# Patient Record
Sex: FEMALE | Race: BLACK OR AFRICAN AMERICAN | NOT HISPANIC OR LATINO | Employment: OTHER | URBAN - METROPOLITAN AREA
[De-identification: names, ages, dates, MRNs, and addresses within clinical notes are randomized per-mention and may not be internally consistent; named-entity substitution may affect disease eponyms.]

---

## 2017-01-03 ENCOUNTER — TELEPHONE (OUTPATIENT)
Dept: OBSTETRICS AND GYNECOLOGY | Facility: CLINIC | Age: 77
End: 2017-01-03

## 2017-01-03 NOTE — TELEPHONE ENCOUNTER
----- Message from Shalonda Sherwood sent at 1/3/2017 11:50 AM CST -----  Contact: Patient  Patient called to schedule her Mammo for tomorrow when she comes in. She can be contacted at [phone] cell.    Thanks,  Shalonda

## 2017-01-04 ENCOUNTER — HOSPITAL ENCOUNTER (OUTPATIENT)
Dept: RADIOLOGY | Facility: HOSPITAL | Age: 77
Discharge: HOME | End: 2017-01-04
Attending: OBSTETRICS & GYNECOLOGY
Payer: MEDICARE

## 2017-01-04 ENCOUNTER — OFFICE VISIT (OUTPATIENT)
Dept: OBSTETRICS AND GYNECOLOGY | Facility: CLINIC | Age: 77
End: 2017-01-04
Payer: MEDICARE

## 2017-01-04 VITALS
WEIGHT: 164.44 LBS | SYSTOLIC BLOOD PRESSURE: 136 MMHG | HEIGHT: 66 IN | DIASTOLIC BLOOD PRESSURE: 84 MMHG | BODY MASS INDEX: 26.43 KG/M2

## 2017-01-04 DIAGNOSIS — Z12.31 ENCOUNTER FOR SCREENING MAMMOGRAM FOR BREAST CANCER: ICD-10-CM

## 2017-01-04 DIAGNOSIS — Z12.31 ENCOUNTER FOR SCREENING MAMMOGRAM FOR BREAST CANCER: Primary | ICD-10-CM

## 2017-01-04 DIAGNOSIS — Z01.419 WELL WOMAN EXAM WITH ROUTINE GYNECOLOGICAL EXAM: Primary | ICD-10-CM

## 2017-01-04 PROCEDURE — 77067 SCR MAMMO BI INCL CAD: CPT | Mod: TC

## 2017-01-04 PROCEDURE — 77063 BREAST TOMOSYNTHESIS BI: CPT | Mod: 26,,, | Performed by: RADIOLOGY

## 2017-01-04 PROCEDURE — 99999 PR PBB SHADOW E&M-EST. PATIENT-LVL II: CPT | Mod: PBBFAC,,, | Performed by: OBSTETRICS & GYNECOLOGY

## 2017-01-04 PROCEDURE — 77067 SCR MAMMO BI INCL CAD: CPT | Mod: 26,,, | Performed by: RADIOLOGY

## 2017-01-04 PROCEDURE — G0101 CA SCREEN;PELVIC/BREAST EXAM: HCPCS | Mod: PBBFAC | Performed by: OBSTETRICS & GYNECOLOGY

## 2017-01-04 PROCEDURE — G0101 CA SCREEN;PELVIC/BREAST EXAM: HCPCS | Mod: S$PBB,,, | Performed by: OBSTETRICS & GYNECOLOGY

## 2017-01-04 NOTE — PROGRESS NOTES
CHIEF COMPLAINT   Gynecologic Exam  Chief Complaint   Patient presents with    Well Woman        HISTORY OF PRESENT ILLNESS  Patient presents for annual exam. The patient has no complaints today.  Wants to cont ERT.    No LMP recorded. Patient has had a hysterectomy. benign indications.  Ovaries remain.       Reports no bowel movement irregularities from baseline, bloating, or weight loss.    ERT:    on    Estradiol  0.5mg    Health Maintenance   Topic Date Due    Zoster Vaccine  06/19/2002    Pneumococcal (65+) (2 of 2 - PCV13) 04/18/2014    Lipid Panel  12/07/2016    Eye Exam  12/07/2016    Hemoglobin A1c  12/09/2016    Mammogram  12/30/2016    Foot Exam  06/30/2017    DEXA SCAN  11/24/2017    Colonoscopy  10/24/2021    TETANUS VACCINE  10/24/2024    Influenza Vaccine  Completed       HISTORY  Patient Active Problem List   Diagnosis    CKD (chronic kidney disease), stage III    Pancreatic cancer    Type II diabetes mellitus    Hypertension    Hyperlipidemia    Anemia in chronic renal disease    Allergic rhinitis due to pollen    Limb pain    SOB (shortness of breath)    Leg swelling    S/P partial thyroidectomy    Iron deficiency anemia    Hot flashes    Peripheral neuropathy       Past Medical History   Diagnosis Date    Anemia     CKD (chronic kidney disease), stage III     Diabetes mellitus type II     Encounter for blood transfusion     Gout, unspecified     Hyperlipidemia     Hypertension     Neuropathy     Pancreatic cancer     Renal failure     Thyroid disease        Past Surgical History   Procedure Laterality Date    Thyroidectomy, partial      Hysterectomy      Nephrectomy Left 5/2013     Dr Carter     Splenectomy, total      Pancreas surgery       distal pancreatectomy    Colonoscopy  2011     Dr. Favio Mims    Ventriculoatrial shunt Left 12/4/2014      left arm       Family History   Problem Relation Age of Onset    Cancer Mother      breast    Heart  disease Mother 60     MI    Hypertension Mother     Stroke Mother     Cancer Father      prostate    Cancer Brother      renal cell carcinoma    Diabetes Brother        Social History     Social History    Marital status:      Spouse name: N/A    Number of children: N/A    Years of education: N/A     Social History Main Topics    Smoking status: Former Smoker     Packs/day: 1.00     Years: 35.00     Quit date: 1/1/2001    Smokeless tobacco: Never Used    Alcohol use No    Drug use: No    Sexual activity: Not Asked     Other Topics Concern    None     Social History Narrative       Current Outpatient Prescriptions   Medication Sig Dispense Refill    allopurinol (ZYLOPRIM) 100 MG tablet TAKE 1 TABLET DAILY 90 tablet 1    amlodipine (NORVASC) 5 MG tablet Take 1 tablet (5 mg total) by mouth 2 (two) times daily. 180 tablet 3    cetirizine (ZYRTEC) 10 MG tablet Take 10 mg by mouth once daily.       estradiol (ESTRACE) 0.5 MG tablet TAKE 1 TABLET DAILY FOR 3 WEEKS PER MONTH, THEN DO NOT TAKE FOR 4TH WEEK EACH MONTH 90 tablet 2    furosemide (LASIX) 40 MG tablet Take 1 tablet (40 mg total) by mouth once daily. 30 tablet 11    gabapentin (NEURONTIN) 100 MG capsule Take 1 capsule by mouth 2 (two) times daily.      GLUCOSAMINE HCL/CHONDR ZAVALETA A NA (OSTEO BI-FLEX ORAL) Take by mouth 2 (two) times daily.        hydrALAZINE (APRESOLINE) 50 MG tablet TAKE 1 TABLET EVERY 8 HOURS 270 tablet 2    pravastatin (PRAVACHOL) 40 MG tablet Take 40 mg by mouth once daily.        repaglinide (PRANDIN) 0.5 MG tablet Take 1 tablet (0.5 mg total) by mouth 3 (three) times daily before meals. 270 tablet 2    sodium bicarbonate 650 MG tablet Take 1 tablet (650 mg total) by mouth 3 (three) times daily. 90 tablet 8    tramadol (ULTRAM) 50 mg tablet Take 1 tablet (50 mg total) by mouth every 12 (twelve) hours. 60 tablet 4     No current facility-administered medications for this visit.        Review of patient's  allergies indicates:   Allergen Reactions    Allegra [fexofenadine] Swelling    Codeine Hives, Itching and Nausea And Vomiting         PHYSICAL EXAM     Vitals:    01/04/17 1016   BP: 136/84       PAIN SCALE: 0/10 None    ROS:  GENERAL: No fever, chills, fatigability or weight loss.  ABDOMEN: Appetite fine. No weight loss. Denies diarrhea, abdominal pain, hematemesis or blood in stool.  No change in bowel movement pattern.  URINARY: No flank pain, dysuria or hematuria.  REPRODUCTIVE: No abnormal vaginal bleeding.  BREASTS: Breasts symmetric, nontender and no lumps detected.    PE:   APPEARANCE: Well nourished, well developed, in no acute distress.  NECK: Neck symmetric without masses or thyromegaly.     NODES: No inguinal lymph node enlargement.  ABDOMEN: Soft. No tenderness or masses. No hepatosplenomegaly. No hernias.  BREASTS: Symmetrical, no skin changes or visible lesions. No palpable masses, nipple discharge or adenopathy bilaterally.    PELVIC:   VULVA: No lesions. Atrophic but normal female genitalia.  URETHRAL MEATUS: Normal size and location, no lesions, no prolapse.  URETHRA: No masses, tenderness, prolapse or scarring.  PELVIC: Normal external female genitalia without lesions. Normal hair distribution. Adequate perineal body, normal urethral meatus. Vagina dry and narrow without lesions or discharge. No significant cystocele or rectocele. Uterus and cervix surgically absent. Bimanual exam revealed no masses, tenderness or abnormality.    ANUS, PERINEUM: no masses, external hemorrhoids noted.       DIAGNOSIS:   1. Annual exam  2. Physiologic atrophy      PLAN:    Mammogram  Colonoscopy  dexa    MEDICATIONS PRESCRIBED: calcium vitamin D weight bearing exercise   wean off of estraogen reviewed. Pt in agreement     COUNSELING:  Patient was counseled today on A.C.S. Pap guidelines and recommendations for yearly pelvic exams, mammograms and monthly self breast exams; to see her PCP for other health  maintenance.   A full discussion of the benefit-risk ratio of hormonal replacement therapy was carried out. Improvement in vasomotor and other climacteric symptoms is discussed, including possible improvements in sleep and mood. Reduction of risk for osteoporosis was explained. We discussed the study data showing increased risk of thrombo-embolic events such as myocardial infarction, stroke and also possibly breast cancer with estrogen replacement, and how this might affect her. The range of side effects such as breast tenderness, weight gain and including possible increases in lifetime risk of breast cancer and possible thrombotic complications was discussed. We also discussed ACOG's recommendation to use hormone replacement therapy for the relief of hot flashes alone and to be on the lowest dose possible for the shortest amount of time.  Alternative such as herbal and soy-based products were reviewed. All of her questions about this therapy were answered.  If taking hormones, pt knows and understands to discontinue immediately if she develops chest pain, heart problems, MI, DVT, CVA, breast cancer.      FOLLOW-UP: With me in 1 year.

## 2017-01-12 ENCOUNTER — LAB VISIT (OUTPATIENT)
Dept: LAB | Facility: HOSPITAL | Age: 77
End: 2017-01-12
Attending: INTERNAL MEDICINE
Payer: MEDICARE

## 2017-01-12 DIAGNOSIS — N18.5 CHRONIC KIDNEY DISEASE (CKD), STAGE V: ICD-10-CM

## 2017-01-12 LAB
ALBUMIN SERPL BCP-MCNC: 3.6 G/DL
ANION GAP SERPL CALC-SCNC: 15 MMOL/L
BUN SERPL-MCNC: 85 MG/DL
CALCIUM SERPL-MCNC: 9.6 MG/DL
CHLORIDE SERPL-SCNC: 100 MMOL/L
CO2 SERPL-SCNC: 25 MMOL/L
CREAT SERPL-MCNC: 4.4 MG/DL
EST. GFR  (AFRICAN AMERICAN): 10.7 ML/MIN/1.73 M^2
EST. GFR  (NON AFRICAN AMERICAN): 9.3 ML/MIN/1.73 M^2
GLUCOSE SERPL-MCNC: 226 MG/DL
PHOSPHATE SERPL-MCNC: 5 MG/DL
POTASSIUM SERPL-SCNC: 4.4 MMOL/L
SODIUM SERPL-SCNC: 140 MMOL/L

## 2017-01-12 PROCEDURE — 80069 RENAL FUNCTION PANEL: CPT

## 2017-01-12 PROCEDURE — 36415 COLL VENOUS BLD VENIPUNCTURE: CPT | Mod: PO

## 2017-01-30 DIAGNOSIS — M10.9 ACUTE GOUT OF FOOT, UNSPECIFIED CAUSE, UNSPECIFIED LATERALITY: ICD-10-CM

## 2017-01-30 RX ORDER — ALLOPURINOL 100 MG/1
TABLET ORAL
Qty: 90 TABLET | Refills: 2 | Status: SHIPPED | OUTPATIENT
Start: 2017-01-30 | End: 2017-11-27 | Stop reason: SDUPTHER

## 2017-02-13 ENCOUNTER — OFFICE VISIT (OUTPATIENT)
Dept: ALLERGY | Facility: CLINIC | Age: 77
End: 2017-02-13
Payer: MEDICARE

## 2017-02-13 VITALS
SYSTOLIC BLOOD PRESSURE: 138 MMHG | BODY MASS INDEX: 26.08 KG/M2 | DIASTOLIC BLOOD PRESSURE: 60 MMHG | RESPIRATION RATE: 15 BRPM | TEMPERATURE: 97 F | WEIGHT: 159.19 LBS | HEART RATE: 74 BPM

## 2017-02-13 DIAGNOSIS — E11.59 HYPERTENSION ASSOCIATED WITH DIABETES: Chronic | ICD-10-CM

## 2017-02-13 DIAGNOSIS — N18.30 CKD (CHRONIC KIDNEY DISEASE), STAGE III: ICD-10-CM

## 2017-02-13 DIAGNOSIS — J30.89 ALLERGIC RHINITIS DUE TO OTHER ALLERGEN: Primary | ICD-10-CM

## 2017-02-13 DIAGNOSIS — I15.2 HYPERTENSION ASSOCIATED WITH DIABETES: Chronic | ICD-10-CM

## 2017-02-13 DIAGNOSIS — H10.13 ALLERGIC CONJUNCTIVITIS, BILATERAL: ICD-10-CM

## 2017-02-13 DIAGNOSIS — C25.2 MALIGNANT NEOPLASM OF TAIL OF PANCREAS: ICD-10-CM

## 2017-02-13 PROCEDURE — 99214 OFFICE O/P EST MOD 30 MIN: CPT | Mod: 25,S$PBB,, | Performed by: ALLERGY & IMMUNOLOGY

## 2017-02-13 PROCEDURE — 99999 PR PBB SHADOW E&M-EST. PATIENT-LVL III: CPT | Mod: PBBFAC,,, | Performed by: ALLERGY & IMMUNOLOGY

## 2017-02-13 PROCEDURE — 96372 THER/PROPH/DIAG INJ SC/IM: CPT | Mod: PBBFAC,PO

## 2017-02-13 PROCEDURE — 99213 OFFICE O/P EST LOW 20 MIN: CPT | Mod: PBBFAC,PO | Performed by: ALLERGY & IMMUNOLOGY

## 2017-02-13 RX ORDER — BETAMETHASONE SODIUM PHOSPHATE AND BETAMETHASONE ACETATE 3; 3 MG/ML; MG/ML
6 INJECTION, SUSPENSION INTRA-ARTICULAR; INTRALESIONAL; INTRAMUSCULAR; SOFT TISSUE
Status: COMPLETED | OUTPATIENT
Start: 2017-02-13 | End: 2017-02-13

## 2017-02-13 RX ADMIN — BETAMETHASONE ACETATE AND BETAMETHASONE SODIUM PHOSPHATE 6 MG: 3; 3 INJECTION, SUSPENSION INTRA-ARTICULAR; INTRALESIONAL; INTRAMUSCULAR; SOFT TISSUE at 01:02

## 2017-02-13 NOTE — MR AVS SNAPSHOT
[facility] Allergy/ Immunology  [address] [place] [address]  Phone: [phone]  Fax: [phone]                  Cailin Pickett   2017 1:00 PM   Office Visit    Description:  Female : 1940   Provider:  Adrian Arevalo MD   Department:  [facility] - Allergy/ Immunology           Reason for Visit     Other           Diagnoses this Visit        Comments    Allergic rhinitis due to other allergen    -  Primary     Allergic conjunctivitis, bilateral                To Do List           Future Appointments        Provider Department Dept Phone    3/6/2017 9:55 AM LABORATORY, [facility] [phone]    3/6/2017 10:00 AM SPECIMEN, [facility] [phone]    3/6/2017 11:00 AM Nas Hoffman MD [facility] Hemotology Oncology [phone]    3/23/2017 11:30 AM Tank Vinson MD [facility] Nephrology [phone]      Goals (5 Years of Data)     None      [facility]     [facility] Care [facility] -  Assistance  Registered nurses in the [facility] provide clinical advisement, health education, appointment booking, and other advisory services.  Call for this free service at [phone].             Medications           Message regarding Medications     Verify the changes and/or additions to your medication regime listed below are the same as discussed with your clinician today.  If any of these changes or additions are incorrect, please notify your healthcare provider.        These medications were administered today        Dose Freq    betamethasone acetate-betamethasone sodium phosphate injection 6 mg 6 mg Clinic/HOD 1 time    Sig: Inject 1 mL (6 mg total) into the muscle one time.    Class: Normal    Route: Intramuscular           Verify that the below list of medications is an accurate representation of the medications you are currently taking.  If none reported, the list may be blank. If incorrect, please  contact your healthcare provider. Carry this list with you in case of emergency.           Current Medications     allopurinol (ZYLOPRIM) 100 MG tablet TAKE 1 TABLET DAILY    amlodipine (NORVASC) 5 MG tablet Take 1 tablet (5 mg total) by mouth 2 (two) times daily.    cetirizine (ZYRTEC) 10 MG tablet Take 10 mg by mouth once daily.     estradiol (ESTRACE) 0.5 MG tablet TAKE 1 TABLET DAILY FOR 3 WEEKS PER MONTH, THEN DO NOT TAKE FOR 4TH WEEK EACH MONTH    furosemide (LASIX) 40 MG tablet Take 1 tablet (40 mg total) by mouth once daily.    gabapentin (NEURONTIN) 100 MG capsule Take 1 capsule by mouth 2 (two) times daily.    GLUCOSAMINE HCL/CHONDR ZAVALETA A NA (OSTEO BI-FLEX ORAL) Take by mouth 2 (two) times daily.      hydrALAZINE (APRESOLINE) 50 MG tablet TAKE 1 TABLET EVERY 8 HOURS    pravastatin (PRAVACHOL) 40 MG tablet Take 40 mg by mouth once daily.      repaglinide (PRANDIN) 0.5 MG tablet Take 1 tablet (0.5 mg total) by mouth 3 (three) times daily before meals.    sodium bicarbonate 650 MG tablet Take 1 tablet (650 mg total) by mouth 3 (three) times daily.    tramadol (ULTRAM) 50 mg tablet Take 1 tablet (50 mg total) by mouth every 12 (twelve) hours.           Clinical Reference Information           Your Vitals Were     BP Pulse Temp Resp Weight Last Period    138/60 74 96.7 °F (35.9 °C) 15 72.2 kg (159 lb 2.8 oz) 07/27/1982    BMI                26.08 kg/m2          Blood Pressure          Most Recent Value    BP  138/60      Allergies as of 2/13/2017     Allegra [Fexofenadine]    Codeine      Immunizations Administered on Date of Encounter - 2/13/2017     None      Administrations This Visit     betamethasone acetate-betamethasone sodium phosphate injection 6 mg     Admin Date Action Dose Route Administered By             02/13/2017 Given 6 mg Intramuscular Nathaly Breaux LPN                      Maintenance Dialysis History     Patient has no recorded history of maintenance dialysis.      Instructions    Per  discussion.       Language Assistance Services     ATTENTION: Language assistance services are available, free of charge. Please call [phone].      ATENCIÓN: Si habla humera, tiene a salmon disposición servicios gratuitos de asistencia lingüística. Llame al [phone].     CHÚ Ý: N?u b?n nói Ti?ng Vi?t, có các d?ch v? h? tr? ngôn ng? mi?n phí dành cho b?n. G?i s? [phone].         [facility] - All[facility]/ [facility] complies with applicable Federal civil rights laws and does not discriminate on the basis of race, color, national origin, age, disability, or sex.

## 2017-02-13 NOTE — PROGRESS NOTES
Chief complaint: ALLERGY symptoms    This note was dictated using voice recognition software may contain errors.    History:    She had a 1 PM appointment on Monday, Feb. 13 2017.  She stated this past weekend she spent a great deal of time outdoors working in regard.  She stated that her ALLERGIC respiratory symptoms have significantly worsened, she is also experiencing ALLERGIC ocular symptoms.  She requested treatment.  In the past she is found administration of an intramuscular injection of Celestone to be very helpful.  She requested treatment with this medication today.    Information in her medical record regarding her past medical history family history and social history was reviewed and updated today.  Significant additions if any are as noted above or below.    She stated after her appointment December 2, 2016 she did improve after receiving treatment.    Review of systems: She is experiencing itching sneezing and excessive nasal mucus production.  She is experiencing itching of the eyes.    Exam:    In general she is in no distress.  She is alert and oriented well-developed in good mood and attentive  Gait steady  Skin no rash noted  Eyes sclera white conjunctiva slightly inflamed more so on the left  Ears not inflamed tympanic membranes not inflamed  Nose patent pink because of clear rhinorrhea  Mouth no swelling or inflammation of the lips tongue or in the throat noted  Neck no thyromegaly or masses noted  Lymph nodes no significant cervical or epitrochlear lymphadenopathy noted  Lungs clear to auscultation  Heart regular rhythm systolic murmur heard  Extremities no swelling or inflammation of the hands or legs noted    Impression:    #1 ALLERGIC rhinitis  #2 ALLERGIC conjunctivitis  #3 other health concerns as noted in her medical record    Assessment and plan:    At her request, while she was here today, my nurse administered by intramuscular injection Celestone 6 mg.  Should she not improve or have  additional questions or concerns she was instructed to call.  She was given the office phone number.

## 2017-03-06 ENCOUNTER — TELEPHONE (OUTPATIENT)
Dept: HEMATOLOGY/ONCOLOGY | Facility: CLINIC | Age: 77
End: 2017-03-06

## 2017-03-06 ENCOUNTER — OFFICE VISIT (OUTPATIENT)
Dept: HEMATOLOGY/ONCOLOGY | Facility: CLINIC | Age: 77
End: 2017-03-06
Payer: MEDICARE

## 2017-03-06 ENCOUNTER — HOSPITAL ENCOUNTER (OUTPATIENT)
Dept: RADIOLOGY | Facility: HOSPITAL | Age: 77
Discharge: HOME | End: 2017-03-06
Attending: INTERNAL MEDICINE
Payer: MEDICARE

## 2017-03-06 VITALS
OXYGEN SATURATION: 98 % | HEIGHT: 65 IN | TEMPERATURE: 97 F | DIASTOLIC BLOOD PRESSURE: 68 MMHG | SYSTOLIC BLOOD PRESSURE: 150 MMHG | WEIGHT: 162.5 LBS | HEART RATE: 66 BPM | BODY MASS INDEX: 27.07 KG/M2

## 2017-03-06 DIAGNOSIS — C25.2 MALIGNANT NEOPLASM OF TAIL OF PANCREAS: ICD-10-CM

## 2017-03-06 DIAGNOSIS — G89.29 CHRONIC RIGHT-SIDED LOW BACK PAIN WITHOUT SCIATICA: ICD-10-CM

## 2017-03-06 DIAGNOSIS — M54.50 CHRONIC RIGHT-SIDED LOW BACK PAIN WITHOUT SCIATICA: ICD-10-CM

## 2017-03-06 DIAGNOSIS — N18.9 ANEMIA IN CHRONIC RENAL DISEASE: Primary | ICD-10-CM

## 2017-03-06 DIAGNOSIS — N18.9 ANEMIA IN CHRONIC RENAL DISEASE: ICD-10-CM

## 2017-03-06 DIAGNOSIS — D63.1 ANEMIA IN CHRONIC RENAL DISEASE: Primary | ICD-10-CM

## 2017-03-06 DIAGNOSIS — D63.1 ANEMIA IN CHRONIC RENAL DISEASE: ICD-10-CM

## 2017-03-06 PROCEDURE — 72100 X-RAY EXAM L-S SPINE 2/3 VWS: CPT | Mod: TC,PO

## 2017-03-06 PROCEDURE — 72100 X-RAY EXAM L-S SPINE 2/3 VWS: CPT | Mod: 26,,, | Performed by: RADIOLOGY

## 2017-03-06 PROCEDURE — 99214 OFFICE O/P EST MOD 30 MIN: CPT | Mod: S$PBB,,, | Performed by: INTERNAL MEDICINE

## 2017-03-06 PROCEDURE — 99999 PR PBB SHADOW E&M-EST. PATIENT-LVL III: CPT | Mod: PBBFAC,,, | Performed by: INTERNAL MEDICINE

## 2017-03-06 NOTE — PROGRESS NOTES
Reason for visit: Pancreatic adenocarcinoma/Anemia due to chronic kidney disease  Date of Diagnosis: 2012    HPI:   The patient is a 76-year-old  female who presents to the hematology oncology clinic today for follow up. She underwent resection surgery for her pancreatic tail high-grade carcinoma on May 15, 2013 by Dr. Carter at [facility], [place]. The patient had previously completed neoadjuvant chemotherapy and 5-FU chemoradiation with excellent response.  Today the patient reports that she feels well except for occasional episodes of lower back pain. Energy levels are good. She reports that her appetite is good. She denies any fever, chills or night sweats. She denies any chest pain or shortness of breath. She denies any bowel or urinary complaints. The patient denies any nausea or vomiting. She denies any abdominal pain today.  I have reviewed all of the patient's interval clinical history available in EPIC. She continues to report tingling pain in the soles of her feet since completing her chemotherapy. Neurontin did not help and she stopped lyrica because of side effects.   She is being closely followed by Dr. Vinson with nephrology with regard to her kidney function. She has also had A-V fistula done in her left upper extremity by Dr. Foster.    PAST MEDICAL HISTORY:   1. Hypertension  2. GERD  3. Migraines  4. Gout  5. Chronic kidney disease stage IV   6. Bilateral renal cysts  7. Type 2 diabetes mellitus    SURGICAL HISTORY:   1. Hysterectomy for fibroids  2. Partial thyroidectomy  3. Left mediport placement  4. Distal pancreatectomy, splenectomy, left nephrectomy and adrenalectomy on May 15, 2013  5. Left AV fistula on 14    FAMILY HISTORY: The patient's mother  from complications related to breast cancer at the age of 60. The patient's father  from complications related to prostate cancer at the age of 81. The patient's sister was diagnosed with breast cancer at the  age of 68. The patient's brother had renal cell carcinoma in both kidneys [sporadic] approximately 4 years apart. He has since had a kidney transplant. Another brother has kidney cancer diagnosed at 68. She denies any other immediate family members with cancer or bleeding/clotting disorders.    SOCIAL HISTORY: She reports a 30+ pack year smoking history and quit in 2001. She does not drink alcohol. She has never used any recreational drugs. She worked for the [employer] in [place] and retired in 1986. She is  and does not have any children. She lives in [place], [place].    ALLERGIES: Reviewed on medication card.    MEDICATIONS: [Medcard has been reviewed and/or reconciled.]    REVIEW OF SYSTEMS:   GENERAL: [No fevers, chills or sweats.] Denies fatigue. Appetite is good. Weight is stable.  HEENT: [No blurred vision, tinnitus, nasal discharge, sorethroat or dysphagia.]  HEART: [No chest pain, palpitations or shortness of breath.]   LUNGS: [No hemoptysis, cough or breathing problems.]   ABDOMEN: [No abdominal pain, constipation or melena.] Denies nausea, vomiting.  GENITOURINARY: [No dysuria, bleeding or malodorous discharge.]  NEURO: [No headache, dizziness or vertigo.]  HEMATOLOGY: [No easy bruising, spontaneous bleeding or blood clots in the past].  MUSCULOSKELETAL: She denies any myalgias or bone pain. Does report occasional low back pain.  SKIN: [No rashes or skin lesions.]  PSYCHIATRY: [No depression or anxiety.]    PHYSICAL EXAMINATION:   VS: Reviewed on nurse's notes.  APPEARANCE: The patient is a well-developed, well-nourished and well-groomed  female who appears in no acute distress.   HEENT: No scleral icterus. Both external auditory canals clear. No oral ulcers, lesions. Throat clear  HEAD: No sinus tenderness. She has facial hair suggestive of hirsutism.   NECK: Supple. No palpable lymphadenopathy. Thyroid non-tender, no palpable masses  CHEST: Breath sounds  clear bilaterally. No rales. No rhonchi. Unlabored respirations.  CARDIOVASCULAR: Normal S1, S2. Normal rate. Regular rhythm.  ABDOMEN: Bowel sounds normal. No tenderness. No abdominal distention. No hepatomegaly. No splenomegaly. Healed incision noted.  LYMPHATIC: No palpable supraclavicular, axillary nodes  EXTREMITIES: No clubbing, cyanosis. No edema of bilateral lower extremities. Left A-V fistula noted.  SKIN: No lesions. No petechiae. No ecchymoses. No induration or nodules  NEUROLOGIC: No focal findings. Alert & Oriented x 3. Mood appropriate to affect    LABS:   Reviewed    IMAGING:  Reviewed    IMPRESSION:  1. Pancreatic adenocarcinoma (T3N0Mx)  2. Chronic kidney disease (stage 4)  3. Anemia due to CKD  4. Peripheral neuropathy  5. Bilateral lower extremity edema  6. Lower back pain    EDWIN:  1. Results of CBC from today were discussed in detail. I will hold off on initiation of Procrit at this time as her hemoglobin continues to be greater than 10 g/dL.  2. I have encouraged good p.o. intake of food and fluids. I have encouraged regular exercise.   3. Continue follow up with Dr. Vinson as recommended with regard to CKD  4. There appears to be no evidence of recurrence of her pancreatic adenocarcinoma at this time. We discussed that there is no benefit for surveillance imaging in her case. We will obtain imaging if any new symptoms.  5. Continue elavil for treatment of peripheral neuropathy. This medication has been helping but with some anticholinergic side effects such as dry mouth. She has not had relief from trying multiple other medications for her peripheral neuropathy. Risks/benefits and common side effects discussed in detail. She knows not to stop it abruptly and is also aware of sedation precautions.  6. Conservative measures for low back pain. Recheck lumbar xray today. Previous noted to have OA/DDD which is likely etiology for this. Chronic and stable for several years now. She knows to let us  know if any worsening.  7. She is planning to get her screening colonoscopy done this year via her PCP Dr. Favio Titus's office.    Return to clinic in 3 months with CBC. She knows to call sooner for any new problems or questions.    Nas Hoffman MD

## 2017-03-06 NOTE — TELEPHONE ENCOUNTER
----- Message from Nas Hoffman MD sent at 3/6/2017 11:51 AM CST -----  Xray shows arthritis as expected. No fracture or dislocation. Thank you.

## 2017-03-22 ENCOUNTER — OFFICE VISIT (OUTPATIENT)
Dept: CARDIOLOGY | Facility: CLINIC | Age: 77
End: 2017-03-22
Payer: MEDICARE

## 2017-03-22 VITALS
BODY MASS INDEX: 27.31 KG/M2 | HEART RATE: 62 BPM | DIASTOLIC BLOOD PRESSURE: 54 MMHG | WEIGHT: 163.94 LBS | OXYGEN SATURATION: 98 % | HEIGHT: 65 IN | SYSTOLIC BLOOD PRESSURE: 144 MMHG

## 2017-03-22 DIAGNOSIS — E11.59 HYPERTENSION ASSOCIATED WITH DIABETES: Chronic | ICD-10-CM

## 2017-03-22 DIAGNOSIS — E78.5 HYPERLIPIDEMIA, UNSPECIFIED HYPERLIPIDEMIA TYPE: Chronic | ICD-10-CM

## 2017-03-22 DIAGNOSIS — M79.604 PAIN IN BOTH LOWER EXTREMITIES: Primary | ICD-10-CM

## 2017-03-22 DIAGNOSIS — R06.02 SOB (SHORTNESS OF BREATH): ICD-10-CM

## 2017-03-22 DIAGNOSIS — M79.605 PAIN IN BOTH LOWER EXTREMITIES: Primary | ICD-10-CM

## 2017-03-22 DIAGNOSIS — N18.30 CKD (CHRONIC KIDNEY DISEASE), STAGE III: ICD-10-CM

## 2017-03-22 DIAGNOSIS — I15.2 HYPERTENSION ASSOCIATED WITH DIABETES: Chronic | ICD-10-CM

## 2017-03-22 PROCEDURE — 99999 PR PBB SHADOW E&M-EST. PATIENT-LVL III: CPT | Mod: PBBFAC,,, | Performed by: INTERNAL MEDICINE

## 2017-03-22 PROCEDURE — 99214 OFFICE O/P EST MOD 30 MIN: CPT | Mod: S$PBB,,, | Performed by: INTERNAL MEDICINE

## 2017-03-22 PROCEDURE — 99213 OFFICE O/P EST LOW 20 MIN: CPT | Mod: PBBFAC | Performed by: INTERNAL MEDICINE

## 2017-03-22 NOTE — PROGRESS NOTES
Subjective:   Patient ID:  Cailin Pickett is a 76 y.o. female who presents for follow-up of HTN, HLP.  Patient denies CP, angina or anginal equivalent.    Hypertension   This is a chronic problem. The current episode started more than 1 year ago. The problem has been gradually improving since onset. The problem is controlled. Pertinent negatives include no chest pain, palpitations or shortness of breath. Past treatments include direct vasodilators, calcium channel blockers and diuretics. The current treatment provides moderate improvement. Compliance problems include exercise.    Hyperlipidemia   This is a chronic problem. The current episode started more than 1 year ago. The problem is controlled. Recent lipid tests were reviewed and are variable. Pertinent negatives include no chest pain or shortness of breath. Current antihyperlipidemic treatment includes statins. The current treatment provides moderate improvement of lipids. Compliance problems include medication side effects.        Review of Systems   Constitution: Negative. Negative for weight gain.   HENT: Negative.    Eyes: Negative.    Cardiovascular: Negative.  Negative for chest pain, leg swelling and palpitations.   Respiratory: Negative.  Negative for shortness of breath.    Endocrine: Negative.    Hematologic/Lymphatic: Negative.    Skin: Negative.    Musculoskeletal: Negative for muscle weakness.   Gastrointestinal: Negative.    Genitourinary: Negative.    Neurological: Negative.  Negative for dizziness.   Psychiatric/Behavioral: Negative.    Allergic/Immunologic: Negative.      Family History   Problem Relation Age of Onset    Cancer Mother      breast    Heart disease Mother 60     MI    Hypertension Mother     Stroke Mother     Cancer Father      prostate    Cancer Brother      renal cell carcinoma    Diabetes Brother      Past Medical History:   Diagnosis Date    Anemia     CKD (chronic kidney disease), stage III     Diabetes  mellitus type II     Encounter for blood transfusion     Gout, unspecified     Hyperlipidemia     Hypertension     Neuropathy     Pancreatic cancer     Renal failure     Thyroid disease      Current Outpatient Prescriptions on File Prior to Visit   Medication Sig Dispense Refill    allopurinol (ZYLOPRIM) 100 MG tablet TAKE 1 TABLET DAILY 90 tablet 2    amlodipine (NORVASC) 5 MG tablet Take 1 tablet (5 mg total) by mouth 2 (two) times daily. 180 tablet 3    cetirizine (ZYRTEC) 10 MG tablet Take 10 mg by mouth once daily.       estradiol (ESTRACE) 0.5 MG tablet TAKE 1 TABLET DAILY FOR 3 WEEKS PER MONTH, THEN DO NOT TAKE FOR 4TH WEEK EACH MONTH 90 tablet 2    furosemide (LASIX) 40 MG tablet Take 1 tablet (40 mg total) by mouth once daily. 30 tablet 11    gabapentin (NEURONTIN) 100 MG capsule Take 1 capsule by mouth 2 (two) times daily.      GLUCOSAMINE HCL/CHONDR ZAVALETA A NA (OSTEO BI-FLEX ORAL) Take by mouth 2 (two) times daily.        hydrALAZINE (APRESOLINE) 50 MG tablet TAKE 1 TABLET EVERY 8 HOURS 270 tablet 2    pravastatin (PRAVACHOL) 40 MG tablet Take 40 mg by mouth once daily.        repaglinide (PRANDIN) 0.5 MG tablet Take 1 tablet (0.5 mg total) by mouth 3 (three) times daily before meals. 270 tablet 2    sodium bicarbonate 650 MG tablet Take 1 tablet (650 mg total) by mouth 3 (three) times daily. 90 tablet 8    tramadol (ULTRAM) 50 mg tablet Take 1 tablet (50 mg total) by mouth every 12 (twelve) hours. 60 tablet 4     No current facility-administered medications on file prior to visit.      Review of patient's allergies indicates:   Allergen Reactions    Allegra [fexofenadine] Swelling    Codeine Hives, Itching and Nausea And Vomiting       Objective:     Physical Exam   Constitutional: She is oriented to person, place, and time. She appears well-developed and well-nourished.   HENT:   Head: Normocephalic and atraumatic.   Eyes: Conjunctivae and EOM are normal. Pupils are equal, round, and  reactive to light.   Neck: Normal range of motion. Neck supple.   Cardiovascular: Normal rate, regular rhythm, normal heart sounds and intact distal pulses.    Pulmonary/Chest: Effort normal and breath sounds normal.   Abdominal: Soft. Bowel sounds are normal.   Musculoskeletal: Normal range of motion.   Neurological: She is alert and oriented to person, place, and time.   Skin: Skin is warm and dry.   Psychiatric: She has a normal mood and affect.   Nursing note and vitals reviewed.      Assessment:     1. Pain in both lower extremities    2. SOB (shortness of breath)    3. Hypertension associated with diabetes    4. CKD (chronic kidney disease), stage III    5. Hyperlipidemia, unspecified hyperlipidemia type        Plan:     Pain in both lower extremities    SOB (shortness of breath)    Hypertension associated with diabetes    CKD (chronic kidney disease), stage III    Hyperlipidemia, unspecified hyperlipidemia type    continue norvasc, hydralazine, diuretics-htn  Continue statin-hlp

## 2017-03-23 ENCOUNTER — OFFICE VISIT (OUTPATIENT)
Dept: NEPHROLOGY | Facility: CLINIC | Age: 77
End: 2017-03-23
Payer: MEDICARE

## 2017-03-23 VITALS
HEIGHT: 65 IN | WEIGHT: 160.94 LBS | DIASTOLIC BLOOD PRESSURE: 58 MMHG | BODY MASS INDEX: 26.81 KG/M2 | HEART RATE: 72 BPM | SYSTOLIC BLOOD PRESSURE: 152 MMHG

## 2017-03-23 DIAGNOSIS — E87.20 METABOLIC ACIDOSIS: ICD-10-CM

## 2017-03-23 DIAGNOSIS — N18.5 CHRONIC KIDNEY DISEASE (CKD), STAGE V: Primary | ICD-10-CM

## 2017-03-23 DIAGNOSIS — L29.9 ITCHING: ICD-10-CM

## 2017-03-23 PROCEDURE — 99213 OFFICE O/P EST LOW 20 MIN: CPT | Mod: PBBFAC,PO | Performed by: INTERNAL MEDICINE

## 2017-03-23 PROCEDURE — 99214 OFFICE O/P EST MOD 30 MIN: CPT | Mod: S$PBB,,, | Performed by: INTERNAL MEDICINE

## 2017-03-23 PROCEDURE — 99999 PR PBB SHADOW E&M-EST. PATIENT-LVL III: CPT | Mod: PBBFAC,,, | Performed by: INTERNAL MEDICINE

## 2017-03-23 RX ORDER — HYDRALAZINE HYDROCHLORIDE 100 MG/1
100 TABLET, FILM COATED ORAL EVERY 8 HOURS
Qty: 270 TABLET | Refills: 3 | Status: SHIPPED | OUTPATIENT
Start: 2017-03-23 | End: 2017-11-27 | Stop reason: SDUPTHER

## 2017-03-23 RX ORDER — HYDROXYZINE HYDROCHLORIDE 25 MG/1
25 TABLET, FILM COATED ORAL 3 TIMES DAILY PRN
Qty: 90 TABLET | Refills: 8 | Status: SHIPPED | OUTPATIENT
Start: 2017-03-23 | End: 2017-11-27 | Stop reason: SDUPTHER

## 2017-03-23 NOTE — MR AVS SNAPSHOT
[facility] Nephrology  [address] [place] [address]  Phone: [phone]  Fax: [phone]                  Cailin Pickett   3/23/2017 11:30 AM   Office Visit    Description:  Female : 1940   Provider:  Tank Vinson MD   Department:  [facility] - Nephrology           Reason for Visit     Chronic Kidney Disease     Follow-up           Diagnoses this Visit        Comments    Chronic kidney disease (CKD), stage V    -  Primary     Itching         Metabolic acidosis                To Do List           Future Appointments        Provider Department Dept Phone    2017 10:50 AM LABORATORY, [facility] [phone]    2017 11:00 AM Nas Hoffman MD [facility] Hemotology Oncology [phone]    2017 11:30 AM LABORATORY, [facility] [phone]    2017 11:40 AM SPECIMEN, [facility] [phone]    2017 1:30 PM Tank Vinson MD [facility] Nephrology [phone]      Goals (5 Years of Data)     None      Follow-Up and Disposition     Return in about 4 months (around 2017).       These Medications        Disp Refills Start End    hydrOXYzine HCl (ATARAX) 25 MG tablet 90 tablet 8 3/23/2017     Take 1 tablet (25 mg total) by mouth 3 (three) times daily as needed for Itching. - Oral    Pharmacy: [facility] - [address] Ph #: [phone]       hydrALAZINE (APRESOLINE) 100 MG tablet 270 tablet 3 3/23/2017 3/23/2018    Take 1 tablet (100 mg total) by mouth every 8 (eight) hours. - Oral    Pharmacy: [facility] - [address] Ph #: [phone]         [facility]     [facility] On Call Nurse Care Line -  Assistance  Registered nurses in the [facility] Center provide clinical advisement, health education, appointment booking, and other advisory services.  Call for this free service at  [phone].             Medications           Message regarding Medications     Verify the changes and/or additions to your medication regime listed below are the same as discussed with your clinician today.  If any of these changes or additions are incorrect, please notify your healthcare provider.        START taking these NEW medications        Refills    hydrOXYzine HCl (ATARAX) 25 MG tablet 8    Sig: Take 1 tablet (25 mg total) by mouth 3 (three) times daily as needed for Itching.    Class: Normal    Route: Oral    hydrALAZINE (APRESOLINE) 100 MG tablet 3    Sig: Take 1 tablet (100 mg total) by mouth every 8 (eight) hours.    Class: Normal    Route: Oral           Verify that the below list of medications is an accurate representation of the medications you are currently taking.  If none reported, the list may be blank. If incorrect, please contact your healthcare provider. Carry this list with you in case of emergency.           Current Medications     allopurinol (ZYLOPRIM) 100 MG tablet TAKE 1 TABLET DAILY    amlodipine (NORVASC) 5 MG tablet Take 1 tablet (5 mg total) by mouth 2 (two) times daily.    cetirizine (ZYRTEC) 10 MG tablet Take 10 mg by mouth once daily.     estradiol (ESTRACE) 0.5 MG tablet TAKE 1 TABLET DAILY FOR 3 WEEKS PER MONTH, THEN DO NOT TAKE FOR 4TH WEEK EACH MONTH    furosemide (LASIX) 40 MG tablet Take 1 tablet (40 mg total) by mouth once daily.    gabapentin (NEURONTIN) 100 MG capsule Take 1 capsule by mouth 2 (two) times daily.    GLUCOSAMINE HCL/CHONDR ZAVALETA A NA (OSTEO BI-FLEX ORAL) Take by mouth 2 (two) times daily.      pravastatin (PRAVACHOL) 40 MG tablet Take 40 mg by mouth once daily.      repaglinide (PRANDIN) 0.5 MG tablet Take 1 tablet (0.5 mg total) by mouth 3 (three) times daily before meals.    sodium bicarbonate 650 MG tablet Take 1 tablet (650 mg total) by mouth 3 (three) times daily.    tramadol (ULTRAM) 50 mg tablet Take 1 tablet (50 mg total) by mouth every 12  "(twelve) hours.    hydrALAZINE (APRESOLINE) 100 MG tablet Take 1 tablet (100 mg total) by mouth every 8 (eight) hours.    hydrOXYzine HCl (ATARAX) 25 MG tablet Take 1 tablet (25 mg total) by mouth 3 (three) times daily as needed for Itching.           Clinical Reference Information           Your Vitals Were     BP Pulse Height Weight Last Period BMI    152/58 72 5' 5" (1.651 m) 73 kg (160 lb 15 oz) 07/27/1982 26.78 kg/m2      Blood Pressure          Most Recent Value    BP  (!)  152/58      Allergies as of 3/23/2017     Allegra [Fexofenadine]    Codeine      Immunizations Administered on Date of Encounter - 3/23/2017     None      Orders Placed During Today's Visit     Future Labs/Procedures Expected by Expires    CBC auto differential  3/23/2017 5/22/2018    Protein / creatinine ratio, urine  3/23/2017 3/23/2018    Renal function panel  3/23/2017 5/22/2018    Urinalysis  3/23/2017 3/23/2018      Maintenance Dialysis History     Patient has no recorded history of maintenance dialysis.      Instructions    Avoid NSAID pain medications such as advil, aleve, motrin, ibuprofen, naprosyn, meloxicam, diclofenac, mobic.      goal blood pressure is less than 140/90    keep a track of daily BPs and bring at next appt     bring all meds in a bag at next appt     Increase Hydralzine to 100 mg three times a day     Start Atarax ( hydroxyzine ) three times a day for itch as needed                Language Assistance Services     ATTENTION: Language assistance services are available, free of charge. Please call [phone].      ATENCIÓN: Si habla español, tiene a salmon disposición servicios gratuitos de asistencia lingüística. Llame al [phone].     [facility] Ý: N?u b?n nói Ti?ng Vi?t, có các d?ch v? h? tr? ngôn ng? mi?n phí dành cho b?n. G?i s? [phone].         [facility] - Ne[facility] complies with applicable Federal civil rights laws and does not discriminate on the basis of race, color, national origin, age, disability, or " sex.

## 2017-03-23 NOTE — PROGRESS NOTES
Subjective:       Patient ID: Cailin Pickett is a 76 y.o.   female who presents for follow-up evaluation of CKD stage 5, HTN, SHPT , Anemia          HPI: Cailin Pickett Is a pleasant 76-year-old  woman seen office today followup for above medical problems. I saw her in clinic about 3 months ago. Recent lab results were discussed with the patient. She has solitary right kidney. Multiple cysts reported on the kidney. It measures about 16 cm. Patient Attended chronic kidney disease education and options class. she had a left arm AV fistula placed on 12/4/14 by Dr Foster as a backup but will try peritoneal dialysis when indicated . She denies recent hospitalizations or ER visits. PD cath placement when she is close to HD. She has history of hyperuricemia. Recent lab results were discussed with the patient. Recent serum Cr is 4.2  mg/dL.       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 she underwent chemo rx with 5-FU, she tolerated the cycles well. In 5/2013 she underwent resection of tail of pancreas, left nephrectomy, splenectomy, left adrenalectomy.             Past Medical History:   Diagnosis Date    Anemia     CKD (chronic kidney disease), stage III     Diabetes mellitus type II     Encounter for blood transfusion     Gout, unspecified     Hyperlipidemia     Hypertension     Neuropathy     Pancreatic cancer     Renal failure     Thyroid disease          Current Outpatient Prescriptions on File Prior to Visit   Medication Sig Dispense Refill    allopurinol (ZYLOPRIM) 100 MG tablet TAKE 1 TABLET DAILY 90 tablet 2    amlodipine (NORVASC) 5 MG tablet Take 1 tablet (5 mg total) by mouth 2 (two) times daily. 180 tablet 3    cetirizine (ZYRTEC) 10 MG tablet Take 10 mg by mouth once daily.       estradiol (ESTRACE) 0.5 MG tablet TAKE 1 TABLET DAILY FOR 3 WEEKS PER MONTH, THEN DO NOT TAKE FOR 4TH WEEK EACH MONTH 90  tablet 2    furosemide (LASIX) 40 MG tablet Take 1 tablet (40 mg total) by mouth once daily. 30 tablet 11    gabapentin (NEURONTIN) 100 MG capsule Take 1 capsule by mouth 2 (two) times daily.      GLUCOSAMINE HCL/CHONDR ZAVALETA A NA (OSTEO BI-FLEX ORAL) Take by mouth 2 (two) times daily.        hydrALAZINE (APRESOLINE) 50 MG tablet TAKE 1 TABLET EVERY 8 HOURS 270 tablet 2    pravastatin (PRAVACHOL) 40 MG tablet Take 40 mg by mouth once daily.        repaglinide (PRANDIN) 0.5 MG tablet Take 1 tablet (0.5 mg total) by mouth 3 (three) times daily before meals. 270 tablet 2    sodium bicarbonate 650 MG tablet Take 1 tablet (650 mg total) by mouth 3 (three) times daily. 90 tablet 8    tramadol (ULTRAM) 50 mg tablet Take 1 tablet (50 mg total) by mouth every 12 (twelve) hours. 60 tablet 4     No current facility-administered medications on file prior to visit.          Review of Systems  :    Constitutional: Negative for fever, activity change, appetite change and fatigue.    HENT: Negative for congestion, sore throat, facial swelling, trouble swallowing and voice change.    Eyes: Negative for redness and visual disturbance.    Respiratory: Negative for apnea, cough, chest tightness, shortness of breath and wheezing.    Cardiovascular: Negative for chest pain, palpitations and leg swelling.    Gastrointestinal: Negative for nausea, vomiting, abdominal pain, diarrhea, constipation, blood in stool and abdominal distention.    Genitourinary: Negative for dysuria, urgency, frequency, hematuria, flank pain, decreased urine volume, difficulty urinating and pelvic pain.    Musculoskeletal: Negative for joint swelling and gait problem. She has low back pain.    Skin: Negative for color change and rash.  she has itching   Neurological: Negative for dizziness, syncope, weakness and headaches.    Hematological: Does not bruise/bleed easily.    Psychiatric/Behavioral: Negative for behavioral problems, confusion and agitation. The  patient is not nervous/anxious       Objective:         Vitals:    03/23/17 1112   BP: (!) 152/58   Pulse: 72       Respiratory rate 20, afebrile, weight 160 pounds, stable      Physical Exam  :      Constitutional: She is oriented to person, place, and time. She appears well-developed and well-nourished. No distress.    HENT: Head: Normocephalic and atraumatic. Oropharynx is clear and moist. No oropharyngeal exudate.    Eyes: Conjunctivae normal and EOM are normal. Pupils are equal, round, and reactive to light. No scleral icterus.    Neck: Normal range of motion. Neck supple. No JVD present. Carotid bruit is not present. No tracheal deviation present. No mass and no thyromegaly present.    Cardiovascular: Normal rate, regular rhythm, normal heart sounds and intact distal pulses. no gallop and no friction rub. No murmur heard.    Pulmonary/Chest: Effort normal and breath sounds normal. No respiratory distress. She has no wheezes. She has no rales. She exhibits no tenderness.    Abdominal: Soft. Bowel sounds are normal. She exhibits no distension, no abdominal bruit, no ascites and no mass. There is no hepatosplenomegaly. There is no rebound, no guarding and no CVA tenderness.   Musculoskeletal: Normal range of motion. She exhibits no edema and no tenderness. LUE AV Fistula with good thrill, no local redness, tender to pressure on both sacroiliac joints. 2+ pitting edema in legs   Lymphadenopathy: She has no cervical adenopathy.   Neurological: She is alert and oriented to person, place, and time. No cranial nerve deficit.    Skin: Skin is warm and intact. No rash noted. No erythema. No pallor.   Psychiatric: She has a normal mood and affect. Her behavior is normal. Judgment normal.           Labs:    Lab Results   Component Value Date    CREATININE 4.2 (H) 03/06/2017    CREATININE 4.2 (H) 03/06/2017    BUN 70 (H) 03/06/2017    BUN 70 (H) 03/06/2017     03/06/2017     03/06/2017    K 4.6 03/06/2017    K  4.6 03/06/2017     03/06/2017     03/06/2017    CO2 26 03/06/2017    CO2 26 03/06/2017       Lab Results   Component Value Date    WBC 4.15 03/06/2017    HGB 11.4 (L) 03/06/2017    HCT 35.6 (L) 03/06/2017    MCV 96 03/06/2017     03/06/2017       Lab Results   Component Value Date    .0 (H) 03/06/2017    CALCIUM 9.1 03/06/2017    CALCIUM 9.1 03/06/2017    PHOS 4.6 (H) 03/06/2017    PHOS 4.6 (H) 03/06/2017       Lab Results   Component Value Date    ALBUMIN 3.5 03/06/2017    ALBUMIN 3.5 03/06/2017     Lab Results   Component Value Date    URICACID 6.2 (H) 03/06/2017       Lab Results   Component Value Date    HGBA1C 5.5 06/09/2016       Impression and plan - 76-year-old  woman seen in office today in followup for following medical problems,     1. Chronic kidney disease stage V - Serum Creatinine stable at 4.2 mg/dL, Patient attended chronic kidney disease education and options class. She has a solitary right kidney which is enlarged at 16 cm secondary to polycystic kidney disease. Left kidney was removed due to High-grade pancreatic cancer invading directly into the left kidney. Patient was declined for a kidney transplant because of advanced age and history of cancer. Patient had a left arm AV fistula on 12/4/14 by Dr Foster , This is as a backup. she may want to try peritoneal dialysis. PD catheter placement when she is closer to needing RRT , today she denies any signs and symptoms of uremia.      Patient complains of occasional steel symptoms in her left arm, at this time not bothering her.  If symptoms worse she'll be referred back to her vascular surgeon for further evaluation    2. Hypertension - target blood pressure is elevated, advised patient to continue to monitor her blood pressures at home.  Target blood pressure is less than 150/90.  Will increase hydralazine from 50 g 3 times a day 200 mg 3 times a day.    3. Anemia - Hb 11.4 gms, following with  Hematology/Oncology,     4. Secondary hyperparathyroidism - PTH is Acceptable at 357,  Continue to follow.    5. pancreatic carcinoma - s/p surgery + Chemo, follows with Oncology        6. Volume - euvolemic on  Lasix to 40 mg daily . Discussed salt and fluid restriction ,        7. Cystic kidney disease - follow,        8. Hyperuricemia/gout - continue allopurinol.        9. Metabolic acidosis -  sodium bicarbonate supplements      10. Atarax for itch        We will see her in followup in 4 months, More than 25 minutes was spent with the patient discussing labs and plan of care     Tank Vinson M.D.

## 2017-03-23 NOTE — PATIENT INSTRUCTIONS
Avoid NSAID pain medications such as advil, aleve, motrin, ibuprofen, naprosyn, meloxicam, diclofenac, mobic.      goal blood pressure is less than 140/90    keep a track of daily BPs and bring at next appt     bring all meds in a bag at next appt     Increase Hydralzine to 100 mg three times a day     Start Atarax ( hydroxyzine ) three times a day for itch as needed

## 2017-03-27 ENCOUNTER — TELEPHONE (OUTPATIENT)
Dept: NEPHROLOGY | Facility: CLINIC | Age: 77
End: 2017-03-27

## 2017-03-27 NOTE — TELEPHONE ENCOUNTER
Returned call to patient who is calling to find out if the doctor sent Hydralazine over to her pharmacy because she have so much. Informed her that he did send a refill over and if she have too much inform the pharmacy to hold it until she need more. She expressed understanding.

## 2017-03-27 NOTE — TELEPHONE ENCOUNTER
----- Message from Sandrine Ervin sent at 3/27/2017 11:46 AM CDT -----  Pt states she need to talk to you about medication and pt only wants to talk to. Please call pt t [phone].

## 2017-04-24 DIAGNOSIS — M25.562 LEFT KNEE PAIN, UNSPECIFIED CHRONICITY: Primary | ICD-10-CM

## 2017-04-25 ENCOUNTER — OFFICE VISIT (OUTPATIENT)
Dept: ORTHOPEDICS | Facility: CLINIC | Age: 77
End: 2017-04-25
Payer: MEDICARE

## 2017-04-25 ENCOUNTER — HOSPITAL ENCOUNTER (OUTPATIENT)
Dept: RADIOLOGY | Facility: HOSPITAL | Age: 77
Discharge: HOME | End: 2017-04-25
Attending: ORTHOPAEDIC SURGERY
Payer: MEDICARE

## 2017-04-25 VITALS
SYSTOLIC BLOOD PRESSURE: 150 MMHG | DIASTOLIC BLOOD PRESSURE: 56 MMHG | WEIGHT: 156.5 LBS | HEIGHT: 65 IN | BODY MASS INDEX: 26.08 KG/M2 | HEART RATE: 60 BPM

## 2017-04-25 DIAGNOSIS — M17.12 ARTHRITIS OF LEFT KNEE: Primary | ICD-10-CM

## 2017-04-25 DIAGNOSIS — M25.562 LEFT KNEE PAIN, UNSPECIFIED CHRONICITY: ICD-10-CM

## 2017-04-25 PROCEDURE — 99213 OFFICE O/P EST LOW 20 MIN: CPT | Mod: S$PBB,,, | Performed by: PHYSICIAN ASSISTANT

## 2017-04-25 PROCEDURE — 99999 PR PBB SHADOW E&M-EST. PATIENT-LVL IV: CPT | Mod: PBBFAC,,, | Performed by: PHYSICIAN ASSISTANT

## 2017-04-25 PROCEDURE — 99214 OFFICE O/P EST MOD 30 MIN: CPT | Mod: PBBFAC,25,PO | Performed by: PHYSICIAN ASSISTANT

## 2017-04-25 PROCEDURE — 73562 X-RAY EXAM OF KNEE 3: CPT | Mod: 26,LT,, | Performed by: RADIOLOGY

## 2017-04-25 PROCEDURE — 73560 X-RAY EXAM OF KNEE 1 OR 2: CPT | Mod: 26,59,RT, | Performed by: RADIOLOGY

## 2017-04-25 RX ORDER — DICLOFENAC SODIUM 10 MG/G
2 GEL TOPICAL 4 TIMES DAILY
Qty: 1 TUBE | Refills: 2 | Status: SHIPPED | OUTPATIENT
Start: 2017-04-25 | End: 2017-07-31 | Stop reason: SDUPTHER

## 2017-04-25 NOTE — MR AVS SNAPSHOT
[facility]  [address] [facility] [place] [address]  Phone: [phone]  Fax: [phone]                  Cailin Pickett   2017 2:30 PM   Office Visit    Description:  Female : 1940   Provider:  Edwin Bentley PA-C   Department:  [facility] - Orthopedics           Reason for Visit     Left Knee - Pain                To Do List           Future Appointments        Provider Department Dept Phone    2017 10:50 AM LABORATORY, [facility] [phone]    2017 11:00 AM Nas Hoffman MD [facility] [phone]    2017 11:30 AM LABORATORY, [facility] [phone]    2017 11:40 AM SPECIMEN, [facility] [phone]    2017 1:30 PM Tank Vinson MD [facility] [phone]      Goals (5 Years of Data)     None       These Medications        Disp Refills Start End    diclofenac sodium 1 % Gel 1 Tube 2 2017    Apply 2 g topically 4 (four) times daily. - Topical    Pharmacy: [facility] [address] [place] [address] #: [phone]         [facility]     [facility] Nurse Care Line -  Assistance  Unless otherwise directed by your provider, please contact [facility], our nurse care line that is available for  assistance.     Registered nurses in the [facility] provide: appointment scheduling, clinical advisement, health education, and other advisory services.  Call: [phone] (toll free)               Medications           Message regarding Medications     Verify the changes and/or additions to your medication regime listed below are the same as discussed with your clinician today.  If any of these changes or additions are incorrect, please notify your healthcare provider.        START taking these NEW medications        Refills    diclofenac sodium 1 % Gel 2    Sig: Apply 2 g topically 4  "(four) times daily.    Class: Print    Route: Topical      STOP taking these medications     pravastatin (PRAVACHOL) 40 MG tablet Take 40 mg by mouth once daily.             Verify that the below list of medications is an accurate representation of the medications you are currently taking.  If none reported, the list may be blank. If incorrect, please contact your healthcare provider. Carry this list with you in case of emergency.           Current Medications     allopurinol (ZYLOPRIM) 100 MG tablet TAKE 1 TABLET DAILY    amlodipine (NORVASC) 5 MG tablet Take 1 tablet (5 mg total) by mouth 2 (two) times daily.    cetirizine (ZYRTEC) 10 MG tablet Take 10 mg by mouth once daily.     estradiol (ESTRACE) 0.5 MG tablet TAKE 1 TABLET DAILY FOR 3 WEEKS PER MONTH, THEN DO NOT TAKE FOR 4TH WEEK EACH MONTH    furosemide (LASIX) 40 MG tablet Take 1 tablet (40 mg total) by mouth once daily.    gabapentin (NEURONTIN) 100 MG capsule Take 1 capsule by mouth 2 (two) times daily.    GLUCOSAMINE HCL/CHONDR ZAVALETA A NA (OSTEO BI-FLEX ORAL) Take by mouth 2 (two) times daily.      hydrALAZINE (APRESOLINE) 100 MG tablet Take 1 tablet (100 mg total) by mouth every 8 (eight) hours.    hydrOXYzine HCl (ATARAX) 25 MG tablet Take 1 tablet (25 mg total) by mouth 3 (three) times daily as needed for Itching.    repaglinide (PRANDIN) 0.5 MG tablet Take 1 tablet (0.5 mg total) by mouth 3 (three) times daily before meals.    sodium bicarbonate 650 MG tablet Take 1 tablet (650 mg total) by mouth 3 (three) times daily.    tramadol (ULTRAM) 50 mg tablet Take 1 tablet (50 mg total) by mouth every 12 (twelve) hours.    diclofenac sodium 1 % Gel Apply 2 g topically 4 (four) times daily.           Clinical Reference Information           Your Vitals Were     BP Pulse Height Weight Last Period BMI    150/56 60 5' 5" (1.651 m) 71 kg (156 lb 8.4 oz) 07/27/1982 26.05 kg/m2      Blood Pressure          Most Recent Value    BP  (!)  150/56      Allergies as of " 4/25/2017     Allegra [Fexofenadine]    Codeine      Immunizations Administered on Date of Encounter - 4/25/2017     None      Maintenance Dialysis History     Patient has no recorded history of maintenance dialysis.      Instructions      What is Arthritis?  Arthritis is a disease that affects the joints (the parts where bones meet and move). It can affect any joint in your body. There are many types of arthritis, including osteoarthritis and rheumatoid arthrtitis. If your symptoms are mild, medications may be enough to reduce pain and swelling. For more severe arthritis, surgery may be needed to improve the condition of the joint or replace the joint entirely.                  What causes arthritis?  Cartilage is a smooth substance that protects the ends of your bones and provides cushioning. When you have arthritis, this cartilage breaks down and can no longer protect your bones. The bones rub against each other, causing pain and swelling. Over time, bone spurs (small pieces of rough or splintered bone) may develop, and the joint's range of motion can become limited.  Symptoms  Some of the more common symptoms of arthritis include:  · Joint pain and stiffness. Pain and stiffness get worse with long periods of rest or using a joint too long or too hard.  · Joints that have lost normal shape and motion.  · Tender, inflamed joints. They may look red and feel warm.  · Grinding or popping noise with joint movement.   · Feeling tired all the time.  Reducing symptoms  Following a healthy lifestyle by losing weight and exercising can help reduce symptoms of osteoarthritis. Medicines can be very helpful for arthritis.     Date Last Reviewed: 9/10/2015  © 2823-7233 [employer]. [address] [place] [address], [place] [address]. All rights reserved. This information is not intended as a substitute for professional medical care. Always follow your healthcare professional's instructions.             Language Assistance  Services     ATTENTION: Language assistance services are available, free of charge. Please call [phone].      ATENCIÓN: Si habla humera, tiene a salmon disposición servicios gratuitos de asistencia lingüística. Llame al [phone].     CHÚ Ý: N?u b?n nói Ti?ng Vi?t, có các d?ch v? h? tr? ngôn ng? mi?n phí dành cho b?n. G?i s? [phone].         [facility] complies with applicable Federal civil rights laws and does not discriminate on the basis of race, color, national origin, age, disability, or sex.

## 2017-04-25 NOTE — PATIENT INSTRUCTIONS
What is Arthritis?  Arthritis is a disease that affects the joints (the parts where bones meet and move). It can affect any joint in your body. There are many types of arthritis, including osteoarthritis and rheumatoid arthrtitis. If your symptoms are mild, medications may be enough to reduce pain and swelling. For more severe arthritis, surgery may be needed to improve the condition of the joint or replace the joint entirely.                  What causes arthritis?  Cartilage is a smooth substance that protects the ends of your bones and provides cushioning. When you have arthritis, this cartilage breaks down and can no longer protect your bones. The bones rub against each other, causing pain and swelling. Over time, bone spurs (small pieces of rough or splintered bone) may develop, and the joint's range of motion can become limited.  Symptoms  Some of the more common symptoms of arthritis include:  · Joint pain and stiffness. Pain and stiffness get worse with long periods of rest or using a joint too long or too hard.  · Joints that have lost normal shape and motion.  · Tender, inflamed joints. They may look red and feel warm.  · Grinding or popping noise with joint movement.   · Feeling tired all the time.  Reducing symptoms  Following a healthy lifestyle by losing weight and exercising can help reduce symptoms of osteoarthritis. Medicines can be very helpful for arthritis.     Date Last Reviewed: 9/10/2015  © 2327-0484 [employer]. [address] [place] [address], [place] [address]. All rights reserved. This information is not intended as a substitute for professional medical care. Always follow your healthcare professional's instructions.

## 2017-04-29 NOTE — PROGRESS NOTES
CC:77 yo female with left knee pain    HPI: Pain for 3-4 weeks, increase with activity, stairs. Denies any trauma or swelling. Pain 4/10    PMH:    Past Medical History:   Diagnosis Date    Anemia     CKD (chronic kidney disease), stage III     Diabetes mellitus type II     Encounter for blood transfusion     Gout, unspecified     Hyperlipidemia     Hypertension     Neuropathy     Pancreatic cancer     Renal failure     Thyroid disease        PSH:    Past Surgical History:   Procedure Laterality Date    COLONOSCOPY  2011    Dr. Favio Mims    HYSTERECTOMY      NEPHRECTOMY Left 5/2013    Dr Carter     PANCREAS SURGERY      distal pancreatectomy    SPLENECTOMY, TOTAL      THYROIDECTOMY, PARTIAL      VENTRICULOATRIAL SHUNT Left 12/4/2014     left arm       Family Hx:    Family History   Problem Relation Age of Onset    Cancer Mother      breast    Heart disease Mother 60     MI    Hypertension Mother     Stroke Mother     Cancer Father      prostate    Cancer Brother      renal cell carcinoma    Diabetes Brother        Allergy:    Review of patient's allergies indicates:   Allergen Reactions    Allegra [fexofenadine] Swelling    Codeine Hives, Itching and Nausea And Vomiting       Medication:    Current Outpatient Prescriptions:     allopurinol (ZYLOPRIM) 100 MG tablet, TAKE 1 TABLET DAILY, Disp: 90 tablet, Rfl: 2    amlodipine (NORVASC) 5 MG tablet, Take 1 tablet (5 mg total) by mouth 2 (two) times daily., Disp: 180 tablet, Rfl: 3    cetirizine (ZYRTEC) 10 MG tablet, Take 10 mg by mouth once daily. , Disp: , Rfl:     estradiol (ESTRACE) 0.5 MG tablet, TAKE 1 TABLET DAILY FOR 3 WEEKS PER MONTH, THEN DO NOT TAKE FOR 4TH WEEK EACH MONTH, Disp: 90 tablet, Rfl: 2    furosemide (LASIX) 40 MG tablet, Take 1 tablet (40 mg total) by mouth once daily., Disp: 30 tablet, Rfl: 11    gabapentin (NEURONTIN) 100 MG capsule, Take 1 capsule by mouth 2 (two) times daily., Disp: , Rfl:     GLUCOSAMINE  "HCL/CHONDR ZAVALETA A NA (OSTEO BI-FLEX ORAL), Take by mouth 2 (two) times daily.  , Disp: , Rfl:     hydrALAZINE (APRESOLINE) 100 MG tablet, Take 1 tablet (100 mg total) by mouth every 8 (eight) hours., Disp: 270 tablet, Rfl: 3    hydrOXYzine HCl (ATARAX) 25 MG tablet, Take 1 tablet (25 mg total) by mouth 3 (three) times daily as needed for Itching., Disp: 90 tablet, Rfl: 8    repaglinide (PRANDIN) 0.5 MG tablet, Take 1 tablet (0.5 mg total) by mouth 3 (three) times daily before meals., Disp: 270 tablet, Rfl: 2    sodium bicarbonate 650 MG tablet, Take 1 tablet (650 mg total) by mouth 3 (three) times daily., Disp: 90 tablet, Rfl: 8    tramadol (ULTRAM) 50 mg tablet, Take 1 tablet (50 mg total) by mouth every 12 (twelve) hours., Disp: 60 tablet, Rfl: 4    diclofenac sodium 1 % Gel, Apply 2 g topically 4 (four) times daily., Disp: 1 Tube, Rfl: 2    Social History:    Social History     Social History    Marital status:      Spouse name: N/A    Number of children: N/A    Years of education: N/A     Occupational History    Not on file.     Social History Main Topics    Smoking status: Former Smoker     Packs/day: 1.00     Years: 35.00     Quit date: 1/1/2001    Smokeless tobacco: Never Used    Alcohol use No    Drug use: No    Sexual activity: Not on file     Other Topics Concern    Not on file     Social History Narrative       Vitals:   BP (!) 150/56  Pulse 60  Ht 5' 5" (1.651 m)  Wt 71 kg (156 lb 8.4 oz)  LMP 07/27/1982  BMI 26.05 kg/m2     ROS:  GENERAL: No fever, chills, fatigability or weight loss.  SKIN: No rashes, itching or changes in color or texture of skin.  HEAD: No headaches or recent head trauma.  EYES: Visual acuity fine. No photophobia, ocular pain or diplopia.  EARS: Denies ear pain, discharge or vertigo.  NOSE: No loss of smell, no epistaxis or postnasal drip.  MOUTH & THROAT: No hoarseness or change in voice. No excessive gum bleeding.  NODES: Denies swollen glands.  CHEST: " Denies HYDE, cyanosis, wheezing, cough and sputum production.  CARDIOVASCULAR: Denies chest pain, PND, orthopnea or reduced exercise tolerance.  ABDOMEN: Appetite fine. No weight loss. Denies diarrhea, abdominal pain, hematemesis or blood in stool.  URINARY: No flank pain, dysuria or hematuria.  PERIPHERAL VASCULAR: No claudication or cyanosis.  NEUROLOGIC: No history of seizures, paralysis, alteration of gait or coordination.  MUSCULOSKELETAL: See HPI    PE:  APPEARANCE: Well nourished, well developed, in no acute distress.   HEAD: Normocephalic, atraumatic.  NEUROLOGIC: Cranial Nerves: II-XII grossly intact, also see MUSCULOSKELETAL  MUSCULOSKELETAL: Knee-left  Knee Exam-abnormal  Gait-abnormal  Muscle Appearance:normal  Grooming:normal  Spine Alignment-normal  Muscle Atrophy-Negative  Deformities-Negative  Tenderness-Positive  Paresthesias-Negative  Range of Motion         Ext-abnormal         Flex-abnormal  Muscle Strength-abnormal  Sensation-abnormal  Reflexes-normal  Crepitus-Positive                                Swelling-Negative  Effusion- Negative                                Edema-Negative  Lachman-Negative                               Erythema-Negative  [facility]'s-Negative                            Apley Grind-Positive  Patellar Comp-Positive                        Alignment-normal/symmetric  Patellar Apprehension-Negative            Synovial fullness-Negative  Passive Patellar Tilt-normal  Patellar Tracking-normal   Patellar Glide-normal  Q-Angle at 90 degrees-normal  Patellar Grind-normal  T-Tavw-Xauphfku  Fatigue-Negative                                     HS Tightness-Negative  Tests on Exam-abnormal  Neurovascular Status-normal+2 DP and PT artery pulses  Skin-normal  Mental Status-normal          Assessment:           Diagnosis:              1.                   Diagnostic Studies  MRI-No  X-Ray-Yes, agree with There is bilateral tricompartmental degenerative spurring and tibiofemoral  chondrocalcinosis.  Moderate asymmetric narrowing of the right patellofemoral joint space.  No significant joint effusion.  No acute fracture or dislocation.  EMG/NCV-No  Arthrogram-No  Bone Scan-No  CT Scan-No  Doppler-No  ESR-No  CRP-No  CBC with Diff-No   Rheumatoid/Arthritis Panel-No      Plan:                                                 1. PT-no                                                 2.OT-no                                          3.NSAID-yes      Voltaren gel , advised not to use until cleared by Nephrology                                 4. Narcotics-no                                     5. Wound care-N/A                                 6. Rest-no                                           7. Surgery-no                                         8. CLARA Hose-no                                    9. Anticoagulation therapy-no               10. Elevation-no                                     11. Crutches-no                                    12. Walker-no             13. Cane no                        14. Referral-no                                     15.Injection-no                            16. Splint   /    Cast   /   Cast Shoe-No              17. RICE            18. Follow up-  Advised not to use the Voltaren gel until cleared by Nephrology  .

## 2017-05-31 DIAGNOSIS — I10 ESSENTIAL HYPERTENSION: ICD-10-CM

## 2017-06-01 RX ORDER — AMLODIPINE BESYLATE 5 MG/1
TABLET ORAL
Qty: 180 TABLET | Refills: 2 | Status: SHIPPED | OUTPATIENT
Start: 2017-06-01 | End: 2017-11-27 | Stop reason: SDUPTHER

## 2017-06-06 ENCOUNTER — LAB VISIT (OUTPATIENT)
Dept: LAB | Facility: HOSPITAL | Age: 77
End: 2017-06-06
Attending: INTERNAL MEDICINE
Payer: MEDICARE

## 2017-06-06 ENCOUNTER — OFFICE VISIT (OUTPATIENT)
Dept: HEMATOLOGY/ONCOLOGY | Facility: CLINIC | Age: 77
End: 2017-06-06
Payer: MEDICARE

## 2017-06-06 VITALS
RESPIRATION RATE: 18 BRPM | HEART RATE: 66 BPM | BODY MASS INDEX: 26.45 KG/M2 | DIASTOLIC BLOOD PRESSURE: 80 MMHG | SYSTOLIC BLOOD PRESSURE: 130 MMHG | WEIGHT: 158.75 LBS | HEIGHT: 65 IN | TEMPERATURE: 98 F | OXYGEN SATURATION: 98 %

## 2017-06-06 DIAGNOSIS — E11.22 TYPE 2 DIABETES MELLITUS WITH DIABETIC CHRONIC KIDNEY DISEASE, UNSPECIFIED CKD STAGE, UNSPECIFIED LONG TERM INSULIN USE STATUS: ICD-10-CM

## 2017-06-06 DIAGNOSIS — D63.1 ANEMIA IN CHRONIC RENAL DISEASE: ICD-10-CM

## 2017-06-06 DIAGNOSIS — N18.9 ANEMIA IN CHRONIC RENAL DISEASE: ICD-10-CM

## 2017-06-06 DIAGNOSIS — C25.2 MALIGNANT NEOPLASM OF TAIL OF PANCREAS: ICD-10-CM

## 2017-06-06 DIAGNOSIS — G89.29 CHRONIC RIGHT-SIDED LOW BACK PAIN WITHOUT SCIATICA: ICD-10-CM

## 2017-06-06 DIAGNOSIS — M19.90 ARTHRITIS: ICD-10-CM

## 2017-06-06 DIAGNOSIS — C25.2 MALIGNANT NEOPLASM OF TAIL OF PANCREAS: Primary | ICD-10-CM

## 2017-06-06 DIAGNOSIS — M54.50 CHRONIC RIGHT-SIDED LOW BACK PAIN WITHOUT SCIATICA: ICD-10-CM

## 2017-06-06 LAB
BASOPHILS # BLD AUTO: 0.04 K/UL
BASOPHILS NFR BLD: 1 %
DIFFERENTIAL METHOD: ABNORMAL
EOSINOPHIL # BLD AUTO: 0.1 K/UL
EOSINOPHIL NFR BLD: 2.1 %
ERYTHROCYTE [DISTWIDTH] IN BLOOD BY AUTOMATED COUNT: 14.2 %
HCT VFR BLD AUTO: 33.3 %
HGB BLD-MCNC: 10.5 G/DL
LYMPHOCYTES # BLD AUTO: 1 K/UL
LYMPHOCYTES NFR BLD: 26.9 %
MCH RBC QN AUTO: 28.7 PG
MCHC RBC AUTO-ENTMCNC: 31.5 %
MCV RBC AUTO: 91 FL
MONOCYTES # BLD AUTO: 0.6 K/UL
MONOCYTES NFR BLD: 16.6 %
NEUTROPHILS # BLD AUTO: 2.1 K/UL
NEUTROPHILS NFR BLD: 53.4 %
PLATELET # BLD AUTO: 238 K/UL
PMV BLD AUTO: 9.9 FL
RBC # BLD AUTO: 3.66 M/UL
WBC # BLD AUTO: 3.86 K/UL

## 2017-06-06 PROCEDURE — 99214 OFFICE O/P EST MOD 30 MIN: CPT | Mod: S$PBB,,, | Performed by: INTERNAL MEDICINE

## 2017-06-06 PROCEDURE — 99213 OFFICE O/P EST LOW 20 MIN: CPT | Mod: PBBFAC,PO | Performed by: INTERNAL MEDICINE

## 2017-06-06 PROCEDURE — 99999 PR PBB SHADOW E&M-EST. PATIENT-LVL III: CPT | Mod: PBBFAC,,, | Performed by: INTERNAL MEDICINE

## 2017-06-06 RX ORDER — TRAMADOL HYDROCHLORIDE 50 MG/1
50 TABLET ORAL EVERY 12 HOURS
Qty: 60 TABLET | Refills: 4 | Status: SHIPPED | OUTPATIENT
Start: 2017-06-06 | End: 2017-11-27 | Stop reason: SDUPTHER

## 2017-06-06 RX ORDER — REPAGLINIDE 0.5 MG/1
0.5 TABLET ORAL
Qty: 270 TABLET | Refills: 2 | Status: SHIPPED | OUTPATIENT
Start: 2017-06-06 | End: 2018-03-02 | Stop reason: SDUPTHER

## 2017-06-06 NOTE — PROGRESS NOTES
Reason for visit: Pancreatic adenocarcinoma/Anemia due to chronic kidney disease  Date of Diagnosis: 2012    HPI:   The patient is a 76-year-old  female who presents to the hematology oncology clinic today for follow up. She underwent resection surgery for her pancreatic tail high-grade carcinoma on May 15, 2013 by Dr. Carter at [facility], [place]. The patient had previously completed neoadjuvant chemotherapy and 5-FU chemoradiation with excellent response.  Today the patient reports that she feels well except for occasional episodes of lower back pain. She is taking tramadol with good relief from this. Energy levels are good. She reports that her appetite is good. She denies any fever, chills or night sweats. She denies any chest pain or shortness of breath. She denies any bowel or urinary complaints. The patient denies any nausea or vomiting. She denies any abdominal pain today.  I have reviewed all of the patient's interval clinical history available in EPIC. She continues to report tingling pain in the soles of her feet since completing her chemotherapy. Neurontin did not help and she stopped lyrica because of side effects.   She is being closely followed by Dr. Vinson with nephrology with regard to her kidney function. She has also had A-V fistula done in her left upper extremity by Dr. Foster.    PAST MEDICAL HISTORY:   1. Hypertension  2. GERD  3. Migraines  4. Gout  5. Chronic kidney disease stage IV   6. Bilateral renal cysts  7. Type 2 diabetes mellitus    SURGICAL HISTORY:   1. Hysterectomy for fibroids  2. Partial thyroidectomy  3. Left mediport placement  4. Distal pancreatectomy, splenectomy, left nephrectomy and adrenalectomy on May 15, 2013  5. Left AV fistula on 14    FAMILY HISTORY: The patient's mother  from complications related to breast cancer at the age of 60. The patient's father  from complications related to prostate cancer at the age of 81. The  patient's sister was diagnosed with breast cancer at the age of 68. The patient's brother had renal cell carcinoma in both kidneys [sporadic] approximately 4 years apart. He has since had a kidney transplant. Another brother has kidney cancer diagnosed at 68. She denies any other immediate family members with cancer or bleeding/clotting disorders.    SOCIAL HISTORY: She reports a 30+ pack year smoking history and quit in 2001. She does not drink alcohol. She has never used any recreational drugs. She worked for the [employer] in [place] and retired in 1986. She is  and does not have any children. She lives in [place], [place].    ALLERGIES: Reviewed on medication card.    MEDICATIONS: [Medcard has been reviewed and/or reconciled.]    REVIEW OF SYSTEMS:   GENERAL: [No fevers, chills or sweats.] Denies fatigue. Appetite is good. Weight is stable.  HEENT: [No blurred vision, tinnitus, nasal discharge, sorethroat or dysphagia.]  HEART: [No chest pain, palpitations or shortness of breath.]   LUNGS: [No hemoptysis, cough or breathing problems.]   ABDOMEN: [No abdominal pain, constipation or melena.] Denies nausea, vomiting.  GENITOURINARY: [No dysuria, bleeding or malodorous discharge.]  NEURO: [No headache, dizziness or vertigo.]  HEMATOLOGY: [No easy bruising, spontaneous bleeding or blood clots in the past].  MUSCULOSKELETAL: She denies any myalgias or bone pain. Does report occasional low back pain.  SKIN: [No rashes or skin lesions.]  PSYCHIATRY: [No depression or anxiety.]    PHYSICAL EXAMINATION:   VS: Reviewed on nurse's notes.  APPEARANCE: The patient is a well-developed, well-nourished and well-groomed  female who appears in no acute distress.   HEENT: No scleral icterus. Both external auditory canals clear. No oral ulcers, lesions. Throat clear  HEAD: No sinus tenderness. She has facial hair suggestive of hirsutism.   NECK: Supple. No palpable lymphadenopathy. Thyroid  non-tender, no palpable masses  CHEST: Breath sounds clear bilaterally. No rales. No rhonchi. Unlabored respirations.  CARDIOVASCULAR: Normal S1, S2. Normal rate. Regular rhythm.  ABDOMEN: Bowel sounds normal. No tenderness. No abdominal distention. No hepatomegaly. No splenomegaly. Healed incision noted.  LYMPHATIC: No palpable supraclavicular, axillary nodes  EXTREMITIES: No clubbing, cyanosis. No edema of bilateral lower extremities. Left A-V fistula noted.  SKIN: No lesions. No petechiae. No ecchymoses. No induration or nodules  NEUROLOGIC: No focal findings. Alert & Oriented x 3. Mood appropriate to affect    LABS:   Reviewed    IMAGING:  Reviewed    IMPRESSION:  1. Pancreatic adenocarcinoma (T3N0Mx)  2. Chronic kidney disease (stage 4)  3. Anemia due to CKD  4. Peripheral neuropathy    PLAN:  1. Results of CBC from today were discussed in detail. I will hold off on initiation of Procrit at this time as her hemoglobin continues to be greater than 10 g/dL.  2. I have encouraged good p.o. intake of food and fluids. I have encouraged regular exercise.   3. Continue follow up with Dr. Vinson as recommended with regard to CKD  4. There appears to be no evidence of recurrence of her pancreatic adenocarcinoma at this time. We discussed that there is no benefit for surveillance imaging in her case. We will obtain imaging if any new symptoms.  5. Continue elavil for treatment of peripheral neuropathy. This medication has been helping but with some anticholinergic side effects such as dry mouth. She has not had relief from trying multiple other medications for her peripheral neuropathy. Risks/benefits and common side effects discussed in detail. She knows not to stop it abruptly and is also aware of sedation precautions.  6. I will set up screening colonoscopy.    Return to clinic in 3 months with CBC. She knows to call sooner for any new problems or questions.    Nas Hoffman MD

## 2017-06-09 RX ORDER — ESTRADIOL 0.5 MG/1
TABLET ORAL
Qty: 90 TABLET | Refills: 3 | Status: SHIPPED | OUTPATIENT
Start: 2017-06-09 | End: 2018-08-09 | Stop reason: SDUPTHER

## 2017-06-21 RX ORDER — SODIUM, POTASSIUM,MAG SULFATES 17.5-3.13G
SOLUTION, RECONSTITUTED, ORAL ORAL
Qty: 354 ML | Refills: 0 | Status: ON HOLD | OUTPATIENT
Start: 2017-06-21 | End: 2017-07-18 | Stop reason: HOSPADM

## 2017-07-18 ENCOUNTER — ANESTHESIA EVENT (OUTPATIENT)
Dept: ENDOSCOPY | Facility: HOSPITAL | Age: 77
End: 2017-07-18
Payer: MEDICARE

## 2017-07-18 ENCOUNTER — ANESTHESIA (OUTPATIENT)
Dept: ENDOSCOPY | Facility: HOSPITAL | Age: 77
End: 2017-07-18
Payer: MEDICARE

## 2017-07-18 ENCOUNTER — SURGERY (OUTPATIENT)
Age: 77
End: 2017-07-18

## 2017-07-18 ENCOUNTER — HOSPITAL ENCOUNTER (OUTPATIENT)
Facility: HOSPITAL | Age: 77
Discharge: HOME | End: 2017-07-18
Attending: FAMILY MEDICINE | Admitting: FAMILY MEDICINE
Payer: MEDICARE

## 2017-07-18 VITALS
SYSTOLIC BLOOD PRESSURE: 137 MMHG | TEMPERATURE: 98 F | BODY MASS INDEX: 25.49 KG/M2 | DIASTOLIC BLOOD PRESSURE: 57 MMHG | OXYGEN SATURATION: 99 % | WEIGHT: 153 LBS | HEART RATE: 66 BPM | HEIGHT: 65 IN | RESPIRATION RATE: 20 BRPM

## 2017-07-18 VITALS — RESPIRATION RATE: 20 BRPM

## 2017-07-18 DIAGNOSIS — K57.30 DIVERTICULOSIS OF LARGE INTESTINE WITHOUT HEMORRHAGE: ICD-10-CM

## 2017-07-18 DIAGNOSIS — K63.5 POLYP OF COLON, UNSPECIFIED PART OF COLON, UNSPECIFIED TYPE: ICD-10-CM

## 2017-07-18 DIAGNOSIS — Z12.11 SPECIAL SCREENING FOR MALIGNANT NEOPLASMS, COLON: ICD-10-CM

## 2017-07-18 DIAGNOSIS — Z86.010 HISTORY OF COLON POLYPS: ICD-10-CM

## 2017-07-18 DIAGNOSIS — Z83.719 FAMILY HISTORY OF COLONIC POLYPS: ICD-10-CM

## 2017-07-18 DIAGNOSIS — Z12.11 COLON CANCER SCREENING: Primary | ICD-10-CM

## 2017-07-18 PROBLEM — Z86.0100 HISTORY OF COLON POLYPS: Status: ACTIVE | Noted: 2017-07-18

## 2017-07-18 LAB
GLUCOSE SERPL-MCNC: 125 MG/DL (ref 70–110)
POCT GLUCOSE: 125 MG/DL (ref 70–110)

## 2017-07-18 PROCEDURE — 45385 COLONOSCOPY W/LESION REMOVAL: CPT | Performed by: FAMILY MEDICINE

## 2017-07-18 PROCEDURE — 27201089 HC SNARE, DISP (ANY): Performed by: FAMILY MEDICINE

## 2017-07-18 PROCEDURE — 88305 TISSUE EXAM BY PATHOLOGIST: CPT | Mod: 26,,, | Performed by: PATHOLOGY

## 2017-07-18 PROCEDURE — 25000003 PHARM REV CODE 250: Performed by: FAMILY MEDICINE

## 2017-07-18 PROCEDURE — 37000008 HC ANESTHESIA 1ST 15 MINUTES: Performed by: FAMILY MEDICINE

## 2017-07-18 PROCEDURE — 88305 TISSUE EXAM BY PATHOLOGIST: CPT | Performed by: PATHOLOGY

## 2017-07-18 PROCEDURE — 25000003 PHARM REV CODE 250: Performed by: NURSE ANESTHETIST, CERTIFIED REGISTERED

## 2017-07-18 PROCEDURE — 45385 COLONOSCOPY W/LESION REMOVAL: CPT | Mod: PT,,, | Performed by: FAMILY MEDICINE

## 2017-07-18 PROCEDURE — 63600175 PHARM REV CODE 636 W HCPCS: Performed by: NURSE ANESTHETIST, CERTIFIED REGISTERED

## 2017-07-18 PROCEDURE — 37000009 HC ANESTHESIA EA ADD 15 MINS: Performed by: FAMILY MEDICINE

## 2017-07-18 RX ORDER — LIDOCAINE HYDROCHLORIDE 10 MG/ML
INJECTION INFILTRATION; PERINEURAL
Status: DISCONTINUED | OUTPATIENT
Start: 2017-07-18 | End: 2017-07-18

## 2017-07-18 RX ORDER — PROPOFOL 10 MG/ML
VIAL (ML) INTRAVENOUS
Status: DISCONTINUED | OUTPATIENT
Start: 2017-07-18 | End: 2017-07-18

## 2017-07-18 RX ORDER — SODIUM CHLORIDE, SODIUM LACTATE, POTASSIUM CHLORIDE, CALCIUM CHLORIDE 600; 310; 30; 20 MG/100ML; MG/100ML; MG/100ML; MG/100ML
INJECTION, SOLUTION INTRAVENOUS CONTINUOUS
Status: DISCONTINUED | OUTPATIENT
Start: 2017-07-18 | End: 2017-07-18 | Stop reason: HOSPADM

## 2017-07-18 RX ADMIN — PROPOFOL 40 MG: 10 INJECTION, EMULSION INTRAVENOUS at 11:07

## 2017-07-18 RX ADMIN — SODIUM CHLORIDE, SODIUM LACTATE, POTASSIUM CHLORIDE, AND CALCIUM CHLORIDE: .6; .31; .03; .02 INJECTION, SOLUTION INTRAVENOUS at 10:07

## 2017-07-18 RX ADMIN — LIDOCAINE HYDROCHLORIDE 50 MG: 10 INJECTION, SOLUTION INFILTRATION; PERINEURAL at 11:07

## 2017-07-18 RX ADMIN — PROPOFOL 70 MG: 10 INJECTION, EMULSION INTRAVENOUS at 11:07

## 2017-07-18 NOTE — ANESTHESIA POSTPROCEDURE EVALUATION
"Anesthesia Post Evaluation    Patient: Cailin Pickett    Procedure(s) Performed: Procedure(s) (LRB):  screening colonoscopy (N/A)    Final Anesthesia Type: MAC  Patient location during evaluation: PACU  Patient participation: Yes- Able to Participate  Level of consciousness: awake and alert  Post-procedure vital signs: reviewed and stable  Pain management: adequate  Airway patency: patent  PONV status at discharge: No PONV  Anesthetic complications: no      Cardiovascular status: blood pressure returned to baseline  Respiratory status: unassisted  Hydration status: euvolemic  Follow-up not needed.        Visit Vitals  BP (!) 150/60 (BP Location: Right arm, BP Method: Manual)   Pulse 75   Temp 36.6 °C (97.8 °F)   Resp 20   Ht 5' 5" (1.651 m)   Wt 69.4 kg (153 lb)   LMP 07/27/1982   SpO2 95%   Breastfeeding? No   BMI 25.46 kg/m²       Pain/Blanca Score: Pain Assessment Performed: Yes (7/18/2017 10:45 AM)      "

## 2017-07-18 NOTE — TRANSFER OF CARE
"Anesthesia Transfer of Care Note    Patient: Cailin Pickett    Procedure(s) Performed: Procedure(s) (LRB):  screening colonoscopy (N/A)    Patient location: PACU    Anesthesia Type: MAC    Transport from OR: Transported from OR on room air with adequate spontaneous ventilation    Post pain: adequate analgesia    Post assessment: no apparent anesthetic complications    Post vital signs: stable    Level of consciousness: awake    Nausea/Vomiting: no nausea/vomiting    Complications: none    Transfer of care protocol was followed      Last vitals:   Visit Vitals  BP (!) 150/60 (BP Location: Right arm, BP Method: Manual)   Pulse 75   Temp 36.6 °C (97.8 °F)   Resp 20   Ht 5' 5" (1.651 m)   Wt 69.4 kg (153 lb)   LMP 07/27/1982   SpO2 95%   Breastfeeding? No   BMI 25.46 kg/m²     "

## 2017-07-18 NOTE — H&P
Short Stay Endoscopy History and Physical    PCP - Favio Titus MD    Procedure - Colonoscopy  ASA - 2  Mallampati - per anesthesia  History of Anesthesia problems - no  Family history Anesthesia problems -  no     HPI:  This is a 77 y.o. female here for evaluation of :   Active Hospital Problems    Diagnosis  POA    *Family history of colonic polyps [Z83.71]  Not Applicable    Colon cancer screening [Z12.11]  Not Applicable    Special screening for malignant neoplasms, colon [Z12.11]  Not Applicable    History of colon polyps [Z86.010]  Not Applicable      Resolved Hospital Problems    Diagnosis Date Resolved POA   No resolved problems to display.         Health Maintenance       Date Due Completion Date    Zoster Vaccine 06/19/2002 ---    Pneumococcal (65+) (2 of 2 - PCV13) 04/18/2014 4/18/2013    Eye Exam 12/07/2016 12/7/2015    Override on 6/24/2014: Done    Hemoglobin A1c 06/22/2017 12/22/2016    Foot Exam 06/30/2017 6/30/2016 (Done)    Override on 6/30/2016: Done    Override on 5/11/2015: Done    Override on 2/27/2014: Done    Influenza Vaccine 08/01/2017 11/30/2016    DEXA SCAN 11/24/2017 11/24/2014    Lipid Panel 12/22/2017 12/22/2016    Mammogram 01/05/2018 1/5/2017    Colonoscopy 10/24/2021 10/24/2011 (Done)    Override on 10/24/2011: Done    TETANUS VACCINE 10/24/2024 10/24/2014          Screening - yes  History of polyps - yes and her family has had colon polyps.  Diarrhea - no  Anemia - no  Blood in stools - no  Abdominal pain - no  Other - no    ROS:  CONSTITUTIONAL: Denies weight change,  fatigue, fevers, chills, night sweats.  CARDIOVASCULAR: Denies chest pain, shortness of breath, orthopnea and edema.  RESPIRATORY: Denies cough, hemoptysis, dyspnea, and wheezing.  GI: See HPI.    Medical History:   Past Medical History:   Diagnosis Date    Anemia     CKD (chronic kidney disease), stage III     Diabetes mellitus type II     Encounter for blood transfusion     Family history of colonic  polyps 7/18/2017    Gout, unspecified     Hyperlipidemia     Hypertension     Neuropathy     Pancreatic cancer     Renal failure     Thyroid disease        Surgical History:   Past Surgical History:   Procedure Laterality Date    COLONOSCOPY  2011    Dr. Favio Mims    HYSTERECTOMY      NEPHRECTOMY Left 5/2013    Dr Carter     PANCREAS SURGERY      distal pancreatectomy    SPLENECTOMY, TOTAL      THYROIDECTOMY, PARTIAL      VENTRICULOATRIAL SHUNT Left 12/4/2014     left arm       Family History:   Family History   Problem Relation Age of Onset    Cancer Mother      breast    Heart disease Mother 60     MI    Hypertension Mother     Stroke Mother     Cancer Father      prostate    Cancer Brother      renal cell carcinoma    Diabetes Brother        Social History:   Social History   Substance Use Topics    Smoking status: Former Smoker     Packs/day: 1.00     Years: 35.00     Quit date: 1/1/2001    Smokeless tobacco: Never Used    Alcohol use No       Allergies:   Review of patient's allergies indicates:   Allergen Reactions    Allegra [fexofenadine] Swelling    Codeine Hives, Itching and Nausea And Vomiting       Medications:   No current facility-administered medications on file prior to encounter.      Current Outpatient Prescriptions on File Prior to Encounter   Medication Sig Dispense Refill    allopurinol (ZYLOPRIM) 100 MG tablet TAKE 1 TABLET DAILY 90 tablet 2    amlodipine (NORVASC) 5 MG tablet TAKE 1 TABLET TWICE A  tablet 2    cetirizine (ZYRTEC) 10 MG tablet Take 10 mg by mouth once daily.       furosemide (LASIX) 40 MG tablet Take 1 tablet (40 mg total) by mouth once daily. 30 tablet 11    gabapentin (NEURONTIN) 100 MG capsule Take 1 capsule by mouth 2 (two) times daily.      GLUCOSAMINE HCL/CHONDR ZAVALETA A NA (OSTEO BI-FLEX ORAL) Take by mouth 2 (two) times daily.        hydrALAZINE (APRESOLINE) 100 MG tablet Take 1 tablet (100 mg total) by mouth every 8 (eight)  hours. 270 tablet 3    hydrOXYzine HCl (ATARAX) 25 MG tablet Take 1 tablet (25 mg total) by mouth 3 (three) times daily as needed for Itching. 90 tablet 8    repaglinide (PRANDIN) 0.5 MG tablet Take 1 tablet (0.5 mg total) by mouth 3 (three) times daily before meals. 270 tablet 2    sodium bicarbonate 650 MG tablet Take 1 tablet (650 mg total) by mouth 3 (three) times daily. 90 tablet 8    tramadol (ULTRAM) 50 mg tablet Take 1 tablet (50 mg total) by mouth every 12 (twelve) hours. 60 tablet 4    diclofenac sodium 1 % Gel Apply 2 g topically 4 (four) times daily. 1 Tube 2       Physical Exam:  Vital Signs:   Vitals:    07/18/17 1037   BP: (!) 150/60   Pulse: 75   Resp: 20   Temp: 97.8 °F (36.6 °C)     General Appearance: Well appearing in no acute distress  ENT: OP clear  Chest: CTA B  CV: RRR, no m/r/g  Abd: s/nt/nd/nabs  Ext: no edema    Labs:Reviewed    IMP:  Active Hospital Problems    Diagnosis  POA    *Family history of colonic polyps [Z83.71]  Not Applicable    Colon cancer screening [Z12.11]  Not Applicable    Special screening for malignant neoplasms, colon [Z12.11]  Not Applicable    History of colon polyps [Z86.010]  Not Applicable      Resolved Hospital Problems    Diagnosis Date Resolved POA   No resolved problems to display.         Plan:   I have explained the risks and benefits of colonoscopy to the patient including but not limited to bleeding, perforation, infection, and death. The patient wishes to proceed.

## 2017-07-18 NOTE — DISCHARGE INSTRUCTIONS
Diverticulosis    Diverticulosis means that small pouches have formed in the wall of your large intestine (colon). Most often, this problem causes no symptoms and is common as people age. But the pouches in the colon are at risk of becoming infected. When this happens, the condition is called diverticulitis. Although most people with diverticulosis never develop diverticulitis, it is still not uncommon. Rectal bleeding can also occur and in less common situations, a type of colon inflammation called colitis.  While most people do not have symptoms, some people with diverticulosis may have:  · Abdominal cramps and pain  · Bloating  · Constipation  · Change in bowel habits  Causes  The exact cause of diverticulosis (and diverticulitis) has not been proved, but a few things are associated with the condition:  · Low-fiber diet  · Constipation  · Lack of exercise  Your healthcare provider will talk with you about how to manage your condition. Diet changes may be all that are needed to help control diverticulosis and prevent progression to diverticulitis. If you develop diverticulitis, you will likely need other treatments.  Home care  You may be told to take fiber supplements daily. Fiber adds bulk to the stool so that it passes through the colon more easily. Stool softeners may be recommended. You may also be given medications for pain relief. Be sure to take all medications as directed.  In the past, people were told to avoid corn, nuts, and seeds. This is no longer necessary.  Follow these guidelines when caring for yourself at home:  · Eat unprocessed foods that are high in fiber. Whole grains, fruits, and vegetables are good choices.  · Drink 6 to 8 glasses of water every day unless your healthcare provider has you limit how much fluid you should have.  · Watch for changes in your bowel movements. Tell your provider if you notice any changes.  · Begin an exercise program. Ask your provider how to get started.  Generally, walking is the best.  · Get plenty of rest and sleep.  Follow-up care  Follow up with your healthcare provider, or as advised. Regular visits may be needed to check on your health. Sometimes special procedures such as colonoscopy, are needed after an episode of diverticulitis or blooding. Be sure to keep all your appointments.  If a stool sample was taken, or cultures were done, you should be told if they are positive, or if your treatment needs to be changed. You can call as directed for the results.  If X-rays were done, a radiologist will look at them. You will be told if there is a change in your treatment.  If antibiotics were prescribed, be sure to finish them all.  When to seek medical advice  Call your healthcare provider right away if any of these occur:  · Fever of 100.4°F (38°C) or higher, or as directed by your healthcare provider  · Severe cramps in the lower left side of the abdomen or pain that is getting worse  · Tenderness in the lower left side of the abdomen or worsening pain throughout the abdomen  · Diarrhea or constipation that doesn't get better within 24 hours  · Nausea and vomiting  · Bleeding from the rectum  Call 911  Call emergency services if any of the following occur:  · Trouble breathing  · Confusion  · Very drowsy or trouble awakening  · Fainting or loss of consciousness  · Rapid heart rate  · Chest pain  Date Last Reviewed: 12/30/2015 © 2000-2016 [employer]. [address] [place] [address]. All rights reserved. This information is not intended as a substitute for professional medical care. Always follow your healthcare professional's instructions.        Understanding Colon and Rectal Polyps    The colon (also called the large intestine) is a muscular tube that forms the last part of the digestive tract. It absorbs water and stores food waste. The colon is about 4 to 6 feet long. The rectum is the last 6 inches of the colon. The colon and rectum  have a smooth lining composed of millions of cells. Changes in these cells can lead to growths in the colon that can become cancerous and should be removed. Multiple tests are available to screen for colon cancer, but the colonoscopy is the most recommended test. During colonoscopy, these polyps can be removed. How often you need this test depends on many things including your condition, your family history, symptoms, and what the findings were at the previous colonoscopy.   When the colon lining changes  Changes that happen in the cells that line the colon or rectum can lead to growths called polyps. Over a period of years, polyps can turn cancerous. Removing polyps early may prevent cancer from ever forming.  Polyps  Polyps are fleshy clumps of tissue that form on the lining of the colon or rectum. Small polyps are usually benign (not cancerous). However, over time, cells in a polyp can change and become cancerous. Certain types of polyps known as adenomatous polyps are premalignant. The risk for invasive cancer increases with the size of the polyp and certain cell and gene features. This means that they can become cancerous if they're not removed. Hyperplastic polyps are benign. They can grow quite large and not turn cancerous.   Cancer  Almost all colorectal cancers start when polyp cells begin growing abnormally. As a cancerous tumor grows, it may involve more and more of the colon or rectum. In time, cancer can also grow beyond the colon or rectum and spread to nearby organs or to glands called lymph nodes. The cells can also travel to other parts of the body. This is known as metastasis. The earlier a cancerous tumor is removed, the better the chance of preventing its spread.    Date Last Reviewed: 8/1/2016  © 3357-2375 [employer]. [address] [place] [address], [place] [address]. All rights reserved. This information is not intended as a substitute for professional medical care. Always follow your  healthcare professional's instructions.

## 2017-07-18 NOTE — ANESTHESIA PREPROCEDURE EVALUATION
07/18/2017  Cailin Pickett is a 77 y.o., female.    Anesthesia Evaluation    I have reviewed the Patient Summary Reports.    I have reviewed the Nursing Notes.   I have reviewed the Medications.     Review of Systems  Anesthesia Hx:  No problems with previous Anesthesia    Social:  Former Smoker    Hematology/Oncology:         -- Anemia: Iron Deficiency Anemia Chronic  Pancreatic  Current/Recent Cancer.   EENT/Dental:EENT/Dental Normal   Cardiovascular:   Hypertension, well controlled hyperlipidemia    Pulmonary:   Shortness of breath    Renal/:   Chronic Renal Disease (stage III), CRI    Hepatic/GI:   Bowel Prep.    Musculoskeletal:  Musculoskeletal Normal    Neurological:   Neuromuscular Disease,    Endocrine:   Diabetes, well controlled, type 2 Hypothyroidism    Dermatological:  Skin Normal    Psych:  Psychiatric Normal           Physical Exam  General:  Well nourished    Airway/Jaw/Neck:  Airway Findings: Mouth Opening: Normal Tongue: Normal  General Airway Assessment: Adult       Chest/Lungs:  Chest/Lungs Findings: Clear to auscultation     Heart/Vascular:  Heart Findings: Rate: Normal             Anesthesia Plan  Type of Anesthesia, risks & benefits discussed:  Anesthesia Type:  MAC  Patient's Preference:   Intra-op Monitoring Plan:   Intra-op Monitoring Plan Comments:   Post Op Pain Control Plan:   Post Op Pain Control Plan Comments:   Induction:   IV  Beta Blocker:  Patient is not currently on a Beta-Blocker (No further documentation required).       Informed Consent: Patient understands risks and agrees with Anesthesia plan.  Questions answered. Anesthesia consent signed with patient representative.  ASA Score: 3     Day of Surgery Review of History & Physical: I have interviewed and examined the patient. I have reviewed the patient's H&P dated:  There are no significant changes.           Ready For Surgery From Anesthesia Perspective.

## 2017-07-18 NOTE — ANESTHESIA RELEASE NOTE
"Anesthesia Release from PACU Note    Patient: Cailin Pickett    Procedure(s) Performed: Procedure(s) (LRB):  screening colonoscopy (N/A)    Anesthesia type: MAC    Post pain: Adequate analgesia    Post assessment: no apparent anesthetic complications    Last Vitals:   Visit Vitals  BP (!) 150/60 (BP Location: Right arm, BP Method: Manual)   Pulse 75   Temp 36.6 °C (97.8 °F)   Resp 20   Ht 5' 5" (1.651 m)   Wt 69.4 kg (153 lb)   LMP 07/27/1982   SpO2 95%   Breastfeeding? No   BMI 25.46 kg/m²       Post vital signs: stable    Level of consciousness: awake    Nausea/Vomiting: no nausea/no vomiting    Complications: none    Airway Patency: patent    Respiratory: unassisted    Cardiovascular: stable and blood pressure at baseline    Hydration: euvolemic  "

## 2017-07-18 NOTE — DISCHARGE SUMMARY
Endoscopy Discharge Summary      Admit Date: 7/18/2017    Discharge Date and Time:  7/18/2017 11:39 AM    Attending Physician: Kenneth Kamara MD     Discharge Physician: Kenneth Kamara MD     Principal Admitting Diagnoses: Family history of colonic polyps         Discharge Diagnosis: The primary encounter diagnosis was Colon cancer screening. Diagnoses of Special screening for malignant neoplasms, colon, Family history of colonic polyps, History of colon polyps, Polyp of colon, unspecified part of colon, unspecified type, and Diverticulosis of large intestine without hemorrhage were also pertinent to this visit.     Discharged Condition: Good    Indication for Admission: Family history of colonic polyps     Hospital Course: Patient was admitted for an inpatient procedure and tolerated the procedure well with no complications.    Significant Diagnostic Studies: Colonoscopy with hot snare polypectomy    Pathology (if any):  Specimen (12h ago through future)    Start     Ordered    07/18/17 1133  Specimen to Pathology - Surgery  Once     Comments:  1.  Transverse colon polyp      07/18/17 1134          Estimated Blood Loss: 0 ml.    Discussed with: patient and family.    Disposition: Home.    Follow Up/Patient Instructions:   Current Discharge Medication List      CONTINUE these medications which have NOT CHANGED    Details   allopurinol (ZYLOPRIM) 100 MG tablet TAKE 1 TABLET DAILY  Qty: 90 tablet, Refills: 2    Associated Diagnoses: Acute gout of foot, unspecified cause, unspecified laterality      amlodipine (NORVASC) 5 MG tablet TAKE 1 TABLET TWICE A DAY  Qty: 180 tablet, Refills: 2    Associated Diagnoses: Essential hypertension      cetirizine (ZYRTEC) 10 MG tablet Take 10 mg by mouth once daily.       estradiol (ESTRACE) 0.5 MG tablet TAKE 1 TABLET DAILY FOR 3 WEEKS PER MONTH, THEN DO NOT TAKE FOR FOURTH WEEK EACH MONTH  Qty: 90 tablet, Refills: 3      furosemide (LASIX) 40 MG tablet Take 1 tablet (40 mg  total) by mouth once daily.  Qty: 30 tablet, Refills: 11    Associated Diagnoses: Localized edema      gabapentin (NEURONTIN) 100 MG capsule Take 1 capsule by mouth 2 (two) times daily.      GLUCOSAMINE HCL/CHONDR ZAVALETA A NA (OSTEO BI-FLEX ORAL) Take by mouth 2 (two) times daily.        hydrALAZINE (APRESOLINE) 100 MG tablet Take 1 tablet (100 mg total) by mouth every 8 (eight) hours.  Qty: 270 tablet, Refills: 3      hydrOXYzine HCl (ATARAX) 25 MG tablet Take 1 tablet (25 mg total) by mouth 3 (three) times daily as needed for Itching.  Qty: 90 tablet, Refills: 8    Associated Diagnoses: Itching      repaglinide (PRANDIN) 0.5 MG tablet Take 1 tablet (0.5 mg total) by mouth 3 (three) times daily before meals.  Qty: 270 tablet, Refills: 2    Associated Diagnoses: Type 2 diabetes mellitus with diabetic chronic kidney disease, unspecified CKD stage, unspecified long term insulin use status      sodium bicarbonate 650 MG tablet Take 1 tablet (650 mg total) by mouth 3 (three) times daily.  Qty: 90 tablet, Refills: 8    Associated Diagnoses: Metabolic acidosis      tramadol (ULTRAM) 50 mg tablet Take 1 tablet (50 mg total) by mouth every 12 (twelve) hours.  Qty: 60 tablet, Refills: 4    Associated Diagnoses: Arthritis      diclofenac sodium 1 % Gel Apply 2 g topically 4 (four) times daily.  Qty: 1 Tube, Refills: 2         STOP taking these medications       sodium,potassium,mag sulfates (SUPREP BOWEL PREP KIT) 17.5-3.13-1.6 gram SolR Comments:   Reason for Stopping:                 Discharge Procedure Orders  Diet general     Activity as tolerated     Call MD for:  temperature >100.4     Call MD for:  persistent nausea and vomiting     Call MD for:  severe uncontrolled pain     Call MD for:  difficulty breathing, headache or visual disturbances     Call MD for:  redness, tenderness, or signs of infection (pain, swelling, redness, odor or green/yellow discharge around incision site)     Call MD for:  hives     Call MD for:   persistent dizziness or light-headedness     No dressing needed         Follow-up Information     Kenneth Kamara MD. Call in 1 week.    Specialty:  Family Medicine  Why:  To receive pathology results.  Contact information:  [address] [facility] [address] [phone]                   @[email or web address]([phone])@

## 2017-07-22 NOTE — PROGRESS NOTES
Dear Favio Titus MD,    I recently cared for Cailin Pickett and performed an endoscopy.  Tissue was sent for pathology evaluation and I will have a letter written to ask the patient to repeat the colonoscopy in 5 years.  The pathology showed that there was adenomatous tissue present.  Thank you for allowing me to participate in the care of your patient.  Please call me for any questions that you might have.      Dr. Kenneth Kamara  [phone] cell  [phone] office      NURSING STAFF:Please  inform the patient that I reviewed the recent pathology obtained at the time of colonoscopy.    The results showed that there was adenomatous tissue present which is benign and based on that, I recommend that the patient have a repeat colonoscopy performed in 5 years.     If the patient has MyChart, this message has been sent to them.  Confirm that they read the note.  If not, copy the information and print a letter to send to the patient at this time.  Confirm that a notation to the PCP was done.      Dear Cailin Pickett,    This is to inform you that I have reviewed your recent colonoscopy pathology.  The results showed that you had adenomatous tissue present which is benign and based on that, I recommend that you have a repeat colonoscopy performed in 5 years.      Dr. Kenneth Kamara  [phone]

## 2017-07-24 ENCOUNTER — LAB VISIT (OUTPATIENT)
Dept: LAB | Facility: HOSPITAL | Age: 77
End: 2017-07-24
Attending: INTERNAL MEDICINE
Payer: MEDICARE

## 2017-07-24 DIAGNOSIS — N18.5 CHRONIC KIDNEY DISEASE (CKD), STAGE V: ICD-10-CM

## 2017-07-24 LAB
ALBUMIN SERPL BCP-MCNC: 3.3 G/DL
ANION GAP SERPL CALC-SCNC: 10 MMOL/L
BASOPHILS # BLD AUTO: 0.04 K/UL
BASOPHILS NFR BLD: 1 %
BUN SERPL-MCNC: 64 MG/DL
CALCIUM SERPL-MCNC: 9.3 MG/DL
CHLORIDE SERPL-SCNC: 104 MMOL/L
CO2 SERPL-SCNC: 25 MMOL/L
CREAT SERPL-MCNC: 4.6 MG/DL
DIFFERENTIAL METHOD: ABNORMAL
EOSINOPHIL # BLD AUTO: 0.1 K/UL
EOSINOPHIL NFR BLD: 2.7 %
ERYTHROCYTE [DISTWIDTH] IN BLOOD BY AUTOMATED COUNT: 15.4 %
EST. GFR  (AFRICAN AMERICAN): 10.1 ML/MIN/1.73 M^2
EST. GFR  (NON AFRICAN AMERICAN): 8.7 ML/MIN/1.73 M^2
GLUCOSE SERPL-MCNC: 131 MG/DL
HCT VFR BLD AUTO: 31.9 %
HGB BLD-MCNC: 10.3 G/DL
LYMPHOCYTES # BLD AUTO: 1.3 K/UL
LYMPHOCYTES NFR BLD: 32.4 %
MCH RBC QN AUTO: 28.7 PG
MCHC RBC AUTO-ENTMCNC: 32.3 G/DL
MCV RBC AUTO: 89 FL
MONOCYTES # BLD AUTO: 0.5 K/UL
MONOCYTES NFR BLD: 13.2 %
NEUTROPHILS # BLD AUTO: 2.1 K/UL
NEUTROPHILS NFR BLD: 50.7 %
PHOSPHATE SERPL-MCNC: 4.7 MG/DL
PLATELET # BLD AUTO: 252 K/UL
PMV BLD AUTO: 10.8 FL
POTASSIUM SERPL-SCNC: 4.6 MMOL/L
RBC # BLD AUTO: 3.59 M/UL
SODIUM SERPL-SCNC: 139 MMOL/L
WBC # BLD AUTO: 4.1 K/UL

## 2017-07-24 PROCEDURE — 85025 COMPLETE CBC W/AUTO DIFF WBC: CPT

## 2017-07-24 PROCEDURE — 80069 RENAL FUNCTION PANEL: CPT

## 2017-07-24 PROCEDURE — 36415 COLL VENOUS BLD VENIPUNCTURE: CPT | Mod: PO

## 2017-07-31 ENCOUNTER — OFFICE VISIT (OUTPATIENT)
Dept: NEPHROLOGY | Facility: CLINIC | Age: 77
End: 2017-07-31
Payer: MEDICARE

## 2017-07-31 VITALS
HEART RATE: 68 BPM | HEIGHT: 65 IN | SYSTOLIC BLOOD PRESSURE: 140 MMHG | DIASTOLIC BLOOD PRESSURE: 52 MMHG | BODY MASS INDEX: 25.85 KG/M2 | WEIGHT: 155.19 LBS

## 2017-07-31 DIAGNOSIS — E55.9 VITAMIN D DEFICIENCY: ICD-10-CM

## 2017-07-31 DIAGNOSIS — M25.561 ACUTE PAIN OF RIGHT KNEE: ICD-10-CM

## 2017-07-31 DIAGNOSIS — N18.5 CHRONIC KIDNEY DISEASE (CKD), STAGE V: Primary | ICD-10-CM

## 2017-07-31 PROCEDURE — 99213 OFFICE O/P EST LOW 20 MIN: CPT | Mod: PBBFAC,PO | Performed by: INTERNAL MEDICINE

## 2017-07-31 PROCEDURE — 1159F MED LIST DOCD IN RCRD: CPT | Mod: ,,, | Performed by: INTERNAL MEDICINE

## 2017-07-31 PROCEDURE — 1125F AMNT PAIN NOTED PAIN PRSNT: CPT | Mod: ,,, | Performed by: INTERNAL MEDICINE

## 2017-07-31 PROCEDURE — 99214 OFFICE O/P EST MOD 30 MIN: CPT | Mod: S$PBB,,, | Performed by: INTERNAL MEDICINE

## 2017-07-31 PROCEDURE — 1157F ADVNC CARE PLAN IN RCRD: CPT | Mod: ,,, | Performed by: INTERNAL MEDICINE

## 2017-07-31 PROCEDURE — 99999 PR PBB SHADOW E&M-EST. PATIENT-LVL III: CPT | Mod: PBBFAC,,, | Performed by: INTERNAL MEDICINE

## 2017-07-31 RX ORDER — DICLOFENAC SODIUM 10 MG/G
2 GEL TOPICAL 4 TIMES DAILY
Qty: 1 TUBE | Refills: 2 | Status: SHIPPED | OUTPATIENT
Start: 2017-07-31 | End: 2017-11-27

## 2017-07-31 NOTE — PROGRESS NOTES
Subjective:       Patient ID: Cailin Pickett is a 77 y.o.  female who presents for follow-up evaluation of CKD stage 5, HTN, SHPT , Anemia         Favio Titus MD      HPI : Cailin Pickett Is a pleasant 76-year-old  woman seen office today followup for above medical problems. I saw her in clinic about 3 months ago. Recent lab results were discussed with the patient. She has solitary right kidney. Multiple cysts reported on the kidney. It measures about 16 cm. Patient Attended chronic kidney disease education and options class. she had a left arm AV fistula placed on 12/4/14 by Dr Foster . She denies recent hospitalizations or ER visits.  Recent lab results were discussed with the patient. Recent serum Cr is 4.6  mg/dL.  She complains of right knee pain that has been bothering her for the last 2-3 months.      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 she underwent chemo rx with 5-FU, she tolerated the cycles well. In 5/2013 she underwent resection of tail of pancreas, left nephrectomy, splenectomy, left adrenalectomy.          Past Medical History:   Diagnosis Date    Anemia     CKD (chronic kidney disease), stage III     Diabetes mellitus type II     Encounter for blood transfusion     Family history of colonic polyps 7/18/2017    Gout, unspecified     Hyperlipidemia     Hypertension     Neuropathy     Pancreatic cancer     Renal failure     Thyroid disease          Current Outpatient Prescriptions on File Prior to Visit   Medication Sig Dispense Refill    allopurinol (ZYLOPRIM) 100 MG tablet TAKE 1 TABLET DAILY 90 tablet 2    amlodipine (NORVASC) 5 MG tablet TAKE 1 TABLET TWICE A  tablet 2    cetirizine (ZYRTEC) 10 MG tablet Take 10 mg by mouth once daily.       estradiol (ESTRACE) 0.5 MG tablet TAKE 1 TABLET DAILY FOR 3 WEEKS PER MONTH, THEN DO NOT TAKE FOR FOURTH WEEK EACH MONTH 90 tablet 3     furosemide (LASIX) 40 MG tablet Take 1 tablet (40 mg total) by mouth once daily. 30 tablet 11    gabapentin (NEURONTIN) 100 MG capsule Take 1 capsule by mouth 2 (two) times daily.      GLUCOSAMINE HCL/CHONDR ZAVALETA A NA (OSTEO BI-FLEX ORAL) Take by mouth 2 (two) times daily.        hydrALAZINE (APRESOLINE) 100 MG tablet Take 1 tablet (100 mg total) by mouth every 8 (eight) hours. 270 tablet 3    hydrOXYzine HCl (ATARAX) 25 MG tablet Take 1 tablet (25 mg total) by mouth 3 (three) times daily as needed for Itching. 90 tablet 8    repaglinide (PRANDIN) 0.5 MG tablet Take 1 tablet (0.5 mg total) by mouth 3 (three) times daily before meals. 270 tablet 2    sodium bicarbonate 650 MG tablet Take 1 tablet (650 mg total) by mouth 3 (three) times daily. 90 tablet 8    tramadol (ULTRAM) 50 mg tablet Take 1 tablet (50 mg total) by mouth every 12 (twelve) hours. 60 tablet 4    diclofenac sodium 1 % Gel Apply 2 g topically 4 (four) times daily. 1 Tube 2     No current facility-administered medications on file prior to visit.          Review of Systems   Constitutional: Negative for activity change, appetite change, fatigue and fever.   HENT: Negative for congestion, facial swelling, sore throat, trouble swallowing and voice change.    Eyes: Negative for redness and visual disturbance.   Respiratory: Negative for apnea, cough, chest tightness, shortness of breath and wheezing.    Cardiovascular: Negative for chest pain, palpitations and leg swelling.   Gastrointestinal: Negative for abdominal distention, abdominal pain, blood in stool, constipation, diarrhea, nausea and vomiting.   Genitourinary: Negative for decreased urine volume, difficulty urinating, dysuria, flank pain, frequency, hematuria, pelvic pain and urgency.   Musculoskeletal: Positive for arthralgias. Negative for back pain, gait problem and joint swelling.        Right knee pain    Skin: Negative for color change and rash.   Neurological: Negative for  dizziness, syncope, weakness and headaches.   Hematological: Does not bruise/bleed easily.   Psychiatric/Behavioral: Negative for agitation, behavioral problems and confusion. The patient is not nervous/anxious.                Objective:         Vitals:    07/31/17 1339   BP: (!) 140/52   Pulse: 68       Respiratory rate 20, afebrile, weight 155 pounds, previous weight was 160 pounds    Physical Exam  :      Constitutional: She is oriented to person, place, and time. She appears well-developed and well-nourished. No distress.    HENT: Head: Normocephalic and atraumatic. Oropharynx is clear and moist. No oropharyngeal exudate.    Eyes: Conjunctivae normal and EOM are normal. Pupils are equal, round, and reactive to light. No scleral icterus.    Neck: Normal range of motion. Neck supple. No JVD present. Carotid bruit is not present. No tracheal deviation present. No mass and no thyromegaly present.    Cardiovascular: Normal rate, regular rhythm, normal heart sounds and intact distal pulses. no gallop and no friction rub. No murmur heard.    Pulmonary/Chest: Effort normal and breath sounds normal. No respiratory distress. She has no wheezes. She has no rales. She exhibits no tenderness.    Abdominal: Soft. Bowel sounds are normal. She exhibits no distension, no abdominal bruit, no ascites and no mass. There is no hepatosplenomegaly. There is no rebound, no guarding and no CVA tenderness.   Musculoskeletal: Normal range of motion. She exhibits no edema and no tenderness. LUE AV Fistula with good thrill, no local redness, tender to pressure on both sacroiliac joints. Trace edema in legs   Lymphadenopathy: She has no cervical adenopathy.   Neurological: She is alert and oriented to person, place, and time. No cranial nerve deficit.    Skin: Skin is warm and intact. No rash noted. No erythema. No pallor.   Psychiatric: She has a normal mood and affect. Her behavior is normal. Judgment normal.           Labs:    Lab Results    Component Value Date    CREATININE 4.6 (H) 07/24/2017    BUN 64 (H) 07/24/2017     07/24/2017    K 4.6 07/24/2017     07/24/2017    CO2 25 07/24/2017       Lab Results   Component Value Date    WBC 4.10 07/24/2017    HGB 10.3 (L) 07/24/2017    HCT 31.9 (L) 07/24/2017    MCV 89 07/24/2017     07/24/2017       Lab Results   Component Value Date    .0 (H) 03/06/2017    CALCIUM 9.3 07/24/2017    PHOS 4.7 (H) 07/24/2017       Lab Results   Component Value Date    ALBUMIN 3.3 (L) 07/24/2017       Lab Results   Component Value Date    URICACID 6.2 (H) 03/06/2017       Impression and plan - 76-year-old  woman seen in office today in followup for following medical problems,     1. Chronic kidney disease stage 5 - Serum Creatinine stable at 4.6 mg/dL, Patient attended chronic kidney disease education and options class. She has a solitary right kidney which is enlarged at 16 cm secondary to polycystic kidney disease. Left kidney was removed due to High-grade pancreatic cancer invading directly into the left kidney. Patient was declined for a kidney transplant because of advanced age and history of cancer. Patient had a left arm AV fistula on 12/4/14 by Dr Foster , she is not interested in PD anymore,  today she denies any signs and symptoms of uremia.        2. Hypertension - blood pressure controlled on current regimen, will continue to monitor    3. Anemia - Hb 10.3 gms, following with Hematology/Oncology,     4. Secondary hyperparathyroidism - PTH is Acceptable at 357,  Continue to follow.    5. pancreatic carcinoma - s/p surgery + Chemo, follows with Oncology        6. Volume - euvolemic on  Lasix to 40 mg daily . Discussed salt and fluid restriction ,        7. Cystic kidney disease - follow,        8. Hyperuricemia/gout - continue allopurinol.        9. Metabolic acidosis -  sodium bicarbonate supplements    10.  Follow-up appointment with orthopedics for right knee pain. ?  steroid injection           We will see her in followup in 3 months, More than 25 minutes was spent with the patient discussing labs and plan of care     Tank Vinson M.D.

## 2017-08-24 ENCOUNTER — OFFICE VISIT (OUTPATIENT)
Dept: ORTHOPEDICS | Facility: CLINIC | Age: 77
End: 2017-08-24
Payer: MEDICARE

## 2017-08-24 VITALS
HEART RATE: 62 BPM | WEIGHT: 152.13 LBS | DIASTOLIC BLOOD PRESSURE: 61 MMHG | SYSTOLIC BLOOD PRESSURE: 152 MMHG | RESPIRATION RATE: 16 BRPM | BODY MASS INDEX: 24.45 KG/M2 | HEIGHT: 66 IN

## 2017-08-24 DIAGNOSIS — M25.561 ACUTE PAIN OF RIGHT KNEE: ICD-10-CM

## 2017-08-24 PROCEDURE — 3077F SYST BP >= 140 MM HG: CPT | Mod: ,,, | Performed by: PHYSICIAN ASSISTANT

## 2017-08-24 PROCEDURE — 3078F DIAST BP <80 MM HG: CPT | Mod: ,,, | Performed by: PHYSICIAN ASSISTANT

## 2017-08-24 PROCEDURE — 1157F ADVNC CARE PLAN IN RCRD: CPT | Mod: ,,, | Performed by: PHYSICIAN ASSISTANT

## 2017-08-24 PROCEDURE — 99213 OFFICE O/P EST LOW 20 MIN: CPT | Mod: 25,S$PBB,, | Performed by: PHYSICIAN ASSISTANT

## 2017-08-24 PROCEDURE — 99214 OFFICE O/P EST MOD 30 MIN: CPT | Mod: PBBFAC,PO | Performed by: PHYSICIAN ASSISTANT

## 2017-08-24 PROCEDURE — 1159F MED LIST DOCD IN RCRD: CPT | Mod: ,,, | Performed by: PHYSICIAN ASSISTANT

## 2017-08-24 PROCEDURE — 1126F AMNT PAIN NOTED NONE PRSNT: CPT | Mod: ,,, | Performed by: PHYSICIAN ASSISTANT

## 2017-08-24 PROCEDURE — 20610 DRAIN/INJ JOINT/BURSA W/O US: CPT | Mod: 50,PBBFAC,PO | Performed by: PHYSICIAN ASSISTANT

## 2017-08-24 PROCEDURE — 20610 DRAIN/INJ JOINT/BURSA W/O US: CPT | Mod: 50,S$PBB,, | Performed by: PHYSICIAN ASSISTANT

## 2017-08-24 PROCEDURE — 99999 PR PBB SHADOW E&M-EST. PATIENT-LVL IV: CPT | Mod: PBBFAC,,, | Performed by: PHYSICIAN ASSISTANT

## 2017-08-24 RX ORDER — METHYLPREDNISOLONE ACETATE 40 MG/ML
40 INJECTION, SUSPENSION INTRA-ARTICULAR; INTRALESIONAL; INTRAMUSCULAR; SOFT TISSUE
Status: COMPLETED | OUTPATIENT
Start: 2017-08-24 | End: 2017-08-24

## 2017-08-24 RX ADMIN — METHYLPREDNISOLONE ACETATE 40 MG: 40 INJECTION, SUSPENSION INTRALESIONAL; INTRAMUSCULAR; INTRASYNOVIAL; SOFT TISSUE at 03:08

## 2017-08-24 NOTE — LETTER
August 24, 2017      Tank Vinosn MD  [address] [place] [place] [address]           [facility]  [address]  [place] [address]  Phone: [phone]  Fax: [phone]          Patient: Cailin Pickett   MR Number: [id number]   YOB: 1940   Date of Visit: 8/24/2017       Dear Dr. Tank Vinson:    Thank you for referring Cailin Pickett to me for evaluation. Attached you will find relevant portions of my assessment and plan of care.    If you have questions, please do not hesitate to call me. I look forward to following Cailin Pickett along with you.    Sincerely,    Edwin Bentley PA-C    Enclosure  CC:  No Recipients    If you would like to receive this communication electronically, please contact [email or web address] or [phone] to request more information on [employer] access.    For providers and/or their staff who would like to refer a patient to [facility], please contact us through our one-stop-shop provider referral line, [facility] , at [phone].    If you feel you have received this communication in error or would no longer like to receive these types of communications, please e-mail [email or web address]

## 2017-08-24 NOTE — PROGRESS NOTES
CC:77 yo female with bilateral knee pain    HPI: Pain for 3-4 weeks, increase with activity, stairs. Denies any trauma or swelling. Pain 4/10 she's had a chronic history of bilateral knee pain.  Underwent Synvisc injections several years ago.  Currently requesting steroid injections.  She has had this cleared through her nephrologist    PMH:    Past Medical History:   Diagnosis Date    Anemia     CKD (chronic kidney disease), stage III     Diabetes mellitus type II     Encounter for blood transfusion     Family history of colonic polyps 7/18/2017    Gout, unspecified     Hyperlipidemia     Hypertension     Neuropathy     Pancreatic cancer     Renal failure     Thyroid disease        PSH:    Past Surgical History:   Procedure Laterality Date    COLONOSCOPY  2011    Dr. Favio Mims    COLONOSCOPY N/A 7/18/2017    Procedure: screening colonoscopy;  Surgeon: Kenneth Kamara MD;  Location: [facility];  Service: Endoscopy;  Laterality: N/A;    HYSTERECTOMY      NEPHRECTOMY Left 5/2013    Dr Carter     PANCREAS SURGERY      distal pancreatectomy    SPLENECTOMY, TOTAL      THYROIDECTOMY, PARTIAL      VENTRICULOATRIAL SHUNT Left 12/4/2014     left arm       Family Hx:    Family History   Problem Relation Age of Onset    Cancer Mother      breast    Heart disease Mother 60     MI    Hypertension Mother     Stroke Mother     Cancer Father      prostate    Cancer Brother      renal cell carcinoma    Diabetes Brother        Allergy:    Review of patient's allergies indicates:   Allergen Reactions    Allegra [fexofenadine] Swelling    Codeine Hives, Itching and Nausea And Vomiting       Medication:    Current Outpatient Prescriptions:     allopurinol (ZYLOPRIM) 100 MG tablet, TAKE 1 TABLET DAILY, Disp: 90 tablet, Rfl: 2    amlodipine (NORVASC) 5 MG tablet, TAKE 1 TABLET TWICE A DAY, Disp: 180 tablet, Rfl: 2    cetirizine (ZYRTEC) 10 MG tablet, Take 10 mg by mouth once daily. , Disp: , Rfl:      "estradiol (ESTRACE) 0.5 MG tablet, TAKE 1 TABLET DAILY FOR 3 WEEKS PER MONTH, THEN DO NOT TAKE FOR FOURTH WEEK EACH MONTH, Disp: 90 tablet, Rfl: 3    furosemide (LASIX) 40 MG tablet, Take 1 tablet (40 mg total) by mouth once daily., Disp: 30 tablet, Rfl: 11    gabapentin (NEURONTIN) 100 MG capsule, Take 1 capsule by mouth 2 (two) times daily., Disp: , Rfl:     GLUCOSAMINE HCL/CHONDR ZAVALETA A NA (OSTEO BI-FLEX ORAL), Take by mouth 2 (two) times daily.  , Disp: , Rfl:     hydrALAZINE (APRESOLINE) 100 MG tablet, Take 1 tablet (100 mg total) by mouth every 8 (eight) hours., Disp: 270 tablet, Rfl: 3    hydrOXYzine HCl (ATARAX) 25 MG tablet, Take 1 tablet (25 mg total) by mouth 3 (three) times daily as needed for Itching., Disp: 90 tablet, Rfl: 8    repaglinide (PRANDIN) 0.5 MG tablet, Take 1 tablet (0.5 mg total) by mouth 3 (three) times daily before meals., Disp: 270 tablet, Rfl: 2    sodium bicarbonate 650 MG tablet, Take 1 tablet (650 mg total) by mouth 3 (three) times daily., Disp: 90 tablet, Rfl: 8    tramadol (ULTRAM) 50 mg tablet, Take 1 tablet (50 mg total) by mouth every 12 (twelve) hours., Disp: 60 tablet, Rfl: 4    diclofenac sodium 1 % Gel, Apply 2 g topically 4 (four) times daily., Disp: 1 Tube, Rfl: 2    Social History:    Social History     Social History    Marital status:      Spouse name: N/A    Number of children: N/A    Years of education: N/A     Occupational History    Not on file.     Social History Main Topics    Smoking status: Former Smoker     Packs/day: 1.00     Years: 35.00     Quit date: 1/1/2001    Smokeless tobacco: Never Used    Alcohol use No    Drug use: No    Sexual activity: Not on file     Other Topics Concern    Not on file     Social History Narrative    No narrative on file       Vitals:   BP (!) 152/61   Pulse 62   Resp 16   Ht 5' 5.5" (1.664 m)   Wt 69 kg (152 lb 1.9 oz)   LMP 07/27/1982   BMI 24.93 kg/m²      ROS:  GENERAL: No fever, chills, " fatigability or weight loss.  SKIN: No rashes, itching or changes in color or texture of skin.  HEAD: No headaches or recent head trauma.  EYES: Visual acuity fine. No photophobia, ocular pain or diplopia.  EARS: Denies ear pain, discharge or vertigo.  NOSE: No loss of smell, no epistaxis or postnasal drip.  MOUTH & THROAT: No hoarseness or change in voice. No excessive gum bleeding.  NODES: Denies swollen glands.  CHEST: Denies HYDE, cyanosis, wheezing, cough and sputum production.  CARDIOVASCULAR: Denies chest pain, PND, orthopnea or reduced exercise tolerance.  ABDOMEN: Appetite fine. No weight loss. Denies diarrhea, abdominal pain, hematemesis or blood in stool.  URINARY: No flank pain, dysuria or hematuria.  PERIPHERAL VASCULAR: No claudication or cyanosis.  NEUROLOGIC: No history of seizures, paralysis, alteration of gait or coordination.  MUSCULOSKELETAL: See HPI    PE:  APPEARANCE: Well nourished, well developed, in no acute distress.   HEAD: Normocephalic, atraumatic.  NEUROLOGIC: Cranial Nerves: II-XII grossly intact, also see MUSCULOSKELETAL  MUSCULOSKELETAL: Knee-bilateral  Knee Exam-abnormal  Gait-abnormal  Muscle Appearance:normal  Grooming:normal  Spine Alignment-normal  Muscle Atrophy-Negative  Deformities-Negative  Tenderness-Positive  Paresthesias-Negative  Range of Motion         Ext-abnormal         Flex-abnormal  Muscle Strength-abnormal  Sensation-abnormal  Reflexes-normal  Crepitus-Positive                                Swelling-Negative  Effusion- Negative                                Edema-Negative  Lachman-Negative                               Erythema-Negative  Cathy's-Negative                            Apley Grind-Positive  Patellar Comp-Positive                        Alignment-normal/symmetric  Patellar Apprehension-Negative            Synovial fullness-Negative  Passive Patellar Tilt-normal  Patellar Tracking-normal   Patellar Glide-normal  Q-Angle at 90 degrees-normal  Patellar  Grind-normal  P-[id number]-[id number]  Fatigue-Negative                                     HS Tightness-Negative  Tests on Exam-abnormal  Neurovascular Status-normal+2 DP and PT artery pulses  Skin-normal  Mental Status-normal             Diagnosis:              1.  Bilateral knee arthritis                   Diagnostic Studies  MRI-No  X-Ray-Yes, agree with There is bilateral tricompartmental degenerative spurring and tibiofemoral chondrocalcinosis.  Moderate asymmetric narrowing of the right patellofemoral joint space.  No significant joint effusion.  No acute fracture or dislocation.      Plan:                                                 1. PT-no                                                 2.OT-no                                          3.NSAID-yes      Voltaren gel , advised not to use until cleared by Nephrology                                 4. Narcotics-no                                     5. Wound care-N/A                                 6. Rest-no                                           7. Surgery-no                                         8. CLARA Hose-no                                    9. Anticoagulation therapy-no               10. Elevation-no                                     11. Crutches-no                                    12. Walker-no             13. Cane no                        14. Referral-no                                     15.Injection- yes.   Injection:  The patient was placed in the supine position with the bilateral knee near the end of the bed facing me.  The bilateral knee was placed over a nonsterile pad.The Knee was prepped, sterily, with Alcohol and Betadine.  A  Refrigerant and coolant was used to locally anesthetize the skin over the lateral aspect of the knee.  A one and a half inch, 27 gauge needle attached to A 3 cc syringe was placed into the lateral joint. A negative pressure was placed on the needle to ensure the aspiration was placed into the joint and not into a  blood vessel.  2 cc of a 1%  Lidocaine was mixed with 0.5  cc of a 80 mg solution of Depo-Medrol injected. The patient tolerated the knee injection well.  A Bandage was placed over the injection site.                         16. Splint   /    Cast   /   Cast Shoe-No              17. RICE            18. Follow up-  Advised to use the Voltaren gel , discuss possible knee replacement future.  Also discuss repeating Synvisc injections.  The patient wants to hold this time.

## 2017-08-24 NOTE — PATIENT INSTRUCTIONS
Knee art    Osteoarthritis  Osteoarthritis (also called degenerative joint disease) happens when the cartilage in a joint becomes damaged and worn. This may be due to age, wear and tear, overuse of the joint, or other problems. Osteoarthritis can affect any joint. But it is most common in hands, knees, spine, hips, and feet. Symptoms include joint stiffness, pain, and swelling.  Home care  · When a joint is more sore than usual, rest it for a day or two.  · Heat can help relieve stiffness. Take a hot bath or apply a heating pad for up to 30 minutes at a time. If symptoms are worse in the morning, using heat just after awakening can help relax the muscle and soothe the joints.   · Ice helps relieve pain and swelling. It is often used after activity. Use a cold pack wrapped in a thin cloth on the joint for 10-15 minutes at a time.   · Alternating hot and cold can also help relieve pain. Try this for 20 minutes at a time, several times per day.  · Exercise helps prevent the muscles and ligaments around the joint from becoming weak. It also helps maintain function in the joint.  Be as active as you can. Talk to your doctor about what activity program is best for you.  · Excess weight puts a lot of extra strain on weight-bearing joints of the lower back, hips, knees, feet and ankles. If you are overweight, talk to your doctor about a safe and effective weight loss program.  · Use anti-inflammatory medications as prescribed for pain. This incudes acetaminophen or NSAIDs such as ibuprofen or naproxen. If needed, topical or injected medications may be recommended. Talk to your doctor if these options are not enough to manage your pain.  · Talk with your doctor about devices that might help improve your function and reduce pain.  Follow-up care  Follow up with your doctor as advised by our staff.  When to seek medical attention  Call your doctor right away if any of the following occur:  · Redness or swelling of a painful  joint  · Discharge or pus from a painful joint  · Fever of 100.4ºF (38ºC) or higher, or as directed by your healthcare provider  · Worsening joint pain  · Decreased ability to move the joint or bear weight on the joint  Date Last Reviewed: 4/13/2015  © 9459-8529 [employer]. [address] [place] [address], [place] [address]. All rights reserved. This information is not intended as a substitute for professional medical care. Always follow your healthcare professional's instructions.

## 2017-09-06 ENCOUNTER — LAB VISIT (OUTPATIENT)
Dept: LAB | Facility: HOSPITAL | Age: 77
End: 2017-09-06
Attending: INTERNAL MEDICINE
Payer: MEDICARE

## 2017-09-06 ENCOUNTER — OFFICE VISIT (OUTPATIENT)
Dept: HEMATOLOGY/ONCOLOGY | Facility: CLINIC | Age: 77
End: 2017-09-06
Payer: MEDICARE

## 2017-09-06 VITALS
HEIGHT: 66 IN | DIASTOLIC BLOOD PRESSURE: 56 MMHG | SYSTOLIC BLOOD PRESSURE: 154 MMHG | BODY MASS INDEX: 24.7 KG/M2 | WEIGHT: 153.69 LBS | TEMPERATURE: 98 F | HEART RATE: 62 BPM

## 2017-09-06 DIAGNOSIS — D63.1 ANEMIA IN STAGE 4 CHRONIC KIDNEY DISEASE: Primary | ICD-10-CM

## 2017-09-06 DIAGNOSIS — C25.2 MALIGNANT NEOPLASM OF TAIL OF PANCREAS: ICD-10-CM

## 2017-09-06 DIAGNOSIS — M19.90 ARTHRITIS: ICD-10-CM

## 2017-09-06 DIAGNOSIS — N18.4 ANEMIA IN STAGE 4 CHRONIC KIDNEY DISEASE: Primary | ICD-10-CM

## 2017-09-06 DIAGNOSIS — N18.9 ANEMIA IN CHRONIC RENAL DISEASE: ICD-10-CM

## 2017-09-06 DIAGNOSIS — E11.22 TYPE 2 DIABETES MELLITUS WITH DIABETIC CHRONIC KIDNEY DISEASE, UNSPECIFIED CKD STAGE, UNSPECIFIED LONG TERM INSULIN USE STATUS: ICD-10-CM

## 2017-09-06 DIAGNOSIS — D63.1 ANEMIA IN CHRONIC RENAL DISEASE: ICD-10-CM

## 2017-09-06 LAB
BASOPHILS # BLD AUTO: 0.03 K/UL
BASOPHILS NFR BLD: 0.6 %
DIFFERENTIAL METHOD: ABNORMAL
EOSINOPHIL # BLD AUTO: 0 K/UL
EOSINOPHIL NFR BLD: 0.8 %
ERYTHROCYTE [DISTWIDTH] IN BLOOD BY AUTOMATED COUNT: 15.2 %
HCT VFR BLD AUTO: 33.3 %
HGB BLD-MCNC: 10.5 G/DL
LYMPHOCYTES # BLD AUTO: 1.3 K/UL
LYMPHOCYTES NFR BLD: 25.6 %
MCH RBC QN AUTO: 28.7 PG
MCHC RBC AUTO-ENTMCNC: 31.5 G/DL
MCV RBC AUTO: 91 FL
MONOCYTES # BLD AUTO: 0.7 K/UL
MONOCYTES NFR BLD: 13.6 %
NEUTROPHILS # BLD AUTO: 3.1 K/UL
NEUTROPHILS NFR BLD: 59.4 %
PLATELET # BLD AUTO: 253 K/UL
PMV BLD AUTO: 10.7 FL
RBC # BLD AUTO: 3.66 M/UL
WBC # BLD AUTO: 5.23 K/UL

## 2017-09-06 PROCEDURE — 3078F DIAST BP <80 MM HG: CPT | Mod: ,,, | Performed by: INTERNAL MEDICINE

## 2017-09-06 PROCEDURE — 1126F AMNT PAIN NOTED NONE PRSNT: CPT | Mod: ,,, | Performed by: INTERNAL MEDICINE

## 2017-09-06 PROCEDURE — 1157F ADVNC CARE PLAN IN RCRD: CPT | Mod: ,,, | Performed by: INTERNAL MEDICINE

## 2017-09-06 PROCEDURE — 99213 OFFICE O/P EST LOW 20 MIN: CPT | Mod: PBBFAC,PO | Performed by: INTERNAL MEDICINE

## 2017-09-06 PROCEDURE — 1159F MED LIST DOCD IN RCRD: CPT | Mod: ,,, | Performed by: INTERNAL MEDICINE

## 2017-09-06 PROCEDURE — 99999 PR PBB SHADOW E&M-EST. PATIENT-LVL III: CPT | Mod: PBBFAC,,, | Performed by: INTERNAL MEDICINE

## 2017-09-06 PROCEDURE — 36415 COLL VENOUS BLD VENIPUNCTURE: CPT | Mod: PO

## 2017-09-06 PROCEDURE — 99214 OFFICE O/P EST MOD 30 MIN: CPT | Mod: S$PBB,,, | Performed by: INTERNAL MEDICINE

## 2017-09-06 PROCEDURE — 85025 COMPLETE CBC W/AUTO DIFF WBC: CPT | Mod: PO

## 2017-09-06 PROCEDURE — 3077F SYST BP >= 140 MM HG: CPT | Mod: ,,, | Performed by: INTERNAL MEDICINE

## 2017-09-06 NOTE — PROGRESS NOTES
Reason for visit: Pancreatic adenocarcinoma/Anemia due to chronic kidney disease  Date of Diagnosis: 2012    HPI:   The patient is a 77-year-old  female who presents to the hematology oncology clinic today for follow up. She underwent resection surgery for her pancreatic tail high-grade carcinoma on May 15, 2013 by Dr. Carter at [facility], [place]. The patient had previously completed neoadjuvant chemotherapy and 5-FU chemoradiation with excellent response.  Today the patient reports that she feels well except for occasional episodes of lower back pain. She is taking tramadol with good relief from this. Energy levels are good. She reports that her appetite is good. She denies any fever, chills or night sweats. She denies any chest pain or shortness of breath. She denies any bowel or urinary complaints. The patient denies any nausea or vomiting. She denies any abdominal pain today.  I have reviewed all of the patient's interval clinical history available in EPIC. She continues to report tingling pain in the soles of her feet since completing her chemotherapy. Neurontin did not help and she stopped lyrica because of side effects.   She is being closely followed by Dr. Vinson with nephrology with regard to her kidney function. She has also had A-V fistula done in her left upper extremity by Dr. Foster.    PAST MEDICAL HISTORY:   1. Hypertension  2. GERD  3. Migraines  4. Gout  5. Chronic kidney disease stage IV   6. Bilateral renal cysts  7. Type 2 diabetes mellitus    SURGICAL HISTORY:   1. Hysterectomy for fibroids  2. Partial thyroidectomy  3. Left mediport placement  4. Distal pancreatectomy, splenectomy, left nephrectomy and adrenalectomy on May 15, 2013  5. Left AV fistula on 14    FAMILY HISTORY: The patient's mother  from complications related to breast cancer at the age of 60. The patient's father  from complications related to prostate cancer at the age of 81. The  patient's sister was diagnosed with breast cancer at the age of 68. The patient's brother had renal cell carcinoma in both kidneys [sporadic] approximately 4 years apart. He has since had a kidney transplant. Another brother has kidney cancer diagnosed at 68. She denies any other immediate family members with cancer or bleeding/clotting disorders.    SOCIAL HISTORY: She reports a 30+ pack year smoking history and quit in 2001. She does not drink alcohol. She has never used any recreational drugs. She worked for the [employer] in [place] and retired in 1986. She is  and does not have any children. She lives in [place], [place].    ALLERGIES: Reviewed on medication card.    MEDICATIONS: [Medcard has been reviewed and/or reconciled.]    REVIEW OF SYSTEMS:   GENERAL: [No fevers, chills or sweats.] Denies fatigue. Appetite is good. Weight is stable.  HEENT: [No blurred vision, tinnitus, nasal discharge, sorethroat or dysphagia.]  HEART: [No chest pain, palpitations or shortness of breath.]   LUNGS: [No hemoptysis, cough or breathing problems.]   ABDOMEN: [No abdominal pain, constipation or melena.] Denies nausea, vomiting.  GENITOURINARY: [No dysuria, bleeding or malodorous discharge.]  NEURO: [No headache, dizziness or vertigo.]  HEMATOLOGY: [No easy bruising, spontaneous bleeding or blood clots in the past].  MUSCULOSKELETAL: She denies any myalgias or bone pain. Does report occasional low back pain.  SKIN: [No rashes or skin lesions.]  PSYCHIATRY: [No depression or anxiety.]    PHYSICAL EXAMINATION:   VS: Reviewed on nurse's notes.  APPEARANCE: The patient is a well-developed, well-nourished and well-groomed  female who appears in no acute distress.   HEENT: No scleral icterus. Both external auditory canals clear. No oral ulcers, lesions. Throat clear  HEAD: No sinus tenderness. She has facial hair suggestive of hirsutism.   NECK: Supple. No palpable lymphadenopathy. Thyroid  non-tender, no palpable masses  CHEST: Breath sounds clear bilaterally. No rales. No rhonchi. Unlabored respirations.  CARDIOVASCULAR: Normal S1, S2. Normal rate. Regular rhythm.  ABDOMEN: Bowel sounds normal. No tenderness. No abdominal distention. No hepatomegaly. No splenomegaly. Healed incision noted.  LYMPHATIC: No palpable supraclavicular, axillary nodes  EXTREMITIES: No clubbing, cyanosis. No edema of bilateral lower extremities. Left A-V fistula noted.  SKIN: No lesions. No petechiae. No ecchymoses. No induration or nodules  NEUROLOGIC: No focal findings. Alert & Oriented x 3. Mood appropriate to affect    LABS:   Reviewed    IMAGING:  Reviewed    IMPRESSION:  1. Pancreatic adenocarcinoma (T3N0Mx)  2. Chronic kidney disease (stage 4)  3. Anemia due to CKD  4. Peripheral neuropathy    PLAN:  1. Results of CBC from today were discussed in detail. I will hold off on initiation of Procrit at this time as her hemoglobin continues to be greater than 10 g/dL.  2. I have encouraged good p.o. intake of food and fluids. I have encouraged regular exercise.   3. Continue follow up with Dr. Vinson as recommended with regard to CKD  4. There appears to be no evidence of recurrence of her pancreatic adenocarcinoma at this time. We discussed that there is no benefit for surveillance imaging in her case. We will obtain imaging if any new symptoms.  5. Continue elavil for treatment of peripheral neuropathy. This medication has been helping but with some anticholinergic side effects such as dry mouth. She has not had relief from trying multiple other medications for her peripheral neuropathy. Risks/benefits and common side effects discussed in detail. She knows not to stop it abruptly and is also aware of sedation precautions.  6. She is uptodate with screening colonoscopy.    Return to clinic in 3 months with CBC. She knows to call sooner for any new problems or questions.    Nas Hoffman MD

## 2017-11-08 NOTE — MR AVS SNAPSHOT
[facility]  [address] [address]  Phone: [phone]  Fax: [phone]                  Cailin Pickett   3/22/2017 8:40 AM   Office Visit    Description:  Female : 1940   Provider:  Haroon Long MD   Department:  [facility]           Diagnoses this Visit        Comments    Pain in both lower extremities    -  Primary     SOB (shortness of breath)         Hypertension associated with diabetes         CKD (chronic kidney disease), stage III         Hyperlipidemia, unspecified hyperlipidemia type                To Do List           Future Appointments        Provider Department Dept Phone    3/23/2017 11:30 AM Tank Vinson MD [facility] Nephrology [phone]    2017 10:50 AM LABORATORY, [facility] [phone]    2017 11:00 AM Nas Hoffman MD [facility] Hemotology Oncology [phone]      Goals (5 Years of Data)     None      Follow-Up and Disposition     Return in about 1 year (around 3/22/2018).      [facility]     [facility] Nurse Care Line - 24/7 Assistance  Registered nurses in the [facility] provide clinical advisement, health education, appointment booking, and other advisory services.  Call for this free service at [phone].             Medications           Message regarding Medications     Verify the changes and/or additions to your medication regime listed below are the same as discussed with your clinician today.  If any of these changes or additions are incorrect, please notify your healthcare provider.             Verify that the below list of medications is an accurate representation of the medications you are currently taking.  If none reported, the list may be blank. If incorrect, please contact your healthcare provider. Carry this list with you in case of emergency.           Current Medications     allopurinol (ZYLOPRIM) 100 MG tablet TAKE 1 TABLET  Accompanied by dadHermes      Water Source:  [x]  Well  []  City  []  Bottled    Second Hand Smoke Exposure? no    Pets? 3 cats and 2 dog    Parental Hearing Concerns? no    Parental Vision Concerns? no    TB Risk:   Child Born Outside of [place]? no  Child traveled outside [place]? no  Contact with anyone with TB? no  Contact with anyone with positive PPD? no    Lead Risk  House <1960 and peeling paint? no  House <1960 and renovations? no  Hobbies with Pb exposure? no  Sibs or Playmates with Pb poisoning? no  Live near a Pb smelter or battery recycling center? no   "DAILY    amlodipine (NORVASC) 5 MG tablet Take 1 tablet (5 mg total) by mouth 2 (two) times daily.    cetirizine (ZYRTEC) 10 MG tablet Take 10 mg by mouth once daily.     estradiol (ESTRACE) 0.5 MG tablet TAKE 1 TABLET DAILY FOR 3 WEEKS PER MONTH, THEN DO NOT TAKE FOR 4TH WEEK EACH MONTH    furosemide (LASIX) 40 MG tablet Take 1 tablet (40 mg total) by mouth once daily.    gabapentin (NEURONTIN) 100 MG capsule Take 1 capsule by mouth 2 (two) times daily.    GLUCOSAMINE HCL/CHONDR SALMON A NA (OSTEO BI-FLEX ORAL) Take by mouth 2 (two) times daily.      hydrALAZINE (APRESOLINE) 50 MG tablet TAKE 1 TABLET EVERY 8 HOURS    pravastatin (PRAVACHOL) 40 MG tablet Take 40 mg by mouth once daily.      repaglinide (PRANDIN) 0.5 MG tablet Take 1 tablet (0.5 mg total) by mouth 3 (three) times daily before meals.    sodium bicarbonate 650 MG tablet Take 1 tablet (650 mg total) by mouth 3 (three) times daily.    tramadol (ULTRAM) 50 mg tablet Take 1 tablet (50 mg total) by mouth every 12 (twelve) hours.           Clinical Reference Information           Your Vitals Were     BP Pulse Height Weight Last Period SpO2    144/54 (BP Location: Right arm, Patient Position: Sitting, BP Method: Manual) 62 5' 5" (1.651 m) 74.3 kg (163 lb 14.6 oz) 07[birth year]27[birth year]1982 98%    BMI                27.28 kg/m2          Blood Pressure          Most Recent Value    BP  (!)  144/54      Allergies as of 3/22/2017     Allegra [Fexofenadine]    Codeine      Immunizations Administered on Date of Encounter - 3/22/2017     None      Maintenance Dialysis History     Patient has no recorded history of maintenance dialysis.      Language Assistance Services     ATTENTION: Language assistance services are available, free of charge. Please call [phone].      ATENCIÓN: Si jyoti grubbs, tiene a salmon disposición servicios gratuitos de asistencia lingüística. Llame al [phone].     CHÚ Ý: N?u b?n nói Ti?ng Vi?t, có các d?ch v? h? tr? ngôn ng? mi?n phí dành cho b?n. " G?i s? [phone].         [facility] complies with applicable Federal civil rights laws and does not discriminate on the basis of race, color, national origin, age, disability, or sex.

## 2017-11-20 ENCOUNTER — LAB VISIT (OUTPATIENT)
Dept: LAB | Facility: HOSPITAL | Age: 77
End: 2017-11-20
Attending: INTERNAL MEDICINE
Payer: MEDICARE

## 2017-11-20 DIAGNOSIS — N18.5 CHRONIC KIDNEY DISEASE (CKD), STAGE V: ICD-10-CM

## 2017-11-20 DIAGNOSIS — E55.9 VITAMIN D DEFICIENCY: ICD-10-CM

## 2017-11-20 LAB
25(OH)D3+25(OH)D2 SERPL-MCNC: 41 NG/ML
ALBUMIN SERPL BCP-MCNC: 3.3 G/DL
ANION GAP SERPL CALC-SCNC: 10 MMOL/L
BUN SERPL-MCNC: 60 MG/DL
CALCIUM SERPL-MCNC: 9.4 MG/DL
CHLORIDE SERPL-SCNC: 106 MMOL/L
CO2 SERPL-SCNC: 24 MMOL/L
CREAT SERPL-MCNC: 4.4 MG/DL
EST. GFR  (AFRICAN AMERICAN): 10.6 ML/MIN/1.73 M^2
EST. GFR  (NON AFRICAN AMERICAN): 9.2 ML/MIN/1.73 M^2
GLUCOSE SERPL-MCNC: 132 MG/DL
PHOSPHATE SERPL-MCNC: 3.4 MG/DL
POTASSIUM SERPL-SCNC: 4.4 MMOL/L
PTH-INTACT SERPL-MCNC: 152 PG/ML
SODIUM SERPL-SCNC: 140 MMOL/L
URATE SERPL-MCNC: 5 MG/DL

## 2017-11-20 PROCEDURE — 36415 COLL VENOUS BLD VENIPUNCTURE: CPT | Mod: PO

## 2017-11-20 PROCEDURE — 80069 RENAL FUNCTION PANEL: CPT

## 2017-11-20 PROCEDURE — 83970 ASSAY OF PARATHORMONE: CPT

## 2017-11-20 PROCEDURE — 82306 VITAMIN D 25 HYDROXY: CPT

## 2017-11-20 PROCEDURE — 84550 ASSAY OF BLOOD/URIC ACID: CPT

## 2017-11-27 ENCOUNTER — OFFICE VISIT (OUTPATIENT)
Dept: NEPHROLOGY | Facility: CLINIC | Age: 77
End: 2017-11-27
Payer: MEDICARE

## 2017-11-27 VITALS
HEIGHT: 66 IN | HEART RATE: 70 BPM | DIASTOLIC BLOOD PRESSURE: 68 MMHG | BODY MASS INDEX: 25.19 KG/M2 | SYSTOLIC BLOOD PRESSURE: 158 MMHG | WEIGHT: 156.75 LBS

## 2017-11-27 DIAGNOSIS — E11.21 DIABETIC NEPHROPATHY ASSOCIATED WITH TYPE 2 DIABETES MELLITUS: ICD-10-CM

## 2017-11-27 DIAGNOSIS — I10 ESSENTIAL HYPERTENSION: ICD-10-CM

## 2017-11-27 DIAGNOSIS — M19.90 ARTHRITIS: ICD-10-CM

## 2017-11-27 DIAGNOSIS — L29.9 ITCHING: ICD-10-CM

## 2017-11-27 DIAGNOSIS — Z84.89: ICD-10-CM

## 2017-11-27 DIAGNOSIS — R60.0 LOCALIZED EDEMA: ICD-10-CM

## 2017-11-27 DIAGNOSIS — M10.9 ACUTE GOUT OF FOOT, UNSPECIFIED CAUSE, UNSPECIFIED LATERALITY: ICD-10-CM

## 2017-11-27 DIAGNOSIS — E87.20 METABOLIC ACIDOSIS: ICD-10-CM

## 2017-11-27 DIAGNOSIS — N18.5 CHRONIC KIDNEY DISEASE (CKD), STAGE V: Primary | ICD-10-CM

## 2017-11-27 PROCEDURE — 99213 OFFICE O/P EST LOW 20 MIN: CPT | Mod: PBBFAC,PO | Performed by: INTERNAL MEDICINE

## 2017-11-27 PROCEDURE — 99999 PR PBB SHADOW E&M-EST. PATIENT-LVL III: CPT | Mod: PBBFAC,,, | Performed by: INTERNAL MEDICINE

## 2017-11-27 PROCEDURE — 99214 OFFICE O/P EST MOD 30 MIN: CPT | Mod: S$PBB,,, | Performed by: INTERNAL MEDICINE

## 2017-11-27 RX ORDER — HYDRALAZINE HYDROCHLORIDE 100 MG/1
100 TABLET, FILM COATED ORAL EVERY 8 HOURS
Qty: 270 TABLET | Refills: 3 | Status: SHIPPED | OUTPATIENT
Start: 2017-11-27 | End: 2019-03-05 | Stop reason: SDUPTHER

## 2017-11-27 RX ORDER — AMLODIPINE BESYLATE 5 MG/1
5 TABLET ORAL 2 TIMES DAILY
Qty: 180 TABLET | Refills: 2 | Status: SHIPPED | OUTPATIENT
Start: 2017-11-27 | End: 2018-11-01 | Stop reason: SDUPTHER

## 2017-11-27 RX ORDER — SODIUM BICARBONATE 650 MG/1
650 TABLET ORAL 2 TIMES DAILY
Qty: 180 TABLET | Refills: 3 | Status: SHIPPED | OUTPATIENT
Start: 2017-11-27 | End: 2018-11-29 | Stop reason: SDUPTHER

## 2017-11-27 RX ORDER — ALLOPURINOL 100 MG/1
100 TABLET ORAL DAILY
Qty: 90 TABLET | Refills: 2 | Status: SHIPPED | OUTPATIENT
Start: 2017-11-27 | End: 2018-08-03 | Stop reason: SDUPTHER

## 2017-11-27 RX ORDER — FUROSEMIDE 40 MG/1
40 TABLET ORAL DAILY
Qty: 90 TABLET | Refills: 3 | Status: SHIPPED | OUTPATIENT
Start: 2017-11-27 | End: 2018-12-02 | Stop reason: SDUPTHER

## 2017-11-27 RX ORDER — SODIUM BICARBONATE 650 MG/1
650 TABLET ORAL 2 TIMES DAILY
Qty: 180 TABLET | Refills: 3 | Status: SHIPPED | OUTPATIENT
Start: 2017-11-27 | End: 2017-11-27 | Stop reason: SDUPTHER

## 2017-11-27 RX ORDER — CARVEDILOL 6.25 MG/1
6.25 TABLET ORAL 2 TIMES DAILY WITH MEALS
Qty: 60 TABLET | Refills: 11 | Status: SHIPPED | OUTPATIENT
Start: 2017-11-27 | End: 2017-12-12 | Stop reason: SDUPTHER

## 2017-11-27 RX ORDER — SODIUM BICARBONATE 650 MG/1
650 TABLET ORAL 2 TIMES DAILY
Qty: 90 TABLET | Refills: 8 | Status: SHIPPED | OUTPATIENT
Start: 2017-11-27 | End: 2017-11-27 | Stop reason: SDUPTHER

## 2017-11-27 RX ORDER — TRAMADOL HYDROCHLORIDE 50 MG/1
50 TABLET ORAL EVERY 12 HOURS
Qty: 180 TABLET | Refills: 3 | Status: SHIPPED | OUTPATIENT
Start: 2017-11-27 | End: 2019-02-20 | Stop reason: SDUPTHER

## 2017-11-27 RX ORDER — HYDROXYZINE HYDROCHLORIDE 25 MG/1
25 TABLET, FILM COATED ORAL 2 TIMES DAILY PRN
Qty: 180 TABLET | Refills: 3 | Status: SHIPPED | OUTPATIENT
Start: 2017-11-27 | End: 2018-12-19 | Stop reason: SDUPTHER

## 2017-11-27 NOTE — PATIENT INSTRUCTIONS
Avoid NSAID pain medications such as advil, aleve, motrin, ibuprofen, naprosyn, meloxicam, diclofenac, mobic.       Start Coreg or Carvedilol 6.5 mg twice a day for high BP       Low-Salt Diet:    This diet removes foods that are high in salt. It also limits the amount of salt you use when cooking. It is most often used for people with high blood pressure, edema (fluid retention), and kidney, liver, or heart disease.  Table salt contains the mineral sodium. Your body needs sodium to work normally. But too much sodium can make your health problems worse. Your healthcare provider is recommending a low-salt (also called low-sodium) diet for you. Your total daily allowance of salt is 1,500 to 2,300 milligrams (mg). It is less than 1 teaspoon of table salt. This means you can have only about 500 to 700 mg of sodium at each meal. People with certain health problems should limit salt intake to the lower end of the recommended range.    When you cook, dont add much salt. If you can cook without using salt, even better. Dont add salt to your food at the table.  When shopping, read food labels. Salt is often called sodium on the label. Choose foods that are salt-free, low salt, or very low salt. Note that foods with reduced salt may not lower your salt intake enough.    Beans, potatoes, and pasta  Ok: Dry beans, split peas, lentils, potatoes, rice, macaroni, pasta, spaghetti without added salt  Avoid: Potato chips, tortilla chips, and similar products  Breads and cereals  Ok: Low-sodium breads, rolls, cereals, and cakes; low-salt crackers, matzo crackers  Avoid: Salted crackers, pretzels, popcorn, Nepali toast, pancakes, muffins  Dairy  Ok: Milk, chocolate milk, hot chocolate mix, low-salt cheeses, and yogurt  Avoid: Processed cheese and cheese spreads; Roquefort, Camembert, and cottage cheese; buttermilk, instant breakfast drink  Desserts  Ok: Ice cream, frozen yogurt, juice bars, gelatin, cookies and pies, sugar, honey,  jelly, hard candy  Avoid: Most pies, cakes and cookies prepared or processed with salt; instant pudding  Drinks  Ok: Tea, coffee, fizzy (carbonated) drinks, juices  Avoid: Flavored coffees, electrolyte replacement drinks, sports drinks  Meats  Ok: All fresh meat, fish, poultry, low-salt tuna, eggs, egg substitute  Avoid: Smoked, pickled, brine-cured, or salted meats and fish. This includes meraz, chipped beef, corned beef, hot dogs, deli meats, ham, kosher meats, salt pork, sausage, canned tuna, salted codfish, smoked salmon, herring, sardines, or anchovies.  Seasonings and spices  Ok: Most seasonings are okay. Good substitutes for salt include: fresh herb blends, hot sauce, lemon, garlic, gann, vinegar, dry mustard, parsley, cilantro, horseradish, tomato paste, regular margarine, mayonnaise, unsalted butter, cream cheese, vegetable oil, cream, low-salt salad dressing and gravy.  Avoid: Regular ketchup, relishes, pickles, soy sauce, teriyaki sauce, Worcestershire sauce, BBQ sauce, tartar sauce, meat tenderizer, chili sauce, regular gravy, regular salad dressing, salted butter  Soups  Ok: Low-salt soups and broths made with allowed foods  Avoid: Bouillon cubes, soups with smoked or salted meats, regular soup and broth  Vegetables  Ok: Most vegetables are okay; also low-salt tomato and vegetable juices  Avoid: Sauerkraut and other brine-soaked vegetables; pickles and other pickled vegetables; tomato juice, olives  Date Last Reviewed: 8/1/2016  © 3719-8535 [employer]. [address] [place] [address], [place] [address]. All rights reserved. This information is not intended as a substitute for professional medical care. Always follow your healthcare professional's instructions.

## 2017-11-27 NOTE — PROGRESS NOTES
Subjective:       Patient ID: Cailin Pickett is a 77 y.o.  female who presents for follow-up evaluation of CKD stage 5, HTN, SHPT , Anemia      Favio Titus MD      HPI : Cailin Pickett Is a pleasant 77-year-old  woman seen office today followup for above medical problems. I saw her in clinic about 3 months ago. Recent lab results were discussed with the patient. She has solitary right kidney. Multiple cysts reported on the kidney. It measures about 16 cm. Patient Attended chronic kidney disease education and options class. she had a left arm AV fistula placed on 12/4/14 by Dr Foster . She denies recent hospitalizations or ER visits.  Recent lab results were discussed with the patient. Recent serum Cr is 4.4  mg/dL.        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 she underwent chemo rx with 5-FU, she tolerated the cycles well. In 5/2013 she underwent resection of tail of pancreas, left nephrectomy, splenectomy, left adrenalectomy.          Past Medical History:   Diagnosis Date    Anemia     CKD (chronic kidney disease), stage III     Diabetes mellitus type II     Encounter for blood transfusion     Family history of colonic polyps 7/18/2017    Gout, unspecified     Hyperlipidemia     Hypertension     Neuropathy     Pancreatic cancer     Renal failure     Thyroid disease          Current Outpatient Prescriptions on File Prior to Visit   Medication Sig Dispense Refill    allopurinol (ZYLOPRIM) 100 MG tablet TAKE 1 TABLET DAILY 90 tablet 2    amlodipine (NORVASC) 5 MG tablet TAKE 1 TABLET TWICE A  tablet 2    cetirizine (ZYRTEC) 10 MG tablet Take 10 mg by mouth once daily.       estradiol (ESTRACE) 0.5 MG tablet TAKE 1 TABLET DAILY FOR 3 WEEKS PER MONTH, THEN DO NOT TAKE FOR FOURTH WEEK EACH MONTH 90 tablet 3    furosemide (LASIX) 40 MG tablet Take 1 tablet (40 mg total) by mouth once daily. 30  tablet 11    gabapentin (NEURONTIN) 100 MG capsule Take 1 capsule by mouth 2 (two) times daily.      GLUCOSAMINE HCL/CHONDR ZAVALETA A NA (OSTEO BI-FLEX ORAL) Take by mouth 2 (two) times daily.        hydrALAZINE (APRESOLINE) 100 MG tablet Take 1 tablet (100 mg total) by mouth every 8 (eight) hours. 270 tablet 3    hydrOXYzine HCl (ATARAX) 25 MG tablet Take 1 tablet (25 mg total) by mouth 3 (three) times daily as needed for Itching. 90 tablet 8    repaglinide (PRANDIN) 0.5 MG tablet Take 1 tablet (0.5 mg total) by mouth 3 (three) times daily before meals. 270 tablet 2    sodium bicarbonate 650 MG tablet Take 1 tablet (650 mg total) by mouth 3 (three) times daily. 90 tablet 8    tramadol (ULTRAM) 50 mg tablet Take 1 tablet (50 mg total) by mouth every 12 (twelve) hours. 60 tablet 4    [DISCONTINUED] diclofenac sodium 1 % Gel Apply 2 g topically 4 (four) times daily. 1 Tube 2     No current facility-administered medications on file prior to visit.        Review of Systems  :        Constitutional: Negative for activity change, appetite change, fatigue and fever.   HENT: Negative for congestion, facial swelling, sore throat, trouble swallowing and voice change.    Eyes: Negative for redness and visual disturbance.   Respiratory: Negative for apnea, cough, chest tightness, shortness of breath and wheezing.    Cardiovascular: Negative for chest pain, palpitations and leg swelling.   Gastrointestinal: Negative for abdominal distention, abdominal pain, blood in stool, constipation, diarrhea, nausea and vomiting.   Genitourinary: Negative for decreased urine volume, difficulty urinating, dysuria, flank pain, frequency, hematuria, pelvic pain and urgency.   Musculoskeletal: Positive for arthralgias. Negative for back pain, gait problem and joint swelling.   Skin: Negative for color change and rash.   Neurological: Negative for dizziness, syncope, weakness and headaches.   Hematological: Does not bruise/bleed easily.    Psychiatric/Behavioral: Negative for agitation, behavioral problems and confusion. The patient is not nervous/anxious.        Objective:         Vitals:    11/27/17 1056   BP: (!) 158/68   Pulse: 70       Respiratory rate 20, afebrile, weight 156 pounds, stable    Physical Exam  :        Constitutional: She is oriented to person, place, and time. She appears well-developed and well-nourished. No distress.    HENT: Head: Normocephalic and atraumatic. Oropharynx is clear and moist. No oropharyngeal exudate.    Eyes: Conjunctivae normal and EOM are normal. Pupils are equal, round, and reactive to light. No scleral icterus.    Neck: Normal range of motion. Neck supple. No JVD present. Carotid bruit is not present. No tracheal deviation present. No mass and no thyromegaly present.    Cardiovascular: Normal rate, regular rhythm, normal heart sounds and intact distal pulses. no gallop and no friction rub. No murmur heard.    Pulmonary/Chest: Effort normal and breath sounds normal. No respiratory distress. She has no wheezes. She has no rales. She exhibits no tenderness.    Abdominal: Soft. Bowel sounds are normal. She exhibits no distension, no abdominal bruit, no ascites and no mass. There is no hepatosplenomegaly. There is no rebound, no guarding and no CVA tenderness.   Musculoskeletal: Normal range of motion. She exhibits no edema and no tenderness. LUE AV Fistula with good thrill, no local redness, tender to pressure on both sacroiliac joints. Trace edema in legs   Lymphadenopathy: She has no cervical adenopathy.   Neurological: She is alert and oriented to person, place, and time. No cranial nerve deficit.    Skin: Skin is warm and intact. No rash noted. No erythema. No pallor.   Psychiatric: She has a normal mood and affect. Her behavior is normal. Judgment normal.                 Labs:    Lab Results   Component Value Date    CREATININE 4.4 (H) 11/20/2017    BUN 60 (H) 11/20/2017     11/20/2017    K 4.4  11/20/2017     11/20/2017    CO2 24 11/20/2017       Lab Results   Component Value Date    WBC 5.23 09/06/2017    HGB 10.5 (L) 09/06/2017    HCT 33.3 (L) 09/06/2017    MCV 91 09/06/2017     09/06/2017       Lab Results   Component Value Date    .0 (H) 11/20/2017    CALCIUM 9.4 11/20/2017    PHOS 3.4 11/20/2017       Lab Results   Component Value Date    URICACID 5.0 11/20/2017       Lab Results   Component Value Date    HGBA1C 5.5 06/09/2016       Lab Results   Component Value Date    ALBUMIN 3.3 (L) 11/20/2017       Impression and plan - 77-year-old  woman seen in office today in followup for following medical problems,     1. Chronic kidney disease stage 5 - Serum Creatinine stable at 4.4 mg/dL, Patient attended chronic kidney disease education and options class. She has a solitary right kidney which is enlarged at 16 cm secondary to polycystic kidney disease. Left kidney was removed due to High-grade pancreatic cancer invading directly into the left kidney. Patient was declined for a kidney transplant because of advanced age and history of cancer. Patient had a left arm AV fistula on 12/4/14 by Dr Foster , she is not interested in PD anymore,  today she denies any signs and symptoms of uremia.         2. Hypertension - blood pressure is elevated, target blood pressure less than 150/90, continue current regimen, will start carvedilol 6.5 mg twice a day, advised patient to continue to monitor blood pressures at home, nurse visit in 2 weeks for blood pressure check.    3. Anemia - Hb 10.5  gms, following with Hematology/Oncology,     4. Secondary hyperparathyroidism - PTH is Acceptable at 357,  Continue to follow.    5. pancreatic carcinoma - s/p surgery + Chemo, follows with Oncology        6. Volume - euvolemic on  Lasix to 40 mg daily . Discussed salt and fluid restriction ,        7. Cystic kidney disease - follow,        8. Hyperuricemia/gout - continue allopurinol.        9.  Metabolic acidosis - cont  sodium bicarbonate supplements     10.  U/S of the breast , request as there is a FH of lump           We will see her in followup in 3 months, More than 25 minutes was spent with the patient discussing labs and plan of care     Tank Vinson M.D.

## 2017-12-12 ENCOUNTER — LAB VISIT (OUTPATIENT)
Dept: LAB | Facility: HOSPITAL | Age: 77
End: 2017-12-12
Attending: INTERNAL MEDICINE
Payer: MEDICARE

## 2017-12-12 ENCOUNTER — OFFICE VISIT (OUTPATIENT)
Dept: HEMATOLOGY/ONCOLOGY | Facility: CLINIC | Age: 77
End: 2017-12-12
Payer: MEDICARE

## 2017-12-12 VITALS
WEIGHT: 155.63 LBS | TEMPERATURE: 98 F | DIASTOLIC BLOOD PRESSURE: 50 MMHG | SYSTOLIC BLOOD PRESSURE: 134 MMHG | HEART RATE: 57 BPM | HEIGHT: 66 IN | OXYGEN SATURATION: 96 % | BODY MASS INDEX: 25.01 KG/M2

## 2017-12-12 DIAGNOSIS — Z85.07 HISTORY OF PANCREATIC CANCER: ICD-10-CM

## 2017-12-12 DIAGNOSIS — C25.2 MALIGNANT NEOPLASM OF TAIL OF PANCREAS: ICD-10-CM

## 2017-12-12 DIAGNOSIS — N18.4 ANEMIA IN STAGE 4 CHRONIC KIDNEY DISEASE: Primary | ICD-10-CM

## 2017-12-12 DIAGNOSIS — I10 ESSENTIAL HYPERTENSION: ICD-10-CM

## 2017-12-12 DIAGNOSIS — D63.1 ANEMIA IN STAGE 4 CHRONIC KIDNEY DISEASE: Primary | ICD-10-CM

## 2017-12-12 DIAGNOSIS — R63.4 WEIGHT LOSS: ICD-10-CM

## 2017-12-12 DIAGNOSIS — N18.4 ANEMIA IN STAGE 4 CHRONIC KIDNEY DISEASE: ICD-10-CM

## 2017-12-12 DIAGNOSIS — D63.1 ANEMIA IN STAGE 4 CHRONIC KIDNEY DISEASE: ICD-10-CM

## 2017-12-12 LAB
BASOPHILS # BLD AUTO: 0.03 K/UL
BASOPHILS NFR BLD: 0.6 %
DIFFERENTIAL METHOD: ABNORMAL
EOSINOPHIL # BLD AUTO: 0.1 K/UL
EOSINOPHIL NFR BLD: 2.7 %
ERYTHROCYTE [DISTWIDTH] IN BLOOD BY AUTOMATED COUNT: 15 %
HCT VFR BLD AUTO: 32.2 %
HGB BLD-MCNC: 10.3 G/DL
LYMPHOCYTES # BLD AUTO: 1.3 K/UL
LYMPHOCYTES NFR BLD: 25.9 %
MCH RBC QN AUTO: 29.8 PG
MCHC RBC AUTO-ENTMCNC: 32 G/DL
MCV RBC AUTO: 93 FL
MONOCYTES # BLD AUTO: 0.3 K/UL
MONOCYTES NFR BLD: 6.3 %
NEUTROPHILS # BLD AUTO: 3.3 K/UL
NEUTROPHILS NFR BLD: 64.5 %
PLATELET # BLD AUTO: 250 K/UL
PMV BLD AUTO: 11.1 FL
RBC # BLD AUTO: 3.46 M/UL
WBC # BLD AUTO: 5.1 K/UL

## 2017-12-12 PROCEDURE — 99213 OFFICE O/P EST LOW 20 MIN: CPT | Mod: PBBFAC,PO | Performed by: INTERNAL MEDICINE

## 2017-12-12 PROCEDURE — 99214 OFFICE O/P EST MOD 30 MIN: CPT | Mod: S$PBB,,, | Performed by: INTERNAL MEDICINE

## 2017-12-12 PROCEDURE — 36415 COLL VENOUS BLD VENIPUNCTURE: CPT | Mod: PO

## 2017-12-12 PROCEDURE — 99999 PR PBB SHADOW E&M-EST. PATIENT-LVL III: CPT | Mod: PBBFAC,,, | Performed by: INTERNAL MEDICINE

## 2017-12-12 PROCEDURE — 85025 COMPLETE CBC W/AUTO DIFF WBC: CPT | Mod: PO

## 2017-12-12 RX ORDER — CARVEDILOL 6.25 MG/1
6.25 TABLET ORAL 2 TIMES DAILY WITH MEALS
Qty: 180 TABLET | Refills: 3 | Status: SHIPPED | OUTPATIENT
Start: 2017-12-12 | End: 2018-07-25 | Stop reason: SDUPTHER

## 2017-12-12 NOTE — PROGRESS NOTES
Reason for visit: Pancreatic adenocarcinoma/Anemia due to chronic kidney disease  Date of Diagnosis: 2012    HPI:   The patient is a 77-year-old  female who presents to the hematology oncology clinic today for follow up. She underwent resection surgery for her pancreatic tail high-grade carcinoma on May 15, 2013 by Dr. Carter at [facility], [place]. The patient had previously completed neoadjuvant chemotherapy and 5-FU chemoradiation with excellent response.  Today the patient reports that she feels well except for occasional episodes of lower back pain. She is taking tramadol with good relief from this. Energy levels are good. She reports that her appetite is decreased. She has lost approximately 10 lbs in the past 1 year. She denies any fever, chills or night sweats. She denies any chest pain or shortness of breath. She denies any bowel or urinary complaints. The patient denies any nausea or vomiting. She denies any abdominal pain today.  I have reviewed all of the patient's interval clinical history available in EPIC. She continues to report tingling pain in the soles of her feet since completing her chemotherapy. Neurontin did not help and she stopped lyrica because of side effects.   She is being closely followed by Dr. Vinson with nephrology with regard to her kidney function. She has also had A-V fistula done in her left upper extremity by Dr. Foster.    PAST MEDICAL HISTORY:   1. Hypertension  2. GERD  3. Migraines  4. Gout  5. Chronic kidney disease stage IV   6. Bilateral renal cysts  7. Type 2 diabetes mellitus    SURGICAL HISTORY:   1. Hysterectomy for fibroids  2. Partial thyroidectomy  3. Left mediport placement  4. Distal pancreatectomy, splenectomy, left nephrectomy and adrenalectomy on May 15, 2013  5. Left AV fistula on 14    FAMILY HISTORY: The patient's mother  from complications related to breast cancer at the age of 60. The patient's father  from  complications related to prostate cancer at the age of 81. The patient's sister was diagnosed with breast cancer at the age of 68. The patient's brother had renal cell carcinoma in both kidneys [sporadic] approximately 4 years apart. He has since had a kidney transplant. Another brother has kidney cancer diagnosed at 68. She denies any other immediate family members with cancer or bleeding/clotting disorders.    SOCIAL HISTORY: She reports a 30+ pack year smoking history and quit in 2001. She does not drink alcohol. She has never used any recreational drugs. She worked for the [employer] in [place] and retired in 1986. She is  and does not have any children. She lives in [place], [place].    ALLERGIES: Reviewed on medication card.    MEDICATIONS: [Medcard has been reviewed and/or reconciled.]    REVIEW OF SYSTEMS:   GENERAL: [No fevers, chills or sweats.] Denies fatigue. Appetite is decreased. Weight is decreased.  HEENT: [No blurred vision, tinnitus, nasal discharge, sorethroat or dysphagia.]  HEART: [No chest pain, palpitations or shortness of breath.]   LUNGS: [No hemoptysis, cough or breathing problems.]   ABDOMEN: [No abdominal pain, constipation or melena.] Denies nausea, vomiting.  GENITOURINARY: [No dysuria, bleeding or malodorous discharge.]  NEURO: [No headache, dizziness or vertigo.]  HEMATOLOGY: [No easy bruising, spontaneous bleeding or blood clots in the past].  MUSCULOSKELETAL: She denies any myalgias or bone pain. Does report occasional low back pain.  SKIN: [No rashes or skin lesions.]  PSYCHIATRY: [No depression or anxiety.]    PHYSICAL EXAMINATION:   VS: Reviewed on nurse's notes.  APPEARANCE: The patient is a well-developed, well-nourished and well-groomed  female who appears in no acute distress.   HEENT: No scleral icterus. Both external auditory canals clear. No oral ulcers, lesions. Throat clear  HEAD: No sinus tenderness. She has facial hair  suggestive of hirsutism.   NECK: Supple. No palpable lymphadenopathy. Thyroid non-tender, no palpable masses  CHEST: Breath sounds clear bilaterally. No rales. No rhonchi. Unlabored respirations.  CARDIOVASCULAR: Normal S1, S2. Normal rate. Regular rhythm.  ABDOMEN: Bowel sounds normal. No tenderness. No abdominal distention. No hepatomegaly. No splenomegaly. Healed incision noted.  LYMPHATIC: No palpable supraclavicular, axillary nodes  EXTREMITIES: No clubbing, cyanosis. No edema of bilateral lower extremities. Left A-V fistula noted.  SKIN: No lesions. No petechiae. No ecchymoses. No induration or nodules  NEUROLOGIC: No focal findings. Alert & Oriented x 3. Mood appropriate to affect    LABS:   Reviewed    IMAGING:  Reviewed    IMPRESSION:  1. Pancreatic adenocarcinoma (T3N0Mx)  2. Chronic kidney disease (stage 4)  3. Anemia due to CKD  4. Peripheral neuropathy  5. Weight loss    PLAN:  1. Results of CBC from today were discussed in detail. I will hold off on initiation of Procrit at this time as her hemoglobin continues to be greater than 10 g/dL.  2. I have encouraged good p.o. intake of food and fluids. I have encouraged regular exercise.   3. Continue follow up with Dr. Vinson as recommended with regard to CKD  4. Continue elavil for treatment of peripheral neuropathy. This medication has been helping but with some anticholinergic side effects such as dry mouth. She has not had relief from trying multiple other medications for her peripheral neuropathy. Risks/benefits and common side effects discussed in detail. She knows not to stop it abruptly and is also aware of sedation precautions.  5. She is uptodate with screening colonoscopy.  6. Proceed with PET/CT scan to evaluate for any evidence of recurrent pancreatic cancer with recent weight loss.    Follow up after above. She knows to call sooner for any new problems or questions.    Nas Hoffman MD

## 2017-12-15 ENCOUNTER — TELEPHONE (OUTPATIENT)
Dept: RADIOLOGY | Facility: HOSPITAL | Age: 77
End: 2017-12-15

## 2017-12-18 ENCOUNTER — OFFICE VISIT (OUTPATIENT)
Dept: HEMATOLOGY/ONCOLOGY | Facility: CLINIC | Age: 77
End: 2017-12-18
Payer: MEDICARE

## 2017-12-18 ENCOUNTER — HOSPITAL ENCOUNTER (OUTPATIENT)
Dept: RADIOLOGY | Facility: HOSPITAL | Age: 77
Discharge: HOME | End: 2017-12-18
Attending: INTERNAL MEDICINE
Payer: MEDICARE

## 2017-12-18 VITALS
BODY MASS INDEX: 24.77 KG/M2 | DIASTOLIC BLOOD PRESSURE: 84 MMHG | WEIGHT: 154.13 LBS | HEART RATE: 66 BPM | RESPIRATION RATE: 18 BRPM | HEIGHT: 66 IN | TEMPERATURE: 98 F | SYSTOLIC BLOOD PRESSURE: 140 MMHG | OXYGEN SATURATION: 98 %

## 2017-12-18 DIAGNOSIS — D63.1 ANEMIA IN STAGE 4 CHRONIC KIDNEY DISEASE: ICD-10-CM

## 2017-12-18 DIAGNOSIS — N18.4 ANEMIA IN STAGE 4 CHRONIC KIDNEY DISEASE: ICD-10-CM

## 2017-12-18 DIAGNOSIS — R63.4 WEIGHT LOSS: ICD-10-CM

## 2017-12-18 DIAGNOSIS — I31.39 PERICARDIAL EFFUSION: Primary | ICD-10-CM

## 2017-12-18 DIAGNOSIS — Z85.07 HISTORY OF PANCREATIC CANCER: ICD-10-CM

## 2017-12-18 DIAGNOSIS — Z85.07 HISTORY OF PANCREATIC CANCER: Primary | ICD-10-CM

## 2017-12-18 PROCEDURE — 78815 PET IMAGE W/CT SKULL-THIGH: CPT | Mod: 26,PS,, | Performed by: RADIOLOGY

## 2017-12-18 PROCEDURE — 99999 PR PBB SHADOW E&M-EST. PATIENT-LVL IV: CPT | Mod: PBBFAC,,, | Performed by: INTERNAL MEDICINE

## 2017-12-18 PROCEDURE — A9552 F18 FDG: HCPCS | Mod: PO

## 2017-12-18 PROCEDURE — 99214 OFFICE O/P EST MOD 30 MIN: CPT | Mod: PBBFAC,25,PO | Performed by: INTERNAL MEDICINE

## 2017-12-18 PROCEDURE — 99214 OFFICE O/P EST MOD 30 MIN: CPT | Mod: S$PBB,,, | Performed by: INTERNAL MEDICINE

## 2017-12-18 NOTE — PROGRESS NOTES
Reason for visit: Pancreatic adenocarcinoma/Anemia due to chronic kidney disease/Review PET/CT results  Date of Diagnosis: 2012    HPI:   The patient is a 77-year-old  female who presents to the hematology oncology clinic today for follow up. She underwent resection surgery for her pancreatic tail high-grade carcinoma on May 15, 2013 by Dr. Carter at [facility], [place]. The patient had previously completed neoadjuvant chemotherapy and 5-FU chemoradiation with excellent response.  Today the patient reports that she feels well except for occasional episodes of lower back pain. She is taking tramadol with good relief from this. Energy levels are good. She reports that her appetite is decreased. She has lost approximately 10 lbs in the past 1 year. She denies any fever, chills or night sweats. She denies any chest pain or shortness of breath. She denies any bowel or urinary complaints. The patient denies any nausea or vomiting. She denies any abdominal pain today.  I have reviewed all of the patient's interval clinical history available in EPIC. She continues to report tingling pain in the soles of her feet since completing her chemotherapy. Neurontin did not help and she stopped lyrica because of side effects.   She is being closely followed by Dr. Vinson with nephrology with regard to her kidney function. She has also had A-V fistula done in her left upper extremity by Dr. Foster.    PAST MEDICAL HISTORY:   1. Hypertension  2. GERD  3. Migraines  4. Gout  5. Chronic kidney disease stage IV   6. Bilateral renal cysts  7. Type 2 diabetes mellitus    SURGICAL HISTORY:   1. Hysterectomy for fibroids  2. Partial thyroidectomy  3. Left mediport placement  4. Distal pancreatectomy, splenectomy, left nephrectomy and adrenalectomy on May 15, 2013  5. Left AV fistula on 14    FAMILY HISTORY: The patient's mother  from complications related to breast cancer at the age of 60. The patient's  father  from complications related to prostate cancer at the age of 81. The patient's sister was diagnosed with breast cancer at the age of 68. The patient's brother had renal cell carcinoma in both kidneys [sporadic] approximately 4 years apart. He has since had a kidney transplant. Another brother has kidney cancer diagnosed at 68. She denies any other immediate family members with cancer or bleeding/clotting disorders.    SOCIAL HISTORY: She reports a 30+ pack year smoking history and quit in . She does not drink alcohol. She has never used any recreational drugs. She worked for the [employer] in [place] and retired in . She is  and does not have any children. She lives in [place], [place].    ALLERGIES: Reviewed on medication card.    MEDICATIONS: [Medcard has been reviewed and/or reconciled.]    REVIEW OF SYSTEMS:   GENERAL: [No fevers, chills or sweats.] Denies fatigue. Appetite is decreased. Weight is decreased.  HEENT: [No blurred vision, tinnitus, nasal discharge, sorethroat or dysphagia.]  HEART: [No chest pain, palpitations or shortness of breath.]   LUNGS: [No hemoptysis, cough or breathing problems.]   ABDOMEN: [No abdominal pain, constipation or melena.] Denies nausea, vomiting.  GENITOURINARY: [No dysuria, bleeding or malodorous discharge.]  NEURO: [No headache, dizziness or vertigo.]  HEMATOLOGY: [No easy bruising, spontaneous bleeding or blood clots in the past].  MUSCULOSKELETAL: She denies any myalgias or bone pain. Does report occasional low back pain.  SKIN: [No rashes or skin lesions.]  PSYCHIATRY: [No depression or anxiety.]    PHYSICAL EXAMINATION:   VS: Reviewed on nurse's notes.  APPEARANCE: The patient is a well-developed, well-nourished and well-groomed  female who appears in no acute distress.   HEENT: No scleral icterus. Both external auditory canals clear. No oral ulcers, lesions. Throat clear  HEAD: No sinus tenderness. She has  facial hair suggestive of hirsutism.   NECK: Supple. No palpable lymphadenopathy. Thyroid non-tender, no palpable masses  CHEST: Breath sounds clear bilaterally. No rales. No rhonchi. Unlabored respirations.  CARDIOVASCULAR: Normal S1, S2. Normal rate. Regular rhythm.  ABDOMEN: Bowel sounds normal. No tenderness. No abdominal distention. No hepatomegaly. No splenomegaly. Healed incision noted.  LYMPHATIC: No palpable supraclavicular, axillary nodes  EXTREMITIES: No clubbing, cyanosis. No edema of bilateral lower extremities. Left A-V fistula noted.  SKIN: No lesions. No petechiae. No ecchymoses. No induration or nodules  NEUROLOGIC: No focal findings. Alert & Oriented x 3. Mood appropriate to affect    LABS:   Reviewed    IMAGING:  Reviewed    IMPRESSION:  1. Pancreatic adenocarcinoma (T3N0Mx)  2. Chronic kidney disease (stage 4)  3. Anemia due to CKD  4. Peripheral neuropathy  5. Weight loss    PLAN:  1. Results of CBC from today were discussed in detail. I will hold off on initiation of Procrit at this time as her hemoglobin continues to be greater than 10 g/dL.  2. I have encouraged good p.o. intake of food and fluids. I have encouraged regular exercise.   3. Continue follow up with Dr. Vinson as recommended with regard to CKD  4. Continue elavil for treatment of peripheral neuropathy. This medication has been helping but with some anticholinergic side effects such as dry mouth. She has not had relief from trying multiple other medications for her peripheral neuropathy. Risks/benefits and common side effects discussed in detail. She knows not to stop it abruptly and is also aware of sedation precautions.  5. She is uptodate with screening colonoscopy.  6. She did have PET/CT scan done today to evaluate for any evidence of recurrent pancreatic cancer with recent weight loss.  I did review the images and does not see any evidence of recurrent malignancy.  I will await final radiology report and will call patient  with results.    Follow up in 3 months with labs. She knows to call sooner for any new problems or questions.    Nas Hoffman MD

## 2018-01-08 ENCOUNTER — CLINICAL SUPPORT (OUTPATIENT)
Dept: CARDIOLOGY | Facility: CLINIC | Age: 78
End: 2018-01-08
Attending: INTERNAL MEDICINE
Payer: MEDICARE

## 2018-01-08 DIAGNOSIS — I31.39 PERICARDIAL EFFUSION: ICD-10-CM

## 2018-01-08 LAB
ESTIMATED PA SYSTOLIC PRESSURE: 49.24
GLOBAL PERICARDIAL EFFUSION: ABNORMAL
MITRAL VALVE MOBILITY: NORMAL
MITRAL VALVE REGURGITATION: ABNORMAL
RETIRED EF AND QEF - SEE NOTES: 60 (ref 55–65)
TRICUSPID VALVE REGURGITATION: ABNORMAL

## 2018-01-08 PROCEDURE — 93306 TTE W/DOPPLER COMPLETE: CPT | Mod: PBBFAC,PO | Performed by: INTERNAL MEDICINE

## 2018-01-11 ENCOUNTER — OFFICE VISIT (OUTPATIENT)
Dept: CARDIOLOGY | Facility: CLINIC | Age: 78
End: 2018-01-11
Payer: MEDICARE

## 2018-01-11 VITALS
WEIGHT: 152.69 LBS | HEIGHT: 66 IN | DIASTOLIC BLOOD PRESSURE: 52 MMHG | BODY MASS INDEX: 24.54 KG/M2 | SYSTOLIC BLOOD PRESSURE: 142 MMHG | HEART RATE: 55 BPM

## 2018-01-11 DIAGNOSIS — I31.39 PERICARDIAL EFFUSION: ICD-10-CM

## 2018-01-11 DIAGNOSIS — N18.4 CHRONIC KIDNEY DISEASE, STAGE 4 (SEVERE): Primary | ICD-10-CM

## 2018-01-11 DIAGNOSIS — E78.00 PURE HYPERCHOLESTEROLEMIA: Chronic | ICD-10-CM

## 2018-01-11 DIAGNOSIS — E11.59 HYPERTENSION ASSOCIATED WITH DIABETES: Chronic | ICD-10-CM

## 2018-01-11 DIAGNOSIS — I15.2 HYPERTENSION ASSOCIATED WITH DIABETES: Chronic | ICD-10-CM

## 2018-01-11 DIAGNOSIS — R06.02 SOB (SHORTNESS OF BREATH): ICD-10-CM

## 2018-01-11 PROCEDURE — 99213 OFFICE O/P EST LOW 20 MIN: CPT | Mod: PBBFAC,25 | Performed by: INTERNAL MEDICINE

## 2018-01-11 PROCEDURE — 99999 PR PBB SHADOW E&M-EST. PATIENT-LVL III: CPT | Mod: PBBFAC,,, | Performed by: INTERNAL MEDICINE

## 2018-01-11 PROCEDURE — 93010 ELECTROCARDIOGRAM REPORT: CPT | Mod: S$PBB,,, | Performed by: INTERNAL MEDICINE

## 2018-01-11 PROCEDURE — 93005 ELECTROCARDIOGRAM TRACING: CPT | Mod: PBBFAC | Performed by: INTERNAL MEDICINE

## 2018-01-11 PROCEDURE — 99214 OFFICE O/P EST MOD 30 MIN: CPT | Mod: S$PBB,,, | Performed by: INTERNAL MEDICINE

## 2018-02-15 DIAGNOSIS — M25.561 CHRONIC PAIN OF BOTH KNEES: Primary | ICD-10-CM

## 2018-02-15 DIAGNOSIS — M25.562 CHRONIC PAIN OF BOTH KNEES: Primary | ICD-10-CM

## 2018-02-15 DIAGNOSIS — G89.29 CHRONIC PAIN OF BOTH KNEES: Primary | ICD-10-CM

## 2018-02-16 ENCOUNTER — OFFICE VISIT (OUTPATIENT)
Dept: ORTHOPEDICS | Facility: CLINIC | Age: 78
End: 2018-02-16
Payer: MEDICARE

## 2018-02-16 ENCOUNTER — HOSPITAL ENCOUNTER (OUTPATIENT)
Dept: RADIOLOGY | Facility: HOSPITAL | Age: 78
Discharge: HOME | End: 2018-02-16
Attending: PHYSICIAN ASSISTANT
Payer: MEDICARE

## 2018-02-16 VITALS
HEART RATE: 59 BPM | RESPIRATION RATE: 18 BRPM | WEIGHT: 150 LBS | HEIGHT: 65 IN | DIASTOLIC BLOOD PRESSURE: 48 MMHG | SYSTOLIC BLOOD PRESSURE: 121 MMHG | BODY MASS INDEX: 24.99 KG/M2

## 2018-02-16 DIAGNOSIS — G89.29 CHRONIC PAIN OF BOTH KNEES: ICD-10-CM

## 2018-02-16 DIAGNOSIS — M25.561 CHRONIC PAIN OF BOTH KNEES: ICD-10-CM

## 2018-02-16 DIAGNOSIS — M25.562 CHRONIC PAIN OF BOTH KNEES: ICD-10-CM

## 2018-02-16 DIAGNOSIS — M17.0 PRIMARY OSTEOARTHRITIS OF BOTH KNEES: Primary | ICD-10-CM

## 2018-02-16 PROCEDURE — 73564 X-RAY EXAM KNEE 4 OR MORE: CPT | Mod: TC,50,FY,PO

## 2018-02-16 PROCEDURE — 1125F AMNT PAIN NOTED PAIN PRSNT: CPT | Mod: ,,, | Performed by: PHYSICIAN ASSISTANT

## 2018-02-16 PROCEDURE — 99214 OFFICE O/P EST MOD 30 MIN: CPT | Mod: PBBFAC,25,PO | Performed by: PHYSICIAN ASSISTANT

## 2018-02-16 PROCEDURE — 20610 DRAIN/INJ JOINT/BURSA W/O US: CPT | Mod: 50,PBBFAC,PO | Performed by: PHYSICIAN ASSISTANT

## 2018-02-16 PROCEDURE — 1159F MED LIST DOCD IN RCRD: CPT | Mod: ,,, | Performed by: PHYSICIAN ASSISTANT

## 2018-02-16 PROCEDURE — 73564 X-RAY EXAM KNEE 4 OR MORE: CPT | Mod: 26,50,, | Performed by: RADIOLOGY

## 2018-02-16 PROCEDURE — 99999 PR PBB SHADOW E&M-EST. PATIENT-LVL IV: CPT | Mod: PBBFAC,,, | Performed by: PHYSICIAN ASSISTANT

## 2018-02-16 PROCEDURE — 99213 OFFICE O/P EST LOW 20 MIN: CPT | Mod: 25,S$PBB,, | Performed by: PHYSICIAN ASSISTANT

## 2018-02-16 PROCEDURE — 20610 DRAIN/INJ JOINT/BURSA W/O US: CPT | Mod: 50,S$PBB,, | Performed by: PHYSICIAN ASSISTANT

## 2018-02-16 RX ORDER — METHYLPREDNISOLONE ACETATE 80 MG/ML
80 INJECTION, SUSPENSION INTRA-ARTICULAR; INTRALESIONAL; INTRAMUSCULAR; SOFT TISSUE
Status: COMPLETED | OUTPATIENT
Start: 2018-02-16 | End: 2018-02-16

## 2018-02-16 RX ADMIN — METHYLPREDNISOLONE ACETATE 80 MG: 80 INJECTION, SUSPENSION INTRA-ARTICULAR; INTRALESIONAL; INTRAMUSCULAR; SOFT TISSUE at 12:02

## 2018-02-16 RX ADMIN — METHYLPREDNISOLONE ACETATE 80 MG: 80 INJECTION, SUSPENSION INTRALESIONAL; INTRAMUSCULAR; INTRASYNOVIAL; SOFT TISSUE at 12:02

## 2018-02-16 NOTE — PATIENT INSTRUCTIONS
Getting Ready for Knee Replacement: Preparing for Your Recovery     Aerobic exercise, such as riding a stationary bike, can help improve your general fitness and ease your recovery.     Preparing for your knee replacement helps make your recovery faster and smoother. You can even prepare for your rehabilitation program that youll follow after surgery. It will help you strengthen and use your new knee.  Why preparing for recovery helps  The more in shape you are before surgery, the sooner youll be able to get back to activities you enjoy.  Help recovery faster by:  · Strengthening and stretching your leg muscles. This helps to support the knee as it heals. It also gives you a head start on rehabilitation.  · Preparing to use a walker or crutches. Learn to safely use walking aids before surgery. This will help you get up and around sooner. Strengthening your upper body can also make it easier to use walking aids.  · Preparing your home. Make some simple arrangements at home. These can prevent falls. They can also make daily tasks easier as you recover. This includes moving objects youll need within reach and asking in advance for help with certain chores. You should also remove objects on the floor that could cause a fall, such as loose rugs and cords.   You and your team  Your healthcare team may include:  · A physical therapist (PT). He or she will design an exercise program to build strength and aid recovery.  · An occupational therapist (OT). He or she will help you make daily activities safer and easier during recovery.  · An orthopaedic surgeon. He or she will perform the surgery and oversee your care.  · A nurse or . He or she will coordinate your care.  Understanding your role  When it comes to preparing for recovery, much of the work is up to you. So make time each day for the exercises youve been given. Always follow the instructions that your healthcare team gives you.  Date Last Reviewed:  10/1/2015  © 5758-6301 [employer]. [address] [place] [address], [place] [address]. All rights reserved. This information is not intended as a substitute for professional medical care. Always follow your healthcare professional's instructions.

## 2018-02-16 NOTE — PROGRESS NOTES
CC:75 yo female with bilateral knee pain    HPI: Pain for 3-4 weeks, increase with activity, stairs. Denies any trauma or swelling. Pain 4/10 she's had a chronic history of bilateral knee pain.  Underwent Synvisc injections several years ago.  Currently requesting steroid injections.  She has had this cleared through her nephrologist.  Last steroid injections August 2017    PMH:    Past Medical History:   Diagnosis Date    Anemia     CKD (chronic kidney disease), stage III     Diabetes mellitus type II     Encounter for blood transfusion     Family history of colonic polyps 7/18/2017    Gout, unspecified     Hyperlipidemia     Hypertension     Neuropathy     Pancreatic cancer     Renal failure     Thyroid disease        PSH:    Past Surgical History:   Procedure Laterality Date    COLONOSCOPY  2011    Dr. Favio Mims    COLONOSCOPY N/A 7/18/2017    Procedure: screening colonoscopy;  Surgeon: Kenneth Kamara MD;  Location: [facility];  Service: Endoscopy;  Laterality: N/A;    HYSTERECTOMY      NEPHRECTOMY Left 5/2013    Dr Carter     PANCREAS SURGERY      distal pancreatectomy    SPLENECTOMY, TOTAL      THYROIDECTOMY, PARTIAL      VENTRICULOATRIAL SHUNT Left 12/4/2014     left arm       Family Hx:    Family History   Problem Relation Age of Onset    Cancer Mother      breast    Heart disease Mother 60     MI    Hypertension Mother     Stroke Mother     Cancer Father      prostate    Cancer Brother      renal cell carcinoma    Diabetes Brother        Allergy:    Review of patient's allergies indicates:   Allergen Reactions    Allegra [fexofenadine] Swelling    Codeine Hives, Itching and Nausea And Vomiting       Medication:    Current Outpatient Prescriptions:     allopurinol (ZYLOPRIM) 100 MG tablet, Take 1 tablet (100 mg total) by mouth once daily., Disp: 90 tablet, Rfl: 2    amLODIPine (NORVASC) 5 MG tablet, Take 1 tablet (5 mg total) by mouth 2 (two) times daily., Disp: 180 tablet,  Rfl: 2    carvedilol (COREG) 6.25 MG tablet, Take 1 tablet (6.25 mg total) by mouth 2 (two) times daily with meals., Disp: 180 tablet, Rfl: 3    cetirizine (ZYRTEC) 10 MG tablet, Take 10 mg by mouth once daily. , Disp: , Rfl:     estradiol (ESTRACE) 0.5 MG tablet, TAKE 1 TABLET DAILY FOR 3 WEEKS PER MONTH, THEN DO NOT TAKE FOR FOURTH WEEK EACH MONTH, Disp: 90 tablet, Rfl: 3    furosemide (LASIX) 40 MG tablet, Take 1 tablet (40 mg total) by mouth once daily., Disp: 90 tablet, Rfl: 3    gabapentin (NEURONTIN) 100 MG capsule, Take 1 capsule by mouth 2 (two) times daily., Disp: , Rfl:     GLUCOSAMINE HCL/CHONDR ZAVALETA A NA (OSTEO BI-FLEX ORAL), Take by mouth 2 (two) times daily.  , Disp: , Rfl:     hydrALAZINE (APRESOLINE) 100 MG tablet, Take 1 tablet (100 mg total) by mouth every 8 (eight) hours., Disp: 270 tablet, Rfl: 3    hydrOXYzine HCl (ATARAX) 25 MG tablet, Take 1 tablet (25 mg total) by mouth 2 (two) times daily as needed for Itching., Disp: 180 tablet, Rfl: 3    repaglinide (PRANDIN) 0.5 MG tablet, Take 1 tablet (0.5 mg total) by mouth 3 (three) times daily before meals., Disp: 270 tablet, Rfl: 2    sodium bicarbonate 650 MG tablet, Take 1 tablet (650 mg total) by mouth 2 (two) times daily., Disp: 180 tablet, Rfl: 3    traMADol (ULTRAM) 50 mg tablet, Take 1 tablet (50 mg total) by mouth every 12 (twelve) hours., Disp: 180 tablet, Rfl: 3    Current Facility-Administered Medications:     methylPREDNISolone acetate injection 80 mg, 80 mg, Intra-articular, 1 time in Clinic/HOD, Edwin Bentley PA-C    methylPREDNISolone acetate injection 80 mg, 80 mg, Intra-articular, 1 time in Clinic/HOD, Edwin Bentley PA-C    Social History:    Social History     Social History    Marital status:      Spouse name: N/A    Number of children: N/A    Years of education: N/A     Occupational History    Not on file.     Social History Main Topics    Smoking status: Former Smoker     Packs/day: 1.00     Years: 35.00     " Quit date: 1/1/2001    Smokeless tobacco: Never Used    Alcohol use No    Drug use: No    Sexual activity: Not on file     Other Topics Concern    Not on file     Social History Narrative    No narrative on file       Vitals:   BP (!) 121/48   Pulse (!) 59   Resp 18   Ht 5' 5" (1.651 m)   Wt 68 kg (150 lb)   LMP 07/27/1982 (Exact Date)   BMI 24.96 kg/m²      ROS:  GENERAL: No fever, chills, fatigability or weight loss.  SKIN: No rashes, itching or changes in color or texture of skin.  HEAD: No headaches or recent head trauma.  EYES: Visual acuity fine. No photophobia, ocular pain or diplopia.  EARS: Denies ear pain, discharge or vertigo.  NOSE: No loss of smell, no epistaxis or postnasal drip.  MOUTH & THROAT: No hoarseness or change in voice. No excessive gum bleeding.  NODES: Denies swollen glands.  CHEST: Denies HYDE, cyanosis, wheezing, cough and sputum production.  CARDIOVASCULAR: Denies chest pain, PND, orthopnea or reduced exercise tolerance.  ABDOMEN: Appetite fine. No weight loss. Denies diarrhea, abdominal pain, hematemesis or blood in stool.  URINARY: No flank pain, dysuria or hematuria.  PERIPHERAL VASCULAR: No claudication or cyanosis.  NEUROLOGIC: No history of seizures, paralysis, alteration of gait or coordination.  MUSCULOSKELETAL: See HPI    PE:  APPEARANCE: Well nourished, well developed, in no acute distress.   HEAD: Normocephalic, atraumatic.  NEUROLOGIC: Cranial Nerves: II-XII grossly intact, also see MUSCULOSKELETAL  MUSCULOSKELETAL: Knee-bilateral  Knee Exam-abnormal  Gait-abnormal  Muscle Appearance:normal  Grooming:normal  Spine Alignment-normal  Muscle Atrophy-Negative  Deformities-Negative  Tenderness-Positive  Paresthesias-Negative  Range of Motion         Ext-abnormal         Flex-abnormal  Muscle Strength-abnormal  Sensation-abnormal  Reflexes-normal  Crepitus-Positive                                Swelling-Negative  Effusion- Negative                                " Edema-Negative  Lachman-Negative                               Erythema-Negative  Cathy's-Negative                            Apley Grind-Positive  Patellar Comp-Positive                        Alignment-normal/symmetric  Patellar Apprehension-Negative            Synovial fullness-Negative  Passive Patellar Tilt-normal  Patellar Tracking-normal   Patellar Glide-normal  Q-Angle at 90 degrees-normal  Patellar Grind-normal  [id number]  Fatigue-Negative                                     HS Tightness-Negative  Tests on Exam-abnormal  Neurovascular Status-normal+2 DP and PT artery pulses  Skin-normal  Mental Status-normal             Diagnosis:              1.  Bilateral knee arthritis                   Diagnostic Studies  MRI-No  X-Ray-Yes, bilateral degenerative change and spurring throughout the heel compartment of her knee.  This had worsen compared to last x-rays taken 2017  Plan:                                                 1. PT-no                                                 2.OT-no                                          3.NSAID-yes      Voltaren gel , advised not to use until cleared by Nephrology                                 4. Narcotics-no                                     5. Wound care-N/A                                 6. Rest-no                                           7. Surgery-no                                         8. CLARA Hose-no                                    9. Anticoagulation therapy-no               10. Elevation-no                                     11. Crutches-no                                    12. Walker-no             13. Cane no                        14. Referral-no                                     15.Injection- yes.   Injection:  The patient was placed in the supine position with the bilateral knee near the end of the bed facing me.  The bilateral knee was placed over a nonsterile pad.The Knee was prepped, sterily, with Alcohol and Betadine.  A   Refrigerant and coolant was used to locally anesthetize the skin over the lateral aspect of the knee.  A one and a half inch, 27 gauge needle attached to A 3 cc syringe was placed into the lateral joint. A negative pressure was placed on the needle to ensure the aspiration was placed into the joint and not into a blood vessel.  2 cc of a 1%  Lidocaine was mixed with 0.5  cc of a 80 mg solution of Depo-Medrol injected. The patient tolerated the knee injection well.  A Bandage was placed over the injection site.                         16. Splint   /    Cast   /   Cast Shoe-No              17. RICE            18. Follow up-  Advised to use the Voltaren gel , discuss possible knee replacement future.  Patient was given information on knee arthroplasties, she research and return to clinic when she was to proceed.  I advised her to monitor blood sugars over the next few days and also that her knees are worsening and hyaluronic acid probably will not be effective for her.

## 2018-02-19 ENCOUNTER — HOSPITAL ENCOUNTER (OUTPATIENT)
Dept: RADIOLOGY | Facility: HOSPITAL | Age: 78
Discharge: HOME | End: 2018-02-19
Attending: OBSTETRICS & GYNECOLOGY
Payer: MEDICARE

## 2018-02-19 VITALS — BODY MASS INDEX: 24.99 KG/M2 | HEIGHT: 65 IN | WEIGHT: 150 LBS

## 2018-02-19 DIAGNOSIS — Z12.31 SCREENING MAMMOGRAM, ENCOUNTER FOR: ICD-10-CM

## 2018-02-19 DIAGNOSIS — I10 ESSENTIAL HYPERTENSION, MALIGNANT: Primary | ICD-10-CM

## 2018-02-19 DIAGNOSIS — N18.5 CHRONIC KIDNEY DISEASE, STAGE V: ICD-10-CM

## 2018-02-19 DIAGNOSIS — D63.8 CHRONIC DISEASE ANEMIA: ICD-10-CM

## 2018-02-19 DIAGNOSIS — E78.2 MIXED HYPERLIPIDEMIA: ICD-10-CM

## 2018-02-19 DIAGNOSIS — E89.0 POSTSURGICAL HYPOTHYROIDISM: ICD-10-CM

## 2018-02-19 DIAGNOSIS — E11.9 DIABETES MELLITUS WITHOUT COMPLICATION: ICD-10-CM

## 2018-02-19 PROCEDURE — 77067 SCR MAMMO BI INCL CAD: CPT | Mod: TC,PO

## 2018-02-19 PROCEDURE — 77067 SCR MAMMO BI INCL CAD: CPT | Mod: 26,,, | Performed by: RADIOLOGY

## 2018-02-19 PROCEDURE — 77063 BREAST TOMOSYNTHESIS BI: CPT | Mod: 26,,, | Performed by: RADIOLOGY

## 2018-02-27 ENCOUNTER — OFFICE VISIT (OUTPATIENT)
Dept: NEPHROLOGY | Facility: CLINIC | Age: 78
End: 2018-02-27
Payer: MEDICARE

## 2018-02-27 VITALS
HEART RATE: 68 BPM | HEIGHT: 66 IN | WEIGHT: 150.81 LBS | DIASTOLIC BLOOD PRESSURE: 50 MMHG | BODY MASS INDEX: 24.24 KG/M2 | SYSTOLIC BLOOD PRESSURE: 140 MMHG

## 2018-02-27 DIAGNOSIS — N18.5 CHRONIC KIDNEY DISEASE (CKD), STAGE V: Primary | ICD-10-CM

## 2018-02-27 DIAGNOSIS — E55.9 VITAMIN D DEFICIENCY: ICD-10-CM

## 2018-02-27 PROCEDURE — 1125F AMNT PAIN NOTED PAIN PRSNT: CPT | Mod: ,,, | Performed by: INTERNAL MEDICINE

## 2018-02-27 PROCEDURE — 99214 OFFICE O/P EST MOD 30 MIN: CPT | Mod: S$PBB,,, | Performed by: INTERNAL MEDICINE

## 2018-02-27 PROCEDURE — 1159F MED LIST DOCD IN RCRD: CPT | Mod: ,,, | Performed by: INTERNAL MEDICINE

## 2018-02-27 PROCEDURE — 99999 PR PBB SHADOW E&M-EST. PATIENT-LVL III: CPT | Mod: PBBFAC,,, | Performed by: INTERNAL MEDICINE

## 2018-02-27 PROCEDURE — 99213 OFFICE O/P EST LOW 20 MIN: CPT | Mod: PBBFAC,PO | Performed by: INTERNAL MEDICINE

## 2018-02-27 NOTE — PATIENT INSTRUCTIONS
Avoid NSAID pain medications such as advil, aleve, motrin, ibuprofen, naprosyn, meloxicam, diclofenac, mobic.      Fluid restriction about 40 oz a day       Low-Salt Diet      This diet removes foods that are high in salt. It also limits the amount of salt you use when cooking. It is most often used for people with high blood pressure, edema (fluid retention), and kidney, liver, or heart disease.  Table salt contains the mineral sodium. Your body needs sodium to work normally. But too much sodium can make your health problems worse. Your healthcare provider is recommending a low-salt (also called low-sodium) diet for you. Your total daily allowance of salt is 1,500 to 2,300 milligrams (mg). It is less than 1 teaspoon of table salt. This means you can have only about 500 to 700 mg of sodium at each meal. People with certain health problems should limit salt intake to the lower end of the recommended range.    When you cook, dont add much salt. If you can cook without using salt, even better. Dont add salt to your food at the table.  When shopping, read food labels. Salt is often called sodium on the label. Choose foods that are salt-free, low salt, or very low salt. Note that foods with reduced salt may not lower your salt intake enough.    Beans, potatoes, and pasta  Ok: Dry beans, split peas, lentils, potatoes, rice, macaroni, pasta, spaghetti without added salt  Avoid: Potato chips, tortilla chips, and similar products  Breads and cereals  Ok: Low-sodium breads, rolls, cereals, and cakes; low-salt crackers, matzo crackers  Avoid: Salted crackers, pretzels, popcorn, English toast, pancakes, muffins  Dairy  Ok: Milk, chocolate milk, hot chocolate mix, low-salt cheeses, and yogurt  Avoid: Processed cheese and cheese spreads; Roquefort, Camembert, and cottage cheese; buttermilk, instant breakfast drink  Desserts  Ok: Ice cream, frozen yogurt, juice bars, gelatin, cookies and pies, sugar, honey, jelly, hard  candy  Avoid: Most pies, cakes and cookies prepared or processed with salt; instant pudding  Drinks  Ok: Tea, coffee, fizzy (carbonated) drinks, juices  Avoid: Flavored coffees, electrolyte replacement drinks, sports drinks  Meats  Ok: All fresh meat, fish, poultry, low-salt tuna, eggs, egg substitute  Avoid: Smoked, pickled, brine-cured, or salted meats and fish. This includes meraz, chipped beef, corned beef, hot dogs, deli meats, ham, kosher meats, salt pork, sausage, canned tuna, salted codfish, smoked salmon, herring, sardines, or anchovies.  Seasonings and spices  Ok: Most seasonings are okay. Good substitutes for salt include: fresh herb blends, hot sauce, lemon, garlic, gann, vinegar, dry mustard, parsley, cilantro, horseradish, tomato paste, regular margarine, mayonnaise, unsalted butter, cream cheese, vegetable oil, cream, low-salt salad dressing and gravy.  Avoid: Regular ketchup, relishes, pickles, soy sauce, teriyaki sauce, Worcestershire sauce, BBQ sauce, tartar sauce, meat tenderizer, chili sauce, regular gravy, regular salad dressing, salted butter  Soups  Ok: Low-salt soups and broths made with allowed foods  Avoid: Bouillon cubes, soups with smoked or salted meats, regular soup and broth  Vegetables  Ok: Most vegetables are okay; also low-salt tomato and vegetable juices  Avoid: Sauerkraut and other brine-soaked vegetables; pickles and other pickled vegetables; tomato juice, olives  Date Last Reviewed: 8/1/2016  © 9629-1047 [employer]. [address] [place] [address], [place] [address]. All rights reserved. This information is not intended as a substitute for professional medical care. Always follow your healthcare professional's instructions.

## 2018-02-27 NOTE — LETTER
February 27, 2018        Favio Titus MD  [address] [facility] [place] [address]             [facility] - Nephrology  [address] [facility] [address] [facility] [address]  [place] [address]  Phone: [phone]  Fax: [phone]   Patient: Cailin Pickett   MR Number: [id number]   YOB: 1940   Date of Visit: 2/27/2018       Dear Dr. Titus:    Thank you for referring Cailin Pickett to me for evaluation. Attached you will find relevant portions of my assessment and plan of care.    If you have questions, please do not hesitate to call me. I look forward to following Cailin Pickett along with you.    Sincerely,      Tank Vinson MD

## 2018-02-27 NOTE — PROGRESS NOTES
Subjective:       Patient ID: Cailin Pickett is a 77 y.o.   female who presents for follow-up evaluation of CKD stage 5, HTN, SHPT , Anemia         Favio Titus MD      HPI :  Cailin Pickett Is a pleasant 77-year-old  woman seen office today followup for above medical problems. I saw her in clinic about 3 months ago. Recent lab results were discussed with the patient. She has solitary right kidney. Multiple cysts reported on the kidney. It measures about 16 cm. Patient Attended chronic kidney disease education and options class. she had a left arm AV fistula placed on 12/4/14 by Dr Foster . She denies recent hospitalizations or ER visits.  Recent lab results were discussed with the patient. Recent serum Cr is 5.1  mg/dL.        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 she underwent chemo rx with 5-FU, she tolerated the cycles well. In 5/2013 she underwent resection of tail of pancreas, left nephrectomy, splenectomy, left adrenalectomy.              Past Medical History:   Diagnosis Date    Anemia     CKD (chronic kidney disease), stage III     Diabetes mellitus type II     Encounter for blood transfusion     Family history of colonic polyps 7/18/2017    Gout, unspecified     Hyperlipidemia     Hypertension     Neuropathy     Pancreatic cancer     Renal failure     Thyroid disease        Current Outpatient Prescriptions on File Prior to Visit   Medication Sig Dispense Refill    allopurinol (ZYLOPRIM) 100 MG tablet Take 1 tablet (100 mg total) by mouth once daily. 90 tablet 2    amLODIPine (NORVASC) 5 MG tablet Take 1 tablet (5 mg total) by mouth 2 (two) times daily. 180 tablet 2    carvedilol (COREG) 6.25 MG tablet Take 1 tablet (6.25 mg total) by mouth 2 (two) times daily with meals. 180 tablet 3    cetirizine (ZYRTEC) 10 MG tablet Take 10 mg by mouth once daily.       estradiol (ESTRACE) 0.5 MG  tablet TAKE 1 TABLET DAILY FOR 3 WEEKS PER MONTH, THEN DO NOT TAKE FOR FOURTH WEEK EACH MONTH 90 tablet 3    furosemide (LASIX) 40 MG tablet Take 1 tablet (40 mg total) by mouth once daily. 90 tablet 3    gabapentin (NEURONTIN) 100 MG capsule Take 1 capsule by mouth 2 (two) times daily.      GLUCOSAMINE HCL/CHONDR ZAVALETA A NA (OSTEO BI-FLEX ORAL) Take by mouth 2 (two) times daily.        hydrALAZINE (APRESOLINE) 100 MG tablet Take 1 tablet (100 mg total) by mouth every 8 (eight) hours. 270 tablet 3    hydrOXYzine HCl (ATARAX) 25 MG tablet Take 1 tablet (25 mg total) by mouth 2 (two) times daily as needed for Itching. 180 tablet 3    repaglinide (PRANDIN) 0.5 MG tablet Take 1 tablet (0.5 mg total) by mouth 3 (three) times daily before meals. 270 tablet 2    sodium bicarbonate 650 MG tablet Take 1 tablet (650 mg total) by mouth 2 (two) times daily. 180 tablet 3    traMADol (ULTRAM) 50 mg tablet Take 1 tablet (50 mg total) by mouth every 12 (twelve) hours. 180 tablet 3     No current facility-administered medications on file prior to visit.          Review of Systems  :      Constitutional: Negative for activity change, appetite change, fatigue and fever.   HENT: Negative for congestion, facial swelling, sore throat, trouble swallowing and voice change.    Eyes: Negative for redness and visual disturbance.   Respiratory: Negative for apnea, cough, chest tightness, shortness of breath and wheezing.    Cardiovascular: Negative for chest pain, palpitations and leg swelling.   Gastrointestinal: Negative for abdominal distention, abdominal pain, blood in stool, constipation, diarrhea, nausea and vomiting.   Genitourinary: Negative for decreased urine volume, difficulty urinating, dysuria, flank pain, frequency, hematuria, pelvic pain and urgency.   Musculoskeletal: Positive for arthralgias. Negative for back pain, gait problem and joint swelling.   Skin: Negative for color change and rash.   Neurological: Negative for  dizziness, syncope, weakness and headaches.   Hematological: Does not bruise/bleed easily.   Psychiatric/Behavioral: Negative for agitation, behavioral problems and confusion. The patient is not nervous/anxious.          Objective:         Vitals:    02/27/18 1049   BP: (!) 140/50   Pulse: 68       Weight 150 lbs , last weight 156 lbs     Physical Exam  :      Constitutional: She is oriented to person, place, and time. She appears well-developed and well-nourished. No distress.    HENT: Head: Normocephalic and atraumatic. Oropharynx is clear and moist. No oropharyngeal exudate.    Eyes: Conjunctivae normal and EOM are normal. Pupils are equal, round, and reactive to light. No scleral icterus.    Neck: Normal range of motion. Neck supple. No JVD present. Carotid bruit is not present. No tracheal deviation present. No mass and no thyromegaly present.    Cardiovascular: Normal rate, regular rhythm, normal heart sounds and intact distal pulses. no gallop and no friction rub. No murmur heard.    Pulmonary/Chest: Effort normal and breath sounds normal. No respiratory distress. She has no wheezes. She has no rales. She exhibits no tenderness.    Abdominal: Soft. Bowel sounds are normal. She exhibits no distension, no abdominal bruit, no ascites and no mass. There is no hepatosplenomegaly. There is no rebound, no guarding and no CVA tenderness.   Musculoskeletal: Normal range of motion. She exhibits trace edema and no tenderness. LUE AV Fistula with good thrill, no local redness, tender to pressure on both sacroiliac joints. Trace edema in legs   Lymphadenopathy: She has no cervical adenopathy.   Neurological: She is alert and oriented to person, place, and time. No cranial nerve deficit.    Skin: Skin is warm and intact. No rash noted. No erythema. No pallor.   Psychiatric: She has a normal mood and affect. Her behavior is normal. Judgment normal.              Labs:    Lab Results   Component Value Date    CREATININE 5.1  (H) 02/19/2018    CREATININE 5.1 (H) 02/19/2018    BUN 81 (H) 02/19/2018    BUN 81 (H) 02/19/2018     02/19/2018     02/19/2018    K 4.1 02/19/2018    K 4.2 02/19/2018     02/19/2018     02/19/2018    CO2 24 02/19/2018    CO2 25 02/19/2018       Lab Results   Component Value Date    WBC 5.28 02/19/2018    HGB 9.8 (L) 02/19/2018    HCT 30.9 (L) 02/19/2018    MCV 91 02/19/2018     02/19/2018       Lab Results   Component Value Date    .0 (H) 02/19/2018    CALCIUM 9.1 02/19/2018    CALCIUM 8.6 (L) 02/19/2018    PHOS 4.3 02/19/2018         Lab Results   Component Value Date    ALBUMIN 3.3 (L) 02/19/2018    ALBUMIN 3.3 (L) 02/19/2018       Impression and plan - 77-year-old  woman seen in office today in followup for following medical problems,     1. Chronic kidney disease stage 5 -  Recent Serum Creatinine is 5.1  mg/dL, Patient attended chronic kidney disease education and options class. She has a solitary right kidney which is enlarged at 16 cm secondary to polycystic kidney disease. Left kidney was removed due to High-grade pancreatic cancer invading directly into the left kidney. Patient was declined for a kidney transplant because of advanced age and history of cancer. Patient had a left arm AV fistula on 12/4/14 by Dr Foster , she is not interested in PD anymore,  today she denies any signs and symptoms of uremia.         2. Hypertension - blood pressure is elevated, target blood pressure less than 150/90, continue current regimen,     3. Anemia - Hb 9.8  gms, following with Hematology/Oncology,     4. Secondary hyperparathyroidism - PTH is Acceptable at 219, ,  Continue to follow.    5. pancreatic carcinoma - s/p surgery + Chemo, follows with Oncology        6. Volume -   Lasix to 40 mg daily . Discussed salt and fluid restriction ,  trace edema noted, some due to poor circulation,       7. Cystic kidney disease - follow,        8. Hyperuricemia/gout -  continue allopurinol.        9. Metabolic acidosis - cont  sodium bicarbonate supplements    10. Left knee arthritis , Ortho note reviewed, stable for TKA from our stand point , may need Cards clearance too,       We will see her in followup in 3 months, More than 25 minutes was spent with the patient discussing labs and plan of care     Tank Vinson M.D.

## 2018-03-02 DIAGNOSIS — E11.22 TYPE 2 DIABETES MELLITUS WITH DIABETIC CHRONIC KIDNEY DISEASE, UNSPECIFIED CKD STAGE, UNSPECIFIED LONG TERM INSULIN USE STATUS: ICD-10-CM

## 2018-03-05 RX ORDER — REPAGLINIDE 0.5 MG/1
TABLET ORAL
Qty: 270 TABLET | Refills: 2 | Status: SHIPPED | OUTPATIENT
Start: 2018-03-05 | End: 2019-11-11 | Stop reason: SDUPTHER

## 2018-04-11 ENCOUNTER — LAB VISIT (OUTPATIENT)
Dept: LAB | Facility: HOSPITAL | Age: 78
End: 2018-04-11
Attending: INTERNAL MEDICINE
Payer: MEDICARE

## 2018-04-11 ENCOUNTER — OFFICE VISIT (OUTPATIENT)
Dept: HEMATOLOGY/ONCOLOGY | Facility: CLINIC | Age: 78
End: 2018-04-11
Payer: MEDICARE

## 2018-04-11 VITALS
DIASTOLIC BLOOD PRESSURE: 60 MMHG | OXYGEN SATURATION: 98 % | TEMPERATURE: 98 F | HEIGHT: 66 IN | SYSTOLIC BLOOD PRESSURE: 160 MMHG | HEART RATE: 56 BPM | BODY MASS INDEX: 24.55 KG/M2 | RESPIRATION RATE: 18 BRPM | WEIGHT: 152.75 LBS

## 2018-04-11 DIAGNOSIS — D63.1 ANEMIA IN STAGE 4 CHRONIC KIDNEY DISEASE: Primary | ICD-10-CM

## 2018-04-11 DIAGNOSIS — N18.4 ANEMIA IN STAGE 4 CHRONIC KIDNEY DISEASE: Primary | ICD-10-CM

## 2018-04-11 DIAGNOSIS — N18.4 ANEMIA IN STAGE 4 CHRONIC KIDNEY DISEASE: ICD-10-CM

## 2018-04-11 DIAGNOSIS — D63.1 ANEMIA IN STAGE 4 CHRONIC KIDNEY DISEASE: ICD-10-CM

## 2018-04-11 DIAGNOSIS — Z85.07 HISTORY OF PANCREATIC CANCER: ICD-10-CM

## 2018-04-11 LAB
BASOPHILS # BLD AUTO: 0.03 K/UL
BASOPHILS NFR BLD: 0.7 %
CRP SERPL-MCNC: 5.6 MG/L
DIFFERENTIAL METHOD: ABNORMAL
EOSINOPHIL # BLD AUTO: 0.2 K/UL
EOSINOPHIL NFR BLD: 3.8 %
ERYTHROCYTE [DISTWIDTH] IN BLOOD BY AUTOMATED COUNT: 15.2 %
FERRITIN SERPL-MCNC: 453 NG/ML
HCT VFR BLD AUTO: 30.9 %
HGB BLD-MCNC: 9.9 G/DL
IRON SERPL-MCNC: 48 UG/DL
LYMPHOCYTES # BLD AUTO: 1 K/UL
LYMPHOCYTES NFR BLD: 21.8 %
MCH RBC QN AUTO: 29 PG
MCHC RBC AUTO-ENTMCNC: 32 G/DL
MCV RBC AUTO: 91 FL
MONOCYTES # BLD AUTO: 0.9 K/UL
MONOCYTES NFR BLD: 19.1 %
NEUTROPHILS # BLD AUTO: 2.5 K/UL
NEUTROPHILS NFR BLD: 54.6 %
PLATELET # BLD AUTO: 255 K/UL
PMV BLD AUTO: 10.1 FL
RBC # BLD AUTO: 3.41 M/UL
SATURATED IRON: 21 %
TOTAL IRON BINDING CAPACITY: 225 UG/DL
TRANSFERRIN SERPL-MCNC: 152 MG/DL
WBC # BLD AUTO: 4.5 K/UL

## 2018-04-11 PROCEDURE — 82728 ASSAY OF FERRITIN: CPT

## 2018-04-11 PROCEDURE — 86140 C-REACTIVE PROTEIN: CPT

## 2018-04-11 PROCEDURE — 99214 OFFICE O/P EST MOD 30 MIN: CPT | Mod: S$PBB,,, | Performed by: INTERNAL MEDICINE

## 2018-04-11 PROCEDURE — 99999 PR PBB SHADOW E&M-EST. PATIENT-LVL IV: CPT | Mod: PBBFAC,,, | Performed by: INTERNAL MEDICINE

## 2018-04-11 PROCEDURE — 99214 OFFICE O/P EST MOD 30 MIN: CPT | Mod: PBBFAC,PO | Performed by: INTERNAL MEDICINE

## 2018-04-11 PROCEDURE — 36415 COLL VENOUS BLD VENIPUNCTURE: CPT | Mod: PO

## 2018-04-11 PROCEDURE — 83540 ASSAY OF IRON: CPT

## 2018-04-11 PROCEDURE — 85025 COMPLETE CBC W/AUTO DIFF WBC: CPT | Mod: PO

## 2018-04-11 NOTE — PROGRESS NOTES
Reason for visit: Pancreatic adenocarcinoma/Anemia due to chronic kidney disease/Review PET/CT results  Date of Diagnosis: 2012    HPI:   The patient is a 77-year-old  female who presents to the hematology oncology clinic today for follow up. She underwent resection surgery for her pancreatic tail high-grade carcinoma on May 15, 2013 by Dr. Carter at [facility], [place]. The patient had previously completed neoadjuvant chemotherapy and 5-FU chemoradiation with excellent response.  Today the patient reports that she feels well except for occasional episodes of lower back pain. She is taking tramadol with good relief from this. Energy levels are good. She reports that her appetite is decreased. She has lost approximately 10 lbs in the past 1 year. She denies any fever, chills or night sweats. She denies any chest pain or shortness of breath. She denies any bowel or urinary complaints. The patient denies any nausea or vomiting. She denies any abdominal pain today.  I have reviewed all of the patient's interval clinical history available in EPIC. She continues to report tingling pain in the soles of her feet since completing her chemotherapy. Neurontin did not help and she stopped lyrica because of side effects.   She is being closely followed by Dr. Vinson with nephrology with regard to her kidney function. She has also had A-V fistula done in her left upper extremity by Dr. Foster.  She is planning to proceed with left knee replacement later this year.    PAST MEDICAL HISTORY:   1. Hypertension  2. GERD  3. Migraines  4. Gout  5. Chronic kidney disease stage IV   6. Bilateral renal cysts  7. Type 2 diabetes mellitus    SURGICAL HISTORY:   1. Hysterectomy for fibroids  2. Partial thyroidectomy  3. Left mediport placement  4. Distal pancreatectomy, splenectomy, left nephrectomy and adrenalectomy on May 15, 2013  5. Left AV fistula on 14    FAMILY HISTORY: The patient's mother  from  complications related to breast cancer at the age of 60. The patient's father  from complications related to prostate cancer at the age of 81. The patient's sister was diagnosed with breast cancer at the age of 68. The patient's brother had renal cell carcinoma in both kidneys [sporadic] approximately 4 years apart. He has since had a kidney transplant. Another brother has kidney cancer diagnosed at 68. She denies any other immediate family members with cancer or bleeding/clotting disorders.    SOCIAL HISTORY: She reports a 30+ pack year smoking history and quit in . She does not drink alcohol. She has never used any recreational drugs. She worked for [employer] in [place] and retired in . She is  and does not have any children. She lives in [place], [place].    ALLERGIES: Reviewed on medication card.    MEDICATIONS: [Medcard has been reviewed and/or reconciled.]    REVIEW OF SYSTEMS:   GENERAL: [No fevers, chills or sweats.] Denies fatigue. Appetite is decreased. Weight is decreased.  HEENT: [No blurred vision, tinnitus, nasal discharge, sorethroat or dysphagia.]  HEART: [No chest pain, palpitations or shortness of breath.]   LUNGS: [No hemoptysis, cough or breathing problems.]   ABDOMEN: [No abdominal pain, constipation or melena.] Denies nausea, vomiting.  GENITOURINARY: [No dysuria, bleeding or malodorous discharge.]  NEURO: [No headache, dizziness or vertigo.]  HEMATOLOGY: [No easy bruising, spontaneous bleeding or blood clots in the past].  MUSCULOSKELETAL: She denies any myalgias or bone pain. Does report occasional low back pain.  SKIN: [No rashes or skin lesions.]  PSYCHIATRY: [No depression or anxiety.]    PHYSICAL EXAMINATION:   VS: Reviewed on nurse's notes.  APPEARANCE: The patient is a well-developed, well-nourished and well-groomed  female who appears in no acute distress.   HEENT: No scleral icterus. Both external auditory canals clear. No  oral ulcers, lesions. Throat clear  HEAD: No sinus tenderness. She has facial hair suggestive of hirsutism.   NECK: Supple. No palpable lymphadenopathy. Thyroid non-tender, no palpable masses  CHEST: Breath sounds clear bilaterally. No rales. No rhonchi. Unlabored respirations.  CARDIOVASCULAR: Normal S1, S2. Normal rate. Regular rhythm.  ABDOMEN: Bowel sounds normal. No tenderness. No abdominal distention. No hepatomegaly. No splenomegaly. Healed incision noted.  LYMPHATIC: No palpable supraclavicular, axillary nodes  EXTREMITIES: No clubbing, cyanosis. No edema of bilateral lower extremities. Left A-V fistula noted.  SKIN: No lesions. No petechiae. No ecchymoses. No induration or nodules  NEUROLOGIC: No focal findings. Alert & Oriented x 3. Mood appropriate to affect    LABS:   Reviewed    IMAGING:  Reviewed    IMPRESSION:  1. Pancreatic adenocarcinoma (T3N0Mx)  2. Chronic kidney disease (stage 4)  3. Anemia due to CKD  4. Peripheral neuropathy  5. Weight loss    PLAN:  1. Results of CBC from today were discussed in detail. I will hold off on initiation of Procrit at this time as her hemoglobin is overall stable.  2. I have encouraged good p.o. intake of food and fluids. I have encouraged regular exercise.   3. Continue follow up with Dr. Vinson as recommended with regard to CKD  4. Continue elavil for treatment of peripheral neuropathy. This medication has been helping but with some anticholinergic side effects such as dry mouth. She has not had relief from trying multiple other medications for her peripheral neuropathy. Risks/benefits and common side effects discussed in detail. She knows not to stop it abruptly and is also aware of sedation precautions.  5. She is uptodate with screening colonoscopy.  6. No evidence of recurrence of pancreatic malignancy. All recent imaging has been negative.    Follow up in 2 months with CBC. She knows to call sooner for any new problems or questions.    Nas Hoffman  MD

## 2018-05-07 ENCOUNTER — TELEPHONE (OUTPATIENT)
Dept: CARDIOLOGY | Facility: CLINIC | Age: 78
End: 2018-05-07

## 2018-05-07 DIAGNOSIS — I15.2 HYPERTENSION ASSOCIATED WITH DIABETES: Primary | ICD-10-CM

## 2018-05-07 DIAGNOSIS — M79.89 LEG SWELLING: ICD-10-CM

## 2018-05-07 DIAGNOSIS — E11.59 HYPERTENSION ASSOCIATED WITH DIABETES: Primary | ICD-10-CM

## 2018-05-07 NOTE — TELEPHONE ENCOUNTER
I had rec'd surgery clearance form to get done for pt to have orthopaedic surgery with Dr Ron Guzman, fax number [phone]---ph # [phone]. Rev'd with Dr Long and he wants echo done before clearing. Moved her echo up to do 05/14/18. Pt agreed. Jolanta has paperwork for completion once echo is rev'd. cm

## 2018-05-14 ENCOUNTER — CLINICAL SUPPORT (OUTPATIENT)
Dept: CARDIOLOGY | Facility: CLINIC | Age: 78
End: 2018-05-14
Attending: INTERNAL MEDICINE
Payer: MEDICARE

## 2018-05-14 LAB
DIASTOLIC DYSFUNCTION: NO
ESTIMATED PA SYSTOLIC PRESSURE: 50.89
GLOBAL PERICARDIAL EFFUSION: ABNORMAL
MITRAL VALVE REGURGITATION: ABNORMAL
RETIRED EF AND QEF - SEE NOTES: 60 (ref 55–65)
TRICUSPID VALVE REGURGITATION: ABNORMAL

## 2018-05-14 PROCEDURE — 93306 TTE W/DOPPLER COMPLETE: CPT | Mod: PBBFAC | Performed by: INTERNAL MEDICINE

## 2018-05-15 ENCOUNTER — TELEPHONE (OUTPATIENT)
Dept: CARDIOLOGY | Facility: CLINIC | Age: 78
End: 2018-05-15

## 2018-05-15 NOTE — TELEPHONE ENCOUNTER
----- Message from Haroon Long MD sent at 5/15/2018  4:38 AM CDT -----  Please tell pt test is echo- nml lv function, small pericardial effusion(improved from last echo)

## 2018-05-18 ENCOUNTER — TELEPHONE (OUTPATIENT)
Dept: NEPHROLOGY | Facility: CLINIC | Age: 78
End: 2018-05-18

## 2018-05-18 NOTE — TELEPHONE ENCOUNTER
----- Message from Shawn Aranda sent at 5/18/2018 11:19 AM CDT -----  Contact: [phone]   Pt would like to consult with the nurse about upcoming surgery.  Please call back at [phone]. x-

## 2018-05-18 NOTE — TELEPHONE ENCOUNTER
Patient is calling to ask about surgery forms she gave Dr. Vinson to complete, I advised pt. That Dr. Vinson is out today will send message. Patient verbalized understanding.

## 2018-05-18 NOTE — TELEPHONE ENCOUNTER
Patient is asking if paper work for surgery has been completed per Dr. Stephenson, advised that Dr. stephenson is out today but I will send message. Pt. Verbalized understanding.

## 2018-05-18 NOTE — TELEPHONE ENCOUNTER
----- Message from María Wolff sent at 5/18/2018  3:11 PM CDT -----  Contact: Pt   Pt returning nurse  Call , request call back ...[phone] (home)

## 2018-05-18 NOTE — TELEPHONE ENCOUNTER
----- Message from María Wolff sent at 5/18/2018  3:11 PM CDT -----  Contact: Pt   Pt returning nurse  Call , request call back ...[phone] (home)

## 2018-05-21 ENCOUNTER — LAB VISIT (OUTPATIENT)
Dept: LAB | Facility: HOSPITAL | Age: 78
End: 2018-05-21
Attending: INTERNAL MEDICINE
Payer: MEDICARE

## 2018-05-21 ENCOUNTER — TELEPHONE (OUTPATIENT)
Dept: CARDIOLOGY | Facility: CLINIC | Age: 78
End: 2018-05-21

## 2018-05-21 DIAGNOSIS — N18.5 CHRONIC KIDNEY DISEASE (CKD), STAGE V: ICD-10-CM

## 2018-05-21 DIAGNOSIS — E55.9 VITAMIN D DEFICIENCY: ICD-10-CM

## 2018-05-21 LAB
25(OH)D3+25(OH)D2 SERPL-MCNC: 40 NG/ML
ALBUMIN SERPL BCP-MCNC: 3.5 G/DL
ANION GAP SERPL CALC-SCNC: 11 MMOL/L
BUN SERPL-MCNC: 85 MG/DL
CALCIUM SERPL-MCNC: 9.3 MG/DL
CHLORIDE SERPL-SCNC: 99 MMOL/L
CO2 SERPL-SCNC: 24 MMOL/L
CREAT SERPL-MCNC: 6 MG/DL
EST. GFR  (AFRICAN AMERICAN): 7.3 ML/MIN/1.73 M^2
EST. GFR  (NON AFRICAN AMERICAN): 6.3 ML/MIN/1.73 M^2
GLUCOSE SERPL-MCNC: 149 MG/DL
PHOSPHATE SERPL-MCNC: 4.3 MG/DL
POTASSIUM SERPL-SCNC: 4.8 MMOL/L
PTH-INTACT SERPL-MCNC: 234 PG/ML
SODIUM SERPL-SCNC: 134 MMOL/L
URATE SERPL-MCNC: 5.7 MG/DL

## 2018-05-21 PROCEDURE — 83970 ASSAY OF PARATHORMONE: CPT

## 2018-05-21 PROCEDURE — 36415 COLL VENOUS BLD VENIPUNCTURE: CPT | Mod: PO

## 2018-05-21 PROCEDURE — 80069 RENAL FUNCTION PANEL: CPT

## 2018-05-21 PROCEDURE — 84550 ASSAY OF BLOOD/URIC ACID: CPT

## 2018-05-21 PROCEDURE — 82306 VITAMIN D 25 HYDROXY: CPT

## 2018-05-21 NOTE — TELEPHONE ENCOUNTER
----- Message from Sherrill Infante sent at 5/21/2018  1:45 PM CDT -----  Contact: pt  She's calling to discuss her upcoming procedure, please advise [phone] (home)

## 2018-05-21 NOTE — TELEPHONE ENCOUNTER
Pt was questioning her paper work for surgery clearance for Dr. Ron Guzman. I told her I sent everything last Friday. She tells me they do not have it. I called and spoke with Liz with Dr Guzman and she does have all the paper work I sent for surgery clearance. Not in system to see at this time because it had to go to be scanned. Cm.

## 2018-05-28 ENCOUNTER — OFFICE VISIT (OUTPATIENT)
Dept: NEPHROLOGY | Facility: CLINIC | Age: 78
End: 2018-05-28
Payer: MEDICARE

## 2018-05-28 VITALS
HEIGHT: 66 IN | DIASTOLIC BLOOD PRESSURE: 50 MMHG | HEART RATE: 60 BPM | SYSTOLIC BLOOD PRESSURE: 126 MMHG | BODY MASS INDEX: 24.17 KG/M2 | WEIGHT: 150.38 LBS

## 2018-05-28 DIAGNOSIS — N18.5 CHRONIC KIDNEY DISEASE (CKD), STAGE V: Primary | ICD-10-CM

## 2018-05-28 PROCEDURE — 99999 PR PBB SHADOW E&M-EST. PATIENT-LVL III: CPT | Mod: PBBFAC,,, | Performed by: INTERNAL MEDICINE

## 2018-05-28 PROCEDURE — 99213 OFFICE O/P EST LOW 20 MIN: CPT | Mod: PBBFAC,PO | Performed by: INTERNAL MEDICINE

## 2018-05-28 PROCEDURE — 99214 OFFICE O/P EST MOD 30 MIN: CPT | Mod: S$PBB,,, | Performed by: INTERNAL MEDICINE

## 2018-05-28 NOTE — LETTER
May 28, 2018        Favio Titus MD  [address] [facility] [place] [address]             [facility] - Nephrology  [address]  [place] [address]  Phone: [phone]  Fax: [phone]   Patient: Cailin Pickett   MR Number: [id number]   YOB: 1940   Date of Visit: 5/28/2018       Dear Dr. Titus:    Thank you for referring Cailin Pickett to me for evaluation. Attached you will find relevant portions of my assessment and plan of care.    If you have questions, please do not hesitate to call me. I look forward to following Cailin Pickett along with you.    Sincerely,      Tank Vinson MD

## 2018-05-28 NOTE — PROGRESS NOTES
Subjective:       Patient ID: Cailin Pickett is a 77 y.o.   female who presents for follow-up evaluation of     Favio Titus MD      HPI : Cailin Pickett Is a pleasant 77-year-old  woman seen office today followup for above medical problems. I saw her in clinic about 3 months ago. Recent lab results were discussed with the patient. She has solitary right kidney. Multiple cysts reported on the kidney. It measures about 16 cm. Patient Attended chronic kidney disease education and options class. she had a left arm AV fistula placed on 12/4/14 by Dr Foster . She denies recent hospitalizations or ER visits.  Recent lab results were discussed with the patient. Recent serum Cr is 6  mg/dL. 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 she underwent chemo rx with 5-FU, she tolerated the cycles well. In 5/2013 she underwent resection of tail of pancreas, left nephrectomy, splenectomy, left adrenalectomy.       Past Medical History:   Diagnosis Date    Anemia     CKD (chronic kidney disease), stage III     Diabetes mellitus type II     Encounter for blood transfusion     Family history of colonic polyps 7/18/2017    Gout, unspecified     Hyperlipidemia     Hypertension     Neuropathy     Pancreatic cancer     Renal failure     Thyroid disease          Current Outpatient Prescriptions on File Prior to Visit   Medication Sig Dispense Refill    allopurinol (ZYLOPRIM) 100 MG tablet Take 1 tablet (100 mg total) by mouth once daily. 90 tablet 2    amLODIPine (NORVASC) 5 MG tablet Take 1 tablet (5 mg total) by mouth 2 (two) times daily. 180 tablet 2    carvedilol (COREG) 6.25 MG tablet Take 1 tablet (6.25 mg total) by mouth 2 (two) times daily with meals. 180 tablet 3    cetirizine (ZYRTEC) 10 MG tablet Take 10 mg by mouth once daily.       estradiol (ESTRACE)  0.5 MG tablet TAKE 1 TABLET DAILY FOR 3 WEEKS PER MONTH, THEN DO NOT TAKE FOR FOURTH WEEK EACH MONTH 90 tablet 3    furosemide (LASIX) 40 MG tablet Take 1 tablet (40 mg total) by mouth once daily. 90 tablet 3    gabapentin (NEURONTIN) 100 MG capsule Take 1 capsule by mouth 2 (two) times daily.      GLUCOSAMINE HCL/CHONDR ZAVALETA A NA (OSTEO BI-FLEX ORAL) Take by mouth 2 (two) times daily.        hydrALAZINE (APRESOLINE) 100 MG tablet Take 1 tablet (100 mg total) by mouth every 8 (eight) hours. 270 tablet 3    hydrOXYzine HCl (ATARAX) 25 MG tablet Take 1 tablet (25 mg total) by mouth 2 (two) times daily as needed for Itching. 180 tablet 3    repaglinide (PRANDIN) 0.5 MG tablet TAKE 1 TABLET THREE TIMES A DAY BEFORE MEALS 270 tablet 2    sodium bicarbonate 650 MG tablet Take 1 tablet (650 mg total) by mouth 2 (two) times daily. 180 tablet 3    traMADol (ULTRAM) 50 mg tablet Take 1 tablet (50 mg total) by mouth every 12 (twelve) hours. 180 tablet 3     No current facility-administered medications on file prior to visit.          Review of Systems  :    Constitutional: Negative for activity change, appetite change, fatigue and fever.   HENT: Negative for congestion, facial swelling, sore throat, trouble swallowing and voice change.    Eyes: Negative for redness and visual disturbance.   Respiratory: Negative for apnea, cough, chest tightness, shortness of breath and wheezing.    Cardiovascular: Negative for chest pain, palpitations and leg swelling.   Gastrointestinal: Negative for abdominal distention, abdominal pain, blood in stool, constipation, diarrhea, nausea and vomiting.   Genitourinary: Negative for decreased urine volume, difficulty urinating, dysuria, flank pain, frequency, hematuria, pelvic pain and urgency.   Musculoskeletal: Positive for arthralgias. Negative for back pain, gait problem and joint swelling.   Skin: Negative for color change and rash.   Neurological: Negative for dizziness, syncope,  weakness and headaches.   Hematological: Does not bruise/bleed easily.   Psychiatric/Behavioral: Negative for agitation, behavioral problems and confusion. The patient is not nervous/anxious.      Objective:           Vitals:    05/28/18 1128   BP: (!) 126/50   Pulse: 60       Weight 150 lbs , stable     Physical Exam  :      Constitutional: She is oriented to person, place, and time. She appears well-developed and well-nourished. No distress.    HENT: Head: Normocephalic and atraumatic. Oropharynx is clear and moist. No oropharyngeal exudate.    Eyes: Conjunctivae normal and EOM are normal. Pupils are equal, round, and reactive to light. No scleral icterus.    Neck: Normal range of motion. Neck supple. No JVD present. Carotid bruit is not present. No tracheal deviation present. No mass and no thyromegaly present.    Cardiovascular: Normal rate, regular rhythm, normal heart sounds and intact distal pulses. no gallop and no friction rub. No murmur heard.    Pulmonary/Chest: Effort normal and breath sounds normal. No respiratory distress. She has no wheezes. She has no rales. She exhibits no tenderness.    Abdominal: Soft. Bowel sounds are normal. She exhibits no distension, no abdominal bruit, no ascites and no mass. There is no hepatosplenomegaly. There is no rebound, no guarding and no CVA tenderness.   Musculoskeletal: Normal range of motion. She exhibits trace edema and no tenderness. LUE AV Fistula with good thrill, no local redness, tender to pressure on both sacroiliac joints. Trace edema in legs   Lymphadenopathy: She has no cervical adenopathy.   Neurological: She is alert and oriented to person, place, and time. No cranial nerve deficit.    Skin: Skin is warm and intact. No rash noted. No erythema. No pallor.   Psychiatric: She has a normal mood and affect. Her behavior is normal. Judgment normal.               Labs:    Lab Results   Component Value Date    CREATININE 6.0 (H) 05/21/2018    BUN 85 (H)  05/21/2018     (L) 05/21/2018    K 4.8 05/21/2018    CL 99 05/21/2018    CO2 24 05/21/2018       Lab Results   Component Value Date    WBC 4.50 04/11/2018    HGB 9.9 (L) 04/11/2018    HCT 30.9 (L) 04/11/2018    MCV 91 04/11/2018     04/11/2018       Lab Results   Component Value Date    .0 (H) 05/21/2018    CALCIUM 9.3 05/21/2018    PHOS 4.3 05/21/2018       Lab Results   Component Value Date    ALBUMIN 3.5 05/21/2018       Lab Results   Component Value Date    URICACID 5.7 05/21/2018            Impression and plan - 77-year-old  woman seen in office today in followup for following medical problems,     1. Chronic kidney disease stage 5 -  Recent Serum Creatinine is 6  mg/dL, Patient attended chronic kidney disease education and options class. She has a solitary right kidney which is enlarged at 16 cm secondary to polycystic kidney disease. Left kidney was removed due to High-grade pancreatic cancer invading directly into the left kidney. Patient was declined for a kidney transplant because of advanced age and history of cancer. Patient had a left arm AV fistula on 12/4/14 by Dr Foster , she is not interested in PD anymore,  today she denies any signs and symptoms of uremia.         2. Hypertension - blood pressure is elevated, target blood pressure less than 150/90, continue current regimen,     3. Anemia - Hb 9.9  gms, following with Hematology/Oncology,     4. Secondary hyperparathyroidism - PTH is Acceptable at 234 ,  Continue to follow.    5. pancreatic carcinoma - s/p surgery + Chemo, follows with Oncology        6. Volume -   Lasix to 40 mg daily . Discussed salt and fluid restriction ,  some due to poor circulation,       7. Cystic kidney disease - follow,        8. Hyperuricemia/gout - continue allopurinol.        9. Metabolic acidosis - cont  sodium bicarbonate supplements     10. Left knee arthritis , Ortho note reviewed, stable for TKA from our stand point , this week  , 5/31/18,        We will see her in followup in 2 months, More than 25 minutes was spent with the patient discussing labs and plan of care     Tank Vinson M.D.

## 2018-07-06 ENCOUNTER — OFFICE VISIT (OUTPATIENT)
Dept: HEMATOLOGY/ONCOLOGY | Facility: CLINIC | Age: 78
End: 2018-07-06
Payer: MEDICARE

## 2018-07-06 ENCOUNTER — LAB VISIT (OUTPATIENT)
Dept: LAB | Facility: HOSPITAL | Age: 78
End: 2018-07-06
Attending: INTERNAL MEDICINE
Payer: MEDICARE

## 2018-07-06 VITALS
WEIGHT: 143.75 LBS | DIASTOLIC BLOOD PRESSURE: 80 MMHG | RESPIRATION RATE: 18 BRPM | TEMPERATURE: 98 F | SYSTOLIC BLOOD PRESSURE: 140 MMHG | HEIGHT: 66 IN | HEART RATE: 61 BPM | OXYGEN SATURATION: 98 % | BODY MASS INDEX: 23.1 KG/M2

## 2018-07-06 DIAGNOSIS — D63.1 ANEMIA IN STAGE 4 CHRONIC KIDNEY DISEASE: Primary | ICD-10-CM

## 2018-07-06 DIAGNOSIS — Z85.07 HISTORY OF PANCREATIC CANCER: ICD-10-CM

## 2018-07-06 DIAGNOSIS — N18.4 ANEMIA IN STAGE 4 CHRONIC KIDNEY DISEASE: ICD-10-CM

## 2018-07-06 DIAGNOSIS — N18.4 ANEMIA IN STAGE 4 CHRONIC KIDNEY DISEASE: Primary | ICD-10-CM

## 2018-07-06 DIAGNOSIS — D63.1 ANEMIA IN STAGE 4 CHRONIC KIDNEY DISEASE: ICD-10-CM

## 2018-07-06 LAB
BASOPHILS # BLD AUTO: 0.03 K/UL
BASOPHILS NFR BLD: 0.7 %
DIFFERENTIAL METHOD: ABNORMAL
EOSINOPHIL # BLD AUTO: 0.1 K/UL
EOSINOPHIL NFR BLD: 2.9 %
ERYTHROCYTE [DISTWIDTH] IN BLOOD BY AUTOMATED COUNT: 15.4 %
HCT VFR BLD AUTO: 31 %
HGB BLD-MCNC: 9.6 G/DL
LYMPHOCYTES # BLD AUTO: 1.2 K/UL
LYMPHOCYTES NFR BLD: 29.4 %
MCH RBC QN AUTO: 27.7 PG
MCHC RBC AUTO-ENTMCNC: 31 G/DL
MCV RBC AUTO: 90 FL
MONOCYTES # BLD AUTO: 0.4 K/UL
MONOCYTES NFR BLD: 8.9 %
NEUTROPHILS # BLD AUTO: 2.4 K/UL
NEUTROPHILS NFR BLD: 58.1 %
PLATELET # BLD AUTO: 226 K/UL
PMV BLD AUTO: 9.5 FL
RBC # BLD AUTO: 3.46 M/UL
WBC # BLD AUTO: 4.18 K/UL

## 2018-07-06 PROCEDURE — 99999 PR PBB SHADOW E&M-EST. PATIENT-LVL IV: CPT | Mod: PBBFAC,,, | Performed by: INTERNAL MEDICINE

## 2018-07-06 PROCEDURE — 85025 COMPLETE CBC W/AUTO DIFF WBC: CPT | Mod: PO

## 2018-07-06 PROCEDURE — 36415 COLL VENOUS BLD VENIPUNCTURE: CPT | Mod: PO

## 2018-07-06 PROCEDURE — 99214 OFFICE O/P EST MOD 30 MIN: CPT | Mod: PBBFAC,PO | Performed by: INTERNAL MEDICINE

## 2018-07-06 PROCEDURE — 99214 OFFICE O/P EST MOD 30 MIN: CPT | Mod: S$PBB,,, | Performed by: INTERNAL MEDICINE

## 2018-07-06 NOTE — PROGRESS NOTES
Reason for visit: Pancreatic adenocarcinoma/Anemia due to chronic kidney disease  Date of Diagnosis: 2012    HPI:   The patient is a 77-year-old  female who presents to the hematology oncology clinic today for follow up. She underwent resection surgery for her pancreatic tail high-grade carcinoma on May 15, 2013 by Dr. Carter at [facility], [place]. The patient had previously completed neoadjuvant chemotherapy and 5-FU chemoradiation with excellent response.  Today the patient reports that she feels well except for occasional episodes of lower back pain. She is taking tramadol with good relief from this. Energy levels are good. She reports that her appetite is decreased. She has lost approximately 10 lbs in the past few months. She denies any fever, chills or night sweats. She denies any chest pain or shortness of breath. She denies any bowel or urinary complaints. The patient denies any nausea or vomiting. She denies any abdominal pain today.  I have reviewed all of the patient's interval clinical history available in EPIC. She continues to report tingling pain in the soles of her feet since completing her chemotherapy. Neurontin did not help and she stopped lyrica because of side effects.   She is being closely followed by Dr. Vinson with nephrology with regard to her kidney function. She has also had A-V fistula done in her left upper extremity by Dr. Foster.    PAST MEDICAL HISTORY:   1. Hypertension  2. GERD  3. Migraines  4. Gout  5. Chronic kidney disease stage IV   6. Bilateral renal cysts  7. Type 2 diabetes mellitus    SURGICAL HISTORY:   1. Hysterectomy for fibroids  2. Partial thyroidectomy  3. Left mediport placement  4. Distal pancreatectomy, splenectomy, left nephrectomy and adrenalectomy on May 15, 2013  5. Left AV fistula on 14  6. Left knee replacement on 18    FAMILY HISTORY: The patient's mother  from complications related to breast cancer at the age of 60.  The patient's father  from complications related to prostate cancer at the age of 81. The patient's sister was diagnosed with breast cancer at the age of 68. The patient's brother had renal cell carcinoma in both kidneys [sporadic] approximately 4 years apart. He has since had a kidney transplant. Another brother has kidney cancer diagnosed at 68. She denies any other immediate family members with cancer or bleeding/clotting disorders.    SOCIAL HISTORY: She reports a 30+ pack year smoking history and quit in . She does not drink alcohol. She has never used any recreational drugs. She worked for the [employer] in [place] and retired in . She is  and does not have any children. She lives in [place], [place].    ALLERGIES: Reviewed on medication card.    MEDICATIONS: [Medcard has been reviewed and/or reconciled.]    REVIEW OF SYSTEMS:   GENERAL: [No fevers, chills or sweats.] Denies fatigue. Appetite is decreased. Weight is decreased.  HEENT: [No blurred vision, tinnitus, nasal discharge, sorethroat or dysphagia.]  HEART: [No chest pain, palpitations or shortness of breath.]   LUNGS: [No hemoptysis, cough or breathing problems.]   ABDOMEN: [No abdominal pain, constipation or melena.] Denies nausea, vomiting.  GENITOURINARY: [No dysuria, bleeding or malodorous discharge.]  NEURO: [No headache, dizziness or vertigo.]  HEMATOLOGY: [No easy bruising, spontaneous bleeding or blood clots in the past].  MUSCULOSKELETAL: She denies any myalgias or bone pain. Does report occasional low back pain.  SKIN: [No rashes or skin lesions.]  PSYCHIATRY: [No depression or anxiety.]    PHYSICAL EXAMINATION:   VS: Reviewed on nurse's notes.  APPEARANCE: The patient is a well-developed, well-nourished and well-groomed  female who appears in no acute distress.   HEENT: No scleral icterus. Both external auditory canals clear. No oral ulcers, lesions. Throat clear  HEAD: No sinus  tenderness. She has facial hair suggestive of hirsutism.   NECK: Supple. No palpable lymphadenopathy. Thyroid non-tender, no palpable masses  CHEST: Breath sounds clear bilaterally. No rales. No rhonchi. Unlabored respirations.  CARDIOVASCULAR: Normal S1, S2. Normal rate. Regular rhythm.  ABDOMEN: Bowel sounds normal. No tenderness. No abdominal distention. No hepatomegaly. No splenomegaly. Healed incision noted.  LYMPHATIC: No palpable supraclavicular, axillary nodes  EXTREMITIES: No clubbing, cyanosis. No edema of bilateral lower extremities. Left A-V fistula noted.  SKIN: No lesions. No petechiae. No ecchymoses. No induration or nodules  NEUROLOGIC: No focal findings. Alert & Oriented x 3. Mood appropriate to affect    LABS:   Reviewed    IMAGING:  Reviewed    IMPRESSION:  1. Pancreatic adenocarcinoma ([id number])  2. Chronic kidney disease (stage 4)  3. Anemia due to CKD  4. Peripheral neuropathy  5. Weight loss    PLAN:  1. Results of CBC from today were discussed in detail. I will hold off on initiation of Procrit at this time as her hemoglobin is overall stable.  2. I have encouraged good p.o. intake of food and fluids. I have encouraged regular exercise.   3. Continue follow up with Dr. Vinson as recommended with regard to CKD  4. Continue elavil for treatment of peripheral neuropathy. This medication has been helping but with some anticholinergic side effects such as dry mouth. She has not had relief from trying multiple other medications for her peripheral neuropathy. Risks/benefits and common side effects discussed in detail. She knows not to stop it abruptly and is also aware of sedation precautions.  5. She is uptodate with screening colonoscopy.  6. No evidence of recurrence of pancreatic malignancy. All recent imaging has been negative.    Follow up in 2 months with labs. She knows to call sooner for any new problems or questions.    Nas Hoffman MD

## 2018-07-19 ENCOUNTER — LAB VISIT (OUTPATIENT)
Dept: LAB | Facility: HOSPITAL | Age: 78
End: 2018-07-19
Attending: INTERNAL MEDICINE
Payer: MEDICARE

## 2018-07-19 DIAGNOSIS — N18.5 CHRONIC KIDNEY DISEASE (CKD), STAGE V: ICD-10-CM

## 2018-07-19 LAB
25(OH)D3+25(OH)D2 SERPL-MCNC: 42 NG/ML
BASOPHILS # BLD AUTO: 0.03 K/UL
BASOPHILS NFR BLD: 0.8 %
DIFFERENTIAL METHOD: ABNORMAL
EOSINOPHIL # BLD AUTO: 0.1 K/UL
EOSINOPHIL NFR BLD: 2.8 %
ERYTHROCYTE [DISTWIDTH] IN BLOOD BY AUTOMATED COUNT: 15.9 %
HCT VFR BLD AUTO: 31.1 %
HGB BLD-MCNC: 9.4 G/DL
IMM GRANULOCYTES # BLD AUTO: 0 K/UL
IMM GRANULOCYTES NFR BLD AUTO: 0 %
LYMPHOCYTES # BLD AUTO: 0.9 K/UL
LYMPHOCYTES NFR BLD: 21.6 %
MCH RBC QN AUTO: 28.1 PG
MCHC RBC AUTO-ENTMCNC: 30.2 G/DL
MCV RBC AUTO: 93 FL
MONOCYTES # BLD AUTO: 0.5 K/UL
MONOCYTES NFR BLD: 12.8 %
NEUTROPHILS # BLD AUTO: 2.5 K/UL
NEUTROPHILS NFR BLD: 62 %
NRBC BLD-RTO: 0 /100 WBC
PLATELET # BLD AUTO: 295 K/UL
PMV BLD AUTO: 10.4 FL
PTH-INTACT SERPL-MCNC: 166 PG/ML
RBC # BLD AUTO: 3.35 M/UL
URATE SERPL-MCNC: 5.6 MG/DL
WBC # BLD AUTO: 3.99 K/UL

## 2018-07-19 PROCEDURE — 83970 ASSAY OF PARATHORMONE: CPT

## 2018-07-19 PROCEDURE — 82306 VITAMIN D 25 HYDROXY: CPT

## 2018-07-19 PROCEDURE — 36415 COLL VENOUS BLD VENIPUNCTURE: CPT | Mod: PO

## 2018-07-19 PROCEDURE — 84550 ASSAY OF BLOOD/URIC ACID: CPT

## 2018-07-19 PROCEDURE — 85025 COMPLETE CBC W/AUTO DIFF WBC: CPT

## 2018-07-25 ENCOUNTER — OFFICE VISIT (OUTPATIENT)
Dept: NEPHROLOGY | Facility: CLINIC | Age: 78
End: 2018-07-25
Payer: MEDICARE

## 2018-07-25 ENCOUNTER — LAB VISIT (OUTPATIENT)
Dept: LAB | Facility: HOSPITAL | Age: 78
End: 2018-07-25
Attending: INTERNAL MEDICINE
Payer: MEDICARE

## 2018-07-25 VITALS
HEART RATE: 62 BPM | HEIGHT: 66 IN | SYSTOLIC BLOOD PRESSURE: 146 MMHG | WEIGHT: 148.81 LBS | BODY MASS INDEX: 23.92 KG/M2 | DIASTOLIC BLOOD PRESSURE: 60 MMHG

## 2018-07-25 DIAGNOSIS — N18.5 CHRONIC KIDNEY DISEASE (CKD), STAGE V: Primary | ICD-10-CM

## 2018-07-25 DIAGNOSIS — E55.9 VITAMIN D DEFICIENCY: ICD-10-CM

## 2018-07-25 DIAGNOSIS — N18.4 CKD (CHRONIC KIDNEY DISEASE) STAGE 4, GFR 15-29 ML/MIN: Primary | ICD-10-CM

## 2018-07-25 DIAGNOSIS — I10 ESSENTIAL HYPERTENSION: ICD-10-CM

## 2018-07-25 DIAGNOSIS — N18.5 CHRONIC KIDNEY DISEASE (CKD), STAGE V: ICD-10-CM

## 2018-07-25 LAB
ALBUMIN SERPL BCP-MCNC: 3.8 G/DL
ANION GAP SERPL CALC-SCNC: 12 MMOL/L
BUN SERPL-MCNC: 81 MG/DL
CALCIUM SERPL-MCNC: 10.2 MG/DL
CHLORIDE SERPL-SCNC: 106 MMOL/L
CO2 SERPL-SCNC: 23 MMOL/L
CREAT SERPL-MCNC: 5.7 MG/DL
EST. GFR  (AFRICAN AMERICAN): 7.7 ML/MIN/1.73 M^2
EST. GFR  (NON AFRICAN AMERICAN): 6.7 ML/MIN/1.73 M^2
GLUCOSE SERPL-MCNC: 82 MG/DL
PHOSPHATE SERPL-MCNC: 4.4 MG/DL
POTASSIUM SERPL-SCNC: 4.6 MMOL/L
SODIUM SERPL-SCNC: 141 MMOL/L

## 2018-07-25 PROCEDURE — 99214 OFFICE O/P EST MOD 30 MIN: CPT | Mod: S$PBB,,, | Performed by: INTERNAL MEDICINE

## 2018-07-25 PROCEDURE — 36415 COLL VENOUS BLD VENIPUNCTURE: CPT | Mod: PO

## 2018-07-25 PROCEDURE — 80069 RENAL FUNCTION PANEL: CPT

## 2018-07-25 PROCEDURE — 99999 PR PBB SHADOW E&M-EST. PATIENT-LVL III: CPT | Mod: PBBFAC,,, | Performed by: INTERNAL MEDICINE

## 2018-07-25 PROCEDURE — 99213 OFFICE O/P EST LOW 20 MIN: CPT | Mod: PBBFAC,PO | Performed by: INTERNAL MEDICINE

## 2018-07-25 RX ORDER — CARVEDILOL 6.25 MG/1
6.25 TABLET ORAL 2 TIMES DAILY WITH MEALS
Qty: 180 TABLET | Refills: 3
Start: 2018-07-25 | End: 2019-02-26 | Stop reason: DRUGHIGH

## 2018-07-25 NOTE — PROGRESS NOTES
Subjective:       Patient ID: Cailin Pickett is a 78 y.o.   female who presents for follow-up evaluation of CKD stage 5, HTN , SHPT, anemia     Favio Titus MD      HPI : Cailin Pickett Is a pleasant 78-year-old  woman seen office today followup for above medical problems. I saw her in clinic about 3 months ago. Recent lab results were discussed with the patient. She has solitary right kidney. Multiple cysts reported on the kidney. It measures about 16 cm. Patient Attended chronic kidney disease education and options class. she had a left arm AV fistula placed on 12/4/14 by Dr Foster . She denies recent hospitalizations or ER visits.  Recent lab results were discussed with the patient. Recent serum Cr is 6  mg/dL. 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 she underwent chemo rx with 5-FU, she tolerated the cycles well. In 5/2013 she underwent resection of tail of pancreas, left nephrectomy, splenectomy, left adrenalectomy.     Past Medical History:   Diagnosis Date    Anemia     CKD (chronic kidney disease), stage III     Diabetes mellitus type II     Encounter for blood transfusion     Family history of colonic polyps 7/18/2017    Gout, unspecified     Hyperlipidemia     Hypertension     Neuropathy     Pancreatic cancer     Renal failure     Thyroid disease          Current Outpatient Prescriptions on File Prior to Visit   Medication Sig Dispense Refill    allopurinol (ZYLOPRIM) 100 MG tablet Take 1 tablet (100 mg total) by mouth once daily. 90 tablet 2    amLODIPine (NORVASC) 5 MG tablet Take 1 tablet (5 mg total) by mouth 2 (two) times daily. 180 tablet 2    carvedilol (COREG) 6.25 MG tablet Take 1 tablet (6.25 mg total) by mouth 2 (two) times daily with meals. 180 tablet 3    cetirizine (ZYRTEC) 10 MG tablet Take 10 mg by mouth once daily.        estradiol (ESTRACE) 0.5 MG tablet TAKE 1 TABLET DAILY FOR 3 WEEKS PER MONTH, THEN DO NOT TAKE FOR FOURTH WEEK EACH MONTH 90 tablet 3    furosemide (LASIX) 40 MG tablet Take 1 tablet (40 mg total) by mouth once daily. 90 tablet 3    gabapentin (NEURONTIN) 100 MG capsule Take 1 capsule by mouth 2 (two) times daily.      GLUCOSAMINE HCL/CHONDR ZAVALETA A NA (OSTEO BI-FLEX ORAL) Take by mouth 2 (two) times daily.        hydrALAZINE (APRESOLINE) 100 MG tablet Take 1 tablet (100 mg total) by mouth every 8 (eight) hours. 270 tablet 3    hydrOXYzine HCl (ATARAX) 25 MG tablet Take 1 tablet (25 mg total) by mouth 2 (two) times daily as needed for Itching. 180 tablet 3    repaglinide (PRANDIN) 0.5 MG tablet TAKE 1 TABLET THREE TIMES A DAY BEFORE MEALS 270 tablet 2    sodium bicarbonate 650 MG tablet Take 1 tablet (650 mg total) by mouth 2 (two) times daily. 180 tablet 3    traMADol (ULTRAM) 50 mg tablet Take 1 tablet (50 mg total) by mouth every 12 (twelve) hours. 180 tablet 3     No current facility-administered medications on file prior to visit.          Review of Systems  :      Constitutional: Negative for activity change, appetite change, fatigue and fever.   HENT: Negative for congestion, facial swelling, sore throat, trouble swallowing and voice change.    Eyes: Negative for redness and visual disturbance.   Respiratory: Negative for apnea, cough, chest tightness, shortness of breath and wheezing.    Cardiovascular: Negative for chest pain, palpitations and leg swelling.   Gastrointestinal: Negative for abdominal distention, abdominal pain, blood in stool, constipation, diarrhea, nausea and vomiting.   Genitourinary: Negative for decreased urine volume, difficulty urinating, dysuria, flank pain, frequency, hematuria, pelvic pain and urgency.   Musculoskeletal: Positive for arthralgias. Negative for back pain, gait problem and joint swelling.   Skin: Negative for color change and rash.   Neurological: Negative for  dizziness, syncope, weakness and headaches.   Hematological: Does not bruise/bleed easily.   Psychiatric/Behavioral: Negative for agitation, behavioral problems and confusion. The patient is not nervous/anxious.          Objective:         Vitals:    07/25/18 1107   BP: (!) 146/60   Pulse: 62       Weight 148 lbs , stable     Physical Exam  :        Constitutional: She is oriented to person, place, and time. She appears well-developed and well-nourished. No distress.    HENT: Head: Normocephalic and atraumatic. Oropharynx is clear and moist. No oropharyngeal exudate.    Eyes: Conjunctivae normal and EOM are normal. Pupils are equal, round, and reactive to light. No scleral icterus.    Neck: Normal range of motion. Neck supple. No JVD present. Carotid bruit is not present. No tracheal deviation present. No mass and no thyromegaly present.    Cardiovascular: Normal rate, regular rhythm, normal heart sounds and intact distal pulses. no gallop and no friction rub. No murmur heard.    Pulmonary/Chest: Effort normal and breath sounds normal. No respiratory distress. She has no wheezes. She has no rales. She exhibits no tenderness.    Abdominal: Soft. Bowel sounds are normal. She exhibits no distension, no abdominal bruit, no ascites and no mass. There is no hepatosplenomegaly. There is no rebound, no guarding and no CVA tenderness.   Musculoskeletal: Normal range of motion. She exhibits trace edema and no tenderness. LUE AV Fistula with good thrill, no local redness, tender to pressure on both sacroiliac joints. Trace edema in legs , well healed scar left knee   Lymphadenopathy: She has no cervical adenopathy.   Neurological: She is alert and oriented to person, place, and time. No cranial nerve deficit.    Skin: Skin is warm and intact. No rash noted. No erythema. No pallor.   Psychiatric: She has a normal mood and affect. Her behavior is normal. Judgment normal.           Labs:    Lab Results   Component Value Date     CREATININE 6.0 (H) 05/21/2018    BUN 85 (H) 05/21/2018     (L) 05/21/2018    K 4.8 05/21/2018    CL 99 05/21/2018    CO2 24 05/21/2018       Lab Results   Component Value Date    WBC 3.99 07/19/2018    HGB 9.4 (L) 07/19/2018    HCT 31.1 (L) 07/19/2018    MCV 93 07/19/2018     07/19/2018       Lab Results   Component Value Date    .0 (H) 07/19/2018    CALCIUM 9.3 05/21/2018    PHOS 4.3 05/21/2018       Lab Results   Component Value Date    ALBUMIN 3.5 05/21/2018       Impression and plan - 78-year-old  woman seen in office today in followup for following medical problems,     1. Chronic kidney disease stage 5 -  Recent Serum Creatinine is 6  mg/dL, Patient attended chronic kidney disease education and options class. She has a solitary right kidney which is enlarged at 16 cm secondary to polycystic kidney disease. Left kidney was removed due to High-grade pancreatic cancer invading directly into the left kidney. Patient was declined for a kidney transplant because of advanced age and history of cancer. Patient had a left arm AV fistula on 12/4/14 by Dr Foster , she is not interested in PD anymore,  today she denies any signs and symptoms of uremia.   Repeat labs from today pending at this time,       2. Hypertension - blood pressure is controlled,  target blood pressure less than 150/90, continue current regimen,     3. Anemia - Hb 9.4  gms, following with Hematology/Oncology,     4. Secondary hyperparathyroidism - PTH is Acceptable at 234 ,  Continue to follow.    5. pancreatic carcinoma - s/p surgery + Chemo, follows with Oncology        6. Volume -   Lasix to 40 mg daily . Discussed salt and fluid restriction ,  some due to poor circulation,       7. Cystic kidney disease - follow,        8. Hyperuricemia/gout - continue allopurinol.        9. Metabolic acidosis - cont  sodium bicarbonate supplements     10. Left knee arthritis , status post left total knee arthroplasty.    We  will see her in followup in 2 months, More than 25 minutes was spent with the patient discussing labs and plan of care     Tank Vinson M.D.

## 2018-07-26 ENCOUNTER — OFFICE VISIT (OUTPATIENT)
Dept: CARDIOLOGY | Facility: CLINIC | Age: 78
End: 2018-07-26
Attending: INTERNAL MEDICINE
Payer: MEDICARE

## 2018-07-26 ENCOUNTER — PATIENT MESSAGE (OUTPATIENT)
Dept: NEPHROLOGY | Facility: CLINIC | Age: 78
End: 2018-07-26

## 2018-07-26 VITALS
HEART RATE: 64 BPM | HEIGHT: 66 IN | BODY MASS INDEX: 23.76 KG/M2 | SYSTOLIC BLOOD PRESSURE: 136 MMHG | DIASTOLIC BLOOD PRESSURE: 40 MMHG | WEIGHT: 147.81 LBS

## 2018-07-26 DIAGNOSIS — E78.00 PURE HYPERCHOLESTEROLEMIA: Chronic | ICD-10-CM

## 2018-07-26 DIAGNOSIS — I15.2 HYPERTENSION ASSOCIATED WITH DIABETES: Primary | Chronic | ICD-10-CM

## 2018-07-26 DIAGNOSIS — N18.4 CHRONIC KIDNEY DISEASE, STAGE 4 (SEVERE): ICD-10-CM

## 2018-07-26 DIAGNOSIS — E11.59 HYPERTENSION ASSOCIATED WITH DIABETES: Primary | Chronic | ICD-10-CM

## 2018-07-26 PROCEDURE — 99214 OFFICE O/P EST MOD 30 MIN: CPT | Mod: S$PBB,,, | Performed by: INTERNAL MEDICINE

## 2018-07-26 PROCEDURE — 99213 OFFICE O/P EST LOW 20 MIN: CPT | Mod: PBBFAC | Performed by: INTERNAL MEDICINE

## 2018-07-26 PROCEDURE — 99999 PR PBB SHADOW E&M-EST. PATIENT-LVL III: CPT | Mod: PBBFAC,,, | Performed by: INTERNAL MEDICINE

## 2018-07-26 NOTE — TELEPHONE ENCOUNTER
Lab Results   Component Value Date    CREATININE 5.7 (H) 07/25/2018    BUN 81 (H) 07/25/2018     07/25/2018    K 4.6 07/25/2018     07/25/2018    CO2 23 07/25/2018       Serum creatinine is slightly improved at 5.7    Dr Vinson

## 2018-07-26 NOTE — PROGRESS NOTES
Subjective:   Patient ID:  Cailin Pickett is a 78 y.o. female who presents for follow-up of Hypertension and Follow-up  echo- nml lv function, small pericardial effussion( improved)the patient feels skipped beats occasionally.  Patient denies CP, angina or anginal equivalent.  Hypertension   This is a chronic problem. The current episode started more than 1 year ago. The problem has been gradually improving since onset. The problem is controlled. Pertinent negatives include no chest pain, palpitations or shortness of breath. Past treatments include beta blockers, calcium channel blockers and direct vasodilators. The current treatment provides moderate improvement. Compliance problems include medication side effects.    Hyperlipidemia   This is a chronic problem. The current episode started more than 1 year ago. The problem is controlled. Recent lipid tests were reviewed and are variable. Pertinent negatives include no chest pain or shortness of breath. She is currently on no antihyperlipidemic treatment. The current treatment provides mild improvement of lipids. Compliance problems include medication side effects.        Review of Systems   Constitution: Negative. Negative for weight gain.   HENT: Negative.    Eyes: Negative.    Cardiovascular: Negative.  Negative for chest pain, leg swelling and palpitations.   Respiratory: Negative.  Negative for shortness of breath.    Endocrine: Negative.    Hematologic/Lymphatic: Negative.    Skin: Negative.    Musculoskeletal: Negative for muscle weakness.   Gastrointestinal: Negative.    Genitourinary: Negative.    Neurological: Negative.  Negative for dizziness.   Psychiatric/Behavioral: Negative.    Allergic/Immunologic: Negative.      Family History   Problem Relation Age of Onset    Cancer Mother         breast    Heart disease Mother 60        MI    Hypertension Mother     Stroke Mother     Breast cancer Mother     Cancer Father         prostate    Cancer  Brother         renal cell carcinoma    Diabetes Brother     Breast cancer Sister     Breast cancer Sister      Past Medical History:   Diagnosis Date    Anemia     CKD (chronic kidney disease), stage III     Diabetes mellitus type II     Encounter for blood transfusion     Family history of colonic polyps 7/18/2017    Gout, unspecified     Hyperlipidemia     Hypertension     Neuropathy     Pancreatic cancer     Renal failure     Thyroid disease      Current Outpatient Prescriptions on File Prior to Visit   Medication Sig Dispense Refill    allopurinol (ZYLOPRIM) 100 MG tablet Take 1 tablet (100 mg total) by mouth once daily. 90 tablet 2    amLODIPine (NORVASC) 5 MG tablet Take 1 tablet (5 mg total) by mouth 2 (two) times daily. 180 tablet 2    carvedilol (COREG) 6.25 MG tablet Take 1 tablet (6.25 mg total) by mouth 2 (two) times daily with meals. 180 tablet 3    cetirizine (ZYRTEC) 10 MG tablet Take 10 mg by mouth once daily.       estradiol (ESTRACE) 0.5 MG tablet TAKE 1 TABLET DAILY FOR 3 WEEKS PER MONTH, THEN DO NOT TAKE FOR FOURTH WEEK EACH MONTH 90 tablet 3    furosemide (LASIX) 40 MG tablet Take 1 tablet (40 mg total) by mouth once daily. 90 tablet 3    gabapentin (NEURONTIN) 100 MG capsule Take 1 capsule by mouth 2 (two) times daily.      GLUCOSAMINE HCL/CHONDR ZAVALETA A NA (OSTEO BI-FLEX ORAL) Take by mouth 2 (two) times daily.        hydrALAZINE (APRESOLINE) 100 MG tablet Take 1 tablet (100 mg total) by mouth every 8 (eight) hours. 270 tablet 3    hydrOXYzine HCl (ATARAX) 25 MG tablet Take 1 tablet (25 mg total) by mouth 2 (two) times daily as needed for Itching. 180 tablet 3    repaglinide (PRANDIN) 0.5 MG tablet TAKE 1 TABLET THREE TIMES A DAY BEFORE MEALS 270 tablet 2    sodium bicarbonate 650 MG tablet Take 1 tablet (650 mg total) by mouth 2 (two) times daily. 180 tablet 3    traMADol (ULTRAM) 50 mg tablet Take 1 tablet (50 mg total) by mouth every 12 (twelve) hours. 180 tablet 3      No current facility-administered medications on file prior to visit.      Review of patient's allergies indicates:   Allergen Reactions    Allegra [fexofenadine] Swelling    Codeine Hives, Itching and Nausea And Vomiting       Objective:     Physical Exam   Constitutional: She is oriented to person, place, and time. She appears well-developed and well-nourished.   HENT:   Head: Normocephalic and atraumatic.   Eyes: Conjunctivae and EOM are normal. Pupils are equal, round, and reactive to light.   Neck: Normal range of motion. Neck supple.   Cardiovascular: Normal rate, regular rhythm, normal heart sounds and intact distal pulses.    Pulmonary/Chest: Effort normal and breath sounds normal.   Abdominal: Soft. Bowel sounds are normal.   Musculoskeletal: Normal range of motion.   Neurological: She is alert and oriented to person, place, and time.   Skin: Skin is warm and dry.   Psychiatric: She has a normal mood and affect.   Nursing note and vitals reviewed.      Assessment:     1. Hypertension associated with diabetes    2. Pure hypercholesterolemia    3. Chronic kidney disease, stage 4 (severe)        Plan:     Hypertension associated with diabetes    Pure hypercholesterolemia    Chronic kidney disease, stage 4 (severe)    Continue duretics, coreg, norvasc, hydralazine -htn  Check lipids

## 2018-08-03 DIAGNOSIS — M10.9 ACUTE GOUT OF FOOT, UNSPECIFIED CAUSE, UNSPECIFIED LATERALITY: ICD-10-CM

## 2018-08-04 RX ORDER — ALLOPURINOL 100 MG/1
TABLET ORAL
Qty: 90 TABLET | Refills: 3 | Status: SHIPPED | OUTPATIENT
Start: 2018-08-04 | End: 2019-04-03 | Stop reason: SDUPTHER

## 2018-08-09 RX ORDER — ESTRADIOL 0.5 MG/1
TABLET ORAL
Qty: 90 TABLET | Refills: 3 | Status: SHIPPED | OUTPATIENT
Start: 2018-08-09 | End: 2020-03-02

## 2018-09-07 ENCOUNTER — OFFICE VISIT (OUTPATIENT)
Dept: HEMATOLOGY/ONCOLOGY | Facility: CLINIC | Age: 78
End: 2018-09-07
Payer: MEDICARE

## 2018-09-07 ENCOUNTER — LAB VISIT (OUTPATIENT)
Dept: LAB | Facility: HOSPITAL | Age: 78
End: 2018-09-07
Attending: INTERNAL MEDICINE
Payer: MEDICARE

## 2018-09-07 VITALS
WEIGHT: 145.31 LBS | OXYGEN SATURATION: 98 % | RESPIRATION RATE: 18 BRPM | TEMPERATURE: 97 F | DIASTOLIC BLOOD PRESSURE: 80 MMHG | HEART RATE: 58 BPM | BODY MASS INDEX: 23.35 KG/M2 | HEIGHT: 66 IN | SYSTOLIC BLOOD PRESSURE: 152 MMHG

## 2018-09-07 DIAGNOSIS — N18.4 ANEMIA IN STAGE 4 CHRONIC KIDNEY DISEASE: Primary | ICD-10-CM

## 2018-09-07 DIAGNOSIS — D63.1 ANEMIA IN STAGE 4 CHRONIC KIDNEY DISEASE: ICD-10-CM

## 2018-09-07 DIAGNOSIS — Z85.07 HISTORY OF PANCREATIC CANCER: ICD-10-CM

## 2018-09-07 DIAGNOSIS — N18.4 ANEMIA IN STAGE 4 CHRONIC KIDNEY DISEASE: ICD-10-CM

## 2018-09-07 DIAGNOSIS — D63.1 ANEMIA IN STAGE 4 CHRONIC KIDNEY DISEASE: Primary | ICD-10-CM

## 2018-09-07 LAB
ALBUMIN SERPL BCP-MCNC: 3.8 G/DL
ALP SERPL-CCNC: 84 U/L
ALT SERPL W/O P-5'-P-CCNC: 30 U/L
ANION GAP SERPL CALC-SCNC: 10 MMOL/L
AST SERPL-CCNC: 21 U/L
BASOPHILS # BLD AUTO: 0.04 K/UL
BASOPHILS NFR BLD: 0.9 %
BILIRUB SERPL-MCNC: 0.4 MG/DL
BUN SERPL-MCNC: 77 MG/DL
CALCIUM SERPL-MCNC: 9.7 MG/DL
CHLORIDE SERPL-SCNC: 106 MMOL/L
CO2 SERPL-SCNC: 23 MMOL/L
CREAT SERPL-MCNC: 5.4 MG/DL
CRP SERPL-MCNC: 6.2 MG/L
DIFFERENTIAL METHOD: ABNORMAL
EOSINOPHIL # BLD AUTO: 0.1 K/UL
EOSINOPHIL NFR BLD: 2.8 %
ERYTHROCYTE [DISTWIDTH] IN BLOOD BY AUTOMATED COUNT: 15.2 %
EST. GFR  (AFRICAN AMERICAN): 8 ML/MIN/1.73 M^2
EST. GFR  (NON AFRICAN AMERICAN): 7 ML/MIN/1.73 M^2
FERRITIN SERPL-MCNC: 435 NG/ML
GLUCOSE SERPL-MCNC: 113 MG/DL
HCT VFR BLD AUTO: 31.8 %
HGB BLD-MCNC: 10.1 G/DL
HYPOCHROMIA BLD QL SMEAR: ABNORMAL
IRON SERPL-MCNC: 53 UG/DL
LYMPHOCYTES # BLD AUTO: 1.4 K/UL
LYMPHOCYTES NFR BLD: 32.7 %
MCH RBC QN AUTO: 27.9 PG
MCHC RBC AUTO-ENTMCNC: 31.8 G/DL
MCV RBC AUTO: 88 FL
MONOCYTES # BLD AUTO: 0.5 K/UL
MONOCYTES NFR BLD: 12.1 %
NEUTROPHILS # BLD AUTO: 2.2 K/UL
NEUTROPHILS NFR BLD: 51.7 %
PLATELET # BLD AUTO: 248 K/UL
PLATELET BLD QL SMEAR: ABNORMAL
PMV BLD AUTO: 9.6 FL
POTASSIUM SERPL-SCNC: 4.6 MMOL/L
PROT SERPL-MCNC: 7.4 G/DL
RBC # BLD AUTO: 3.62 M/UL
SATURATED IRON: 22 %
SODIUM SERPL-SCNC: 139 MMOL/L
TARGETS BLD QL SMEAR: ABNORMAL
TOTAL IRON BINDING CAPACITY: 241 UG/DL
TRANSFERRIN SERPL-MCNC: 163 MG/DL
WBC # BLD AUTO: 4.28 K/UL

## 2018-09-07 PROCEDURE — 36415 COLL VENOUS BLD VENIPUNCTURE: CPT | Mod: PO

## 2018-09-07 PROCEDURE — 80053 COMPREHEN METABOLIC PANEL: CPT | Mod: PO

## 2018-09-07 PROCEDURE — 85025 COMPLETE CBC W/AUTO DIFF WBC: CPT | Mod: PO

## 2018-09-07 PROCEDURE — 99214 OFFICE O/P EST MOD 30 MIN: CPT | Mod: PBBFAC,PO | Performed by: INTERNAL MEDICINE

## 2018-09-07 PROCEDURE — 82728 ASSAY OF FERRITIN: CPT

## 2018-09-07 PROCEDURE — 86140 C-REACTIVE PROTEIN: CPT

## 2018-09-07 PROCEDURE — 99999 PR PBB SHADOW E&M-EST. PATIENT-LVL IV: CPT | Mod: PBBFAC,,, | Performed by: INTERNAL MEDICINE

## 2018-09-07 PROCEDURE — 99214 OFFICE O/P EST MOD 30 MIN: CPT | Mod: S$PBB,,, | Performed by: INTERNAL MEDICINE

## 2018-09-07 PROCEDURE — 83540 ASSAY OF IRON: CPT

## 2018-09-07 NOTE — PROGRESS NOTES
Reason for visit: Pancreatic adenocarcinoma/Anemia due to chronic kidney disease  Date of Diagnosis: 2012    HPI:   The patient is a 78-year-old  female who presents to the hematology oncology clinic today for follow up. She underwent resection surgery for her pancreatic tail high-grade carcinoma on May 15, 2013 by Dr. Carter at [facility], [place]. The patient had previously completed neoadjuvant chemotherapy and 5-FU chemoradiation with excellent response.  Today the patient reports that she feels well except for occasional episodes of lower back pain. She is taking tramadol with good relief from this. Energy levels are good. She reports that her appetite is decreased. She has gained a couple of lbs in the past few months. She denies any fever, chills or night sweats. She denies any chest pain or shortness of breath. She denies any bowel or urinary complaints. The patient denies any nausea or vomiting. She denies any abdominal pain today.  I have reviewed all of the patient's interval clinical history available in EPIC. She continues to report tingling pain in the soles of her feet since completing her chemotherapy. Neurontin did not help and she stopped lyrica because of side effects.   She is being closely followed by Dr. Vinson with nephrology with regard to her kidney function. She has also had A-V fistula done in her left upper extremity by Dr. Foster.    PAST MEDICAL HISTORY:   1. Hypertension  2. GERD  3. Migraines  4. Gout  5. Chronic kidney disease stage IV   6. Bilateral renal cysts  7. Type 2 diabetes mellitus    SURGICAL HISTORY:   1. Hysterectomy for fibroids  2. Partial thyroidectomy  3. Left mediport placement  4. Distal pancreatectomy, splenectomy, left nephrectomy and adrenalectomy on May 15, 2013  5. Left AV fistula on 14  6. Left knee replacement on 18    FAMILY HISTORY: The patient's mother  from complications related to breast cancer at the age of 60. The  patient's father  from complications related to prostate cancer at the age of 81. The patient's sister was diagnosed with breast cancer at the age of 68. The patient's brother had renal cell carcinoma in both kidneys [sporadic] approximately 4 years apart. He has since had a kidney transplant. Another brother has kidney cancer diagnosed at 68. She denies any other immediate family members with cancer or bleeding/clotting disorders.    SOCIAL HISTORY: She reports a 30+ pack year smoking history and quit in . She does not drink alcohol. She has never used any recreational drugs. She worked for the [employer] in [place] and retired in . She is  and does not have any children. She lives in [place], [place].    ALLERGIES: Reviewed on medication card.    MEDICATIONS: [Medcard has been reviewed and/or reconciled.]    REVIEW OF SYSTEMS:   GENERAL: [No fevers, chills or sweats.] Denies fatigue. Appetite is decreased. Weight is decreased.  HEENT: [No blurred vision, tinnitus, nasal discharge, sorethroat or dysphagia.]  HEART: [No chest pain, palpitations or shortness of breath.]   LUNGS: [No hemoptysis, cough or breathing problems.]   ABDOMEN: [No abdominal pain, constipation or melena.] Denies nausea, vomiting.  GENITOURINARY: [No dysuria, bleeding or malodorous discharge.]  NEURO: [No headache, dizziness or vertigo.]  HEMATOLOGY: [No easy bruising, spontaneous bleeding or blood clots in the past].  MUSCULOSKELETAL: She denies any myalgias or bone pain. Does report occasional low back pain.  SKIN: [No rashes or skin lesions.]  PSYCHIATRY: [No depression or anxiety.]    PHYSICAL EXAMINATION:   VS: Reviewed on nurse's notes.  APPEARANCE: The patient is a well-developed, well-nourished and well-groomed  female who appears in no acute distress.   HEENT: No scleral icterus. Both external auditory canals clear. No oral ulcers, lesions. Throat clear  HEAD: No sinus tenderness.  She has facial hair suggestive of hirsutism.   NECK: Supple. No palpable lymphadenopathy. Thyroid non-tender, no palpable masses  CHEST: Breath sounds clear bilaterally. No rales. No rhonchi. Unlabored respirations.  CARDIOVASCULAR: Normal S1, S2. Normal rate. Regular rhythm.  ABDOMEN: Bowel sounds normal. No tenderness. No abdominal distention. No hepatomegaly. No splenomegaly. Healed incision noted.  LYMPHATIC: No palpable supraclavicular, axillary nodes  EXTREMITIES: No clubbing, cyanosis. No edema of bilateral lower extremities. Left A-V fistula noted.  SKIN: No lesions. No petechiae. No ecchymoses. No induration or nodules  NEUROLOGIC: No focal findings. Alert & Oriented x 3. Mood appropriate to affect    LABS:   Reviewed    IMAGING:  Reviewed    IMPRESSION:  1. Pancreatic adenocarcinoma (T3N0Mx)  2. Chronic kidney disease (stage 4)  3. Anemia due to CKD  4. Peripheral neuropathy  5. Weight loss    PLAN:  1. Results of CBC from today were discussed in detail. No urgent need for initiation of Procrit at this time as her hemoglobin is overall stable.  2. I have encouraged good p.o. intake of food and fluids. I have encouraged regular exercise.   3. Continue follow up with Dr. Vinson as recommended with regard to CKD  4. Continue elavil for treatment of peripheral neuropathy. This medication has been helping but with some anticholinergic side effects such as dry mouth. She has not had relief from trying multiple other medications for her peripheral neuropathy. Risks/benefits and common side effects discussed in detail. She knows not to stop it abruptly and is also aware of sedation precautions.  5. She is uptodate with screening colonoscopy.  6. No evidence of recurrence of pancreatic malignancy. All recent imaging has been negative.    Follow up in 2 months with labs. She knows to call sooner for any new problems or questions.    Nas Hoffman MD

## 2018-09-13 ENCOUNTER — LAB VISIT (OUTPATIENT)
Dept: LAB | Facility: HOSPITAL | Age: 78
End: 2018-09-13
Attending: INTERNAL MEDICINE
Payer: MEDICARE

## 2018-09-13 DIAGNOSIS — N18.4 CKD (CHRONIC KIDNEY DISEASE) STAGE 4, GFR 15-29 ML/MIN: ICD-10-CM

## 2018-09-13 DIAGNOSIS — N18.5 CHRONIC KIDNEY DISEASE (CKD), STAGE V: ICD-10-CM

## 2018-09-13 LAB
ALBUMIN SERPL BCP-MCNC: 3.7 G/DL
ANION GAP SERPL CALC-SCNC: 12 MMOL/L
BASOPHILS # BLD AUTO: 0.05 K/UL
BASOPHILS NFR BLD: 1.2 %
BUN SERPL-MCNC: 92 MG/DL
CALCIUM SERPL-MCNC: 9.6 MG/DL
CHLORIDE SERPL-SCNC: 104 MMOL/L
CO2 SERPL-SCNC: 22 MMOL/L
CREAT SERPL-MCNC: 5.9 MG/DL
DIFFERENTIAL METHOD: ABNORMAL
EOSINOPHIL # BLD AUTO: 0.1 K/UL
EOSINOPHIL NFR BLD: 3 %
ERYTHROCYTE [DISTWIDTH] IN BLOOD BY AUTOMATED COUNT: 15.9 %
EST. GFR  (AFRICAN AMERICAN): 7.4 ML/MIN/1.73 M^2
EST. GFR  (NON AFRICAN AMERICAN): 6.4 ML/MIN/1.73 M^2
GLUCOSE SERPL-MCNC: 80 MG/DL
HCT VFR BLD AUTO: 31.1 %
HGB BLD-MCNC: 9.8 G/DL
IMM GRANULOCYTES # BLD AUTO: 0.01 K/UL
IMM GRANULOCYTES NFR BLD AUTO: 0.2 %
LYMPHOCYTES # BLD AUTO: 1.2 K/UL
LYMPHOCYTES NFR BLD: 29.9 %
MCH RBC QN AUTO: 28.5 PG
MCHC RBC AUTO-ENTMCNC: 31.5 G/DL
MCV RBC AUTO: 90 FL
MONOCYTES # BLD AUTO: 0.7 K/UL
MONOCYTES NFR BLD: 17.7 %
NEUTROPHILS # BLD AUTO: 1.9 K/UL
NEUTROPHILS NFR BLD: 48 %
NRBC BLD-RTO: 1 /100 WBC
PHOSPHATE SERPL-MCNC: 5.3 MG/DL
PLATELET # BLD AUTO: 255 K/UL
PMV BLD AUTO: 11.3 FL
POTASSIUM SERPL-SCNC: 4.5 MMOL/L
PTH-INTACT SERPL-MCNC: 195 PG/ML
RBC # BLD AUTO: 3.44 M/UL
SODIUM SERPL-SCNC: 138 MMOL/L
URATE SERPL-MCNC: 5.5 MG/DL
WBC # BLD AUTO: 4.02 K/UL

## 2018-09-13 PROCEDURE — 85025 COMPLETE CBC W/AUTO DIFF WBC: CPT

## 2018-09-13 PROCEDURE — 80069 RENAL FUNCTION PANEL: CPT

## 2018-09-13 PROCEDURE — 84550 ASSAY OF BLOOD/URIC ACID: CPT

## 2018-09-13 PROCEDURE — 83970 ASSAY OF PARATHORMONE: CPT

## 2018-09-13 PROCEDURE — 36415 COLL VENOUS BLD VENIPUNCTURE: CPT | Mod: PO

## 2018-09-20 ENCOUNTER — OFFICE VISIT (OUTPATIENT)
Dept: NEPHROLOGY | Facility: CLINIC | Age: 78
End: 2018-09-20
Payer: MEDICARE

## 2018-09-20 VITALS
SYSTOLIC BLOOD PRESSURE: 134 MMHG | HEART RATE: 59 BPM | WEIGHT: 145.31 LBS | HEIGHT: 66 IN | BODY MASS INDEX: 23.35 KG/M2 | DIASTOLIC BLOOD PRESSURE: 50 MMHG

## 2018-09-20 DIAGNOSIS — N18.5 CHRONIC KIDNEY DISEASE (CKD), STAGE V: Primary | ICD-10-CM

## 2018-09-20 PROCEDURE — 99215 OFFICE O/P EST HI 40 MIN: CPT | Mod: S$PBB,,, | Performed by: INTERNAL MEDICINE

## 2018-09-20 PROCEDURE — 99999 PR PBB SHADOW E&M-EST. PATIENT-LVL III: CPT | Mod: PBBFAC,,, | Performed by: INTERNAL MEDICINE

## 2018-09-20 PROCEDURE — 99213 OFFICE O/P EST LOW 20 MIN: CPT | Mod: PBBFAC,PO | Performed by: INTERNAL MEDICINE

## 2018-09-20 NOTE — PATIENT INSTRUCTIONS
Avoid NSAID pain medications such as advil, aleve, motrin, ibuprofen, naprosyn, meloxicam, diclofenac, mobic.       Velphoro 500 mgs three times a day before meals

## 2018-09-20 NOTE — PROGRESS NOTES
Subjective:       Patient ID: Cailin Pickett is a 78 y.o.   female who presents for follow-up evaluation of CKD stage 5, HTN , SHPT, anemia        Favio Titus MD      HPI: Cailin Pickett Is a pleasant 78-year-old  woman seen office today followup for above medical problems. I saw her in clinic about 3 months ago. Recent lab results were discussed with the patient. She has solitary right kidney. Multiple cysts reported on the kidney. It measures about 16 cm. Patient Attended chronic kidney disease education and options class. she had a left arm AV fistula placed on 12/4/14 by Dr Foster . She denies recent hospitalizations or ER visits.  Recent lab results were discussed with the patient. Recent serum Cr is 5.9  mg/dL. S/p  left TKA on 5/31/18 , Dr Saleh at [facility] ,       Important Medical Info :       In 2013 patient was diagnosed with pancreatic tail carcinoma. she underwent chemo rx with 5-FU, she tolerated the cycles well. In 5/2013 she underwent resection of tail of pancreas, left nephrectomy, splenectomy, left adrenalectomy.            Past Medical History:   Diagnosis Date    Anemia     CKD (chronic kidney disease), stage III     Diabetes mellitus type II     Encounter for blood transfusion     Family history of colonic polyps 7/18/2017    Gout, unspecified     Hyperlipidemia     Hypertension     Neuropathy     Pancreatic cancer     Renal failure     Thyroid disease          Current Outpatient Medications on File Prior to Visit   Medication Sig Dispense Refill    allopurinol (ZYLOPRIM) 100 MG tablet TAKE 1 TABLET DAILY 90 tablet 3    amLODIPine (NORVASC) 5 MG tablet Take 1 tablet (5 mg total) by mouth 2 (two) times daily. 180 tablet 2    carvedilol (COREG) 6.25 MG tablet Take 1 tablet (6.25 mg total) by mouth 2 (two) times daily with meals. 180 tablet 3    cetirizine (ZYRTEC) 10 MG tablet Take 10 mg by mouth once daily.       estradiol  (ESTRACE) 0.5 MG tablet TAKE 1 TABLET DAILY FOR 3 WEEKS PER MONTH, THEN DO NOT TAKE FOR FOURTH WEEK EACH MONTH 90 tablet 3    furosemide (LASIX) 40 MG tablet Take 1 tablet (40 mg total) by mouth once daily. 90 tablet 3    gabapentin (NEURONTIN) 100 MG capsule Take 1 capsule by mouth 2 (two) times daily.      GLUCOSAMINE HCL/CHONDR ZAVALETA A NA (OSTEO BI-FLEX ORAL) Take by mouth 2 (two) times daily.        hydrALAZINE (APRESOLINE) 100 MG tablet Take 1 tablet (100 mg total) by mouth every 8 (eight) hours. 270 tablet 3    hydrOXYzine HCl (ATARAX) 25 MG tablet Take 1 tablet (25 mg total) by mouth 2 (two) times daily as needed for Itching. 180 tablet 3    repaglinide (PRANDIN) 0.5 MG tablet TAKE 1 TABLET THREE TIMES A DAY BEFORE MEALS 270 tablet 2    sodium bicarbonate 650 MG tablet Take 1 tablet (650 mg total) by mouth 2 (two) times daily. 180 tablet 3    traMADol (ULTRAM) 50 mg tablet Take 1 tablet (50 mg total) by mouth every 12 (twelve) hours. 180 tablet 3     No current facility-administered medications on file prior to visit.          Review of Systems  :         Constitutional: Negative for activity change, appetite change, fatigue and fever.   HENT: Negative for congestion, facial swelling, sore throat, trouble swallowing and voice change.    Eyes: Negative for redness and visual disturbance.   Respiratory: Negative for apnea, cough, chest tightness, shortness of breath and wheezing.    Cardiovascular: Negative for chest pain, palpitations and leg swelling.   Gastrointestinal: Negative for abdominal distention, abdominal pain, blood in stool, constipation, diarrhea, nausea and vomiting.   Genitourinary: Negative for decreased urine volume, difficulty urinating, dysuria, flank pain, frequency, hematuria, pelvic pain and urgency.   Musculoskeletal: Positive for arthralgias. Negative for back pain, gait problem and joint swelling.   Skin: Negative for color change and rash.   Neurological: Negative for dizziness,  syncope, weakness and headaches.   Hematological: Does not bruise/bleed easily.   Psychiatric/Behavioral: Negative for agitation, behavioral problems and confusion. The patient is not nervous/anxious.          Objective:           Vitals:    09/20/18 0853   BP: (!) 134/50   Pulse: (!) 59       Weight 145 lbs, last weight 148 lbs       Physical Exam  :         Constitutional: She is oriented to person, place, and time. She appears well-developed and well-nourished. No distress.    HENT: Head: Normocephalic and atraumatic. Oropharynx is clear and moist. No oropharyngeal exudate.    Eyes: Conjunctivae normal and EOM are normal. Pupils are equal, round, and reactive to light. No scleral icterus.    Neck: Normal range of motion. Neck supple. No JVD present. Carotid bruit is not present. No tracheal deviation present. No mass and no thyromegaly present.    Cardiovascular: Normal rate, regular rhythm, normal heart sounds and intact distal pulses. no gallop and no friction rub. No murmur heard.    Pulmonary/Chest: Effort normal and breath sounds normal. No respiratory distress. She has no wheezes. She has no rales. She exhibits no tenderness.    Abdominal: Soft. Bowel sounds are normal. She exhibits no distension, no abdominal bruit, no ascites and no mass. There is no hepatosplenomegaly. There is no rebound, no guarding and no CVA tenderness.   Musculoskeletal: Normal range of motion. She exhibits trace edema and no tenderness. LUE AV Fistula with good thrill, no local redness, tender to pressure on both sacroiliac joints. Trace edema in legs , well healed scar left knee   Lymphadenopathy: She has no cervical adenopathy.   Neurological: She is alert and oriented to person, place, and time. No cranial nerve deficit.    Skin: Skin is warm and intact. No rash noted. No erythema. No pallor.   Psychiatric: She has a normal mood and affect. Her behavior is normal. Judgment normal.                 Labs:    Lab Results   Component  Value Date    CREATININE 5.9 (H) 09/13/2018    BUN 92 (H) 09/13/2018     09/13/2018    K 4.5 09/13/2018     09/13/2018    CO2 22 (L) 09/13/2018       Lab Results   Component Value Date    WBC 4.02 09/13/2018    HGB 9.8 (L) 09/13/2018    HCT 31.1 (L) 09/13/2018    MCV 90 09/13/2018     09/13/2018       Lab Results   Component Value Date    .0 (H) 09/13/2018    CALCIUM 9.6 09/13/2018    PHOS 5.3 (H) 09/13/2018       Lab Results   Component Value Date    ALBUMIN 3.7 09/13/2018       Lab Results   Component Value Date    URICACID 5.5 09/13/2018       Lab Results   Component Value Date    HGBA1C 5.1 02/19/2018       Impression and plan - 78-year-old  woman seen in office today in followup for following medical problems,     1. Chronic kidney disease stage 5 -  Recent Serum Creatinine is 5.9  mg/dL, Patient attended chronic kidney disease education and options class. She has a solitary right kidney which is enlarged at 16 cm secondary to polycystic kidney disease. Left kidney was removed due to High-grade pancreatic cancer invading directly into the left kidney. Patient was declined for a kidney transplant because of advanced age and history of cancer. Patient had a left arm AV fistula on 12/4/14 by Dr Foster , she is not interested in PD anymore,  today she denies any signs and symptoms of uremia.   Repeat labs from today pending at this time,       2. Hypertension - blood pressure is controlled,  target blood pressure less than 150/90, continue current regimen,     3. Anemia - Hb 9.8 gms, following with Hematology/Oncology,     4. Secondary hyperparathyroidism - PTH is Acceptable at 195 ,  Continue to follow. Will start on Velphoro 500mgs tid , discussed low phos diet,     5. pancreatic carcinoma - s/p surgery + Chemo, follows with Oncology        6. Volume -   Lasix to 40 mg daily . Discussed salt and fluid restriction ,  some due to poor circulation,       7. Cystic kidney  disease - follow,        8. Hyperuricemia/gout - continue allopurinol.        9. Metabolic acidosis - cont  sodium bicarbonate supplements     10. Left knee arthritis , status post left total knee arthroplasty.    We will see her in followup in 2 months, More than 25 minutes was spent with the patient discussing labs and plan of care     Tank Vinson M.D.

## 2018-11-01 DIAGNOSIS — I10 ESSENTIAL HYPERTENSION: ICD-10-CM

## 2018-11-06 RX ORDER — AMLODIPINE BESYLATE 5 MG/1
TABLET ORAL
Qty: 180 TABLET | Refills: 2 | Status: SHIPPED | OUTPATIENT
Start: 2018-11-06 | End: 2019-09-16

## 2018-11-09 ENCOUNTER — TELEPHONE (OUTPATIENT)
Dept: NEPHROLOGY | Facility: CLINIC | Age: 78
End: 2018-11-09

## 2018-11-09 NOTE — TELEPHONE ENCOUNTER
----- Message from Sun Nieves sent at 11/9/2018 10:26 AM CST -----  Pt at [phone] or [phone]//states she is calling regarding her lab appt and appt with Dr Vinson//please call//thanks/[facility]

## 2018-11-12 ENCOUNTER — LAB VISIT (OUTPATIENT)
Dept: LAB | Facility: HOSPITAL | Age: 78
End: 2018-11-12
Attending: INTERNAL MEDICINE
Payer: MEDICARE

## 2018-11-12 DIAGNOSIS — N18.5 CHRONIC KIDNEY DISEASE (CKD), STAGE V: ICD-10-CM

## 2018-11-12 DIAGNOSIS — N18.4 ANEMIA IN STAGE 4 CHRONIC KIDNEY DISEASE: ICD-10-CM

## 2018-11-12 DIAGNOSIS — D63.1 ANEMIA IN STAGE 4 CHRONIC KIDNEY DISEASE: ICD-10-CM

## 2018-11-12 DIAGNOSIS — Z85.07 HISTORY OF PANCREATIC CANCER: ICD-10-CM

## 2018-11-12 LAB
ALBUMIN SERPL BCP-MCNC: 3.6 G/DL
ALBUMIN SERPL BCP-MCNC: 3.6 G/DL
ALP SERPL-CCNC: 74 U/L
ALT SERPL W/O P-5'-P-CCNC: 13 U/L
ANION GAP SERPL CALC-SCNC: 12 MMOL/L
ANION GAP SERPL CALC-SCNC: 12 MMOL/L
AST SERPL-CCNC: 19 U/L
BASOPHILS # BLD AUTO: 0.03 K/UL
BASOPHILS NFR BLD: 0.8 %
BILIRUB SERPL-MCNC: 0.4 MG/DL
BUN SERPL-MCNC: 81 MG/DL
BUN SERPL-MCNC: 81 MG/DL
CALCIUM SERPL-MCNC: 9.6 MG/DL
CALCIUM SERPL-MCNC: 9.6 MG/DL
CHLORIDE SERPL-SCNC: 104 MMOL/L
CHLORIDE SERPL-SCNC: 104 MMOL/L
CO2 SERPL-SCNC: 24 MMOL/L
CO2 SERPL-SCNC: 24 MMOL/L
CREAT SERPL-MCNC: 5.5 MG/DL
CREAT SERPL-MCNC: 5.5 MG/DL
DIFFERENTIAL METHOD: ABNORMAL
EOSINOPHIL # BLD AUTO: 0.1 K/UL
EOSINOPHIL NFR BLD: 2.3 %
ERYTHROCYTE [DISTWIDTH] IN BLOOD BY AUTOMATED COUNT: 15.1 %
EST. GFR  (AFRICAN AMERICAN): 8 ML/MIN/1.73 M^2
EST. GFR  (AFRICAN AMERICAN): 8 ML/MIN/1.73 M^2
EST. GFR  (NON AFRICAN AMERICAN): 7 ML/MIN/1.73 M^2
EST. GFR  (NON AFRICAN AMERICAN): 7 ML/MIN/1.73 M^2
GLUCOSE SERPL-MCNC: 136 MG/DL
GLUCOSE SERPL-MCNC: 136 MG/DL
HCT VFR BLD AUTO: 32.8 %
HGB BLD-MCNC: 10 G/DL
LYMPHOCYTES # BLD AUTO: 1.4 K/UL
LYMPHOCYTES NFR BLD: 37.4 %
MCH RBC QN AUTO: 27.5 PG
MCHC RBC AUTO-ENTMCNC: 30.5 G/DL
MCV RBC AUTO: 90 FL
MONOCYTES # BLD AUTO: 0.2 K/UL
MONOCYTES NFR BLD: 3.9 %
NEUTROPHILS # BLD AUTO: 2.1 K/UL
NEUTROPHILS NFR BLD: 55.6 %
PHOSPHATE SERPL-MCNC: 5.5 MG/DL
PLATELET # BLD AUTO: 238 K/UL
PMV BLD AUTO: 10.4 FL
POTASSIUM SERPL-SCNC: 4.5 MMOL/L
POTASSIUM SERPL-SCNC: 4.5 MMOL/L
PROT SERPL-MCNC: 7.4 G/DL
PTH-INTACT SERPL-MCNC: 247 PG/ML
RBC # BLD AUTO: 3.64 M/UL
SODIUM SERPL-SCNC: 140 MMOL/L
SODIUM SERPL-SCNC: 140 MMOL/L
WBC # BLD AUTO: 3.85 K/UL

## 2018-11-12 PROCEDURE — 36415 COLL VENOUS BLD VENIPUNCTURE: CPT | Mod: PO

## 2018-11-12 PROCEDURE — 80053 COMPREHEN METABOLIC PANEL: CPT | Mod: PO

## 2018-11-12 PROCEDURE — 84100 ASSAY OF PHOSPHORUS: CPT | Mod: PO

## 2018-11-12 PROCEDURE — 83970 ASSAY OF PARATHORMONE: CPT

## 2018-11-12 PROCEDURE — 85025 COMPLETE CBC W/AUTO DIFF WBC: CPT | Mod: PO

## 2018-11-13 ENCOUNTER — OFFICE VISIT (OUTPATIENT)
Dept: NEPHROLOGY | Facility: CLINIC | Age: 78
End: 2018-11-13
Payer: MEDICARE

## 2018-11-13 VITALS
SYSTOLIC BLOOD PRESSURE: 134 MMHG | WEIGHT: 149.94 LBS | DIASTOLIC BLOOD PRESSURE: 68 MMHG | HEIGHT: 66 IN | HEART RATE: 60 BPM | BODY MASS INDEX: 24.1 KG/M2

## 2018-11-13 DIAGNOSIS — N18.5 CHRONIC KIDNEY DISEASE (CKD), STAGE V: Primary | ICD-10-CM

## 2018-11-13 PROCEDURE — 99213 OFFICE O/P EST LOW 20 MIN: CPT | Mod: PBBFAC,PO | Performed by: INTERNAL MEDICINE

## 2018-11-13 PROCEDURE — 99214 OFFICE O/P EST MOD 30 MIN: CPT | Mod: S$PBB,,, | Performed by: INTERNAL MEDICINE

## 2018-11-13 PROCEDURE — 99999 PR PBB SHADOW E&M-EST. PATIENT-LVL III: CPT | Mod: PBBFAC,,, | Performed by: INTERNAL MEDICINE

## 2018-11-13 NOTE — PATIENT INSTRUCTIONS
Avoid NSAID pain medications such as advil, aleve, motrin, ibuprofen, naprosyn, meloxicam, diclofenac, mobic.     Low phosphorus diet as discussed

## 2018-11-13 NOTE — PROGRESS NOTES
Subjective:       Patient ID: Cailin Pickett is a 78 y.o.   female who presents for follow-up evaluation of CKD stage 5, HTN , SHPT, anemia         Favio Titus MD      HPI: Cailin Pickett Is a pleasant 78-year-old  woman seen office today followup for above medical problems. I saw her in clinic about 3 months ago. Recent lab results were discussed with the patient. She has solitary right kidney. Multiple cysts reported on the kidney. It measures about 16 cm. Patient Attended chronic kidney disease education and options class. she had a left arm AV fistula placed on 12/4/14 by Dr Foster . She denies recent hospitalizations or ER visits.  Recent lab results were discussed with the patient. Recent serum Cr is 5.5  mg/dL. S/p  left TKA on 5/31/18 , Dr Saleh at [facility] ,       Important Medical Info :       In 2013 patient was diagnosed with pancreatic tail carcinoma. she underwent chemo rx with 5-FU, she tolerated the cycles well. In 5/2013 she underwent resection of tail of pancreas, left nephrectomy, splenectomy, left adrenalectomy.        Past Medical History:   Diagnosis Date    Anemia     CKD (chronic kidney disease), stage III     Diabetes mellitus type II     Encounter for blood transfusion     Family history of colonic polyps 7/18/2017    Gout, unspecified     Hyperlipidemia     Hypertension     Neuropathy     Pancreatic cancer     Renal failure     Thyroid disease        Current Outpatient Medications on File Prior to Visit   Medication Sig Dispense Refill    allopurinol (ZYLOPRIM) 100 MG tablet TAKE 1 TABLET DAILY 90 tablet 3    amLODIPine (NORVASC) 5 MG tablet TAKE 1 TABLET TWICE A  tablet 2    carvedilol (COREG) 6.25 MG tablet Take 1 tablet (6.25 mg total) by mouth 2 (two) times daily with meals. 180 tablet 3    cetirizine (ZYRTEC) 10 MG tablet Take 10 mg by mouth once daily.       estradiol (ESTRACE) 0.5 MG tablet TAKE 1 TABLET DAILY  FOR 3 WEEKS PER MONTH, THEN DO NOT TAKE FOR FOURTH WEEK EACH MONTH 90 tablet 3    furosemide (LASIX) 40 MG tablet Take 1 tablet (40 mg total) by mouth once daily. 90 tablet 3    gabapentin (NEURONTIN) 100 MG capsule Take 1 capsule by mouth 2 (two) times daily.      GLUCOSAMINE HCL/CHONDR ZAVALETA A NA (OSTEO BI-FLEX ORAL) Take by mouth 2 (two) times daily.        hydrALAZINE (APRESOLINE) 100 MG tablet Take 1 tablet (100 mg total) by mouth every 8 (eight) hours. 270 tablet 3    hydrOXYzine HCl (ATARAX) 25 MG tablet Take 1 tablet (25 mg total) by mouth 2 (two) times daily as needed for Itching. 180 tablet 3    repaglinide (PRANDIN) 0.5 MG tablet TAKE 1 TABLET THREE TIMES A DAY BEFORE MEALS 270 tablet 2    sodium bicarbonate 650 MG tablet Take 1 tablet (650 mg total) by mouth 2 (two) times daily. 180 tablet 3    sucroferric oxyhydroxide (VELPHORO) 500 mg Chew Take 1 tablet (500 mg total) by mouth 3 (three) times daily before meals. 90 tablet 9    traMADol (ULTRAM) 50 mg tablet Take 1 tablet (50 mg total) by mouth every 12 (twelve) hours. 180 tablet 3     No current facility-administered medications on file prior to visit.              Review of Systems  :      Constitutional: Negative for activity change, appetite change, fatigue and fever.   HENT: Negative for congestion, facial swelling, sore throat, trouble swallowing and voice change.    Eyes: Negative for redness and visual disturbance.   Respiratory: Negative for apnea, cough, chest tightness, shortness of breath and wheezing.    Cardiovascular: Negative for chest pain, palpitations and leg swelling.   Gastrointestinal: Negative for abdominal distention, abdominal pain, blood in stool, constipation, diarrhea, nausea and vomiting.   Genitourinary: Negative for decreased urine volume, difficulty urinating, dysuria, flank pain, frequency, hematuria, pelvic pain and urgency.   Musculoskeletal: Positive for arthralgias. Negative for back pain, gait problem and  joint swelling.   Skin: Negative for color change and rash.   Neurological: Negative for dizziness, syncope, weakness and headaches.   Hematological: Does not bruise/bleed easily.   Psychiatric/Behavioral: Negative for agitation, behavioral problems and confusion. The patient is not nervous/anxious.           Objective:         Vitals:    11/13/18 0915   BP: 134/68   Pulse: 60       Weight 149 lb, stable      Physical Exam  :      Constitutional: She is oriented to person, place, and time. She appears well-developed and well-nourished. No distress.    HENT: Head: Normocephalic and atraumatic. Oropharynx is clear and moist. No oropharyngeal exudate.    Eyes: Conjunctivae normal and EOM are normal. Pupils are equal, round, and reactive to light. No scleral icterus.    Neck: Normal range of motion. Neck supple. No JVD present. Carotid bruit is not present. No tracheal deviation present. No mass and no thyromegaly present.    Cardiovascular: Normal rate, regular rhythm, normal heart sounds and intact distal pulses. no gallop and no friction rub. No murmur heard.    Pulmonary/Chest: Effort normal and breath sounds normal. No respiratory distress. She has no wheezes. She has no rales. She exhibits no tenderness.    Abdominal: Soft. Bowel sounds are normal. She exhibits no distension, no abdominal bruit, no ascites and no mass. There is no hepatosplenomegaly. There is no rebound, no guarding and no CVA tenderness.   Musculoskeletal: Normal range of motion. She exhibits trace edema and no tenderness. LUE AV Fistula with good thrill, no local redness, tender to pressure on both sacroiliac joints. Trace edema in legs , well healed scar left knee   Lymphadenopathy: She has no cervical adenopathy.   Neurological: She is alert and oriented to person, place, and time. No cranial nerve deficit.    Skin: Skin is warm and intact. No rash noted. No erythema. No pallor.   Psychiatric: She has a normal mood and affect. Her behavior is  normal. Judgment normal.              Labs:    Lab Results   Component Value Date    CREATININE 5.5 (H) 11/12/2018    CREATININE 5.5 (H) 11/12/2018    BUN 81 (H) 11/12/2018    BUN 81 (H) 11/12/2018     11/12/2018     11/12/2018    K 4.5 11/12/2018    K 4.5 11/12/2018     11/12/2018     11/12/2018    CO2 24 11/12/2018    CO2 24 11/12/2018       Lab Results   Component Value Date    WBC 3.85 (L) 11/12/2018    HGB 10.0 (L) 11/12/2018    HCT 32.8 (L) 11/12/2018    MCV 90 11/12/2018     11/12/2018       Lab Results   Component Value Date    .0 (H) 11/12/2018    CALCIUM 9.6 11/12/2018    CALCIUM 9.6 11/12/2018    PHOS 5.5 (H) 11/12/2018       Lab Results   Component Value Date    URICACID 5.5 09/13/2018       Impression and plan - 78-year-old  woman seen in office today in followup for following medical problems,     1. Chronic kidney disease stage 5 -  Recent Serum Creatinine is 5.5  mg/dL, Patient attended chronic kidney disease education and options class. She has a solitary right kidney which is enlarged at 16 cm secondary to polycystic kidney disease. Left kidney was removed due to High-grade pancreatic cancer invading directly into the left kidney.       Patient was declined for a kidney transplant in 2014 because of advanced age and history of cancer.  Since she has been relatively stable she wants to be re-evaluated for renal transplant,    Patient had a left arm AV fistula on 12/4/14 by Dr Foster , she is not interested in PD anymore,         2. Hypertension - blood pressure is controlled,  target blood pressure less than 150/90, continue current regimen,     3. Anemia - Hb 10 gms, following with Hematology/Oncology,     4. Secondary hyperparathyroidism - PTH is Acceptable at 247 ,  Continue to follow. Cont  Velphoro 500 mgs tid , discussed low phos diet,     5. pancreatic carcinoma - s/p surgery + Chemo, follows with Oncology        6. Volume -   Lasix to 40  mg daily . Discussed salt and fluid restriction ,        7. Cystic kidney disease - follow,        8. Hyperuricemia/gout - continue allopurinol.        9. Metabolic acidosis - cont  sodium bicarbonate supplements     10. Left knee arthritis , status post left total knee arthroplasty.    We will see her in followup in 3 months, More than 25 minutes was spent with the patient discussing labs and plan of care     Tank Vinson M.D.

## 2018-11-18 DIAGNOSIS — I10 ESSENTIAL HYPERTENSION: ICD-10-CM

## 2018-11-23 RX ORDER — CARVEDILOL 6.25 MG/1
TABLET ORAL
Qty: 180 TABLET | Refills: 3 | Status: SHIPPED | OUTPATIENT
Start: 2018-11-23 | End: 2019-02-11 | Stop reason: SDUPTHER

## 2018-11-29 DIAGNOSIS — E87.20 METABOLIC ACIDOSIS: ICD-10-CM

## 2018-11-29 RX ORDER — SODIUM BICARBONATE 650 MG/1
650 TABLET ORAL 2 TIMES DAILY
Qty: 180 TABLET | Refills: 3 | Status: ON HOLD | OUTPATIENT
Start: 2018-11-29 | End: 2019-04-11 | Stop reason: HOSPADM

## 2018-11-29 NOTE — TELEPHONE ENCOUNTER
----- Message from Jluis Lindsey sent at 11/29/2018  9:59 AM CST -----  Contact: self  1. What is the name of the medication you are requesting?sodium  2. What is the dose? n/a  3. How do you take the medication? Orally, topically, etc? orally  4. How often do you take this medication?daily  5. Do you need a 30 day or 90 day supply? n/a  6. How many refills are you requesting? n/a  7. What is your preferred pharmacy and location of the pharmacy?     Pt uses  [facility] [address]  [address] [facility] [address]  Phone: [phone] Fax: [phone]      8. Who can we contact with further questions? Self    Thanks,  Jluis Lindsey

## 2018-11-30 ENCOUNTER — TELEPHONE (OUTPATIENT)
Dept: HEMATOLOGY/ONCOLOGY | Facility: CLINIC | Age: 78
End: 2018-11-30

## 2018-11-30 NOTE — TELEPHONE ENCOUNTER
Left MSG for pt that she missed her appt with  today at 8:40am to reschedule through her [facility] or call [phone].

## 2018-12-02 DIAGNOSIS — R60.0 LOCALIZED EDEMA: ICD-10-CM

## 2018-12-04 RX ORDER — FUROSEMIDE 40 MG/1
TABLET ORAL
Qty: 90 TABLET | Refills: 3 | Status: SHIPPED | OUTPATIENT
Start: 2018-12-04 | End: 2018-12-19 | Stop reason: SDUPTHER

## 2018-12-19 DIAGNOSIS — L29.9 ITCHING: ICD-10-CM

## 2018-12-19 DIAGNOSIS — R60.0 LOCALIZED EDEMA: ICD-10-CM

## 2018-12-19 NOTE — TELEPHONE ENCOUNTER
----- Message from Shalonda Sherwood sent at 12/19/2018 12:09 PM CST -----  Contact: Patient  Patient called to request a refill on:    1. What is the name of the medication you are requesting? Lasix  /  Hydroxyzine   2. What is the dose? 40 mg  /  25 mg  3. How do you take the medication? Orally, topically, etc? Orally for both  4. How often do you take this medication? 1 tablet daily  / 1 tablet 2 times daily  5. Do you need a 30 day or 90 day supply? 90 day for both  6. How many refills are you requesting? 2 for both  7. What is your preferred pharmacy and location of the pharmacy?     [facility] - [address] [address]  Phone: [phone] Fax: [phone]    8. Who can we contact with further questions? Cailin [phone]    Thanks,  Shalonda

## 2018-12-20 RX ORDER — FUROSEMIDE 40 MG/1
40 TABLET ORAL DAILY
Qty: 90 TABLET | Refills: 3 | Status: SHIPPED | OUTPATIENT
Start: 2018-12-20 | End: 2019-04-03

## 2018-12-20 RX ORDER — HYDROXYZINE HYDROCHLORIDE 25 MG/1
TABLET, FILM COATED ORAL
Qty: 180 TABLET | Refills: 3 | Status: SHIPPED | OUTPATIENT
Start: 2018-12-20 | End: 2019-11-11 | Stop reason: SDUPTHER

## 2019-01-08 ENCOUNTER — OFFICE VISIT (OUTPATIENT)
Dept: DERMATOLOGY | Facility: CLINIC | Age: 79
End: 2019-01-08
Payer: MEDICARE

## 2019-01-08 ENCOUNTER — TELEPHONE (OUTPATIENT)
Dept: NEPHROLOGY | Facility: CLINIC | Age: 79
End: 2019-01-08

## 2019-01-08 DIAGNOSIS — D48.5 NEOPLASM OF UNCERTAIN BEHAVIOR OF SKIN: Primary | ICD-10-CM

## 2019-01-08 PROCEDURE — 11104 PUNCH BX SKIN SINGLE LESION: CPT | Mod: S$PBB,,, | Performed by: STUDENT IN AN ORGANIZED HEALTH CARE EDUCATION/TRAINING PROGRAM

## 2019-01-08 PROCEDURE — 88305 TISSUE SPECIMEN TO PATHOLOGY, DERMATOLOGY: ICD-10-PCS | Mod: 26,,, | Performed by: PATHOLOGY

## 2019-01-08 PROCEDURE — 99999 PR PBB SHADOW E&M-EST. PATIENT-LVL III: CPT | Mod: PBBFAC,,, | Performed by: STUDENT IN AN ORGANIZED HEALTH CARE EDUCATION/TRAINING PROGRAM

## 2019-01-08 PROCEDURE — 99202 PR OFFICE/OUTPT VISIT, NEW, LEVL II, 15-29 MIN: ICD-10-PCS | Mod: 25,S$PBB,, | Performed by: STUDENT IN AN ORGANIZED HEALTH CARE EDUCATION/TRAINING PROGRAM

## 2019-01-08 PROCEDURE — 88312 TISSUE SPECIMEN TO PATHOLOGY, DERMATOLOGY: ICD-10-PCS | Mod: 26,,, | Performed by: PATHOLOGY

## 2019-01-08 PROCEDURE — 88305 TISSUE EXAM BY PATHOLOGIST: CPT | Performed by: PATHOLOGY

## 2019-01-08 PROCEDURE — 99999 PR PBB SHADOW E&M-EST. PATIENT-LVL III: ICD-10-PCS | Mod: PBBFAC,,, | Performed by: STUDENT IN AN ORGANIZED HEALTH CARE EDUCATION/TRAINING PROGRAM

## 2019-01-08 PROCEDURE — 99202 OFFICE O/P NEW SF 15 MIN: CPT | Mod: 25,S$PBB,, | Performed by: STUDENT IN AN ORGANIZED HEALTH CARE EDUCATION/TRAINING PROGRAM

## 2019-01-08 PROCEDURE — 11104 PR PUNCH BIOPSY, SKIN, SINGLE LESION: ICD-10-PCS | Mod: S$PBB,,, | Performed by: STUDENT IN AN ORGANIZED HEALTH CARE EDUCATION/TRAINING PROGRAM

## 2019-01-08 PROCEDURE — 88312 SPECIAL STAINS GROUP 1: CPT | Mod: 26,,, | Performed by: PATHOLOGY

## 2019-01-08 PROCEDURE — 88305 TISSUE EXAM BY PATHOLOGIST: CPT | Mod: 26,,, | Performed by: PATHOLOGY

## 2019-01-08 PROCEDURE — 99213 OFFICE O/P EST LOW 20 MIN: CPT | Mod: PBBFAC,PO | Performed by: STUDENT IN AN ORGANIZED HEALTH CARE EDUCATION/TRAINING PROGRAM

## 2019-01-08 PROCEDURE — 88312 SPECIAL STAINS GROUP 1: CPT | Performed by: PATHOLOGY

## 2019-01-08 NOTE — TELEPHONE ENCOUNTER
----- Message from Elo Cisse sent at 1/8/2019 10:55 AM CST -----  Contact: Pt  Pt would like nurse to contact her regarding a recall on (amLODIPine (NORVASC) 5 MG tablet). Pt would like nurse to contact her on more information about medication or if she should stop taking it. Please contact pt at ([phone]).

## 2019-01-08 NOTE — TELEPHONE ENCOUNTER
----- Message from Jluis Lindsey sent at 1/8/2019  3:15 PM CST -----  Contact: self  Pt returning phone call. Please call back at [phone].    Thanks,  Jluis Lindsey

## 2019-01-08 NOTE — PROGRESS NOTES
Subjective:       Patient ID:  Cailin Pickett is a 78 y.o. female who presents for   Chief Complaint   Patient presents with    Growth     c/o growth to right leg x 3 weeks thats painful no tx      History of Present Illness: The patient presents with chief complaint of growth .  Location: right leg  Duration: 3 weeks   Signs/Symptoms: painful, growing. Denies any trauma to the leg    Prior treatments: none  Pt denies any history of skin cancer           Review of Systems   Skin: Negative for itching and rash.        Objective:    Physical Exam   Constitutional: She appears well-developed and well-nourished. No distress.   Neurological: She is alert and oriented to person, place, and time. She is not disoriented.   Psychiatric: She has a normal mood and affect.   Skin:   Areas Examined (abnormalities noted in diagram):   RLE Inspected  LLE Inspection Performed              Diagram Legend     Erythematous scaling macule/papule c/w actinic keratosis       Vascular papule c/w angioma      Pigmented verrucoid papule/plaque c/w seborrheic keratosis      Yellow umbilicated papule c/w sebaceous hyperplasia      Irregularly shaped tan macule c/w lentigo     1-2 mm smooth white papules consistent with Milia      Movable subcutaneous cyst with punctum c/w epidermal inclusion cyst      Subcutaneous movable cyst c/w pilar cyst      Firm pink to brown papule c/w dermatofibroma      Pedunculated fleshy papule(s) c/w skin tag(s)      Evenly pigmented macule c/w junctional nevus     Mildly variegated pigmented, slightly irregular-bordered macule c/w mildly atypical nevus      Flesh colored to evenly pigmented papule c/w intradermal nevus       Pink pearly papule/plaque c/w basal cell carcinoma      Erythematous hyperkeratotic cursted plaque c/w SCC      Surgical scar with no sign of skin cancer recurrence      Open and closed comedones      Inflammatory papules and pustules      Verrucoid papule consistent consistent with  wart     Erythematous eczematous patches and plaques     Dystrophic onycholytic nail with subungual debris c/w onychomycosis     Umbilicated papule    Erythematous-base heme-crusted tan verrucoid plaque consistent with inflamed seborrheic keratosis     Erythematous Silvery Scaling Plaque c/w Psoriasis     See annotation          Assessment / Plan:      Pathology Orders:     Normal Orders This Visit    Tissue Specimen To Pathology, Dermatology     Questions:    Directional Terms:  Other(comment)    Clinical information:  painful growing papule on the lower leg. DF vs other    Specific Site:  right lower leg        Neoplasm of uncertain behavior of skin  -     Tissue Specimen To Pathology, Dermatology    Punch biopsy procedure note:  Punch biopsy performed after verbal consent obtained. Area marked and prepped with alcohol. Approximately 1cc of 1% lidocaine with epinephrine injected. 5 mm disposable punch used to remove lesion. Hemostasis obtained and biopsy site closed with 1 - 2 ethilon sutures. Wound care instructions reviewed with patient and handout given.         Follow-up in about 2 weeks (around 1/22/2019) for suture removal .

## 2019-01-09 ENCOUNTER — TELEPHONE (OUTPATIENT)
Dept: TRANSPLANT | Facility: CLINIC | Age: 79
End: 2019-01-09

## 2019-01-22 ENCOUNTER — CLINICAL SUPPORT (OUTPATIENT)
Dept: DERMATOLOGY | Facility: CLINIC | Age: 79
End: 2019-01-22
Payer: MEDICARE

## 2019-01-22 DIAGNOSIS — Z48.02 VISIT FOR SUTURE REMOVAL: Primary | ICD-10-CM

## 2019-01-22 PROCEDURE — 99024 POSTOP FOLLOW-UP VISIT: CPT | Mod: POP,,, | Performed by: STUDENT IN AN ORGANIZED HEALTH CARE EDUCATION/TRAINING PROGRAM

## 2019-01-22 PROCEDURE — 99024 PR POST-OP FOLLOW-UP VISIT: ICD-10-PCS | Mod: POP,,, | Performed by: STUDENT IN AN ORGANIZED HEALTH CARE EDUCATION/TRAINING PROGRAM

## 2019-01-28 ENCOUNTER — OFFICE VISIT (OUTPATIENT)
Dept: OBSTETRICS AND GYNECOLOGY | Facility: CLINIC | Age: 79
End: 2019-01-28
Payer: MEDICARE

## 2019-01-28 VITALS
SYSTOLIC BLOOD PRESSURE: 140 MMHG | BODY MASS INDEX: 25.75 KG/M2 | WEIGHT: 154.56 LBS | HEIGHT: 65 IN | DIASTOLIC BLOOD PRESSURE: 38 MMHG

## 2019-01-28 DIAGNOSIS — Z01.419 WELL WOMAN EXAM WITH ROUTINE GYNECOLOGICAL EXAM: Primary | ICD-10-CM

## 2019-01-28 PROCEDURE — G0101 PR CA SCREEN;PELVIC/BREAST EXAM: ICD-10-PCS | Mod: S$PBB,,, | Performed by: OBSTETRICS & GYNECOLOGY

## 2019-01-28 PROCEDURE — 99999 PR PBB SHADOW E&M-EST. PATIENT-LVL III: CPT | Mod: PBBFAC,,, | Performed by: OBSTETRICS & GYNECOLOGY

## 2019-01-28 PROCEDURE — 99213 OFFICE O/P EST LOW 20 MIN: CPT | Mod: PBBFAC | Performed by: OBSTETRICS & GYNECOLOGY

## 2019-01-28 PROCEDURE — G0101 CA SCREEN;PELVIC/BREAST EXAM: HCPCS | Mod: S$PBB,,, | Performed by: OBSTETRICS & GYNECOLOGY

## 2019-01-28 PROCEDURE — 99999 PR PBB SHADOW E&M-EST. PATIENT-LVL III: ICD-10-PCS | Mod: PBBFAC,,, | Performed by: OBSTETRICS & GYNECOLOGY

## 2019-01-28 NOTE — PROGRESS NOTES
CHIEF COMPLAINT   Gynecologic Exam  Chief Complaint   Patient presents with    Annual Exam        HISTORY OF PRESENT ILLNESS  Patient presents for annual exam. The patient has no complaints today.     No LMP recorded. Patient has had a hysterectomy. benign indications.  Ovaries remain.     Reports no bowel movement irregularities from baseline, bloating, or weight loss.    ERT:   On estradiol   0.5mg twice a week and in process of weaning off    Health Maintenance   Topic Date Due    Zoster Vaccine  06/19/2002    Foot Exam  06/30/2017    DEXA SCAN  11/24/2017    Influenza Vaccine  08/01/2018    Hemoglobin A1c  08/19/2018    Eye Exam  12/22/2018    Mammogram  02/19/2019    Lipid Panel  02/19/2019    Colonoscopy  07/18/2022    TETANUS VACCINE  10/24/2024       HISTORY  Patient Active Problem List   Diagnosis    Chronic kidney disease, stage 4 (severe)    History of pancreatic cancer    Type II diabetes mellitus    Hypertension associated with diabetes    Hyperlipidemia    Anemia in stage 4 chronic kidney disease    Allergic rhinitis due to pollen    Limb pain    SOB (shortness of breath)    Leg swelling    S/P partial thyroidectomy    Iron deficiency anemia    Hot flashes    Peripheral neuropathy    Colon cancer screening    Special screening for malignant neoplasms, colon    Family history of colonic polyps    History of colon polyps    Colon polyp    Diverticulosis of large intestine without hemorrhage       Past Medical History:   Diagnosis Date    Anemia     CKD (chronic kidney disease), stage III     Diabetes mellitus type II     Encounter for blood transfusion     Family history of colonic polyps 7/18/2017    Gout, unspecified     Hyperlipidemia     Hypertension     Neuropathy     Pancreatic cancer     Renal failure     Thyroid disease        Past Surgical History:   Procedure Laterality Date    COLONOSCOPY  2011    Dr. Favio Mims    [id number] Left  2014    [facility]    HYSTERECTOMY      KNEE SURGERY Left 2018    NEPHRECTOMY Left 2013    Dr Carter     NEPHRECTOMY Left 5/15/2013    Performed by Blair [facility] II, [facility] at [facility] OR 2ND FLR    PANCREAS SURGERY      distal pancreatectomy    PANCREATECTOMY, DISTAL, LAPAROSCOPIC, WITH SPLENECTOMY N/A 5/15/2013    Performed by Blair Carter II, [facility] at [facility] OR 2ND FLR    screening colonoscopy N/A 2017    Per[facility] Kenneth Kamara [facility] at [facility] ENDO    SPLENECTOMY, TOTAL      THYROIDECTOMY, PARTIAL      ULTRASOUND, UPPER GI TRACT, ENDOSCOPIC N/A 2012    Performed by Travis Penn MD at [facility] ENDO (2ND FLR)    VENTRICULOATRIAL SHUNT Left 2014     left arm       Family History   Problem Relation Age of Onset    Cancer Mother         breast    Heart disease Mother 60        MI    Hypertension Mother     Stroke Mother     Breast cancer Mother     Cancer Father         prostate    Cancer Brother         renal cell carcinoma    Diabetes Brother     Breast cancer Sister     Breast cancer Sister        Social History     Socioeconomic History    Marital status:      Spouse name: None    Number of children: None    Years of education: None    Highest education level: None   Social Needs    Financial resource strain: None    Food insecurity - worry: None    Food insecurity - inability: None    Transportation needs - medical: None    Transportation needs - non-medical: None   Occupational History    None   Tobacco Use    Smoking status: Former Smoker     Packs/day: 1.00     Years: 35.00     Pack years: 35.00     Last attempt to quit: 2001     Years since quittin.0    Smokeless tobacco: Never Used   Substance and Sexual Activity    Alcohol use: No    Drug use: No    Sexual activity: No   Other Topics Concern    None   Social History Narrative    None       Current Outpatient Medications   Medication Sig  Dispense Refill    allopurinol (ZYLOPRIM) 100 MG tablet TAKE 1 TABLET DAILY 90 tablet 3    amLODIPine (NORVASC) 5 MG tablet TAKE 1 TABLET TWICE A  tablet 2    carvedilol (COREG) 6.25 MG tablet Take 1 tablet (6.25 mg total) by mouth 2 (two) times daily with meals. 180 tablet 3    carvedilol (COREG) 6.25 MG tablet TAKE 1 TABLET TWICE A DAY WITH MEALS 180 tablet 3    cetirizine (ZYRTEC) 10 MG tablet Take 10 mg by mouth once daily.       estradiol (ESTRACE) 0.5 MG tablet TAKE 1 TABLET DAILY FOR 3 WEEKS PER MONTH, THEN DO NOT TAKE FOR FOURTH WEEK EACH MONTH 90 tablet 3    furosemide (LASIX) 40 MG tablet Take 1 tablet (40 mg total) by mouth once daily. 90 tablet 3    gabapentin (NEURONTIN) 100 MG capsule Take 1 capsule by mouth 2 (two) times daily.      GLUCOSAMINE HCL/CHONDR ZAVALETA A NA (OSTEO BI-FLEX ORAL) Take by mouth 2 (two) times daily.        hydrOXYzine HCl (ATARAX) 25 MG tablet TAKE 1 TABLET TWICE A DAY AS NEEDED FOR ITCHING 180 tablet 3    repaglinide (PRANDIN) 0.5 MG tablet TAKE 1 TABLET THREE TIMES A DAY BEFORE MEALS 270 tablet 2    sodium bicarbonate 650 MG tablet Take 1 tablet (650 mg total) by mouth 2 (two) times daily. 180 tablet 3    traMADol (ULTRAM) 50 mg tablet Take 1 tablet (50 mg total) by mouth every 12 (twelve) hours. 180 tablet 3    hydrALAZINE (APRESOLINE) 100 MG tablet Take 1 tablet (100 mg total) by mouth every 8 (eight) hours. 270 tablet 3    sucroferric oxyhydroxide (VELPHORO) 500 mg Chew Take 1 tablet (500 mg total) by mouth 3 (three) times daily before meals. 90 tablet 9     No current facility-administered medications for this visit.        Review of patient's allergies indicates:   Allergen Reactions    Allegra [fexofenadine] Swelling    Codeine Hives, Itching and Nausea And Vomiting         PHYSICAL EXAM     Vitals:    01/28/19 1316   BP: (!) 140/38   repeat 128/58 machine bp check    PAIN SCALE: 0/10 None    ROS:  GENERAL: No fever, chills, fatigability or weight  loss.  ABDOMEN: Appetite fine. No weight loss. Denies diarrhea, abdominal pain, hematemesis or blood in stool.  No change in bowel movement pattern.  URINARY: No flank pain, dysuria or hematuria.  REPRODUCTIVE: No abnormal vaginal bleeding.  BREASTS: Breasts symmetric, nontender and no lumps detected.    PE:   APPEARANCE: Well nourished, well developed, in no acute distress.  NECK: Neck symmetric without masses or thyromegaly.     NODES: No inguinal lymph node enlargement.  ABDOMEN: Soft. No tenderness or masses. No hepatosplenomegaly. No hernias.  BREASTS: Symmetrical, no skin changes or visible lesions. No palpable masses, nipple discharge or adenopathy bilaterally.    PELVIC:   VULVA: No lesions. Normal female genitalia.  URETHRAL MEATUS: Normal size and location, no lesions, no prolapse.  URETHRA: No masses, tenderness, prolapse or scarring.  PELVIC: atrophic external female genitalia without lesions. Normal hair distribution. Adequate perineal body, normal urethral meatus. Vagina moist and well rugated without lesions or discharge. No significant cystocele or rectocele. Uterus and cervix surgically absent. Bimanual exam revealed no masses, tenderness or abnormality.    ANUS, PERINEUM: no masses, external hemorrhoids noted.       DIAGNOSIS:   1. Annual exam      PLAN:    Mammogram  Colonoscopy  dexa     MEDICATIONS PRESCRIBED: calcium vitamin D weight bearing exercise   estradiol - since pt till having severe hot flashes and wants to cont. Recommend going down to once weekly and continuing slow wean that way since pt wants to cont pill form of ERT.    A full discussion of the benefit-risk ratio of hormonal replacement therapy was carried out. Improvement in vasomotor and other climacteric symptoms is discussed, including possible improvements in sleep and mood. Reduction of risk for osteoporosis was explained. We discussed the study data showing increased risk of thrombo-embolic events such as myocardial  infarction, stroke and also possibly breast cancer with estrogen replacement, and how this might affect her. The range of side effects such as breast tenderness, weight gain and including possible increases in lifetime risk of breast cancer and possible thrombotic complications was discussed. We also discussed ACOG's recommendation to use hormone replacement therapy for the relief of hot flashes alone and to be on the lowest dose possible for the shortest amount of time.  Alternative such as herbal and soy-based products were reviewed. All of her questions about this therapy were answered.  If taking hormones, pt knows and understands to discontinue immediately if she develops chest pain, heart problems, MI, DVT, CVA, breast cancer.      COUNSELING:  Patient was counseled today on A.C.S. Pap guidelines and recommendations for yearly pelvic exams, mammograms and monthly self breast exams; to see her PCP for other health maintenance.        FOLLOW-UP: With me in 1 year.

## 2019-01-29 ENCOUNTER — TELEPHONE (OUTPATIENT)
Dept: OBSTETRICS AND GYNECOLOGY | Facility: CLINIC | Age: 79
End: 2019-01-29

## 2019-01-29 DIAGNOSIS — Z12.31 BREAST CANCER SCREENING BY MAMMOGRAM: Primary | ICD-10-CM

## 2019-01-29 NOTE — TELEPHONE ENCOUNTER
Returned call to patient.  Mammogram ordered and appointment scheduled Tuesday, 02/26/19 at 8:45 am, patient confirmed appointment.

## 2019-02-05 ENCOUNTER — TELEPHONE (OUTPATIENT)
Dept: HEMATOLOGY/ONCOLOGY | Facility: CLINIC | Age: 79
End: 2019-02-05

## 2019-02-05 NOTE — TELEPHONE ENCOUNTER
----- Message from Jluis Lindsey sent at 2/5/2019  1:02 PM CST -----  Contact: self  Requesting call back regarding questions on having cancer test done. Please call back at [phone].    Thanks,  Jluis Lindsey

## 2019-02-05 NOTE — TELEPHONE ENCOUNTER
Spoke with pt and she said that she has a history of pancreatic cancer and has been having stomach issues. That its about time she is due for a Fu with  so she wanted to make an appt to discuss this with him. Pt scheduled for 2/21/19 at 9:40am at the [facility] location.

## 2019-02-11 ENCOUNTER — OFFICE VISIT (OUTPATIENT)
Dept: CARDIOLOGY | Facility: CLINIC | Age: 79
End: 2019-02-11
Payer: MEDICARE

## 2019-02-11 VITALS
HEIGHT: 65 IN | HEART RATE: 52 BPM | SYSTOLIC BLOOD PRESSURE: 140 MMHG | BODY MASS INDEX: 25.08 KG/M2 | DIASTOLIC BLOOD PRESSURE: 58 MMHG | WEIGHT: 150.56 LBS

## 2019-02-11 DIAGNOSIS — E78.00 PURE HYPERCHOLESTEROLEMIA: Chronic | ICD-10-CM

## 2019-02-11 DIAGNOSIS — I15.2 HYPERTENSION ASSOCIATED WITH DIABETES: Primary | Chronic | ICD-10-CM

## 2019-02-11 DIAGNOSIS — E11.59 HYPERTENSION ASSOCIATED WITH DIABETES: Primary | Chronic | ICD-10-CM

## 2019-02-11 DIAGNOSIS — R06.02 SOB (SHORTNESS OF BREATH): ICD-10-CM

## 2019-02-11 DIAGNOSIS — R09.89 BRUIT: ICD-10-CM

## 2019-02-11 DIAGNOSIS — N18.4 CHRONIC KIDNEY DISEASE, STAGE 4 (SEVERE): ICD-10-CM

## 2019-02-11 PROCEDURE — 99999 PR PBB SHADOW E&M-EST. PATIENT-LVL III: ICD-10-PCS | Mod: PBBFAC,,, | Performed by: INTERNAL MEDICINE

## 2019-02-11 PROCEDURE — 99213 OFFICE O/P EST LOW 20 MIN: CPT | Mod: PBBFAC | Performed by: INTERNAL MEDICINE

## 2019-02-11 PROCEDURE — 99214 PR OFFICE/OUTPT VISIT, EST, LEVL IV, 30-39 MIN: ICD-10-PCS | Mod: S$PBB,,, | Performed by: INTERNAL MEDICINE

## 2019-02-11 PROCEDURE — 99214 OFFICE O/P EST MOD 30 MIN: CPT | Mod: S$PBB,,, | Performed by: INTERNAL MEDICINE

## 2019-02-11 PROCEDURE — 99999 PR PBB SHADOW E&M-EST. PATIENT-LVL III: CPT | Mod: PBBFAC,,, | Performed by: INTERNAL MEDICINE

## 2019-02-11 NOTE — PROGRESS NOTES
Subjective:   Patient ID:  Cailin Pickett is a 78 y.o. female who presents for follow-up of Follow-up  Pt with a few episodes of low DBP.Patient denies CP, angina or anginal equivalent.    Hypertension   This is a chronic problem. The current episode started more than 1 year ago. The problem has been gradually improving since onset. The problem is controlled. Pertinent negatives include no chest pain, palpitations or shortness of breath. Past treatments include beta blockers, calcium channel blockers and direct vasodilators. The current treatment provides moderate improvement. There are no compliance problems.    Hyperlipidemia   This is a chronic problem. The current episode started more than 1 year ago. The problem is controlled. Recent lipid tests were reviewed and are variable. Pertinent negatives include no chest pain or shortness of breath. She is currently on no antihyperlipidemic treatment. The current treatment provides mild improvement of lipids. Compliance problems include medication side effects.        Review of Systems   Constitution: Negative. Negative for weight gain.   HENT: Negative.    Eyes: Negative.    Cardiovascular: Negative.  Negative for chest pain, leg swelling and palpitations.   Respiratory: Negative.  Negative for shortness of breath.    Endocrine: Negative.    Hematologic/Lymphatic: Negative.    Skin: Negative.    Musculoskeletal: Negative for muscle weakness.   Gastrointestinal: Negative.    Genitourinary: Negative.    Neurological: Negative.  Negative for dizziness.   Psychiatric/Behavioral: Negative.    Allergic/Immunologic: Negative.      Family History   Problem Relation Age of Onset    Cancer Mother         breast    Heart disease Mother 60        MI    Hypertension Mother     Stroke Mother     Breast cancer Mother     Cancer Father         prostate    Cancer Brother         renal cell carcinoma    Diabetes Brother     Breast cancer Sister     Breast cancer Sister       Past Medical History:   Diagnosis Date    Anemia     CKD (chronic kidney disease), stage III     Diabetes mellitus type II     Encounter for blood transfusion     Family history of colonic polyps 7/18/2017    Gout, unspecified     Hyperlipidemia     Hypertension     Neuropathy     Pancreatic cancer     Renal failure     Thyroid disease      Current Outpatient Medications on File Prior to Visit   Medication Sig Dispense Refill    allopurinol (ZYLOPRIM) 100 MG tablet TAKE 1 TABLET DAILY 90 tablet 3    amLODIPine (NORVASC) 5 MG tablet TAKE 1 TABLET TWICE A  tablet 2    carvedilol (COREG) 6.25 MG tablet Take 1 tablet (6.25 mg total) by mouth 2 (two) times daily with meals. 180 tablet 3    cetirizine (ZYRTEC) 10 MG tablet Take 10 mg by mouth once daily.       estradiol (ESTRACE) 0.5 MG tablet TAKE 1 TABLET DAILY FOR 3 WEEKS PER MONTH, THEN DO NOT TAKE FOR FOURTH WEEK EACH MONTH 90 tablet 3    furosemide (LASIX) 40 MG tablet Take 1 tablet (40 mg total) by mouth once daily. 90 tablet 3    gabapentin (NEURONTIN) 100 MG capsule Take 1 capsule by mouth 2 (two) times daily.      GLUCOSAMINE HCL/CHONDR ZAVALETA A NA (OSTEO BI-FLEX ORAL) Take by mouth 2 (two) times daily.        hydrALAZINE (APRESOLINE) 100 MG tablet Take 1 tablet (100 mg total) by mouth every 8 (eight) hours. 270 tablet 3    hydrOXYzine HCl (ATARAX) 25 MG tablet TAKE 1 TABLET TWICE A DAY AS NEEDED FOR ITCHING 180 tablet 3    repaglinide (PRANDIN) 0.5 MG tablet TAKE 1 TABLET THREE TIMES A DAY BEFORE MEALS 270 tablet 2    sodium bicarbonate 650 MG tablet Take 1 tablet (650 mg total) by mouth 2 (two) times daily. 180 tablet 3    sucroferric oxyhydroxide (VELPHORO) 500 mg Chew Take 1 tablet (500 mg total) by mouth 3 (three) times daily before meals. 90 tablet 9    traMADol (ULTRAM) 50 mg tablet Take 1 tablet (50 mg total) by mouth every 12 (twelve) hours. 180 tablet 3    [DISCONTINUED] carvedilol (COREG) 6.25 MG tablet TAKE 1 TABLET  TWICE A DAY WITH MEALS 180 tablet 3     No current facility-administered medications on file prior to visit.      Review of patient's allergies indicates:   Allergen Reactions    Allegra [fexofenadine] Swelling    Codeine Hives, Itching and Nausea And Vomiting       Objective:     Physical Exam   Constitutional: She is oriented to person, place, and time. She appears well-developed and well-nourished.   HENT:   Head: Normocephalic and atraumatic.   Eyes: Pupils are equal, round, and reactive to light. Conjunctivae and EOM are normal.   Neck: Normal range of motion. Neck supple.   Cardiovascular: Normal rate, regular rhythm, normal heart sounds and intact distal pulses.   Pulmonary/Chest: Effort normal and breath sounds normal.   Abdominal: Soft. Bowel sounds are normal.   Musculoskeletal: Normal range of motion.   Neurological: She is alert and oriented to person, place, and time.   Skin: Skin is warm and dry.   Psychiatric: She has a normal mood and affect.   Nursing note and vitals reviewed.      Assessment:     1. Hypertension associated with diabetes    2. Pure hypercholesterolemia    3. Chronic kidney disease, stage 4 (severe)    4. SOB (shortness of breath)        Plan:     Hypertension associated with diabetes    Pure hypercholesterolemia    Chronic kidney disease, stage 4 (severe)    SOB (shortness of breath)    Decrease lasix if DBP < 60  Continue duretics, coreg, norvasc, hydralazine -htn  Check lipids   carotid us- bruit

## 2019-02-13 ENCOUNTER — TELEPHONE (OUTPATIENT)
Dept: NEUROLOGY | Facility: CLINIC | Age: 79
End: 2019-02-13

## 2019-02-13 NOTE — TELEPHONE ENCOUNTER
----- Message from Jie Parsons sent at 2/13/2019  2:31 PM CST -----  Contact: self/[phone]  Type:  RX Refill Request    Who Called: patient  Refill or New Rx:refill  RX Name and Strength:GabaPenoin 100 MG  How is the patient currently taking it? (ex. 1XDay):twice a day  Is this a 30 day or 90 day RX:90  Preferred Pharmacy with phone number:.  [facility] - [address] [address]  Phone: [phone] Fax: [phone]          Local or Mail Order:mail order  Ordering Provider:Dr Vinson  Would the patient rather a call back or a response via [facility]? Call back  Best Call Back Number:[phone]  Additional Information: Also Tramadol 50 mg

## 2019-02-13 NOTE — TELEPHONE ENCOUNTER
Called to let hr know that dr stephenson did not write this med for her and to check with her pcp for refill.2/13/19/1653/sf

## 2019-02-13 NOTE — TELEPHONE ENCOUNTER
----- Message from Jie Parsons sent at 2/13/2019  2:31 PM CST -----  Contact: self/[phone]  Type:  RX Refill Request    Who Called: patient  Refill or New Rx:refill  RX Name and Strength:GabaPenoin 100 MG  How is the patient currently taking it? (ex. 1XDay):twice a day  Is this a 30 day or 90 day RX:90  Preferred Pharmacy with phone number:.  [facility] - [address] [address]  Phone: [phone] Fax: [phone]          Local or Mail Order:mail order  Ordering Provider:Dr Vinson  Would the patient rather a call back or a response via [facility]? Call back  Best Call Back Number:[phone]  Additional Information: Also Tramadol 50 mg

## 2019-02-19 ENCOUNTER — LAB VISIT (OUTPATIENT)
Dept: LAB | Facility: HOSPITAL | Age: 79
End: 2019-02-19
Attending: INTERNAL MEDICINE
Payer: MEDICARE

## 2019-02-19 DIAGNOSIS — N18.5 CHRONIC KIDNEY DISEASE (CKD), STAGE V: ICD-10-CM

## 2019-02-19 LAB
ALBUMIN SERPL BCP-MCNC: 3.6 G/DL
ANION GAP SERPL CALC-SCNC: 8 MMOL/L
BUN SERPL-MCNC: 74 MG/DL
CALCIUM SERPL-MCNC: 9.7 MG/DL
CHLORIDE SERPL-SCNC: 107 MMOL/L
CO2 SERPL-SCNC: 23 MMOL/L
CREAT SERPL-MCNC: 5.1 MG/DL
EST. GFR  (AFRICAN AMERICAN): 8.8 ML/MIN/1.73 M^2
EST. GFR  (NON AFRICAN AMERICAN): 7.7 ML/MIN/1.73 M^2
GLUCOSE SERPL-MCNC: 78 MG/DL
PHOSPHATE SERPL-MCNC: 5 MG/DL
POTASSIUM SERPL-SCNC: 4.7 MMOL/L
SODIUM SERPL-SCNC: 138 MMOL/L

## 2019-02-19 PROCEDURE — 36415 COLL VENOUS BLD VENIPUNCTURE: CPT | Mod: TXP

## 2019-02-19 PROCEDURE — 80069 RENAL FUNCTION PANEL: CPT | Mod: NTX

## 2019-02-20 DIAGNOSIS — M19.90 ARTHRITIS: ICD-10-CM

## 2019-02-21 ENCOUNTER — OFFICE VISIT (OUTPATIENT)
Dept: HEMATOLOGY/ONCOLOGY | Facility: CLINIC | Age: 79
End: 2019-02-21
Payer: MEDICARE

## 2019-02-21 ENCOUNTER — LAB VISIT (OUTPATIENT)
Dept: LAB | Facility: HOSPITAL | Age: 79
End: 2019-02-21
Attending: SPECIALIST
Payer: MEDICARE

## 2019-02-21 VITALS
BODY MASS INDEX: 25.85 KG/M2 | SYSTOLIC BLOOD PRESSURE: 150 MMHG | WEIGHT: 155.19 LBS | RESPIRATION RATE: 18 BRPM | HEART RATE: 59 BPM | HEIGHT: 65 IN | TEMPERATURE: 98 F | OXYGEN SATURATION: 98 % | DIASTOLIC BLOOD PRESSURE: 70 MMHG

## 2019-02-21 DIAGNOSIS — R10.84 GENERALIZED ABDOMINAL PAIN: ICD-10-CM

## 2019-02-21 DIAGNOSIS — D63.1 ANEMIA IN STAGE 5 CHRONIC KIDNEY DISEASE, NOT ON CHRONIC DIALYSIS: Primary | ICD-10-CM

## 2019-02-21 DIAGNOSIS — Z85.07 HISTORY OF PANCREATIC CANCER: ICD-10-CM

## 2019-02-21 DIAGNOSIS — N18.5 ANEMIA IN STAGE 5 CHRONIC KIDNEY DISEASE, NOT ON CHRONIC DIALYSIS: Primary | ICD-10-CM

## 2019-02-21 DIAGNOSIS — D63.1 ANEMIA IN STAGE 5 CHRONIC KIDNEY DISEASE, NOT ON CHRONIC DIALYSIS: ICD-10-CM

## 2019-02-21 DIAGNOSIS — N18.5 ANEMIA IN STAGE 5 CHRONIC KIDNEY DISEASE, NOT ON CHRONIC DIALYSIS: ICD-10-CM

## 2019-02-21 LAB
ANISOCYTOSIS BLD QL SMEAR: SLIGHT
BASOPHILS # BLD AUTO: 0.05 K/UL
BASOPHILS NFR BLD: 0.9 %
DACRYOCYTES BLD QL SMEAR: ABNORMAL
DIFFERENTIAL METHOD: ABNORMAL
EOSINOPHIL # BLD AUTO: 0.2 K/UL
EOSINOPHIL NFR BLD: 3.1 %
ERYTHROCYTE [DISTWIDTH] IN BLOOD BY AUTOMATED COUNT: 14.9 %
HCT VFR BLD AUTO: 34.2 %
HGB BLD-MCNC: 10.3 G/DL
IMM GRANULOCYTES # BLD AUTO: 0.01 K/UL
IMM GRANULOCYTES NFR BLD AUTO: 0.2 %
LYMPHOCYTES # BLD AUTO: 1.1 K/UL
LYMPHOCYTES NFR BLD: 20.1 %
MCH RBC QN AUTO: 28.3 PG
MCHC RBC AUTO-ENTMCNC: 30.1 G/DL
MCV RBC AUTO: 94 FL
MONOCYTES # BLD AUTO: 0.8 K/UL
MONOCYTES NFR BLD: 13.9 %
NEUTROPHILS # BLD AUTO: 3.4 K/UL
NEUTROPHILS NFR BLD: 62 %
NRBC BLD-RTO: 1 /100 WBC
OVALOCYTES BLD QL SMEAR: ABNORMAL
PLATELET # BLD AUTO: 241 K/UL
PLATELET BLD QL SMEAR: ABNORMAL
PMV BLD AUTO: 10.7 FL
POIKILOCYTOSIS BLD QL SMEAR: SLIGHT
RBC # BLD AUTO: 3.64 M/UL
STOMATOCYTES BLD QL SMEAR: PRESENT
TARGETS BLD QL SMEAR: ABNORMAL
WBC # BLD AUTO: 5.46 K/UL

## 2019-02-21 PROCEDURE — 99214 PR OFFICE/OUTPT VISIT, EST, LEVL IV, 30-39 MIN: ICD-10-PCS | Mod: S$PBB,,, | Performed by: INTERNAL MEDICINE

## 2019-02-21 PROCEDURE — 36415 COLL VENOUS BLD VENIPUNCTURE: CPT | Mod: NTX

## 2019-02-21 PROCEDURE — 99999 PR PBB SHADOW E&M-EST. PATIENT-LVL IV: ICD-10-PCS | Mod: PBBFAC,,, | Performed by: INTERNAL MEDICINE

## 2019-02-21 PROCEDURE — 99214 OFFICE O/P EST MOD 30 MIN: CPT | Mod: S$PBB,,, | Performed by: INTERNAL MEDICINE

## 2019-02-21 PROCEDURE — 85025 COMPLETE CBC W/AUTO DIFF WBC: CPT | Mod: TXP

## 2019-02-21 PROCEDURE — 99214 OFFICE O/P EST MOD 30 MIN: CPT | Mod: PBBFAC,PN | Performed by: INTERNAL MEDICINE

## 2019-02-21 PROCEDURE — 99999 PR PBB SHADOW E&M-EST. PATIENT-LVL IV: CPT | Mod: PBBFAC,,, | Performed by: INTERNAL MEDICINE

## 2019-02-21 RX ORDER — TRAMADOL HYDROCHLORIDE 50 MG/1
50 TABLET ORAL EVERY 12 HOURS
Qty: 180 TABLET | Refills: 3 | Status: SHIPPED | OUTPATIENT
Start: 2019-02-21 | End: 2020-03-02 | Stop reason: SDUPTHER

## 2019-02-21 NOTE — PROGRESS NOTES
Reason for visit: Pancreatic adenocarcinoma/Anemia due to chronic kidney disease  Date of Diagnosis: 11/29/2012    HPI:   The patient is a 78-year-old  female who presents to the hematology oncology clinic today for follow up. She underwent resection surgery for her pancreatic tail high-grade carcinoma on May 15, 2013 by Dr. Carter at [facility], [place]. The patient had previously completed neoadjuvant chemotherapy and 5-FU chemoradiation with excellent response.  Today the patient reports that in the past 2 weeks she has had intermittent episodes of generalized abdominal pain.  Otherwise she feels well except for occasional episodes of lower back pain. She is taking tramadol with good relief from this. Energy levels are good. She reports that her appetite is good.  She has not had any unintentional weight loss. She denies any fever, chills or night sweats. She denies any chest pain or shortness of breath. She denies any bowel or urinary complaints. The patient denies any nausea or vomiting. She denies any abdominal pain today.  I have reviewed all of the patient's interval clinical history available in EPIC. She continues to report tingling pain in the soles of her feet since completing her chemotherapy. Neurontin did not help and she stopped lyrica because of side effects.   She is being closely followed by Dr. Vinson with nephrology with regard to her kidney function. She has also had A-V fistula done in her left upper extremity by Dr. Foster.    PAST MEDICAL HISTORY:   1. Hypertension  2. GERD  3. Migraines  4. Gout  5. Chronic kidney disease stage IV   6. Bilateral renal cysts  7. Type 2 diabetes mellitus    SURGICAL HISTORY:   1. Hysterectomy for fibroids  2. Partial thyroidectomy  3. Left mediport placement  4. Distal pancreatectomy, splenectomy, left nephrectomy and adrenalectomy on May 15, 2013  5. Left AV fistula on 12/4/14  6. Left knee replacement on 5/31/18    FAMILY HISTORY: The  patient's mother  from complications related to breast cancer at the age of 60. The patient's father  from complications related to prostate cancer at the age of 81. The patient's sister was diagnosed with breast cancer at the age of 68. The patient's brother had renal cell carcinoma in both kidneys [sporadic] approximately 4 years apart. He has since had a kidney transplant. Another brother has kidney cancer diagnosed at 68. She denies any other immediate family members with cancer or bleeding/clotting disorders.    SOCIAL HISTORY: She reports a 30+ pack year smoking history and quit in . She does not drink alcohol. She has never used any recreational drugs. She worked for the [employer] in [place] and retired in . She is  and does not have any children. She lives in [place], [place].    ALLERGIES: Reviewed on medication card.    MEDICATIONS: [Medcard has been reviewed and/or reconciled.]    REVIEW OF SYSTEMS:   GENERAL: [No fevers, chills or sweats.] Denies fatigue. Appetite is decreased. Weight is decreased.  HEENT: [No blurred vision, tinnitus, nasal discharge, sorethroat or dysphagia.]  HEART: [No chest pain, palpitations or shortness of breath.]   LUNGS: [No hemoptysis, cough or breathing problems.]   ABDOMEN: [Reports intermittent generalized abdominal pain. Denies constipation or melena.] Denies nausea, vomiting.  GENITOURINARY: [No dysuria, bleeding or malodorous discharge.]  NEURO: [No headache, dizziness or vertigo.]  HEMATOLOGY: [No easy bruising, spontaneous bleeding or blood clots in the past].  MUSCULOSKELETAL: She denies any myalgias or bone pain. Does report occasional low back pain.  SKIN: [No rashes or skin lesions.]  PSYCHIATRY: [No depression or anxiety.]    PHYSICAL EXAMINATION:   VS: Reviewed on nurse's notes.  APPEARANCE: The patient is a well-developed, well-nourished and well-groomed  female who appears in no acute distress.    HEENT: No scleral icterus. Both external auditory canals clear. No oral ulcers, lesions. Throat clear  HEAD: No sinus tenderness. She has facial hair suggestive of hirsutism.   NECK: Supple. No palpable lymphadenopathy. Thyroid non-tender, no palpable masses  CHEST: Breath sounds clear bilaterally. No rales. No rhonchi. Unlabored respirations.  CARDIOVASCULAR: Normal S1, S2. Normal rate. Regular rhythm.  ABDOMEN: Bowel sounds normal. No tenderness. No abdominal distention. No hepatomegaly. No splenomegaly. Healed incision noted.  LYMPHATIC: No palpable supraclavicular, axillary nodes  EXTREMITIES: No clubbing, cyanosis. No edema of bilateral lower extremities. Left A-V fistula noted.  SKIN: No lesions. No petechiae. No ecchymoses. No induration or nodules  NEUROLOGIC: No focal findings. Alert & Oriented x 3. Mood appropriate to affect    LABS:   Reviewed    IMAGING:  Reviewed    IMPRESSION:  1.  History of pancreatic adenocarcinoma (T3N0Mx)  2. Chronic kidney disease (stage 5 not on hemodialysis)  3. Anemia due to CKD  4. Peripheral neuropathy  5.  Intermittent generalized abdominal pain    PLAN:  1.  I will check CBC today to follow up with regard to her anemia due to chronic kidney disease stage 5 not on hemodialysis.  No urgent need for initiation of Procrit if hemoglobin continues to be greater than 10 grams/deciliter.   2. I have encouraged good p.o. intake of food and fluids. I have encouraged regular exercise.   3. Continue follow up with Dr. Vinson as recommended with regard to CKD  4. Continue elavil for treatment of peripheral neuropathy. This medication has been helping but with some anticholinergic side effects such as dry mouth. She has not had relief from trying multiple other medications for her peripheral neuropathy. Risks/benefits and common side effects discussed in detail. She knows not to stop it abruptly and is also aware of sedation precautions.  5. She is uptodate with screening  colonoscopy.  6.  I have recommended proceeding with PET-CT scan for follow-up with regard to her intermittent generalized abdominal pain with history of pancreatic cancer to evaluate for any evidence of recurrent malignancy.  Rationale reviewed in detail and she expressed understanding and agreement to proceed with this.    Follow up after PET-CT scan to review results and discuss further management. She knows to call sooner for any new problems or questions.    Nas Hoffman MD

## 2019-02-25 DIAGNOSIS — Z76.82 ORGAN TRANSPLANT CANDIDATE: Primary | ICD-10-CM

## 2019-02-26 ENCOUNTER — TELEPHONE (OUTPATIENT)
Dept: RADIOLOGY | Facility: HOSPITAL | Age: 79
End: 2019-02-26

## 2019-02-26 ENCOUNTER — OFFICE VISIT (OUTPATIENT)
Dept: NEPHROLOGY | Facility: CLINIC | Age: 79
End: 2019-02-26
Payer: MEDICARE

## 2019-02-26 ENCOUNTER — HOSPITAL ENCOUNTER (OUTPATIENT)
Dept: RADIOLOGY | Facility: HOSPITAL | Age: 79
Discharge: HOME | End: 2019-02-26
Attending: OBSTETRICS & GYNECOLOGY
Payer: MEDICARE

## 2019-02-26 VITALS
WEIGHT: 153.88 LBS | SYSTOLIC BLOOD PRESSURE: 156 MMHG | HEART RATE: 60 BPM | BODY MASS INDEX: 24.73 KG/M2 | HEIGHT: 66 IN | DIASTOLIC BLOOD PRESSURE: 80 MMHG

## 2019-02-26 DIAGNOSIS — N18.5 CHRONIC KIDNEY DISEASE (CKD), STAGE V: ICD-10-CM

## 2019-02-26 DIAGNOSIS — Z12.31 BREAST CANCER SCREENING BY MAMMOGRAM: ICD-10-CM

## 2019-02-26 DIAGNOSIS — I10 HTN (HYPERTENSION), BENIGN: Primary | ICD-10-CM

## 2019-02-26 PROCEDURE — 77063 MAMMO DIGITAL SCREENING BILAT WITH TOMOSYNTHESIS_CAD: ICD-10-PCS | Mod: 26,NTX,, | Performed by: RADIOLOGY

## 2019-02-26 PROCEDURE — 99213 OFFICE O/P EST LOW 20 MIN: CPT | Mod: PBBFAC,PN,NTX | Performed by: INTERNAL MEDICINE

## 2019-02-26 PROCEDURE — 99214 OFFICE O/P EST MOD 30 MIN: CPT | Mod: S$PBB,,, | Performed by: INTERNAL MEDICINE

## 2019-02-26 PROCEDURE — 99214 PR OFFICE/OUTPT VISIT, EST, LEVL IV, 30-39 MIN: ICD-10-PCS | Mod: S$PBB,,, | Performed by: INTERNAL MEDICINE

## 2019-02-26 PROCEDURE — 99999 PR PBB SHADOW E&M-EST. PATIENT-LVL III: CPT | Mod: PBBFAC,,, | Performed by: INTERNAL MEDICINE

## 2019-02-26 PROCEDURE — 77067 SCR MAMMO BI INCL CAD: CPT | Mod: 26,NTX,, | Performed by: RADIOLOGY

## 2019-02-26 PROCEDURE — 77067 MAMMO DIGITAL SCREENING BILAT WITH TOMOSYNTHESIS_CAD: ICD-10-PCS | Mod: 26,NTX,, | Performed by: RADIOLOGY

## 2019-02-26 PROCEDURE — 77067 SCR MAMMO BI INCL CAD: CPT | Mod: TC,TXP

## 2019-02-26 PROCEDURE — 77063 BREAST TOMOSYNTHESIS BI: CPT | Mod: 26,NTX,, | Performed by: RADIOLOGY

## 2019-02-26 PROCEDURE — 99999 PR PBB SHADOW E&M-EST. PATIENT-LVL III: ICD-10-PCS | Mod: PBBFAC,,, | Performed by: INTERNAL MEDICINE

## 2019-02-26 RX ORDER — CARVEDILOL 12.5 MG/1
12.5 TABLET ORAL 2 TIMES DAILY WITH MEALS
Qty: 60 TABLET | Refills: 11 | Status: SHIPPED | OUTPATIENT
Start: 2019-02-26 | End: 2019-09-16

## 2019-02-26 NOTE — PATIENT INSTRUCTIONS
Avoid NSAID pain medications such as advil, aleve, motrin, ibuprofen, naprosyn, meloxicam, diclofenac, mobic.     Increase to Coreg 12.5 mg twice a day     1) goal blood pressure is less than 130/80    2)  keep a track of daily BPs and bring at next appt    3) bring all meds in a bag at next appt

## 2019-02-26 NOTE — PROGRESS NOTES
Subjective:       Patient ID: Cailin Pickett is a 78 y.o.   female who presents for follow-up evaluation of CKD stage 5, HTN , SHPT, anemia        Favio Titus MD      HPI :  Cailin Pickett Is a pleasant 78-year-old  woman seen office today followup for above medical problems. I saw her in clinic about 3 months ago. Recent lab results were discussed with the patient. She has solitary right kidney. Multiple cysts reported on the kidney. It measures about 16 cm. Patient Attended chronic kidney disease education and options class. she had a left arm AV fistula placed on 12/4/14 by Dr Foster . She denies recent hospitalizations or ER visits.  Recent lab results were discussed with the patient. Recent serum Cr is 5.1  mg/dL. S/p  left TKA on 5/31/18 , Dr Saleh at [facility] ,       Important Medical Info :       In 2013 patient was diagnosed with pancreatic tail carcinoma. she underwent chemo rx with 5-FU, she tolerated the cycles well. In 5/2013 she underwent resection of tail of pancreas, left nephrectomy, splenectomy, left adrenalectomy.       Past Medical History:   Diagnosis Date    Anemia     CKD (chronic kidney disease), stage III     Diabetes mellitus type II     Encounter for blood transfusion     Family history of colonic polyps 7/18/2017    Gout, unspecified     Hyperlipidemia     Hypertension     Neuropathy     Pancreatic cancer     Renal failure     Thyroid disease          Current Outpatient Medications on File Prior to Visit   Medication Sig Dispense Refill    allopurinol (ZYLOPRIM) 100 MG tablet TAKE 1 TABLET DAILY 90 tablet 3    amLODIPine (NORVASC) 5 MG tablet TAKE 1 TABLET TWICE A  tablet 2    carvedilol (COREG) 6.25 MG tablet Take 1 tablet (6.25 mg total) by mouth 2 (two) times daily with meals. 180 tablet 3    cetirizine (ZYRTEC) 10 MG tablet Take 10 mg by mouth once daily.       estradiol (ESTRACE) 0.5 MG tablet TAKE 1 TABLET  DAILY FOR 3 WEEKS PER MONTH, THEN DO NOT TAKE FOR FOURTH WEEK EACH MONTH 90 tablet 3    furosemide (LASIX) 40 MG tablet Take 1 tablet (40 mg total) by mouth once daily. 90 tablet 3    gabapentin (NEURONTIN) 100 MG capsule Take 1 capsule by mouth 2 (two) times daily.      GLUCOSAMINE HCL/CHONDR ZAVALETA A NA (OSTEO BI-FLEX ORAL) Take by mouth 2 (two) times daily.        hydrALAZINE (APRESOLINE) 100 MG tablet Take 1 tablet (100 mg total) by mouth every 8 (eight) hours. 270 tablet 3    hydrOXYzine HCl (ATARAX) 25 MG tablet TAKE 1 TABLET TWICE A DAY AS NEEDED FOR ITCHING 180 tablet 3    repaglinide (PRANDIN) 0.5 MG tablet TAKE 1 TABLET THREE TIMES A DAY BEFORE MEALS 270 tablet 2    sodium bicarbonate 650 MG tablet Take 1 tablet (650 mg total) by mouth 2 (two) times daily. 180 tablet 3    sucroferric oxyhydroxide (VELPHORO) 500 mg Chew Take 1 tablet (500 mg total) by mouth 3 (three) times daily before meals. 90 tablet 9    traMADol (ULTRAM) 50 mg tablet Take 1 tablet (50 mg total) by mouth every 12 (twelve) hours. 180 tablet 3     No current facility-administered medications on file prior to visit.          Review of Systems  :      Constitutional: Negative for activity change, appetite change, fatigue and fever.   HENT: Negative for congestion, facial swelling, sore throat, trouble swallowing and voice change.    Eyes: Negative for redness and visual disturbance.   Respiratory: Negative for apnea, cough, chest tightness, shortness of breath and wheezing.    Cardiovascular: Negative for chest pain, palpitations and leg swelling.   Gastrointestinal: Negative for abdominal distention, abdominal pain, blood in stool, constipation, diarrhea, nausea and vomiting.   Genitourinary: Negative for decreased urine volume, difficulty urinating, dysuria, flank pain, frequency, hematuria, pelvic pain and urgency.   Musculoskeletal: Positive for arthralgias. Negative for back pain, gait problem and joint swelling.   Skin: Negative  for color change and rash.   Neurological: Negative for dizziness, syncope, weakness and headaches.   Hematological: Does not bruise/bleed easily.   Psychiatric/Behavioral: Negative for agitation, behavioral problems and confusion. The patient is not nervous/anxious.          Objective:         Vitals:    02/26/19 0912   BP: (!) 156/80   Pulse: 60       Weight 153 lbs, last weight 149 lbs     Physical Exam  :        Constitutional: She is oriented to person, place, and time. She appears well-developed and well-nourished. No distress.    HENT: Head: Normocephalic and atraumatic. Oropharynx is clear and moist. No oropharyngeal exudate.    Eyes: Conjunctivae normal and EOM are normal. Pupils are equal, round, and reactive to light. No scleral icterus.    Neck: Normal range of motion. Neck supple. No JVD present. Carotid bruit is not present. No tracheal deviation present. No mass and no thyromegaly present.    Cardiovascular: Normal rate, regular rhythm, normal heart sounds and intact distal pulses. no gallop and no friction rub. No murmur heard.    Pulmonary/Chest: Effort normal and breath sounds normal. No respiratory distress. She has no wheezes. She has no rales. She exhibits no tenderness.    Abdominal: Soft. Bowel sounds are normal. She exhibits no distension, no abdominal bruit, no ascites and no mass. There is no hepatosplenomegaly. There is no rebound, no guarding and no CVA tenderness.   Musculoskeletal: Normal range of motion. She exhibits trace edema and no tenderness. LUE AV Fistula with good thrill, no local redness, tender to pressure on both sacroiliac joints. Trace edema in legs , well healed scar left knee   Lymphadenopathy: She has no cervical adenopathy.   Neurological: She is alert and oriented to person, place, and time. No cranial nerve deficit.    Skin: Skin is warm and intact. No rash noted. No erythema. No pallor.   Psychiatric: She has a normal mood and affect. Her behavior is normal.  Judgment normal.           Labs:    Lab Results   Component Value Date    CREATININE 5.1 (H) 02/19/2019    BUN 74 (H) 02/19/2019     02/19/2019    K 4.7 02/19/2019     02/19/2019    CO2 23 02/19/2019       Lab Results   Component Value Date    WBC 5.46 02/21/2019    HGB 10.3 (L) 02/21/2019    HCT 34.2 (L) 02/21/2019    MCV 94 02/21/2019     02/21/2019       Lab Results   Component Value Date    .0 (H) 11/12/2018    CALCIUM 9.7 02/19/2019    PHOS 5.0 (H) 02/19/2019       Lab Results   Component Value Date    URICACID 5.5 09/13/2018       Lab Results   Component Value Date    HGBA1C 5.1 02/19/2018       Impression and plan - 78-year-old  woman seen in office today in followup for following medical problems,     1. Chronic kidney disease stage 5 -  Recent Serum Creatinine is 5.1  mg/dL, Patient attended chronic kidney disease education and options class. She has a solitary right kidney which is enlarged at 16 cm secondary to polycystic kidney disease.     Left kidney was removed due to High-grade pancreatic cancer invading directly into the left kidney.         Patient was declined for a kidney transplant in 2014 because of advanced age and history of cancer.  Since she has been relatively stable she wants to be re-evaluated for renal transplant, she has an appointment coming up for transplant evaluation,     Patient had a left arm AV fistula on 12/4/14 by Dr Foster , she is not interested in PD anymore,         2. Hypertension - blood pressure is  elevated,   target blood pressure less than 130/80, will increase carvedilol from 6.25 mg twice a day to 12.5 mg twice a day,     Discussed low-salt diet, will enroll patient in digital blood pressure monitoring program,    3. Anemia - Hb 10 gms, following with Hematology/Oncology,     4. Secondary hyperparathyroidism - PTH is Acceptable at 247 ,  Continue to follow. Cont  Velphoro 500 mgs tid , discussed low phos diet,     5.  pancreatic carcinoma - s/p surgery + Chemo, follows with Oncology        6. Volume -   Lasix to 40 mg daily . Discussed salt and fluid restriction ,        7. Cystic kidney disease - follow,        8. Hyperuricemia/gout - continue allopurinol.        9. Metabolic acidosis - cont  sodium bicarbonate supplements     10. Left knee arthritis , status post left total knee arthroplasty.    We will see her in followup in 3 months, More than 25 minutes was spent with the patient discussing labs and plan of care     Tank Vinson M.D.

## 2019-02-28 ENCOUNTER — HOSPITAL ENCOUNTER (OUTPATIENT)
Dept: RADIOLOGY | Facility: HOSPITAL | Age: 79
Discharge: HOME | End: 2019-02-28
Attending: INTERNAL MEDICINE
Payer: MEDICARE

## 2019-02-28 DIAGNOSIS — N18.5 ANEMIA IN STAGE 5 CHRONIC KIDNEY DISEASE, NOT ON CHRONIC DIALYSIS: ICD-10-CM

## 2019-02-28 DIAGNOSIS — Z85.07 HISTORY OF PANCREATIC CANCER: ICD-10-CM

## 2019-02-28 DIAGNOSIS — D63.1 ANEMIA IN STAGE 5 CHRONIC KIDNEY DISEASE, NOT ON CHRONIC DIALYSIS: ICD-10-CM

## 2019-02-28 DIAGNOSIS — R10.84 GENERALIZED ABDOMINAL PAIN: ICD-10-CM

## 2019-02-28 PROCEDURE — 78815 PET IMAGE W/CT SKULL-THIGH: CPT | Mod: 26,PS,NTX, | Performed by: RADIOLOGY

## 2019-02-28 PROCEDURE — 78815 PET IMAGE W/CT SKULL-THIGH: CPT | Mod: TC,NTX

## 2019-02-28 PROCEDURE — A9552 F18 FDG: HCPCS | Mod: NTX

## 2019-02-28 PROCEDURE — 78815 NM PET CT ROUTINE: ICD-10-PCS | Mod: 26,PS,NTX, | Performed by: RADIOLOGY

## 2019-03-05 DIAGNOSIS — L29.9 ITCHING: ICD-10-CM

## 2019-03-05 DIAGNOSIS — I10 ESSENTIAL HYPERTENSION: ICD-10-CM

## 2019-03-06 ENCOUNTER — OFFICE VISIT (OUTPATIENT)
Dept: HEMATOLOGY/ONCOLOGY | Facility: CLINIC | Age: 79
End: 2019-03-06
Payer: MEDICARE

## 2019-03-06 VITALS
SYSTOLIC BLOOD PRESSURE: 135 MMHG | BODY MASS INDEX: 24.68 KG/M2 | RESPIRATION RATE: 18 BRPM | HEART RATE: 51 BPM | HEIGHT: 66 IN | WEIGHT: 153.56 LBS | TEMPERATURE: 97 F | OXYGEN SATURATION: 96 % | DIASTOLIC BLOOD PRESSURE: 61 MMHG

## 2019-03-06 DIAGNOSIS — N18.5 ANEMIA IN STAGE 5 CHRONIC KIDNEY DISEASE, NOT ON CHRONIC DIALYSIS: Primary | ICD-10-CM

## 2019-03-06 DIAGNOSIS — Z85.07 HISTORY OF PANCREATIC CANCER: ICD-10-CM

## 2019-03-06 DIAGNOSIS — D63.1 ANEMIA IN STAGE 5 CHRONIC KIDNEY DISEASE, NOT ON CHRONIC DIALYSIS: Primary | ICD-10-CM

## 2019-03-06 PROCEDURE — 99214 PR OFFICE/OUTPT VISIT, EST, LEVL IV, 30-39 MIN: ICD-10-PCS | Mod: S$PBB,,, | Performed by: INTERNAL MEDICINE

## 2019-03-06 PROCEDURE — 99214 OFFICE O/P EST MOD 30 MIN: CPT | Mod: S$PBB,,, | Performed by: INTERNAL MEDICINE

## 2019-03-06 PROCEDURE — 99999 PR PBB SHADOW E&M-EST. PATIENT-LVL IV: ICD-10-PCS | Mod: PBBFAC,,, | Performed by: INTERNAL MEDICINE

## 2019-03-06 PROCEDURE — 99214 OFFICE O/P EST MOD 30 MIN: CPT | Mod: PBBFAC | Performed by: INTERNAL MEDICINE

## 2019-03-06 PROCEDURE — 99999 PR PBB SHADOW E&M-EST. PATIENT-LVL IV: CPT | Mod: PBBFAC,,, | Performed by: INTERNAL MEDICINE

## 2019-03-06 RX ORDER — HYDRALAZINE HYDROCHLORIDE 100 MG/1
TABLET, FILM COATED ORAL
Qty: 270 TABLET | Refills: 3 | Status: SHIPPED | OUTPATIENT
Start: 2019-03-06 | End: 2019-06-03

## 2019-03-06 NOTE — PROGRESS NOTES
Reason for visit: Pancreatic adenocarcinoma/Anemia due to chronic kidney disease  Date of Diagnosis: 2012    HPI:   The patient is a 78-year-old  female who presents to the hematology oncology clinic today for follow up. She underwent resection surgery for her pancreatic tail high-grade carcinoma on May 15, 2013 by Dr. Carter at [facility], [place]. The patient had previously completed neoadjuvant chemotherapy and 5-FU chemoradiation with excellent response.  Today the patient reports that her generalized abdominal pain has improved. She feels well except for occasional episodes of lower back pain. She is taking tramadol with good relief from this. Energy levels are good. She reports that her appetite is good.  She has not had any unintentional weight loss. She denies any fever, chills or night sweats. She denies any chest pain or shortness of breath. She denies any bowel or urinary complaints. The patient denies any nausea or vomiting. She denies any abdominal pain today.  I have reviewed all of the patient's interval clinical history available in EPIC. She continues to report tingling pain in the soles of her feet since completing her chemotherapy. Neurontin did not help and she stopped lyrica because of side effects.   She is being closely followed by Dr. Vinson with nephrology with regard to her kidney function. She has also had A-V fistula done in her left upper extremity by Dr. Foster.    PAST MEDICAL HISTORY:   1. Hypertension  2. GERD  3. Migraines  4. Gout  5. Chronic kidney disease stage IV   6. Bilateral renal cysts  7. Type 2 diabetes mellitus    SURGICAL HISTORY:   1. Hysterectomy for fibroids  2. Partial thyroidectomy  3. Left mediport placement  4. Distal pancreatectomy, splenectomy, left nephrectomy and adrenalectomy on May 15, 2013  5. Left AV fistula on 14  6. Left knee replacement on 18    FAMILY HISTORY: The patient's mother  from complications related to  breast cancer at the age of 60. The patient's father  from complications related to prostate cancer at the age of 81. The patient's sister was diagnosed with breast cancer at the age of 68. The patient's brother had renal cell carcinoma in both kidneys [sporadic] approximately 4 years apart. He has since had a kidney transplant. Another brother has kidney cancer diagnosed at 68. She denies any other immediate family members with cancer or bleeding/clotting disorders.    SOCIAL HISTORY: She reports a 30+ pack year smoking history and quit in . She does not drink alcohol. She has never used any recreational drugs. She worked for [employer] in [place] and retired in . She is  and does not have any children. She lives in [place], [place].    ALLERGIES: Reviewed on medication card.    MEDICATIONS: [Medcard has been reviewed and/or reconciled.]    REVIEW OF SYSTEMS:   GENERAL: [No fevers, chills or sweats.] Denies fatigue. Appetite is decreased. Weight is decreased.  HEENT: [No blurred vision, tinnitus, nasal discharge, sorethroat or dysphagia.]  HEART: [No chest pain, palpitations or shortness of breath.]   LUNGS: [No hemoptysis, cough or breathing problems.]   ABDOMEN: [Denies abdominal pain. Denies constipation or melena.] Denies nausea, vomiting.  GENITOURINARY: [No dysuria, bleeding or malodorous discharge.]  NEURO: [No headache, dizziness or vertigo.]  HEMATOLOGY: [No easy bruising, spontaneous bleeding or blood clots in the past].  MUSCULOSKELETAL: She denies any myalgias or bone pain. Does report occasional low back pain.  SKIN: [No rashes or skin lesions.]  PSYCHIATRY: [No depression or anxiety.]    PHYSICAL EXAMINATION:   VS: Reviewed on nurse's notes.  APPEARANCE: The patient is a well-developed, well-nourished and well-groomed  female who appears in no acute distress.   HEENT: No scleral icterus. Both external auditory canals clear. No oral ulcers,  lesions. Throat clear  HEAD: No sinus tenderness. She has facial hair suggestive of hirsutism.   NECK: Supple. No palpable lymphadenopathy. Thyroid non-tender, no palpable masses  CHEST: Breath sounds clear bilaterally. No rales. No rhonchi. Unlabored respirations.  CARDIOVASCULAR: Normal S1, S2. Normal rate. Regular rhythm.  ABDOMEN: Bowel sounds normal. No tenderness. No abdominal distention. No hepatomegaly. No splenomegaly. Healed incision noted.  LYMPHATIC: No palpable supraclavicular, axillary nodes  EXTREMITIES: No clubbing, cyanosis. No edema of bilateral lower extremities. Left A-V fistula noted.  SKIN: No lesions. No petechiae. No ecchymoses. No induration or nodules  NEUROLOGIC: No focal findings. Alert & Oriented x 3. Mood appropriate to affect    LABS:   Reviewed    IMAGING:  Reviewed    IMPRESSION:  1.  History of pancreatic adenocarcinoma (T3N0Mx)  2. Chronic kidney disease (stage 5 not on hemodialysis)  3. Anemia due to CKD  4. Peripheral neuropathy    PLAN:  1.  Most recent CBC from February 2019 reviewed.  No urgent need for initiation of Procrit as hemoglobin continues to be greater than 10 grams/deciliter.   2. I have encouraged good p.o. intake of food and fluids. I have encouraged regular exercise.   3. Continue follow up with Dr. Vinson as recommended with regard to CKD.  She is being re-evaluated for possible kidney transplant at this time.  4. Continue elavil for treatment of peripheral neuropathy. This medication has been helping but with some anticholinergic side effects such as dry mouth. She has not had relief from trying multiple other medications for her peripheral neuropathy. Risks/benefits and common side effects discussed in detail. She knows not to stop it abruptly and is also aware of sedation precautions.  5. She is uptodate with screening colonoscopy.  6.  I had a detailed discussion with the patient today with regard to results of PET-CT scan from February 28, 2019.  No  evidence of recurrent malignancy noted. She has an indeterminate small cystic structure anterior to the left psoas muscle.  This is not hypermetabolic but has slowly increased in size since 2013 from 1.3 cm to 1.7 cm.  We discussed that this was unlikely to be malignant in etiology.  This can be monitored for now unless the transplant team thinks that additional evaluation is needed.    Follow up in 3 months with labs. She knows to call sooner for any new problems or questions.    Nas Hoffman MD

## 2019-03-11 ENCOUNTER — CLINICAL SUPPORT (OUTPATIENT)
Dept: CARDIOLOGY | Facility: CLINIC | Age: 79
End: 2019-03-11
Attending: INTERNAL MEDICINE
Payer: MEDICARE

## 2019-03-11 LAB
DIASTOLIC DYSFUNCTION: YES
ESTIMATED PA SYSTOLIC PRESSURE: 42.44
GLOBAL PERICARDIAL EFFUSION: ABNORMAL
INTERNAL CAROTID STENOSIS: ABNORMAL
MITRAL VALVE REGURGITATION: ABNORMAL
RETIRED EF AND QEF - SEE NOTES: 60 (ref 55–65)
TRICUSPID VALVE REGURGITATION: ABNORMAL

## 2019-03-11 PROCEDURE — 93306 2D ECHO WITH COLOR FLOW DOPPLER: ICD-10-PCS | Mod: 26,S$PBB,NTX, | Performed by: NUCLEAR MEDICINE

## 2019-03-11 PROCEDURE — 93880 CAR US DOPPLER CAROTID BILATERAL: ICD-10-PCS | Mod: 26,S$PBB,NTX, | Performed by: INTERNAL MEDICINE

## 2019-03-11 PROCEDURE — 93306 TTE W/DOPPLER COMPLETE: CPT | Mod: PBBFAC,NTX | Performed by: NUCLEAR MEDICINE

## 2019-03-11 PROCEDURE — 93880 EXTRACRANIAL BILAT STUDY: CPT | Mod: PBBFAC,NTX | Performed by: INTERNAL MEDICINE

## 2019-03-12 ENCOUNTER — OFFICE VISIT (OUTPATIENT)
Dept: NEPHROLOGY | Facility: CLINIC | Age: 79
End: 2019-03-12
Payer: MEDICARE

## 2019-03-12 ENCOUNTER — TELEPHONE (OUTPATIENT)
Dept: NEPHROLOGY | Facility: CLINIC | Age: 79
End: 2019-03-12

## 2019-03-12 VITALS
SYSTOLIC BLOOD PRESSURE: 160 MMHG | HEIGHT: 68 IN | HEART RATE: 70 BPM | DIASTOLIC BLOOD PRESSURE: 60 MMHG | BODY MASS INDEX: 21.88 KG/M2 | WEIGHT: 144.38 LBS

## 2019-03-12 DIAGNOSIS — N25.81 HYPERPARATHYROIDISM, SECONDARY RENAL: ICD-10-CM

## 2019-03-12 DIAGNOSIS — N18.5 CKD (CHRONIC KIDNEY DISEASE) STAGE 5, GFR LESS THAN 15 ML/MIN: Primary | ICD-10-CM

## 2019-03-12 DIAGNOSIS — Q61.3 POLYCYSTIC KIDNEY DISEASE: ICD-10-CM

## 2019-03-12 PROCEDURE — 99999 PR PBB SHADOW E&M-EST. PATIENT-LVL III: CPT | Mod: PBBFAC,,, | Performed by: INTERNAL MEDICINE

## 2019-03-12 PROCEDURE — 99214 PR OFFICE/OUTPT VISIT, EST, LEVL IV, 30-39 MIN: ICD-10-PCS | Mod: S$PBB,,, | Performed by: INTERNAL MEDICINE

## 2019-03-12 PROCEDURE — 99214 OFFICE O/P EST MOD 30 MIN: CPT | Mod: S$PBB,,, | Performed by: INTERNAL MEDICINE

## 2019-03-12 PROCEDURE — 99213 OFFICE O/P EST LOW 20 MIN: CPT | Mod: PBBFAC,NTX | Performed by: INTERNAL MEDICINE

## 2019-03-12 PROCEDURE — 99999 PR PBB SHADOW E&M-EST. PATIENT-LVL III: ICD-10-PCS | Mod: PBBFAC,,, | Performed by: INTERNAL MEDICINE

## 2019-03-12 NOTE — TELEPHONE ENCOUNTER
----- Message from Shawn Aranda sent at 3/12/2019  8:08 AM CDT -----  Contact: [phone]   Type:  Same Day Appointment Request    Caller is requesting a same day appointment.  Caller declined first available appointment listed below.    Name of Caller: Cailin Pickett  When is the first available appointment?03/28-19  Symptoms: pt states that she is having problems with her Kidneys  Best Call Back Number: [phone]  Additional Information: Pt only want to see Dr. Vinson

## 2019-03-12 NOTE — PROGRESS NOTES
Subjective:       Patient ID: Cailin Pickett is a 78 y.o.   female who presents for follow-up evaluation of CKD stage 5, HTN , SHPT, anemia        Favio Titus MD      HPI :  Cailin Pickett Is a pleasant 78-year-old  woman seen office today followup for above medical problems. She has solitary right kidney. Multiple cysts reported on the kidney. It measures about 16 cm. Patient Attended chronic kidney disease education and options class. she had a left arm AV fistula placed on 12/4/14 by Dr Foster . She denies recent hospitalizations or ER visits.  Recent lab results were discussed with the patient. Recent serum Cr is 5.1  mg/dL. S/p  left TKA on 5/31/18 , Dr Saleh at [facility] ,       Important Medical Info :       In 2013 patient was diagnosed with pancreatic tail carcinoma. she underwent chemo rx with 5-FU, she tolerated the cycles well. In 5/2013 she underwent resection of tail of pancreas, left nephrectomy, splenectomy, left adrenalectomy.     March 12, 2019: patient reports recent episode of nausea/vomiting. This has now resolved. She has appointment with renal transplant team in [place] on 3/14/19.       Past Medical History:   Diagnosis Date    Anemia     CKD (chronic kidney disease), stage III     Diabetes mellitus type II     Encounter for blood transfusion     Family history of colonic polyps 7/18/2017    Gout, unspecified     Hyperlipidemia     Hypertension     Neuropathy     Pancreatic cancer     Renal failure     Thyroid disease          Current Outpatient Medications on File Prior to Visit   Medication Sig Dispense Refill    allopurinol (ZYLOPRIM) 100 MG tablet TAKE 1 TABLET DAILY 90 tablet 3    amLODIPine (NORVASC) 5 MG tablet TAKE 1 TABLET TWICE A  tablet 2    carvedilol (COREG) 12.5 MG tablet Take 1 tablet (12.5 mg total) by mouth 2 (two) times daily with meals. 60 tablet 11    cetirizine (ZYRTEC) 10 MG tablet Take 10 mg by mouth  once daily.       estradiol (ESTRACE) 0.5 MG tablet TAKE 1 TABLET DAILY FOR 3 WEEKS PER MONTH, THEN DO NOT TAKE FOR FOURTH WEEK EACH MONTH 90 tablet 3    furosemide (LASIX) 40 MG tablet Take 1 tablet (40 mg total) by mouth once daily. 90 tablet 3    gabapentin (NEURONTIN) 100 MG capsule Take 1 capsule by mouth 2 (two) times daily.      GLUCOSAMINE HCL/CHONDR ZAVALETA A NA (OSTEO BI-FLEX ORAL) Take by mouth 2 (two) times daily.        hydrALAZINE (APRESOLINE) 100 MG tablet TAKE 1 TABLET EVERY 8 HOURS 270 tablet 3    hydrOXYzine HCl (ATARAX) 25 MG tablet TAKE 1 TABLET TWICE A DAY AS NEEDED FOR ITCHING 180 tablet 3    repaglinide (PRANDIN) 0.5 MG tablet TAKE 1 TABLET THREE TIMES A DAY BEFORE MEALS 270 tablet 2    sodium bicarbonate 650 MG tablet Take 1 tablet (650 mg total) by mouth 2 (two) times daily. 180 tablet 3    sucroferric oxyhydroxide (VELPHORO) 500 mg Chew Take 1 tablet (500 mg total) by mouth 3 (three) times daily before meals. 90 tablet 9    traMADol (ULTRAM) 50 mg tablet Take 1 tablet (50 mg total) by mouth every 12 (twelve) hours. 180 tablet 3     No current facility-administered medications on file prior to visit.          Review of Systems   Gastrointestinal: Positive for nausea and vomiting.     :      Constitutional: Negative for activity change, appetite change, fatigue and fever.   HENT: Negative for congestion, facial swelling, sore throat, trouble swallowing and voice change.    Eyes: Negative for redness and visual disturbance.   Respiratory: Negative for apnea, cough, chest tightness, shortness of breath and wheezing.    Cardiovascular: Negative for chest pain, palpitations and leg swelling.   Gastrointestinal: Negative for abdominal distention, abdominal pain, blood in stool, constipation, diarrhea, nausea and vomiting.   Genitourinary: Negative for decreased urine volume, difficulty urinating, dysuria, flank pain, frequency, hematuria, pelvic pain and urgency.   Musculoskeletal:  Negative  for back pain, gait problem and joint swelling.   Skin: Negative for color change and rash.   Neurological: Negative for dizziness, syncope, weakness and headaches.   Hematological: Does not bruise/bleed easily.   Psychiatric/Behavioral: Negative for agitation, behavioral problems and confusion. The patient is not nervous/anxious.          Objective:         Vitals:    03/12/19 1433   BP: (!) 160/60   Pulse: 70       Weight 153 lbs, last weight 149 lbs     Physical Exam  :        Constitutional: She is oriented to person, place, and time. She appears well-developed and well-nourished. No distress.    HENT: Head: Normocephalic and atraumatic. Oropharynx is clear and moist. No oropharyngeal exudate.    Eyes: Conjunctivae normal and EOM are normal. Pupils are equal, round, and reactive to light. No scleral icterus.    Neck: Normal range of motion. Neck supple. No JVD present. Carotid bruit is not present. No tracheal deviation present. No mass and no thyromegaly present.    Cardiovascular: Normal rate, regular rhythm, normal heart sounds and intact distal pulses. no gallop and no friction rub. No murmur heard.    Pulmonary/Chest: Effort normal and breath sounds normal. No respiratory distress. She has no wheezes. She has no rales. She exhibits no tenderness.    Abdominal: Soft. Bowel sounds are normal. She exhibits no distension, no abdominal bruit, no ascites and no mass. There is no hepatosplenomegaly. There is no rebound, no guarding and no CVA tenderness.   Musculoskeletal: Normal range of motion. She exhibits trace edema and no tenderness. LUE AV Fistula with good thrill, no local redness, tender to pressure on both sacroiliac joints. Trace edema in legs , well healed scar left knee   Lymphadenopathy: She has no cervical adenopathy.   Neurological: She is alert and oriented to person, place, and time. No cranial nerve deficit.    Skin: Skin is warm and intact. No rash noted. No erythema. No pallor.   Psychiatric:  She has a normal mood and affect. Her behavior is normal. Judgment normal.           Labs:    Lab Results   Component Value Date    CREATININE 5.1 (H) 02/19/2019    BUN 74 (H) 02/19/2019     02/19/2019    K 4.7 02/19/2019     02/19/2019    CO2 23 02/19/2019       Lab Results   Component Value Date    WBC 5.46 02/21/2019    HGB 10.3 (L) 02/21/2019    HCT 34.2 (L) 02/21/2019    MCV 94 02/21/2019     02/21/2019       Lab Results   Component Value Date    .0 (H) 11/12/2018    CALCIUM 9.7 02/19/2019    PHOS 5.0 (H) 02/19/2019       Lab Results   Component Value Date    URICACID 5.5 09/13/2018       Lab Results   Component Value Date    HGBA1C 5.1 02/19/2018       Impression and plan - 78-year-old  woman seen in office today in followup for following medical problems,     1. Chronic kidney disease stage 5 -  Recent Serum Creatinine is 5.1  mg/dL, Patient attended chronic kidney disease education and options class. She has a solitary right kidney which is enlarged at 16 cm secondary to polycystic kidney disease.   Left kidney was removed due to High-grade pancreatic cancer invading directly into the left kidney.   Patient was declined for a kidney transplant in 2014 because of advanced age and history of cancer.  Since she has been relatively stable she wants to be re-evaluated for renal transplant, she has an appointment on 3/14/19 for transplant evaluation,  Patient had a left arm AV fistula on 12/4/14 by Dr Foster. No overt uremia, but patient reports recent nausea/vomiting that has now resolved. She was educated to contact us if she develops clear uremia symptoms such as continuous nausea/vomiting, complete loss of appetite, generalized weakness, worsening LE edema.      2. Hypertension - blood pressure is  elevated,  patient was asked to increase hydralazine to tid dosing (from bid dosing).     3. Anemia - Hb 10.3 gms, following with Hematology/Oncology,     4. Secondary  hyperparathyroidism - PTH is Acceptable at 247 ,  Continue to follow. Cont  Velphoro 500 mgs tid , discussed low phos diet.     5. pancreatic carcinoma - s/p surgery + Chemo, follows with Oncology        6. Volume -   Lasix to 40 mg daily . Discussed salt and fluid restriction ,        7. Cystic kidney disease - follow,        8. Hyperuricemia/gout - continue allopurinol.        9. Metabolic acidosis - cont  sodium bicarbonate supplements     10. Left knee arthritis , status post left total knee arthroplasty.    We will see her in followup in 1 month.     Edis Martinez M.D.

## 2019-03-13 ENCOUNTER — TELEPHONE (OUTPATIENT)
Dept: CARDIOLOGY | Facility: CLINIC | Age: 79
End: 2019-03-13

## 2019-03-13 ENCOUNTER — PATIENT MESSAGE (OUTPATIENT)
Dept: NEPHROLOGY | Facility: HOSPITAL | Age: 79
End: 2019-03-13

## 2019-03-13 NOTE — TELEPHONE ENCOUNTER
03/13 Pt notified and to follow up as scheduled. Cm    ----- Message from Haroon Long MD sent at 3/11/2019  8:58 PM CDT -----  Please tell pt no changes on the echo

## 2019-03-13 NOTE — TELEPHONE ENCOUNTER
03/13 Pt notified and to follow up as scheduled. Cm    ----- Message from Haroon Long MD sent at 3/12/2019  9:37 PM CDT -----  Please tell pt carotid us shows nonobstructive ds

## 2019-03-14 ENCOUNTER — OFFICE VISIT (OUTPATIENT)
Dept: TRANSPLANT | Facility: CLINIC | Age: 79
End: 2019-03-14
Payer: MEDICARE

## 2019-03-14 ENCOUNTER — CLINICAL SUPPORT (OUTPATIENT)
Dept: INFECTIOUS DISEASES | Facility: CLINIC | Age: 79
End: 2019-03-14
Payer: MEDICARE

## 2019-03-14 VITALS
WEIGHT: 146.81 LBS | TEMPERATURE: 98 F | BODY MASS INDEX: 24.46 KG/M2 | DIASTOLIC BLOOD PRESSURE: 66 MMHG | HEIGHT: 65 IN | RESPIRATION RATE: 18 BRPM | SYSTOLIC BLOOD PRESSURE: 151 MMHG | OXYGEN SATURATION: 99 % | HEART RATE: 50 BPM

## 2019-03-14 DIAGNOSIS — N18.5 CKD (CHRONIC KIDNEY DISEASE) STAGE 5, GFR LESS THAN 15 ML/MIN: ICD-10-CM

## 2019-03-14 DIAGNOSIS — Z76.82 ORGAN TRANSPLANT CANDIDATE: ICD-10-CM

## 2019-03-14 DIAGNOSIS — E11.22 TYPE 2 DIABETES MELLITUS WITH STAGE 5 CHRONIC KIDNEY DISEASE NOT ON CHRONIC DIALYSIS, WITHOUT LONG-TERM CURRENT USE OF INSULIN: Chronic | ICD-10-CM

## 2019-03-14 DIAGNOSIS — D63.1 ANEMIA IN STAGE 5 CHRONIC KIDNEY DISEASE, NOT ON CHRONIC DIALYSIS: Primary | ICD-10-CM

## 2019-03-14 DIAGNOSIS — N18.5 TYPE 2 DIABETES MELLITUS WITH STAGE 5 CHRONIC KIDNEY DISEASE NOT ON CHRONIC DIALYSIS, WITHOUT LONG-TERM CURRENT USE OF INSULIN: Chronic | ICD-10-CM

## 2019-03-14 DIAGNOSIS — E89.0 S/P PARTIAL THYROIDECTOMY: ICD-10-CM

## 2019-03-14 DIAGNOSIS — N25.81 SECONDARY HYPERPARATHYROIDISM OF RENAL ORIGIN: ICD-10-CM

## 2019-03-14 DIAGNOSIS — Z01.818 PRE-TRANSPLANT EVALUATION FOR KIDNEY TRANSPLANT: ICD-10-CM

## 2019-03-14 DIAGNOSIS — Q89.01 ASPLENIA: ICD-10-CM

## 2019-03-14 DIAGNOSIS — N18.5 ANEMIA IN STAGE 5 CHRONIC KIDNEY DISEASE, NOT ON CHRONIC DIALYSIS: Primary | ICD-10-CM

## 2019-03-14 DIAGNOSIS — Z85.07 HISTORY OF PANCREATIC CANCER: ICD-10-CM

## 2019-03-14 DIAGNOSIS — D50.8 OTHER IRON DEFICIENCY ANEMIA: ICD-10-CM

## 2019-03-14 PROCEDURE — 99204 PR OFFICE/OUTPT VISIT, NEW, LEVL IV, 45-59 MIN: ICD-10-PCS | Mod: S$PBB,,, | Performed by: TRANSPLANT SURGERY

## 2019-03-14 PROCEDURE — 90734 MENACWYD/MENACWYCRM VACC IM: CPT | Mod: PBBFAC

## 2019-03-14 PROCEDURE — 99204 OFFICE O/P NEW MOD 45 MIN: CPT | Mod: S$PBB,,, | Performed by: TRANSPLANT SURGERY

## 2019-03-14 PROCEDURE — 99999 PR PBB SHADOW E&M-EST. PATIENT-LVL V: ICD-10-PCS | Mod: PBBFAC,,, | Performed by: INTERNAL MEDICINE

## 2019-03-14 PROCEDURE — 97802 MEDICAL NUTRITION INDIV IN: CPT | Mod: PBBFAC,TXP | Performed by: DIETITIAN, REGISTERED

## 2019-03-14 PROCEDURE — 99999 PR PBB SHADOW E&M-EST. PATIENT-LVL V: CPT | Mod: PBBFAC,,, | Performed by: INTERNAL MEDICINE

## 2019-03-14 PROCEDURE — 99205 PR OFFICE/OUTPT VISIT, NEW, LEVL V, 60-74 MIN: ICD-10-PCS | Mod: S$PBB,,, | Performed by: INTERNAL MEDICINE

## 2019-03-14 PROCEDURE — 90471 IMMUNIZATION ADMIN: CPT | Mod: PBBFAC,TXP

## 2019-03-14 PROCEDURE — 99205 OFFICE O/P NEW HI 60 MIN: CPT | Mod: S$PBB,,, | Performed by: INTERNAL MEDICINE

## 2019-03-14 PROCEDURE — 99215 OFFICE O/P EST HI 40 MIN: CPT | Mod: PBBFAC,25,TXP | Performed by: INTERNAL MEDICINE

## 2019-03-14 PROCEDURE — 99204 OFFICE O/P NEW MOD 45 MIN: CPT | Mod: S$PBB,,, | Performed by: NURSE PRACTITIONER

## 2019-03-14 PROCEDURE — G0010 ADMIN HEPATITIS B VACCINE: HCPCS | Mod: PBBFAC

## 2019-03-14 PROCEDURE — 99204 PR OFFICE/OUTPT VISIT, NEW, LEVL IV, 45-59 MIN: ICD-10-PCS | Mod: S$PBB,,, | Performed by: NURSE PRACTITIONER

## 2019-03-14 PROCEDURE — G0009 ADMIN PNEUMOCOCCAL VACCINE: HCPCS | Mod: PBBFAC,TXP

## 2019-03-14 RX ORDER — MULTIVIT WITH IRON,MINERALS
1 TABLET,CHEWABLE ORAL 2 TIMES DAILY
COMMUNITY

## 2019-03-14 NOTE — PROGRESS NOTES
PHARM.D. PRE-TRANSPLANT NOTE:    This patient's medication therapy was evaluated as part of her pre-transplant evaluation.    The following pharmacologic concerns were noted: Patient currently on estradiol.    This patient's medication profile was reviewed for contraindications for DAA Hepatitis C therapy:    [X]  No current inducers of CYP 3A4 or PGP  [X]  No amiodarone on this patient's EMR profile in the last 24 months  [X]  No past or current atrial fibrillation on this patient's EMR profile       Current Outpatient Medications   Medication Sig Dispense Refill    allopurinol (ZYLOPRIM) 100 MG tablet TAKE 1 TABLET DAILY 90 tablet 3    amLODIPine (NORVASC) 5 MG tablet TAKE 1 TABLET TWICE A  tablet 2    carvedilol (COREG) 12.5 MG tablet Take 1 tablet (12.5 mg total) by mouth 2 (two) times daily with meals. 60 tablet 11    cetirizine (ZYRTEC) 10 MG tablet Take 10 mg by mouth once daily.       estradiol (ESTRACE) 0.5 MG tablet TAKE 1 TABLET DAILY FOR 3 WEEKS PER MONTH, THEN DO NOT TAKE FOR FOURTH WEEK EACH MONTH 90 tablet 3    furosemide (LASIX) 40 MG tablet Take 1 tablet (40 mg total) by mouth once daily. 90 tablet 3    gabapentin (NEURONTIN) 100 MG capsule Take 1 capsule by mouth 2 (two) times daily.      glucosamine-chondroitin (OSTEO BI-FLEX) 250-200 mg Tab Take 1 tablet by mouth 2 (two) times daily.      hydrALAZINE (APRESOLINE) 100 MG tablet TAKE 1 TABLET EVERY 8 HOURS 270 tablet 3    hydrOXYzine HCl (ATARAX) 25 MG tablet TAKE 1 TABLET TWICE A DAY AS NEEDED FOR ITCHING 180 tablet 3    repaglinide (PRANDIN) 0.5 MG tablet TAKE 1 TABLET THREE TIMES A DAY BEFORE MEALS 270 tablet 2    sodium bicarbonate 650 MG tablet Take 1 tablet (650 mg total) by mouth 2 (two) times daily. 180 tablet 3    sucroferric oxyhydroxide (VELPHORO) 500 mg Chew Take 1 tablet (500 mg total) by mouth 3 (three) times daily before meals. 90 tablet 9    traMADol (ULTRAM) 50 mg tablet Take 1 tablet (50 mg total) by mouth  every 12 (twelve) hours. 180 tablet 3     No current facility-administered medications for this visit.          Currently the patient is responsible for preparing / administering this patient's medications on a daily basis.  I am available for consultation and can be contacted, as needed by the other members of the Kidney Transplant team.

## 2019-03-14 NOTE — PROGRESS NOTES
Transplant Nephrology  Kidney Transplant Recipient Evaluation    Referring Physician: Tank Vinson  Current Nephrologist: Tank Vinson    Subjective:   CC:  Initial evaluation of kidney transplant candidacy.    HPI:  Ms. Pickett is a 78 y.o. year old Black or  female who has presented to be evaluated as a potential kidney transplant recipient.  She has advanced kidney disease secondary to HTN.  Patient is currently pre-dialysis. She has a LUE AV fistula for dialysis access.     HTN was diagnosed at age 46. Since then she was getting treatment. No h/o CHF or strokes    DM was diagnosed 5 yrs ago    Patient also has h/o pancreatic cancer treated with chemotherapy radiation therapy and surgery . She underwent resection surgery for her pancreatic tail high-grade carcinoma on May 15, 2013 by Dr. Carter at [facility], [place]. The patient had previously completed neoadjuvant chemotherapy and 5-FU chemoradiation with excellent response. Surgery included:  Distal pancreatectomy, splenectomy, left nephrectomy and adrenalectomy on May 15, 2013    She was told about the CKD several yrs ago but she cant remember when. According with our records she has CKD since 2004 when her creatinine was between 1.2 to 1.4. She refers that her appetite is poor, she eats twice a day. She takes care of the garden. She spends time cooking and some cleaning. Able to walk short distances.     7/2013 syncope episode seen by cardiology.         2D echo 3/2019    CONCLUSIONS     1 - Concentric hypertrophy.     2 - No wall motion abnormalities.     3 - Normal left ventricular systolic function (EF 60-65%).     4 - Impaired LV relaxation, elevated LAP (grade 2 diastolic dysfunction).     5 - Normal right ventricular systolic function .     6 - Pulmonary hypertension. The estimated PA systolic pressure is 42 mmHg.     7 - Mild mitral regurgitation.     8 - Mild tricuspid regurgitation.     9 - Small pericardial effusion.      PET scan 2/2019  Impression       No definite FDG PET/CT findings to suggest recurrent or metastatic disease.  Small cystic structure anterior to the left psoas muscle which is indeterminate.  This is not hypermetabolic but has slowly increased in size since 2013.  Recommend attention on follow-up imaging.           Previous Transplant: no    Past Medical and Surgical History: Ms. Pickett  has a past medical history of Anemia, CKD (chronic kidney disease) stage 5, GFR less than 15 ml/min, CKD (chronic kidney disease), stage III, Diabetes mellitus type II, Encounter for blood transfusion, Family history of colonic polyps, Gout, unspecified, Hyperlipidemia, Hypertension, Neuropathy, Pancreatic cancer, Renal failure, Secondary hyperparathyroidism of renal origin, and Thyroid disease.  She has a past surgical history that includes Thyroidectomy, partial; Hysterectomy; Nephrectomy (Left, 5/2013); Splenectomy, total; Pancreas surgery; Colonoscopy (2011); Ventriculoatrial shunt (Left, 12/4/2014 ); Colonoscopy (N/A, 7/18/2017); and Knee surgery (Left, 05/31/2018).    Past Social and Family History: Ms. Pickett reports that she quit smoking about 18 years ago. She started smoking about 57 years ago. She has a 35.00 pack-year smoking history. she has never used smokeless tobacco. She reports that she does not drink alcohol or use drugs. Her family history includes Breast cancer in her mother, sister, and sister; Cancer in her brother, father, and mother; Diabetes in her brother; Heart disease (age of onset: 60) in her mother; Hypertension in her mother; Kidney disease in her brother; Stroke in her mother.    Review of Systems     Skin: no skin rash  CNS; no headaches, blurred vision, seizure, or syncope  ENT: No JVD,  Adenopathies,  nasal congestion. No oral lesions  Cardiac: No chest pain, dyspnea, claudication, edema or palpitations  Respiratory: No SOB, cough, hemoptysis   Gastro-intestinal: No diarrhea, constipation,  "abdominal pain, nausea, vomit. No ascitis  Genitourinary: no hematuria, dysuria, frequency, frequency  Musculoskeletal: joint pain, arthritis or vasculitic changes  Psych: alert awake, oriented, No cranial nerves deficit.          Objective:   Blood pressure (!) 151/66, pulse (!) 50, temperature 98 °F (36.7 °C), temperature source Oral, resp. rate 18, height 5' 5.04" (1.652 m), weight 66.6 kg (146 lb 13.2 oz), last menstrual period 07/27/1982, SpO2 99 %.body mass index is 24.4 kg/m².    Physical Exam   Able to walk for 2 to 3 minutes only before she got really tired, even though the 02 sat was 99%  Head: normocephalic  Neck: No JVD, cervical axillary, or femoral adenopathies  Heart: no murmurs, Normal s1 and s2, No gallops, no rubs, No murmurs  Lungs; CTA, good respiratory effort, no crackles  Abdomen: soft, non tender, no splenomegaly or hepatomegaly, no massess, no bruits  Extremities: No edema, skin rash, joint pain. LUE AVF  SNC: awake, alert oriented. Cranial nerves are intact, no focalized, sensitivity and strength preserved      Labs:  Lab Results   Component Value Date    WBC 3.85 (L) 03/14/2019    HGB 10.0 (L) 03/14/2019    HCT 32.4 (L) 03/14/2019     03/14/2019    K 4.2 03/14/2019     03/14/2019    CO2 23 03/14/2019    BUN 73 (H) 03/14/2019    CREATININE 6.2 (H) 03/14/2019    EGFRNONAA 6.0 (A) 03/14/2019    CALCIUM 9.7 03/14/2019    PHOS 4.9 (H) 03/14/2019    MG 1.9 07/28/2013    ALBUMIN 3.7 03/14/2019    AST 19 03/14/2019    ALT 17 03/14/2019    UTPCR 0.55 (H) 05/21/2018    .0 (H) 03/14/2019       Lab Results   Component Value Date    PREALBUMIN 21 05/09/2013    BILIRUBINUA Negative 02/19/2019    PROTEINUA 2+ (A) 02/19/2019    NITRITE Negative 02/19/2019    RBCUA 0 02/19/2019    WBCUA 0 02/19/2019       No results found for: HLAABCTYPE    Labs were reviewed with the patient.    Assessment:     1. Anemia in stage 5 chronic kidney disease, not on chronic dialysis    2. CKD (chronic kidney " disease) stage 5, GFR less than 15 ml/min    3. Type 2 diabetes mellitus with stage 5 chronic kidney disease not on chronic dialysis, without long-term current use of insulin    4. Secondary hyperparathyroidism of renal origin    5. History of pancreatic cancer    6. Other iron deficiency anemia    7. S/P partial thyroidectomy        Plan:     Transplant Candidacy:   Based on available information, Ms. Pickett is a high-risk kidney transplant candidate.   Meets center eligibility for accepting HCV+ donor offer - yes.  Patient educated on HCV+ donors. Cailin is willing to accept HCV+ donor offer - yes   Patient is a candidate for KDPI > 85 kidney donor offer - yes.  Final determination of transplant candidacy will be made once workup is complete and reviewed by the selection committee.    Able to walk only for 1 to 2 minutes before she got tired during the clinic visit.     High risk due to age, no living donors, frailty index, past h/o major abdominal surgery (nephrectomy splenectomy and distal pancreatectomy) associated with malignancy of the pancreas receiving radiation therapy and chemotherapy    Extensive education provided    Records available reviewed    I clearly explained to the patient and care giver high risk for kidney transplant. They understood. I told them that I was going to discuss with the team for a final decision. Patient will see providers today but will cancel other diagnostics scheduled for today.     All questions answered    James Ramsey MD       [facility] Patient Status  Functional Status: 60% - Requires occasional assistance but is able to care for needs  Physical Capacity: No Limitations

## 2019-03-14 NOTE — PROGRESS NOTES
Transplant Surgery  Kidney Transplant Recipient Evaluation    Referring Physician: Tank Vinson  Current Nephrologist: Tank Vinson    Subjective:     Reason for Visit: evaluate transplant candidacy    History of Present Illness: Cailin Pickett is a 78 y.o. year old female undergoing transplant evaluation.    Dialysis History: Cailin is pre-dialysis.      Transplant History: N/A    Etiology of Renal Disease: Diabetes Mellitus - Type II (based on medical records from referral).    Review of Systems   Constitutional: Negative for activity change, appetite change, chills, fatigue and fever.   HENT: Negative for sore throat and trouble swallowing.    Eyes: Negative for visual disturbance.   Respiratory: Negative for cough, chest tightness and shortness of breath.    Cardiovascular: Negative for chest pain, palpitations and leg swelling.   Gastrointestinal: Negative for abdominal distention, abdominal pain, blood in stool, constipation, diarrhea, nausea and vomiting.   Endocrine: Negative for polyuria.   Genitourinary: Negative for decreased urine volume, difficulty urinating, dysuria, flank pain, frequency and hematuria.   Musculoskeletal: Negative for gait problem, myalgias and neck stiffness.   Skin: Negative for rash and wound.   Neurological: Negative for dizziness, tremors, seizures, weakness, light-headedness and headaches.   Hematological: Negative for adenopathy.   Psychiatric/Behavioral: Negative for agitation, confusion and sleep disturbance.       Objective:     Physical Exam:  Constitutional:   Vitals reviewed: yes   Well-nourished and well-groomed: yes  Eyes:   Sclerae icteric: no   Extraocular movements intact: yes  GI:    Bowel sounds normal: yes   Tenderness: no    If yes, quadrant/location: not applicable   Palpable masses: no    If yes, quadrant/location: not applicable   Hepatosplenomegaly: no   Ascites: no   Hernia: no    If yes, type/location: not applicable   Surgical scars: yes    If yes,  type/location: midline  Resp:   Effort normal: yes   Breath sounds normal: yes    CV:   Regular rate and rhythm: yes   Heart sounds normal: yes   Femoral pulses normal: yes   Extremities edematous: no  Skin:   Rashes or lesions present: no    If yes, describe:not applicable   Jaundice:: no    Musculoskeletal:   Gait normal: yes   Strength normal: yes  Psych:   Oriented to person, place, and time: yes   Affect and mood normal: yes    Additional comments: not applicable    Counseling: We provided Cailin Pickett with a group education session today.  We discussed kidney transplantation at length with her, including risks, potential complications, and alternatives in the management of her renal failure.  The discussion included complications related to anesthesia, bleeding, infection, primary nonfunction, and ATN.  I discussed the typical postoperative course, length of hospitalization, the need for long-term immunosuppression, and the need for long-term routine follow-up.  I discussed living-donor and -donor transplantation and the relative advantages and disadvantages of each.  I also discussed average waiting times for both living donation and  donation.  I discussed national and center-specific survival rates.  I also mentioned the potential benefit of multicenter listing to candidates listed with centers within more than one organ procurement organization.  All questions were answered.    Final determination of transplant candidacy will be made once evaluation is complete and reviewed by the Kidney & Kidney/Pancreas Selection Committee.         Transplant Surgery - Candidacy   Assessment/Plan:   Cailin Pickett is pre-dialysis with CKD stage 4 (GFR 15-29 mL/min). I see no surgical contraindication to placing a kidney transplant. Based on available information, Cailin Pickett is a suitable kidney transplant candidate.     Katie Coon MD

## 2019-03-14 NOTE — PROGRESS NOTES
Transplant Recipient Adult Psychosocial Assessment    Cailin Pickett  [address] [place] [address]  Telephone Information:   Mobile [phone]   Home  [phone] (home)  Work  There is no work phone number on file.  E-mail  [email or web address] ???    Sex: female  YOB: 1940  Age: 78 y.o.    Encounter Date: 3/14/2019  [place] Citizen: yes  Primary Language: English   Needed: no    Emergency Contact:  Name: Jaxon Pickett   Relationship:   Address: same as patient  Phone Numbers:  [phone] (home), [phone] (mobile)    Family/Social Support:   Number of dependents/: n/a  Marital history: Pt has been  to  for 33 years  Other family dynamics: Pt has 2 sisters and 2 brothers. One brother lives close to her and will be available at time of transplant.     Household Composition:  Name: Blair Pickett  Age: 80  Relationship:   Does person drive? yes    Do you and your caregivers have access to reliable transportation? yes  PRIMARY CAREGIVER: Blair Pickett will be primary caregiver, phone number [phone].      provided in-depth information to patient and caregiver regarding pre- and post-transplant caregiver role.   strongly encourages patient and caregiver to have concrete plan regarding post-transplant care giving, including back-up caregiver(s) to ensure care giving needs are met as needed.    Patient and Caregiver states understanding all aspects of caregiver role/commitment and is able/willing/committed to being caregiver to the fullest extent necessary.    Patient and Caregiver verbalizes understanding of the education provided today and caregiver responsibilities.         remains available. Patient and Caregiver agree to contact  in a timely manner if concerns arise.      Able to take time off work without financial concerns: yes.     Additional Significant Others who will Assist with  Transplant:  Name: Blaine BrittonXejiss-[phone] pt reports that her brother is in good health and able to help as needed.   Age: 81  City: [place] State: [place]  Relationship: brother  Does person drive? yes      Living Will: no  Healthcare Power of : no  Advance Directives on file: <Received per medical record.  Verbally reviewed LW/HCPA information.   provided patient with copy of LW/HCPA documents and provided education on completion of forms.    Living Donors: No. Education and resource information given to patient.    Highest Education Level: Associate/Bachelor Degree  Reading Ability: college  Reports difficulty with: N/A  Learns Best By:  Multisensory and written     Status: no  VA Benefits: no     Working for Income: No  If no, reason not working: Patient Choice - Retired  Patient is retired    Spouse/Significant Other Employment: pt's  is retired from [employer]    Disabled: no    Monthly Income:  SS Reitrement: $combined pt and  receive ~39k a year  Able to afford all costs now and if transplanted, including medications: yes  Patient and Caregiver verbalizes understanding of personal responsibilities related to transplant costs and the importance of having a financial plan to ensure that patients transplant costs are fully covered.      provided fundraising information/education.  Patient and Caregiver verbalizes understanding.   remains available.    Insurance:   Payor/Plan Subscr  Sex Relation Sub. Ins. ID Effective Group Num   1. MEDICARE - ME* ERIK DEL RIO* 1940 Female  [id number] NO                                   [address]   2. AETNA - AETNA* LONNIE DEL RIO OLAMIDE 3/8/1938 Male  [id number]                                   [address]     Primary Insurance (for UNOS reporting): Public Insurance - Medicare FFS (Fee For Service)  Secondary Insurance (for UNOS reporting): Public Insurance - Medicare  FFS (Fee For Service) Aetna is Part D  Patient and Caregiver verbalizes clear understanding that patient may experience difficulty obtaining and/or be denied insurance coverage post-surgery. This includes and is not limited to disability insurance, life insurance, health insurance, burial insurance, long term care insurance, and other insurances.    Patient and Caregiver also reports understanding that future health concerns related to or unrelated to transplantation may not be covered by patient's insurance.  Resources and information provided and reviewed.      Patient and Caregiver provides verbal permission to release any necessary information to outside resources for patient care and discharge planning.  Resources and information provided are reviewed.      Dialysis Adherence:  Patient and Caregiver reports she is pre-dialysis    Infusion Service: patient utilizing? no  Home Health: patient utilizing? no  DME: no  Pulmonary/Cardiac Rehab: Pt does reports that she had knee surgery and has fully recovered. Pt finished PT and all after care   ADLS:  Pt reports that she has trouble with house keeping duties, struggles with bending to clean things, has loss of appetite, and often has trouble remembering things. Pt reports that she always remembers her medicines, and appointments but that is because she writes all of them down.     Adherence:   Pt reports complete adherence to all medical appointments.  Adherence education and counseling provided.     Per History Section:  Past Medical History:   Diagnosis Date    Anemia     CKD (chronic kidney disease) stage 5, GFR less than 15 ml/min 3/14/2019    CKD (chronic kidney disease), stage III     Diabetes mellitus type II     Encounter for blood transfusion     Family history of colonic polyps 7/18/2017    Gout, unspecified     Hyperlipidemia     Hypertension     Neuropathy     Pancreatic cancer     Renal failure     Secondary hyperparathyroidism of renal  origin 3/14/2019    Thyroid disease      Social History     Tobacco Use    Smoking status: Former Smoker     Packs/day: 1.00     Years: 35.00     Pack years: 35.00     Start date: 3/14/1962     Last attempt to quit: 2001     Years since quittin.2    Smokeless tobacco: Never Used   Substance Use Topics    Alcohol use: No     Social History     Substance and Sexual Activity   Drug Use No     Social History     Substance and Sexual Activity   Sexual Activity No       Per Today's Psychosocial:  Tobacco: none, patient denies any use.  Alcohol: none, patient denies any use.  Illicit Drugs/Non-prescribed Medications: none, patient denies any use.    Patient and Caregiver states clear understanding of the potential impact of substance use as it relates to transplant candidacy and is aware of possible random substance screening.  Substance abstinence/cessation counseling, education and resources provided and reviewed.     Arrests/DWI/Treatment/Rehab: patient denies    Psychiatric History:    Mental Health: pt denies any overwhelming feelings of sadness or anxiety at this time. Patient denies being dx with any mental health disorders of any kind. However, during assessment pt and  revealed that their daughter(pt's step daughter) had a liver transplant at [facility] and ultimately passed away a year after transplant due to other medical issues. SW asked pt and  if have ever had grief counseling or needed assistance with the grieving process. Pt and  denied any issues and report that they are fine and have processed everything.   Psychiatrist/Counselor: pt denies  Medications:  Pt denies  Suicide/Homicide Issues: pt denies any previous or current through of SI/HI.    Safety at home: pt confirms feelings safe at home and denies any history of trauma of any kind.      Knowledge: Patient and Caregiver states having clear understanding and realistic expectations regarding the potential risks and  potential benefits of organ transplantation and organ donation, agrees to discuss with health care team members and support system members and to utilize available resources and express questions and/or concerns in order to further facilitate the pt informed decision-making.  Resources and information provided and reviewed.     Patient and Caregiver is aware of [facility]'s affiliation and/or partnership with agencies in home health care, LTAC, SNF, [facility], and other hospitals and clinics.    Understanding: Patient and Caregiver reports having a clear understanding of the many lifetime commitments involved with being a transplant recipient, including costs, compliance, medications, lab work, procedures, appointments, concrete and financial planning, preparedness, timely and appropriate communication of concerns, abstinence (ETOH, tobacco, illicit non-prescribed drugs), adherence to all health care team recommendations, support system and caregiver involvement, appropriate and timely resource utilization and follow-through, mental health counseling as needed/recommended, and patient and caregiver responsibilities.  Social Service Handbook, resources and detailed educational information provided and reviewed.  Educational information provided.    Patient and Caregiver also reports current and expected compliance with health care regime and states having a clear understanding of the importance of compliance.      Patient and Caregiver reports a clear understanding that risks and benefits may be involved with organ transplantation and with organ donation.      Patient and Caregiver also reports clear understanding that psychosocial risk factors may affect patient, and include but are not limited to feelings of depression, generalized anxiety, anxiety regarding dependence on others, post traumatic stress disorder, feelings of guilt and other emotional and/or mental concerns, and/or exacerbation of existing mental health  "concerns.  Detailed resources provided and discussed.     Patient and Caregiver agrees to access appropriate resources in a timely manner as needed and/or as recommended, and to communicate concerns appropriately.  Patient and Caregiver also reports a clear understanding of treatment options available.      reviewed education, provided additional information, and answered questions.    Feelings or Concerns: pt denies any concerns or feelings at this time.     Coping: pt and  reports that they are coping well.     Goals: Pt reports "Oh, I don't even know what my goals would be, but I know I want to live".  Patient referred to Vocational Rehabilitation.    Interview Behavior: Patient and Caregiver presents as alert and oriented x 4, pleasant, good eye contact, well groomed, recall good, concentration/judgement good, average intelligence, calm, communicative, cooperative and asking and answering questions appropriately.          Transplant Social Work - Candidacy  Assessment/Plan:     Psychosocial Suitability: Patient presents as a suitable candidate for kidney transplant at this time. Based on psychosocial risk factors, patient presents as low risk, due to suitable caregiver plan, financial plan in place, and lack of psychosocial concerns. .    Recommendations/Additional Comments: [facility] recommends that pt conduct fundraising to assist pt with pay for cost of medication, food,gas, and other transplant related expenses. [facility] recommends that pt remain free of all tobacco,ETOH, and substance use. SW remains available to assist with any concerns that may arise as pt navigates through the transplant process.      Constanza Duong LMSW         "

## 2019-03-14 NOTE — LETTER
March 15, 2019        Tank Vinson  [address]  [place] [address]  Phone: [phone]  Fax: [phone]             [facility]  [address]  [facility] [address]  Phone: [phone]   Patient: Cailin Pickett   MR Number: [id number]   YOB: 1940   Date of Visit: 3/14/2019       Dear Dr. Tank Vinson    Thank you for referring Cailin Pickett to me for evaluation. Attached you will find relevant portions of my assessment and plan of care.    If you have questions, please do not hesitate to call me. I look forward to following Cailin Pickett along with you.    Sincerely,    James Ramsey MD    Enclosure    If you would like to receive this communication electronically, please contact [email or web address] or [phone] to request [facility] [employer] access.    [facility] [employer] is a tool which provides read-only access to select patient information with whom you have a relationship. Its easy to use and provides real time access to review your patients record including encounter summaries, notes, results, and demographic information.    If you feel you have received this communication in error or would no longer like to receive these types of communications, please e-mail [email or web address]

## 2019-03-14 NOTE — PROGRESS NOTES
INITIAL PATIENT EDUCATION NOTE    Ms. Cailin Pickett was seen in pre-kidney transplant clinic for evaluation for kidney, kidney/pancreas or pancreas only transplant.  The patient attended a group education session that discussed/reviewed the following aspects of transplantation: evaluation and selection committee process, UNOS waitlist management/multiple listings, types of organs offered (KDPI < 85%, KDPI > 85%, PHS increased risk, DCD, HCV+), financial aspects, surgical procedures, dietary instruction pre- and post-transplant, health maintenance pre- and post-transplant, post-transplant hospitalization and outpatient follow-up, potential to participate in a research protocol, and medication management and side effects.  A question and answer session was provided after the presentation.    The patient was seen by all members of the multi-disciplinary team to include: Nephrologist/PA, Surgeon, , Transplant Coordinator, , Pharmacist and Dietician (if applicable).    The patient reviewed and signed all consents for evaluation which were witnessed and sent to scanning into the EPIC chart.    The patient was given an education book and plan for further evaluation based on her individual assessment.      The patient was encouraged to call with any questions or concerns.

## 2019-03-14 NOTE — PROGRESS NOTES
TRANSPLANT NUTRITIONAL ASSESSMENT    Referring Provider: Amirah Hollins MD    Reason for Visit: Pre-kidney transplant work-up (pre-dialysis)    Age: 78 y.o.  Sex: female    Patient Active Problem List   Diagnosis    H/O pancreatic cancer    Type II diabetes mellitus    Hypertension associated with diabetes    Hyperlipidemia    Anemia in stage 5 chronic kidney disease, not on chronic dialysis    Allergic rhinitis due to pollen    Limb pain    SOB (shortness of breath)    Leg swelling    S/P partial thyroidectomy    Iron deficiency anemia    Hot flashes    Peripheral neuropathy    Colon cancer screening    Special screening for malignant neoplasms, colon    Family history of colonic polyps    History of colon polyps    Colon polyp    Diverticulosis of large intestine without hemorrhage    Bruit    CKD (chronic kidney disease) stage 5, GFR less than 15 ml/min    Secondary hyperparathyroidism of renal origin     Past Medical History:   Diagnosis Date    Anemia     CKD (chronic kidney disease) stage 5, GFR less than 15 ml/min 3/14/2019    CKD (chronic kidney disease), stage III     Diabetes mellitus type II     Encounter for blood transfusion     Family history of colonic polyps 7/18/2017    Gout, unspecified     Hyperlipidemia     Hypertension     Neuropathy     Pancreatic cancer     Renal failure     Secondary hyperparathyroidism of renal origin 3/14/2019    Thyroid disease      Lab Results   Component Value Date     03/14/2019    K 4.2 03/14/2019    PHOS 4.9 (H) 03/14/2019    MG 1.9 07/28/2013    CHOL 284 (H) 03/14/2019    HDL 75 03/14/2019    TRIG 111 03/14/2019    ALBUMIN 3.7 03/14/2019    PREALBUMIN 21 05/09/2013    HGBA1C 5.1 03/14/2019    CALCIUM 9.7 03/14/2019     Other Pertinent Labs: none    Current Outpatient Medications   Medication Sig    allopurinol (ZYLOPRIM) 100 MG tablet TAKE 1 TABLET DAILY    amLODIPine (NORVASC) 5 MG tablet TAKE 1 TABLET TWICE A DAY     "carvedilol (COREG) 12.5 MG tablet Take 1 tablet (12.5 mg total) by mouth 2 (two) times daily with meals.    cetirizine (ZYRTEC) 10 MG tablet Take 10 mg by mouth once daily.     estradiol (ESTRACE) 0.5 MG tablet TAKE 1 TABLET DAILY FOR 3 WEEKS PER MONTH, THEN DO NOT TAKE FOR FOURTH WEEK EACH MONTH    furosemide (LASIX) 40 MG tablet Take 1 tablet (40 mg total) by mouth once daily.    gabapentin (NEURONTIN) 100 MG capsule Take 1 capsule by mouth 2 (two) times daily.    glucosamine-chondroitin (OSTEO BI-FLEX) 250-200 mg Tab Take 1 tablet by mouth 2 (two) times daily.    hydrALAZINE (APRESOLINE) 100 MG tablet TAKE 1 TABLET EVERY 8 HOURS    hydrOXYzine HCl (ATARAX) 25 MG tablet TAKE 1 TABLET TWICE A DAY AS NEEDED FOR ITCHING    repaglinide (PRANDIN) 0.5 MG tablet TAKE 1 TABLET THREE TIMES A DAY BEFORE MEALS    sodium bicarbonate 650 MG tablet Take 1 tablet (650 mg total) by mouth 2 (two) times daily.    sucroferric oxyhydroxide (VELPHORO) 500 mg Chew Take 1 tablet (500 mg total) by mouth 3 (three) times daily before meals.    traMADol (ULTRAM) 50 mg tablet Take 1 tablet (50 mg total) by mouth every 12 (twelve) hours.     No current facility-administered medications for this visit.      Allergies: Allegra [fexofenadine] and Codeine    Ht Readings from Last 1 Encounters:   03/14/19 5' 5.04" (1.652 m)     Wt Readings from Last 1 Encounters:   03/14/19 66.6 kg (146 lb 13.2 oz)      BMI: Body mass index is 24.4 kg/m².    Usual Weight: 150-153 lb  Weight Change/Time: no significant change  Current Diet: regular  Appetite/Current Intake: fair   Exercise/Physical Activity: functional in ADL's, no exercise  Nutritional/Herbal Supplements: none  Potential Food/Medication Interactions: reviewed  Chewing/Swallowing Problems: none  Symptoms: nausea and vomiting  Assessment of Lab Values: Phos 4.9, Chol 284  Support System: spouse present and voices support in pt's nutrition/diet regimen    Estimated Kcal Need: 2161-2258 " kcal (25-30 kcal/kg)  Estimated Protein Need: 54-67 gm (0.8-1 gm/kg)    Nutritional History:   Pt reports fair appetite, some n/v in the morning just recently, some sweating as well. She denies having a fever when this happens. Pt has some swelling in legs at times, some weight loss recently due to decreased intake, about 5-7 lb. Pt uses some salt in cooking, does not add to her plate. Pt reported the following diet recall:  B: 1 toast, or 1 biscuit, 1 egg, coffee, maybe cereal (frosted flakes) w/ 1/2 & 1/2  L: skip  Snack: fruit, cheese, pb&J  D: usually at home; meat/vegetable/starch, sometimes salad, fresh or frozen, maybe canned corn  Beverages: a little Coke/Sprite, water or fruit juice (cranberry)    Nutritional Diagnoses  Problem: food- and nutrition-related knowledge deficit  Etiology: r/t no previous edu on pre kidney pre HD diet recommendations  Symptoms: aeb diet recall, questions from pt    Educational Need? yes  Barriers: none identified  Discussed with: patient and spouse  Interventions: Patient taught nutrition information regarding Pre-kidney transplant work-up (pre-dialysis).    Renal Nutrition Therapy packet reviewed ( high/low food sources of K, Phos and protein, low sodium and fluid intake, emphasis on moderate protein intake)   Encouraged physical activity regularly, walking daily as tolerated.   Avoid skipping meals, include low sodium/protein+ carb snack.  Goals/Recommendations: diet adherence and small frequent meals and snacks  Actions Taken: instruct/provide written information  Strategies Used: problem solving, goal setting, motivational interviewing  Patient and/or family comprehend instructions: yes , adherence expected  Outcome: Verbalizes understanding  Monitoring:     Contact information provided, will f/u in clinic and communicate with the care team as needed.     Counseling Time: 15 minutes

## 2019-03-14 NOTE — PROGRESS NOTES
Pre Transplant Infectious Diseases Consult  Kidney Transplant Recipient Evaluation    Requesting Physician:     Reason for Visit:    Chief Complaint   Patient presents with    Kidney Transplant Evaluation     History of Present Illness  Cailin Pickett is a 78 y.o. year old Black or  female with advanced Kidney disease currently being evaluated for Kidney transplant.  Patient is currently pre-dialysis.  She does have  AVF in left arm for access.      Patient denies any recent fever, chills, or infective illnesses.    History of pancreatic cancer, splenectomy.        1) Do you have a history of:   YES NO   Diabetes      [x] []     Diabetic Foot Infection/Bone Infection  []        [x]     Surgical Removal of Spleen   [x]  []  2013.  Spleen, pancreas, left kidney removed                 2) Have you had recurrent infections involving:             YES NO  Sinus infections  []         [x]   Sore Throat   []         [x]                 Prostate Infections  []         [x]              Bladder Infections  []         [x]                     Kidney Infections  []         [x]                               Intestinal Infections  []         [x]      Skin Infections   []         [x]       Reproductive Infections          []  [x]   Periodontal Disease  []         [x]        3)Have you ever had: YES     NO UNKNOWN      Chicken Pox   [x]         []  []   Shingles   []         [x]  []   Orolabial Herpes             []  [x]  []   Genital Herpes  []         [x]  []   Cytomegalovirus  []         [x]  []   Kevan-Barr Virus  []         [x]  []   Genital Warts   []         [x]  []   Hepatitis A   []         [x]  []   Hepatitis B   []         [x]  []   Hepatitis C   []         [x]  []   Syphilis   []         [x]  []   Gonorrhea   []         [x]  []   Pelvic Inflammatory   Disease   []         [x]  []   Chlamydia Infection  []         [x]  []   Intestinal parasites   or worms   []         [x]  []   Fungal Infections  []          [x]  []   Blood Infections  []         [x]  []       Comment:       4) Have you ever been exposed   YES NO  To someone with tuberculosis?  []   [x]   If yes, what treatment did you receive:     5) What states have you lived in? [place], [place]     6) What countries have you visited for more than 2 weeks?    No foreign travel                     YES NO  7) Did you have any associated infections?  []  [] n/a      8) Are you planning to travel outside the    []  [x]   [place] after your transplant?    9) Household                   YES NO  Do you have pets living in your house?    [x]         []   If yes, describe: cat outdoor   Have chickens     Do you spend time or live on a farm or     [x]         []   have livestock or other farm animals?  If yes, which ones: Chickens. Have chicken coop       Do you have a fish tank?          []  [x]       Do you have a litter box?      []         [x]     Do you fish or hunt?       [x]         []     Do you clean or skin fish or animals?    []         [x]     Do you consume raw or undercooked    []         [x]   meat, fish, or shellfish?      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 , dietetics in hospital,   court   11) Patient reports hobby to be chickens.          12)Do you garden or otherwise  YES NO   work in the soil?    [x]         []   13)Do you hike, camp, or spend     time in wooded areas?   []         [x]        14) The patient's immunization history was reviewed.    Have you ever received:  YES NO UNKNOWN DATES   Routine Childhood vaccines  [x]         []  []      Influenza vaccine   [x]  []  [] 10/2018   Pneumovax    [x]  []  [] 4/18/2013   Prevnar unknown.     Tetanus-diptheria   [x]         []  [] 10/24/2014   Hepatitis A vaccine series       []  [x]  []    Hepatitis B vaccine series         []  [x]  []    Meningitis vaccine   [x]         []  [] 4/18/2013   Varicella vaccine   [x]         []  [] zostavax 2018    HIB 4/18/2013    Based on  the patients immunization history and serologies, immunizations were ordered:         Ordered  Not Ordered  Influenza Vaccine     []    [x]   Hepatitis A series at 0,  6 months   [x]    []   Hepatitis B seriesat 0, 1 months   [x]    []   Hepatitis B High Dose 0,1, and 6 months  []    [x]   Prevnar      [x]    []   Pneumovax      []    [x]    TDap       []    []    Zoster       []    [x]   Menactra      [x]    []   Meningitis B vaccine ordered (Bexsero)       The patient was encouraged to contact us about any problems that may develop after immunization and possible side effects were reviewed.      Previous Transplant: no    Etiology of Kidney Disease: diabetic nephropathy    Allergies: Allegra [fexofenadine] and Codeine  Immunization History   Administered Date(s) Administered    HiB PRP-T 04/18/2013    Influenza - High Dose 10/16/2014, 10/13/2015, 11/30/2016    Meningococcal Polysaccharide (23-Valent) (MPSV4) 04/18/2013    Pneumococcal Polysaccharide - 23 Valent 04/18/2013    Tdap 10/24/2014     Past Medical History:   Diagnosis Date    Anemia     CKD (chronic kidney disease) stage 5, GFR less than 15 ml/min 3/14/2019    CKD (chronic kidney disease), stage III     Diabetes mellitus type II     Encounter for blood transfusion     Family history of colonic polyps 7/18/2017    Gout, unspecified     Hyperlipidemia     Hypertension     Neuropathy     Pancreatic cancer     Renal failure     Secondary hyperparathyroidism of renal origin 3/14/2019    Thyroid disease      Past Surgical History:   Procedure Laterality Date    COLONOSCOPY  2011    Dr. Favio Mims    [id number]-[id number]-TC Left 12/4/2014    [facility]    HYSTERECTOMY      KNEE SURGERY Left 05/31/2018    NEPHRECTOMY Left 5/2013    Dr Carter     NEPHRECTOMY Left 5/15/2013    [facility]    PANCREAS SURGERY      distal pancreatectomy    PANCREATECTOMY, DISTAL,  LAPAROSCOPIC, WITH SPLENECTOMY N/A 5/15/2013    [facility] OR 2ND FLR    screening colonoscopy N/A 2017    [facility]O    SPLENECTOMY, TOTAL      THYROIDECTOMY, PARTIAL      ULTRASOUND, UPPER GI TRACT, ENDOSCOPIC N/A 2012    Performed by Travis Penn [facility] at [facility] ENDO (2ND FLR)    VENTRICULOATRIAL SHUNT Left 2014     left arm      Social History     Socioeconomic History    Marital status:      Spouse name: Not on file    Number of children: Not on file    Years of education: Not on file    Highest education level: Not on file   Social Needs    Financial resource strain: Not on file    Food insecurity - worry: Not on file    Food insecurity - inability: Not on file    Transportation needs - medical: Not on file    Transportation needs - non-medical: Not on file   Occupational History     Comment: stay home    Tobacco Use    Smoking status: Former Smoker     Packs/day: 1.00     Years: 35.00     Pack years: 35.00     Start date: 3/14/1962     Last attempt to quit: 2001     Years since quittin.2    Smokeless tobacco: Never Used   Substance and Sexual Activity    Alcohol use: No    Drug use: No    Sexual activity: No   Other Topics Concern    Not on file   Social History Narrative    [place] lives [place] from [place]       Review of Systems   Constitution: Positive for decreased appetite and weight loss (20 pounds over the past year, unintentional ). Negative for chills, fever, weakness, malaise/fatigue, night sweats and weight gain.   HENT: Negative for congestion, ear pain, hearing loss, hoarse voice, sore throat and tinnitus.    Eyes: Negative for blurred vision, redness and visual disturbance.   Cardiovascular: Positive for leg swelling. Negative for chest pain and palpitations.   Respiratory: Positive for shortness of breath (exertional). Negative for cough, hemoptysis, sputum  production and wheezing.    Endocrine: Negative for cold intolerance and heat intolerance.   Hematologic/Lymphatic: Negative for adenopathy. Does not bruise/bleed easily.   Skin: Positive for dry skin and itching. Negative for rash and suspicious lesions.   Musculoskeletal: Positive for back pain (arthritis) and joint pain. Negative for myalgias and neck pain.   Gastrointestinal: Positive for nausea. Negative for abdominal pain, constipation, diarrhea, heartburn and vomiting.   Genitourinary: Negative for dysuria, flank pain, frequency, hematuria, hesitancy and urgency.   Neurological: Positive for numbness (neuropathy in feet ) and paresthesias. Negative for dizziness and headaches.   Psychiatric/Behavioral: Negative for depression and memory loss. The patient does not have insomnia and is not nervous/anxious.    Allergic/Immunologic: Negative for persistent infections.     Physical Exam   Constitutional: She is oriented to person, place, and time. She appears well-developed and well-nourished. No distress.       HENT:   Head: Normocephalic and atraumatic.   Mouth/Throat: Uvula is midline, oropharynx is clear and moist and mucous membranes are normal. She does not have dentures. No oral lesions. Normal dentition. No dental abscesses, lacerations or dental caries. No oropharyngeal exudate.   Eyes: Conjunctivae and EOM are normal. No scleral icterus.   Neck: Normal range of motion.   Cardiovascular: Normal rate, regular rhythm and normal heart sounds. Exam reveals no gallop and no friction rub.   No murmur heard.  Pulmonary/Chest: Effort normal and breath sounds normal. No respiratory distress. She has no wheezes. She has no rales.   Abdominal: Soft. Bowel sounds are normal. She exhibits no distension and no mass. There is no hepatosplenomegaly. There is no tenderness. There is no rebound and no guarding.   Musculoskeletal: She exhibits edema.   Lymphadenopathy:        Head (right side): No submental, no  submandibular, no tonsillar, no preauricular, no posterior auricular and no occipital adenopathy present.        Head (left side): No submental, no submandibular, no tonsillar, no preauricular, no posterior auricular and no occipital adenopathy present.     She has no cervical adenopathy.     She has no axillary adenopathy.        Right: No inguinal, no supraclavicular and no epitrochlear adenopathy present.        Left: No inguinal, no supraclavicular and no epitrochlear adenopathy present.   Neurological: She is alert and oriented to person, place, and time.   Skin: Skin is warm, dry and intact. No rash noted.   Psychiatric: She has a normal mood and affect.     Diagnostics:   RPR   Date Value Ref Range Status   08/11/2010 Non-Reactive Non-Reactive Final     No results found for: CMVANTIBODIE  No results found for: HIV1X2  No results found for: HTLVIIIANTIB  Hepatitis B Surface Ag   Date Value Ref Range Status   03/14/2019 Negative  Final     Hep B Core Total Ab   Date Value Ref Range Status   03/14/2019 Negative  Final     Hepatitis C Ab   Date Value Ref Range Status   03/14/2019 Negative  Final     No results found for: TOXOIGG  No components found for: TOXOIGGINTER  No results found for: GFT5UIA  No results found for: CLU0WDY  No results found for: VARICELLAZOS  No results found for: VARICELLAINT  No results found for: STRONGANTIGG  No results found for: EPSTEINBARRV  No results found for: HEPBSAB  No results found for: QUANTIFERON  No results found for: HEPAIGM  No results found for: PPD  No results found for this or any previous visit.         Transplant Infectious Diseases - Candidacy    Assessment/Plan:     Transplant Candidacy: Based on available information, there are no identified significant barriers to transplantation from an infectious disease standpoint pending acceptable serologies.       1.  Vaccines today:  Menactra booster, Meningitis B series initiated (0,1), PCV13, Hepatitis B.  Rx given for  Hepatitis A vaccine series, second Meningitis B (Bexsero) in one month and second hepatitis B in one month. The patient was encouraged to contact us about any problems that may develop after immunization and possible side effects were reviewed.   2.  Quantiferon Gold is pending.  If positive, please consult ID. If  Indeterminate, please draw T spot.  If T spot positive, please consult ID.    3.  RPR, strongyloides, HIV pending.  If positive, please consult ID  4.  Varicella IgG pending.  Please consult ID for vaccine recommendations if negative.        Final determination of transplant candidacy will be made once evaluation is complete and reviewed by the Transplant Selection Committee.    Jo Delarosa, APRN, ANP         Counseling:I discussed with Cailin and her   the risk for increased susceptibility to infections following transplantation including increased risk for infection right after transplant and if rejection should occur.  The patient has been counseled on the importance of vaccinations including but not limited to a yearly flu vaccine.    Specific guidance has been provided to the patient regarding the patients occupation, hobbies and activities to avoid future infectious complications including but not limited to avoiding undercooked meats and seafood, proper hygiene, and contact with animals - specifically discussed need to have someone else care for chickens/chicken coop, etc. She reports they will find alternative home for chickens

## 2019-03-14 NOTE — PROGRESS NOTES
received Heplisav-B(first dose), Menactra and Prevnar 13 vaccines. Tolerated well. Left unit in NAD.

## 2019-03-15 ENCOUNTER — COMMITTEE REVIEW (OUTPATIENT)
Dept: TRANSPLANT | Facility: CLINIC | Age: 79
End: 2019-03-15

## 2019-03-15 NOTE — LETTER
March 15, 2019    Cailin Pickett  [address] [place] [address]        Dear Cailin Pickett:  MRN: [id number]    It is the duty of the [facility] Selection [facility] to determine which patients are candidates for a transplant. For this reason, our committee has the difficult task of evaluating patients to determine which ones have the greatest chance of having a successful transplant. We are aware of the magnitude of this responsibility, and we approach it with reverence and humility.    It is with regret I inform you that you are not approved as a transplant candidate due to poor functional status, and advanced age with potential poor post transplant outcomes. Your future transplant appointments for 03/27/2019 and 04/03/2019 have been canceled.  Based on this review, we have determined that at this time, you are not a candidate for a transplant at [facility].      The selection committee carefully considers each patient's transplant candidacy and determines whether it is safe to proceed with transplantation on a case-by-case basis using established selection criteria.  At present, the risk of proceeding with an elective transplant surgery has become too high.                                                                               Although the selection committee believes you are not a suitable transplant candidate, you have the option to be evaluated at other transplant centers who may have different selection criteria.  You may request your [facility] records be sent to any center of your choice by contacting our Medical Records Department at [phone].                                                                               Attached is a letter from the [employer] for Organ Sharing (UNOS).  It describes the services and information offered to patients by UNOS and the Organ Procurement and Transplant Network.    The [facility] sincerely wishes you the best and remains  available to answer any questions.  Please do not hesitate to contact our pre-transplant office if we can assist you in any other way.                                                                               Sincerely,      Zahira Taobr MD  Medical Director, Kidney & Kidney/Pancreas Transplantation  [facility]  In-basket:  Tank Vinson MD      Encl: UNOS Letter          OPTN/UNOS: Your Resource for Organ Transplant Information        If you have a question regarding your own medical care, you always should call your transplant center first. However, for general organ transplant-related information, you can call the [employer] Organ [employer]aring (UNOS) toll-free patient services line at 1-276.677.7465.    Anyone, including potential transplant candidates, recipients, family members/friends, living donors, and/or donor family members can call this number to:    · talk about organ donation, living donation, how transplant and donation work, the donation process, transplant policies, and transplant/donor information;  · get a free patient information kit with helpful booklets, waiting list and transplant information, and a list of all transplant centers;  · ask questions about the Organ Procurement and Transplantation Network (OPTN) web site (www.optn.transplant.hrsa.gov); the UNOS Web site (www.unos.org); or the UNOS web site for living donors and transplant recipients (www.transplantliving.org);  · learn how UNOS and the OPTN can help you;  · talk about any concerns that you may have with a transplant center and how they perform    UNOS is a not-for-profit organization that provides all of the administrative services for the national OPTN under federal contract to the Health Resources and Services Administration (HRSA), an agency under the U.S. Department of Health and Human Services (HHS).     UNOS and OPTN responsibilities include:    · writing educational material for patients, the public and  professionals;  · helping to make people aware of the need for donated organs and tissue;  · writing organ transplant policy with help from doctors, nurses, transplant patients/candidates, donor families, living donors, and the public;  · coordinating the organ matching and placement process;  · collecting information about every organ transplant and donation that occurs in the [place].    Remember, you should contact your transplant center directly if you have questions or concerns about your own medical care including medical records, work-up progress and test reports. [facility] is not your transplant center, and staff at [facility] will not be able to transfer you to your transplant center, so keep your transplant centers phone number handy. But, while you research your transplant needs and learn as much as you can about transplantation and donation, we welcome your call to our toll-free patient services line at [phone].

## 2019-03-15 NOTE — COMMITTEE REVIEW
Native Organ Dx: Diabetes Mellitus - Type II      Not approved for LRD/CAD transplant due to poor functional status, advanced age with potential poor transplant outcome.    Nurse spoke with the patient and informed her of the committee's decision. Patient verbalized understanding of the above information. All questions answered.     Note written by: Zac Mccormick RN    ===============================================    I was present at the meeting and attest to the decision of the committee.    Amirah Hollins MD

## 2019-04-01 ENCOUNTER — LAB VISIT (OUTPATIENT)
Dept: LAB | Facility: HOSPITAL | Age: 79
End: 2019-04-01
Attending: INTERNAL MEDICINE
Payer: MEDICARE

## 2019-04-01 DIAGNOSIS — N25.81 HYPERPARATHYROIDISM, SECONDARY RENAL: ICD-10-CM

## 2019-04-01 DIAGNOSIS — N18.5 CKD (CHRONIC KIDNEY DISEASE) STAGE 5, GFR LESS THAN 15 ML/MIN: ICD-10-CM

## 2019-04-01 DIAGNOSIS — Q61.3 POLYCYSTIC KIDNEY DISEASE: ICD-10-CM

## 2019-04-01 LAB
ALBUMIN SERPL BCP-MCNC: 3.4 G/DL (ref 3.5–5.2)
ANION GAP SERPL CALC-SCNC: 12 MMOL/L (ref 8–16)
BASOPHILS # BLD AUTO: 0.02 K/UL (ref 0–0.2)
BASOPHILS NFR BLD: 0.4 % (ref 0–1.9)
BUN SERPL-MCNC: 91 MG/DL (ref 8–23)
CALCIUM SERPL-MCNC: 9.1 MG/DL (ref 8.7–10.5)
CHLORIDE SERPL-SCNC: 105 MMOL/L (ref 95–110)
CO2 SERPL-SCNC: 21 MMOL/L (ref 23–29)
CREAT SERPL-MCNC: 6.2 MG/DL (ref 0.5–1.4)
DIFFERENTIAL METHOD: ABNORMAL
EOSINOPHIL # BLD AUTO: 0.1 K/UL (ref 0–0.5)
EOSINOPHIL NFR BLD: 1.5 % (ref 0–8)
ERYTHROCYTE [DISTWIDTH] IN BLOOD BY AUTOMATED COUNT: 15.6 % (ref 11.5–14.5)
EST. GFR  (AFRICAN AMERICAN): 7 ML/MIN/1.73 M^2
EST. GFR  (NON AFRICAN AMERICAN): 6 ML/MIN/1.73 M^2
GLUCOSE SERPL-MCNC: 170 MG/DL (ref 70–110)
HCT VFR BLD AUTO: 27.8 % (ref 37–48.5)
HGB BLD-MCNC: 8.6 G/DL (ref 12–16)
IMM GRANULOCYTES # BLD AUTO: 0.02 K/UL (ref 0–0.04)
IMM GRANULOCYTES NFR BLD AUTO: 0.4 % (ref 0–0.5)
LYMPHOCYTES # BLD AUTO: 0.6 K/UL (ref 1–4.8)
LYMPHOCYTES NFR BLD: 12.5 % (ref 18–48)
MCH RBC QN AUTO: 28.8 PG (ref 27–31)
MCHC RBC AUTO-ENTMCNC: 30.9 G/DL (ref 32–36)
MCV RBC AUTO: 93 FL (ref 82–98)
MONOCYTES # BLD AUTO: 0.7 K/UL (ref 0.3–1)
MONOCYTES NFR BLD: 15 % (ref 4–15)
NEUTROPHILS # BLD AUTO: 3.4 K/UL (ref 1.8–7.7)
NEUTROPHILS NFR BLD: 70.2 % (ref 38–73)
NRBC BLD-RTO: 2 /100 WBC
PHOSPHATE SERPL-MCNC: 4.4 MG/DL (ref 2.7–4.5)
PLATELET # BLD AUTO: 243 K/UL (ref 150–350)
PMV BLD AUTO: 11.7 FL (ref 9.2–12.9)
POTASSIUM SERPL-SCNC: 4.5 MMOL/L (ref 3.5–5.1)
PTH-INTACT SERPL-MCNC: 186 PG/ML (ref 9–77)
RBC # BLD AUTO: 2.99 M/UL (ref 4–5.4)
SODIUM SERPL-SCNC: 138 MMOL/L (ref 136–145)
WBC # BLD AUTO: 4.8 K/UL (ref 3.9–12.7)

## 2019-04-01 PROCEDURE — 80069 RENAL FUNCTION PANEL: CPT

## 2019-04-01 PROCEDURE — 83970 ASSAY OF PARATHORMONE: CPT

## 2019-04-01 PROCEDURE — 36415 COLL VENOUS BLD VENIPUNCTURE: CPT

## 2019-04-01 PROCEDURE — 85025 COMPLETE CBC W/AUTO DIFF WBC: CPT

## 2019-04-03 ENCOUNTER — OFFICE VISIT (OUTPATIENT)
Dept: NEPHROLOGY | Facility: CLINIC | Age: 79
End: 2019-04-03
Payer: MEDICARE

## 2019-04-03 VITALS
HEART RATE: 60 BPM | DIASTOLIC BLOOD PRESSURE: 64 MMHG | WEIGHT: 154.13 LBS | SYSTOLIC BLOOD PRESSURE: 156 MMHG | BODY MASS INDEX: 25.68 KG/M2 | HEIGHT: 65 IN

## 2019-04-03 DIAGNOSIS — R60.0 LOCALIZED EDEMA: ICD-10-CM

## 2019-04-03 DIAGNOSIS — N18.5 CHRONIC KIDNEY DISEASE (CKD), STAGE V: Primary | ICD-10-CM

## 2019-04-03 DIAGNOSIS — M10.9 ACUTE GOUT OF FOOT, UNSPECIFIED CAUSE, UNSPECIFIED LATERALITY: ICD-10-CM

## 2019-04-03 PROCEDURE — 99214 OFFICE O/P EST MOD 30 MIN: CPT | Mod: S$PBB,,, | Performed by: INTERNAL MEDICINE

## 2019-04-03 PROCEDURE — 99999 PR PBB SHADOW E&M-EST. PATIENT-LVL III: ICD-10-PCS | Mod: PBBFAC,,, | Performed by: INTERNAL MEDICINE

## 2019-04-03 PROCEDURE — 99999 PR PBB SHADOW E&M-EST. PATIENT-LVL III: CPT | Mod: PBBFAC,,, | Performed by: INTERNAL MEDICINE

## 2019-04-03 PROCEDURE — 99214 PR OFFICE/OUTPT VISIT, EST, LEVL IV, 30-39 MIN: ICD-10-PCS | Mod: S$PBB,,, | Performed by: INTERNAL MEDICINE

## 2019-04-03 PROCEDURE — 99213 OFFICE O/P EST LOW 20 MIN: CPT | Mod: PBBFAC | Performed by: INTERNAL MEDICINE

## 2019-04-03 RX ORDER — ALLOPURINOL 100 MG/1
100 TABLET ORAL DAILY
Qty: 90 TABLET | Refills: 3 | Status: SHIPPED | OUTPATIENT
Start: 2019-04-03 | End: 2019-10-04 | Stop reason: SDUPTHER

## 2019-04-03 RX ORDER — FUROSEMIDE 40 MG/1
80 TABLET ORAL DAILY
Qty: 90 TABLET | Refills: 3
Start: 2019-04-03 | End: 2019-12-31

## 2019-04-03 NOTE — PROGRESS NOTES
Subjective:       Patient ID: Cailin Pickett is a 78 y.o.   female who presents for follow-up evaluation of CKD stage 5, HTN , SHPT, anemia        Favio Titus MD      HPI : Cailin Pickett Is a pleasant 78-year-old  woman seen office today followup for above medical problems. I saw her in clinic about 2 months ago.  She was seen in our clinic about 2 weeks ago for nausea, she continues to feel tired, she has some exertion or shortness of Breath.  Peripheral edema noted.  Recent transplant note reviewed, patient was declined for a kidney transplant due to multiple medical problems, advanced age,       Recent lab results were discussed with the patient. She has solitary right kidney. Multiple cysts reported on the kidney. It measures about 16 cm. Patient Attended chronic kidney disease education and options class. she had a left arm AV fistula placed on 12/4/14 by Dr Foster . She denies recent hospitalizations or ER visits.  Recent lab results were discussed with the patient. Recent serum Cr is 6 mg/dL. 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 she underwent chemo rx with 5-FU, she tolerated the cycles well. In 5/2013 she underwent resection of tail of pancreas, left nephrectomy, splenectomy, left adrenalectomy.     Past Medical History:   Diagnosis Date    Anemia     CKD (chronic kidney disease) stage 5, GFR less than 15 ml/min 3/14/2019    CKD (chronic kidney disease), stage III     Diabetes mellitus type II     Encounter for blood transfusion     Family history of colonic polyps 7/18/2017    Gout, unspecified     Hyperlipidemia     Hypertension     Neuropathy     Pancreatic cancer     Renal failure     Secondary hyperparathyroidism of renal origin 3/14/2019    Thyroid disease          Current Outpatient Medications on File Prior to Visit    Medication Sig Dispense Refill    allopurinol (ZYLOPRIM) 100 MG tablet TAKE 1 TABLET DAILY 90 tablet 3    amLODIPine (NORVASC) 5 MG tablet TAKE 1 TABLET TWICE A  tablet 2    carvedilol (COREG) 12.5 MG tablet Take 1 tablet (12.5 mg total) by mouth 2 (two) times daily with meals. 60 tablet 11    cetirizine (ZYRTEC) 10 MG tablet Take 10 mg by mouth once daily.       estradiol (ESTRACE) 0.5 MG tablet TAKE 1 TABLET DAILY FOR 3 WEEKS PER MONTH, THEN DO NOT TAKE FOR FOURTH WEEK EACH MONTH 90 tablet 3    furosemide (LASIX) 40 MG tablet Take 1 tablet (40 mg total) by mouth once daily. 90 tablet 3    gabapentin (NEURONTIN) 100 MG capsule Take 1 capsule by mouth 2 (two) times daily.      glucosamine-chondroitin (OSTEO BI-FLEX) 250-200 mg Tab Take 1 tablet by mouth 2 (two) times daily.      hydrALAZINE (APRESOLINE) 100 MG tablet TAKE 1 TABLET EVERY 8 HOURS 270 tablet 3    hydrOXYzine HCl (ATARAX) 25 MG tablet TAKE 1 TABLET TWICE A DAY AS NEEDED FOR ITCHING 180 tablet 3    repaglinide (PRANDIN) 0.5 MG tablet TAKE 1 TABLET THREE TIMES A DAY BEFORE MEALS 270 tablet 2    sodium bicarbonate 650 MG tablet Take 1 tablet (650 mg total) by mouth 2 (two) times daily. 180 tablet 3    sucroferric oxyhydroxide (VELPHORO) 500 mg Chew Take 1 tablet (500 mg total) by mouth 3 (three) times daily before meals. 90 tablet 9    traMADol (ULTRAM) 50 mg tablet Take 1 tablet (50 mg total) by mouth every 12 (twelve) hours. 180 tablet 3     No current facility-administered medications on file prior to visit.              Review of Systems   Constitutional: Positive for activity change and fatigue. Negative for appetite change and fever.   HENT: Negative for congestion, facial swelling, sore throat, trouble swallowing and voice change.    Eyes: Negative for redness and visual disturbance.   Respiratory: Positive for shortness of breath. Negative for apnea, cough, chest tightness and wheezing.    Cardiovascular: Positive for leg  swelling. Negative for chest pain and palpitations.   Gastrointestinal: Negative for abdominal distention, abdominal pain, blood in stool, constipation, diarrhea, nausea and vomiting.   Genitourinary: Negative for decreased urine volume, difficulty urinating, dysuria, flank pain, frequency, hematuria, pelvic pain and urgency.   Musculoskeletal: Negative for back pain, gait problem and joint swelling.   Skin: Negative for color change and rash.   Neurological: Positive for weakness. Negative for dizziness, syncope and headaches.   Hematological: Does not bruise/bleed easily.   Psychiatric/Behavioral: Negative for agitation, behavioral problems and confusion. The patient is not nervous/anxious.      :            Objective:         Vitals:    04/03/19 1330   BP: (!) 156/64   Pulse: 60       Weight 154 lb, stable        Physical Exam   Constitutional: She is oriented to person, place, and time. She appears well-developed. No distress.   HENT:   Head: Normocephalic and atraumatic.   Mouth/Throat: Oropharynx is clear and moist. No oropharyngeal exudate.   Eyes: Pupils are equal, round, and reactive to light. Conjunctivae and EOM are normal.   Neck: Normal range of motion. Neck supple. No JVD present. Carotid bruit is not present. No tracheal deviation present. No thyroid mass and no thyromegaly present.   Cardiovascular: Normal rate, regular rhythm and intact distal pulses. Exam reveals no gallop and no friction rub.   Murmur heard.  Pulmonary/Chest: Effort normal and breath sounds normal. No respiratory distress. She has no wheezes. She has no rales. She exhibits no tenderness.   Abdominal: Soft. Bowel sounds are normal. She exhibits no distension, no abdominal bruit, no ascites and no mass. There is no hepatosplenomegaly. There is no tenderness. There is no rebound, no guarding and no CVA tenderness.   Musculoskeletal: She exhibits edema. She exhibits no tenderness.   Lymphadenopathy:     She has no cervical adenopathy.    Neurological: She is alert and oriented to person, place, and time. She has normal reflexes. She displays normal reflexes. No cranial nerve deficit. She exhibits normal muscle tone. Coordination normal.   Skin: Skin is warm and intact. No rash noted. No erythema. No pallor.   Psychiatric: She has a normal mood and affect. Her behavior is normal.     :            Labs:    Lab Results   Component Value Date    CREATININE 6.2 (H) 04/01/2019    BUN 91 (H) 04/01/2019     04/01/2019    K 4.5 04/01/2019     04/01/2019    CO2 21 (L) 04/01/2019       Lab Results   Component Value Date    WBC 4.80 04/01/2019    HGB 8.6 (L) 04/01/2019    HCT 27.8 (L) 04/01/2019    MCV 93 04/01/2019     04/01/2019       Lab Results   Component Value Date    .0 (H) 04/01/2019    CALCIUM 9.1 04/01/2019    PHOS 4.4 04/01/2019       Lab Results   Component Value Date    ALBUMIN 3.4 (L) 04/01/2019       Impression and plan - 78-year-old  woman seen in office today in followup for following medical problems,     1. Chronic kidney disease stage 5 -  Recent Serum Creatinine is 6  mg/dL, signs and symptoms of uremia noted, also has peripheral edema, advised patient to increase furosemide from 40 mg to 80 mg daily, advised patient to proceed to the hospital to initiate hemodialysis, she has been having some shortness of breath, patient wants to come to the hospital after 2-3 days, she has some responsibilities, discussed salt and fluid restriction, increase Lasix dose, if symptoms  worsev she will proceed immediately,      Patient attended chronic kidney disease education and options class. She has a solitary right kidney which is enlarged at 16 cm secondary to polycystic kidney disease. Left kidney was removed due to High-grade pancreatic cancer invading directly into the left kidney.         Patient was declined for a kidney transplant in 2014 because of advanced age and history of cancer.      Second  evaluation done recently again was declined,       Patient had a left arm AV fistula on 12/4/14 by Dr Foster , she is not interested in PD anymore,         2. Hypertension - blood pressure is  elevated,   target blood pressure less than 130/80, will increase carvedilol from 6.25 mg twice a day to 12.5 mg twice a day,      Discussed low-salt diet, will enroll patient in digital blood pressure monitoring program,    3. Anemia - Hb 8 gms, following with Hematology/Oncology,     4. Secondary hyperparathyroidism - PTH is Acceptable at 186  ,  Continue to follow. Cont  Velphoro 500 mgs tid , discussed low phos diet,     5. pancreatic carcinoma - s/p surgery + Chemo, follows with Oncology        6. Volume -   Lasix to 80 mg daily . Discussed salt and fluid restriction ,        7. Cystic kidney disease - follow,        8. Hyperuricemia/gout - continue allopurinol.        9. Metabolic acidosis - cont  sodium bicarbonate supplements,      10. Left knee arthritis , status post left total knee arthroplasty.    We will see her in followup in a month, More than 25 minutes was spent with the patient discussing labs and plan of care     Tank Vinson M.D.

## 2019-04-08 ENCOUNTER — HOSPITAL ENCOUNTER (INPATIENT)
Facility: HOSPITAL | Age: 79
LOS: 3 days | Discharge: HOME | DRG: 252 | End: 2019-04-11
Attending: EMERGENCY MEDICINE | Admitting: INTERNAL MEDICINE
Payer: MEDICARE

## 2019-04-08 DIAGNOSIS — N18.6 ESRD NEEDING DIALYSIS: Primary | ICD-10-CM

## 2019-04-08 DIAGNOSIS — Z99.2 ESRD NEEDING DIALYSIS: Primary | ICD-10-CM

## 2019-04-08 DIAGNOSIS — R06.02 SOB (SHORTNESS OF BREATH): ICD-10-CM

## 2019-04-08 DIAGNOSIS — N18.9 CKD (CHRONIC KIDNEY DISEASE): ICD-10-CM

## 2019-04-08 DIAGNOSIS — R06.02 SHORTNESS OF BREATH: ICD-10-CM

## 2019-04-08 LAB
ALBUMIN SERPL BCP-MCNC: 3.6 G/DL (ref 3.5–5.2)
ALP SERPL-CCNC: 74 U/L (ref 55–135)
ALT SERPL W/O P-5'-P-CCNC: 31 U/L (ref 10–44)
ANION GAP SERPL CALC-SCNC: 15 MMOL/L (ref 8–16)
APTT BLDCRRT: 28.5 SEC (ref 21–32)
AST SERPL-CCNC: 25 U/L (ref 10–40)
BASOPHILS # BLD AUTO: 0.03 K/UL (ref 0–0.2)
BASOPHILS NFR BLD: 0.6 % (ref 0–1.9)
BILIRUB SERPL-MCNC: 0.5 MG/DL (ref 0.1–1)
BNP SERPL-MCNC: 491 PG/ML (ref 0–99)
BUN SERPL-MCNC: 97 MG/DL (ref 8–23)
CALCIUM SERPL-MCNC: 9.9 MG/DL (ref 8.7–10.5)
CHLORIDE SERPL-SCNC: 105 MMOL/L (ref 95–110)
CO2 SERPL-SCNC: 20 MMOL/L (ref 23–29)
CREAT SERPL-MCNC: 5.9 MG/DL (ref 0.5–1.4)
DIFFERENTIAL METHOD: ABNORMAL
EOSINOPHIL # BLD AUTO: 0.1 K/UL (ref 0–0.5)
EOSINOPHIL NFR BLD: 1.4 % (ref 0–8)
ERYTHROCYTE [DISTWIDTH] IN BLOOD BY AUTOMATED COUNT: 15.5 % (ref 11.5–14.5)
EST. GFR  (AFRICAN AMERICAN): 7 ML/MIN/1.73 M^2
EST. GFR  (NON AFRICAN AMERICAN): 6 ML/MIN/1.73 M^2
GLUCOSE SERPL-MCNC: 110 MG/DL (ref 70–110)
HCT VFR BLD AUTO: 29.6 % (ref 37–48.5)
HGB BLD-MCNC: 9.3 G/DL (ref 12–16)
HYPOCHROMIA BLD QL SMEAR: ABNORMAL
INR PPP: 0.9 (ref 0.8–1.2)
LYMPHOCYTES # BLD AUTO: 1.1 K/UL (ref 1–4.8)
LYMPHOCYTES NFR BLD: 22.2 % (ref 18–48)
MAGNESIUM SERPL-MCNC: 3.2 MG/DL (ref 1.6–2.6)
MCH RBC QN AUTO: 28.9 PG (ref 27–31)
MCHC RBC AUTO-ENTMCNC: 31.4 G/DL (ref 32–36)
MCV RBC AUTO: 92 FL (ref 82–98)
MONOCYTES # BLD AUTO: 0.8 K/UL (ref 0.3–1)
MONOCYTES NFR BLD: 15.2 % (ref 4–15)
NEUTROPHILS # BLD AUTO: 3.1 K/UL (ref 1.8–7.7)
NEUTROPHILS NFR BLD: 60.6 % (ref 38–73)
OVALOCYTES BLD QL SMEAR: ABNORMAL
PHOSPHATE SERPL-MCNC: 5.3 MG/DL (ref 2.7–4.5)
PLATELET # BLD AUTO: 303 K/UL (ref 150–350)
PLATELET BLD QL SMEAR: ABNORMAL
PMV BLD AUTO: 11 FL (ref 9.2–12.9)
POCT GLUCOSE: 113 MG/DL (ref 70–110)
POTASSIUM SERPL-SCNC: 4.8 MMOL/L (ref 3.5–5.1)
PROT SERPL-MCNC: 7.1 G/DL (ref 6–8.4)
PROTHROMBIN TIME: 10.3 SEC (ref 9–12.5)
RBC # BLD AUTO: 3.22 M/UL (ref 4–5.4)
SODIUM SERPL-SCNC: 140 MMOL/L (ref 136–145)
TARGETS BLD QL SMEAR: ABNORMAL
TROPONIN I SERPL DL<=0.01 NG/ML-MCNC: 0.02 NG/ML (ref 0–0.03)
WBC # BLD AUTO: 5.05 K/UL (ref 3.9–12.7)

## 2019-04-08 PROCEDURE — 25000003 PHARM REV CODE 250: Performed by: NURSE PRACTITIONER

## 2019-04-08 PROCEDURE — 85025 COMPLETE CBC W/AUTO DIFF WBC: CPT

## 2019-04-08 PROCEDURE — 83735 ASSAY OF MAGNESIUM: CPT

## 2019-04-08 PROCEDURE — 84100 ASSAY OF PHOSPHORUS: CPT

## 2019-04-08 PROCEDURE — 25000003 PHARM REV CODE 250: Performed by: EMERGENCY MEDICINE

## 2019-04-08 PROCEDURE — 11000001 HC ACUTE MED/SURG PRIVATE ROOM

## 2019-04-08 PROCEDURE — 80053 COMPREHEN METABOLIC PANEL: CPT

## 2019-04-08 PROCEDURE — 93010 ELECTROCARDIOGRAM REPORT: CPT | Mod: ,,, | Performed by: INTERNAL MEDICINE

## 2019-04-08 PROCEDURE — 93010 EKG 12-LEAD: ICD-10-PCS | Mod: ,,, | Performed by: INTERNAL MEDICINE

## 2019-04-08 PROCEDURE — 85730 THROMBOPLASTIN TIME PARTIAL: CPT

## 2019-04-08 PROCEDURE — 99223 PR INITIAL HOSPITAL CARE,LEVL III: ICD-10-PCS | Mod: ,,, | Performed by: INTERNAL MEDICINE

## 2019-04-08 PROCEDURE — 83880 ASSAY OF NATRIURETIC PEPTIDE: CPT

## 2019-04-08 PROCEDURE — 85610 PROTHROMBIN TIME: CPT

## 2019-04-08 PROCEDURE — 84484 ASSAY OF TROPONIN QUANT: CPT

## 2019-04-08 PROCEDURE — 80100016 HC MAINTENANCE HEMODIALYSIS

## 2019-04-08 PROCEDURE — 21400001 HC TELEMETRY ROOM

## 2019-04-08 PROCEDURE — 93005 ELECTROCARDIOGRAM TRACING: CPT

## 2019-04-08 PROCEDURE — 99285 EMERGENCY DEPT VISIT HI MDM: CPT

## 2019-04-08 PROCEDURE — 99223 1ST HOSP IP/OBS HIGH 75: CPT | Mod: ,,, | Performed by: INTERNAL MEDICINE

## 2019-04-08 RX ORDER — AMLODIPINE BESYLATE 5 MG/1
5 TABLET ORAL 2 TIMES DAILY
Status: DISCONTINUED | OUTPATIENT
Start: 2019-04-08 | End: 2019-04-11 | Stop reason: HOSPADM

## 2019-04-08 RX ORDER — HYDRALAZINE HYDROCHLORIDE 50 MG/1
100 TABLET, FILM COATED ORAL EVERY 8 HOURS
Status: DISCONTINUED | OUTPATIENT
Start: 2019-04-08 | End: 2019-04-11 | Stop reason: HOSPADM

## 2019-04-08 RX ORDER — ALLOPURINOL 100 MG/1
100 TABLET ORAL DAILY
Status: DISCONTINUED | OUTPATIENT
Start: 2019-04-08 | End: 2019-04-11 | Stop reason: HOSPADM

## 2019-04-08 RX ORDER — SODIUM BICARBONATE 650 MG/1
650 TABLET ORAL 2 TIMES DAILY
Status: DISCONTINUED | OUTPATIENT
Start: 2019-04-08 | End: 2019-04-08

## 2019-04-08 RX ORDER — HYDRALAZINE HYDROCHLORIDE 20 MG/ML
10 INJECTION INTRAMUSCULAR; INTRAVENOUS EVERY 8 HOURS PRN
Status: DISCONTINUED | OUTPATIENT
Start: 2019-04-08 | End: 2019-04-11 | Stop reason: HOSPADM

## 2019-04-08 RX ORDER — FUROSEMIDE 40 MG/1
80 TABLET ORAL DAILY
Status: DISCONTINUED | OUTPATIENT
Start: 2019-04-08 | End: 2019-04-11 | Stop reason: HOSPADM

## 2019-04-08 RX ORDER — CARVEDILOL 12.5 MG/1
12.5 TABLET ORAL 2 TIMES DAILY WITH MEALS
Status: DISCONTINUED | OUTPATIENT
Start: 2019-04-08 | End: 2019-04-11 | Stop reason: HOSPADM

## 2019-04-08 RX ORDER — GABAPENTIN 100 MG/1
100 CAPSULE ORAL 2 TIMES DAILY
Status: DISCONTINUED | OUTPATIENT
Start: 2019-04-08 | End: 2019-04-11 | Stop reason: HOSPADM

## 2019-04-08 RX ADMIN — FUROSEMIDE 80 MG: 40 TABLET ORAL at 12:04

## 2019-04-08 RX ADMIN — GABAPENTIN 100 MG: 100 CAPSULE ORAL at 12:04

## 2019-04-08 RX ADMIN — NITROGLYCERIN 1 INCH: 20 OINTMENT TOPICAL at 08:04

## 2019-04-08 RX ADMIN — HYDRALAZINE HYDROCHLORIDE 100 MG: 50 TABLET ORAL at 09:04

## 2019-04-08 RX ADMIN — HYDRALAZINE HYDROCHLORIDE 100 MG: 50 TABLET ORAL at 03:04

## 2019-04-08 RX ADMIN — GABAPENTIN 100 MG: 100 CAPSULE ORAL at 09:04

## 2019-04-08 RX ADMIN — ALLOPURINOL 100 MG: 100 TABLET ORAL at 12:04

## 2019-04-08 RX ADMIN — AMLODIPINE BESYLATE 5 MG: 5 TABLET ORAL at 12:04

## 2019-04-08 RX ADMIN — SODIUM BICARBONATE 650 MG TABLET 650 MG: at 12:04

## 2019-04-08 RX ADMIN — AMLODIPINE BESYLATE 5 MG: 5 TABLET ORAL at 09:04

## 2019-04-08 NOTE — SUBJECTIVE & OBJECTIVE
Past Medical History:   Diagnosis Date    Anemia     CKD (chronic kidney disease) stage 5, GFR less than 15 ml/min 3/14/2019    CKD (chronic kidney disease), stage III     Diabetes mellitus type II     Encounter for blood transfusion     Family history of colonic polyps 7/18/2017    Gout, unspecified     Hyperlipidemia     Hypertension     Neuropathy     Pancreatic cancer     Renal failure     Secondary hyperparathyroidism of renal origin 3/14/2019    Thyroid disease        Past Surgical History:   Procedure Laterality Date    COLONOSCOPY  2011    Dr. Favio Mims    [id number] Left 12/4/2014    [facility]    HYSTERECTOMY      KNEE SURGERY Left 05/31/2018    NEPHRECTOMY Left 5/2013    Dr Carter     NEPHRECTOMY Left 5/15/2013    Performed by Blair [facility] II, [facility] at [facility] OR 2ND FLR    PANCREAS SURGERY      distal pancreatectomy    PANCREATECTOMY, DISTAL, LAPAROSCOPIC, WITH SPLENECTOMY N/A 5/15/2013    Performed by Blair [facility] at [facility] OR 2ND FLR    screening colonoscopy N/A 7/18/2017    Per[facility] Kenneth [facility] at [facility] ENDO    SPLENECTOMY, TOTAL      THYROIDECTOMY, PARTIAL      ULTRASOUND, UPPER GI TRACT, ENDOSCOPIC N/A 11/27/2012    Performed by Travis Penn MD at [facility] ENDO (2ND FLR)    VENTRICULOATRIAL SHUNT Left 12/4/2014     left arm       Review of patient's allergies indicates:   Allergen Reactions    Allegra [fexofenadine] Swelling    Codeine Hives, Itching and Nausea And Vomiting       No current facility-administered medications on file prior to encounter.      Current Outpatient Medications on File Prior to Encounter   Medication Sig    allopurinol (ZYLOPRIM) 100 MG tablet Take 1 tablet (100 mg total) by mouth once daily.    amLODIPine (NORVASC) 5 MG tablet TAKE 1 TABLET TWICE A DAY    carvedilol (COREG) 12.5 MG tablet Take 1 tablet (12.5 mg total) by mouth 2 (two) times daily with meals.    cetirizine  (ZYRTEC) 10 MG tablet Take 10 mg by mouth once daily.     furosemide (LASIX) 40 MG tablet Take 2 tablets (80 mg total) by mouth once daily.    gabapentin (NEURONTIN) 100 MG capsule Take 1 capsule by mouth 2 (two) times daily.    glucosamine-chondroitin (OSTEO BI-FLEX) 250-200 mg Tab Take 1 tablet by mouth 2 (two) times daily.    hydrALAZINE (APRESOLINE) 100 MG tablet TAKE 1 TABLET EVERY 8 HOURS    hydrOXYzine HCl (ATARAX) 25 MG tablet TAKE 1 TABLET TWICE A DAY AS NEEDED FOR ITCHING    repaglinide (PRANDIN) 0.5 MG tablet TAKE 1 TABLET THREE TIMES A DAY BEFORE MEALS    sodium bicarbonate 650 MG tablet Take 1 tablet (650 mg total) by mouth 2 (two) times daily.    traMADol (ULTRAM) 50 mg tablet Take 1 tablet (50 mg total) by mouth every 12 (twelve) hours.    estradiol (ESTRACE) 0.5 MG tablet TAKE 1 TABLET DAILY FOR 3 WEEKS PER MONTH, THEN DO NOT TAKE FOR FOURTH WEEK EACH MONTH (Patient taking differently: every other day)    [DISCONTINUED] sucroferric oxyhydroxide (VELPHORO) 500 mg Chew Take 1 tablet (500 mg total) by mouth 3 (three) times daily before meals.     Family History     Problem Relation (Age of Onset)    Breast cancer Mother, Sister, Sister    Cancer Mother, Father, Brother    Diabetes Brother    Heart disease Mother (60)    Hypertension Mother    Kidney disease Brother    Stroke Mother        Tobacco Use    Smoking status: Former Smoker     Packs/day: 1.00     Years: 35.00     Pack years: 35.00     Start date: 3/14/1962     Last attempt to quit: 2001     Years since quittin.2    Smokeless tobacco: Never Used   Substance and Sexual Activity    Alcohol use: No    Drug use: No    Sexual activity: Not on file     Review of Systems   Constitutional: Positive for fatigue. Negative for chills and fever.   HENT: Negative for congestion, rhinorrhea and sinus pressure.    Respiratory: Positive for shortness of breath. Negative for apnea, cough, choking, chest tightness, wheezing and stridor.     Cardiovascular: Positive for leg swelling. Negative for chest pain and palpitations.   Gastrointestinal: Negative for abdominal distention, abdominal pain, diarrhea, nausea and vomiting.   Endocrine: Negative for cold intolerance and heat intolerance.   Genitourinary: Negative for difficulty urinating and hematuria.   Musculoskeletal: Negative for arthralgias and joint swelling.   Skin: Negative for color change, pallor and rash.   Neurological: Positive for weakness. Negative for dizziness, seizures, numbness and headaches.   Psychiatric/Behavioral: Negative for agitation. The patient is not nervous/anxious.      Objective:     Vital Signs (Most Recent):  Temp: 97.5 °F (36.4 °C) (04/08/19 0728)  Pulse: (!) 59 (04/08/19 1131)  Resp: 18 (04/08/19 1131)  BP: (!) 168/101 (04/08/19 1131)  SpO2: 98 % (04/08/19 1131) Vital Signs (24h Range):  Temp:  [97.5 °F (36.4 °C)] 97.5 °F (36.4 °C)  Pulse:  [57-62] 59  Resp:  [13-22] 18  SpO2:  [96 %-99 %] 98 %  BP: (168-205)/() 168/101     Weight: 68.5 kg (151 lb)  Body mass index is 25.13 kg/m².    Physical Exam   Constitutional: No distress.   HENT:   Mouth/Throat: No oropharyngeal exudate.   Eyes: Right eye exhibits no discharge. Left eye exhibits no discharge.   Cardiovascular: Exam reveals no gallop and no friction rub.   No murmur heard.  Pulmonary/Chest: No stridor. No respiratory distress. She has no wheezes. She has rales. She exhibits no tenderness.   Abdominal: She exhibits no distension and no mass. There is no tenderness. There is no rebound and no guarding. No hernia.   Musculoskeletal: She exhibits no edema or deformity.   Neurological: She is alert.   Skin: Skin is warm and dry. Capillary refill takes less than 2 seconds. She is not diaphoretic.   Nursing note and vitals reviewed.          Significant Labs:   CBC:   Recent Labs   Lab 04/08/19 0817   WBC 5.05   HGB 9.3*   HCT 29.6*        CMP:   Recent Labs   Lab 04/08/19 0817      K 4.8   CL  105   CO2 20*      BUN 97*   CREATININE 5.9*   CALCIUM 9.9   PROT 7.1   ALBUMIN 3.6   BILITOT 0.5   ALKPHOS 74   AST 25   ALT 31   ANIONGAP 15   EGFRNONAA 6*       Significant Imaging:     Imaging Results          X-Ray Chest 1 View (Final result)  Result time 04/08/19 08:40:38    Final result by Christophe Clarke MD (04/08/19 08:40:38)                 Impression:      No acute abnormality.      Electronically signed by: Christophe Clarke  Date:    04/08/2019  Time:    08:40             Narrative:    EXAMINATION:  XR CHEST 1 VIEW    CLINICAL HISTORY:  . Shortness of breath    TECHNIQUE:  Single frontal portable view of the chest was performed.    COMPARISON:  PET-CT 02/28/2019    FINDINGS:  Support devices: None    The lungs are clear, with normal appearance of pulmonary vasculature and no pleural effusion or pneumothorax.    The cardiac silhouette is mildly enlarged, unchanged.  The hilar and mediastinal contours are unremarkable.    Bones are intact.

## 2019-04-08 NOTE — H&P
[facility] Hospital Medicine  History & Physical    Patient Name: Cailin Pickett  MRN: [id number]  Admission Date: 4/8/2019  Attending Physician: Obed Mata MD   Primary Care Provider: Favio Titus MD         Patient information was obtained from patient and ER records.     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 She presented to [facility] Emergency Room with complaints of increase shortness of breath for the pass few days. Associated symptoms include bilateral leg swelling at the end of the day. Denies associated symptoms include chest pain, palpitations, dizziness, fever, chills, nauseas or vomiting. Patient has been evaluated by transplant, however not a candidate due to advance age and multiple medical problems. She is followed by Dr Vinson in clinic. Plan discussed with Dr Vinson, pt being admitted to Hospital Medicine for initation of HD.          Past Medical History:   Diagnosis Date    Anemia     CKD (chronic kidney disease) stage 5, GFR less than 15 ml/min 3/14/2019    CKD (chronic kidney disease), stage III     Diabetes mellitus type II     Encounter for blood transfusion     Family history of colonic polyps 7/18/2017    Gout, unspecified     Hyperlipidemia     Hypertension     Neuropathy     Pancreatic cancer     Renal failure     Secondary hyperparathyroidism of renal origin 3/14/2019    Thyroid disease        Past Surgical History:   Procedure Laterality Date    COLONOSCOPY  2011    Dr. Favio Mims    [id number] Left 12/4/2014    [facility] OR    HYSTERECTOMY      KNEE SURGERY Left 05/31/2018    NEPHRECTOMY Left 5/2013    Dr Carter     NEPHRECTOMY  Left 5/15/2013    Performed by Blair [facility] at [facility] OR 2ND FLR    PANCREAS SURGERY      distal pancreatectomy    PANCREATECTOMY, DISTAL, LAPAROSCOPIC, WITH SPLENECTOMY N/A 5/15/2013    Performed by Blair [facility] II, [facility] at [facility] OR 2ND FLR    screening colonoscopy N/A 7/18/2017    [facility] ENDO    SPLENECTOMY, TOTAL      THYROIDECTOMY, PARTIAL      ULTRASOUND, UPPER GI TRACT, ENDOSCOPIC N/A 11/27/2012    Performed by Travis Penn MD at [facility] ENDO (2ND FLR)    VENTRICULOATRIAL SHUNT Left 12/4/2014     left arm       Review of patient's allergies indicates:   Allergen Reactions    Allegra [fexofenadine] Swelling    Codeine Hives, Itching and Nausea And Vomiting       No current facility-administered medications on file prior to encounter.      Current Outpatient Medications on File Prior to Encounter   Medication Sig    allopurinol (ZYLOPRIM) 100 MG tablet Take 1 tablet (100 mg total) by mouth once daily.    amLODIPine (NORVASC) 5 MG tablet TAKE 1 TABLET TWICE A DAY    carvedilol (COREG) 12.5 MG tablet Take 1 tablet (12.5 mg total) by mouth 2 (two) times daily with meals.    cetirizine (ZYRTEC) 10 MG tablet Take 10 mg by mouth once daily.     furosemide (LASIX) 40 MG tablet Take 2 tablets (80 mg total) by mouth once daily.    gabapentin (NEURONTIN) 100 MG capsule Take 1 capsule by mouth 2 (two) times daily.    glucosamine-chondroitin (OSTEO BI-FLEX) 250-200 mg Tab Take 1 tablet by mouth 2 (two) times daily.    hydrALAZINE (APRESOLINE) 100 MG tablet TAKE 1 TABLET EVERY 8 HOURS    hydrOXYzine HCl (ATARAX) 25 MG tablet TAKE 1 TABLET TWICE A DAY AS NEEDED FOR ITCHING    repaglinide (PRANDIN) 0.5 MG tablet TAKE 1 TABLET THREE TIMES A DAY BEFORE MEALS    sodium bicarbonate 650 MG tablet Take 1 tablet (650 mg total) by mouth 2 (two) times daily.    traMADol (ULTRAM) 50 mg tablet Take 1 tablet (50 mg total) by mouth every 12 (twelve) hours.    estradiol  (ESTRACE) 0.5 MG tablet TAKE 1 TABLET DAILY FOR 3 WEEKS PER MONTH, THEN DO NOT TAKE FOR FOURTH WEEK EACH MONTH (Patient taking differently: every other day)    [DISCONTINUED] sucroferric oxyhydroxide (VELPHORO) 500 mg Chew Take 1 tablet (500 mg total) by mouth 3 (three) times daily before meals.     Family History     Problem Relation (Age of Onset)    Breast cancer Mother, Sister, Sister    Cancer Mother, Father, Brother    Diabetes Brother    Heart disease Mother (60)    Hypertension Mother    Kidney disease Brother    Stroke Mother        Tobacco Use    Smoking status: Former Smoker     Packs/day: 1.00     Years: 35.00     Pack years: 35.00     Start date: 3/14/1962     Last attempt to quit: 2001     Years since quittin.2    Smokeless tobacco: Never Used   Substance and Sexual Activity    Alcohol use: No    Drug use: No    Sexual activity: Not on file     Review of Systems   Constitutional: Positive for fatigue. Negative for chills and fever.   HENT: Negative for congestion, rhinorrhea and sinus pressure.    Respiratory: Positive for shortness of breath. Negative for apnea, cough, choking, chest tightness, wheezing and stridor.    Cardiovascular: Positive for leg swelling. Negative for chest pain and palpitations.   Gastrointestinal: Negative for abdominal distention, abdominal pain, diarrhea, nausea and vomiting.   Endocrine: Negative for cold intolerance and heat intolerance.   Genitourinary: Negative for difficulty urinating and hematuria.   Musculoskeletal: Negative for arthralgias and joint swelling.   Skin: Negative for color change, pallor and rash.   Neurological: Positive for weakness. Negative for dizziness, seizures, numbness and headaches.   Psychiatric/Behavioral: Negative for agitation. The patient is not nervous/anxious.      Objective:     Vital Signs (Most Recent):  Temp: 97.5 °F (36.4 °C) (19 0728)  Pulse: (!) 59 (19 1131)  Resp: 18 (19 1131)  BP: (!) 168/101  (04/08/19 1131)  SpO2: 98 % (04/08/19 1131) Vital Signs (24h Range):  Temp:  [97.5 °F (36.4 °C)] 97.5 °F (36.4 °C)  Pulse:  [57-62] 59  Resp:  [13-22] 18  SpO2:  [96 %-99 %] 98 %  BP: (168-205)/() 168/101     Weight: 68.5 kg (151 lb)  Body mass index is 25.13 kg/m².    Physical Exam   Constitutional: No distress.   HENT:   Mouth/Throat: No oropharyngeal exudate.   Eyes: Right eye exhibits no discharge. Left eye exhibits no discharge.   Cardiovascular: Exam reveals no gallop and no friction rub.   No murmur heard.  Pulmonary/Chest: No stridor. No respiratory distress. She has no wheezes. She has rales. She exhibits no tenderness.   Abdominal: She exhibits no distension and no mass. There is no tenderness. There is no rebound and no guarding. No hernia.   Musculoskeletal: She exhibits no edema or deformity.   Neurological: She is alert.   Skin: Skin is warm and dry. Capillary refill takes less than 2 seconds. She is not diaphoretic.   Nursing note and vitals reviewed.          Significant Labs:   CBC:   Recent Labs   Lab 04/08/19  0817   WBC 5.05   HGB 9.3*   HCT 29.6*        CMP:   Recent Labs   Lab 04/08/19  0817      K 4.8      CO2 20*      BUN 97*   CREATININE 5.9*   CALCIUM 9.9   PROT 7.1   ALBUMIN 3.6   BILITOT 0.5   ALKPHOS 74   AST 25   ALT 31   ANIONGAP 15   EGFRNONAA 6*       Significant Imaging:     Imaging Results          X-Ray Chest 1 View (Final result)  Result time 04/08/19 08:40:38    Final result by Christophe Clarke MD (04/08/19 08:40:38)                 Impression:      No acute abnormality.      Electronically signed by: Christophe Clarke  Date:    04/08/2019  Time:    08:40             Narrative:    EXAMINATION:  XR CHEST 1 VIEW    CLINICAL HISTORY:  . Shortness of breath    TECHNIQUE:  Single frontal portable view of the chest was performed.    COMPARISON:  PET-CT 02/28/2019    FINDINGS:  Support devices: None    The lungs are clear, with normal appearance of pulmonary  vasculature and no pleural effusion or pneumothorax.    The cardiac silhouette is mildly enlarged, unchanged.  The hilar and mediastinal contours are unremarkable.    Bones are intact.                              Assessment/Plan:     * ESRD needing dialysis  Admit to Inpatient   Consult Nephrology to start HD    to arrange outpatient HD         CKD (chronic kidney disease) stage 5, GFR less than 15 ml/min  As above       Type II diabetes mellitus  HA1X 5.1 March 2019  Glucose 170 on labs one week ago  Accu check ACHS   Low dose SS   Diabetic diet   Monitor     Peripheral neuropathy    Continue Gabapentin     SOB (shortness of breath)  Patient admitted with shortness of breath   Admitted for initiation of HD, which should with shortness of breath.   Monitor       Anemia due to chronic kidney disease  HGB currently stable   Montior       Hypertension associated with diabetes    Blood pressure elevated on arrival to ED, however has not taken BP medications today   Restart home amlodipine, carvedilol and hydralazine         VTE Risk Mitigation (From admission, onward)        Ordered     Place sequential compression device  Until discontinued      04/08/19 0436             Tesfaye Narvaez NP  Department of Hospital Medicine   [facility]

## 2019-04-08 NOTE — CONSULTS
[facility]   Nephrology  Consult Note      Patient Name: Cailin Picktet  MRN: [id number]  Admission Date: 4/8/2019  Hospital Length of Stay: 0 days  Attending Provider: Jesus Rodriguez, *   Primary Care Physician: Favio Titus MD  Principal Problem:ESRD needing dialysis    Consults  Subjective:     HPI: Cailin Pickett  is a pleasant 78-year-old  woman with history of hypertension, CKD stage 5, presents to hospital with generalized weakness and shortness of breath.  I saw her in the clinic about a week ago.  Advised patient to initiate hemodialysis.  Patient is here for further evaluation and possibility of starting hemodialysis.     she continues to feel tired, she has some exertion or shortness of Breath.  Peripheral edema noted.  Recent transplant note reviewed, patient was declined for a kidney transplant due to multiple medical problems, advanced age,  has solitary right kidney, she is status post left nephrectomy. Multiple cysts reported on the kidney. It measures about 16 cm. Patient Attended chronic kidney disease education and options class. she had a left arm AV fistula placed on 12/4/14 by Dr Foster .        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 she underwent chemo rx with 5-FU, she tolerated the cycles well. In 5/2013 she underwent resection of tail of pancreas, left nephrectomy, splenectomy, left adrenalectomy.            Past Medical History:   Diagnosis Date    Anemia     CKD (chronic kidney disease) stage 5, GFR less than 15 ml/min 3/14/2019    CKD (chronic kidney disease), stage III     Diabetes mellitus type II     Encounter for blood transfusion     Family history of colonic polyps 7/18/2017    Gout, unspecified     Hyperlipidemia     Hypertension     Neuropathy     Pancreatic cancer     Renal failure     Secondary hyperparathyroidism of renal origin 3/14/2019    Thyroid disease         Past Surgical History:   Procedure Laterality Date    COLONOSCOPY      Dr. Favio Mims    [id number] Left 2014    [facility]    HYSTERECTOMY      KNEE SURGERY Left 2018    NEPHRECTOMY Left 2013    Dr Carter     NEPHRECTOMY Left 5/15/2013    Performed by Blair [facility] OR 2ND FLR    PANCREAS SURGERY      distal pancreatectomy    PANCREATECTOMY, DISTAL, LAPAROSCOPIC, WITH SPLENECTOMY N/A 5/15/2013    Performed by Blair [facility] II, MD at [facility] OR 2ND FLR    screening colonoscopy N/A 2017    Performed by Kenneth Kamara MD at [facility] ENDO    SPLENECTOMY, TOTAL      THYROIDECTOMY, PARTIAL      ULTRASOUND, UPPER GI TRACT, ENDOSCOPIC N/A 2012    Performed by Travis Penn MD at [facility] ENDO (2ND FLR)    VENTRICULOATRIAL SHUNT Left 2014     left arm       Review of patient's allergies indicates:   Allergen Reactions    Allegra [fexofenadine] Swelling    Codeine Hives, Itching and Nausea And Vomiting     Current Facility-Administered Medications   Medication Frequency    allopurinol tablet 100 mg Daily    amLODIPine tablet 5 mg BID    carvedilol tablet 12.5 mg BID WM    epoetin augusta injection 10,000 Units Once    furosemide tablet 80 mg Daily    gabapentin capsule 100 mg BID    hydrALAZINE injection 10 mg Q8H PRN    hydrALAZINE tablet 100 mg Q8H    sodium bicarbonate tablet 650 mg BID     Family History     Problem Relation (Age of Onset)    Breast cancer Mother, Sister, Sister    Cancer Mother, Father, Brother    Diabetes Brother    Heart disease Mother (60)    Hypertension Mother    Kidney disease Brother    Stroke Mother        Tobacco Use    Smoking status: Former Smoker     Packs/day: 1.00     Years: 35.00     Pack years: 35.00     Start date: 3/14/1962     Last attempt to quit: 2001     Years since quittin.2    Smokeless tobacco: Never Used   Substance and Sexual Activity     Alcohol use: No    Drug use: No    Sexual activity: Not on file     Review of Systems   Constitutional: Positive for activity change and fatigue. Negative for appetite change and fever.   HENT: Negative for congestion, facial swelling, sore throat, trouble swallowing and voice change.    Eyes: Negative for redness and visual disturbance.   Respiratory: Positive for shortness of breath. Negative for apnea, cough, chest tightness and wheezing.    Cardiovascular: Positive for leg swelling. Negative for chest pain and palpitations.   Gastrointestinal: Negative for abdominal distention, abdominal pain, blood in stool, constipation, diarrhea, nausea and vomiting.   Genitourinary: Negative for decreased urine volume, difficulty urinating, dysuria, flank pain, frequency, hematuria, pelvic pain and urgency.   Musculoskeletal: Negative for back pain, gait problem and joint swelling.   Skin: Negative for color change and rash.   Neurological: Positive for weakness. Negative for dizziness, syncope and headaches.   Hematological: Does not bruise/bleed easily.   Psychiatric/Behavioral: Negative for agitation, behavioral problems and confusion. The patient is not nervous/anxious.      Objective:     Vital Signs (Most Recent):  Temp: 97.6 °F (36.4 °C) (04/08/19 1643)  Pulse: 60 (04/08/19 1643)  Resp: 16 (04/08/19 1643)  BP: (!) 165/70 (04/08/19 1643)  SpO2: 96 % (04/08/19 1643)  O2 Device (Oxygen Therapy): room air (04/08/19 1515) Vital Signs (24h Range):  Temp:  [97.5 °F (36.4 °C)-98.3 °F (36.8 °C)] 97.6 °F (36.4 °C)  Pulse:  [57-65] 60  Resp:  [13-22] 16  SpO2:  [96 %-99 %] 96 %  BP: (137-205)/() 165/70     Weight: 63.9 kg (140 lb 14 oz) (04/08/19 1515)  Body mass index is 23.44 kg/m².  Body surface area is 1.71 meters squared.    No intake/output data recorded.    Physical Exam   Constitutional: She is oriented to person, place, and time. She appears well-developed. No distress.   HENT:   Head: Normocephalic and  atraumatic.   Mouth/Throat: Oropharynx is clear and moist. No oropharyngeal exudate.   Eyes: Pupils are equal, round, and reactive to light. Conjunctivae and EOM are normal.   Neck: Normal range of motion. Neck supple. No JVD present. Carotid bruit is not present. No tracheal deviation present. No thyroid mass and no thyromegaly present.   Cardiovascular: Normal rate, regular rhythm and intact distal pulses. Exam reveals no gallop and no friction rub.   Murmur heard.  Pulmonary/Chest: Effort normal. No respiratory distress. She has no wheezes. She has rales. She exhibits no tenderness.   Abdominal: Soft. Bowel sounds are normal. She exhibits no distension, no abdominal bruit, no ascites and no mass. There is no hepatosplenomegaly. There is no tenderness. There is no rebound, no guarding and no CVA tenderness.   Musculoskeletal: She exhibits edema. She exhibits no tenderness.   LUE AVF with good thrill    Lymphadenopathy:     She has no cervical adenopathy.   Neurological: She is alert and oriented to person, place, and time. She has normal reflexes. She displays normal reflexes. No cranial nerve deficit. She exhibits normal muscle tone. Coordination normal.   Skin: Skin is warm and intact. No rash noted. No erythema. No pallor.   Psychiatric: She has a normal mood and affect. Her behavior is normal.       Significant Labs:  CBC:   Recent Labs   Lab 04/08/19 0817   WBC 5.05   RBC 3.22*   HGB 9.3*   HCT 29.6*      MCV 92   MCH 28.9   MCHC 31.4*     CMP:   Recent Labs   Lab 04/08/19 0817      CALCIUM 9.9   ALBUMIN 3.6   PROT 7.1      K 4.8   CO2 20*      BUN 97*   CREATININE 5.9*   ALKPHOS 74   ALT 31   AST 25   BILITOT 0.5     Coagulation:   Recent Labs   Lab 04/08/19 0817   INR 0.9   APTT 28.5     LFTs:   Recent Labs   Lab 04/08/19 0817   ALT 31   AST 25   ALKPHOS 74   BILITOT 0.5   PROT 7.1   ALBUMIN 3.6     All labs within the past 24 hours have been reviewed.    Significant Imaging:   Reviewed     Lab Results   Component Value Date    .0 (H) 04/01/2019    CALCIUM 9.9 04/08/2019    PHOS 5.3 (H) 04/08/2019       Lab Results   Component Value Date    ALBUMIN 3.6 04/08/2019         Assessment/Plan:     CKD (chronic kidney disease) stage 5, GFR less than 15 ml/min  1. ESRD :  Patient is s/p  left nephrectomy, she has  functioning right kidney, progressive decline in renal function, she had a left arm AV fistula placed on 12/4/14 by Dr Foster .  Good thrill on exam.  This will be useful to initiate dialysis.   case management consult to arrange outpatient dialysis.    2.  Hypertension - resume home blood pressure medications    3.  Anemia of chronic kidney disease - Procrit with dialysis    4.  Secondary hyperparathyroidism - renal diet, Velphoro with meals,     5. D/c home when stable               Thank you for your consult. I will follow-up with patient. Please contact us if you have any additional questions.     Total time spent 70 minutes including time needed to review the records,  patient  evaluation, documentation, face-to-face discussion with the patient, spouse, primary team, more than 50% of the time was spent on coordination of care and counseling.       Tank Vinson MD   Nephrology  [facility]

## 2019-04-08 NOTE — SUBJECTIVE & OBJECTIVE
Past Medical History:   Diagnosis Date    Anemia     CKD (chronic kidney disease) stage 5, GFR less than 15 ml/min 3/14/2019    CKD (chronic kidney disease), stage III     Diabetes mellitus type II     Encounter for blood transfusion     Family history of colonic polyps 7/18/2017    Gout, unspecified     Hyperlipidemia     Hypertension     Neuropathy     Pancreatic cancer     Renal failure     Secondary hyperparathyroidism of renal origin 3/14/2019    Thyroid disease        Past Surgical History:   Procedure Laterality Date    COLONOSCOPY  2011    Dr. Favio Mims    [id number] Left 12/4/2014    [facility] OR    HYSTERECTOMY      KNEE SURGERY Left 05/31/2018    NEPHRECTOMY Left 5/2013    Dr Carter     NEPHRECTOMY Left 5/15/2013    Performed by Blair [facility] II, [facility] at [facility] OR 2ND FLR    PANCREAS SURGERY      distal pancreatectomy    PANCREATECTOMY, DISTAL, LAPAROSCOPIC, WITH SPLENECTOMY N/A 5/15/2013    Performed by Blair [facility] II, [facility] at [facility] OR 2ND FLR    screening colonoscopy N/A 7/18/2017    Performed by Kenneth Kamara MD at [facility] ENDO    SPLENECTOMY, TOTAL      THYROIDECTOMY, PARTIAL      ULTRASOUND, UPPER GI TRACT, ENDOSCOPIC N/A 11/27/2012    Performed by Travis Penn MD at [facility] ENDO (2ND FLR)    VENTRICULOATRIAL SHUNT Left 12/4/2014     left arm       Review of patient's allergies indicates:   Allergen Reactions    Allegra [fexofenadine] Swelling    Codeine Hives, Itching and Nausea And Vomiting     Current Facility-Administered Medications   Medication Frequency    allopurinol tablet 100 mg Daily    amLODIPine tablet 5 mg BID    carvedilol tablet 12.5 mg BID WM    epoetin augusta injection 10,000 Units Once    furosemide tablet 80 mg Daily    gabapentin capsule 100 mg BID    hydrALAZINE injection 10 mg Q8H PRN    hydrALAZINE tablet 100 mg Q8H    sodium bicarbonate tablet 650 mg BID     Family History     Problem  Relation (Age of Onset)    Breast cancer Mother, Sister, Sister    Cancer Mother, Father, Brother    Diabetes Brother    Heart disease Mother (60)    Hypertension Mother    Kidney disease Brother    Stroke Mother        Tobacco Use    Smoking status: Former Smoker     Packs/day: 1.00     Years: 35.00     Pack years: 35.00     Start date: 3/14/1962     Last attempt to quit: 2001     Years since quittin.2    Smokeless tobacco: Never Used   Substance and Sexual Activity    Alcohol use: No    Drug use: No    Sexual activity: Not on file     Review of Systems   Constitutional: Positive for activity change and fatigue. Negative for appetite change and fever.   HENT: Negative for congestion, facial swelling, sore throat, trouble swallowing and voice change.    Eyes: Negative for redness and visual disturbance.   Respiratory: Positive for shortness of breath. Negative for apnea, cough, chest tightness and wheezing.    Cardiovascular: Positive for leg swelling. Negative for chest pain and palpitations.   Gastrointestinal: Negative for abdominal distention, abdominal pain, blood in stool, constipation, diarrhea, nausea and vomiting.   Genitourinary: Negative for decreased urine volume, difficulty urinating, dysuria, flank pain, frequency, hematuria, pelvic pain and urgency.   Musculoskeletal: Negative for back pain, gait problem and joint swelling.   Skin: Negative for color change and rash.   Neurological: Positive for weakness. Negative for dizziness, syncope and headaches.   Hematological: Does not bruise/bleed easily.   Psychiatric/Behavioral: Negative for agitation, behavioral problems and confusion. The patient is not nervous/anxious.      Objective:     Vital Signs (Most Recent):  Temp: 97.6 °F (36.4 °C) (19 1643)  Pulse: 60 (19)  Resp: 16 (19)  BP: (!) 165/70 (19 1643)  SpO2: 96 % (19)  O2 Device (Oxygen Therapy): room air (19 1515) Vital Signs (24h  Range):  Temp:  [97.5 °F (36.4 °C)-98.3 °F (36.8 °C)] 97.6 °F (36.4 °C)  Pulse:  [57-65] 60  Resp:  [13-22] 16  SpO2:  [96 %-99 %] 96 %  BP: (137-205)/() 165/70     Weight: 63.9 kg (140 lb 14 oz) (04/08/19 1515)  Body mass index is 23.44 kg/m².  Body surface area is 1.71 meters squared.    No intake/output data recorded.    Physical Exam   Constitutional: She is oriented to person, place, and time. She appears well-developed. No distress.   HENT:   Head: Normocephalic and atraumatic.   Mouth/Throat: Oropharynx is clear and moist. No oropharyngeal exudate.   Eyes: Pupils are equal, round, and reactive to light. Conjunctivae and EOM are normal.   Neck: Normal range of motion. Neck supple. No JVD present. Carotid bruit is not present. No tracheal deviation present. No thyroid mass and no thyromegaly present.   Cardiovascular: Normal rate, regular rhythm and intact distal pulses. Exam reveals no gallop and no friction rub.   Murmur heard.  Pulmonary/Chest: Effort normal. No respiratory distress. She has no wheezes. She has rales. She exhibits no tenderness.   Abdominal: Soft. Bowel sounds are normal. She exhibits no distension, no abdominal bruit, no ascites and no mass. There is no hepatosplenomegaly. There is no tenderness. There is no rebound, no guarding and no CVA tenderness.   Musculoskeletal: She exhibits edema. She exhibits no tenderness.   LUE AVF with good thrill    Lymphadenopathy:     She has no cervical adenopathy.   Neurological: She is alert and oriented to person, place, and time. She has normal reflexes. She displays normal reflexes. No cranial nerve deficit. She exhibits normal muscle tone. Coordination normal.   Skin: Skin is warm and intact. No rash noted. No erythema. No pallor.   Psychiatric: She has a normal mood and affect. Her behavior is normal.       Significant Labs:  CBC:   Recent Labs   Lab 04/08/19  0817   WBC 5.05   RBC 3.22*   HGB 9.3*   HCT 29.6*      MCV 92   MCH 28.9    MCHC 31.4*     CMP:   Recent Labs   Lab 04/08/19 0817      CALCIUM 9.9   ALBUMIN 3.6   PROT 7.1      K 4.8   CO2 20*      BUN 97*   CREATININE 5.9*   ALKPHOS 74   ALT 31   AST 25   BILITOT 0.5     Coagulation:   Recent Labs   Lab 04/08/19 0817   INR 0.9   APTT 28.5     LFTs:   Recent Labs   Lab 04/08/19 0817   ALT 31   AST 25   ALKPHOS 74   BILITOT 0.5   PROT 7.1   ALBUMIN 3.6     All labs within the past 24 hours have been reviewed.    Significant Imaging:  Reviewed     Lab Results   Component Value Date    .0 (H) 04/01/2019    CALCIUM 9.9 04/08/2019    PHOS 5.3 (H) 04/08/2019       Lab Results   Component Value Date    ALBUMIN 3.6 04/08/2019

## 2019-04-08 NOTE — ASSESSMENT & PLAN NOTE
Admit to Inpatient   Consult Nephrology to start HD    to arrange outpatient HD   Continue home Sodium Bicarbonate

## 2019-04-08 NOTE — ASSESSMENT & PLAN NOTE
HA1X 5.1 March 2019  Glucose 170 on labs one week ago  Accu check ACHS   Low dose SS   Diabetic diet   Monitor

## 2019-04-08 NOTE — SUBJECTIVE & OBJECTIVE
Past Medical History:   Diagnosis Date    Anemia     CKD (chronic kidney disease) stage 5, GFR less than 15 ml/min 3/14/2019    CKD (chronic kidney disease), stage III     Diabetes mellitus type II     Encounter for blood transfusion     Family history of colonic polyps 7/18/2017    Gout, unspecified     Hyperlipidemia     Hypertension     Neuropathy     Pancreatic cancer     Renal failure     Secondary hyperparathyroidism of renal origin 3/14/2019    Thyroid disease        Past Surgical History:   Procedure Laterality Date    COLONOSCOPY  2011    Dr. Favio Mims    [id number] Left 12/4/2014    [facility]    HYSTERECTOMY      KNEE SURGERY Left 05/31/2018    NEPHRECTOMY Left 5/2013    Dr Carter     NEPHRECTOMY Left 5/15/2013    Performed by Blair [facility] II, MD at [facility] OR 2ND FLR    PANCREAS SURGERY      distal pancreatectomy    PANCREATECTOMY, DISTAL, LAPAROSCOPIC, WITH SPLENECTOMY N/A 5/15/2013    Performed by Blair Carter II, MD at [facility] OR 2ND FLR    screening colonoscopy N/A 7/18/2017    Performed by Kenneth [facility] at [facility] ENDO    SPLENECTOMY, TOTAL      THYROIDECTOMY, PARTIAL      ULTRASOUND, UPPER GI TRACT, ENDOSCOPIC N/A 11/27/2012    Performed by Travis Penn MD at [facility] ENDO (2ND FLR)    VENTRICULOATRIAL SHUNT Left 12/4/2014     left arm       Review of patient's allergies indicates:   Allergen Reactions    Allegra [fexofenadine] Swelling    Codeine Hives, Itching and Nausea And Vomiting       No current facility-administered medications on file prior to encounter.      Current Outpatient Medications on File Prior to Encounter   Medication Sig    allopurinol (ZYLOPRIM) 100 MG tablet Take 1 tablet (100 mg total) by mouth once daily.    amLODIPine (NORVASC) 5 MG tablet TAKE 1 TABLET TWICE A DAY    carvedilol (COREG) 12.5 MG tablet Take 1 tablet (12.5 mg total) by mouth 2 (two) times daily with meals.    cetirizine  (ZYRTEC) 10 MG tablet Take 10 mg by mouth once daily.     furosemide (LASIX) 40 MG tablet Take 2 tablets (80 mg total) by mouth once daily.    gabapentin (NEURONTIN) 100 MG capsule Take 1 capsule by mouth 2 (two) times daily.    glucosamine-chondroitin (OSTEO BI-FLEX) 250-200 mg Tab Take 1 tablet by mouth 2 (two) times daily.    hydrALAZINE (APRESOLINE) 100 MG tablet TAKE 1 TABLET EVERY 8 HOURS    hydrOXYzine HCl (ATARAX) 25 MG tablet TAKE 1 TABLET TWICE A DAY AS NEEDED FOR ITCHING    repaglinide (PRANDIN) 0.5 MG tablet TAKE 1 TABLET THREE TIMES A DAY BEFORE MEALS    sodium bicarbonate 650 MG tablet Take 1 tablet (650 mg total) by mouth 2 (two) times daily.    traMADol (ULTRAM) 50 mg tablet Take 1 tablet (50 mg total) by mouth every 12 (twelve) hours.    estradiol (ESTRACE) 0.5 MG tablet TAKE 1 TABLET DAILY FOR 3 WEEKS PER MONTH, THEN DO NOT TAKE FOR FOURTH WEEK EACH MONTH (Patient taking differently: every other day)    [DISCONTINUED] sucroferric oxyhydroxide (VELPHORO) 500 mg Chew Take 1 tablet (500 mg total) by mouth 3 (three) times daily before meals.     Family History     Problem Relation (Age of Onset)    Breast cancer Mother, Sister, Sister    Cancer Mother, Father, Brother    Diabetes Brother    Heart disease Mother (60)    Hypertension Mother    Kidney disease Brother    Stroke Mother        Tobacco Use    Smoking status: Former Smoker     Packs/day: 1.00     Years: 35.00     Pack years: 35.00     Start date: 3/14/1962     Last attempt to quit: 2001     Years since quittin.2    Smokeless tobacco: Never Used   Substance and Sexual Activity    Alcohol use: No    Drug use: No    Sexual activity: Not on file     Review of Systems   Constitutional: Positive for fatigue. Negative for chills and fever.   HENT: Negative for congestion, rhinorrhea and sinus pressure.    Respiratory: Positive for shortness of breath. Negative for apnea, cough, choking, chest tightness, wheezing and stridor.     Cardiovascular: Negative for chest pain, palpitations and leg swelling.   Gastrointestinal: Negative for abdominal distention, abdominal pain, diarrhea, nausea and vomiting.   Endocrine: Negative for cold intolerance and heat intolerance.   Genitourinary: Negative for difficulty urinating and hematuria.   Musculoskeletal: Negative for arthralgias and joint swelling.   Skin: Negative for color change, pallor and rash.   Neurological: Positive for weakness. Negative for dizziness, seizures, numbness and headaches.   Psychiatric/Behavioral: Negative for agitation. The patient is not nervous/anxious.      Objective:     Vital Signs (Most Recent):  Temp: 97.5 °F (36.4 °C) (04/08/19 0728)  Pulse: (!) 59 (04/08/19 1131)  Resp: 18 (04/08/19 1131)  BP: (!) 168/101 (04/08/19 1131)  SpO2: 98 % (04/08/19 1131) Vital Signs (24h Range):  Temp:  [97.5 °F (36.4 °C)] 97.5 °F (36.4 °C)  Pulse:  [57-62] 59  Resp:  [13-22] 18  SpO2:  [96 %-99 %] 98 %  BP: (168-205)/() 168/101     Weight: 68.5 kg (151 lb)  Body mass index is 25.13 kg/m².    Physical Exam   Constitutional: No distress.   HENT:   Mouth/Throat: No oropharyngeal exudate.   Eyes: Right eye exhibits no discharge. Left eye exhibits no discharge.   Cardiovascular: Exam reveals no gallop and no friction rub.   No murmur heard.  Pulmonary/Chest: No stridor. No respiratory distress. She has no wheezes. She has rales in the right lower field and the left lower field. She exhibits no tenderness.   Abdominal: She exhibits no distension and no mass. There is no tenderness. There is no rebound and no guarding. No hernia.   Musculoskeletal: She exhibits no edema or deformity.   Neurological: She is alert.   Skin: Skin is warm and dry. She is not diaphoretic.   Nursing note and vitals reviewed.          Significant Labs:   CBC:   Recent Labs   Lab 04/08/19 0817   WBC 5.05   HGB 9.3*   HCT 29.6*        CMP:   Recent Labs   Lab 04/08/19 0817      K 4.8      CO2  20*      BUN 97*   CREATININE 5.9*   CALCIUM 9.9   PROT 7.1   ALBUMIN 3.6   BILITOT 0.5   ALKPHOS 74   AST 25   ALT 31   ANIONGAP 15   EGFRNONAA 6*     Cardiac Markers:   Recent Labs   Lab 04/08/19 0817   *     Coagulation:   Recent Labs   Lab 04/08/19 0817   INR 0.9   APTT 28.5     Troponin:   Recent Labs   Lab 04/08/19 0817   TROPONINI 0.017       Significant Imaging:     Imaging Results          X-Ray Chest 1 View (Final result)  Result time 04/08/19 08:40:38    Final result by Christophe Clarke MD (04/08/19 08:40:38)                 Impression:      No acute abnormality.      Electronically signed by: Christophe Clarke  Date:    04/08/2019  Time:    08:40             Narrative:    EXAMINATION:  XR CHEST 1 VIEW    CLINICAL HISTORY:  . Shortness of breath    TECHNIQUE:  Single frontal portable view of the chest was performed.    COMPARISON:  PET-CT 02/28/2019    FINDINGS:  Support devices: None    The lungs are clear, with normal appearance of pulmonary vasculature and no pleural effusion or pneumothorax.    The cardiac silhouette is mildly enlarged, unchanged.  The hilar and mediastinal contours are unremarkable.    Bones are intact.

## 2019-04-08 NOTE — H&P
[facility] Hospital Medicine  History & Physical    Patient Name: Cailin Pickett  MRN: [id number]  Admission Date: 4/8/2019  Attending Physician: Michael Ontiveros MD   Primary Care Provider: Favio Titus MD         Patient information was obtained from patient and ER records.     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 She presented to [facility] with complaints of increase shortness of breath for the pass few days. Associated symptoms include bilateral leg swelling at the end of the day. Denies associated symptoms include chest pain, palpitations, dizziness, fever, chills, nauseas or vomiting. Patient has been evaluated by transplant, however not a candidate due to advance age and multiple medical problems. She is followed by Dr Vinson in clinic. Plan discussed with Dr Vinson, pt being admitted to Hospital Medicine for initation of HD.          Past Medical History:   Diagnosis Date    Anemia     CKD (chronic kidney disease) stage 5, GFR less than 15 ml/min 3/14/2019    CKD (chronic kidney disease), stage III     Diabetes mellitus type II     Encounter for blood transfusion     Family history of colonic polyps 7/18/2017    Gout, unspecified     Hyperlipidemia     Hypertension     Neuropathy     Pancreatic cancer     Renal failure     Secondary hyperparathyroidism of renal origin 3/14/2019    Thyroid disease        Past Surgical History:   Procedure Laterality Date    COLONOSCOPY  2011    Dr. Favio Mims    [id number] Left 12/4/2014    Performed by [facility]ad Foster [facility] at [facility] OR    HYSTERECTOMY      KNEE SURGERY Left 05/31/2018    NEPHRECTOMY Left 5/2013    Dr Carter      NEPHRECTOMY Left 5/15/2013    Performed by Blair [facility], [facility] at [facility] OR 2ND FLR    PANCREAS SURGERY      distal pancreatectomy    PANCREATECTOMY, DISTAL, LAPAROSCOPIC, WITH SPLENECTOMY N/A 5/15/2013    Performed by Blair [facility] II, MD at [facility] OR 2ND FLR    screening colonoscopy N/A 7/18/2017    [facility] ENDO    SPLENECTOMY, TOTAL      THYROIDECTOMY, PARTIAL      ULTRASOUND, UPPER GI TRACT, ENDOSCOPIC N/A 11/27/2012    Performed by Travis Penn MD at [facility] ENDO (2ND FLR)    VENTRICULOATRIAL SHUNT Left 12/4/2014     left arm       Review of patient's allergies indicates:   Allergen Reactions    Allegra [fexofenadine] Swelling    Codeine Hives, Itching and Nausea And Vomiting       No current facility-administered medications on file prior to encounter.      Current Outpatient Medications on File Prior to Encounter   Medication Sig    allopurinol (ZYLOPRIM) 100 MG tablet Take 1 tablet (100 mg total) by mouth once daily.    amLODIPine (NORVASC) 5 MG tablet TAKE 1 TABLET TWICE A DAY    carvedilol (COREG) 12.5 MG tablet Take 1 tablet (12.5 mg total) by mouth 2 (two) times daily with meals.    cetirizine (ZYRTEC) 10 MG tablet Take 10 mg by mouth once daily.     furosemide (LASIX) 40 MG tablet Take 2 tablets (80 mg total) by mouth once daily.    gabapentin (NEURONTIN) 100 MG capsule Take 1 capsule by mouth 2 (two) times daily.    glucosamine-chondroitin (OSTEO BI-FLEX) 250-200 mg Tab Take 1 tablet by mouth 2 (two) times daily.    hydrALAZINE (APRESOLINE) 100 MG tablet TAKE 1 TABLET EVERY 8 HOURS    hydrOXYzine HCl (ATARAX) 25 MG tablet TAKE 1 TABLET TWICE A DAY AS NEEDED FOR ITCHING    repaglinide (PRANDIN) 0.5 MG tablet TAKE 1 TABLET THREE TIMES A DAY BEFORE MEALS    sodium bicarbonate 650 MG tablet Take 1 tablet (650 mg total) by mouth 2 (two) times daily.    traMADol (ULTRAM) 50 mg tablet Take 1 tablet (50 mg total) by mouth every 12 (twelve) hours.     estradiol (ESTRACE) 0.5 MG tablet TAKE 1 TABLET DAILY FOR 3 WEEKS PER MONTH, THEN DO NOT TAKE FOR FOURTH WEEK EACH MONTH (Patient taking differently: every other day)    [DISCONTINUED] sucroferric oxyhydroxide (VELPHORO) 500 mg Chew Take 1 tablet (500 mg total) by mouth 3 (three) times daily before meals.     Family History     Problem Relation (Age of Onset)    Breast cancer Mother, Sister, Sister    Cancer Mother, Father, Brother    Diabetes Brother    Heart disease Mother (60)    Hypertension Mother    Kidney disease Brother    Stroke Mother        Tobacco Use    Smoking status: Former Smoker     Packs/day: 1.00     Years: 35.00     Pack years: 35.00     Start date: 3/14/1962     Last attempt to quit: 2001     Years since quittin.2    Smokeless tobacco: Never Used   Substance and Sexual Activity    Alcohol use: No    Drug use: No    Sexual activity: Not on file     Review of Systems   Constitutional: Positive for fatigue. Negative for chills and fever.   HENT: Negative for congestion, rhinorrhea and sinus pressure.    Respiratory: Positive for shortness of breath. Negative for apnea, cough, choking, chest tightness, wheezing and stridor.    Cardiovascular: Negative for chest pain, palpitations and leg swelling.   Gastrointestinal: Negative for abdominal distention, abdominal pain, diarrhea, nausea and vomiting.   Endocrine: Negative for cold intolerance and heat intolerance.   Genitourinary: Negative for difficulty urinating and hematuria.   Musculoskeletal: Negative for arthralgias and joint swelling.   Skin: Negative for color change, pallor and rash.   Neurological: Positive for weakness. Negative for dizziness, seizures, numbness and headaches.   Psychiatric/Behavioral: Negative for agitation. The patient is not nervous/anxious.      Objective:     Vital Signs (Most Recent):  Temp: 97.5 °F (36.4 °C) (19 0728)  Pulse: (!) 59 (19 1131)  Resp: 18 (19 1131)  BP: (!) 168/101  (04/08/19 1131)  SpO2: 98 % (04/08/19 1131) Vital Signs (24h Range):  Temp:  [97.5 °F (36.4 °C)] 97.5 °F (36.4 °C)  Pulse:  [57-62] 59  Resp:  [13-22] 18  SpO2:  [96 %-99 %] 98 %  BP: (168-205)/() 168/101     Weight: 68.5 kg (151 lb)  Body mass index is 25.13 kg/m².    Physical Exam   Constitutional: No distress.   HENT:   Mouth/Throat: No oropharyngeal exudate.   Eyes: Right eye exhibits no discharge. Left eye exhibits no discharge.   Cardiovascular: Exam reveals no gallop and no friction rub.   No murmur heard.  Pulmonary/Chest: No stridor. No respiratory distress. She has no wheezes. She has rales in the right lower field and the left lower field. She exhibits no tenderness.   Abdominal: She exhibits no distension and no mass. There is no tenderness. There is no rebound and no guarding. No hernia.   Musculoskeletal: She exhibits no edema or deformity.   Neurological: She is alert.   Skin: Skin is warm and dry. She is not diaphoretic.   Nursing note and vitals reviewed.          Significant Labs:   CBC:   Recent Labs   Lab 04/08/19 0817   WBC 5.05   HGB 9.3*   HCT 29.6*        CMP:   Recent Labs   Lab 04/08/19 0817      K 4.8      CO2 20*      BUN 97*   CREATININE 5.9*   CALCIUM 9.9   PROT 7.1   ALBUMIN 3.6   BILITOT 0.5   ALKPHOS 74   AST 25   ALT 31   ANIONGAP 15   EGFRNONAA 6*     Cardiac Markers:   Recent Labs   Lab 04/08/19 0817   *     Coagulation:   Recent Labs   Lab 04/08/19 0817   INR 0.9   APTT 28.5     Troponin:   Recent Labs   Lab 04/08/19 0817   TROPONINI 0.017       Significant Imaging:     Imaging Results          X-Ray Chest 1 View (Final result)  Result time 04/08/19 08:40:38    Final result by Christophe Clarke MD (04/08/19 08:40:38)                 Impression:      No acute abnormality.      Electronically signed by: Christophe Clarke  Date:    04/08/2019  Time:    08:40             Narrative:    EXAMINATION:  XR CHEST 1 VIEW    CLINICAL HISTORY:  . Shortness  of breath    TECHNIQUE:  Single frontal portable view of the chest was performed.    COMPARISON:  PET-CT 02/28/2019    FINDINGS:  Support devices: None    The lungs are clear, with normal appearance of pulmonary vasculature and no pleural effusion or pneumothorax.    The cardiac silhouette is mildly enlarged, unchanged.  The hilar and mediastinal contours are unremarkable.    Bones are intact.                              Assessment/Plan:     * ESRD needing dialysis  Admit to Inpatient   Consult Nephrology to start HD    to arrange outpatient HD   Continue home Sodium Bicarbonate         CKD (chronic kidney disease) stage 5, GFR less than 15 ml/min  As above       Type II diabetes mellitus  HA1X 5.1 March 2019  Glucose 170 on labs one week ago  Accu check ACHS   Low dose SS   Diabetic diet   Monitor     Peripheral neuropathy    Continue Gabapentin     SOB (shortness of breath)  Patient presented to ED with shortness of breath   Admitted for initiation of HD, which should help  with shortness of breath.   Monitor       Anemia due to chronic kidney disease  HGB currently stable   Montior       Hypertension associated with diabetes    Blood pressure elevated on arrival to ED, however has not taken BP medications today   Restart home amlodipine, carvedilol and hydralazine           VTE Risk Mitigation (From admission, onward)        Ordered     Place sequential compression device  Until discontinued      04/08/19 1226             Tesfaye Narvaez NP  Department of Hospital Medicine   [facility]

## 2019-04-08 NOTE — ASSESSMENT & PLAN NOTE
Patient admitted with shortness of breath   Admitted for initiation of HD, which should with shortness of breath.   Monitor

## 2019-04-08 NOTE — HPI
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 She presented to [facility] Emergency Room with complaints of increase shortness of breath for the pass few days. Associated symptoms include bilateral leg swelling at the end of the day. Denies associated symptoms include chest pain, palpitations, dizziness, fever, chills, nauseas or vomiting. Patient has been evaluated by transplant, however not a candidate due to advance age and multiple medical problems. She is followed by Dr Vinson in clinic. Plan discussed with Dr Vinson, pt being admitted to Hospital Medicine for initation of HD.

## 2019-04-08 NOTE — ED PROVIDER NOTES
SCRIBE #1 NOTE: I, Corinne Mack, am scribing for, and in the presence of, Obed Mata MD. I have scribed the entire note.      History      Chief Complaint   Patient presents with    Shortness of Breath       Review of patient's allergies indicates:   Allergen Reactions    Allegra [fexofenadine] Swelling    Codeine Hives, Itching and Nausea And Vomiting        HPI   HPI    4/8/2019, 7:43 AM   History obtained from the patient      History of Present Illness: Cailin Pickett is a 78 y.o. female patient with PMHx of CKD, DM, and HTN who presents to the Emergency Department for SOB which onset gradually several days ago. Symptoms are constant and moderate in severity. Pt's SOB is worse with walking. No mitigating factors reported. Associated sxs include leg swelling. Pt states her legs are swollen at the end of the day after being on them all day. Pt does not typically have leg swelling in the morning and does not have leg swelling now. Patient denies any fever, chills, congestion, rhinorrhea, sore throat, CP, cough, N/V/D, abd pain, back pain, neck pain, HA, dizziness, and all other sxs at this time. No prior Tx reported. No further complaints or concerns at this time.         Arrival mode: Personal vehicle    PCP: Favio Titus MD       Past Medical History:  Past Medical History:   Diagnosis Date    Anemia     CKD (chronic kidney disease) stage 5, GFR less than 15 ml/min 3/14/2019    CKD (chronic kidney disease), stage III     Diabetes mellitus type II     Encounter for blood transfusion     Family history of colonic polyps 7/18/2017    Gout, unspecified     Hyperlipidemia     Hypertension     Neuropathy     Pancreatic cancer     Renal failure     Secondary hyperparathyroidism of renal origin 3/14/2019    Thyroid disease        Past Surgical History:  Past Surgical History:   Procedure Laterality Date    COLONOSCOPY  2011    Dr. Favio Mims    ZNEHEWKR-GHGNAVR-PR Left 12/4/2014     [facility]    HYSTERECTOMY      KNEE SURGERY Left 2018    NEPHRECTOMY Left 2013    Dr Carter     NEPHRECTOMY Left 5/15/2013    Performed by Blair [facility] II, [facility] at [facility] OR 2ND FLR    PANCREAS SURGERY      distal pancreatectomy    PANCREATECTOMY, DISTAL, LAPAROSCOPIC, WITH SPLENECTOMY N/A 5/15/2013    Performed by Blair Carter II, [facility] at [facility] OR 2ND FLR    screening colonoscopy N/A 2017    Performed by Kenneth Kamara MD at [facility] ENDO    SPLENECTOMY, TOTAL      THYROIDECTOMY, PARTIAL      ULTRASOUND, UPPER GI TRACT, ENDOSCOPIC N/A 2012    Performed by Travis Penn MD at [facility] ENDO (2ND FLR)    VENTRICULOATRIAL SHUNT Left 2014     left arm         Family History:  Family History   Problem Relation Age of Onset    Cancer Mother         breast    Heart disease Mother 60        MI    Hypertension Mother     Stroke Mother     Breast cancer Mother     Cancer Father         prostate    Cancer Brother         renal cell carcinoma    Diabetes Brother     Kidney disease Brother         ESRD s/p kidney transplant    Breast cancer Sister     Breast cancer Sister        Social History:  Social History     Tobacco Use    Smoking status: Former Smoker     Packs/day: 1.00     Years: 35.00     Pack years: 35.00     Start date: 3/14/1962     Last attempt to quit: 2001     Years since quittin.2    Smokeless tobacco: Never Used   Substance and Sexual Activity    Alcohol use: No    Drug use: No    Sexual activity: Never       ROS   Review of Systems   Constitutional: Negative for chills and fever.   HENT: Negative for congestion, rhinorrhea and sore throat.    Respiratory: Positive for shortness of breath. Negative for cough.    Cardiovascular: Positive for leg swelling. Negative for chest pain.   Gastrointestinal: Negative for abdominal pain, diarrhea, nausea and vomiting.   Musculoskeletal: Negative for back pain, neck pain and neck  "stiffness.   Skin: Negative for rash and wound.   Neurological: Negative for dizziness, light-headedness, numbness and headaches.   All other systems reviewed and are negative.    Physical Exam      Initial Vitals [04/08/19 0728]   BP Pulse Resp Temp SpO2   (!) 205/81 62 (!) 22 97.5 °F (36.4 °C) 98 %      MAP       --          Physical Exam  Nursing Notes and Vital Signs Reviewed.  Constitutional: Patient is in no acute distress. Well-developed and well-nourished.  Head: Atraumatic. Normocephalic.  Eyes: PERRL. EOM intact. Conjunctivae are not pale. No scleral icterus.  ENT: Mucous membranes are moist. Oropharynx is clear and symmetric.    Neck: Supple. Full ROM. No lymphadenopathy.  Cardiovascular: Regular rate. Regular rhythm. Murmur to the R 2nd intercostal space that radiates to the carotid. No rubs or gallops. Distal pulses are 2+ and symmetric.  Pulmonary/Chest: No respiratory distress. Clear to auscultation bilaterally. No wheezing or rales.  Abdominal: Soft and non-distended.  There is no tenderness.  No rebound, guarding, or rigidity. Good bowel sounds.  Genitourinary: No CVA tenderness  Musculoskeletal: Moves all extremities. No obvious deformities. No edema.  Skin: Warm and dry.  Neurological:  Alert, awake, and appropriate.  Normal speech.  No acute focal neurological deficits are appreciated.  Psychiatric: Normal affect. Good eye contact. Appropriate in content.    ED Course    Procedures  ED Vital Signs:  Vitals:    04/08/19 0728 04/08/19 0803 04/08/19 0832 04/08/19 0930   BP: (!) 205/81 (!) 173/75 (!) 184/72 (!) 171/74   Pulse: 62 (!) 57 (!) 57 (!) 59   Resp: (!) 22 13 16 18   Temp: 97.5 °F (36.4 °C)      TempSrc: Oral      SpO2: 98% 98% 99% 96%   Weight: 68.5 kg (151 lb)      Height: 5' 5" (1.651 m)          Abnormal Lab Results:  Labs Reviewed   CBC W/ AUTO DIFFERENTIAL - Abnormal; Notable for the following components:       Result Value    RBC 3.22 (*)     Hemoglobin 9.3 (*)     Hematocrit 29.6 (*) "     MCHC 31.4 (*)     RDW 15.5 (*)     Mono% 15.2 (*)     All other components within normal limits   COMPREHENSIVE METABOLIC PANEL - Abnormal; Notable for the following components:    CO2 20 (*)     BUN, Bld 97 (*)     Creatinine 5.9 (*)     eGFR if  7 (*)     eGFR if non  6 (*)     All other components within normal limits   B-TYPE NATRIURETIC PEPTIDE - Abnormal; Notable for the following components:     (*)     All other components within normal limits   MAGNESIUM - Abnormal; Notable for the following components:    Magnesium 3.2 (*)     All other components within normal limits   PHOSPHORUS - Abnormal; Notable for the following components:    Phosphorus 5.3 (*)     All other components within normal limits   PROTIME-INR   APTT   TROPONIN I        All Lab Results:  Results for orders placed or performed during the hospital encounter of 04/08/19   CBC auto differential   Result Value Ref Range    WBC 5.05 3.90 - 12.70 K/uL    RBC 3.22 (L) 4.00 - 5.40 M/uL    Hemoglobin 9.3 (L) 12.0 - 16.0 g/dL    Hematocrit 29.6 (L) 37.0 - 48.5 %    MCV 92 82 - 98 fL    MCH 28.9 27.0 - 31.0 pg    MCHC 31.4 (L) 32.0 - 36.0 g/dL    RDW 15.5 (H) 11.5 - 14.5 %    Platelets 303 150 - 350 K/uL    MPV 11.0 9.2 - 12.9 fL    Gran # (ANC) 3.1 1.8 - 7.7 K/uL    Lymph # 1.1 1.0 - 4.8 K/uL    Mono # 0.8 0.3 - 1.0 K/uL    Eos # 0.1 0.0 - 0.5 K/uL    Baso # 0.03 0.00 - 0.20 K/uL    Gran% 60.6 38.0 - 73.0 %    Lymph% 22.2 18.0 - 48.0 %    Mono% 15.2 (H) 4.0 - 15.0 %    Eosinophil% 1.4 0.0 - 8.0 %    Basophil% 0.6 0.0 - 1.9 %    Platelet Estimate Appears normal     Hypo Occasional     Ovalocytes Occasional     Target Cells Occasional     Differential Method Automated    Comprehensive metabolic panel   Result Value Ref Range    Sodium 140 136 - 145 mmol/L    Potassium 4.8 3.5 - 5.1 mmol/L    Chloride 105 95 - 110 mmol/L    CO2 20 (L) 23 - 29 mmol/L    Glucose 110 70 - 110 mg/dL    BUN, Bld 97 (H) 8 - 23 mg/dL     Creatinine 5.9 (H) 0.5 - 1.4 mg/dL    Calcium 9.9 8.7 - 10.5 mg/dL    Total Protein 7.1 6.0 - 8.4 g/dL    Albumin 3.6 3.5 - 5.2 g/dL    Total Bilirubin 0.5 0.1 - 1.0 mg/dL    Alkaline Phosphatase 74 55 - 135 U/L    AST 25 10 - 40 U/L    ALT 31 10 - 44 U/L    Anion Gap 15 8 - 16 mmol/L    eGFR if African American 7 (A) >60 mL/min/1.73 m^2    eGFR if non African American 6 (A) >60 mL/min/1.73 m^2   Protime-INR   Result Value Ref Range    Prothrombin Time 10.3 9.0 - 12.5 sec    INR 0.9 0.8 - 1.2   APTT   Result Value Ref Range    aPTT 28.5 21.0 - 32.0 sec   Troponin I   Result Value Ref Range    Troponin I 0.017 0.000 - 0.026 ng/mL   Brain natriuretic peptide   Result Value Ref Range     (H) 0 - 99 pg/mL   Magnesium   Result Value Ref Range    Magnesium 3.2 (H) 1.6 - 2.6 mg/dL   Phosphorus   Result Value Ref Range    Phosphorus 5.3 (H) 2.7 - 4.5 mg/dL       Imaging Results:  Imaging Results          X-Ray Chest 1 View (Final result)  Result time 04/08/19 08:40:38    Final result by Christophe Clarke MD (04/08/19 08:40:38)                 Impression:      No acute abnormality.      Electronically signed by: Christophe Clarke  Date:    04/08/2019  Time:    08:40             Narrative:    EXAMINATION:  XR CHEST 1 VIEW    CLINICAL HISTORY:  . Shortness of breath    TECHNIQUE:  Single frontal portable view of the chest was performed.    COMPARISON:  PET-CT 02/28/2019    FINDINGS:  Support devices: None    The lungs are clear, with normal appearance of pulmonary vasculature and no pleural effusion or pneumothorax.    The cardiac silhouette is mildly enlarged, unchanged.  The hilar and mediastinal contours are unremarkable.    Bones are intact.                                 The EKG was ordered, reviewed, and independently interpreted by the ED provider.  Interpretation time: 0730  Rate: 61 BPM  Rhythm: normal sinus rhythm  Interpretation: Minimal voltage criteria for LVH. Anterior infarct. No STEMI.           The  Emergency Provider reviewed the vital signs and test results, which are outlined above.    ED Discussion     9:34 AM: Re-evaluated pt. Pt is resting comfortably and is in no acute distress.  D/w pt all pertinent results. D/w pt any concerns expressed at this time. Answered all questions. Pt expresses understanding at this time.    9:41 AM: Dr. Mata discussed the pt's case with Dr. Vinson (Nephrology) who recommends admission to start HD.    9:56 AM: Re-evaluated pt. I have discussed test results, shared treatment plan, and the need for admission with patient and family at bedside. Pt and family express understanding at this time and agree with all information. All questions answered. Pt and family have no further questions or concerns at this time. Pt is ready for admit.    9:58 AM: Discussed case with Grace Dominguez ([facility]). Dr. Evans agrees with current care and management of pt and accepts admission.   Admitting Service: Hospital medicine   Admitting Physician: Dr. Evans  Admit to: [facility]      ED Medication(s):  Medications   nitroGLYCERIN 2% TD oint ointment 1 inch (1 inch Topical (Top) Given 4/8/19 0828)          Medication List      ASK your doctor about these medications    allopurinol 100 MG tablet  Commonly known as:  ZYLOPRIM  Take 1 tablet (100 mg total) by mouth once daily.     amLODIPine 5 MG tablet  Commonly known as:  NORVASC  TAKE 1 TABLET TWICE A DAY     carvedilol 12.5 MG tablet  Commonly known as:  COREG  Take 1 tablet (12.5 mg total) by mouth 2 (two) times daily with meals.     cetirizine 10 MG tablet  Commonly known as:  ZYRTEC     estradiol 0.5 MG tablet  Commonly known as:  ESTRACE  TAKE 1 TABLET DAILY FOR 3 WEEKS PER MONTH, THEN DO NOT TAKE FOR FOURTH WEEK EACH MONTH     furosemide 40 MG tablet  Commonly known as:  LASIX  Take 2 tablets (80 mg total) by mouth once daily.     gabapentin 100 MG capsule  Commonly known as:  NEURONTIN     hydrALAZINE 100 MG tablet  Commonly  known as:  APRESOLINE  TAKE 1 TABLET EVERY 8 HOURS     hydrOXYzine HCl 25 MG tablet  Commonly known as:  ATARAX  TAKE 1 TABLET TWICE A DAY AS NEEDED FOR ITCHING     OSTEO BI-FLEX 250-200 mg Tab  Generic drug:  glucosamine-chondroitin     repaglinide 0.5 MG tablet  Commonly known as:  PRANDIN  TAKE 1 TABLET THREE TIMES A DAY BEFORE MEALS     sodium bicarbonate 650 MG tablet  Take 1 tablet (650 mg total) by mouth 2 (two) times daily.     traMADol 50 mg tablet  Commonly known as:  ULTRAM  Take 1 tablet (50 mg total) by mouth every 12 (twelve) hours.     VELPHORO 500 mg Chew  Generic drug:  sucroferric oxyhydroxide  Take 1 tablet (500 mg total) by mouth 3 (three) times daily before meals.                  Medical Decision Making    Medical Decision Making:   Clinical Tests:   Lab Tests: Ordered and Reviewed  Radiological Study: Ordered and Reviewed  Medical Tests: Ordered and Reviewed           Scribe Attestation:   Scribe #1: I performed the above scribed service and the documentation accurately describes the services I performed. I attest to the accuracy of the note 04/08/2019.    Attending:   Physician Attestation Statement for Scribe #1: I, Obed Mata MD, personally performed the services described in this documentation, as scribed by Corinne Mack, in my presence, and it is both accurate and complete.          Clinical Impression       ICD-10-CM ICD-9-CM   1. ESRD needing dialysis N18.6 585.6    Z99.2    2. Shortness of breath R06.02 786.05   3. SOB (shortness of breath) R06.02 786.05       Disposition:   Disposition: Admitted  Condition: Fair           Obed Mata MD  04/08/19 1030

## 2019-04-08 NOTE — PLAN OF CARE
Problem: Adult Inpatient Plan of Care  Goal: Plan of Care Review  Outcome: Ongoing (interventions implemented as appropriate)  Fall precautions maintained, pt free from injuries/fall.  Repositions and ambulates independently.  Denies pain/nausea/vomiting.  Pt currently on dialysis right now  POCT monitored, coverage given as needed.  POC and meds discussed with pt and spouse, both verbalized understanding.  Side rails x 2, bed locked and low, phone and call light w/in reach.  Chart check done. Will cont to monitor.

## 2019-04-08 NOTE — ASSESSMENT & PLAN NOTE
1. ESRD :  Patient is s/p  left nephrectomy, she has  functioning right kidney, progressive decline in renal function, she had a left arm AV fistula placed on 12/4/14 by Dr Foster .  Good thrill on exam.  This will be useful to initiate dialysis.   case management consult to arrange outpatient dialysis.    2.  Hypertension - resume home blood pressure medications    3.  Anemia of chronic kidney disease - Procrit with dialysis    4.  Secondary hyperparathyroidism - renal diet, Velphoro with meals,     5. D/c home when stable

## 2019-04-08 NOTE — ED NOTES
Report received from Zena POOLE. Pt aaox4, no distress noted. Report called to floor by Zena. Will transfer patient to floor now.

## 2019-04-08 NOTE — HPI
Cailin Pickett  is a pleasant 78-year-old  woman with history of hypertension, CKD stage 5, presents to hospital with generalized weakness and shortness of breath.  I saw her in the clinic about a week ago.  Advised patient to initiate hemodialysis.  Patient is here for further evaluation and possibility of starting hemodialysis.     she continues to feel tired, she has some exertion or shortness of Breath.  Peripheral edema noted.  Recent transplant note reviewed, patient was declined for a kidney transplant due to multiple medical problems, advanced age,  has solitary right kidney, she is status post left nephrectomy. Multiple cysts reported on the kidney. It measures about 16 cm. Patient Attended chronic kidney disease education and options class. she had a left arm AV fistula placed on 12/4/14 by Dr Foster .        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 she underwent chemo rx with 5-FU, she tolerated the cycles well. In 5/2013 she underwent resection of tail of pancreas, left nephrectomy, splenectomy, left adrenalectomy.

## 2019-04-08 NOTE — ASSESSMENT & PLAN NOTE
Patient presented to ED with shortness of breath   Admitted for initiation of HD, which should help  with shortness of breath.   Monitor

## 2019-04-08 NOTE — ASSESSMENT & PLAN NOTE
Blood pressure elevated on arrival to ED, however has not taken BP medications today   Restart home amlodipine, carvedilol and hydralazine

## 2019-04-09 LAB
ALBUMIN SERPL BCP-MCNC: 3.4 G/DL (ref 3.5–5.2)
ANION GAP SERPL CALC-SCNC: 14 MMOL/L (ref 8–16)
BUN SERPL-MCNC: 87 MG/DL (ref 8–23)
CALCIUM SERPL-MCNC: 9.4 MG/DL (ref 8.7–10.5)
CHLORIDE SERPL-SCNC: 104 MMOL/L (ref 95–110)
CO2 SERPL-SCNC: 21 MMOL/L (ref 23–29)
CREAT SERPL-MCNC: 5.6 MG/DL (ref 0.5–1.4)
EST. GFR  (AFRICAN AMERICAN): 8 ML/MIN/1.73 M^2
EST. GFR  (NON AFRICAN AMERICAN): 7 ML/MIN/1.73 M^2
GLUCOSE SERPL-MCNC: 115 MG/DL (ref 70–110)
PHOSPHATE SERPL-MCNC: 4.6 MG/DL (ref 2.7–4.5)
POTASSIUM SERPL-SCNC: 4.6 MMOL/L (ref 3.5–5.1)
SODIUM SERPL-SCNC: 139 MMOL/L (ref 136–145)

## 2019-04-09 PROCEDURE — 21400001 HC TELEMETRY ROOM

## 2019-04-09 PROCEDURE — 36415 COLL VENOUS BLD VENIPUNCTURE: CPT

## 2019-04-09 PROCEDURE — 25000003 PHARM REV CODE 250: Performed by: INTERNAL MEDICINE

## 2019-04-09 PROCEDURE — 11000001 HC ACUTE MED/SURG PRIVATE ROOM

## 2019-04-09 PROCEDURE — 99233 SBSQ HOSP IP/OBS HIGH 50: CPT | Mod: ,,, | Performed by: INTERNAL MEDICINE

## 2019-04-09 PROCEDURE — 63600175 PHARM REV CODE 636 W HCPCS: Mod: JG | Performed by: INTERNAL MEDICINE

## 2019-04-09 PROCEDURE — 99233 PR SUBSEQUENT HOSPITAL CARE,LEVL III: ICD-10-PCS | Mod: ,,, | Performed by: INTERNAL MEDICINE

## 2019-04-09 PROCEDURE — 25000003 PHARM REV CODE 250: Performed by: NURSE PRACTITIONER

## 2019-04-09 PROCEDURE — 80100016 HC MAINTENANCE HEMODIALYSIS

## 2019-04-09 PROCEDURE — 80069 RENAL FUNCTION PANEL: CPT

## 2019-04-09 RX ADMIN — GABAPENTIN 100 MG: 100 CAPSULE ORAL at 09:04

## 2019-04-09 RX ADMIN — CARVEDILOL 12.5 MG: 12.5 TABLET, FILM COATED ORAL at 04:04

## 2019-04-09 RX ADMIN — GABAPENTIN 100 MG: 100 CAPSULE ORAL at 12:04

## 2019-04-09 RX ADMIN — ALLOPURINOL 100 MG: 100 TABLET ORAL at 12:04

## 2019-04-09 RX ADMIN — AMLODIPINE BESYLATE 5 MG: 5 TABLET ORAL at 09:04

## 2019-04-09 RX ADMIN — AMLODIPINE BESYLATE 5 MG: 5 TABLET ORAL at 12:04

## 2019-04-09 RX ADMIN — HYDRALAZINE HYDROCHLORIDE 100 MG: 50 TABLET ORAL at 09:04

## 2019-04-09 RX ADMIN — RENO CAPS 1 CAPSULE: 100; 1.5; 1.7; 20; 10; 1; 150; 5; 6 CAPSULE ORAL at 12:04

## 2019-04-09 RX ADMIN — ERYTHROPOIETIN 10000 UNITS: 10000 INJECTION, SOLUTION INTRAVENOUS; SUBCUTANEOUS at 10:04

## 2019-04-09 RX ADMIN — FUROSEMIDE 80 MG: 40 TABLET ORAL at 12:04

## 2019-04-09 RX ADMIN — HYDRALAZINE HYDROCHLORIDE 100 MG: 50 TABLET ORAL at 05:04

## 2019-04-09 NOTE — SUBJECTIVE & OBJECTIVE
Interval History: tolerating HD without complications.    Review of Systems   Constitutional: Positive for fatigue. Negative for chills and fever.   HENT: Negative for congestion, rhinorrhea and sinus pressure.    Respiratory: Positive for shortness of breath. Negative for apnea, cough, choking, chest tightness, wheezing and stridor.    Cardiovascular: Negative for chest pain, palpitations and leg swelling.   Gastrointestinal: Negative for abdominal distention, abdominal pain, diarrhea, nausea and vomiting.   Endocrine: Negative for cold intolerance and heat intolerance.   Genitourinary: Negative for difficulty urinating and hematuria.   Musculoskeletal: Negative for arthralgias and joint swelling.   Skin: Negative for color change, pallor and rash.   Neurological: Positive for weakness. Negative for dizziness, seizures, numbness and headaches.   Psychiatric/Behavioral: Negative for agitation. The patient is not nervous/anxious.      Objective:     Vital Signs (Most Recent):  Temp: 98.6 °F (37 °C) (04/09/19 1604)  Pulse: 70 (04/09/19 1709)  Resp: 18 (04/09/19 1604)  BP: (!) 145/64 (04/09/19 1604)  SpO2: 96 % (04/09/19 1604) Vital Signs (24h Range):  Temp:  [97.5 °F (36.4 °C)-98.8 °F (37.1 °C)] 98.6 °F (37 °C)  Pulse:  [50-79] 70  Resp:  [18] 18  SpO2:  [94 %-96 %] 96 %  BP: (112-174)/(52-80) 145/64     Weight: 63.9 kg (140 lb 14 oz)  Body mass index is 23.44 kg/m².    Intake/Output Summary (Last 24 hours) at 4/9/2019 1699  Last data filed at 4/9/2019 1400  Gross per 24 hour   Intake 1830 ml   Output 1500 ml   Net 330 ml      Physical Exam   Constitutional: No distress.   HENT:   Mouth/Throat: No oropharyngeal exudate.   Eyes: Right eye exhibits no discharge. Left eye exhibits no discharge.   Cardiovascular: Exam reveals no gallop and no friction rub.   No murmur heard.  Pulmonary/Chest: No stridor. No respiratory distress. She has no wheezes. She has rales in the right lower field and the left lower field. She  exhibits no tenderness.   Abdominal: She exhibits no distension and no mass. There is no tenderness. There is no rebound and no guarding. No hernia.   Musculoskeletal: She exhibits no edema or deformity.   Neurological: She is alert.   Skin: Skin is warm and dry. She is not diaphoretic.   Nursing note and vitals reviewed.    Significant Labs:   CBC:   Recent Labs   Lab 04/08/19 0817   WBC 5.05   HGB 9.3*   HCT 29.6*        CMP:   Recent Labs   Lab 04/08/19 0817 04/09/19  0736    139   K 4.8 4.6    104   CO2 20* 21*    115*   BUN 97* 87*   CREATININE 5.9* 5.6*   CALCIUM 9.9 9.4   PROT 7.1  --    ALBUMIN 3.6 3.4*   BILITOT 0.5  --    ALKPHOS 74  --    AST 25  --    ALT 31  --    ANIONGAP 15 14   EGFRNONAA 6* 7*       Significant Imaging: I have reviewed all pertinent imaging results/findings within the past 24 hours.

## 2019-04-09 NOTE — PROGRESS NOTES
[facility] -   Nephrology  Progress Note    Patient Name: Cailin Pickett  MRN: [id number]  Admission Date: 4/8/2019  Hospital Length of Stay: 1 days  Attending Provider: Jesus Rodriguez, *   Primary Care Physician: Favio Titus MD  Principal Problem:ESRD needing dialysis    Subjective:     HPI: Cailin Pickett  is a pleasant 78-year-old  woman with history of hypertension, CKD stage 5, presents to hospital with generalized weakness and shortness of breath.  I saw her in the clinic about a week ago.  Advised patient to initiate hemodialysis.  Patient is here for further evaluation and possibility of starting hemodialysis.     she continues to feel tired, she has some exertion or shortness of Breath.  Peripheral edema noted.  Recent transplant note reviewed, patient was declined for a kidney transplant due to multiple medical problems, advanced age,  has solitary right kidney, she is status post left nephrectomy. Multiple cysts reported on the kidney. It measures about 16 cm. Patient Attended chronic kidney disease education and options class. she had a left arm AV fistula placed on 12/4/14 by Dr Foster .        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 she underwent chemo rx with 5-FU, she tolerated the cycles well. In 5/2013 she underwent resection of tail of pancreas, left nephrectomy, splenectomy, left adrenalectomy.            Interval History:     4/9/19 - seen at HD , first tx today, tolerating well,     Review of patient's allergies indicates:   Allergen Reactions    Allegra [fexofenadine] Swelling    Codeine Hives, Itching and Nausea And Vomiting     Current Facility-Administered Medications   Medication Frequency    allopurinol tablet 100 mg Daily    amLODIPine tablet 5 mg BID    carvedilol tablet 12.5 mg BID WM    furosemide tablet 80 mg Daily    gabapentin capsule 100 mg BID    hydrALAZINE injection  10 mg Q8H PRN    hydrALAZINE tablet 100 mg Q8H    vitamin renal formula (B-complex-vitamin c-folic acid) 1 mg per capsule 1 capsule Daily       Objective:     Vital Signs (Most Recent):  Temp: 98.1 °F (36.7 °C) (04/09/19 0900)  Pulse: 70 (04/09/19 1040)  Resp: 18 (04/09/19 0410)  BP: (!) 132/56 (04/09/19 1040)  SpO2: (!) 94 % (04/09/19 0711)  O2 Device (Oxygen Therapy): room air (04/09/19 0711) Vital Signs (24h Range):  Temp:  [97.5 °F (36.4 °C)-98.8 °F (37.1 °C)] 98.1 °F (36.7 °C)  Pulse:  [56-70] 70  Resp:  [16-19] 18  SpO2:  [94 %-98 %] 94 %  BP: (132-187)/() 132/56     Weight: 63.9 kg (140 lb 14 oz) (04/08/19 1515)  Body mass index is 23.44 kg/m².  Body surface area is 1.71 meters squared.    I/O last 3 completed shifts:  In: 1100 [P.O.:600; Other:500]  Out: 0     Physical Exam   Constitutional: She is oriented to person, place, and time. She appears well-developed. No distress.   HENT:   Head: Normocephalic and atraumatic.   Mouth/Throat: Oropharynx is clear and moist. No oropharyngeal exudate.   Eyes: Pupils are equal, round, and reactive to light. Conjunctivae and EOM are normal.   Neck: Normal range of motion. Neck supple. No JVD present. Carotid bruit is not present. No tracheal deviation present. No thyroid mass and no thyromegaly present.   Cardiovascular: Normal rate, regular rhythm and intact distal pulses. Exam reveals no gallop and no friction rub.   Murmur heard.  Pulmonary/Chest: Effort normal. No respiratory distress. She has no wheezes. She has rales. She exhibits no tenderness.   Abdominal: Soft. Bowel sounds are normal. She exhibits no distension, no abdominal bruit, no ascites and no mass. There is no hepatosplenomegaly. There is no tenderness. There is no rebound, no guarding and no CVA tenderness.   Musculoskeletal: She exhibits edema. She exhibits no tenderness.   LUE AVF with good thrill    Lymphadenopathy:     She has no cervical adenopathy.   Neurological: She is alert and  oriented to person, place, and time. She has normal reflexes. She displays normal reflexes. No cranial nerve deficit. She exhibits normal muscle tone. Coordination normal.   Skin: Skin is warm and intact. No rash noted. No erythema. No pallor.   Psychiatric: She has a normal mood and affect. Her behavior is normal.       Significant Labs:  CBC:   Recent Labs   Lab 04/08/19 0817   WBC 5.05   RBC 3.22*   HGB 9.3*   HCT 29.6*      MCV 92   MCH 28.9   MCHC 31.4*     CMP:   Recent Labs   Lab 04/08/19 0817 04/09/19  0736    115*   CALCIUM 9.9 9.4   ALBUMIN 3.6 3.4*   PROT 7.1  --     139   K 4.8 4.6   CO2 20* 21*    104   BUN 97* 87*   CREATININE 5.9* 5.6*   ALKPHOS 74  --    ALT 31  --    AST 25  --    BILITOT 0.5  --      Coagulation:   Recent Labs   Lab 04/08/19 0817   INR 0.9   APTT 28.5     LFTs:   Recent Labs   Lab 04/08/19 0817 04/09/19  0736   ALT 31  --    AST 25  --    ALKPHOS 74  --    BILITOT 0.5  --    PROT 7.1  --    ALBUMIN 3.6 3.4*     All labs within the past 24 hours have been reviewed.     Significant Imaging:  Reviewed     Lab Results   Component Value Date    .0 (H) 04/01/2019    CALCIUM 9.4 04/09/2019    PHOS 4.6 (H) 04/09/2019       Lab Results   Component Value Date    ALBUMIN 3.4 (L) 04/09/2019           Assessment/Plan:     * ESRD needing dialysis  1. ESRD :  Patient is s/p  left nephrectomy, she has  functioning right kidney,    progressive decline in renal function, she had a left arm AV fistula placed on 12/4/14 by Dr Foster .  Good thrill on exam.  case management consult to arrange outpatient dialysis.     Some difficulty accessing AVF , consult vascular for a fistulogram,     Seen at HD today , first tx today , plan 2 more next 2 days ,     2.  Hypertension - resume home blood pressure medications, controlled,     3.  Anemia of chronic kidney disease - Procrit with dialysis    4.  Secondary hyperparathyroidism - renal diet, Velphoro with meals,     5.  D/c home when out pt HD arranged     6. SOB due to fluid over load, UF as tolerated, improving           Thank you for your consult. I will follow-up with patient. Please contact us if you have any additional questions.     Total time spent 40 minutes including time needed to review the records,  patient  evaluation, documentation, face-to-face discussion with the patient,  primary team,   more than 50% of the time was spent on coordination of care and counseling.       Tank Vinson MD  Nephrology  [facility]

## 2019-04-09 NOTE — PROGRESS NOTES
Pt is AAOX4, and has called appropriately for assistance. Pt did do to HD today, but they were unsuccessful with her getting her fistula accessed. I got in report that they may try again for 4/9/19. Pt free from injury/falls/tramua throughout shift. Will continue to monitor.

## 2019-04-09 NOTE — NURSING
Patient returned from dialysis unit, HUSEYIN AF with positive bruit and thrill, dressings dry and intact, patient denies nausea or vomiting, voices no co, call bell in reach, bed low and locked, sr up x2, instructed patient to call for assistance, verbalizes understanding

## 2019-04-09 NOTE — PLAN OF CARE
Patient outpatient dialysis schedule will be at [facility] on Tuesday, Thursday, and Saturday at 8:30.  CM reached out to Dr Vinson for anticipated discharge.  Anticipated discharge is Thursday if patient is medically stable.

## 2019-04-09 NOTE — PLAN OF CARE
Referral made to [facility]     04/09/19 0860   Post-Acute Status   Post-Acute Authorization Dialysis   Diaylsis Status Referrals Sent

## 2019-04-09 NOTE — ASSESSMENT & PLAN NOTE
Admit to Inpatient   Consult Nephrology to start HD    to arrange outpatient HD   Continue home Sodium Bicarbonate     4/9/19  Tolerating HD without complications

## 2019-04-09 NOTE — ASSESSMENT & PLAN NOTE
1. ESRD :  Patient is s/p  left nephrectomy, she has  functioning right kidney,    progressive decline in renal function, she had a left arm AV fistula placed on 12/4/14 by Dr Foster .  Good thrill on exam.  case management consult to arrange outpatient dialysis.     Some difficulty accessing AVF , consult vascular for a fistulogram,     2.  Hypertension - resume home blood pressure medications, controlled,     3.  Anemia of chronic kidney disease - Procrit with dialysis    4.  Secondary hyperparathyroidism - renal diet, Velphoro with meals,     5. D/c home when out pt HD arranged     6. SOB due to fluid over load, UF as tolerated, improving

## 2019-04-09 NOTE — PROGRESS NOTES
[facility] Hospital Medicine  Progress Note    Patient Name: Cailin Pickett  MRN: [id number]  Patient Class: IP- Inpatient   Admission Date: 4/8/2019  Length of Stay: 1 days  Attending Physician: Jesus Rodriguez, *  Primary Care Provider: Favio Titus MD      Subjective:     Principal Problem:ESRD needing dialysis    HPI:  Ms Pickett is a 78 year old female with PMHx of CKD stage 5, HTN, pancreatic tail carcinoma in 2013 treated with surger and chemotherapy, anemia and HLD. She presented to [facility] Emergency Room with complaints of increase shortness of breath for the pass few days. Associated symptoms include bilateral leg swelling at the end of the day. Denies associated symptoms include chest pain, palpitations, dizziness, fever, chills, nauseas or vomiting. Patient has been evaluated by transplant, however not a candidate due to advance age and multiple medical problems. She is followed by Dr Vinson in clinic. Plan discussed with Dr Vinson, pt being admitted to Hospital Medicine for initation of HD.          Hospital Course:  No notes on file    Interval History: tolerating HD without complications.    Review of Systems   Constitutional: Positive for fatigue. Negative for chills and fever.   HENT: Negative for congestion, rhinorrhea and sinus pressure.    Respiratory: Positive for shortness of breath. Negative for apnea, cough, choking, chest tightness, wheezing and stridor.    Cardiovascular: Negative for chest pain, palpitations and leg swelling.   Gastrointestinal: Negative for abdominal distention, abdominal pain, diarrhea, nausea and vomiting.   Endocrine: Negative for cold intolerance and heat intolerance.   Genitourinary: Negative for difficulty urinating and hematuria.   Musculoskeletal: Negative for arthralgias and joint swelling.   Skin: Negative for color change, pallor and rash.   Neurological: Positive for weakness. Negative for dizziness, seizures, numbness and  headaches.   Psychiatric/Behavioral: Negative for agitation. The patient is not nervous/anxious.      Objective:     Vital Signs (Most Recent):  Temp: 98.6 °F (37 °C) (04/09/19 1604)  Pulse: 70 (04/09/19 1709)  Resp: 18 (04/09/19 1604)  BP: (!) 145/64 (04/09/19 1604)  SpO2: 96 % (04/09/19 1604) Vital Signs (24h Range):  Temp:  [97.5 °F (36.4 °C)-98.8 °F (37.1 °C)] 98.6 °F (37 °C)  Pulse:  [50-79] 70  Resp:  [18] 18  SpO2:  [94 %-96 %] 96 %  BP: (112-174)/(52-80) 145/64     Weight: 63.9 kg (140 lb 14 oz)  Body mass index is 23.44 kg/m².    Intake/Output Summary (Last 24 hours) at 4/9/2019 1749  Last data filed at 4/9/2019 1400  Gross per 24 hour   Intake 1830 ml   Output 1500 ml   Net 330 ml      Physical Exam   Constitutional: No distress.   HENT:   Mouth/Throat: No oropharyngeal exudate.   Eyes: Right eye exhibits no discharge. Left eye exhibits no discharge.   Cardiovascular: Exam reveals no gallop and no friction rub.   No murmur heard.  Pulmonary/Chest: No stridor. No respiratory distress. She has no wheezes. She has rales in the right lower field and the left lower field. She exhibits no tenderness.   Abdominal: She exhibits no distension and no mass. There is no tenderness. There is no rebound and no guarding. No hernia.   Musculoskeletal: She exhibits no edema or deformity.   Neurological: She is alert.   Skin: Skin is warm and dry. She is not diaphoretic.   Nursing note and vitals reviewed.    Significant Labs:   CBC:   Recent Labs   Lab 04/08/19  0817   WBC 5.05   HGB 9.3*   HCT 29.6*        CMP:   Recent Labs   Lab 04/08/19  0817 04/09/19  0736    139   K 4.8 4.6    104   CO2 20* 21*    115*   BUN 97* 87*   CREATININE 5.9* 5.6*   CALCIUM 9.9 9.4   PROT 7.1  --    ALBUMIN 3.6 3.4*   BILITOT 0.5  --    ALKPHOS 74  --    AST 25  --    ALT 31  --    ANIONGAP 15 14   EGFRNONAA 6* 7*       Significant Imaging: I have reviewed all pertinent imaging results/findings within the past 24  hours.    Assessment/Plan:      * ESRD needing dialysis  Admit to Inpatient   Consult Nephrology to start HD    to arrange outpatient HD   Continue home Sodium Bicarbonate     4/9/19  Tolerating HD without complications    CKD (chronic kidney disease) stage 5, GFR less than 15 ml/min  As above       Peripheral neuropathy    Continue Gabapentin     SOB (shortness of breath)  Patient presented to ED with shortness of breath   Admitted for initiation of HD, which should help  with shortness of breath.   Monitor       Anemia due to chronic kidney disease  HGB currently stable   Montior       Hypertension associated with diabetes    Blood pressure elevated on arrival to ED, however has not taken BP medications today   Restart home amlodipine, carvedilol and hydralazine         Type II diabetes mellitus  HA1X 5.1 March 2019  Glucose 170 on labs one week ago  Accu check ACHS   Low dose SS   Diabetic diet   Monitor       VTE Risk Mitigation (From admission, onward)        Ordered     Place sequential compression device  Until discontinued      04/08/19 6426          Elaina Aranda NP  Department of Hospital Medicine   [facility]

## 2019-04-09 NOTE — PLAN OF CARE
CM met with patient at the bedside to assess for discharge needs and address outpatient dialysis consult.  Patient lives at home with her  and is independent with all needs.  She is aware that she will need outpatient hemodialysis.  CM provided a dialysis list from [email or web address], however patient states that she had already chosen a center.  She would like to use [facility].  She is aware that she will likely have to change nephrologists.  Choice form completed and placed in the patient blue folder.  Referral made to [place] with [facility].  Records faxed to [facility] at [phone].  CM provided a transitional care folder, information on advanced directives, information on pharmacy bedside delivery, and discharge planning begins on admission with contact information for any needs/questions.    D/C Plan: Outpatient Hemodialysis  PCP: Dr Titus  Preferred Pharmacy: [facility]  Discharge transportation: Family  [facility]: Declined  Pharmacy Bedside Delivery:     04/09/19 5760   Discharge Assessment   Assessment Type Discharge Planning Assessment   Confirmed/corrected address and phone number on facesheet? Yes   Assessment information obtained from? Patient   Expected Length of Stay (days)   (2-3)   Communicated expected length of stay with patient/caregiver yes   Prior to hospitilization cognitive status: Alert/Oriented   Prior to hospitalization functional status: Independent   Current cognitive status: Alert/Oriented   Current Functional Status: Independent   Facility Arrived From: home   Lives With spouse   Able to Return to Prior Arrangements yes   Is patient able to care for self after discharge? Yes   Who are your caregiver(s) and their phone number(s)? Blair Pickett, spouse [phone]   Patient's perception of discharge disposition home or selfcare   Readmission Within the Last 30 Days no previous admission in last 30 days   Patient currently being followed by outpatient case management? No    Patient currently receives any other outside agency services? No   Equipment Currently Used at Home none   Do you have any problems affording any of your prescribed medications? No   Is the patient taking medications as prescribed? yes   Does the patient have transportation home? Yes   Transportation Anticipated family or friend will provide   Dialysis Name and Scheduled days NA- new outpatient dialysis patient   Does the patient receive services at the Coumadin Clinic? No   Discharge Plan A Home with family   DME Needed Upon Discharge  none   Patient/Family in Agreement with Plan yes

## 2019-04-09 NOTE — CONSULTS
[facility]  Vascular Surgery  Consult Note    Consults  Subjective:     Chief Complaint/Reason for Admission: high AVF pressures during HD    History of Present Illness:  78-year-old  woman with history of hypertension, CKD stage 5, presents to hospital with generalized weakness and shortness of breath. Admitted for initiation of HD.     Had left arm AVF placed by Dr. Foster in 2014     Seen on HD today.  Able to tolerate HD but at low flow volume with increased venous pressures.        Medications Prior to Admission   Medication Sig Dispense Refill Last Dose    allopurinol (ZYLOPRIM) 100 MG tablet Take 1 tablet (100 mg total) by mouth once daily. 90 tablet 3 4/7/2019 at Unknown time    amLODIPine (NORVASC) 5 MG tablet TAKE 1 TABLET TWICE A  tablet 2 4/7/2019 at Unknown time    carvedilol (COREG) 12.5 MG tablet Take 1 tablet (12.5 mg total) by mouth 2 (two) times daily with meals. 60 tablet 11 4/7/2019 at Unknown time    cetirizine (ZYRTEC) 10 MG tablet Take 10 mg by mouth once daily.    4/7/2019 at Unknown time    furosemide (LASIX) 40 MG tablet Take 2 tablets (80 mg total) by mouth once daily. 90 tablet 3 4/7/2019 at Unknown time    gabapentin (NEURONTIN) 100 MG capsule Take 1 capsule by mouth 2 (two) times daily.   4/7/2019 at Unknown time    glucosamine-chondroitin (OSTEO BI-FLEX) 250-200 mg Tab Take 1 tablet by mouth 2 (two) times daily.   4/7/2019 at Unknown time    hydrALAZINE (APRESOLINE) 100 MG tablet TAKE 1 TABLET EVERY 8 HOURS 270 tablet 3 4/7/2019 at Unknown time    hydrOXYzine HCl (ATARAX) 25 MG tablet TAKE 1 TABLET TWICE A DAY AS NEEDED FOR ITCHING 180 tablet 3 4/7/2019 at Unknown time    repaglinide (PRANDIN) 0.5 MG tablet TAKE 1 TABLET THREE TIMES A DAY BEFORE MEALS 270 tablet 2 4/7/2019 at Unknown time    sodium bicarbonate 650 MG tablet Take 1 tablet (650 mg total) by mouth 2 (two) times daily. 180 tablet 3 4/7/2019 at Unknown time    traMADol  (ULTRAM) 50 mg tablet Take 1 tablet (50 mg total) by mouth every 12 (twelve) hours. 180 tablet 3 4/7/2019 at Unknown time    estradiol (ESTRACE) 0.5 MG tablet TAKE 1 TABLET DAILY FOR 3 WEEKS PER MONTH, THEN DO NOT TAKE FOR FOURTH WEEK EACH MONTH (Patient taking differently: every other day) 90 tablet 3 Taking       Review of patient's allergies indicates:   Allergen Reactions    Allegra [fexofenadine] Swelling    Codeine Hives, Itching and Nausea And Vomiting       Past Medical History:   Diagnosis Date    Anemia     CKD (chronic kidney disease) stage 5, GFR less than 15 ml/min 3/14/2019    CKD (chronic kidney disease), stage III     Diabetes mellitus type II     Encounter for blood transfusion     Family history of colonic polyps 7/18/2017    Gout, unspecified     Hyperlipidemia     Hypertension     Neuropathy     Pancreatic cancer     Renal failure     Secondary hyperparathyroidism of renal origin 3/14/2019    Thyroid disease      Past Surgical History:   Procedure Laterality Date    COLONOSCOPY  2011    Dr. Favio Mims    TIEJASGI-XJCACOI-RM Left 12/4/2014    [facility]ad Foster [facility] OR    HYSTERECTOMY      KNEE SURGERY Left 05/31/2018    NEPHRECTOMY Left 5/2013    Dr Carter     NEPHRECTOMY Left 5/15/2013    Performed by Blair Carter II, [facility] at [facility] OR 2ND FLR    PANCREAS SURGERY      distal pancreatectomy    PANCREATECTOMY, DISTAL, LAPAROSCOPIC, WITH SPLENECTOMY N/A 5/15/2013    Performed by Blair Carter II, [facility] at [facility] OR 2ND FLR    screening colonoscopy N/A 7/18/2017    Performed by Kenneth Kamara MD at [facility] ENDO    SPLENECTOMY, TOTAL      THYROIDECTOMY, PARTIAL      ULTRASOUND, UPPER GI TRACT, ENDOSCOPIC N/A 11/27/2012    Performed by Travis Penn MD at [facility] ENDO (2ND FLR)    VENTRICULOATRIAL SHUNT Left 12/4/2014     left arm     Family History     Problem Relation (Age of Onset)    Breast cancer Mother, Sister, Sister    Cancer Mother, Father,  Brother    Diabetes Brother    Heart disease Mother (60)    Hypertension Mother    Kidney disease Brother    Stroke Mother        Tobacco Use    Smoking status: Former Smoker     Packs/day: 1.00     Years: 35.00     Pack years: 35.00     Start date: 3/14/1962     Last attempt to quit: 2001     Years since quittin.2    Smokeless tobacco: Never Used   Substance and Sexual Activity    Alcohol use: No    Drug use: No    Sexual activity: Not on file     Review of Systems   Negative except for above    Objective:     Vital Signs (Most Recent):  Temp: 98.1 °F (36.7 °C) (19 0900)  Pulse: 70 (19 1040)  Resp: 18 (19 0410)  BP: (!) 132/56 (19 1040)  SpO2: (!) 94 % (19 0711) Vital Signs (24h Range):  Temp:  [97.5 °F (36.4 °C)-98.8 °F (37.1 °C)] 98.1 °F (36.7 °C)  Pulse:  [56-70] 70  Resp:  [16-19] 18  SpO2:  [94 %-98 %] 94 %  BP: (132-187)/() 132/56     Weight: 63.9 kg (140 lb 14 oz)  Body mass index is 23.44 kg/m².        Physical Exam  A&Ox3  RRR  CTA bilaterally  Left arm AVF with weak thrill    Significant Labs:  BMP:   Recent Labs   Lab 1936    115*    139   K 4.8 4.6    104   CO2 20* 21*   BUN 97* 87*   CREATININE 5.9* 5.6*   CALCIUM 9.9 9.4   MG 3.2*  --      CBC:   Recent Labs   Lab 19   WBC 5.05   RBC 3.22*   HGB 9.3*   HCT 29.6*      MCV 92   MCH 28.9   MCHC 31.4*     Coagulation:   Recent Labs   Lab 19   LABPROT 10.3   INR 0.9   APTT 28.5       Significant Diagnostics:  I have reviewed all pertinent imaging results/findings within the past 24 hours.    Assessment/Plan:     Active Diagnoses:    Diagnosis Date Noted POA    PRINCIPAL PROBLEM:  ESRD needing dialysis [N18.6, Z99.2] 2019 Yes    CKD (chronic kidney disease) stage 5, GFR less than 15 ml/min [N18.5] 2019 Yes    Peripheral neuropathy [G62.9] 2016 Yes    SOB (shortness of breath) [R06.02] 10/30/2014 Yes    Anemia  due to chronic kidney disease [N18.9, D63.1] 07/23/2013 Yes    Type II diabetes mellitus [E11.9] 05/09/2013 Yes     Chronic    Hypertension associated with diabetes [E11.59, I10] 05/09/2013 Yes     Chronic      Problems Resolved During this Admission:     Will proceed wit left arm fistulagram tomorrow  NPO after midnight    Thank you for your consult. I will follow-up with patient. Please contact us if you have any additional questions.    Carlyle Mcfadden IV, MD  Vascular Surgery  [facility]

## 2019-04-09 NOTE — SUBJECTIVE & OBJECTIVE
Interval History:     4/9/19 - seen at HD , first tx today, tolerating well,     Review of patient's allergies indicates:   Allergen Reactions    Allegra [fexofenadine] Swelling    Codeine Hives, Itching and Nausea And Vomiting     Current Facility-Administered Medications   Medication Frequency    allopurinol tablet 100 mg Daily    amLODIPine tablet 5 mg BID    carvedilol tablet 12.5 mg BID WM    furosemide tablet 80 mg Daily    gabapentin capsule 100 mg BID    hydrALAZINE injection 10 mg Q8H PRN    hydrALAZINE tablet 100 mg Q8H    vitamin renal formula (B-complex-vitamin c-folic acid) 1 mg per capsule 1 capsule Daily       Objective:     Vital Signs (Most Recent):  Temp: 98.1 °F (36.7 °C) (04/09/19 0900)  Pulse: 70 (04/09/19 1040)  Resp: 18 (04/09/19 0410)  BP: (!) 132/56 (04/09/19 1040)  SpO2: (!) 94 % (04/09/19 0711)  O2 Device (Oxygen Therapy): room air (04/09/19 0711) Vital Signs (24h Range):  Temp:  [97.5 °F (36.4 °C)-98.8 °F (37.1 °C)] 98.1 °F (36.7 °C)  Pulse:  [56-70] 70  Resp:  [16-19] 18  SpO2:  [94 %-98 %] 94 %  BP: (132-187)/() 132/56     Weight: 63.9 kg (140 lb 14 oz) (04/08/19 1515)  Body mass index is 23.44 kg/m².  Body surface area is 1.71 meters squared.    I/O last 3 completed shifts:  In: 1100 [P.O.:600; Other:500]  Out: 0     Physical Exam   Constitutional: She is oriented to person, place, and time. She appears well-developed. No distress.   HENT:   Head: Normocephalic and atraumatic.   Mouth/Throat: Oropharynx is clear and moist. No oropharyngeal exudate.   Eyes: Pupils are equal, round, and reactive to light. Conjunctivae and EOM are normal.   Neck: Normal range of motion. Neck supple. No JVD present. Carotid bruit is not present. No tracheal deviation present. No thyroid mass and no thyromegaly present.   Cardiovascular: Normal rate, regular rhythm and intact distal pulses. Exam reveals no gallop and no friction rub.   Murmur heard.  Pulmonary/Chest: Effort normal. No  respiratory distress. She has no wheezes. She has rales. She exhibits no tenderness.   Abdominal: Soft. Bowel sounds are normal. She exhibits no distension, no abdominal bruit, no ascites and no mass. There is no hepatosplenomegaly. There is no tenderness. There is no rebound, no guarding and no CVA tenderness.   Musculoskeletal: She exhibits edema. She exhibits no tenderness.   LUE AVF with good thrill    Lymphadenopathy:     She has no cervical adenopathy.   Neurological: She is alert and oriented to person, place, and time. She has normal reflexes. She displays normal reflexes. No cranial nerve deficit. She exhibits normal muscle tone. Coordination normal.   Skin: Skin is warm and intact. No rash noted. No erythema. No pallor.   Psychiatric: She has a normal mood and affect. Her behavior is normal.       Significant Labs:  CBC:   Recent Labs   Lab 04/08/19 0817   WBC 5.05   RBC 3.22*   HGB 9.3*   HCT 29.6*      MCV 92   MCH 28.9   MCHC 31.4*     CMP:   Recent Labs   Lab 04/08/19 0817 04/09/19  0736    115*   CALCIUM 9.9 9.4   ALBUMIN 3.6 3.4*   PROT 7.1  --     139   K 4.8 4.6   CO2 20* 21*    104   BUN 97* 87*   CREATININE 5.9* 5.6*   ALKPHOS 74  --    ALT 31  --    AST 25  --    BILITOT 0.5  --      Coagulation:   Recent Labs   Lab 04/08/19 0817   INR 0.9   APTT 28.5     LFTs:   Recent Labs   Lab 04/08/19 0817 04/09/19  0736   ALT 31  --    AST 25  --    ALKPHOS 74  --    BILITOT 0.5  --    PROT 7.1  --    ALBUMIN 3.6 3.4*     All labs within the past 24 hours have been reviewed.     Significant Imaging:  Reviewed     Lab Results   Component Value Date    .0 (H) 04/01/2019    CALCIUM 9.4 04/09/2019    PHOS 4.6 (H) 04/09/2019       Lab Results   Component Value Date    ALBUMIN 3.4 (L) 04/09/2019

## 2019-04-09 NOTE — PROGRESS NOTES
Pt moved her access arm to push herself up and infiltrated the venous stick. RN attempted to recannulate pt, but area infiltrated. Access was very swollen without another area to cannulate. Called and notified Dr. Vinson, orderd to allow access to rest and to reattempt tx tomorrow. Pt NAD. Pt ran for 46 min of HD 0 UF.

## 2019-04-09 NOTE — PLAN OF CARE
Problem: Adult Inpatient Plan of Care  Goal: Plan of Care Review  Outcome: Ongoing (interventions implemented as appropriate)  Patient without adverse reactions, free from falls, tolerated HD today, independent, chart reviewed, will continue to monitor

## 2019-04-10 LAB
ALBUMIN SERPL BCP-MCNC: 3.3 G/DL (ref 3.5–5.2)
ANION GAP SERPL CALC-SCNC: 13 MMOL/L (ref 8–16)
BUN SERPL-MCNC: 65 MG/DL (ref 8–23)
CALCIUM SERPL-MCNC: 9.1 MG/DL (ref 8.7–10.5)
CHLORIDE SERPL-SCNC: 105 MMOL/L (ref 95–110)
CO2 SERPL-SCNC: 21 MMOL/L (ref 23–29)
CREAT SERPL-MCNC: 5.2 MG/DL (ref 0.5–1.4)
EST. GFR  (AFRICAN AMERICAN): 9 ML/MIN/1.73 M^2
EST. GFR  (NON AFRICAN AMERICAN): 7 ML/MIN/1.73 M^2
GLUCOSE SERPL-MCNC: 134 MG/DL (ref 70–110)
PHOSPHATE SERPL-MCNC: 4.6 MG/DL (ref 2.7–4.5)
POTASSIUM SERPL-SCNC: 4.2 MMOL/L (ref 3.5–5.1)
SODIUM SERPL-SCNC: 139 MMOL/L (ref 136–145)

## 2019-04-10 PROCEDURE — 80069 RENAL FUNCTION PANEL: CPT

## 2019-04-10 PROCEDURE — 63600175 PHARM REV CODE 636 W HCPCS: Mod: JG | Performed by: INTERNAL MEDICINE

## 2019-04-10 PROCEDURE — C1769 GUIDE WIRE: HCPCS

## 2019-04-10 PROCEDURE — 63600175 PHARM REV CODE 636 W HCPCS

## 2019-04-10 PROCEDURE — 99233 SBSQ HOSP IP/OBS HIGH 50: CPT | Mod: ,,, | Performed by: INTERNAL MEDICINE

## 2019-04-10 PROCEDURE — 99233 PR SUBSEQUENT HOSPITAL CARE,LEVL III: ICD-10-PCS | Mod: ,,, | Performed by: INTERNAL MEDICINE

## 2019-04-10 PROCEDURE — 36415 COLL VENOUS BLD VENIPUNCTURE: CPT

## 2019-04-10 PROCEDURE — 80100016 HC MAINTENANCE HEMODIALYSIS

## 2019-04-10 PROCEDURE — 21400001 HC TELEMETRY ROOM

## 2019-04-10 PROCEDURE — 25500020 PHARM REV CODE 255

## 2019-04-10 PROCEDURE — C1894 INTRO/SHEATH, NON-LASER: HCPCS

## 2019-04-10 PROCEDURE — 25000003 PHARM REV CODE 250: Performed by: NURSE PRACTITIONER

## 2019-04-10 RX ADMIN — GABAPENTIN 100 MG: 100 CAPSULE ORAL at 09:04

## 2019-04-10 RX ADMIN — ERYTHROPOIETIN 10000 UNITS: 10000 INJECTION, SOLUTION INTRAVENOUS; SUBCUTANEOUS at 11:04

## 2019-04-10 RX ADMIN — HYDRALAZINE HYDROCHLORIDE 100 MG: 50 TABLET ORAL at 05:04

## 2019-04-10 RX ADMIN — HYDRALAZINE HYDROCHLORIDE 100 MG: 50 TABLET ORAL at 09:04

## 2019-04-10 RX ADMIN — HYDRALAZINE HYDROCHLORIDE 100 MG: 50 TABLET ORAL at 02:04

## 2019-04-10 RX ADMIN — CARVEDILOL 12.5 MG: 12.5 TABLET, FILM COATED ORAL at 04:04

## 2019-04-10 RX ADMIN — AMLODIPINE BESYLATE 5 MG: 5 TABLET ORAL at 09:04

## 2019-04-10 NOTE — INTERVAL H&P NOTE
The patient has been examined and the H&P has been reviewed:    I concur with the findings and no changes have occurred since H&P was written.    Anesthesia/Surgery risks, benefits and alternative options discussed and understood by patient/family.          Active Hospital Problems    Diagnosis  POA    *ESRD needing dialysis [N18.6, Z99.2]  Yes    CKD (chronic kidney disease) stage 5, GFR less than 15 ml/min [N18.5]  Yes    Peripheral neuropathy [G62.9]  Yes    SOB (shortness of breath) [R06.02]  Yes    Anemia due to chronic kidney disease [N18.9, D63.1]  Yes    Type II diabetes mellitus [E11.9]  Yes     Chronic    Hypertension associated with diabetes [E11.59, I10]  Yes     Chronic      Resolved Hospital Problems   No resolved problems to display.

## 2019-04-10 NOTE — ASSESSMENT & PLAN NOTE
Admit to Inpatient   Consult Nephrology to start HD    to arrange outpatient HD   Continue home Sodium Bicarbonate     4/9/19  Tolerating HD without complications    4/10/19  S/p fistulogram with angioplasty. Has chair time arranged at [facility] in [place].

## 2019-04-10 NOTE — SUBJECTIVE & OBJECTIVE
Interval History:     4/9/19 - seen at HD , first tx today, tolerating well,     4/10/19 - sen at HD, tolerating well,     Review of patient's allergies indicates:   Allergen Reactions    Allegra [fexofenadine] Swelling    Codeine Hives, Itching and Nausea And Vomiting     Current Facility-Administered Medications   Medication Frequency    allopurinol tablet 100 mg Daily    amLODIPine tablet 5 mg BID    carvedilol tablet 12.5 mg BID WM    furosemide tablet 80 mg Daily    gabapentin capsule 100 mg BID    hydrALAZINE injection 10 mg Q8H PRN    hydrALAZINE tablet 100 mg Q8H    vitamin renal formula (B-complex-vitamin c-folic acid) 1 mg per capsule 1 capsule Daily       Objective:     Vital Signs (Most Recent):  Temp: 97.8 °F (36.6 °C) (04/10/19 0950)  Pulse: 78 (04/10/19 1100)  Resp: 18 (04/10/19 0725)  BP: 134/66 (04/10/19 1100)  SpO2: 96 % (04/10/19 0725)  O2 Device (Oxygen Therapy): room air (04/10/19 0725) Vital Signs (24h Range):  Temp:  [97.7 °F (36.5 °C)-98.8 °F (37.1 °C)] 97.8 °F (36.6 °C)  Pulse:  [50-79] 78  Resp:  [18-19] 18  SpO2:  [94 %-97 %] 96 %  BP: (129-162)/(53-72) 134/66     Weight: 63.9 kg (140 lb 14 oz) (04/08/19 1515)  Body mass index is 23.44 kg/m².  Body surface area is 1.71 meters squared.    I/O last 3 completed shifts:  In: 1570 [P.O.:1320; Other:250]  Out: 1500 [Other:1500]    Physical Exam   Constitutional: She is oriented to person, place, and time. She appears well-developed. No distress.   HENT:   Head: Normocephalic and atraumatic.   Mouth/Throat: Oropharynx is clear and moist. No oropharyngeal exudate.   Eyes: Pupils are equal, round, and reactive to light. Conjunctivae and EOM are normal.   Neck: Normal range of motion. Neck supple. No JVD present. Carotid bruit is not present. No tracheal deviation present. No thyroid mass and no thyromegaly present.   Cardiovascular: Normal rate, regular rhythm and intact distal pulses. Exam reveals no gallop and no friction rub.   Murmur  heard.  Pulmonary/Chest: Effort normal. No respiratory distress. She has no wheezes. She has rales. She exhibits no tenderness.   Abdominal: Soft. Bowel sounds are normal. She exhibits no distension, no abdominal bruit, no ascites and no mass. There is no hepatosplenomegaly. There is no tenderness. There is no rebound, no guarding and no CVA tenderness.   Musculoskeletal: She exhibits edema. She exhibits no tenderness.   LUE AVF with good thrill    Lymphadenopathy:     She has no cervical adenopathy.   Neurological: She is alert and oriented to person, place, and time. She has normal reflexes. She displays normal reflexes. No cranial nerve deficit. She exhibits normal muscle tone. Coordination normal.   Skin: Skin is warm and intact. No rash noted. No erythema. No pallor.   Psychiatric: She has a normal mood and affect. Her behavior is normal.       Significant Labs:  CBC:   Recent Labs   Lab 04/08/19 0817   WBC 5.05   RBC 3.22*   HGB 9.3*   HCT 29.6*      MCV 92   MCH 28.9   MCHC 31.4*     CMP:   Recent Labs   Lab 04/08/19  0817  04/10/19  0554      < > 134*   CALCIUM 9.9   < > 9.1   ALBUMIN 3.6   < > 3.3*   PROT 7.1  --   --       < > 139   K 4.8   < > 4.2   CO2 20*   < > 21*      < > 105   BUN 97*   < > 65*   CREATININE 5.9*   < > 5.2*   ALKPHOS 74  --   --    ALT 31  --   --    AST 25  --   --    BILITOT 0.5  --   --     < > = values in this interval not displayed.     Coagulation:   Recent Labs   Lab 04/08/19 0817   INR 0.9   APTT 28.5     LFTs:   Recent Labs   Lab 04/08/19  0817  04/10/19  0554   ALT 31  --   --    AST 25  --   --    ALKPHOS 74  --   --    BILITOT 0.5  --   --    PROT 7.1  --   --    ALBUMIN 3.6   < > 3.3*    < > = values in this interval not displayed.     All labs within the past 24 hours have been reviewed.     Significant Imaging:  Reviewed     Lab Results   Component Value Date    .0 (H) 04/01/2019    CALCIUM 9.1 04/10/2019    PHOS 4.6 (H) 04/10/2019        Lab Results   Component Value Date    ALBUMIN 3.3 (L) 04/10/2019

## 2019-04-10 NOTE — PROGRESS NOTES
Pt completed 2.5 hours of HD removing 1L. No signs of distress noted. De accessed per p&p  Each site held for 5mins, no post bleeding noted.  Dr Vinson made rounds during tx

## 2019-04-10 NOTE — ASSESSMENT & PLAN NOTE
1. ESRD :  Patient is s/p  left nephrectomy, she has  functioning right kidney, second HD today, plan third tx tomorrow,      she had a left arm AV fistula placed on 12/4/14 by Dr Foster .  Good thrill on exam.  case management consult to arrange outpatient dialysis.     Some difficulty accessing AVF, s/p angioplasty today ,     2.  Hypertension - resume home blood pressure medications, controlled,     3.  Anemia of chronic kidney disease - Procrit with dialysis    4.  Secondary hyperparathyroidism - renal diet, Velphoro with meals,     5. D/c home when out pt HD arranged     6. SOB due to fluid over load, UF as tolerated, improved ,

## 2019-04-10 NOTE — OP NOTE
DATE OF PROCEDURE:  04/10/2019.    PREOPERATIVE DIAGNOSIS:  End-stage renal disease, malfunctioning access.    POSTOPERATIVE DIAGNOSIS:  End-stage renal disease, malfunctioning access.    ATTENDING SURGEON:  Ruddy Fitzpatrick M.D.    ANESTHESIA:  Local.    PROCEDURES:  Left arm fistulogram with balloon angioplasty of peripheral vessel.    COMPLICATIONS:  None.    SPECIMENS:  None.    ESTIMATED BLOOD LOSS:  Minimal.    DISPOSITION:  Return to the floor.    INDICATIONS:  This is a 78-year-old female.  She has a left arm basilic vein   transposition fistula that was placed some time ago.  She recently started   hemodialysis cannulation via the fistula that had been having issues with high   pressure and she presents now for the above procedure.    PROCEDURE IN DETAIL:  After discussion with the patient regarding risks,   benefits, potential complications, she agreed and signed informed consent.  She   was brought to the specialist procedure room and placed in supine position and   after adequate monitoring, prepped and draped the patient's left arm in the   standard surgical fashion.  I then cannulated the left arm fistula using a   micropuncture needle, placing a metal guidewire.  Once confirmed in the fistula,   we upsized to a 5-[place] introducer sheath.  We shot multiple projection images   of the fistula as well as the central venous system.    RADIOLOGIC STUDIES:  The patient had a widely patent fistula.  There was some   tortuosity as well as some retained valve within the fistula, but no significant   severe stenosis of the central venous system.  It was widely patent.  There was   some collateralization, but no obvious stenosis.  At this point, we decided to   proceed with balloon angioplasty of the peripheral vessel.  We passed 0.035   Glidewire across the lesion and then performed balloon angioplasty using a 7 x   40 mm Conquest balloon to open a valve kinked area in the mid fistula.    Subsequent images revealed  improvement in flow through this area with minimal   residual stenosis.  At this point, no further interventions were undertaken.    All catheters and wires were removed from the arm.  The wound was closed with   Prolene.  The patient tolerated the procedure and was transferred to Recovery   Room with stable vital signs.      VT/HN  dd: 04/10/2019 09:17:04 (CDT)  td: 04/10/2019 09:38:16 (CDT)  Doc ID   #[id number]  Job ID #[id number]    CC:

## 2019-04-10 NOTE — SUBJECTIVE & OBJECTIVE
Interval History: s/p left arm fistulogram with balloon angioplasty today. 2nd HD treatment today. Currently at HD     Review of Systems   Constitutional: Positive for fatigue. Negative for chills and fever.   HENT: Negative for congestion, rhinorrhea and sinus pressure.    Respiratory: Positive for shortness of breath (improving). Negative for apnea, cough, choking, chest tightness, wheezing and stridor.    Cardiovascular: Negative for chest pain, palpitations and leg swelling.   Gastrointestinal: Negative for abdominal distention, abdominal pain, diarrhea, nausea and vomiting.   Endocrine: Negative for cold intolerance and heat intolerance.   Genitourinary: Negative for difficulty urinating and hematuria.   Musculoskeletal: Negative for arthralgias and joint swelling.   Skin: Negative for color change, pallor and rash.   Neurological: Positive for weakness. Negative for dizziness, seizures, numbness and headaches.   Psychiatric/Behavioral: Negative for agitation. The patient is not nervous/anxious.       Objective:     Vital Signs (Most Recent):  Temp: 97.8 °F (36.6 °C) (04/10/19 0950)  Pulse: 83 (04/10/19 1140)  Resp: 18 (04/10/19 0725)  BP: (!) 143/63 (04/10/19 1140)  SpO2: 96 % (04/10/19 0725) Vital Signs (24h Range):  Temp:  [97.8 °F (36.6 °C)-98.8 °F (37.1 °C)] 97.8 °F (36.6 °C)  Pulse:  [50-83] 83  Resp:  [18-19] 18  SpO2:  [94 %-97 %] 96 %  BP: (129-162)/(53-72) 143/63     Weight: 63.9 kg (140 lb 14 oz)  Body mass index is 23.44 kg/m².    Intake/Output Summary (Last 24 hours) at 4/10/2019 1159  Last data filed at 4/10/2019 0800  Gross per 24 hour   Intake 480 ml   Output --   Net 480 ml      Physical Exam   Constitutional: No distress.   HENT:   Mouth/Throat: No oropharyngeal exudate.   Eyes: Right eye exhibits no discharge. Left eye exhibits no discharge.   Cardiovascular: Exam reveals no gallop and no friction rub.   No murmur heard.  Left arm fistula currently accessed at HD.   Pulmonary/Chest: No  stridor. No respiratory distress. She has no wheezes. She has rales in the right lower field and the left lower field. She exhibits no tenderness.   Abdominal: She exhibits no distension and no mass. There is no tenderness. There is no rebound and no guarding. No hernia.   Musculoskeletal: She exhibits no edema or deformity.   Neurological: She is alert.   Skin: Skin is warm and dry. She is not diaphoretic.   Nursing note and vitals reviewed.    Significant Labs:   CBC: No results for input(s): WBC, HGB, HCT, PLT in the last 48 hours.  CMP:   Recent Labs   Lab 04/09/19  0736 04/10/19  0554    139   K 4.6 4.2    105   CO2 21* 21*   * 134*   BUN 87* 65*   CREATININE 5.6* 5.2*   CALCIUM 9.4 9.1   ALBUMIN 3.4* 3.3*   ANIONGAP 14 13   EGFRNONAA 7* 7*       Significant Imaging: I have reviewed all pertinent imaging results/findings within the past 24 hours.

## 2019-04-10 NOTE — PLAN OF CARE
Problem: Adult Inpatient Plan of Care  Goal: Plan of Care Review  Outcome: Ongoing (interventions implemented as appropriate)  Patient without adverse reactions, free from falls, no injuries, tolerated dialysis/fistula gram, AF fistula with positive bruit and thrill, denies pain, chart reviewed, will continue to monitor

## 2019-04-10 NOTE — PROGRESS NOTES
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 She presented to [facility] Emergency Room with complaints of increase shortness of breath for the pass few days. Associated symptoms include bilateral leg swelling at the end of the day. Denies associated symptoms include chest pain, palpitations, dizziness, fever, chills, nauseas or vomiting. Patient has been evaluated by transplant, however not a candidate due to advance age and multiple medical problems. She is followed by Dr Vinson in clinic. Plan discussed with Dr Vinson, pt being admitted to Hospital Medicine for initation of HD.          Hospital Course:  No notes on file    Interval History: s/p left arm fistulogram with balloon angioplasty today. 2nd HD treatment today. Currently at HD     Review of Systems   Constitutional: Positive for fatigue. Negative for chills and fever.   HENT: Negative for congestion, rhinorrhea and sinus pressure.    Respiratory: Positive for shortness of breath (improving). Negative for apnea, cough, choking, chest tightness, wheezing and stridor.    Cardiovascular: Negative for chest pain, palpitations and leg swelling.   Gastrointestinal: Negative for abdominal distention, abdominal pain, diarrhea, nausea and vomiting.   Endocrine: Negative for cold intolerance and heat intolerance.   Genitourinary: Negative for difficulty urinating and hematuria.   Musculoskeletal: Negative for arthralgias and joint swelling.   Skin: Negative for color change, pallor and rash.   Neurological:  Positive for weakness. Negative for dizziness, seizures, numbness and headaches.   Psychiatric/Behavioral: Negative for agitation. The patient is not nervous/anxious.       Objective:     Vital Signs (Most Recent):  Temp: 97.8 °F (36.6 °C) (04/10/19 0950)  Pulse: 83 (04/10/19 1140)  Resp: 18 (04/10/19 0725)  BP: (!) 143/63 (04/10/19 1140)  SpO2: 96 % (04/10/19 0725) Vital Signs (24h Range):  Temp:  [97.8 °F (36.6 °C)-98.8 °F (37.1 °C)] 97.8 °F (36.6 °C)  Pulse:  [50-83] 83  Resp:  [18-19] 18  SpO2:  [94 %-97 %] 96 %  BP: (129-162)/(53-72) 143/63     Weight: 63.9 kg (140 lb 14 oz)  Body mass index is 23.44 kg/m².    Intake/Output Summary (Last 24 hours) at 4/10/2019 1159  Last data filed at 4/10/2019 0800  Gross per 24 hour   Intake 480 ml   Output --   Net 480 ml      Physical Exam   Constitutional: No distress.   HENT:   Mouth/Throat: No oropharyngeal exudate.   Eyes: Right eye exhibits no discharge. Left eye exhibits no discharge.   Cardiovascular: Exam reveals no gallop and no friction rub.   No murmur heard.  Left arm fistula currently accessed at HD.   Pulmonary/Chest: No stridor. No respiratory distress. She has no wheezes. She has rales in the right lower field and the left lower field. She exhibits no tenderness.   Abdominal: She exhibits no distension and no mass. There is no tenderness. There is no rebound and no guarding. No hernia.   Musculoskeletal: She exhibits no edema or deformity.   Neurological: She is alert.   Skin: Skin is warm and dry. She is not diaphoretic.   Nursing note and vitals reviewed.    Significant Labs:   CBC: No results for input(s): WBC, HGB, HCT, PLT in the last 48 hours.  CMP:   Recent Labs   Lab 04/09/19  0736 04/10/19  0554    139   K 4.6 4.2    105   CO2 21* 21*   * 134*   BUN 87* 65*   CREATININE 5.6* 5.2*   CALCIUM 9.4 9.1   ALBUMIN 3.4* 3.3*   ANIONGAP 14 13   EGFRNONAA 7* 7*       Significant Imaging: I have reviewed all pertinent imaging  results/findings within the past 24 hours.    Assessment/Plan:      * ESRD needing dialysis  Admit to Inpatient   Consult Nephrology to start HD    to arrange outpatient HD   Continue home Sodium Bicarbonate     4/9/19  Tolerating HD without complications    4/10/19  S/p fistulogram with angioplasty. Has chair time arranged at [facility] in [place].    CKD (chronic kidney disease) stage 5, GFR less than 15 ml/min  As above       Peripheral neuropathy    Continue Gabapentin     SOB (shortness of breath)  Patient presented to ED with shortness of breath   Admitted for initiation of HD, which should help  with shortness of breath.   Monitor       Anemia due to chronic kidney disease  HGB currently stable   Procrit as needed with HD  Montior       Hypertension associated with diabetes    Blood pressure elevated on arrival to ED, however has not taken BP medications today   Restart home amlodipine, carvedilol and hydralazine         Type II diabetes mellitus  HA1X 5.1 March 2019  Glucose 170 on labs one week ago  Accu check ACHS   Low dose SS   Diabetic diet   Monitor       VTE Risk Mitigation (From admission, onward)        Ordered     Place sequential compression device  Until discontinued      04/08/19 1226          Elaina Aranda NP  Department of Hospital Medicine   [facility]

## 2019-04-11 ENCOUNTER — TELEPHONE (OUTPATIENT)
Dept: NEPHROLOGY | Facility: CLINIC | Age: 79
End: 2019-04-11

## 2019-04-11 VITALS
SYSTOLIC BLOOD PRESSURE: 147 MMHG | WEIGHT: 140.88 LBS | OXYGEN SATURATION: 96 % | HEIGHT: 65 IN | DIASTOLIC BLOOD PRESSURE: 56 MMHG | HEART RATE: 83 BPM | TEMPERATURE: 98 F | RESPIRATION RATE: 18 BRPM | BODY MASS INDEX: 23.47 KG/M2

## 2019-04-11 PROBLEM — N18.5 CKD (CHRONIC KIDNEY DISEASE) STAGE 5, GFR LESS THAN 15 ML/MIN: Status: RESOLVED | Noted: 2019-03-14 | Resolved: 2019-04-11

## 2019-04-11 LAB
ALBUMIN SERPL BCP-MCNC: 3.3 G/DL (ref 3.5–5.2)
ANION GAP SERPL CALC-SCNC: 13 MMOL/L (ref 8–16)
BASOPHILS # BLD AUTO: 0.04 K/UL (ref 0–0.2)
BASOPHILS NFR BLD: 0.3 % (ref 0–1.9)
BUN SERPL-MCNC: 48 MG/DL (ref 8–23)
CALCIUM SERPL-MCNC: 9.2 MG/DL (ref 8.7–10.5)
CHLORIDE SERPL-SCNC: 101 MMOL/L (ref 95–110)
CO2 SERPL-SCNC: 22 MMOL/L (ref 23–29)
CREAT SERPL-MCNC: 5 MG/DL (ref 0.5–1.4)
DIFFERENTIAL METHOD: ABNORMAL
EOSINOPHIL # BLD AUTO: 0.1 K/UL (ref 0–0.5)
EOSINOPHIL NFR BLD: 0.7 % (ref 0–8)
ERYTHROCYTE [DISTWIDTH] IN BLOOD BY AUTOMATED COUNT: 16 % (ref 11.5–14.5)
EST. GFR  (AFRICAN AMERICAN): 9 ML/MIN/1.73 M^2
EST. GFR  (NON AFRICAN AMERICAN): 8 ML/MIN/1.73 M^2
GLUCOSE SERPL-MCNC: 133 MG/DL (ref 70–110)
HCT VFR BLD AUTO: 34.7 % (ref 37–48.5)
HGB BLD-MCNC: 10.9 G/DL (ref 12–16)
HYPOCHROMIA BLD QL SMEAR: ABNORMAL
LYMPHOCYTES # BLD AUTO: 2.2 K/UL (ref 1–4.8)
LYMPHOCYTES NFR BLD: 19 % (ref 18–48)
MCH RBC QN AUTO: 29 PG (ref 27–31)
MCHC RBC AUTO-ENTMCNC: 31.4 G/DL (ref 32–36)
MCV RBC AUTO: 92 FL (ref 82–98)
MONOCYTES # BLD AUTO: 1.8 K/UL (ref 0.3–1)
MONOCYTES NFR BLD: 15.2 % (ref 4–15)
NEUTROPHILS # BLD AUTO: 7.5 K/UL (ref 1.8–7.7)
NEUTROPHILS NFR BLD: 65.1 % (ref 38–73)
OVALOCYTES BLD QL SMEAR: ABNORMAL
PHOSPHATE SERPL-MCNC: 4.1 MG/DL (ref 2.7–4.5)
PLATELET # BLD AUTO: 219 K/UL (ref 150–350)
PLATELET BLD QL SMEAR: ABNORMAL
PMV BLD AUTO: 11.5 FL (ref 9.2–12.9)
POIKILOCYTOSIS BLD QL SMEAR: SLIGHT
POTASSIUM SERPL-SCNC: 4.2 MMOL/L (ref 3.5–5.1)
RBC # BLD AUTO: 3.76 M/UL (ref 4–5.4)
SODIUM SERPL-SCNC: 136 MMOL/L (ref 136–145)
TARGETS BLD QL SMEAR: ABNORMAL
WBC # BLD AUTO: 11.63 K/UL (ref 3.9–12.7)

## 2019-04-11 PROCEDURE — 63600175 PHARM REV CODE 636 W HCPCS: Performed by: INTERNAL MEDICINE

## 2019-04-11 PROCEDURE — 85025 COMPLETE CBC W/AUTO DIFF WBC: CPT

## 2019-04-11 PROCEDURE — 90935 HEMODIALYSIS ONE EVALUATION: CPT | Mod: ,,, | Performed by: INTERNAL MEDICINE

## 2019-04-11 PROCEDURE — 25000003 PHARM REV CODE 250: Performed by: NURSE PRACTITIONER

## 2019-04-11 PROCEDURE — 80074 ACUTE HEPATITIS PANEL: CPT

## 2019-04-11 PROCEDURE — 90935 PR HEMODIALYSIS, ONE EVALUATION: ICD-10-PCS | Mod: ,,, | Performed by: INTERNAL MEDICINE

## 2019-04-11 PROCEDURE — 80069 RENAL FUNCTION PANEL: CPT

## 2019-04-11 PROCEDURE — 80100016 HC MAINTENANCE HEMODIALYSIS

## 2019-04-11 PROCEDURE — 25000003 PHARM REV CODE 250: Performed by: SURGERY

## 2019-04-11 PROCEDURE — 36415 COLL VENOUS BLD VENIPUNCTURE: CPT

## 2019-04-11 PROCEDURE — 86706 HEP B SURFACE ANTIBODY: CPT

## 2019-04-11 RX ORDER — HEPARIN SODIUM 1000 [USP'U]/ML
2000 INJECTION, SOLUTION INTRAVENOUS; SUBCUTANEOUS ONCE
Status: COMPLETED | OUTPATIENT
Start: 2019-04-11 | End: 2019-04-11

## 2019-04-11 RX ADMIN — HEPARIN SODIUM 2000 UNITS: 1000 INJECTION INTRAVENOUS; SUBCUTANEOUS at 10:04

## 2019-04-11 RX ADMIN — ALLOPURINOL 100 MG: 100 TABLET ORAL at 08:04

## 2019-04-11 RX ADMIN — HYDRALAZINE HYDROCHLORIDE 100 MG: 50 TABLET ORAL at 06:04

## 2019-04-11 RX ADMIN — RENO CAPS 1 CAPSULE: 100; 1.5; 1.7; 20; 10; 1; 150; 5; 6 CAPSULE ORAL at 08:04

## 2019-04-11 RX ADMIN — GABAPENTIN 100 MG: 100 CAPSULE ORAL at 08:04

## 2019-04-11 RX ADMIN — ERYTHROPOIETIN 10000 UNITS: 10000 INJECTION, SOLUTION INTRAVENOUS; SUBCUTANEOUS at 10:04

## 2019-04-11 NOTE — ASSESSMENT & PLAN NOTE
1. ESRD :  Patient is s/p  left nephrectomy, third HD today ,      she had a left arm AV fistula placed on 12/4/14 by Dr Foster .  Good thrill on exam.      outpatient dialysis arranged at [facility],     Some difficulty accessing AVF, s/p angioplasty ,     2.  Hypertension - resume home blood pressure medications, controlled,     3.  Anemia of chronic kidney disease - Procrit with dialysis    4.  Secondary hyperparathyroidism - renal diet, Velphoro with meals at discharge ,     5. D/c home when out pt HD arranged     6. SOB due to fluid over load, UF as tolerated, improved ,

## 2019-04-11 NOTE — DISCHARGE SUMMARY
[facility] Hospital Medicine  Discharge Summary      Patient Name: Cailin Pickett  MRN: [id number]  Admission Date: 4/8/2019  Hospital Length of Stay: 3 days  Discharge Date and Time:  04/11/2019 3:01 PM  Attending Physician: No att. 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 She presented to [facility] Emergency Room with complaints of increase shortness of breath for the pass few days. Associated symptoms include bilateral leg swelling at the end of the day. Denies associated symptoms include chest pain, palpitations, dizziness, fever, chills, nauseas or vomiting. Patient has been evaluated by transplant, however not a candidate due to advance age and multiple medical problems. She is followed by Dr Vinson in clinic. Plan discussed with Dr Vinson, pt being admitted to Hospital Medicine for initation of HD.          Procedure(s) (LRB):  FISTULOGRAM (N/A)      Hospital Course:   Cailin Pickett is a 78 year old female who was admitted to [facility] for initiation of HD. Patient had to undergo fistulogram with balloon angioplasty prior to 2nd HD treatment. Treatments went without complications. She will dialyze at [facility] in [place] on Tuesday, Thursday, and Saturdays. Sodium bicarbonate was discontinued per Nephrology recommendations. Patient remained stable during hospitalization. Patient seen and examined on date of discharge and deemed suitable.      Consults:   Consults (From admission, onward)        Status Ordering Provider     Inpatient consult to Hospitalist  Once     Provider:  (Not yet assigned)    VICTOR HUGO Acuña     Inpatient consult to Nephrology  Once     Provider:  Tank Vinson MD    Completed MARLON BLANCA     Inpatient consult to  Social Work  Once     Provider:  (Not yet assigned)    LINWOOD Lala     Inpatient consult to Vascular Surgery  Once     Provider:  Ayad Foster MD    Completed LINWOOD HERNANDEZ            Final Active Diagnoses:    Diagnosis Date Noted POA    PRINCIPAL PROBLEM:  ESRD needing dialysis [N18.6, Z99.2] 04/08/2019 Yes    Peripheral neuropathy [G62.9] 01/21/2016 Yes    SOB (shortness of breath) [R06.02] 10/30/2014 Yes    Anemia due to chronic kidney disease [N18.9, D63.1] 07/23/2013 Yes    Type II diabetes mellitus [E11.9] 05/09/2013 Yes     Chronic    Hypertension associated with diabetes [E11.59, I10] 05/09/2013 Yes     Chronic      Problems Resolved During this Admission:    Diagnosis Date Noted Date Resolved POA    CKD (chronic kidney disease) stage 5, GFR less than 15 ml/min [N18.5] 03/14/2019 04/11/2019 Yes       Discharged Condition: stable    Disposition: Home or Self Care    Follow Up:  Follow-up Information     [facility].    Why:  Every Tuesday, Thursday, and Saturday at 8:30 am  Contact information:  [address] [place] [address] [phone]               Patient Instructions:   No discharge procedures on file.    Significant Diagnostic Studies: Labs:   CMP   Recent Labs   Lab 04/10/19  0554 04/11/19  0526    136   K 4.2 4.2    101   CO2 21* 22*   * 133*   BUN 65* 48*   CREATININE 5.2* 5.0*   CALCIUM 9.1 9.2   ALBUMIN 3.3* 3.3*   ANIONGAP 13 13   ESTGFRAFRICA 9* 9*   EGFRNONAA 7* 8*    and CBC   Recent Labs   Lab 04/11/19  0526   WBC 11.63   HGB 10.9*   HCT 34.7*          Pending Diagnostic Studies:     Procedure Component Value Units Date/Time    Hepatitis B surface antibody [[id number]] Collected:  04/11/19 0526    Order Status:  Sent Lab Status:  In process Updated:  04/11/19 1208    Specimen:  Blood     Hepatitis panel, acute [[id number]] Collected:  04/11/19 0526    Order Status:  Sent Lab Status:  In process Updated:  04/11/19  1208    Specimen:  Blood          Medications:  Reconciled Home Medications:      Medication List      START taking these medications    vitamin renal formula (B-complex-vitamin c-folic acid) 1 mg Cap  Commonly known as:  NEPHROCAP  Take 1 capsule by mouth once daily.  Start taking on:  4/12/2019        CHANGE how you take these medications    estradiol 0.5 MG tablet  Commonly known as:  ESTRACE  TAKE 1 TABLET DAILY FOR 3 WEEKS PER MONTH, THEN DO NOT TAKE FOR FOURTH WEEK EACH MONTH  What changed:  See the new instructions.        CONTINUE taking these medications    allopurinol 100 MG tablet  Commonly known as:  ZYLOPRIM  Take 1 tablet (100 mg total) by mouth once daily.     amLODIPine 5 MG tablet  Commonly known as:  NORVASC  TAKE 1 TABLET TWICE A DAY     carvedilol 12.5 MG tablet  Commonly known as:  COREG  Take 1 tablet (12.5 mg total) by mouth 2 (two) times daily with meals.     cetirizine 10 MG tablet  Commonly known as:  ZYRTEC  Take 10 mg by mouth once daily.     furosemide 40 MG tablet  Commonly known as:  LASIX  Take 2 tablets (80 mg total) by mouth once daily.     gabapentin 100 MG capsule  Commonly known as:  NEURONTIN  Take 1 capsule by mouth 2 (two) times daily.     hydrALAZINE 100 MG tablet  Commonly known as:  APRESOLINE  TAKE 1 TABLET EVERY 8 HOURS     hydrOXYzine HCl 25 MG tablet  Commonly known as:  ATARAX  TAKE 1 TABLET TWICE A DAY AS NEEDED FOR ITCHING     OSTEO BI-FLEX 250-200 mg Tab  Generic drug:  glucosamine-chondroitin  Take 1 tablet by mouth 2 (two) times daily.     repaglinide 0.5 MG tablet  Commonly known as:  PRANDIN  TAKE 1 TABLET THREE TIMES A DAY BEFORE MEALS     traMADol 50 mg tablet  Commonly known as:  ULTRAM  Take 1 tablet (50 mg total) by mouth every 12 (twelve) hours.        STOP taking these medications    sodium bicarbonate 650 MG tablet     VELPHORO 500 mg Chew  Generic drug:  sucroferric oxyhydroxide            Indwelling Lines/Drains at time of discharge:    Lines/Drains/Airways     Central Venous Catheter Line                 Port A Cath Single Lumen Left Chest -- days         Port A Cath Single Lumen Left Chest -- days          Drain                 Hemodialysis AV Fistula   Left upper arm -- days         Hemodialysis AV Fistula 12/04/14 1613 Left upper arm 1588 days                Time spent on the discharge of patient: >35 minutes  Patient was seen and examined on the date of discharge and determined to be suitable for discharge.      Elaina Aranda NP  Department of Hospital Medicine  [facility]

## 2019-04-11 NOTE — NURSING
Discharge instructions discussed with patient and spouse, written discharge instructions given to patient, discharge medication discussed with patient and spouse, verbalizes understanding of all instructions, IV catheter removed with tip intact, escorted to private auto via wheelchair pr staff, nad noted

## 2019-04-11 NOTE — HOSPITAL COURSE
Cailin Pickett is a 78 year old female who was admitted to [facility] for initiation of HD. Patient had to undergo fistulogram with balloon angioplasty prior to 2nd HD treatment. Treatments went without complications. She will dialyze at [facility] in [place] on Tuesday, Thursday, and Saturdays. Sodium bicarbonate was discontinued per Nephrology recommendations. Patient remained stable during hospitalization. Patient seen and examined on date of discharge and deemed suitable.

## 2019-04-11 NOTE — PLAN OF CARE
[facility]     04/11/19 1423   Post-Acute Status   Post-Acute Authorization Dialysis   Diaylsis Status Set-up Complete

## 2019-04-11 NOTE — PLAN OF CARE
May22 Established Patient Visit with Tank Vinson MD   Wednesday May 22, 2019 11:00 AM   Arrive at check-in approximately 15 minutes before your scheduled appointment time. Bring all outside medical records and imaging, along with a list of your current medications and insurance card.    4th Floor - From [address], take the [facility] exit ([address]). Enter the facility from the [address]  [facility]   [address]  [place] [address]   [phone]              Patient discharging home with [facility] outpatient HD.  Schedule T/Th/Sat at 8:30.  Information on after visit summary     04/11/19 0926   Final Note   Assessment Type Final Discharge Note   Anticipated Discharge Disposition Home   Hospital Follow Up  Appt(s) scheduled? Yes   Right Care Referral Info   Post Acute Recommendation No Care

## 2019-04-11 NOTE — SUBJECTIVE & OBJECTIVE
Interval History:     4/9/19 - seen at HD , first tx today, tolerating well,     4/10/19 - sen at HD, tolerating well,     4/11/19- no acute events, seen on HD ,     Review of patient's allergies indicates:   Allergen Reactions    Allegra [fexofenadine] Swelling    Codeine Hives, Itching and Nausea And Vomiting     Current Facility-Administered Medications   Medication Frequency    allopurinol tablet 100 mg Daily    amLODIPine tablet 5 mg BID    carvedilol tablet 12.5 mg BID WM    furosemide tablet 80 mg Daily    gabapentin capsule 100 mg BID    hydrALAZINE injection 10 mg Q8H PRN    hydrALAZINE tablet 100 mg Q8H    vitamin renal formula (B-complex-vitamin c-folic acid) 1 mg per capsule 1 capsule Daily       Objective:     Vital Signs (Most Recent):  Temp: 98.4 °F (36.9 °C) (04/11/19 0958)  Pulse: 75 (04/11/19 1120)  Resp: 18 (04/11/19 0958)  BP: (!) 141/55 (04/11/19 1120)  SpO2: 96 % (04/11/19 0724)  O2 Device (Oxygen Therapy): room air (04/11/19 0724) Vital Signs (24h Range):  Temp:  [97.6 °F (36.4 °C)-98.6 °F (37 °C)] 98.4 °F (36.9 °C)  Pulse:  [64-86] 75  Resp:  [18] 18  SpO2:  [93 %-97 %] 96 %  BP: (130-154)/(53-73) 141/55     Weight: 63.9 kg (140 lb 14 oz) (04/08/19 1515)  Body mass index is 23.44 kg/m².  Body surface area is 1.71 meters squared.    I/O last 3 completed shifts:  In: 630 [P.O.:480; Other:150]  Out: 1700 [Urine:200; Other:1500]    Physical Exam   Constitutional: She is oriented to person, place, and time. She appears well-developed. No distress.   HENT:   Head: Normocephalic and atraumatic.   Mouth/Throat: Oropharynx is clear and moist. No oropharyngeal exudate.   Eyes: Pupils are equal, round, and reactive to light. Conjunctivae and EOM are normal.   Neck: Normal range of motion. Neck supple. No JVD present. Carotid bruit is not present. No tracheal deviation present. No thyroid mass and no thyromegaly present.   Cardiovascular: Normal rate, regular rhythm and intact distal pulses.  Exam reveals no gallop and no friction rub.   Murmur heard.  Pulmonary/Chest: Effort normal. No respiratory distress. She has no wheezes. She has rales. She exhibits no tenderness.   Abdominal: Soft. Bowel sounds are normal. She exhibits no distension, no abdominal bruit, no ascites and no mass. There is no hepatosplenomegaly. There is no tenderness. There is no rebound, no guarding and no CVA tenderness.   Musculoskeletal: She exhibits edema. She exhibits no tenderness.   LUE AVF with good thrill    Lymphadenopathy:     She has no cervical adenopathy.   Neurological: She is alert and oriented to person, place, and time. She has normal reflexes. She displays normal reflexes. No cranial nerve deficit. She exhibits normal muscle tone. Coordination normal.   Skin: Skin is warm and intact. No rash noted. No erythema. No pallor.   Psychiatric: She has a normal mood and affect. Her behavior is normal.       Significant Labs:  CBC:   Recent Labs   Lab 04/11/19 0526   WBC 11.63   RBC 3.76*   HGB 10.9*   HCT 34.7*      MCV 92   MCH 29.0   MCHC 31.4*     CMP:   Recent Labs   Lab 04/08/19 0817 04/11/19 0526      < > 133*   CALCIUM 9.9   < > 9.2   ALBUMIN 3.6   < > 3.3*   PROT 7.1  --   --       < > 136   K 4.8   < > 4.2   CO2 20*   < > 22*      < > 101   BUN 97*   < > 48*   CREATININE 5.9*   < > 5.0*   ALKPHOS 74  --   --    ALT 31  --   --    AST 25  --   --    BILITOT 0.5  --   --     < > = values in this interval not displayed.     Coagulation:   Recent Labs   Lab 04/08/19 0817   INR 0.9   APTT 28.5     LFTs:   Recent Labs   Lab 04/08/19 0817 04/11/19 0526   ALT 31  --   --    AST 25  --   --    ALKPHOS 74  --   --    BILITOT 0.5  --   --    PROT 7.1  --   --    ALBUMIN 3.6   < > 3.3*    < > = values in this interval not displayed.     All labs within the past 24 hours have been reviewed.     Significant Imaging:  Reviewed     Lab Results   Component Value Date    .0 (H) 04/01/2019     CALCIUM 9.2 04/11/2019    PHOS 4.1 04/11/2019       Lab Results   Component Value Date    ALBUMIN 3.3 (L) 04/11/2019

## 2019-04-11 NOTE — TELEPHONE ENCOUNTER
----- Message from Katarzyna Starkey sent at 4/11/2019  1:32 PM CDT -----  Contact: [facility]  please fax orders to her at  [phone], has appt saturday. callback is [phone]/urgent per caller

## 2019-04-11 NOTE — TELEPHONE ENCOUNTER
Returned call to Randi. She is requesting that you call her with orders for patient to dialyze. Phone number is [phone]. She states that patient starts Saturday and there are no orders.

## 2019-04-11 NOTE — PLAN OF CARE
CM met with patient at the bedside to assess for discharge needs.  Patient does not anticipate any discharge needs at this time.

## 2019-04-11 NOTE — PROGRESS NOTES
Pt ran for 3 hours of HD, total UF 0ml. Pt seen by Dr. Vinson at bedside. Pt tolerated tx w/o c/o.

## 2019-04-11 NOTE — PROGRESS NOTES
[facility]  Progress Note    Patient Name: Cailin Pickett  MRN: [id number]  Admission Date: 4/8/2019  Hospital Length of Stay: 3 days  Attending Provider: Jesus Rodriguez, *   Primary Care Physician: Favio Titus MD  Principal Problem:ESRD needing dialysis    Subjective:     HPI: Cailin Pickett  is a pleasant 78-year-old  woman with history of hypertension, CKD stage 5, presents to hospital with generalized weakness and shortness of breath.  I saw her in the clinic about a week ago.  Advised patient to initiate hemodialysis.  Patient is here for further evaluation and possibility of starting hemodialysis.     she continues to feel tired, she has some exertion or shortness of Breath.  Peripheral edema noted.  Recent transplant note reviewed, patient was declined for a kidney transplant due to multiple medical problems, advanced age,  has solitary right kidney, she is status post left nephrectomy. Multiple cysts reported on the kidney. It measures about 16 cm. Patient Attended chronic kidney disease education and options class. she had a left arm AV fistula placed on 12/4/14 by Dr Foster .        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 she underwent chemo rx with 5-FU, she tolerated the cycles well. In 5/2013 she underwent resection of tail of pancreas, left nephrectomy, splenectomy, left adrenalectomy.            Interval History:     4/9/19 - seen at HD , first tx today, tolerating well,     4/10/19 - sen at HD, tolerating well,     4/11/19- no acute events, seen on HD ,     Review of patient's allergies indicates:   Allergen Reactions    Allegra [fexofenadine] Swelling    Codeine Hives, Itching and Nausea And Vomiting     Current Facility-Administered Medications   Medication Frequency    allopurinol tablet 100 mg Daily    amLODIPine tablet 5 mg BID    carvedilol tablet 12.5 mg BID WM     furosemide tablet 80 mg Daily    gabapentin capsule 100 mg BID    hydrALAZINE injection 10 mg Q8H PRN    hydrALAZINE tablet 100 mg Q8H    vitamin renal formula (B-complex-vitamin c-folic acid) 1 mg per capsule 1 capsule Daily       Objective:     Vital Signs (Most Recent):  Temp: 98.4 °F (36.9 °C) (04/11/19 0958)  Pulse: 75 (04/11/19 1120)  Resp: 18 (04/11/19 0958)  BP: (!) 141/55 (04/11/19 1120)  SpO2: 96 % (04/11/19 0724)  O2 Device (Oxygen Therapy): room air (04/11/19 0724) Vital Signs (24h Range):  Temp:  [97.6 °F (36.4 °C)-98.6 °F (37 °C)] 98.4 °F (36.9 °C)  Pulse:  [64-86] 75  Resp:  [18] 18  SpO2:  [93 %-97 %] 96 %  BP: (130-154)/(53-73) 141/55     Weight: 63.9 kg (140 lb 14 oz) (04/08/19 1515)  Body mass index is 23.44 kg/m².  Body surface area is 1.71 meters squared.    I/O last 3 completed shifts:  In: 630 [P.O.:480; Other:150]  Out: 1700 [Urine:200; Other:1500]    Physical Exam   Constitutional: She is oriented to person, place, and time. She appears well-developed. No distress.   HENT:   Head: Normocephalic and atraumatic.   Mouth/Throat: Oropharynx is clear and moist. No oropharyngeal exudate.   Eyes: Pupils are equal, round, and reactive to light. Conjunctivae and EOM are normal.   Neck: Normal range of motion. Neck supple. No JVD present. Carotid bruit is not present. No tracheal deviation present. No thyroid mass and no thyromegaly present.   Cardiovascular: Normal rate, regular rhythm and intact distal pulses. Exam reveals no gallop and no friction rub.   Murmur heard.  Pulmonary/Chest: Effort normal. No respiratory distress. She has no wheezes. She has rales. She exhibits no tenderness.   Abdominal: Soft. Bowel sounds are normal. She exhibits no distension, no abdominal bruit, no ascites and no mass. There is no hepatosplenomegaly. There is no tenderness. There is no rebound, no guarding and no CVA tenderness.   Musculoskeletal: She exhibits edema. She exhibits no tenderness.   LUE AVF with  good thrill    Lymphadenopathy:     She has no cervical adenopathy.   Neurological: She is alert and oriented to person, place, and time. She has normal reflexes. She displays normal reflexes. No cranial nerve deficit. She exhibits normal muscle tone. Coordination normal.   Skin: Skin is warm and intact. No rash noted. No erythema. No pallor.   Psychiatric: She has a normal mood and affect. Her behavior is normal.       Significant Labs:  CBC:   Recent Labs   Lab 04/11/19 0526   WBC 11.63   RBC 3.76*   HGB 10.9*   HCT 34.7*      MCV 92   MCH 29.0   MCHC 31.4*     CMP:   Recent Labs   Lab 04/08/19 0817 04/11/19 0526      < > 133*   CALCIUM 9.9   < > 9.2   ALBUMIN 3.6   < > 3.3*   PROT 7.1  --   --       < > 136   K 4.8   < > 4.2   CO2 20*   < > 22*      < > 101   BUN 97*   < > 48*   CREATININE 5.9*   < > 5.0*   ALKPHOS 74  --   --    ALT 31  --   --    AST 25  --   --    BILITOT 0.5  --   --     < > = values in this interval not displayed.     Coagulation:   Recent Labs   Lab 04/08/19 0817   INR 0.9   APTT 28.5     LFTs:   Recent Labs   Lab 04/08/19 0817 04/11/19 0526   ALT 31  --   --    AST 25  --   --    ALKPHOS 74  --   --    BILITOT 0.5  --   --    PROT 7.1  --   --    ALBUMIN 3.6   < > 3.3*    < > = values in this interval not displayed.     All labs within the past 24 hours have been reviewed.     Significant Imaging:  Reviewed     Lab Results   Component Value Date    .0 (H) 04/01/2019    CALCIUM 9.2 04/11/2019    PHOS 4.1 04/11/2019       Lab Results   Component Value Date    ALBUMIN 3.3 (L) 04/11/2019           Assessment/Plan:     * ESRD needing dialysis  1. ESRD :  Patient is s/p  left nephrectomy, third HD today ,      she had a left arm AV fistula placed on 12/4/14 by Dr Foster .  Good thrill on exam.      outpatient dialysis arranged at [facility]  [facility] [place] [facility] , under [facility],     Some difficulty accessing AVF, s/p angioplasty ,     2.  Hypertension -  resume home blood pressure medications, controlled,     3.  Anemia of chronic kidney disease - Procrit with dialysis    4.  Secondary hyperparathyroidism - renal diet, Velphoro with meals at discharge ,     5. D/c home when out pt HD arranged     6. SOB due to fluid over load, UF as tolerated, improved ,               Thank you for your consult. I will follow-up with patient. Please contact us if you have any additional questions.     Total time spent 40 minutes including time needed to review the records,  patient  evaluation, documentation, face-to-face discussion with the patient,  primary team,   more than 50% of the time was spent on coordination of care and counseling.       Tank Vinson MD  Nephrology  [facility]

## 2019-04-11 NOTE — NURSING
Patient returned from dialysis unit via bed, chelsea dressing dry and intact, positive bruit and thrill, nad noted or voices, spouse at bedside, call bell in reach, bed low and locked, sr upx2,

## 2019-04-11 NOTE — PLAN OF CARE
04/11/19 0920   Medicare Message   Important Message from Medicare regarding Discharge Appeal Rights Given to patient/caregiver;Signed/date by patient/caregiver;Explained to patient/caregiver   Date IMM was signed 04/11/19   Time IMM was signed 0917

## 2019-04-12 ENCOUNTER — PATIENT OUTREACH (OUTPATIENT)
Dept: ADMINISTRATIVE | Facility: CLINIC | Age: 79
End: 2019-04-12

## 2019-04-12 LAB
HAV IGM SERPL QL IA: NEGATIVE
HBV CORE IGM SERPL QL IA: NEGATIVE
HBV SURFACE AB SER-ACNC: NEGATIVE M[IU]/ML
HBV SURFACE AG SERPL QL IA: NEGATIVE
HCV AB SERPL QL IA: NEGATIVE

## 2019-04-12 NOTE — PATIENT INSTRUCTIONS
Arteriovenous (AV) Fistula for Dialysis  An AV fistula is a connection between an artery and a vein. For this procedure, an AV fistula is surgically created using an artery and a vein in your arm. (Your healthcare provider will let you know if another site is to be used.) When the artery and vein are joined, blood flow increases from the artery into the vein. As a result, the vein gets bigger over time. The enlarged vein provides easier access to the blood for a treatment for kidney failure (dialysis). This sheet explains the procedure and what to expect.     An AV fistula increases blood flow from the artery into the vein. Over time, the vein becomes stronger and enlarged.   Preparing for the procedure  Prepare as you have been told. In addition:  · Tell your healthcare provider about all the medicines you take. This includes all over-the-counter and prescription medicines, and street drugs. It also includes herbs, vitamins, and other supplements. You may need to stop taking some or all of them before the procedure.  · Follow any directions youre given for not eating or drinking before the procedure.  · Do not allow anyone to draw blood from or take blood pressure on the arm that will have the fistula before the procedure.  The day of the procedure  The procedure takes about 1 to 2 hours. Youll likely go home the same day.  Before the procedure begins:  · An IV (intravenous) line is put into a vein in the arm or hand not being used for the procedure. This line supplies fluids and medicines.  · To keep you free of pain during the procedure, youre given general anesthesia. This medicine puts you into a state like a deep sleep through the procedure. Or a nerve block may be used. This medicine numbs the arm. With it, you may also be given medicine that makes you relaxed and drowsy through the procedure.  During the procedure:  · The skin over your arm may be injected with numbing medicine.  · One or more small  cuts (incisions) are then made through the numbed skin. This depends on the size of your arm and the depth of the vein in your arm.  · The vein is attached to the selected artery.  · Any incisions made are then closed with stitches (sutures), staples, surgical glue, or strips of surgical tape.  After the procedure:  · Youll be asked to keep your arm raised (elevated) as often as possible for at least a week after the procedure.  · Youll be given medicines to manage pain as needed.  · Your arm and hand will be checked to make sure blood is flowing through the fistula properly. The feeling of blood rushing through the fistula is called a thrill. It is somewhat similar to the purring of a cat. Youll be taught how to check for this feeling each day to make sure there are no problems with your fistula. Youll also be taught how to care for your fistula at home.  · When its time for you to leave the hospital, have an adult family member or friend ready to drive you home.  Recovering at home  Once at home, follow all of the instructions youve been given. Be sure to:  · Take all medicines as directed.  · Care for your incision as instructed.  · Check for signs of infection at the incision site (see below).  · Avoid heavy lifting and strenuous activities as directed.  · Monitor and care for your fistula as instructed.  · Do your hand and arm exercises as instructed. This usually involves squeezing a ball in your hand for a few minutes each hour.  Call your healthcare provider if you have any of the following:  · Fever of 100.4°F (38°C) or higher  · Signs of infection at the incision site, such as increased redness or swelling, warmth, worsening pain, bleeding, or bad-smelling drainage  · You cant feel a thrill (the vibration of blood going through your arm)  · Pain or numbness in your fingers, hand, or arm  · Bleeding, redness, or warmth around your fistula  · Sudden bulging of the fistula (more than usual; a slight  bulge is normal)   Follow-Up  Your healthcare provider will check your fistula within 1 to 2 weeks after the procedure. It will likely take about 6 to 8 weeks for the fistula to enlarge enough to start dialysis. After that, make sure the fistula is checked each time you have dialysis. Your healthcare provider may also suggest checkups every 6 months.  Risks and possible complications include:  · The fistula not working properly  · Long wait before the fistula is ready (up to 6 months)  · Coldness or numbness in the hand (due to blood flowing away from the hand and into the fistula)  · An unsightly bump under the skin (due to enlargement of the fistula)  · Prolonged bleeding from the fistula after dialysis  · Narrowing or weakening of the blood vessels used for the fistula  · Formation of blood clots in the blood vessels used for the fistula  · Risks of anesthesia or any other medicines used during the procedure   Living with an AV Fistula  A problem, such as a narrowing (stricture) of the vein or an infection, can make the fistula unusable. If this happens, you may need other treatments to repair or make a new fistula. To protect your fistula, follow these and any other guidelines youre given:  · Check your fistula as often as your healthcare provider says. If you cant feel your thrill, let your provider know right away.  · Make sure your fistula is checked before each dialysis treatment.  · Dont let anyone draw blood from or take blood pressure on the arm that has the fistula.  · Wash your hands often and keep the area around your fistula clean.  · Dont sleep on the arm that has the fistula.  · Dont wear tight jewelry or a watch on the arm with your fistula.  · Protect your fistula from cuts, scrapes, or blows.  Date Last Reviewed: 1/1/2017  © 3221-9276 [employer]. [address] [place] [address], [place] [address]. All rights reserved. This information is not intended as a substitute for professional  medical care. Always follow your healthcare professional's instructions.

## 2019-04-16 ENCOUNTER — TELEPHONE (OUTPATIENT)
Dept: INFECTIOUS DISEASES | Facility: CLINIC | Age: 79
End: 2019-04-16

## 2019-04-16 NOTE — TELEPHONE ENCOUNTER
----- Message from Gene Yu sent at 4/16/2019 11:47 AM CDT -----  Contact: [facility]  This is not a pt of ours.   ----- Message -----  From: Eugenia Wilkinson MA  Sent: 4/16/2019  10:53 AM  To: Gene Yu    Patient immunizations in chart.  ----- Message -----  From: Gene Yu  Sent: 4/16/2019  10:32 AM  To: [facility] Staff    Needs Advice    Reason for call: [facility] center is calling will need the dates of the following immunizations :preznar, Pneumonia vaccination. Please fax to [phone]        Communication Preference: [phone]     ----- Message -----  From: Emilia Mccormick  Sent: 4/16/2019  10:02 AM  To: [facility]erral [facility]    Needs Advice    Reason for call: Kidney center is calling will need the dates of the following immunizations :preznar, Pneumonia vaccination. Please fax to [phone]        Communication Preference: [phone]     Additional Information:

## 2019-05-17 ENCOUNTER — TELEPHONE (OUTPATIENT)
Dept: HEMATOLOGY/ONCOLOGY | Facility: CLINIC | Age: 79
End: 2019-05-17

## 2019-05-17 NOTE — TELEPHONE ENCOUNTER
----- Message from Stefany Curiel sent at 5/17/2019  9:11 AM CDT -----  Contact: pt  She's calling in regards to have nurse RS appt ,pls call pt back at [phone] (home)

## 2019-05-22 ENCOUNTER — TELEPHONE (OUTPATIENT)
Dept: NEPHROLOGY | Facility: CLINIC | Age: 79
End: 2019-05-22

## 2019-05-22 NOTE — TELEPHONE ENCOUNTER
----- Message from Tank Vinson MD sent at 5/22/2019  9:10 AM CDT -----  Pt in dialysis now, please d/c clinic visits    Dr HAMEED

## 2019-06-03 ENCOUNTER — LAB VISIT (OUTPATIENT)
Dept: LAB | Facility: HOSPITAL | Age: 79
End: 2019-06-03
Attending: INTERNAL MEDICINE
Payer: MEDICARE

## 2019-06-03 ENCOUNTER — OFFICE VISIT (OUTPATIENT)
Dept: HEMATOLOGY/ONCOLOGY | Facility: CLINIC | Age: 79
End: 2019-06-03
Payer: MEDICARE

## 2019-06-03 VITALS
HEIGHT: 66 IN | WEIGHT: 139.56 LBS | BODY MASS INDEX: 22.43 KG/M2 | HEART RATE: 66 BPM | DIASTOLIC BLOOD PRESSURE: 61 MMHG | TEMPERATURE: 98 F | SYSTOLIC BLOOD PRESSURE: 117 MMHG | OXYGEN SATURATION: 98 %

## 2019-06-03 DIAGNOSIS — Z99.2 ANEMIA DUE TO CHRONIC KIDNEY DISEASE, ON CHRONIC DIALYSIS: Primary | ICD-10-CM

## 2019-06-03 DIAGNOSIS — Z85.07 HISTORY OF PANCREATIC CANCER: ICD-10-CM

## 2019-06-03 DIAGNOSIS — N18.5 ANEMIA IN STAGE 5 CHRONIC KIDNEY DISEASE, NOT ON CHRONIC DIALYSIS: ICD-10-CM

## 2019-06-03 DIAGNOSIS — D63.1 ANEMIA DUE TO CHRONIC KIDNEY DISEASE, ON CHRONIC DIALYSIS: Primary | ICD-10-CM

## 2019-06-03 DIAGNOSIS — N18.6 ANEMIA DUE TO CHRONIC KIDNEY DISEASE, ON CHRONIC DIALYSIS: Primary | ICD-10-CM

## 2019-06-03 DIAGNOSIS — D63.1 ANEMIA IN STAGE 5 CHRONIC KIDNEY DISEASE, NOT ON CHRONIC DIALYSIS: ICD-10-CM

## 2019-06-03 LAB
ALBUMIN SERPL BCP-MCNC: 2.9 G/DL (ref 3.5–5.2)
ALP SERPL-CCNC: 81 U/L (ref 55–135)
ALT SERPL W/O P-5'-P-CCNC: 13 U/L (ref 10–44)
ANION GAP SERPL CALC-SCNC: 12 MMOL/L (ref 8–16)
ANISOCYTOSIS BLD QL SMEAR: SLIGHT
AST SERPL-CCNC: 24 U/L (ref 10–40)
BASOPHILS # BLD AUTO: 0.04 K/UL (ref 0–0.2)
BASOPHILS NFR BLD: 0.7 % (ref 0–1.9)
BILIRUB SERPL-MCNC: 0.3 MG/DL (ref 0.1–1)
BUN SERPL-MCNC: 29 MG/DL (ref 8–23)
CALCIUM SERPL-MCNC: 10.1 MG/DL (ref 8.7–10.5)
CHLORIDE SERPL-SCNC: 97 MMOL/L (ref 95–110)
CO2 SERPL-SCNC: 30 MMOL/L (ref 23–29)
CREAT SERPL-MCNC: 6.3 MG/DL (ref 0.5–1.4)
DACRYOCYTES BLD QL SMEAR: ABNORMAL
DIFFERENTIAL METHOD: ABNORMAL
EOSINOPHIL # BLD AUTO: 0.6 K/UL (ref 0–0.5)
EOSINOPHIL NFR BLD: 9.7 % (ref 0–8)
ERYTHROCYTE [DISTWIDTH] IN BLOOD BY AUTOMATED COUNT: 15.2 % (ref 11.5–14.5)
EST. GFR  (AFRICAN AMERICAN): 7 ML/MIN/1.73 M^2
EST. GFR  (NON AFRICAN AMERICAN): 6 ML/MIN/1.73 M^2
GLUCOSE SERPL-MCNC: 96 MG/DL (ref 70–110)
HCT VFR BLD AUTO: 36.4 % (ref 37–48.5)
HGB BLD-MCNC: 11.5 G/DL (ref 12–16)
HYPOCHROMIA BLD QL SMEAR: ABNORMAL
LYMPHOCYTES # BLD AUTO: 1.2 K/UL (ref 1–4.8)
LYMPHOCYTES NFR BLD: 18.9 % (ref 18–48)
MCH RBC QN AUTO: 31 PG (ref 27–31)
MCHC RBC AUTO-ENTMCNC: 31.6 G/DL (ref 32–36)
MCV RBC AUTO: 98 FL (ref 82–98)
MONOCYTES # BLD AUTO: 1 K/UL (ref 0.3–1)
MONOCYTES NFR BLD: 16.6 % (ref 4–15)
NEUTROPHILS # BLD AUTO: 3.3 K/UL (ref 1.8–7.7)
NEUTROPHILS NFR BLD: 54.1 % (ref 38–73)
OVALOCYTES BLD QL SMEAR: ABNORMAL
PLATELET # BLD AUTO: 256 K/UL (ref 150–350)
PMV BLD AUTO: 9.8 FL (ref 9.2–12.9)
POIKILOCYTOSIS BLD QL SMEAR: SLIGHT
POLYCHROMASIA BLD QL SMEAR: ABNORMAL
POTASSIUM SERPL-SCNC: 4.6 MMOL/L (ref 3.5–5.1)
PROT SERPL-MCNC: 6.9 G/DL (ref 6–8.4)
RBC # BLD AUTO: 3.71 M/UL (ref 4–5.4)
SODIUM SERPL-SCNC: 139 MMOL/L (ref 136–145)
SPHEROCYTES BLD QL SMEAR: ABNORMAL
STOMATOCYTES BLD QL SMEAR: PRESENT
TARGETS BLD QL SMEAR: ABNORMAL
WBC # BLD AUTO: 6.1 K/UL (ref 3.9–12.7)

## 2019-06-03 PROCEDURE — 85025 COMPLETE CBC W/AUTO DIFF WBC: CPT

## 2019-06-03 PROCEDURE — 99213 OFFICE O/P EST LOW 20 MIN: CPT | Mod: PBBFAC | Performed by: INTERNAL MEDICINE

## 2019-06-03 PROCEDURE — 99999 PR PBB SHADOW E&M-EST. PATIENT-LVL III: CPT | Mod: PBBFAC,,, | Performed by: INTERNAL MEDICINE

## 2019-06-03 PROCEDURE — 99999 PR PBB SHADOW E&M-EST. PATIENT-LVL III: ICD-10-PCS | Mod: PBBFAC,,, | Performed by: INTERNAL MEDICINE

## 2019-06-03 PROCEDURE — 99214 OFFICE O/P EST MOD 30 MIN: CPT | Mod: S$PBB,,, | Performed by: INTERNAL MEDICINE

## 2019-06-03 PROCEDURE — 99214 PR OFFICE/OUTPT VISIT, EST, LEVL IV, 30-39 MIN: ICD-10-PCS | Mod: S$PBB,,, | Performed by: INTERNAL MEDICINE

## 2019-06-03 PROCEDURE — 80053 COMPREHEN METABOLIC PANEL: CPT

## 2019-06-03 PROCEDURE — 36415 COLL VENOUS BLD VENIPUNCTURE: CPT

## 2019-06-03 RX ORDER — SEVELAMER CARBONATE 800 MG/1
800 TABLET, FILM COATED ORAL 2 TIMES DAILY
COMMUNITY
Start: 2019-04-30 | End: 2020-03-02

## 2019-06-03 RX ORDER — CALCIUM ACETATE 667 MG/1
667 CAPSULE ORAL 2 TIMES DAILY
COMMUNITY
Start: 2019-05-06 | End: 2020-03-02

## 2019-06-03 NOTE — PROGRESS NOTES
Reason for visit: Pancreatic adenocarcinoma/Anemia due to chronic kidney disease  Date of Diagnosis: 11/29/2012    HPI:   The patient is a 78-year-old  female who presents to the hematology oncology clinic today for follow up. She underwent resection surgery for her pancreatic tail high-grade carcinoma on May 15, 2013 by Dr. Carter at [facility], [place]. The patient had previously completed neoadjuvant chemotherapy and 5-FU chemoradiation with excellent response.  Today the patient reports that overall she feels well. She did start hemodialysis for end-stage renal disease in April 2019.  She has lost weight in the interim because of this.  She has occasional episodes of lower back pain. She is taking tramadol with good relief from this. Energy levels are good. She reports that her appetite is good.  She denies any fever, chills or night sweats. She denies any chest pain or shortness of breath. She denies any bowel or urinary complaints. The patient denies any nausea or vomiting. She denies any abdominal pain today.  I have reviewed all of the patient's interval clinical history available in EPIC. She continues to report tingling pain in the soles of her feet since completing her chemotherapy. Neurontin did not help and she stopped lyrica because of side effects.   She is being closely followed by Dr. Vinson with nephrology with regard to her kidney function. She has also had A-V fistula done in her left upper extremity by Dr. Foster.    PAST MEDICAL HISTORY:   1. Hypertension  2. GERD  3. Migraines  4. Gout  5. Chronic kidney disease stage IV   6. Bilateral renal cysts  7. Type 2 diabetes mellitus    SURGICAL HISTORY:   1. Hysterectomy for fibroids  2. Partial thyroidectomy  3. Left mediport placement  4. Distal pancreatectomy, splenectomy, left nephrectomy and adrenalectomy on May 15, 2013  5. Left AV fistula on 12/4/14  6. Left knee replacement on 5/31/18    FAMILY HISTORY: The patient's mother   from complications related to breast cancer at the age of 60. The patient's father  from complications related to prostate cancer at the age of 81. The patient's sister was diagnosed with breast cancer at the age of 68. The patient's brother had renal cell carcinoma in both kidneys [sporadic] approximately 4 years apart. He has since had a kidney transplant. Another brother has kidney cancer diagnosed at 68. She denies any other immediate family members with cancer or bleeding/clotting disorders.    SOCIAL HISTORY: She reports a 30+ pack year smoking history and quit in . She does not drink alcohol. She has never used any recreational drugs. She worked for the [employer] in [place] and retired in . She is  and does not have any children. She lives in [place], [place].    ALLERGIES: Reviewed on medication card.    MEDICATIONS: [Medcard has been reviewed and/or reconciled.]    REVIEW OF SYSTEMS:   GENERAL: [No fevers, chills or sweats.] Denies fatigue. Appetite is decreased. Weight is decreased.  HEENT: [No blurred vision, tinnitus, nasal discharge, sorethroat or dysphagia.]  HEART: [No chest pain, palpitations or shortness of breath.]   LUNGS: [No hemoptysis, cough or breathing problems.]   ABDOMEN: [Denies abdominal pain. Denies constipation or melena.] Denies nausea, vomiting.  GENITOURINARY: [No dysuria, bleeding or malodorous discharge.]  NEURO: [No headache, dizziness or vertigo.]  HEMATOLOGY: [No easy bruising, spontaneous bleeding or blood clots in the past].  MUSCULOSKELETAL: She denies any myalgias or bone pain. Does report occasional low back pain.  SKIN: [No rashes or skin lesions.]  PSYCHIATRY: [No depression or anxiety.]    PHYSICAL EXAMINATION:   VS: Reviewed on nurse's notes.  APPEARANCE: The patient is a well-developed, well-nourished and well-groomed  female who appears in no acute distress.   HEENT: No scleral icterus. Both external  auditory canals clear. No oral ulcers, lesions. Throat clear  HEAD: No sinus tenderness. She has facial hair suggestive of hirsutism.   NECK: Supple. No palpable lymphadenopathy. Thyroid non-tender, no palpable masses  CHEST: Breath sounds clear bilaterally. No rales. No rhonchi. Unlabored respirations.  CARDIOVASCULAR: Normal S1, S2. Normal rate. Regular rhythm.  ABDOMEN: Bowel sounds normal. No tenderness. No abdominal distention. No hepatomegaly. No splenomegaly. Healed incision noted.  LYMPHATIC: No palpable supraclavicular, axillary nodes  EXTREMITIES: No clubbing, cyanosis. No edema of bilateral lower extremities. Left A-V fistula noted.  SKIN: No lesions. No petechiae. No ecchymoses. No induration or nodules  NEUROLOGIC: No focal findings. Alert & Oriented x 3. Mood appropriate to affect    LABS:   Reviewed    IMAGING:  Reviewed    IMPRESSION:  1.  History of pancreatic adenocarcinoma (T3N0Mx)  2.  End-stage kidney disease (on hemodialysis)  3.  Anemia due to CKD  4.  Peripheral neuropathy    PLAN:  1.  I had a detailed discussion with the patient today with regard to her current clinical situation.  Lab results from today reviewed and overall looks stable.  She is being treated with IV iron and Procrit as per Nephrology protocol.  2. I have encouraged good p.o. intake of food and fluids. I have encouraged regular exercise.   3. Continue follow up with Dr. Vinson as recommended with regard to CKD.  She is being re-evaluated for possible kidney transplant at this time.  4. Continue elavil for treatment of peripheral neuropathy. This medication has been helping but with some anticholinergic side effects such as dry mouth. She has not had relief from trying multiple other medications for her peripheral neuropathy. Risks/benefits and common side effects discussed in detail. She knows not to stop it abruptly and is also aware of sedation precautions.  5. She is uptodate with screening colonoscopy.  6.  I had a  detailed discussion with the patient previously with regard to results of PET-CT scan from February 28, 2019.  No evidence of recurrent malignancy noted. She has an indeterminate small cystic structure anterior to the left psoas muscle.  This is not hypermetabolic but has slowly increased in size since 2013 from 1.3 cm to 1.7 cm.  We discussed that this was unlikely to be malignant in etiology.  This can be monitored for now unless the transplant team thinks that additional evaluation is needed.    Follow up in 6 months. She knows to call sooner for any new problems or questions.    Nas Hoffman MD

## 2019-07-31 ENCOUNTER — OFFICE VISIT (OUTPATIENT)
Dept: DERMATOLOGY | Facility: CLINIC | Age: 79
End: 2019-07-31
Payer: MEDICARE

## 2019-07-31 ENCOUNTER — LAB VISIT (OUTPATIENT)
Dept: LAB | Facility: HOSPITAL | Age: 79
End: 2019-07-31
Attending: DERMATOLOGY
Payer: MEDICARE

## 2019-07-31 DIAGNOSIS — L65.9 ALOPECIA: ICD-10-CM

## 2019-07-31 DIAGNOSIS — L65.9 ALOPECIA: Primary | ICD-10-CM

## 2019-07-31 LAB
BASOPHILS # BLD AUTO: 0.04 K/UL (ref 0–0.2)
BASOPHILS NFR BLD: 0.7 % (ref 0–1.9)
DIFFERENTIAL METHOD: ABNORMAL
EOSINOPHIL # BLD AUTO: 0.1 K/UL (ref 0–0.5)
EOSINOPHIL NFR BLD: 2 % (ref 0–8)
ERYTHROCYTE [DISTWIDTH] IN BLOOD BY AUTOMATED COUNT: 15 % (ref 11.5–14.5)
HCT VFR BLD AUTO: 38.8 % (ref 37–48.5)
HGB BLD-MCNC: 12 G/DL (ref 12–16)
IMM GRANULOCYTES # BLD AUTO: 0.02 K/UL (ref 0–0.04)
IMM GRANULOCYTES NFR BLD AUTO: 0.4 % (ref 0–0.5)
LYMPHOCYTES # BLD AUTO: 1.6 K/UL (ref 1–4.8)
LYMPHOCYTES NFR BLD: 29.4 % (ref 18–48)
MCH RBC QN AUTO: 30.3 PG (ref 27–31)
MCHC RBC AUTO-ENTMCNC: 30.9 G/DL (ref 32–36)
MCV RBC AUTO: 98 FL (ref 82–98)
MONOCYTES # BLD AUTO: 0.9 K/UL (ref 0.3–1)
MONOCYTES NFR BLD: 15.3 % (ref 4–15)
NEUTROPHILS # BLD AUTO: 2.9 K/UL (ref 1.8–7.7)
NEUTROPHILS NFR BLD: 52.2 % (ref 38–73)
NRBC BLD-RTO: 1 /100 WBC
PLATELET # BLD AUTO: 206 K/UL (ref 150–350)
PMV BLD AUTO: 10.5 FL (ref 9.2–12.9)
RBC # BLD AUTO: 3.96 M/UL (ref 4–5.4)
T4 FREE SERPL-MCNC: 0.87 NG/DL (ref 0.71–1.51)
THYROGLOB AB SERPL IA-ACNC: <4 IU/ML (ref 0–3.9)
THYROPEROXIDASE IGG SERPL-ACNC: <6 IU/ML
TSH SERPL DL<=0.005 MIU/L-ACNC: 2.61 UIU/ML (ref 0.4–4)
WBC # BLD AUTO: 5.57 K/UL (ref 3.9–12.7)

## 2019-07-31 PROCEDURE — 99214 OFFICE O/P EST MOD 30 MIN: CPT | Mod: S$PBB,,, | Performed by: DERMATOLOGY

## 2019-07-31 PROCEDURE — 84439 ASSAY OF FREE THYROXINE: CPT

## 2019-07-31 PROCEDURE — 86376 MICROSOMAL ANTIBODY EACH: CPT

## 2019-07-31 PROCEDURE — 99214 PR OFFICE/OUTPT VISIT, EST, LEVL IV, 30-39 MIN: ICD-10-PCS | Mod: S$PBB,,, | Performed by: DERMATOLOGY

## 2019-07-31 PROCEDURE — 36415 COLL VENOUS BLD VENIPUNCTURE: CPT

## 2019-07-31 PROCEDURE — 99999 PR PBB SHADOW E&M-EST. PATIENT-LVL III: ICD-10-PCS | Mod: PBBFAC,,, | Performed by: DERMATOLOGY

## 2019-07-31 PROCEDURE — 85025 COMPLETE CBC W/AUTO DIFF WBC: CPT

## 2019-07-31 PROCEDURE — 84443 ASSAY THYROID STIM HORMONE: CPT

## 2019-07-31 PROCEDURE — 99213 OFFICE O/P EST LOW 20 MIN: CPT | Mod: PBBFAC | Performed by: DERMATOLOGY

## 2019-07-31 PROCEDURE — 86800 THYROGLOBULIN ANTIBODY: CPT

## 2019-07-31 PROCEDURE — 99999 PR PBB SHADOW E&M-EST. PATIENT-LVL III: CPT | Mod: PBBFAC,,, | Performed by: DERMATOLOGY

## 2019-07-31 RX ORDER — HYDRALAZINE HYDROCHLORIDE 10 MG/1
10 TABLET, FILM COATED ORAL 3 TIMES DAILY
COMMUNITY
End: 2019-09-16

## 2019-07-31 RX ORDER — FLUOCINOLONE ACETONIDE 0.11 MG/ML
OIL TOPICAL
Qty: 1 BOTTLE | Refills: 5 | Status: SHIPPED | OUTPATIENT
Start: 2019-07-31 | End: 2023-11-02

## 2019-07-31 NOTE — PATIENT INSTRUCTIONS
Use 5% Rogaine (or generic minoxidil) solution or foam daily.  Apply directly to balding areas of the scalp.  Use for at least 3 months to assess hair growth.     Patient Education   Minoxidil Topical foam   Minoxidil Topical solution    Minoxidil Topical solution   What is this medicine?   MINOXIDIL (mi NOX i dill) is used to increase new hair growth in cases of hereditary hair loss. When applied to the scalp, this medicine can promote hair growth in men with male pattern baldness. This medicine can also help women with thin hair and frontal hair loss. Women should not use extra-strength minoxidil products.   This medicine may be used for other purposes; ask your health care provider or pharmacist if you have questions.   What should I tell my health care provider before I take this medicine?   They need to know if you have any of these conditions:   frontal hair loss or receding hairline   no family history of hair loss   sudden or patchy hair loss   unknown reason for hair loss   your scalp is red, inflamed, infected or painful   an unusual or allergic reaction to minoxidil, other medicines, foods, dyes, or preservatives   pregnant or trying to get pregnant   breast-feeding  How should I use this medicine?   This medicine is for external use on the scalp only. Do not take by mouth. Follow the directions that come with the product. The hair and scalp should be dry before you use this medicine. You do not need to shampoo your hair before each application. Do not use more often than directed.   Talk to your pediatrician regarding the use of this medicine in children. This medicine is not approved for use in children.   Overdosage: If you think you have taken too much of this medicine contact a poison control center or emergency room at once.   NOTE: This medicine is only for you. Do not share this medicine with others.   What if I miss a dose?   If you miss a dose, use it as soon as you can. If it is almost time for  your next dose, use only that dose. Do not use double or extra doses.   What may interact with this medicine?   Interactions are not expected. Do not use any other medicines on the scalp without asking your doctor or health care professional.   This list may not describe all possible interactions. Give your health care provider a list of all the medicines, herbs, non-prescription drugs, or dietary supplements you use. Also tell them if you smoke, drink alcohol, or use illegal drugs. Some items may interact with your medicine.   What should I watch for while using this medicine?   This medicine must be used on a regular basis for your hair to regrow. It may take 2 to 4 months of regular use before you notice any improvement. It is important to continue to use this product to maintain regrowth of hair. Once you stop using it, the regrown hair will usually fall out within 3 months. If you do not see any new hair growth after 4 months, stop using this product and contact your doctor or health care professional.   Do not get this medicine in your eyes, nose, mouth, or on other sensitive areas. If you do, rinse off with plenty of cool tap water. Always wash your hands after use. Do not apply if your scalp is cut, scraped, or sunburned.   Some people may notice changes in hair color or texture after using this medicine.   What side effects may I notice from receiving this medicine?   Side effects that you should report to your doctor or health care professional as soon as possible:   chest pain or palpitations   dizziness or fainting   skin rash, blisters, or itching   sudden weight gain   swelling of the hands or feet  Side effects that usually do not require medical attention (report to your doctor or health care professional if they continue or are bothersome):   headache   redness, irritation and itching at the site of application   unusual hair growth, on the face, arm, and back  This list may not describe all  possible side effects. Call your doctor for medical advice about side effects. You may report side effects to FDA at 2-230-EDE-8803.   Where should I keep my medicine?   Keep out of the reach of children.   Store at room temperature between 20 and 25 degrees C (68 and 77 degrees F). Some products may be flammable. Keep away from heat, fire or flame. Throw away any unused medicine after the expiration date.   NOTE:This sheet is a summary. It may not cover all possible information. If you have questions about this medicine, talk to your doctor, pharmacist, or health care provider. Copyright© 2014 [employer]

## 2019-07-31 NOTE — PROGRESS NOTES
Subjective:       Patient ID:  Cailin Pickett is a 79 y.o. female who presents for   Chief Complaint   Patient presents with    Hair Loss     c/o hair loss to scalp x 1 month     ESRD on HD (Tues, Thurs, Sat) beginning 5/2019, SK of the right lower leg, last seen by Dr. Hampton on 1/8/19.  Here today for new issue:    History of Present Illness: The patient presents with chief complaint of hair loss.  Location: vertex  Duration: 1 month with starting calcium acetate  Signs/Symptoms: hair loss    Prior treatments: none    Sister with hx of hair loss        Review of Systems   Constitutional: Negative for fever and chills.   Gastrointestinal: Negative for nausea and vomiting.   Skin: Negative for daily sunscreen use, activity-related sunscreen use and recent sunburn.   Hematologic/Lymphatic: Does not bruise/bleed easily.        Objective:    Physical Exam   Constitutional: She appears well-developed and well-nourished. No distress.   Neurological: She is alert and oriented to person, place, and time. She is not disoriented.   Psychiatric: She has a normal mood and affect.   Skin:   Areas Examined (abnormalities noted in diagram):   Scalp / Hair Palpated and Inspected  Head / Face Inspection Performed  Neck Inspection Performed  RUE Inspected  LUE Inspection Performed  Nails and Digits Inspection Performed                Assessment / Plan:        Alopecia  -     TSH; Future; Expected date: 07/31/2019  -     T4, FREE; Future; Expected date: 07/31/2019  -     CBC auto differential; Future; Expected date: 07/31/2019  -     Thyroid peroxidase antibody; Future; Expected date: 07/31/2019  -     THYROGLOBULIN AB SCREEN; Future; Expected date: 07/31/2019  -     fluocinolone (DERMA-SMOOTHE) 0.01 % external oil; Apply oil to scalp once a day  Dispense: 1 Bottle; Refill: 5  -     Given circular areas, possible alopecia areata.  Will check above labs.  + hx of elevated TSH in 2018.  Will start fluocinolone qD and minoxidil  qD.  Will avoid ILK, but consider in future if no improvement.    Discussed risks, benefits and side effect profile of minoxidil, including irritation, dryness, and unwanted facial hair growth.  Discussed that patient should undertake 6 month trial before cessation if no improvement.  Discussed that patient may note hair loss prior to hair regrowth in the first 4 weeks. Patient instructed on how to apply the medication.    **20 minutes spent in counseling and coordination of care**         Follow up in about 3 months (around 10/31/2019).

## 2019-08-19 ENCOUNTER — OFFICE VISIT (OUTPATIENT)
Dept: CARDIOLOGY | Facility: CLINIC | Age: 79
End: 2019-08-19
Payer: MEDICARE

## 2019-08-19 VITALS
SYSTOLIC BLOOD PRESSURE: 120 MMHG | DIASTOLIC BLOOD PRESSURE: 50 MMHG | HEART RATE: 72 BPM | WEIGHT: 145.19 LBS | HEIGHT: 65 IN | BODY MASS INDEX: 24.19 KG/M2

## 2019-08-19 DIAGNOSIS — Z99.2 ESRD NEEDING DIALYSIS: ICD-10-CM

## 2019-08-19 DIAGNOSIS — I15.2 HYPERTENSION ASSOCIATED WITH DIABETES: Primary | Chronic | ICD-10-CM

## 2019-08-19 DIAGNOSIS — N18.6 ESRD NEEDING DIALYSIS: ICD-10-CM

## 2019-08-19 DIAGNOSIS — E11.59 HYPERTENSION ASSOCIATED WITH DIABETES: Primary | Chronic | ICD-10-CM

## 2019-08-19 DIAGNOSIS — R06.02 SOB (SHORTNESS OF BREATH): ICD-10-CM

## 2019-08-19 PROCEDURE — 99999 PR PBB SHADOW E&M-EST. PATIENT-LVL III: CPT | Mod: PBBFAC,,, | Performed by: INTERNAL MEDICINE

## 2019-08-19 PROCEDURE — 99213 OFFICE O/P EST LOW 20 MIN: CPT | Mod: PBBFAC | Performed by: INTERNAL MEDICINE

## 2019-08-19 PROCEDURE — 99214 PR OFFICE/OUTPT VISIT, EST, LEVL IV, 30-39 MIN: ICD-10-PCS | Mod: S$PBB,,, | Performed by: INTERNAL MEDICINE

## 2019-08-19 PROCEDURE — 99214 OFFICE O/P EST MOD 30 MIN: CPT | Mod: S$PBB,,, | Performed by: INTERNAL MEDICINE

## 2019-08-19 PROCEDURE — 99999 PR PBB SHADOW E&M-EST. PATIENT-LVL III: ICD-10-PCS | Mod: PBBFAC,,, | Performed by: INTERNAL MEDICINE

## 2019-08-19 NOTE — PROGRESS NOTES
Subjective:   Patient ID:  Cailin Pickett is a 79 y.o. female who presents for follow-up of Hypertension (6 mo f/u) and Hyperlipidemia  Pt started HD, BP Meds adjusted.Patient denies CP, angina or anginal equivalent.    Hypertension   This is a chronic problem. The current episode started more than 1 year ago. The problem has been gradually improving since onset. The problem is controlled. Pertinent negatives include no chest pain, palpitations or shortness of breath. Past treatments include calcium channel blockers, beta blockers, direct vasodilators and diuretics. The current treatment provides moderate improvement. Compliance problems include medication side effects.        Review of Systems   Constitution: Negative. Negative for weight gain.   HENT: Negative.    Eyes: Negative.    Cardiovascular: Negative.  Negative for chest pain, leg swelling and palpitations.   Respiratory: Negative.  Negative for shortness of breath.    Endocrine: Negative.    Hematologic/Lymphatic: Negative.    Skin: Negative.    Musculoskeletal: Negative for muscle weakness.   Gastrointestinal: Negative.    Genitourinary: Negative.    Neurological: Negative.  Negative for dizziness.   Psychiatric/Behavioral: Negative.    Allergic/Immunologic: Negative.      Family History   Problem Relation Age of Onset    Cancer Mother         breast    Heart disease Mother 60        MI    Hypertension Mother     Stroke Mother     Breast cancer Mother     Cancer Father         prostate    Cancer Brother         renal cell carcinoma    Diabetes Brother     Kidney disease Brother         ESRD s/p kidney transplant    Breast cancer Sister     Breast cancer Sister      Past Medical History:   Diagnosis Date    Anemia     CKD (chronic kidney disease) stage 5, GFR less than 15 ml/min 3/14/2019    CKD (chronic kidney disease), stage III     Diabetes mellitus type II     Encounter for blood transfusion     Family history of colonic polyps  2017    Gout, unspecified     Hyperlipidemia     Hypertension     Neuropathy     Pancreatic cancer     Renal failure     Secondary hyperparathyroidism of renal origin 3/14/2019    Thyroid disease      Social History     Socioeconomic History    Marital status:      Spouse name: Not on file    Number of children: Not on file    Years of education: Not on file    Highest education level: Not on file   Occupational History     Comment: stay home    Social Needs    Financial resource strain: Not on file    Food insecurity:     Worry: Not on file     Inability: Not on file    Transportation needs:     Medical: Not on file     Non-medical: Not on file   Tobacco Use    Smoking status: Former Smoker     Packs/day: 1.00     Years: 35.00     Pack years: 35.00     Start date: 3/14/1962     Last attempt to quit: 2001     Years since quittin.6    Smokeless tobacco: Never Used   Substance and Sexual Activity    Alcohol use: No    Drug use: No    Sexual activity: Not on file   Lifestyle    Physical activity:     Days per week: Not on file     Minutes per session: Not on file    Stress: Not on file   Relationships    Social connections:     Talks on phone: Not on file     Gets together: Not on file     Attends Hoahaoism service: Not on file     Active member of club or organization: Not on file     Attends meetings of clubs or organizations: Not on file     Relationship status: Not on file   Other Topics Concern    Not on file   Social History Narrative    She lives [place] minutes [place] from [place]     Current Outpatient Medications on File Prior to Visit   Medication Sig Dispense Refill    allopurinol (ZYLOPRIM) 100 MG tablet Take 1 tablet (100 mg total) by mouth once daily. 90 tablet 3    calcium acetate (PHOSLO) 667 mg capsule Take 667 mg by mouth 2 (two) times daily.      cetirizine (ZYRTEC) 10 MG tablet Take 10 mg by mouth once daily.       estradiol (ESTRACE) 0.5 MG tablet  TAKE 1 TABLET DAILY FOR 3 WEEKS PER MONTH, THEN DO NOT TAKE FOR FOURTH WEEK EACH MONTH (Patient taking differently: every other day) 90 tablet 3    fluocinolone (DERMA-SMOOTHE) 0.01 % external oil Apply oil to scalp once a day 1 Bottle 5    furosemide (LASIX) 40 MG tablet Take 2 tablets (80 mg total) by mouth once daily. 90 tablet 3    gabapentin (NEURONTIN) 100 MG capsule Take 1 capsule by mouth 2 (two) times daily.      glucosamine-chondroitin (OSTEO BI-FLEX) 250-200 mg Tab Take 1 tablet by mouth 2 (two) times daily.      hydrOXYzine HCl (ATARAX) 25 MG tablet TAKE 1 TABLET TWICE A DAY AS NEEDED FOR ITCHING 180 tablet 3    repaglinide (PRANDIN) 0.5 MG tablet TAKE 1 TABLET THREE TIMES A DAY BEFORE MEALS 270 tablet 2    sevelamer carbonate (RENVELA) 800 mg Tab Take 800 mg by mouth 2 (two) times daily.      traMADol (ULTRAM) 50 mg tablet Take 1 tablet (50 mg total) by mouth every 12 (twelve) hours. 180 tablet 3    vitamin renal formula, B-complex-vitamin c-folic acid, (NEPHROCAP) 1 mg Cap Take 1 capsule by mouth once daily. 30 capsule 0    amLODIPine (NORVASC) 5 MG tablet TAKE 1 TABLET TWICE A  tablet 2    carvedilol (COREG) 12.5 MG tablet Take 1 tablet (12.5 mg total) by mouth 2 (two) times daily with meals. 60 tablet 11    hydrALAZINE (APRESOLINE) 10 MG tablet Take 10 mg by mouth 3 (three) times daily.       No current facility-administered medications on file prior to visit.      Review of patient's allergies indicates:   Allergen Reactions    Allegra [fexofenadine] Swelling    Codeine Hives, Itching and Nausea And Vomiting       Objective:     Physical Exam   Constitutional: She is oriented to person, place, and time. She appears well-developed and well-nourished.   HENT:   Head: Normocephalic and atraumatic.   Eyes: Pupils are equal, round, and reactive to light. Conjunctivae and EOM are normal.   Neck: Normal range of motion. Neck supple.   Cardiovascular: Normal rate, regular rhythm, normal  heart sounds and intact distal pulses.   Pulmonary/Chest: Effort normal and breath sounds normal.   Abdominal: Soft. Bowel sounds are normal.   Musculoskeletal: Normal range of motion.   Neurological: She is alert and oriented to person, place, and time.   Skin: Skin is warm and dry.   Psychiatric: She has a normal mood and affect.   Nursing note and vitals reviewed.      Assessment:     1. Hypertension associated with diabetes    2. ESRD needing dialysis    3. SOB (shortness of breath)         Plan:     Hypertension associated with diabetes    ESRD needing dialysis    SOB (shortness of breath)        Continue duretics, coreg, norvasc, hydralazine -htn  Check lipids

## 2019-09-09 ENCOUNTER — TELEPHONE (OUTPATIENT)
Dept: CARDIOLOGY | Facility: CLINIC | Age: 79
End: 2019-09-09

## 2019-09-09 ENCOUNTER — PATIENT OUTREACH (OUTPATIENT)
Dept: ADMINISTRATIVE | Facility: HOSPITAL | Age: 79
End: 2019-09-09

## 2019-09-09 NOTE — PROGRESS NOTES
Health maintenance reviewed.  Care Everywhere checked. Immunizations reviewed and reconciled.  Per DEXA REPORT BMD due 5 years.

## 2019-09-09 NOTE — TELEPHONE ENCOUNTER
Rtc and appt rescheduled. Cm  ----- Message from Brigida Ryder sent at 9/9/2019 10:49 AM CDT -----  Contact: pt  States she needs to speak to the nurse regarding her appt. Please call pt at [phone]. Thank you

## 2019-09-16 ENCOUNTER — OFFICE VISIT (OUTPATIENT)
Dept: INTERNAL MEDICINE | Facility: CLINIC | Age: 79
End: 2019-09-16
Payer: MEDICARE

## 2019-09-16 ENCOUNTER — LAB VISIT (OUTPATIENT)
Dept: LAB | Facility: HOSPITAL | Age: 79
End: 2019-09-16
Attending: FAMILY MEDICINE
Payer: MEDICARE

## 2019-09-16 VITALS
HEART RATE: 77 BPM | WEIGHT: 149.06 LBS | SYSTOLIC BLOOD PRESSURE: 124 MMHG | HEIGHT: 66 IN | TEMPERATURE: 96 F | DIASTOLIC BLOOD PRESSURE: 58 MMHG | BODY MASS INDEX: 23.95 KG/M2 | OXYGEN SATURATION: 96 %

## 2019-09-16 DIAGNOSIS — N18.5 TYPE 2 DIABETES MELLITUS WITH STAGE 5 CHRONIC KIDNEY DISEASE NOT ON CHRONIC DIALYSIS, WITHOUT LONG-TERM CURRENT USE OF INSULIN: Chronic | ICD-10-CM

## 2019-09-16 DIAGNOSIS — N18.6 ESRD NEEDING DIALYSIS: ICD-10-CM

## 2019-09-16 DIAGNOSIS — E78.5 HYPERLIPIDEMIA, UNSPECIFIED HYPERLIPIDEMIA TYPE: ICD-10-CM

## 2019-09-16 DIAGNOSIS — E89.0 H/O PARTIAL THYROIDECTOMY: Primary | ICD-10-CM

## 2019-09-16 DIAGNOSIS — Z99.2 ESRD NEEDING DIALYSIS: ICD-10-CM

## 2019-09-16 DIAGNOSIS — E11.22 TYPE 2 DIABETES MELLITUS WITH STAGE 5 CHRONIC KIDNEY DISEASE NOT ON CHRONIC DIALYSIS, WITHOUT LONG-TERM CURRENT USE OF INSULIN: Chronic | ICD-10-CM

## 2019-09-16 DIAGNOSIS — E78.00 PURE HYPERCHOLESTEROLEMIA: Chronic | ICD-10-CM

## 2019-09-16 DIAGNOSIS — Z78.0 MENOPAUSE: ICD-10-CM

## 2019-09-16 DIAGNOSIS — E89.0 H/O PARTIAL THYROIDECTOMY: ICD-10-CM

## 2019-09-16 LAB
CHOLEST SERPL-MCNC: 296 MG/DL (ref 120–199)
CHOLEST/HDLC SERPL: 3.6 {RATIO} (ref 2–5)
ESTIMATED AVG GLUCOSE: 117 MG/DL (ref 68–131)
HBA1C MFR BLD HPLC: 5.7 % (ref 4–5.6)
HDLC SERPL-MCNC: 82 MG/DL (ref 40–75)
HDLC SERPL: 27.7 % (ref 20–50)
LDLC SERPL CALC-MCNC: 172.8 MG/DL (ref 63–159)
NONHDLC SERPL-MCNC: 214 MG/DL
TRIGL SERPL-MCNC: 206 MG/DL (ref 30–150)
TSH SERPL DL<=0.005 MIU/L-ACNC: 1.41 UIU/ML (ref 0.4–4)

## 2019-09-16 PROCEDURE — 99999 PR PBB SHADOW E&M-EST. PATIENT-LVL III: ICD-10-PCS | Mod: PBBFAC,,, | Performed by: FAMILY MEDICINE

## 2019-09-16 PROCEDURE — 84443 ASSAY THYROID STIM HORMONE: CPT

## 2019-09-16 PROCEDURE — 99204 PR OFFICE/OUTPT VISIT, NEW, LEVL IV, 45-59 MIN: ICD-10-PCS | Mod: S$PBB,,, | Performed by: FAMILY MEDICINE

## 2019-09-16 PROCEDURE — 36415 COLL VENOUS BLD VENIPUNCTURE: CPT

## 2019-09-16 PROCEDURE — 99213 OFFICE O/P EST LOW 20 MIN: CPT | Mod: PBBFAC | Performed by: FAMILY MEDICINE

## 2019-09-16 PROCEDURE — 80061 LIPID PANEL: CPT

## 2019-09-16 PROCEDURE — 99204 OFFICE O/P NEW MOD 45 MIN: CPT | Mod: S$PBB,,, | Performed by: FAMILY MEDICINE

## 2019-09-16 PROCEDURE — 99999 PR PBB SHADOW E&M-EST. PATIENT-LVL III: CPT | Mod: PBBFAC,,, | Performed by: FAMILY MEDICINE

## 2019-09-16 PROCEDURE — 83036 HEMOGLOBIN GLYCOSYLATED A1C: CPT

## 2019-09-16 RX ORDER — RENAGEL 800 MG/1
TABLET ORAL
COMMUNITY
Start: 2019-09-02 | End: 2019-09-16

## 2019-09-16 NOTE — PROGRESS NOTES
Subjective:       Patient ID: Cailin Pickett is a 79 y.o. female.    Chief Complaint: Establish Care    80 yo female here to establish care. She has h/o CKD Stage V on hemodialysis Tues-Thurs-Sat, DM2, hyperlipidemia, and neuropathy in her feet from chemotherapy to treat pancreatic cancer (had surgery and radiation as well).     Would like to have cholesterol and diabetic check up today.    Flu shot given Saturday at dialysis center in [facility]    does not have any pertinent problems on file.  Past Medical History:   Diagnosis Date    Anemia     CKD (chronic kidney disease) stage 5, GFR less than 15 ml/min 3/14/2019    CKD (chronic kidney disease), stage III     Diabetes mellitus type II     Encounter for blood transfusion     Family history of colonic polyps 7/18/2017    Gout, unspecified     Hyperlipidemia     Hypertension     Neuropathy     Pancreatic cancer     Renal failure     Secondary hyperparathyroidism of renal origin 3/14/2019    Thyroid disease      Past Surgical History:   Procedure Laterality Date    COLONOSCOPY  2011    Dr. Favio Mims    [id number] Left 12/4/2014    [facility]    FISTULOGRAM N/A 4/10/2019    Performed by Ruddy Fitzpatrick [facility] at [facility] CATH LAB    HYSTERECTOMY      KNEE SURGERY Left 05/31/2018    NEPHRECTOMY Left 5/2013    Dr Carter     NEPHRECTOMY Left 5/15/2013    [facility] Blair [facility] 2ND FLR    PANCREAS SURGERY      distal pancreatectomy    PANCREATECTOMY, DISTAL, LAPAROSCOPIC, WITH SPLENECTOMY N/A 5/15/2013    [facility] Blair [facility] OR 2ND FLR    screening colonoscopy N/A 7/18/2017    Performed by Kenneth Kamara [facility] at [facility] ENDO    SPLENECTOMY, TOTAL      THYROIDECTOMY, PARTIAL      ULTRASOUND, UPPER GI TRACT, ENDOSCOPIC N/A 11/27/2012    Per[facility] Travis Penn [facility] ENDO (2ND FLR)    VENTRICULOATRIAL SHUNT Left 12/4/2014     left arm     Family  History   Problem Relation Age of Onset    Cancer Mother         breast    Heart disease Mother 60        MI    Hypertension Mother     Stroke Mother     Breast cancer Mother     Cancer Father         prostate    Cancer Brother         renal cell carcinoma    Diabetes Brother     Kidney disease Brother         ESRD s/p kidney transplant    Breast cancer Sister     Breast cancer Sister      Social History     Socioeconomic History    Marital status:      Spouse name: Not on file    Number of children: Not on file    Years of education: Not on file    Highest education level: Not on file   Occupational History     Comment: stay home    Social Needs    Financial resource strain: Not on file    Food insecurity:     Worry: Not on file     Inability: Not on file    Transportation needs:     Medical: Not on file     Non-medical: Not on file   Tobacco Use    Smoking status: Former Smoker     Packs/day: 1.00     Years: 35.00     Pack years: 35.00     Start date: 3/14/1962     Last attempt to quit: 2001     Years since quittin.7    Smokeless tobacco: Never Used   Substance and Sexual Activity    Alcohol use: No    Drug use: No    Sexual activity: Not on file   Lifestyle    Physical activity:     Days per week: Not on file     Minutes per session: Not on file    Stress: Not on file   Relationships    Social connections:     Talks on phone: Not on file     Gets together: Not on file     Attends Scientologist service: Not on file     Active member of club or organization: Not on file     Attends meetings of clubs or organizations: Not on file     Relationship status: Not on file   Other Topics Concern    Not on file   Social History Narrative    [place] lives [place] from [place]     Review of Systems   Constitutional: Negative for fatigue and unexpected weight change.   HENT: Negative for hearing loss and sore throat.    Eyes: Negative for pain and visual disturbance.    Respiratory: Negative for cough and shortness of breath.    Cardiovascular: Negative for chest pain and palpitations.   Gastrointestinal: Negative for abdominal pain, constipation and diarrhea.   Genitourinary: Negative for vaginal bleeding and vaginal discharge.   Musculoskeletal: Negative for arthralgias and myalgias.   Skin: Negative for rash and wound.   Neurological: Negative for light-headedness and headaches.   Hematological: Negative.    Psychiatric/Behavioral: Negative for dysphoric mood and sleep disturbance.       Objective:     Vitals:    09/16/19 1230   BP: (!) 124/58   Pulse: 77   Temp: (!) 95.7 °F (35.4 °C)        Physical Exam   Constitutional: She is oriented to person, place, and time. She appears well-developed and well-nourished.   HENT:   Head: Normocephalic and atraumatic.   Eyes: Pupils are equal, round, and reactive to light. EOM are normal.   Neck: Normal range of motion. Neck supple.   Cardiovascular: Normal rate and regular rhythm.   Pulmonary/Chest: Effort normal and breath sounds normal.   Musculoskeletal: Normal range of motion. She exhibits no deformity.   Neurological: She is alert and oriented to person, place, and time.   Skin: Skin is warm and dry.   Psychiatric: She has a normal mood and affect. Her behavior is normal.   Nursing note and vitals reviewed.      Assessment:       1. H/O partial thyroidectomy    2. Hyperlipidemia, unspecified hyperlipidemia type    3. Pure hypercholesterolemia    4. Type 2 diabetes mellitus with stage 5 chronic kidney disease not on chronic dialysis, without long-term current use of insulin    5. ESRD needing dialysis    6. Menopause        Plan:           Problem List Items Addressed This Visit        Cardiac/Vascular    Hyperlipidemia (Chronic)    Relevant Orders    Lipid panel       Renal/    ESRD needing dialysis    Relevant Orders    DXA Bone Density Spine And Hip       Endocrine    Type II diabetes mellitus (Chronic)    Relevant Orders     Hemoglobin A1c    TSH      Other Visit Diagnoses     H/O partial thyroidectomy    -  Primary    Relevant Orders    TSH    Menopause        Relevant Orders    DXA Bone Density Spine And Hip

## 2019-09-20 DIAGNOSIS — E78.00 PURE HYPERCHOLESTEROLEMIA: Primary | Chronic | ICD-10-CM

## 2019-09-20 RX ORDER — ATORVASTATIN CALCIUM 40 MG/1
40 TABLET, FILM COATED ORAL DAILY
Qty: 90 TABLET | Refills: 4 | Status: SHIPPED | OUTPATIENT
Start: 2019-09-20 | End: 2020-12-09

## 2019-09-20 RX ORDER — UBIDECARENONE 30 MG
30 CAPSULE ORAL 2 TIMES DAILY
Qty: 180 CAPSULE | Refills: 4 | Status: SHIPPED | OUTPATIENT
Start: 2019-09-20 | End: 2020-12-09

## 2019-10-04 DIAGNOSIS — M10.9 ACUTE GOUT OF FOOT, UNSPECIFIED CAUSE, UNSPECIFIED LATERALITY: ICD-10-CM

## 2019-10-07 RX ORDER — ALLOPURINOL 100 MG/1
TABLET ORAL
Qty: 90 TABLET | Refills: 4 | Status: SHIPPED | OUTPATIENT
Start: 2019-10-07 | End: 2021-01-03

## 2019-10-18 ENCOUNTER — OFFICE VISIT (OUTPATIENT)
Dept: ALLERGY | Facility: CLINIC | Age: 79
End: 2019-10-18
Payer: MEDICARE

## 2019-10-18 VITALS
RESPIRATION RATE: 15 BRPM | TEMPERATURE: 96 F | BODY MASS INDEX: 23.88 KG/M2 | HEIGHT: 66 IN | SYSTOLIC BLOOD PRESSURE: 130 MMHG | WEIGHT: 148.56 LBS | HEART RATE: 76 BPM | DIASTOLIC BLOOD PRESSURE: 60 MMHG

## 2019-10-18 DIAGNOSIS — J31.0 CHRONIC RHINITIS: Primary | ICD-10-CM

## 2019-10-18 PROCEDURE — 99999 PR PBB SHADOW E&M-EST. PATIENT-LVL III: CPT | Mod: PBBFAC,,, | Performed by: ALLERGY & IMMUNOLOGY

## 2019-10-18 PROCEDURE — 99213 OFFICE O/P EST LOW 20 MIN: CPT | Mod: PBBFAC | Performed by: ALLERGY & IMMUNOLOGY

## 2019-10-18 PROCEDURE — 99214 OFFICE O/P EST MOD 30 MIN: CPT | Mod: S$PBB,,, | Performed by: ALLERGY & IMMUNOLOGY

## 2019-10-18 PROCEDURE — 99214 PR OFFICE/OUTPT VISIT, EST, LEVL IV, 30-39 MIN: ICD-10-PCS | Mod: S$PBB,,, | Performed by: ALLERGY & IMMUNOLOGY

## 2019-10-18 PROCEDURE — 99999 PR PBB SHADOW E&M-EST. PATIENT-LVL III: ICD-10-PCS | Mod: PBBFAC,,, | Performed by: ALLERGY & IMMUNOLOGY

## 2019-10-18 RX ORDER — IPRATROPIUM BROMIDE 21 UG/1
SPRAY, METERED NASAL
Qty: 30 ML | Refills: 6 | Status: SHIPPED | OUTPATIENT
Start: 2019-10-18 | End: 2020-03-02

## 2019-10-18 NOTE — PROGRESS NOTES
Chief complaint:  Troublesome nasal symptoms    This note was dictated using voice recognition software and may contain errors.    History:    She had an 8:00 a.m. appointment on Friday October 18, 2019.  Information in her medical record regarding her past medical history family history and social history was reviewed and updated today.  Significant additions,  if any,  are as noted below.    Her last appointment with me occurred February 13, 2017.    She stated the purpose for today's visit is because she has developed anterior rhinorrhea in association with eating.    She began hemodialysis this year.    Review of systems:  At the time of her appointment she is feeling well in general.  No additional new symptoms were mention.    Exam:    In general she is in no distress.  She is alert oriented well-developed in good mood and attentive  Gait steady  Skin no rash noted  Head no swelling noted  Eyes scleral white conjunctiva pink  Nose patent no polyp seen  Mouth no inflammation or swelling of the lips tongue or in the throat noted  Ears not inflamed tympanic membranes not inflamed  Neck no thyromegaly or masses noted  Lymph nodes no significant cervical or epitrochlear lymphadenopathy noted  Heart regular rhythm no murmurs heard  Lungs clear to auscultation  Extremities no swelling or inflammation of the hands legs noted    Impression:    1.  Chronic rhinitis  2.  Other health concerns as noted in her medical record    Assessment plan:    I reviewed nasal anatomy with her using teaching models.  She was instructed in the proper technique for using a nasal spray.  I have suggested she evaluate the use of Atrovent nasal spray 0.03%.  Initially I have suggested that she use 1 spray in each nasal passage.  She should use the medication 20 or 30 min prior to eating.  I recommended that she record the time of use of the medicine and then monitor the duration of benefit she experiences.  If it becomes apparent that 1  spray in each nasal passage is not helpful she may evaluate 2 sprays of the medication in each nasal passage 20 or 30 min prior to eating.  Should she require the use of 2 sprays I recommended that she wait 2 or 3 min between administering each spray.    At her request, prescription was transmitted to her pharmacy.    She was given the office phone number.  Should she have additional questions or concerns she was instructed to call.    Her appointment was 30 min in duration spent entirely in face-to-face contact.  More than 50% of the visit was spent in counseling and coordination of care.    I made her aware of my senior care.

## 2019-10-30 ENCOUNTER — APPOINTMENT (OUTPATIENT)
Dept: RADIOLOGY | Facility: HOSPITAL | Age: 79
End: 2019-10-30
Attending: FAMILY MEDICINE
Payer: MEDICARE

## 2019-10-30 ENCOUNTER — OFFICE VISIT (OUTPATIENT)
Dept: DERMATOLOGY | Facility: CLINIC | Age: 79
End: 2019-10-30
Payer: MEDICARE

## 2019-10-30 DIAGNOSIS — Z99.2 ESRD NEEDING DIALYSIS: ICD-10-CM

## 2019-10-30 DIAGNOSIS — N18.6 ESRD NEEDING DIALYSIS: ICD-10-CM

## 2019-10-30 DIAGNOSIS — Z78.0 MENOPAUSE: ICD-10-CM

## 2019-10-30 DIAGNOSIS — L65.0 TELOGEN EFFLUVIUM: Primary | ICD-10-CM

## 2019-10-30 PROCEDURE — 99213 OFFICE O/P EST LOW 20 MIN: CPT | Mod: S$PBB,,, | Performed by: DERMATOLOGY

## 2019-10-30 PROCEDURE — 99213 PR OFFICE/OUTPT VISIT, EST, LEVL III, 20-29 MIN: ICD-10-PCS | Mod: S$PBB,,, | Performed by: DERMATOLOGY

## 2019-10-30 PROCEDURE — 77080 DEXA BONE DENSITY SPINE HIP: ICD-10-PCS | Mod: 26,,, | Performed by: RADIOLOGY

## 2019-10-30 PROCEDURE — 99212 OFFICE O/P EST SF 10 MIN: CPT | Mod: PBBFAC,25 | Performed by: DERMATOLOGY

## 2019-10-30 PROCEDURE — 77080 DXA BONE DENSITY AXIAL: CPT | Mod: 26,,, | Performed by: RADIOLOGY

## 2019-10-30 PROCEDURE — 77080 DXA BONE DENSITY AXIAL: CPT | Mod: TC

## 2019-10-30 PROCEDURE — 99999 PR PBB SHADOW E&M-EST. PATIENT-LVL II: ICD-10-PCS | Mod: PBBFAC,,, | Performed by: DERMATOLOGY

## 2019-10-30 PROCEDURE — 99999 PR PBB SHADOW E&M-EST. PATIENT-LVL II: CPT | Mod: PBBFAC,,, | Performed by: DERMATOLOGY

## 2019-10-30 NOTE — PROGRESS NOTES
Subjective:       Patient ID:  Cailin Pickett is a 79 y.o. female who presents for   Chief Complaint   Patient presents with    Alopecia     f/u      Hx of hair loss, possible AA vs. Female pattern vs. Telogen effluvium, ESRD on HD (Tues, Thurs, Sat) beginning 5/2019, last seen on c/ 7/31/19. She is currently using minoxidil qD and fluocinolone scalp oil qD. She denies improvement.         Review of Systems   Constitutional: Negative for fever and chills.   Gastrointestinal: Negative for nausea and vomiting.   Skin: Negative for daily sunscreen use, activity-related sunscreen use and recent sunburn.   Hematologic/Lymphatic: Does not bruise/bleed easily.        Objective:    Physical Exam   Constitutional: She appears well-developed and well-nourished. No distress.   Neurological: She is alert and oriented to person, place, and time. She is not disoriented.   Psychiatric: She has a normal mood and affect.   Skin:   Areas Examined (abnormalities noted in diagram):   Scalp / Hair Palpated and Inspected  Head / Face Inspection Performed  Neck Inspection Performed  RUE Inspected  LUE Inspection Performed                  Assessment / Plan:        Telogen effluvium    S/p starting dialysis.  CBC, TSH wnl 7/31/19. Will continue fluocinolone and minoxidil.  Pt wishes to defer ILK and fluocinolone. Discussed this could be due to metabolic/physical stress from starting HD. The patient acknowledged understanding.  Consider mometasone lotion in the future.            Follow up in about 3 months (around 1/30/2020).

## 2019-11-11 ENCOUNTER — OFFICE VISIT (OUTPATIENT)
Dept: INTERNAL MEDICINE | Facility: CLINIC | Age: 79
End: 2019-11-11
Payer: MEDICARE

## 2019-11-11 ENCOUNTER — LAB VISIT (OUTPATIENT)
Dept: LAB | Facility: HOSPITAL | Age: 79
End: 2019-11-11
Attending: FAMILY MEDICINE
Payer: MEDICARE

## 2019-11-11 VITALS
HEART RATE: 70 BPM | TEMPERATURE: 98 F | SYSTOLIC BLOOD PRESSURE: 124 MMHG | DIASTOLIC BLOOD PRESSURE: 76 MMHG | BODY MASS INDEX: 23.74 KG/M2 | OXYGEN SATURATION: 97 % | HEIGHT: 66 IN | WEIGHT: 147.69 LBS

## 2019-11-11 DIAGNOSIS — E11.22 TYPE 2 DIABETES MELLITUS WITH CHRONIC KIDNEY DISEASE ON CHRONIC DIALYSIS, WITHOUT LONG-TERM CURRENT USE OF INSULIN: ICD-10-CM

## 2019-11-11 DIAGNOSIS — Z85.07 HISTORY OF PANCREATIC CANCER: ICD-10-CM

## 2019-11-11 DIAGNOSIS — N18.6 TYPE 2 DIABETES MELLITUS WITH CHRONIC KIDNEY DISEASE ON CHRONIC DIALYSIS, WITHOUT LONG-TERM CURRENT USE OF INSULIN: ICD-10-CM

## 2019-11-11 DIAGNOSIS — R10.13 EPIGASTRIC PAIN: ICD-10-CM

## 2019-11-11 DIAGNOSIS — L29.9 ITCHING: ICD-10-CM

## 2019-11-11 DIAGNOSIS — R10.13 EPIGASTRIC PAIN: Primary | ICD-10-CM

## 2019-11-11 DIAGNOSIS — Z99.2 TYPE 2 DIABETES MELLITUS WITH CHRONIC KIDNEY DISEASE ON CHRONIC DIALYSIS, WITHOUT LONG-TERM CURRENT USE OF INSULIN: ICD-10-CM

## 2019-11-11 LAB
AMYLASE SERPL-CCNC: 130 U/L (ref 20–110)
LIPASE SERPL-CCNC: 157 U/L (ref 4–60)

## 2019-11-11 PROCEDURE — 99214 PR OFFICE/OUTPT VISIT, EST, LEVL IV, 30-39 MIN: ICD-10-PCS | Mod: S$PBB,,, | Performed by: FAMILY MEDICINE

## 2019-11-11 PROCEDURE — 82150 ASSAY OF AMYLASE: CPT

## 2019-11-11 PROCEDURE — 83690 ASSAY OF LIPASE: CPT

## 2019-11-11 PROCEDURE — 99213 OFFICE O/P EST LOW 20 MIN: CPT | Mod: PBBFAC | Performed by: FAMILY MEDICINE

## 2019-11-11 PROCEDURE — 99999 PR PBB SHADOW E&M-EST. PATIENT-LVL III: ICD-10-PCS | Mod: PBBFAC,,, | Performed by: FAMILY MEDICINE

## 2019-11-11 PROCEDURE — 99214 OFFICE O/P EST MOD 30 MIN: CPT | Mod: S$PBB,,, | Performed by: FAMILY MEDICINE

## 2019-11-11 PROCEDURE — 99999 PR PBB SHADOW E&M-EST. PATIENT-LVL III: CPT | Mod: PBBFAC,,, | Performed by: FAMILY MEDICINE

## 2019-11-11 PROCEDURE — 36415 COLL VENOUS BLD VENIPUNCTURE: CPT

## 2019-11-11 RX ORDER — GABAPENTIN 100 MG/1
100 CAPSULE ORAL 2 TIMES DAILY
Qty: 180 CAPSULE | Refills: 3 | Status: SHIPPED | OUTPATIENT
Start: 2019-11-11 | End: 2020-03-02 | Stop reason: SDUPTHER

## 2019-11-11 RX ORDER — HYDROXYZINE HYDROCHLORIDE 25 MG/1
TABLET, FILM COATED ORAL
Qty: 180 TABLET | Refills: 3 | Status: SHIPPED | OUTPATIENT
Start: 2019-11-11 | End: 2020-07-09 | Stop reason: SDUPTHER

## 2019-11-11 RX ORDER — HYDROXYZINE HYDROCHLORIDE 25 MG/1
TABLET, FILM COATED ORAL
Qty: 180 TABLET | Refills: 3 | Status: CANCELLED | OUTPATIENT
Start: 2019-11-11

## 2019-11-11 RX ORDER — REPAGLINIDE 0.5 MG/1
TABLET ORAL
Qty: 270 TABLET | Refills: 3 | Status: SHIPPED | OUTPATIENT
Start: 2019-11-11 | End: 2020-03-03

## 2019-11-11 NOTE — PROGRESS NOTES
Subjective:       Patient ID: Cailin Pickett is a 79 y.o. female.    Chief Complaint: Abdominal Pain    80 yo female here with c/o epigastric pain that has been going on about 3 weeks. Yesterday she states it was fairly severe. She has a history of pancreatic cancer and is concerned about this pain. Has noticed a slight decrease in her appetite. No nausea or vomiting. No diarrhea--in fact has frequent constipation. Denies any correlation to eating; states it seems to occur when she is active (yesterday it happened while she was sleeping). She was unable to finish sweeping the floor yesterday due to the pain. Pain subsides with rest such as laying down or sitting still. Takes several hours to go away. She also reports one episode of itching all over the place.     does not have any pertinent problems on file.  Past Medical History:   Diagnosis Date    Anemia     CKD (chronic kidney disease) stage 5, GFR less than 15 ml/min 3/14/2019    CKD (chronic kidney disease), stage III     Diabetes mellitus type II     Encounter for blood transfusion     Family history of colonic polyps 7/18/2017    Gout, unspecified     Hyperlipidemia     Hypertension     Neuropathy     Pancreatic cancer     Renal failure     Secondary hyperparathyroidism of renal origin 3/14/2019    Thyroid disease      Past Surgical History:   Procedure Laterality Date    COLONOSCOPY  2011    Dr. Favio Mims    COLONOSCOPY N/A 7/18/2017    Procedure: screening colonoscopy;  Surgeon: Kenneth Kamara MD;  Location: [facility] ENDO;  Service: Endoscopy;  Laterality: N/A;    FISTULOGRAM N/A 4/10/2019    Procedure: FISTULOGRAM;  Surgeon: Ruddy Fitzpatrick MD;  Location: [facility] CATH LAB;  Service: Vascular;  Laterality: N/A;  0830 start    HYSTERECTOMY      KNEE SURGERY Left 05/31/2018    NEPHRECTOMY Left 5/2013    Dr Carter     PANCREAS SURGERY      distal pancreatectomy    SPLENECTOMY, TOTAL      THYROIDECTOMY, PARTIAL      VENTRICULOATRIAL  SHUNT Left 2014     left arm     Family History   Problem Relation Age of Onset    Cancer Mother         breast    Heart disease Mother 60        MI    Hypertension Mother     Stroke Mother     Breast cancer Mother     Cancer Father         prostate    Cancer Brother         renal cell carcinoma    Diabetes Brother     Kidney disease Brother         ESRD s/p kidney transplant    Breast cancer Sister     Breast cancer Sister      Social History     Socioeconomic History    Marital status:      Spouse name: Not on file    Number of children: Not on file    Years of education: Not on file    Highest education level: Not on file   Occupational History     Comment: stay home    Social Needs    Financial resource strain: Not on file    Food insecurity:     Worry: Not on file     Inability: Not on file    Transportation needs:     Medical: Not on file     Non-medical: Not on file   Tobacco Use    Smoking status: Former Smoker     Packs/day: 1.00     Years: 35.00     Pack years: 35.00     Start date: 3/14/1962     Last attempt to quit: 2001     Years since quittin.8    Smokeless tobacco: Never Used   Substance and Sexual Activity    Alcohol use: No    Drug use: No    Sexual activity: Not on file   Lifestyle    Physical activity:     Days per week: Not on file     Minutes per session: Not on file    Stress: Not on file   Relationships    Social connections:     Talks on phone: Not on file     Gets together: Not on file     Attends Latter-day service: Not on file     Active member of club or organization: Not on file     Attends meetings of clubs or organizations: Not on file     Relationship status: Not on file   Other Topics Concern    Not on file   Social History Narrative    She lives [place] minutes [place] from [place]     Review of Systems   Constitutional: Positive for activity change and appetite change. Negative for fatigue.   Gastrointestinal: Positive for abdominal pain  and constipation. Negative for nausea and vomiting.   All other systems reviewed and are negative.      Objective:     Vitals:    11/11/19 0812   BP: 124/76   Pulse: 70   Temp: 97.6 °F (36.4 °C)        Physical Exam   Constitutional: She is oriented to person, place, and time. She appears well-developed and well-nourished.   HENT:   Head: Normocephalic and atraumatic.   Right Ear: External ear normal.   Left Ear: External ear normal.   Nose: Nose normal.   Eyes: Pupils are equal, round, and reactive to light. Conjunctivae and EOM are normal. No scleral icterus.   Cardiovascular: Normal rate and regular rhythm.   No murmur heard.  Pulmonary/Chest: Effort normal. No respiratory distress.   Abdominal: Soft. Bowel sounds are normal. She exhibits no mass. There is tenderness (epigastric). There is no rebound and no guarding. No hernia.   Neurological: She is alert and oriented to person, place, and time.   Normal gait. No speech abnormality   Skin: Skin is warm. No erythema. No pallor.   Psychiatric: She has a normal mood and affect. Her behavior is normal. Judgment and thought content normal.   Nursing note and vitals reviewed.      Assessment:       1. Epigastric pain    2. Itching    3. Type 2 diabetes mellitus with chronic kidney disease on chronic dialysis, without long-term current use of insulin    4. History of pancreatic cancer        Plan:           Problem List Items Addressed This Visit        Oncology    History of pancreatic cancer    Relevant Orders    US Abdomen Limited    Amylase (Completed)    Lipase (Completed)       Endocrine    Type II diabetes mellitus (Chronic)    Relevant Medications    repaglinide (PRANDIN) 0.5 MG tablet      Other Visit Diagnoses     Epigastric pain    -  Primary    Relevant Orders    Amylase (Completed)    Lipase (Completed)    Itching        Relevant Medications    hydrOXYzine HCl (ATARAX) 25 MG tablet

## 2019-11-11 NOTE — PATIENT INSTRUCTIONS
Epigastric Pain (Uncertain Cause)     Epigastric pain can be a sign of disease in the upper abdomen. Common causes include:  · Acid reflux (stomach acid flowing up into the esophagus)  · Gastritis (irritation of the stomach lining)  · Peptic Ulcer Disease  · Inflammation of the pancreas  · Gallstone  · Infection in the gallbladder  Pain may be dull or burning. It may spread upward to the chest or to the back. There may be other symptoms such as belching, bloating, cramps or hunger pains. There may be weight loss or poor appetite, nausea or vomiting.  Since the diagnosis of your pain is not certain yet, further tests may sometimes be needed. Sometimes the doctor will treat you for the most likely condition to see if there is improvement before doing further tests.  Home care  Medicines  · Antacids help neutralize the normal acids in your stomach. Examples are Maalox, Mylanta, Rolaids, and Tums. If you dont like the liquid, you can also try a chewable one. You may find one works better than another for you. Overuse can cause diarrhea or constipation.  · Acid blockers (H2 blockers) decrease acid production. Examples are cimetidine (Tagamet), famotidine (Pepcid) and ranitidine (Zantac).  · Acid inhibitors (PPIs) decrease acid production in a different way than the blockers. You may find they work better, but can take a little longer to take effect.  Examples are omeprazole (Prilosec), lansoprazole (Prevacid), pantoprazole (Protonix), rabeprazole (Aciphex), and esomeprazole (Nexium).  · Take an antacid 30-60 minutes after eating and at bedtime, but not at the same time as an acid blocker.  · Try not to take NSAIDs. Aspirin may also cause problems, but if taking it for your heart or other medical reasons, talk to your doctor before stopping it; you do not want to cause a worse problem, like a heart attack or stroke.  Diet  · If certain foods seem to cause your spasm, try to avoid them.   · Eat slowly and chew food well  before swallowing. Symptoms of gastritis can be worsened by certain foods. Limit or avoid fatty, fried, and spicy foods, as well as coffee, chocolate, mint, and foods with high acid content such as tomatoes and citrus fruit and juices (orange, grapefruit, lemon).  · Avoid alcohol, caffeine, and tobacco, which can delay healing and worsen your problem.  · Try eating smaller meals with snacks in between  Follow-up care  Follow up with your healthcare provider or as advised.  When to seek medical advice  Call your healthcare provider right away if any of the following occur:  · Stomach pain worsens or moves to the right lower part of the abdomen  · Chest pain appears, or if it worsens or spreads to the chest, back, neck, shoulder, or arm  · Frequent vomiting (cant keep down liquids)  · Blood in the stool or vomit (red or black color)  · Feeling weak or dizzy, fainting, or having trouble breathing  · Fever of 100.4ºF (38ºC) or higher, or as directed by your healthcare provider  · Abdominal swelling  Date Last Reviewed: 9/25/2015  © 6067-8338 [employer]. [address] [place] [address]. All rights reserved. This information is not intended as a substitute for professional medical care. Always follow your healthcare professional's instructions.

## 2019-11-12 DIAGNOSIS — D49.0 PANCREAS NEOPLASM: ICD-10-CM

## 2019-11-13 ENCOUNTER — TELEPHONE (OUTPATIENT)
Dept: INTERNAL MEDICINE | Facility: CLINIC | Age: 79
End: 2019-11-13

## 2019-11-14 ENCOUNTER — TELEPHONE (OUTPATIENT)
Dept: RADIOLOGY | Facility: HOSPITAL | Age: 79
End: 2019-11-14

## 2019-11-15 ENCOUNTER — HOSPITAL ENCOUNTER (OUTPATIENT)
Dept: RADIOLOGY | Facility: HOSPITAL | Age: 79
Discharge: HOME | End: 2019-11-15
Attending: FAMILY MEDICINE
Payer: MEDICARE

## 2019-11-15 DIAGNOSIS — Z85.07 HISTORY OF PANCREATIC CANCER: ICD-10-CM

## 2019-11-15 PROCEDURE — 76705 ECHO EXAM OF ABDOMEN: CPT | Mod: TC

## 2019-11-15 PROCEDURE — 76705 ECHO EXAM OF ABDOMEN: CPT | Mod: 26,,, | Performed by: RADIOLOGY

## 2019-11-15 PROCEDURE — 76705 US ABDOMEN LIMITED: ICD-10-PCS | Mod: 26,,, | Performed by: RADIOLOGY

## 2019-11-18 ENCOUNTER — LAB VISIT (OUTPATIENT)
Dept: LAB | Facility: HOSPITAL | Age: 79
End: 2019-11-18
Payer: MEDICARE

## 2019-11-18 DIAGNOSIS — E78.00 PURE HYPERCHOLESTEROLEMIA: Chronic | ICD-10-CM

## 2019-11-18 LAB
ALT SERPL W/O P-5'-P-CCNC: 15 U/L (ref 10–44)
AST SERPL-CCNC: 21 U/L (ref 10–40)
CHOLEST SERPL-MCNC: 230 MG/DL (ref 120–199)
CHOLEST/HDLC SERPL: 2.9 {RATIO} (ref 2–5)
HDLC SERPL-MCNC: 80 MG/DL (ref 40–75)
HDLC SERPL: 34.8 % (ref 20–50)
LDLC SERPL CALC-MCNC: 127.6 MG/DL (ref 63–159)
NONHDLC SERPL-MCNC: 150 MG/DL
TRIGL SERPL-MCNC: 112 MG/DL (ref 30–150)

## 2019-11-18 PROCEDURE — 80061 LIPID PANEL: CPT

## 2019-11-18 PROCEDURE — 84460 ALANINE AMINO (ALT) (SGPT): CPT

## 2019-11-18 PROCEDURE — 36415 COLL VENOUS BLD VENIPUNCTURE: CPT

## 2019-11-18 PROCEDURE — 84450 TRANSFERASE (AST) (SGOT): CPT

## 2019-12-04 ENCOUNTER — INITIAL CONSULT (OUTPATIENT)
Dept: HEMATOLOGY/ONCOLOGY | Facility: CLINIC | Age: 79
End: 2019-12-04
Payer: MEDICARE

## 2019-12-04 ENCOUNTER — LAB VISIT (OUTPATIENT)
Dept: LAB | Facility: HOSPITAL | Age: 79
End: 2019-12-04
Attending: INTERNAL MEDICINE
Payer: MEDICARE

## 2019-12-04 VITALS
WEIGHT: 149.06 LBS | OXYGEN SATURATION: 98 % | SYSTOLIC BLOOD PRESSURE: 123 MMHG | HEART RATE: 65 BPM | DIASTOLIC BLOOD PRESSURE: 70 MMHG | HEIGHT: 66 IN | BODY MASS INDEX: 23.95 KG/M2 | TEMPERATURE: 97 F

## 2019-12-04 DIAGNOSIS — Z85.07 HISTORY OF PANCREATIC CANCER: ICD-10-CM

## 2019-12-04 DIAGNOSIS — D63.1 ANEMIA DUE TO CHRONIC KIDNEY DISEASE, ON CHRONIC DIALYSIS: ICD-10-CM

## 2019-12-04 DIAGNOSIS — Z99.2 ANEMIA DUE TO CHRONIC KIDNEY DISEASE, ON CHRONIC DIALYSIS: ICD-10-CM

## 2019-12-04 DIAGNOSIS — N18.6 ANEMIA DUE TO CHRONIC KIDNEY DISEASE, ON CHRONIC DIALYSIS: ICD-10-CM

## 2019-12-04 DIAGNOSIS — Z85.07 HISTORY OF PANCREATIC CANCER: Primary | ICD-10-CM

## 2019-12-04 LAB
AMYLASE SERPL-CCNC: 133 U/L (ref 20–110)
LIPASE SERPL-CCNC: 219 U/L (ref 4–60)

## 2019-12-04 PROCEDURE — 99213 OFFICE O/P EST LOW 20 MIN: CPT | Mod: PBBFAC | Performed by: INTERNAL MEDICINE

## 2019-12-04 PROCEDURE — 82150 ASSAY OF AMYLASE: CPT

## 2019-12-04 PROCEDURE — 1126F AMNT PAIN NOTED NONE PRSNT: CPT | Mod: ,,, | Performed by: INTERNAL MEDICINE

## 2019-12-04 PROCEDURE — 86301 IMMUNOASSAY TUMOR CA 19-9: CPT

## 2019-12-04 PROCEDURE — 99999 PR PBB SHADOW E&M-EST. PATIENT-LVL III: ICD-10-PCS | Mod: PBBFAC,,, | Performed by: INTERNAL MEDICINE

## 2019-12-04 PROCEDURE — 99214 OFFICE O/P EST MOD 30 MIN: CPT | Mod: S$PBB,,, | Performed by: INTERNAL MEDICINE

## 2019-12-04 PROCEDURE — 1126F PR PAIN SEVERITY QUANTIFIED, NO PAIN PRESENT: ICD-10-PCS | Mod: ,,, | Performed by: INTERNAL MEDICINE

## 2019-12-04 PROCEDURE — 99999 PR PBB SHADOW E&M-EST. PATIENT-LVL III: CPT | Mod: PBBFAC,,, | Performed by: INTERNAL MEDICINE

## 2019-12-04 PROCEDURE — 1159F MED LIST DOCD IN RCRD: CPT | Mod: ,,, | Performed by: INTERNAL MEDICINE

## 2019-12-04 PROCEDURE — 1159F PR MEDICATION LIST DOCUMENTED IN MEDICAL RECORD: ICD-10-PCS | Mod: ,,, | Performed by: INTERNAL MEDICINE

## 2019-12-04 PROCEDURE — 99214 PR OFFICE/OUTPT VISIT, EST, LEVL IV, 30-39 MIN: ICD-10-PCS | Mod: S$PBB,,, | Performed by: INTERNAL MEDICINE

## 2019-12-04 PROCEDURE — 83690 ASSAY OF LIPASE: CPT

## 2019-12-04 PROCEDURE — 36415 COLL VENOUS BLD VENIPUNCTURE: CPT

## 2019-12-04 NOTE — PROGRESS NOTES
Subjective:       Patient ID: Cailin Pickett is a 79 y.o. female.    Chief Complaint: History of pancreatic cancer [Z85.07]  HPI: We have an opportunity to see Ms. Cailin Pickett in Hematology Oncology clinic at [facility] on 12/04/2019.  Ms. Cailin Pickett is a 79 y.o. 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  US abdomen showed   Impression       Postsurgical changes partial pancreatectomy, splenectomy and left nephrectomy.    Cholelithiasis without scan evidence of cholecystitis.  Correlate with clinical and laboratory findings.    Partially visualized right kidney demonstrates numerous cysts in keeping with history of polycystic kidney disease.    No other significant findings.  See above.     Amylase/lipase slightly elevated.     No history exists.     Past Medical History:   Diagnosis Date    Anemia     CKD (chronic kidney disease) stage 5, GFR less than 15 ml/min 3/14/2019    CKD (chronic kidney disease), stage III     Diabetes mellitus type II     Encounter for blood transfusion     Family history of colonic polyps 7/18/2017    Gout, unspecified     Hyperlipidemia     Hypertension     Neuropathy     Pancreatic cancer     Renal failure     Secondary hyperparathyroidism of renal origin 3/14/2019    Thyroid disease      Family History   Problem Relation Age of Onset    Cancer Mother         breast    Heart disease Mother 60        MI    Hypertension Mother     Stroke Mother     Breast cancer Mother     Cancer Father         prostate    Cancer Brother         renal cell carcinoma    Diabetes Brother     Kidney disease Brother         ESRD s/p kidney transplant    Breast cancer Sister     Breast cancer Sister      Social History     Socioeconomic History    Marital status:      Spouse name: Not on file    Number of children: Not on file    Years of education: Not on file     Highest education level: Not on file   Occupational History     Comment: stay home    Social Needs    Financial resource strain: Not on file    Food insecurity:     Worry: Not on file     Inability: Not on file    Transportation needs:     Medical: Not on file     Non-medical: Not on file   Tobacco Use    Smoking status: Former Smoker     Packs/day: 1.00     Years: 35.00     Pack years: 35.00     Start date: 3/14/1962     Last attempt to quit: 2001     Years since quittin.9    Smokeless tobacco: Never Used   Substance and Sexual Activity    Alcohol use: No    Drug use: No    Sexual activity: Not on file   Lifestyle    Physical activity:     Days per week: Not on file     Minutes per session: Not on file    Stress: Not on file   Relationships    Social connections:     Talks on phone: Not on file     Gets together: Not on file     Attends [employer] service: Not on file     Active member of club or organization: Not on file     Attends meetings of clubs or organizations: Not on file     Relationship status: Not on file   Other Topics Concern    Not on file   Social History Narrative    [place] lives [place] from [place]     Past Surgical History:   Procedure Laterality Date    COLONOSCOPY      Dr. Favio Mims    COLONOSCOPY N/A 2017    Procedure: screening colonoscopy;  Surgeon: Kenneth Kamara MD;  Location: [facility] END[facility];  Service: Endoscopy;  Laterality: N/A;    FISTULOGRAM N/A 4/10/2019    Procedure: FISTULOGRAM;  Surgeon: Ruddy Fitzpatrick MD;  Location: [facility] CATH LAB;  Service: Vascular;  Laterality: N/A;  0830 start    HYSTERECTOMY      KNEE SURGERY Left 2018    NEPHRECTOMY Left 2013    Dr Carter     PANCREAS SURGERY      distal pancreatectomy    SPLENECTOMY, TOTAL      THYROIDECTOMY, PARTIAL      VENTRICULOATRIAL SHUNT Left 2014     left arm     Current Outpatient Medications   Medication Sig Dispense Refill    allopurinol (ZYLOPRIM) 100 MG tablet TAKE 1  TABLET DAILY 90 tablet 4    atorvastatin (LIPITOR) 40 MG tablet Take 1 tablet (40 mg total) by mouth once daily. 90 tablet 4    calcium acetate (PHOSLO) 667 mg capsule Take 667 mg by mouth 2 (two) times daily.      cetirizine (ZYRTEC) 10 MG tablet Take 10 mg by mouth once daily.       co-enzyme Q-10 30 mg capsule Take 1 capsule (30 mg total) by mouth 2 (two) times daily. 180 capsule 4    estradiol (ESTRACE) 0.5 MG tablet TAKE 1 TABLET DAILY FOR 3 WEEKS PER MONTH, THEN DO NOT TAKE FOR FOURTH WEEK EACH MONTH (Patient taking differently: every other day) 90 tablet 3    fluocinolone (DERMA-SMOOTHE) 0.01 % external oil Apply oil to scalp once a day 1 Bottle 5    furosemide (LASIX) 40 MG tablet Take 2 tablets (80 mg total) by mouth once daily. 90 tablet 3    gabapentin (NEURONTIN) 100 MG capsule Take 1 capsule (100 mg total) by mouth 2 (two) times daily. 180 capsule 3    glucosamine-chondroitin (OSTEO BI-FLEX) 250-200 mg Tab Take 1 tablet by mouth 2 (two) times daily.      hydrOXYzine HCl (ATARAX) 25 MG tablet TAKE 1 TABLET TWICE A DAY AS NEEDED FOR ITCHING 180 tablet 3    ipratropium (ATROVENT) 0.03 % nasal spray 1 or 2 sprays each nasal passage every 8 hours, if needed, as directed 30 mL 6    repaglinide (PRANDIN) 0.5 MG tablet TAKE 1 TABLET THREE TIMES A DAY BEFORE MEALS 270 tablet 3    sevelamer carbonate (RENVELA) 800 mg Tab Take 800 mg by mouth 2 (two) times daily.      traMADol (ULTRAM) 50 mg tablet Take 1 tablet (50 mg total) by mouth every 12 (twelve) hours. 180 tablet 3    vitamin renal formula, B-complex-vitamin c-folic acid, (NEPHROCAP) 1 mg Cap Take 1 capsule by mouth once daily. 30 capsule 0     No current facility-administered medications for this visit.        Labs:  Lab Results   Component Value Date    WBC 5.57 07/31/2019    HGB 12.0 07/31/2019    HCT 38.8 07/31/2019    MCV 98 07/31/2019     07/31/2019     BMP  Lab Results   Component Value Date     06/03/2019    K 4.6  06/03/2019    CL 97 06/03/2019    CO2 30 (H) 06/03/2019    BUN 29 (H) 06/03/2019    CREATININE 6.3 (H) 06/03/2019    CALCIUM 10.1 06/03/2019    ANIONGAP 12 06/03/2019    ESTGFRAFRICA 7 (A) 06/03/2019    EGFRNONAA 6 (A) 06/03/2019     Lab Results   Component Value Date    ALT 15 11/18/2019    AST 21 11/18/2019    ALKPHOS 81 06/03/2019    BILITOT 0.3 06/03/2019       Lab Results   Component Value Date    IRON 53 09/07/2018    TIBC 241 (L) 09/07/2018    FERRITIN 435 (H) 09/07/2018     Lab Results   Component Value Date    RKOHVJAZ16 546 10/31/2013     Lab Results   Component Value Date    FOLATE 11.2 10/31/2013     Lab Results   Component Value Date    TSH 1.407 09/16/2019       I have reviewed the radiology reports and examined the scan/xray images.    Review of Systems   Constitutional: Negative.    HENT: Negative.    Eyes: Negative.    Respiratory: Negative.    Cardiovascular: Negative.    Gastrointestinal: Negative.    Endocrine: Negative.    Genitourinary: Negative.    Musculoskeletal: Negative.    Skin: Negative.    Allergic/Immunologic: Negative.    Neurological: Negative.    Hematological: Negative.    Psychiatric/Behavioral: Negative.      ECOG SCORE    0 - Fully active-able to carry on all pre-disease performance without restriction            Objective:     Vitals:    12/04/19 0900   BP: 123/70   Pulse: 65   Temp: 97.2 °F (36.2 °C)   Body mass index is 24.05 kg/m².  Physical Exam   Constitutional: She is oriented to person, place, and time. She appears well-developed and well-nourished.   HENT:   Head: Normocephalic and atraumatic.   Eyes: Conjunctivae and EOM are normal.   Neck: Normal range of motion. Neck supple.   Cardiovascular: Normal rate and regular rhythm.   Pulmonary/Chest: Effort normal and breath sounds normal.   Abdominal: Soft. Bowel sounds are normal.   Musculoskeletal: Normal range of motion.   Neurological: She is alert and oriented to person, place, and time.   Skin: Skin is warm and dry.    Psychiatric: She has a normal mood and affect. Her behavior is normal. Judgment and thought content normal.   Nursing note and vitals reviewed.        Assessment:      1. Anemia due to chronic kidney disease, on chronic dialysis    2. History of pancreatic cancer           Plan:     History of pancreatic cancer  Has upcoming MRI abdomen pelvis  -     Cancer antigen 19-9; Future; Expected date: 12/04/2019  -     Amylase; Future; Expected date: 12/04/2019  -     Lipase; Future; Expected date: 12/04/2019    Anemia due to chronic kidney disease, on chronic dialysis

## 2019-12-05 LAB — CANCER AG19-9 SERPL-ACNC: <2 U/ML (ref 2–40)

## 2019-12-16 ENCOUNTER — TELEPHONE (OUTPATIENT)
Dept: INTERNAL MEDICINE | Facility: CLINIC | Age: 79
End: 2019-12-16

## 2019-12-16 NOTE — TELEPHONE ENCOUNTER
----- Message from Stefany Curiel sent at 12/13/2019  9:53 AM CST -----  Contact: pt   She's calling in regards to medication       pls call pt back at [phone] (home)

## 2019-12-31 DIAGNOSIS — R60.0 LOCALIZED EDEMA: ICD-10-CM

## 2019-12-31 RX ORDER — FUROSEMIDE 40 MG/1
TABLET ORAL
Qty: 90 TABLET | Refills: 4 | Status: SHIPPED | OUTPATIENT
Start: 2019-12-31 | End: 2021-03-28 | Stop reason: SDUPTHER

## 2020-01-13 ENCOUNTER — TELEPHONE (OUTPATIENT)
Dept: INTERNAL MEDICINE | Facility: CLINIC | Age: 80
End: 2020-01-13

## 2020-01-13 NOTE — TELEPHONE ENCOUNTER
----- Message from Jie Parsons sent at 1/13/2020 12:00 PM CST -----  Contact: self/[phone]  Would like to consult with nurse regarding medication , please call back at [phone]. Thanks/ar

## 2020-01-16 RX ORDER — DEXTROSE 4 G
1 TABLET,CHEWABLE ORAL ONCE
Qty: 1 EACH | Refills: 0 | Status: SHIPPED | OUTPATIENT
Start: 2020-01-16 | End: 2020-01-16

## 2020-01-16 RX ORDER — INSULIN PUMP SYRINGE, 3 ML
EACH MISCELLANEOUS
Qty: 1 EACH | Refills: 0 | Status: CANCELLED | OUTPATIENT
Start: 2020-01-16 | End: 2021-01-15

## 2020-01-16 NOTE — TELEPHONE ENCOUNTER
----- Message from Mabel Kong sent at 1/16/2020  7:59 AM CST -----  Contact: Self  Type:  Diabetic/Medical Supplies Request    Name of Caller:  patient  What supplies are needed:  Meter, test strips and lancets  What is the brand of the supplies:  One Touch  Refill or New Rx:  New RX  If checking glucose, how many times do they check it?:  1XDay  Who prescribed the original supplies:  Dr Grace  Pharmacy/Company Name, Phone #, Location:    [facility] - [place] [address]  Phone: [phone] Fax: [phone]  Requesting a Call Back:  yes  Best Call Back Number:  [phone] (home)   Additional Information:  na

## 2020-02-10 ENCOUNTER — OFFICE VISIT (OUTPATIENT)
Dept: CARDIOLOGY | Facility: CLINIC | Age: 80
End: 2020-02-10
Payer: MEDICARE

## 2020-02-10 VITALS
DIASTOLIC BLOOD PRESSURE: 46 MMHG | HEART RATE: 68 BPM | HEIGHT: 66 IN | SYSTOLIC BLOOD PRESSURE: 122 MMHG | WEIGHT: 153.88 LBS | BODY MASS INDEX: 24.73 KG/M2 | OXYGEN SATURATION: 95 %

## 2020-02-10 DIAGNOSIS — Z99.2 ESRD NEEDING DIALYSIS: ICD-10-CM

## 2020-02-10 DIAGNOSIS — E78.00 PURE HYPERCHOLESTEROLEMIA: Chronic | ICD-10-CM

## 2020-02-10 DIAGNOSIS — I15.2 HYPERTENSION ASSOCIATED WITH DIABETES: Primary | Chronic | ICD-10-CM

## 2020-02-10 DIAGNOSIS — E11.59 HYPERTENSION ASSOCIATED WITH DIABETES: Primary | Chronic | ICD-10-CM

## 2020-02-10 DIAGNOSIS — N18.6 ESRD NEEDING DIALYSIS: ICD-10-CM

## 2020-02-10 PROCEDURE — 99214 OFFICE O/P EST MOD 30 MIN: CPT | Mod: S$PBB,,, | Performed by: INTERNAL MEDICINE

## 2020-02-10 PROCEDURE — 99999 PR PBB SHADOW E&M-EST. PATIENT-LVL III: CPT | Mod: PBBFAC,,, | Performed by: INTERNAL MEDICINE

## 2020-02-10 PROCEDURE — 99999 PR PBB SHADOW E&M-EST. PATIENT-LVL III: ICD-10-PCS | Mod: PBBFAC,,, | Performed by: INTERNAL MEDICINE

## 2020-02-10 PROCEDURE — 99214 PR OFFICE/OUTPT VISIT, EST, LEVL IV, 30-39 MIN: ICD-10-PCS | Mod: S$PBB,,, | Performed by: INTERNAL MEDICINE

## 2020-02-10 PROCEDURE — 99213 OFFICE O/P EST LOW 20 MIN: CPT | Mod: PBBFAC | Performed by: INTERNAL MEDICINE

## 2020-02-10 RX ORDER — FERRIC CITRATE 210 MG/1
TABLET, COATED ORAL
COMMUNITY
Start: 2020-01-16

## 2020-02-10 NOTE — PROGRESS NOTES
Subjective:   Patient ID:  Cailin Pickett is a 79 y.o. female who presents for follow-up of Follow-up  Here for routine follow-up. Last seen about 6 months ago. She has a h/o HTN, HLP, DM2 and ESRD on HD. Last visit she was still taking lasix, coreg, norvasc, and hydralazine for her HTN but she has since been taken off of all of her anti-hypertensive medications except for the Lasix. She is doing well and denies any new complaints. No episodes of chest pain, SOB, palpitations, syncope or peripheral edema. Former smoker quit 20 years ago. Exercises regularly.    Hypertension   This is a chronic problem. The current episode started more than 1 year ago. The problem has been gradually improving since onset. The problem is controlled. Pertinent negatives include no chest pain, headaches, palpitations or shortness of breath. Past treatments include diuretics. The current treatment provides moderate improvement. There are no compliance problems.    Hyperlipidemia   This is a chronic problem. The current episode started more than 1 year ago. The problem is controlled. Recent lipid tests were reviewed and are variable. Pertinent negatives include no chest pain or shortness of breath. Current antihyperlipidemic treatment includes statins. The current treatment provides moderate improvement of lipids. There are no compliance problems.        Review of Systems   Constitution: Negative. Negative for chills, fever, weight gain and weight loss.   HENT: Negative.    Eyes: Negative.    Cardiovascular: Negative.  Negative for chest pain, dyspnea on exertion, irregular heartbeat, leg swelling, near-syncope, palpitations and syncope.   Respiratory: Negative.  Negative for cough and shortness of breath.    Endocrine: Negative.    Hematologic/Lymphatic: Negative.    Skin: Negative.    Musculoskeletal: Negative for muscle weakness.   Gastrointestinal: Negative.  Negative for abdominal pain, nausea and vomiting.   Genitourinary:  Negative.    Neurological: Negative.  Negative for dizziness and headaches.   Psychiatric/Behavioral: Negative.    Allergic/Immunologic: Negative.      Family History   Problem Relation Age of Onset    Cancer Mother         breast    Heart disease Mother 60        MI    Hypertension Mother     Stroke Mother     Breast cancer Mother     Cancer Father         prostate    Cancer Brother         renal cell carcinoma    Diabetes Brother     Kidney disease Brother         ESRD s/p kidney transplant    Breast cancer Sister     Breast cancer Sister      Past Medical History:   Diagnosis Date    Anemia     CKD (chronic kidney disease) stage 5, GFR less than 15 ml/min 3/14/2019    CKD (chronic kidney disease), stage III     Diabetes mellitus type II     Encounter for blood transfusion     Family history of colonic polyps 2017    Gout, unspecified     Hyperlipidemia     Hypertension     Neuropathy     Pancreatic cancer     Renal failure     Secondary hyperparathyroidism of renal origin 3/14/2019    Thyroid disease      Social History     Socioeconomic History    Marital status:      Spouse name: Not on file    Number of children: Not on file    Years of education: Not on file    Highest education level: Not on file   Occupational History     Comment: stay home    Social Needs    Financial resource strain: Not on file    Food insecurity:     Worry: Not on file     Inability: Not on file    Transportation needs:     Medical: Not on file     Non-medical: Not on file   Tobacco Use    Smoking status: Former Smoker     Packs/day: 1.00     Years: 35.00     Pack years: 35.00     Start date: 3/14/1962     Last attempt to quit: 2001     Years since quittin.1    Smokeless tobacco: Never Used   Substance and Sexual Activity    Alcohol use: No    Drug use: No    Sexual activity: Not on file   Lifestyle    Physical activity:     Days per week: Not on file     Minutes per session:  Not on file    Stress: Not on file   Relationships    Social connections:     Talks on phone: Not on file     Gets together: Not on file     Attends [employer] service: Not on file     Active member of club or organization: Not on file     Attends meetings of clubs or organizations: Not on file     Relationship status: Not on file   Other Topics Concern    Not on file   Social History Narrative    She lives [place] from [place]     Current Outpatient Medications on File Prior to Visit   Medication Sig Dispense Refill    allopurinol (ZYLOPRIM) 100 MG tablet TAKE 1 TABLET DAILY 90 tablet 4    atorvastatin (LIPITOR) 40 MG tablet Take 1 tablet (40 mg total) by mouth once daily. 90 tablet 4    AURYXIA 210 mg iron Tab       calcium acetate (PHOSLO) 667 mg capsule Take 667 mg by mouth 2 (two) times daily.      cetirizine (ZYRTEC) 10 MG tablet Take 10 mg by mouth once daily.       co-enzyme Q-10 30 mg capsule Take 1 capsule (30 mg total) by mouth 2 (two) times daily. 180 capsule 4    furosemide (LASIX) 40 MG tablet TAKE 1 TABLET DAILY 90 tablet 4    gabapentin (NEURONTIN) 100 MG capsule Take 1 capsule (100 mg total) by mouth 2 (two) times daily. 180 capsule 3    glucosamine-chondroitin (OSTEO BI-FLEX) 250-200 mg Tab Take 1 tablet by mouth 2 (two) times daily.      hydrOXYzine HCl (ATARAX) 25 MG tablet TAKE 1 TABLET TWICE A DAY AS NEEDED FOR ITCHING 180 tablet 3    repaglinide (PRANDIN) 0.5 MG tablet TAKE 1 TABLET THREE TIMES A DAY BEFORE MEALS 270 tablet 3    sevelamer carbonate (RENVELA) 800 mg Tab Take 800 mg by mouth 2 (two) times daily.      traMADol (ULTRAM) 50 mg tablet Take 1 tablet (50 mg total) by mouth every 12 (twelve) hours. 180 tablet 3    vitamin renal formula, B-complex-vitamin c-folic acid, (NEPHROCAP) 1 mg Cap Take 1 capsule by mouth once daily. 30 capsule 0    blood sugar diagnostic Strp Use to test blood sugar 2 times daily 100 each 3    blood-glucose meter (ONETOUCH VERIO  SYSTEM) Misc 1 each by Misc.(Non-Drug; Combo Route) route once. for 1 dose 1 each 0    estradiol (ESTRACE) 0.5 MG tablet TAKE 1 TABLET DAILY FOR 3 WEEKS PER MONTH, THEN DO NOT TAKE FOR FOURTH WEEK EACH MONTH (Patient not taking: Reported on 2/10/2020) 90 tablet 3    fluocinolone (DERMA-SMOOTHE) 0.01 % external oil Apply oil to scalp once a day 1 Bottle 5    ipratropium (ATROVENT) 0.03 % nasal spray 1 or 2 sprays each nasal passage every 8 hours, if needed, as directed 30 mL 6    lancets 31 gauge Misc Use to test blood sugar 2 times daily 100 each 3     No current facility-administered medications on file prior to visit.      Review of patient's allergies indicates:   Allergen Reactions    Allegra [fexofenadine] Swelling    Codeine Hives, Itching and Nausea And Vomiting       Objective:     Physical Exam   Constitutional: She is oriented to person, place, and time. She appears well-developed and well-nourished. No distress.   HENT:   Head: Normocephalic and atraumatic.   Eyes: Pupils are equal, round, and reactive to light. Conjunctivae and EOM are normal.   Neck: Normal range of motion. Neck supple. No JVD present.   Cardiovascular: Normal rate, regular rhythm, normal heart sounds and intact distal pulses. Exam reveals no gallop and no friction rub.   No murmur heard.  Pulmonary/Chest: Effort normal and breath sounds normal. No respiratory distress. She has no wheezes. She has no rales.   Abdominal: Soft. Bowel sounds are normal. She exhibits no distension. There is no tenderness.   Musculoskeletal: Normal range of motion. She exhibits no edema.   Neurological: She is alert and oriented to person, place, and time.   Skin: Skin is warm and dry.   Psychiatric: She has a normal mood and affect. Her behavior is normal.   Nursing note and vitals reviewed.      Assessment:     1. Hypertension associated with diabetes    2. Pure hypercholesterolemia    3. ESRD needing dialysis        Plan:     Hypertension associated  with diabetes    Pure hypercholesterolemia    ESRD needing dialysis      Continue statin-hlp  Continue diuretic- htn

## 2020-02-17 ENCOUNTER — TELEPHONE (OUTPATIENT)
Dept: INTERNAL MEDICINE | Facility: CLINIC | Age: 80
End: 2020-02-17

## 2020-02-19 ENCOUNTER — TELEPHONE (OUTPATIENT)
Dept: OBSTETRICS AND GYNECOLOGY | Facility: CLINIC | Age: 80
End: 2020-02-19

## 2020-02-19 DIAGNOSIS — Z12.31 BREAST CANCER SCREENING BY MAMMOGRAM: Primary | ICD-10-CM

## 2020-03-02 ENCOUNTER — HOSPITAL ENCOUNTER (OUTPATIENT)
Dept: RADIOLOGY | Facility: HOSPITAL | Age: 80
Discharge: HOME | End: 2020-03-02
Attending: OBSTETRICS & GYNECOLOGY
Payer: MEDICARE

## 2020-03-02 ENCOUNTER — OFFICE VISIT (OUTPATIENT)
Dept: INTERNAL MEDICINE | Facility: CLINIC | Age: 80
End: 2020-03-02
Payer: MEDICARE

## 2020-03-02 VITALS
WEIGHT: 157.44 LBS | DIASTOLIC BLOOD PRESSURE: 80 MMHG | HEART RATE: 74 BPM | SYSTOLIC BLOOD PRESSURE: 124 MMHG | BODY MASS INDEX: 24.73 KG/M2 | OXYGEN SATURATION: 98 % | BODY MASS INDEX: 25.3 KG/M2 | WEIGHT: 153.88 LBS | HEIGHT: 66 IN | HEIGHT: 66 IN | TEMPERATURE: 98 F

## 2020-03-02 DIAGNOSIS — D69.6 THROMBOCYTOPENIA, UNSPECIFIED: ICD-10-CM

## 2020-03-02 DIAGNOSIS — N18.6 TYPE 2 DIABETES MELLITUS WITH CHRONIC KIDNEY DISEASE ON CHRONIC DIALYSIS, WITHOUT LONG-TERM CURRENT USE OF INSULIN: Primary | ICD-10-CM

## 2020-03-02 DIAGNOSIS — G62.0 DRUG-INDUCED POLYNEUROPATHY: ICD-10-CM

## 2020-03-02 DIAGNOSIS — N25.81 SECONDARY HYPERPARATHYROIDISM OF RENAL ORIGIN: ICD-10-CM

## 2020-03-02 DIAGNOSIS — E11.22 TYPE 2 DIABETES MELLITUS WITH CHRONIC KIDNEY DISEASE ON CHRONIC DIALYSIS, WITHOUT LONG-TERM CURRENT USE OF INSULIN: Primary | ICD-10-CM

## 2020-03-02 DIAGNOSIS — Z12.31 BREAST CANCER SCREENING BY MAMMOGRAM: ICD-10-CM

## 2020-03-02 DIAGNOSIS — M19.90 ARTHRITIS: ICD-10-CM

## 2020-03-02 DIAGNOSIS — C25.9 MALIGNANT NEOPLASM OF PANCREAS, UNSPECIFIED LOCATION OF MALIGNANCY: ICD-10-CM

## 2020-03-02 DIAGNOSIS — Z99.2 TYPE 2 DIABETES MELLITUS WITH CHRONIC KIDNEY DISEASE ON CHRONIC DIALYSIS, WITHOUT LONG-TERM CURRENT USE OF INSULIN: Primary | ICD-10-CM

## 2020-03-02 PROBLEM — I49.5 SICK SINUS SYNDROME: Status: ACTIVE | Noted: 2020-03-02

## 2020-03-02 PROCEDURE — 99999 PR PBB SHADOW E&M-EST. PATIENT-LVL III: CPT | Mod: PBBFAC,,, | Performed by: FAMILY MEDICINE

## 2020-03-02 PROCEDURE — 99999 PR PBB SHADOW E&M-EST. PATIENT-LVL III: ICD-10-PCS | Mod: PBBFAC,,, | Performed by: FAMILY MEDICINE

## 2020-03-02 PROCEDURE — 77067 SCR MAMMO BI INCL CAD: CPT | Mod: TC

## 2020-03-02 PROCEDURE — 77063 BREAST TOMOSYNTHESIS BI: CPT | Mod: 26,,, | Performed by: RADIOLOGY

## 2020-03-02 PROCEDURE — 99214 PR OFFICE/OUTPT VISIT, EST, LEVL IV, 30-39 MIN: ICD-10-PCS | Mod: S$PBB,,, | Performed by: FAMILY MEDICINE

## 2020-03-02 PROCEDURE — 77067 SCR MAMMO BI INCL CAD: CPT | Mod: 26,,, | Performed by: RADIOLOGY

## 2020-03-02 PROCEDURE — 77067 MAMMO DIGITAL SCREENING BILAT WITH TOMOSYNTHESIS_CAD: ICD-10-PCS | Mod: 26,,, | Performed by: RADIOLOGY

## 2020-03-02 PROCEDURE — 99213 OFFICE O/P EST LOW 20 MIN: CPT | Mod: PBBFAC | Performed by: FAMILY MEDICINE

## 2020-03-02 PROCEDURE — 99214 OFFICE O/P EST MOD 30 MIN: CPT | Mod: S$PBB,,, | Performed by: FAMILY MEDICINE

## 2020-03-02 PROCEDURE — 77063 MAMMO DIGITAL SCREENING BILAT WITH TOMOSYNTHESIS_CAD: ICD-10-PCS | Mod: 26,,, | Performed by: RADIOLOGY

## 2020-03-02 RX ORDER — TRAMADOL HYDROCHLORIDE 50 MG/1
50 TABLET ORAL EVERY 12 HOURS
Qty: 180 TABLET | Refills: 3 | Status: SHIPPED | OUTPATIENT
Start: 2020-03-02 | End: 2020-03-02

## 2020-03-02 RX ORDER — GABAPENTIN 100 MG/1
100 CAPSULE ORAL 3 TIMES DAILY
Qty: 180 CAPSULE | Refills: 4 | Status: SHIPPED | OUTPATIENT
Start: 2020-03-02 | End: 2020-07-09 | Stop reason: SDUPTHER

## 2020-03-02 RX ORDER — TRAMADOL HYDROCHLORIDE 50 MG/1
50 TABLET ORAL EVERY 12 HOURS
Qty: 180 TABLET | Refills: 3 | Status: SHIPPED | OUTPATIENT
Start: 2020-03-02 | End: 2020-10-15 | Stop reason: SDUPTHER

## 2020-03-02 NOTE — PROGRESS NOTES
Subjective:       Patient ID: Cailin Pickett is a 79 y.o. female.    Chief Complaint: Hyperglycemia (200 a.m before eating )    78 yo female here to follow up on increased blood sugars at times (around 200). She denies any recent illness, change in medication or diet. Reports good compliance with medicine and with hemodialysis for ESRD.     does not have any pertinent problems on file.  Past Medical History:   Diagnosis Date    Anemia     CKD (chronic kidney disease) stage 5, GFR less than 15 ml/min 3/14/2019    CKD (chronic kidney disease), stage III     Diabetes mellitus type II     Encounter for blood transfusion     Family history of colonic polyps 7/18/2017    Gout, unspecified     Hyperlipidemia     Hypertension     Neuropathy     Pancreatic cancer     Renal failure     Secondary hyperparathyroidism of renal origin 3/14/2019    Thyroid disease      Past Surgical History:   Procedure Laterality Date    COLONOSCOPY  2011    Dr. Favio Mims    COLONOSCOPY N/A 7/18/2017    Procedure: screening colonoscopy;  Surgeon: Kenneth Kamara MD;  Location: [facility] ENDO;  Service: Endoscopy;  Laterality: N/A;    FISTULOGRAM N/A 4/10/2019    Procedure: FISTULOGRAM;  Surgeon: Ruddy Fitzpatrick MD;  Location: [facility] CATH LAB;  Service: Vascular;  Laterality: N/A;  0830 start    HYSTERECTOMY      KNEE SURGERY Left 05/31/2018    NEPHRECTOMY Left 5/2013    Dr Carter     PANCREAS SURGERY      distal pancreatectomy    SPLENECTOMY, TOTAL      THYROIDECTOMY, PARTIAL      VENTRICULOATRIAL SHUNT Left 12/4/2014     left arm     Family History   Problem Relation Age of Onset    Cancer Mother         breast    Heart disease Mother 60        MI    Hypertension Mother     Stroke Mother     Breast cancer Mother     Cancer Father         prostate    Cancer Brother         renal cell carcinoma    Diabetes Brother     Kidney disease Brother         ESRD s/p kidney transplant    Breast cancer Sister     Breast  cancer Sister      Social History     Socioeconomic History    Marital status:      Spouse name: Not on file    Number of children: Not on file    Years of education: Not on file    Highest education level: Not on file   Occupational History     Comment: stay home    Social Needs    Financial resource strain: Not on file    Food insecurity:     Worry: Not on file     Inability: Not on file    Transportation needs:     Medical: Not on file     Non-medical: Not on file   Tobacco Use    Smoking status: Former Smoker     Packs/day: 1.00     Years: 35.00     Pack years: 35.00     Start date: 3/14/1962     Last attempt to quit: 2001     Years since quittin.2    Smokeless tobacco: Never Used   Substance and Sexual Activity    Alcohol use: No    Drug use: No    Sexual activity: Not on file   Lifestyle    Physical activity:     Days per week: Not on file     Minutes per session: Not on file    Stress: Not on file   Relationships    Social connections:     Talks on phone: Not on file     Gets together: Not on file     Attends [employer] service: Not on file     Active member of club or organization: Not on file     Attends meetings of clubs or organizations: Not on file     Relationship status: Not on file   Other Topics Concern    Not on file   Social History Narrative    She lives [place] from [facility]   A comprehensive 14-point review of systems was reviewed with patient and was negative other than as specified above.     Objective:     Vitals:    20 1329   BP: 124/80   Pulse: 74   Temp: 98 °F (36.7 °C)        Physical Exam   Constitutional: She is oriented to person, place, and time. She appears well-developed and well-nourished.   HENT:   Head: Normocephalic and atraumatic.   Eyes: Pupils are equal, round, and reactive to light. EOM are normal.   Neck: Normal range of motion. Neck supple.   Cardiovascular: Normal rate and regular rhythm.    Pulmonary/Chest: Effort normal and breath sounds normal.   Abdominal: Soft. Bowel sounds are normal.   Musculoskeletal: Normal range of motion. She exhibits no deformity.   Neurological: She is alert and oriented to person, place, and time.   Skin: Skin is warm and dry.   Psychiatric: She has a normal mood and affect. Her behavior is normal.   Nursing note and vitals reviewed.      Assessment:       1. Type 2 diabetes mellitus with chronic kidney disease on chronic dialysis, without long-term current use of insulin    2. Malignant neoplasm of pancreas, unspecified location of malignancy    3. Secondary hyperparathyroidism of renal origin    4. Thrombocytopenia, unspecified    5. Drug-induced polyneuropathy    6. Arthritis        Plan:           Problem List Items Addressed This Visit        Neuro    Drug-induced polyneuropathy    Relevant Medications    gabapentin (NEURONTIN) 100 MG capsule       Hematology    Thrombocytopenia, unspecified    Current Assessment & Plan     Lab Results   Component Value Date    WBC 5.57 07/31/2019    HGB 12.0 07/31/2019    HCT 38.8 07/31/2019    MCV 98 07/31/2019     07/31/2019       Stable platelet count; no intervention at this time            Oncology    RESOLVED: Malignant neoplasm of pancreas       Endocrine    Type II diabetes mellitus - Primary (Chronic)    Relevant Medications    ONETOUCH VERIO SYSTEM Misc    Other Relevant Orders    Hemoglobin A1c (Completed)    Secondary hyperparathyroidism of renal origin      Other Visit Diagnoses     Arthritis        Relevant Medications    traMADol (ULTRAM) 50 mg tablet      Will repeat hgba1c and adjust medication accordingly. Encouraged good dietary compliance.

## 2020-03-03 DIAGNOSIS — E11.22 TYPE 2 DIABETES MELLITUS WITH CHRONIC KIDNEY DISEASE ON CHRONIC DIALYSIS, WITHOUT LONG-TERM CURRENT USE OF INSULIN: Primary | ICD-10-CM

## 2020-03-03 DIAGNOSIS — Z99.2 TYPE 2 DIABETES MELLITUS WITH CHRONIC KIDNEY DISEASE ON CHRONIC DIALYSIS, WITHOUT LONG-TERM CURRENT USE OF INSULIN: Primary | ICD-10-CM

## 2020-03-03 DIAGNOSIS — N18.6 TYPE 2 DIABETES MELLITUS WITH CHRONIC KIDNEY DISEASE ON CHRONIC DIALYSIS, WITHOUT LONG-TERM CURRENT USE OF INSULIN: Primary | ICD-10-CM

## 2020-03-03 RX ORDER — REPAGLINIDE 1 MG/1
1 TABLET ORAL
Qty: 270 TABLET | Refills: 3 | Status: SHIPPED | OUTPATIENT
Start: 2020-03-03 | End: 2021-04-22

## 2020-03-06 ENCOUNTER — HOSPITAL ENCOUNTER (OUTPATIENT)
Dept: CARDIOLOGY | Facility: HOSPITAL | Age: 80
Discharge: HOME | End: 2020-03-06
Attending: INTERNAL MEDICINE
Payer: MEDICARE

## 2020-03-06 VITALS
HEIGHT: 66 IN | BODY MASS INDEX: 25.23 KG/M2 | SYSTOLIC BLOOD PRESSURE: 124 MMHG | DIASTOLIC BLOOD PRESSURE: 80 MMHG | WEIGHT: 157 LBS | HEART RATE: 70 BPM

## 2020-03-06 DIAGNOSIS — N18.6 ESRD NEEDING DIALYSIS: ICD-10-CM

## 2020-03-06 DIAGNOSIS — Z99.2 ESRD NEEDING DIALYSIS: ICD-10-CM

## 2020-03-06 DIAGNOSIS — E78.00 PURE HYPERCHOLESTEROLEMIA: ICD-10-CM

## 2020-03-06 DIAGNOSIS — I15.2 HYPERTENSION ASSOCIATED WITH DIABETES: ICD-10-CM

## 2020-03-06 DIAGNOSIS — E11.59 HYPERTENSION ASSOCIATED WITH DIABETES: ICD-10-CM

## 2020-03-06 PROCEDURE — 93306 ECHO (CUPID ONLY): ICD-10-PCS | Mod: 26,,, | Performed by: INTERNAL MEDICINE

## 2020-03-06 PROCEDURE — 93306 TTE W/DOPPLER COMPLETE: CPT

## 2020-03-06 PROCEDURE — 93306 TTE W/DOPPLER COMPLETE: CPT | Mod: 26,,, | Performed by: INTERNAL MEDICINE

## 2020-03-08 LAB
AORTIC ROOT ANNULUS: 2.51 CM
ASCENDING AORTA: 2.56 CM
AV INDEX (PROSTH): 0.81
AV MEAN GRADIENT: 5 MMHG
AV PEAK GRADIENT: 10 MMHG
AV VALVE AREA: 2.45 CM2
AV VELOCITY RATIO: 0.88
BSA FOR ECHO PROCEDURE: 1.82 M2
CV ECHO LV RWT: 0.59 CM
DOP CALC AO PEAK VEL: 1.57 M/S
DOP CALC AO VTI: 35.54 CM
DOP CALC LVOT AREA: 3 CM2
DOP CALC LVOT DIAMETER: 1.96 CM
DOP CALC LVOT PEAK VEL: 1.38 M/S
DOP CALC LVOT STROKE VOLUME: 87.15 CM3
DOP CALCLVOT PEAK VEL VTI: 28.9 CM
E WAVE DECELERATION TIME: 238.03 MSEC
E/A RATIO: 1.23
E/E' RATIO: 13.71 M/S
ECHO LV POSTERIOR WALL: 1.08 CM (ref 0.6–1.1)
FRACTIONAL SHORTENING: 37 % (ref 28–44)
INTERVENTRICULAR SEPTUM: 1.08 CM (ref 0.6–1.1)
IVRT: 86.51 MSEC
LA MAJOR: 3.65 CM
LA MINOR: 3.3 CM
LA WIDTH: 3.2 CM
LEFT ATRIUM SIZE: 3.06 CM
LEFT ATRIUM VOLUME INDEX: 16 ML/M2
LEFT ATRIUM VOLUME: 28.85 CM3
LEFT INTERNAL DIMENSION IN SYSTOLE: 2.29 CM (ref 2.1–4)
LEFT VENTRICLE DIASTOLIC VOLUME INDEX: 31.2 ML/M2
LEFT VENTRICLE DIASTOLIC VOLUME: 56.31 ML
LEFT VENTRICLE MASS INDEX: 68 G/M2
LEFT VENTRICLE SYSTOLIC VOLUME INDEX: 10 ML/M2
LEFT VENTRICLE SYSTOLIC VOLUME: 18.01 ML
LEFT VENTRICULAR INTERNAL DIMENSION IN DIASTOLE: 3.65 CM (ref 3.5–6)
LEFT VENTRICULAR MASS: 123.32 G
LV LATERAL E/E' RATIO: 12 M/S
LV SEPTAL E/E' RATIO: 16 M/S
MV PEAK A VEL: 0.78 M/S
MV PEAK E VEL: 0.96 M/S
PISA TR MAX VEL: 2.7 M/S
PULM VEIN S/D RATIO: 1.16
PV PEAK D VEL: 0.43 M/S
PV PEAK S VEL: 0.5 M/S
PV PEAK VELOCITY: 0.97 CM/S
RA MAJOR: 4.17 CM
RA WIDTH: 3.38 CM
RIGHT VENTRICULAR END-DIASTOLIC DIMENSION: 2.93 CM
SINUS: 2.7 CM
STJ: 2.55 CM
TDI LATERAL: 0.08 M/S
TDI SEPTAL: 0.06 M/S
TDI: 0.07 M/S
TR MAX PG: 29 MMHG

## 2020-03-12 ENCOUNTER — TELEPHONE (OUTPATIENT)
Dept: CARDIOLOGY | Facility: CLINIC | Age: 80
End: 2020-03-12

## 2020-03-12 NOTE — TELEPHONE ENCOUNTER
Pt notified. Cm  ----- Message from Haroon Long MD sent at 3/11/2020  8:23 PM CDT -----  Echo is wnl limits

## 2020-03-18 NOTE — ASSESSMENT & PLAN NOTE
Lab Results   Component Value Date    WBC 5.57 07/31/2019    HGB 12.0 07/31/2019    HCT 38.8 07/31/2019    MCV 98 07/31/2019     07/31/2019       Stable platelet count; no intervention at this time

## 2020-04-23 ENCOUNTER — TELEPHONE (OUTPATIENT)
Dept: INTERNAL MEDICINE | Facility: CLINIC | Age: 80
End: 2020-04-23

## 2020-04-23 NOTE — TELEPHONE ENCOUNTER
----- Message from Mariann James sent at 4/23/2020  1:50 PM CDT -----  Contact: pt   Pt needing a call back regarding the diabetes store is trying to get in touch with the office they will fax some documents to the office       Pt contact# [phone]

## 2020-04-28 ENCOUNTER — TELEPHONE (OUTPATIENT)
Dept: INTERNAL MEDICINE | Facility: CLINIC | Age: 80
End: 2020-04-28

## 2020-04-28 NOTE — TELEPHONE ENCOUNTER
----- Message from Tash Krueger sent at 4/28/2020  9:25 AM CDT -----  Contact: Mony from [employer] [facility] [phone]  Mony called back from the [facility] stores they sent a fax to your office on April 23 they are asking for the status.   Call back [phone]  She  Will refax today as well.

## 2020-05-08 ENCOUNTER — TELEPHONE (OUTPATIENT)
Dept: INTERNAL MEDICINE | Facility: CLINIC | Age: 80
End: 2020-05-08

## 2020-05-08 NOTE — TELEPHONE ENCOUNTER
----- Message from Melly Torres sent at 5/8/2020 11:43 AM CDT -----  Contact: [employer]  Type:  Patient Returning Call    Who Called: [employer]  Who Left Message for Patient: Sheeba  Does the patient know what this is regarding?:yes   Would the patient rather a call back or a response via [facility]? Call back   Best Call Back Number: [phone]   Additional Information: she is calling in regards to the Rx for diabetic supplies, please advise.

## 2020-05-08 NOTE — TELEPHONE ENCOUNTER
They will contact the patient and see what she would wanna do if a new doctor has to sign off on her diabetes orders since Dr. Grace is not in clinic.

## 2020-05-14 ENCOUNTER — TELEPHONE (OUTPATIENT)
Dept: INTERNAL MEDICINE | Facility: CLINIC | Age: 80
End: 2020-05-14

## 2020-05-14 NOTE — TELEPHONE ENCOUNTER
----- Message from Cailin Onofre sent at 5/14/2020 10:39 AM CDT -----  Type: Patient Call Back    Who called: pt     What is the request in detail: pt asking for a call back regarding her diabetic supplies. She states company has not received fax.     Can the clinic reply by [facility]? No     Would the patient rather a call back or a response via [facility]? Call back     Best call back number: [phone]    Additional Information:

## 2020-05-18 ENCOUNTER — TELEPHONE (OUTPATIENT)
Dept: INTERNAL MEDICINE | Facility: CLINIC | Age: 80
End: 2020-05-18

## 2020-05-18 NOTE — TELEPHONE ENCOUNTER
----- Message from Arnel Palmer sent at 5/18/2020 12:25 PM CDT -----  Contact: Diabetes store/Ms.Alka Tan called in and wanted to speak with the office regarding a from that was faxed over incorrectly.  would like a call back from the office and can be reached at    [phone]

## 2020-06-02 ENCOUNTER — TELEPHONE (OUTPATIENT)
Dept: INTERNAL MEDICINE | Facility: CLINIC | Age: 80
End: 2020-06-02

## 2020-06-02 NOTE — TELEPHONE ENCOUNTER
----- Message from Ester Casillas sent at 6/2/2020  1:43 PM CDT -----  Contact: Mikayla[employer]  Please call Alka @ [phone] regarding the form CMN, states the frequency code do not match high testing.

## 2020-06-17 ENCOUNTER — TELEPHONE (OUTPATIENT)
Dept: CARDIOLOGY | Facility: CLINIC | Age: 80
End: 2020-06-17

## 2020-06-17 NOTE — TELEPHONE ENCOUNTER
Called and left v/m that Dr Long in cath lab on aug 14 and I am moving her appt up to Aug 12th at 11:20 am. Please call me if this will not work for you. cm

## 2020-06-18 ENCOUNTER — TELEPHONE (OUTPATIENT)
Dept: CARDIOLOGY | Facility: CLINIC | Age: 80
End: 2020-06-18

## 2020-06-18 NOTE — TELEPHONE ENCOUNTER
Appt rescheduled due to dialysis , can not come on a hospital rounds week and has to be in the afternoon if on a Monday. Cm  ----- Message from Fatimah Stevens sent at 6/18/2020 11:06 AM CDT -----  Regarding: appointment  Stated she will like a call back about changing her appointment to a Tuesday or Thursday, she can be reached at [phone] or [phone] Thanks

## 2020-07-02 ENCOUNTER — LAB VISIT (OUTPATIENT)
Dept: LAB | Facility: HOSPITAL | Age: 80
End: 2020-07-02
Attending: FAMILY MEDICINE
Payer: MEDICARE

## 2020-07-02 DIAGNOSIS — Z99.2 TYPE 2 DIABETES MELLITUS WITH CHRONIC KIDNEY DISEASE ON CHRONIC DIALYSIS, WITHOUT LONG-TERM CURRENT USE OF INSULIN: ICD-10-CM

## 2020-07-02 DIAGNOSIS — E11.22 TYPE 2 DIABETES MELLITUS WITH CHRONIC KIDNEY DISEASE ON CHRONIC DIALYSIS, WITHOUT LONG-TERM CURRENT USE OF INSULIN: ICD-10-CM

## 2020-07-02 DIAGNOSIS — N18.6 TYPE 2 DIABETES MELLITUS WITH CHRONIC KIDNEY DISEASE ON CHRONIC DIALYSIS, WITHOUT LONG-TERM CURRENT USE OF INSULIN: ICD-10-CM

## 2020-07-02 LAB
ESTIMATED AVG GLUCOSE: 120 MG/DL (ref 68–131)
HBA1C MFR BLD HPLC: 5.8 % (ref 4–5.6)

## 2020-07-02 PROCEDURE — 36415 COLL VENOUS BLD VENIPUNCTURE: CPT

## 2020-07-02 PROCEDURE — 83036 HEMOGLOBIN GLYCOSYLATED A1C: CPT

## 2020-07-08 NOTE — PROGRESS NOTES
Subjective:       Patient ID: Cailin Pickett is a 80 y.o. female.    Chief Complaint: History of pancreatic cancer [Z85.07]  HPI: We have an opportunity to see Ms. Cailin Pickett in Hematology Oncology clinic at [facility] on 07/08/2020.  Ms. Cailin Pickett is a 80 y.o.  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  US abdomen showed   Impression         Postsurgical changes partial pancreatectomy, splenectomy and left nephrectomy.    Cholelithiasis without scan evidence of cholecystitis.  Correlate with clinical and laboratory findings.    Partially visualized right kidney demonstrates numerous cysts in keeping with history of polycystic kidney disease.    No other significant findings.  See above.      Amylase/lipase slightly elevated.      Oncology History    No history exists.     Past Medical History:   Diagnosis Date    Anemia     CKD (chronic kidney disease) stage 5, GFR less than 15 ml/min 3/14/2019    CKD (chronic kidney disease), stage III     Diabetes mellitus type II     Encounter for blood transfusion     Family history of colonic polyps 7/18/2017    Gout, unspecified     Hyperlipidemia     Hypertension     Neuropathy     Pancreatic cancer     Renal failure     Secondary hyperparathyroidism of renal origin 3/14/2019    Thyroid disease      Family History   Problem Relation Age of Onset    Cancer Mother         breast    Heart disease Mother 60        MI    Hypertension Mother     Stroke Mother     Breast cancer Mother     Cancer Father         prostate    Cancer Brother         renal cell carcinoma    Diabetes Brother     Kidney disease Brother         ESRD s/p kidney transplant    Breast cancer Sister     Breast cancer Sister      Social History     Socioeconomic History    Marital status:      Spouse name: Not on file    Number of children: Not on file    Years of  education: Not on file    Highest education level: Not on file   Occupational History     Comment: stay home    Social Needs    Financial resource strain: Not on file    Food insecurity     Worry: Not on file     Inability: Not on file    Transportation needs     Medical: Not on file     Non-medical: Not on file   Tobacco Use    Smoking status: Former Smoker     Packs/day: 1.00     Years: 35.00     Pack years: 35.00     Start date: 3/14/1962     Quit date: 2001     Years since quittin.5    Smokeless tobacco: Never Used   Substance and Sexual Activity    Alcohol use: No    Drug use: No    Sexual activity: Not on file   Lifestyle    Physical activity     Days per week: Not on file     Minutes per session: Not on file    Stress: Not on file   Relationships    Social connections     Talks on phone: Not on file     Gets together: Not on file     Attends [employer] service: Not on file     Active member of club or organization: Not on file     Attends meetings of clubs or organizations: Not on file     Relationship status: Not on file   Other Topics Concern    Not on file   Social History Narrative    She lives [place] minutes [place] from [place]     Past Surgical History:   Procedure Laterality Date    COLONOSCOPY      Dr. Favio Mims    COLONOSCOPY N/A 2017    Procedure: screening colonoscopy;  Surgeon: Kenneth Kamara MD;  Location: [facility] ENDO;  Service: Endoscopy;  Laterality: N/A;    FISTULOGRAM N/A 4/10/2019    Procedure: FISTULOGRAM;  Surgeon: Ruddy Fitzpatrick MD;  Location: [facility] CATH LAB;  Service: Vascular;  Laterality: N/A;  0830 start    HYSTERECTOMY      KNEE SURGERY Left 2018    NEPHRECTOMY Left 2013    Dr Carter     PANCREAS SURGERY      distal pancreatectomy    SPLENECTOMY, TOTAL      THYROIDECTOMY, PARTIAL      VENTRICULOATRIAL SHUNT Left 2014     left arm     Current Outpatient Medications   Medication Sig Dispense Refill    allopurinol (ZYLOPRIM) 100 MG  tablet TAKE 1 TABLET DAILY 90 tablet 4    atorvastatin (LIPITOR) 40 MG tablet Take 1 tablet (40 mg total) by mouth once daily. 90 tablet 4    AURYXIA 210 mg iron Tab       blood sugar diagnostic Strp Use to test blood sugar 2 times daily 100 each 3    cetirizine (ZYRTEC) 10 MG tablet Take 10 mg by mouth once daily.       co-enzyme Q-10 30 mg capsule Take 1 capsule (30 mg total) by mouth 2 (two) times daily. 180 capsule 4    fluocinolone (DERMA-SMOOTHE) 0.01 % external oil Apply oil to scalp once a day 1 Bottle 5    furosemide (LASIX) 40 MG tablet TAKE 1 TABLET DAILY 90 tablet 4    gabapentin (NEURONTIN) 100 MG capsule Take 1 capsule (100 mg total) by mouth 3 (three) times daily. 180 capsule 4    glucosamine-chondroitin (OSTEO BI-FLEX) 250-200 mg Tab Take 1 tablet by mouth 2 (two) times daily.      hydrOXYzine HCl (ATARAX) 25 MG tablet TAKE 1 TABLET TWICE A DAY AS NEEDED FOR ITCHING 180 tablet 3    lancets 31 gauge Misc Use to test blood sugar 2 times daily 100 each 3    ONETOUCH VERIO SYSTEM Misc       repaglinide (PRANDIN) 1 MG tablet Take 1 tablet (1 mg total) by mouth 3 (three) times daily before meals. 270 tablet 3    traMADol (ULTRAM) 50 mg tablet Take 1 tablet (50 mg total) by mouth every 12 (twelve) hours. 180 tablet 3    vitamin renal formula, B-complex-vitamin c-folic acid, (NEPHROCAP) 1 mg Cap Take 1 capsule by mouth once daily. 30 capsule 0     No current facility-administered medications for this visit.        Labs:  Lab Results   Component Value Date    WBC 5.57 07/31/2019    HGB 12.0 07/31/2019    HCT 38.8 07/31/2019    MCV 98 07/31/2019     07/31/2019     BMP  Lab Results   Component Value Date     06/03/2019    K 4.6 06/03/2019    CL 97 06/03/2019    CO2 30 (H) 06/03/2019    BUN 29 (H) 06/03/2019    CREATININE 6.3 (H) 06/03/2019    CALCIUM 10.1 06/03/2019    ANIONGAP 12 06/03/2019    ESTGFRAFRICA 7 (A) 06/03/2019    EGFRNONAA 6 (A) 06/03/2019     Lab Results   Component  Value Date    ALT 15 11/18/2019    AST 21 11/18/2019    ALKPHOS 81 06/03/2019    BILITOT 0.3 06/03/2019       Lab Results   Component Value Date    IRON 53 09/07/2018    TIBC 241 (L) 09/07/2018    FERRITIN 435 (H) 09/07/2018     Lab Results   Component Value Date    SWXFCIDY07 546 10/31/2013     Lab Results   Component Value Date    FOLATE 11.2 10/31/2013     Lab Results   Component Value Date    TSH 1.407 09/16/2019       I have reviewed the radiology reports and examined the scan/xray images.    Review of Systems   Constitutional: Negative.    HENT: Negative.    Eyes: Negative.    Respiratory: Negative.    Cardiovascular: Negative.    Gastrointestinal: Negative.    Endocrine: Negative.    Genitourinary: Negative.    Musculoskeletal: Negative.    Skin: Negative.    Allergic/Immunologic: Negative.    Neurological: Negative.    Hematological: Negative.    Psychiatric/Behavioral: Negative.      ECOG SCORE    0 - Fully active-able to carry on all pre-disease performance without restriction            Objective:     Vitals:    07/09/20 1311   BP: (!) 129/57   Pulse: 74   Resp: 18   Temp: 97.4 °F (36.3 °C)   Body mass index is 24.34 kg/m².  Physical Exam  Vitals signs and nursing note reviewed.   Constitutional:       Appearance: She is well-developed.   HENT:      Head: Normocephalic and atraumatic.   Eyes:      Conjunctiva/sclera: Conjunctivae normal.   Neck:      Musculoskeletal: Normal range of motion and neck supple.   Cardiovascular:      Rate and Rhythm: Normal rate and regular rhythm.   Pulmonary:      Effort: Pulmonary effort is normal.      Breath sounds: Normal breath sounds.   Abdominal:      General: Bowel sounds are normal.      Palpations: Abdomen is soft.   Musculoskeletal: Normal range of motion.   Skin:     General: Skin is warm and dry.   Neurological:      Mental Status: She is alert and oriented to person, place, and time.   Psychiatric:         Behavior: Behavior normal.         Thought Content:  Thought content normal.         Judgment: Judgment normal.           Assessment:      1. History of pancreatic cancer    2. Thrombocytopenia, unspecified    3. Anemia due to chronic kidney disease, on chronic dialysis    4. Drug-induced polyneuropathy    5. Itching    6. Pure hypercholesterolemia    7. Type 2 diabetes mellitus with chronic kidney disease on chronic dialysis, without long-term current use of insulin           Plan:     History of pancreatic cancer  -     US Abdomen Complete; Future; Expected date: 07/09/2020  -     CBC auto differential; Future; Expected date: 07/09/2020  -     Comprehensive metabolic panel; Future; Expected date: 07/09/2020  -     Cancer antigen 19-9; Future; Expected date: 07/09/2020    Thrombocytopenia, unspecified    Anemia due to chronic kidney disease, on chronic dialysis    Drug-induced polyneuropathy  -     Discontinue: gabapentin (NEURONTIN) 100 MG capsule; Take 2 capsules (200 mg total) by mouth 3 (three) times daily.  Dispense: 360 capsule; Refill: 4  -     gabapentin (NEURONTIN) 100 MG capsule; Take 2 capsules (200 mg total) by mouth 3 (three) times daily.  Dispense: 540 capsule; Refill: 4    Itching  -     Discontinue: hydrOXYzine HCL (ATARAX) 25 MG tablet; Take 1 tablet (25 mg total) by mouth 3 (three) times daily as needed for Itching. TAKE 1 TABLET TWICE A DAY AS NEEDED FOR ITCHING  Dispense: 180 tablet; Refill: 3  -     hydrOXYzine HCL (ATARAX) 25 MG tablet; Take 1 tablet (25 mg total) by mouth 3 (three) times daily as needed for Itching. TAKE 1 TABLET TWICE A DAY AS NEEDED FOR ITCHING  Dispense: 270 tablet; Refill: 3    Pure hypercholesterolemia  -     Ambulatory referral/consult to Internal Medicine; Future; Expected date: 07/16/2020    Type 2 diabetes mellitus with chronic kidney disease on chronic dialysis, without long-term current use of insulin  -     HEMOGLOBIN A1C; Future; Expected date: 07/09/2020  -     Ambulatory referral/consult to Internal Medicine;  Future; Expected date: 07/16/2020

## 2020-07-09 ENCOUNTER — OFFICE VISIT (OUTPATIENT)
Dept: HEMATOLOGY/ONCOLOGY | Facility: CLINIC | Age: 80
End: 2020-07-09
Payer: MEDICARE

## 2020-07-09 VITALS
OXYGEN SATURATION: 95 % | TEMPERATURE: 97 F | HEIGHT: 66 IN | SYSTOLIC BLOOD PRESSURE: 129 MMHG | BODY MASS INDEX: 24.24 KG/M2 | HEART RATE: 74 BPM | WEIGHT: 150.81 LBS | RESPIRATION RATE: 18 BRPM | DIASTOLIC BLOOD PRESSURE: 57 MMHG

## 2020-07-09 DIAGNOSIS — E11.22 TYPE 2 DIABETES MELLITUS WITH CHRONIC KIDNEY DISEASE ON CHRONIC DIALYSIS, WITHOUT LONG-TERM CURRENT USE OF INSULIN: Chronic | ICD-10-CM

## 2020-07-09 DIAGNOSIS — D63.1 ANEMIA DUE TO CHRONIC KIDNEY DISEASE, ON CHRONIC DIALYSIS: ICD-10-CM

## 2020-07-09 DIAGNOSIS — D69.6 THROMBOCYTOPENIA, UNSPECIFIED: ICD-10-CM

## 2020-07-09 DIAGNOSIS — N18.6 ANEMIA DUE TO CHRONIC KIDNEY DISEASE, ON CHRONIC DIALYSIS: ICD-10-CM

## 2020-07-09 DIAGNOSIS — Z99.2 ANEMIA DUE TO CHRONIC KIDNEY DISEASE, ON CHRONIC DIALYSIS: ICD-10-CM

## 2020-07-09 DIAGNOSIS — G62.0 DRUG-INDUCED POLYNEUROPATHY: ICD-10-CM

## 2020-07-09 DIAGNOSIS — L29.9 ITCHING: ICD-10-CM

## 2020-07-09 DIAGNOSIS — N18.6 TYPE 2 DIABETES MELLITUS WITH CHRONIC KIDNEY DISEASE ON CHRONIC DIALYSIS, WITHOUT LONG-TERM CURRENT USE OF INSULIN: Chronic | ICD-10-CM

## 2020-07-09 DIAGNOSIS — Z99.2 TYPE 2 DIABETES MELLITUS WITH CHRONIC KIDNEY DISEASE ON CHRONIC DIALYSIS, WITHOUT LONG-TERM CURRENT USE OF INSULIN: Chronic | ICD-10-CM

## 2020-07-09 DIAGNOSIS — Z85.07 HISTORY OF PANCREATIC CANCER: Primary | ICD-10-CM

## 2020-07-09 DIAGNOSIS — E78.00 PURE HYPERCHOLESTEROLEMIA: Chronic | ICD-10-CM

## 2020-07-09 PROCEDURE — 99999 PR PBB SHADOW E&M-EST. PATIENT-LVL IV: CPT | Mod: PBBFAC,,, | Performed by: INTERNAL MEDICINE

## 2020-07-09 PROCEDURE — 99214 OFFICE O/P EST MOD 30 MIN: CPT | Mod: PBBFAC | Performed by: INTERNAL MEDICINE

## 2020-07-09 PROCEDURE — 99214 PR OFFICE/OUTPT VISIT, EST, LEVL IV, 30-39 MIN: ICD-10-PCS | Mod: S$PBB,,, | Performed by: INTERNAL MEDICINE

## 2020-07-09 PROCEDURE — 99214 OFFICE O/P EST MOD 30 MIN: CPT | Mod: S$PBB,,, | Performed by: INTERNAL MEDICINE

## 2020-07-09 PROCEDURE — 99999 PR PBB SHADOW E&M-EST. PATIENT-LVL IV: ICD-10-PCS | Mod: PBBFAC,,, | Performed by: INTERNAL MEDICINE

## 2020-07-09 RX ORDER — GABAPENTIN 100 MG/1
200 CAPSULE ORAL 3 TIMES DAILY
Qty: 360 CAPSULE | Refills: 4 | Status: SHIPPED | OUTPATIENT
Start: 2020-07-09 | End: 2020-07-09 | Stop reason: SDUPTHER

## 2020-07-09 RX ORDER — HYDROXYZINE HYDROCHLORIDE 25 MG/1
25 TABLET, FILM COATED ORAL 3 TIMES DAILY PRN
Qty: 180 TABLET | Refills: 3 | Status: SHIPPED | OUTPATIENT
Start: 2020-07-09 | End: 2020-07-09 | Stop reason: SDUPTHER

## 2020-07-09 RX ORDER — HYDROXYZINE HYDROCHLORIDE 25 MG/1
25 TABLET, FILM COATED ORAL 3 TIMES DAILY PRN
Qty: 270 TABLET | Refills: 3 | Status: SHIPPED | OUTPATIENT
Start: 2020-07-09 | End: 2021-02-26

## 2020-07-09 RX ORDER — GABAPENTIN 100 MG/1
200 CAPSULE ORAL 3 TIMES DAILY
Qty: 540 CAPSULE | Refills: 4 | Status: SHIPPED | OUTPATIENT
Start: 2020-07-09 | End: 2021-05-27 | Stop reason: SDUPTHER

## 2020-07-15 ENCOUNTER — TELEPHONE (OUTPATIENT)
Dept: RADIOLOGY | Facility: HOSPITAL | Age: 80
End: 2020-07-15

## 2020-07-16 ENCOUNTER — HOSPITAL ENCOUNTER (OUTPATIENT)
Dept: RADIOLOGY | Facility: HOSPITAL | Age: 80
Discharge: HOME | End: 2020-07-16
Attending: INTERNAL MEDICINE
Payer: MEDICARE

## 2020-07-16 DIAGNOSIS — Z85.07 HISTORY OF PANCREATIC CANCER: ICD-10-CM

## 2020-07-16 PROCEDURE — 76700 US EXAM ABDOM COMPLETE: CPT | Mod: TC

## 2020-07-16 PROCEDURE — 76700 US ABDOMEN COMPLETE: ICD-10-PCS | Mod: 26,,, | Performed by: RADIOLOGY

## 2020-07-16 PROCEDURE — 76700 US EXAM ABDOM COMPLETE: CPT | Mod: 26,,, | Performed by: RADIOLOGY

## 2020-08-17 ENCOUNTER — OFFICE VISIT (OUTPATIENT)
Dept: CARDIOLOGY | Facility: CLINIC | Age: 80
End: 2020-08-17
Payer: MEDICARE

## 2020-08-17 VITALS
OXYGEN SATURATION: 99 % | HEART RATE: 78 BPM | HEIGHT: 66 IN | BODY MASS INDEX: 23.81 KG/M2 | DIASTOLIC BLOOD PRESSURE: 62 MMHG | SYSTOLIC BLOOD PRESSURE: 130 MMHG | WEIGHT: 148.13 LBS

## 2020-08-17 DIAGNOSIS — I15.2 HYPERTENSION ASSOCIATED WITH DIABETES: Primary | Chronic | ICD-10-CM

## 2020-08-17 DIAGNOSIS — Z99.2 ESRD NEEDING DIALYSIS: ICD-10-CM

## 2020-08-17 DIAGNOSIS — E11.59 HYPERTENSION ASSOCIATED WITH DIABETES: Primary | Chronic | ICD-10-CM

## 2020-08-17 DIAGNOSIS — E78.00 PURE HYPERCHOLESTEROLEMIA: Chronic | ICD-10-CM

## 2020-08-17 DIAGNOSIS — N18.6 ESRD NEEDING DIALYSIS: ICD-10-CM

## 2020-08-17 DIAGNOSIS — I49.5 SICK SINUS SYNDROME: ICD-10-CM

## 2020-08-17 PROCEDURE — 99214 PR OFFICE/OUTPT VISIT, EST, LEVL IV, 30-39 MIN: ICD-10-PCS | Mod: S$PBB,,, | Performed by: INTERNAL MEDICINE

## 2020-08-17 PROCEDURE — 99999 PR PBB SHADOW E&M-EST. PATIENT-LVL IV: CPT | Mod: PBBFAC,,, | Performed by: INTERNAL MEDICINE

## 2020-08-17 PROCEDURE — 99214 OFFICE O/P EST MOD 30 MIN: CPT | Mod: S$PBB,,, | Performed by: INTERNAL MEDICINE

## 2020-08-17 PROCEDURE — 99214 OFFICE O/P EST MOD 30 MIN: CPT | Mod: PBBFAC | Performed by: INTERNAL MEDICINE

## 2020-08-17 PROCEDURE — 99999 PR PBB SHADOW E&M-EST. PATIENT-LVL IV: ICD-10-PCS | Mod: PBBFAC,,, | Performed by: INTERNAL MEDICINE

## 2020-08-17 NOTE — PROGRESS NOTES
Subjective:   Patient ID:  Cailin Pickett is a 80 y.o. female who presents for follow-up of Hypertension associated with diabetes (6 month f/u)  Patient denies CP, angina or anginal equivalent.Echo- nml lv function    Hypertension  This is a chronic problem. The current episode started more than 1 year ago. The problem has been gradually improving since onset. The problem is controlled. Pertinent negatives include no chest pain, palpitations or shortness of breath. Past treatments include diuretics. The current treatment provides moderate improvement. There are no compliance problems.    Hyperlipidemia  This is a chronic problem. The current episode started more than 1 year ago. The problem is controlled. Pertinent negatives include no chest pain or shortness of breath. Current antihyperlipidemic treatment includes statins. There are no compliance problems.        Review of Systems   Constitution: Negative. Negative for weight gain.   HENT: Negative.    Eyes: Negative.    Cardiovascular: Negative.  Negative for chest pain, leg swelling and palpitations.   Respiratory: Negative.  Negative for shortness of breath.    Endocrine: Negative.    Hematologic/Lymphatic: Negative.    Skin: Negative.    Musculoskeletal: Negative for muscle weakness.   Gastrointestinal: Negative.    Genitourinary: Negative.    Neurological: Negative.  Negative for dizziness.   Psychiatric/Behavioral: Negative.    Allergic/Immunologic: Negative.      Family History   Problem Relation Age of Onset    Cancer Mother         breast    Heart disease Mother 60        MI    Hypertension Mother     Stroke Mother     Breast cancer Mother     Cancer Father         prostate    Cancer Brother         renal cell carcinoma    Diabetes Brother     Kidney disease Brother         ESRD s/p kidney transplant    Breast cancer Sister     Breast cancer Sister      Past Medical History:   Diagnosis Date    Anemia     CKD (chronic kidney disease)  stage 5, GFR less than 15 ml/min 3/14/2019    CKD (chronic kidney disease), stage III     Diabetes mellitus type II     Encounter for blood transfusion     Family history of colonic polyps 2017    Gout, unspecified     Hyperlipidemia     Hypertension     Neuropathy     Pancreatic cancer     Renal failure     Secondary hyperparathyroidism of renal origin 3/14/2019    Thyroid disease      Social History     Socioeconomic History    Marital status:      Spouse name: Not on file    Number of children: Not on file    Years of education: Not on file    Highest education level: Not on file   Occupational History     Comment: stay home    Social Needs    Financial resource strain: Not on file    Food insecurity     Worry: Not on file     Inability: Not on file    Transportation needs     Medical: Not on file     Non-medical: Not on file   Tobacco Use    Smoking status: Former Smoker     Packs/day: 1.00     Years: 35.00     Pack years: 35.00     Start date: 3/14/1962     Quit date: 2001     Years since quittin.6    Smokeless tobacco: Never Used   Substance and Sexual Activity    Alcohol use: No    Drug use: No    Sexual activity: Not on file   Lifestyle    Physical activity     Days per week: Not on file     Minutes per session: Not on file    Stress: Not on file   Relationships    Social connections     Talks on phone: Not on file     Gets together: Not on file     Attends Gnosticism service: Not on file     Active member of club or organization: Not on file     Attends meetings of clubs or organizations: Not on file     Relationship status: Not on file   Other Topics Concern    Not on file   Social History Narrative    She lives [place] minutes [place] from [place]     Current Outpatient Medications on File Prior to Visit   Medication Sig Dispense Refill    allopurinol (ZYLOPRIM) 100 MG tablet TAKE 1 TABLET DAILY 90 tablet 4    atorvastatin (LIPITOR) 40 MG tablet Take 1 tablet  (40 mg total) by mouth once daily. 90 tablet 4    AURYXIA 210 mg iron Tab       blood sugar diagnostic Strp Use to test blood sugar 2 times daily 100 each 3    cetirizine (ZYRTEC) 10 MG tablet Take 10 mg by mouth once daily.       co-enzyme Q-10 30 mg capsule Take 1 capsule (30 mg total) by mouth 2 (two) times daily. 180 capsule 4    fluocinolone (DERMA-SMOOTHE) 0.01 % external oil Apply oil to scalp once a day 1 Bottle 5    furosemide (LASIX) 40 MG tablet TAKE 1 TABLET DAILY 90 tablet 4    gabapentin (NEURONTIN) 100 MG capsule Take 2 capsules (200 mg total) by mouth 3 (three) times daily. 540 capsule 4    glucosamine-chondroitin (OSTEO BI-FLEX) 250-200 mg Tab Take 1 tablet by mouth 2 (two) times daily.      hydrOXYzine HCL (ATARAX) 25 MG tablet Take 1 tablet (25 mg total) by mouth 3 (three) times daily as needed for Itching. TAKE 1 TABLET TWICE A DAY AS NEEDED FOR ITCHING 270 tablet 3    lancets 31 gauge Misc Use to test blood sugar 2 times daily 100 each 3    ONETOUCH VERIO SYSTEM Misc       repaglinide (PRANDIN) 1 MG tablet Take 1 tablet (1 mg total) by mouth 3 (three) times daily before meals. 270 tablet 3    traMADol (ULTRAM) 50 mg tablet Take 1 tablet (50 mg total) by mouth every 12 (twelve) hours. 180 tablet 3    vitamin renal formula, B-complex-vitamin c-folic acid, (NEPHROCAP) 1 mg Cap Take 1 capsule by mouth once daily. 30 capsule 0     No current facility-administered medications on file prior to visit.      Review of patient's allergies indicates:   Allergen Reactions    Allegra [fexofenadine] Swelling    Codeine Hives, Itching and Nausea And Vomiting       Objective:     Physical Exam   Constitutional: She is oriented to person, place, and time. She appears well-developed and well-nourished.   HENT:   Head: Normocephalic and atraumatic.   Eyes: Pupils are equal, round, and reactive to light. Conjunctivae and EOM are normal.   Neck: Normal range of motion. Neck supple.   Cardiovascular:  Normal rate, regular rhythm, normal heart sounds and intact distal pulses.   Pulmonary/Chest: Effort normal and breath sounds normal.   Abdominal: Soft. Bowel sounds are normal.   Musculoskeletal: Normal range of motion.   Neurological: She is alert and oriented to person, place, and time.   Skin: Skin is warm and dry.   Psychiatric: She has a normal mood and affect.   Nursing note and vitals reviewed.      Assessment:     1. Hypertension associated with diabetes    2. Pure hypercholesterolemia    3. ESRD needing dialysis    4. Sick sinus syndrome        Plan:     Hypertension associated with diabetes    Pure hypercholesterolemia    ESRD needing dialysis    Sick sinus syndrome      Continue statin-hlp  Continue diuretics-htn

## 2020-08-17 NOTE — PROGRESS NOTES
Subjective:   Patient ID:  Cailin Pickett is a 80 y.o. female who presents for follow-up of Hypertension associated with diabetes (6 month f/u)      HPI    ROS  Family History   Problem Relation Age of Onset    Cancer Mother         breast    Heart disease Mother 60        MI    Hypertension Mother     Stroke Mother     Breast cancer Mother     Cancer Father         prostate    Cancer Brother         renal cell carcinoma    Diabetes Brother     Kidney disease Brother         ESRD s/p kidney transplant    Breast cancer Sister     Breast cancer Sister      Past Medical History:   Diagnosis Date    Anemia     CKD (chronic kidney disease) stage 5, GFR less than 15 ml/min 3/14/2019    CKD (chronic kidney disease), stage III     Diabetes mellitus type II     Encounter for blood transfusion     Family history of colonic polyps 2017    Gout, unspecified     Hyperlipidemia     Hypertension     Neuropathy     Pancreatic cancer     Renal failure     Secondary hyperparathyroidism of renal origin 3/14/2019    Thyroid disease      Social History     Socioeconomic History    Marital status:      Spouse name: Not on file    Number of children: Not on file    Years of education: Not on file    Highest education level: Not on file   Occupational History     Comment: stay home    Social Needs    Financial resource strain: Not on file    Food insecurity     Worry: Not on file     Inability: Not on file    Transportation needs     Medical: Not on file     Non-medical: Not on file   Tobacco Use    Smoking status: Former Smoker     Packs/day: 1.00     Years: 35.00     Pack years: 35.00     Start date: 3/14/1962     Quit date: 2001     Years since quittin.6    Smokeless tobacco: Never Used   Substance and Sexual Activity    Alcohol use: No    Drug use: No    Sexual activity: Not on file   Lifestyle    Physical activity     Days per week: Not on file     Minutes per session:  Not on file    Stress: Not on file   Relationships    Social connections     Talks on phone: Not on file     Gets together: Not on file     Attends Mosque service: Not on file     Active member of club or organization: Not on file     Attends meetings of clubs or organizations: Not on file     Relationship status: Not on file   Other Topics Concern    Not on file   Social History Narrative    She lives 20 minutes [place] from [place]     Current Outpatient Medications on File Prior to Visit   Medication Sig Dispense Refill    allopurinol (ZYLOPRIM) 100 MG tablet TAKE 1 TABLET DAILY 90 tablet 4    atorvastatin (LIPITOR) 40 MG tablet Take 1 tablet (40 mg total) by mouth once daily. 90 tablet 4    AURYXIA 210 mg iron Tab       blood sugar diagnostic Strp Use to test blood sugar 2 times daily 100 each 3    cetirizine (ZYRTEC) 10 MG tablet Take 10 mg by mouth once daily.       co-enzyme Q-10 30 mg capsule Take 1 capsule (30 mg total) by mouth 2 (two) times daily. 180 capsule 4    fluocinolone (DERMA-SMOOTHE) 0.01 % external oil Apply oil to scalp once a day 1 Bottle 5    furosemide (LASIX) 40 MG tablet TAKE 1 TABLET DAILY 90 tablet 4    gabapentin (NEURONTIN) 100 MG capsule Take 2 capsules (200 mg total) by mouth 3 (three) times daily. 540 capsule 4    glucosamine-chondroitin (OSTEO BI-FLEX) 250-200 mg Tab Take 1 tablet by mouth 2 (two) times daily.      hydrOXYzine HCL (ATARAX) 25 MG tablet Take 1 tablet (25 mg total) by mouth 3 (three) times daily as needed for Itching. TAKE 1 TABLET TWICE A DAY AS NEEDED FOR ITCHING 270 tablet 3    lancets 31 gauge Misc Use to test blood sugar 2 times daily 100 each 3    ONETOUCH VERIO SYSTEM Misc       repaglinide (PRANDIN) 1 MG tablet Take 1 tablet (1 mg total) by mouth 3 (three) times daily before meals. 270 tablet 3    traMADol (ULTRAM) 50 mg tablet Take 1 tablet (50 mg total) by mouth every 12 (twelve) hours. 180 tablet 3    vitamin renal formula,  B-complex-vitamin c-folic acid, (NEPHROCAP) 1 mg Cap Take 1 capsule by mouth once daily. 30 capsule 0     No current facility-administered medications on file prior to visit.      Review of patient's allergies indicates:   Allergen Reactions    Allegra [fexofenadine] Swelling    Codeine Hives, Itching and Nausea And Vomiting       Objective:     Physical Exam    Assessment:     1. Hypertension associated with diabetes    2. Pure hypercholesterolemia    3. ESRD needing dialysis    4. Sick sinus syndrome        Plan:     Hypertension associated with diabetes    Pure hypercholesterolemia    ESRD needing dialysis    Sick sinus syndrome    Continue statin-hlp  Continue diuretic- htn

## 2020-08-20 ENCOUNTER — OFFICE VISIT (OUTPATIENT)
Dept: INTERNAL MEDICINE | Facility: CLINIC | Age: 80
End: 2020-08-20
Payer: MEDICARE

## 2020-08-20 VITALS
DIASTOLIC BLOOD PRESSURE: 58 MMHG | HEIGHT: 66 IN | BODY MASS INDEX: 23.81 KG/M2 | TEMPERATURE: 98 F | SYSTOLIC BLOOD PRESSURE: 136 MMHG | HEART RATE: 73 BPM | RESPIRATION RATE: 16 BRPM | OXYGEN SATURATION: 96 % | WEIGHT: 148.13 LBS

## 2020-08-20 DIAGNOSIS — Z99.2 ESRD NEEDING DIALYSIS: ICD-10-CM

## 2020-08-20 DIAGNOSIS — R23.2 HOT FLASHES: ICD-10-CM

## 2020-08-20 DIAGNOSIS — Z86.010 HISTORY OF COLON POLYPS: ICD-10-CM

## 2020-08-20 DIAGNOSIS — Z85.07 HISTORY OF PANCREATIC CANCER: ICD-10-CM

## 2020-08-20 DIAGNOSIS — E89.0 S/P PARTIAL THYROIDECTOMY: ICD-10-CM

## 2020-08-20 DIAGNOSIS — I49.5 SICK SINUS SYNDROME: ICD-10-CM

## 2020-08-20 DIAGNOSIS — E78.5 HYPERLIPIDEMIA ASSOCIATED WITH TYPE 2 DIABETES MELLITUS: ICD-10-CM

## 2020-08-20 DIAGNOSIS — Z99.2 TYPE 2 DIABETES MELLITUS WITH CHRONIC KIDNEY DISEASE ON CHRONIC DIALYSIS, WITHOUT LONG-TERM CURRENT USE OF INSULIN: ICD-10-CM

## 2020-08-20 DIAGNOSIS — E11.22 TYPE 2 DIABETES MELLITUS WITH CHRONIC KIDNEY DISEASE ON CHRONIC DIALYSIS, WITHOUT LONG-TERM CURRENT USE OF INSULIN: ICD-10-CM

## 2020-08-20 DIAGNOSIS — E11.69 HYPERLIPIDEMIA ASSOCIATED WITH TYPE 2 DIABETES MELLITUS: ICD-10-CM

## 2020-08-20 DIAGNOSIS — D63.1 ANEMIA DUE TO CHRONIC KIDNEY DISEASE, ON CHRONIC DIALYSIS: ICD-10-CM

## 2020-08-20 DIAGNOSIS — E11.59 HYPERTENSION ASSOCIATED WITH DIABETES: Primary | Chronic | ICD-10-CM

## 2020-08-20 DIAGNOSIS — J30.1 SEASONAL ALLERGIC RHINITIS DUE TO POLLEN: ICD-10-CM

## 2020-08-20 DIAGNOSIS — N18.6 TYPE 2 DIABETES MELLITUS WITH CHRONIC KIDNEY DISEASE ON CHRONIC DIALYSIS, WITHOUT LONG-TERM CURRENT USE OF INSULIN: ICD-10-CM

## 2020-08-20 DIAGNOSIS — N18.6 ANEMIA DUE TO CHRONIC KIDNEY DISEASE, ON CHRONIC DIALYSIS: ICD-10-CM

## 2020-08-20 DIAGNOSIS — I15.2 HYPERTENSION ASSOCIATED WITH DIABETES: Primary | Chronic | ICD-10-CM

## 2020-08-20 DIAGNOSIS — E11.42 TYPE 2 DIABETES MELLITUS WITH DIABETIC POLYNEUROPATHY, WITHOUT LONG-TERM CURRENT USE OF INSULIN: ICD-10-CM

## 2020-08-20 DIAGNOSIS — N18.6 ESRD NEEDING DIALYSIS: ICD-10-CM

## 2020-08-20 DIAGNOSIS — Z99.2 ANEMIA DUE TO CHRONIC KIDNEY DISEASE, ON CHRONIC DIALYSIS: ICD-10-CM

## 2020-08-20 DIAGNOSIS — N25.81 SECONDARY HYPERPARATHYROIDISM OF RENAL ORIGIN: ICD-10-CM

## 2020-08-20 PROBLEM — Z12.11 COLON CANCER SCREENING: Status: RESOLVED | Noted: 2017-07-18 | Resolved: 2020-08-20

## 2020-08-20 PROBLEM — G62.0 DRUG-INDUCED POLYNEUROPATHY: Status: RESOLVED | Noted: 2020-03-02 | Resolved: 2020-08-20

## 2020-08-20 PROBLEM — Z12.11 SPECIAL SCREENING FOR MALIGNANT NEOPLASMS, COLON: Status: RESOLVED | Noted: 2017-07-18 | Resolved: 2020-08-20

## 2020-08-20 PROBLEM — R09.89 BRUIT: Status: RESOLVED | Noted: 2019-02-11 | Resolved: 2020-08-20

## 2020-08-20 PROBLEM — D69.6 THROMBOCYTOPENIA, UNSPECIFIED: Status: RESOLVED | Noted: 2020-03-02 | Resolved: 2020-08-20

## 2020-08-20 PROBLEM — K63.5 COLON POLYP: Status: RESOLVED | Noted: 2017-07-18 | Resolved: 2020-08-20

## 2020-08-20 PROBLEM — Z83.719 FAMILY HISTORY OF COLONIC POLYPS: Status: RESOLVED | Noted: 2017-07-18 | Resolved: 2020-08-20

## 2020-08-20 PROCEDURE — 99214 OFFICE O/P EST MOD 30 MIN: CPT | Mod: S$PBB,,, | Performed by: FAMILY MEDICINE

## 2020-08-20 PROCEDURE — 99214 PR OFFICE/OUTPT VISIT, EST, LEVL IV, 30-39 MIN: ICD-10-PCS | Mod: S$PBB,,, | Performed by: FAMILY MEDICINE

## 2020-08-20 PROCEDURE — 99215 OFFICE O/P EST HI 40 MIN: CPT | Mod: PBBFAC | Performed by: FAMILY MEDICINE

## 2020-08-20 PROCEDURE — 99999 PR PBB SHADOW E&M-EST. PATIENT-LVL V: CPT | Mod: PBBFAC,,, | Performed by: FAMILY MEDICINE

## 2020-08-20 PROCEDURE — 99999 PR PBB SHADOW E&M-EST. PATIENT-LVL V: ICD-10-PCS | Mod: PBBFAC,,, | Performed by: FAMILY MEDICINE

## 2020-08-20 RX ORDER — AMOXICILLIN 500 MG/1
CAPSULE ORAL
Status: ON HOLD | COMMUNITY
Start: 2020-07-28 | End: 2021-02-13 | Stop reason: HOSPADM

## 2020-08-20 NOTE — PROGRESS NOTES
Subjective:       Patient ID: Cailin Pickett is a 80 y.o. female.    Chief Complaint: Establish Care    80-year-old  female patient previously seen by Dr. Grace here to establish care.  Patient with Patient Active Problem List:     Type 2 diabetes mellitus with chronic kidney disease on chronic dialysis, without long-term current use of insulin     Hypertension associated with diabetes     Hyperlipidemia associated with type 2 diabetes mellitus     Anemia due to chronic kidney disease     Allergic rhinitis due to pollen     Leg swelling     S/P partial thyroidectomy     Hot flashes     History of colon polyps     Diverticulosis of large intestine without hemorrhage     Secondary hyperparathyroidism of renal origin     ESRD needing dialysis     History of pancreatic cancer     Sick sinus syndrome     Type 2 diabetes mellitus with diabetic polyneuropathy, without long-term current use of insulin  Has been taking her medications regularly and denies any chest pain or difficulty breathing but has been having occasional hot flashes which has been ongoing.  Patient has been followed by Dr. Henderson, has been taking her medications regularly and denies any fatigue.   Patient has been staying physically active and reports blood glucose levels has been running well  Patient reports that she has been having tingling and numbness sensation to her feet especially worse at bedtime and has been taking gabapentin 100 mg 3 times daily with no significant response    Review of Systems   Constitutional: Negative for fatigue.   Eyes: Negative for visual disturbance.   Respiratory: Negative for shortness of breath.    Cardiovascular: Negative for chest pain and leg swelling.   Gastrointestinal: Negative for abdominal pain, nausea and vomiting.   Endocrine: Negative for polydipsia, polyphagia and polyuria.   Musculoskeletal: Negative for myalgias.   Skin: Negative for rash.   Neurological: Positive for numbness.  "Negative for weakness, light-headedness and headaches.   Psychiatric/Behavioral: Negative for sleep disturbance.         BP (!) 136/58 (BP Location: Right arm, Patient Position: Sitting, BP Method: Medium (Manual))   Pulse 73   Temp 97.8 °F (36.6 °C) (Tympanic)   Resp 16   Ht 5' 6" (1.676 m)   Wt 67.2 kg (148 lb 2.4 oz)   LMP 07/27/1982 (Exact Date)   SpO2 96%   BMI 23.91 kg/m²   Objective:      Physical Exam  Constitutional:       Appearance: She is well-developed.   HENT:      Head: Normocephalic and atraumatic.   Cardiovascular:      Rate and Rhythm: Normal rate and regular rhythm.      Pulses:           Dorsalis pedis pulses are 1+ on the right side and 1+ on the left side.      Heart sounds: Normal heart sounds. No murmur.   Pulmonary:      Effort: Pulmonary effort is normal.      Breath sounds: Normal breath sounds. No wheezing.   Abdominal:      General: Bowel sounds are normal.      Palpations: Abdomen is soft.      Tenderness: There is no abdominal tenderness.   Feet:      Right foot:      Protective Sensation: 10 sites tested. 10 sites sensed.      Skin integrity: Callus present.      Left foot:      Protective Sensation: 10 sites tested. 10 sites sensed.      Skin integrity: Callus present.   Skin:     General: Skin is warm and dry.      Findings: No rash.   Neurological:      Mental Status: She is alert and oriented to person, place, and time.   Psychiatric:         Mood and Affect: Mood normal.           Assessment/Plan:   1. Hypertension associated with diabetes  - Comprehensive metabolic panel; Future  - Lipid Panel; Future  - TSH; Future  Blood pressure is stable today currently not taking any medication  Encouraged to maintain lifestyle modifications with low-fat and low-cholesterol diet and exercise 30 min daily    2. Hyperlipidemia associated with type 2 diabetes mellitus  - Lipid Panel; Future  Currently on Lipitor 40 mg daily    3. Type 2 diabetes mellitus with chronic kidney disease on " chronic dialysis, without long-term current use of insulin  - Comprehensive metabolic panel; Future  - Lipid Panel; Future  Currently taking Prandin 1 mg 3 times a day    4. Secondary hyperparathyroidism of renal origin  - Vitamin D; Future  - PTH, intact; Future   5. ESRD needing dialysis  12. Anemia due to chronic kidney disease, on chronic dialysis  - CBC auto differential; Future  Followed by Nephrology    6. History of pancreatic cancer  Followed by Hematology/Oncology    7. History of colon polyps    8. Type 2 diabetes mellitus with diabetic polyneuropathy, without long-term current use of insulin  Advised to try increasing gabapentin to 200 mg at bedtime    9. S/P partial thyroidectomy    10. Seasonal allergic rhinitis due to pollen    11. Sick sinus syndrome    13. Hot flashes  - Cortisol; Future   Will check further labs likely secondary to menopausal symptoms causing hot flashes due to her age but patient thinks that it is due to menopause

## 2020-09-03 ENCOUNTER — LAB VISIT (OUTPATIENT)
Dept: LAB | Facility: HOSPITAL | Age: 80
End: 2020-09-03
Attending: FAMILY MEDICINE
Payer: MEDICARE

## 2020-09-03 DIAGNOSIS — Z99.2 ANEMIA DUE TO CHRONIC KIDNEY DISEASE, ON CHRONIC DIALYSIS: ICD-10-CM

## 2020-09-03 DIAGNOSIS — E11.59 HYPERTENSION ASSOCIATED WITH DIABETES: Chronic | ICD-10-CM

## 2020-09-03 DIAGNOSIS — E78.5 HYPERLIPIDEMIA ASSOCIATED WITH TYPE 2 DIABETES MELLITUS: ICD-10-CM

## 2020-09-03 DIAGNOSIS — E11.22 TYPE 2 DIABETES MELLITUS WITH CHRONIC KIDNEY DISEASE ON CHRONIC DIALYSIS, WITHOUT LONG-TERM CURRENT USE OF INSULIN: ICD-10-CM

## 2020-09-03 DIAGNOSIS — E11.69 HYPERLIPIDEMIA ASSOCIATED WITH TYPE 2 DIABETES MELLITUS: ICD-10-CM

## 2020-09-03 DIAGNOSIS — I15.2 HYPERTENSION ASSOCIATED WITH DIABETES: Chronic | ICD-10-CM

## 2020-09-03 DIAGNOSIS — N18.6 TYPE 2 DIABETES MELLITUS WITH CHRONIC KIDNEY DISEASE ON CHRONIC DIALYSIS, WITHOUT LONG-TERM CURRENT USE OF INSULIN: ICD-10-CM

## 2020-09-03 DIAGNOSIS — R23.2 HOT FLASHES: ICD-10-CM

## 2020-09-03 DIAGNOSIS — N25.81 SECONDARY HYPERPARATHYROIDISM OF RENAL ORIGIN: ICD-10-CM

## 2020-09-03 DIAGNOSIS — D63.1 ANEMIA DUE TO CHRONIC KIDNEY DISEASE, ON CHRONIC DIALYSIS: ICD-10-CM

## 2020-09-03 DIAGNOSIS — N18.6 ANEMIA DUE TO CHRONIC KIDNEY DISEASE, ON CHRONIC DIALYSIS: ICD-10-CM

## 2020-09-03 DIAGNOSIS — Z99.2 TYPE 2 DIABETES MELLITUS WITH CHRONIC KIDNEY DISEASE ON CHRONIC DIALYSIS, WITHOUT LONG-TERM CURRENT USE OF INSULIN: ICD-10-CM

## 2020-09-03 LAB
25(OH)D3+25(OH)D2 SERPL-MCNC: 38 NG/ML (ref 30–96)
ALBUMIN SERPL BCP-MCNC: 3.4 G/DL (ref 3.5–5.2)
ALP SERPL-CCNC: 143 U/L (ref 55–135)
ALT SERPL W/O P-5'-P-CCNC: 14 U/L (ref 10–44)
ANION GAP SERPL CALC-SCNC: 8 MMOL/L (ref 8–16)
AST SERPL-CCNC: 19 U/L (ref 10–40)
BASOPHILS # BLD AUTO: 0.04 K/UL (ref 0–0.2)
BASOPHILS NFR BLD: 0.8 % (ref 0–1.9)
BILIRUB SERPL-MCNC: 0.3 MG/DL (ref 0.1–1)
BUN SERPL-MCNC: 32 MG/DL (ref 8–23)
CALCIUM SERPL-MCNC: 9.9 MG/DL (ref 8.7–10.5)
CHLORIDE SERPL-SCNC: 103 MMOL/L (ref 95–110)
CHOLEST SERPL-MCNC: 243 MG/DL (ref 120–199)
CHOLEST/HDLC SERPL: 3.3 {RATIO} (ref 2–5)
CO2 SERPL-SCNC: 29 MMOL/L (ref 23–29)
CORTIS SERPL-MCNC: 14.1 UG/DL
CREAT SERPL-MCNC: 4.9 MG/DL (ref 0.5–1.4)
DIFFERENTIAL METHOD: ABNORMAL
EOSINOPHIL # BLD AUTO: 0.1 K/UL (ref 0–0.5)
EOSINOPHIL NFR BLD: 2.9 % (ref 0–8)
ERYTHROCYTE [DISTWIDTH] IN BLOOD BY AUTOMATED COUNT: 16.7 % (ref 11.5–14.5)
EST. GFR  (AFRICAN AMERICAN): 9.1 ML/MIN/1.73 M^2
EST. GFR  (NON AFRICAN AMERICAN): 7.9 ML/MIN/1.73 M^2
GLUCOSE SERPL-MCNC: 103 MG/DL (ref 70–110)
HCT VFR BLD AUTO: 37.3 % (ref 37–48.5)
HDLC SERPL-MCNC: 73 MG/DL (ref 40–75)
HDLC SERPL: 30 % (ref 20–50)
HGB BLD-MCNC: 11.4 G/DL (ref 12–16)
IMM GRANULOCYTES # BLD AUTO: 0.01 K/UL (ref 0–0.04)
IMM GRANULOCYTES NFR BLD AUTO: 0.2 % (ref 0–0.5)
LDLC SERPL CALC-MCNC: 147.8 MG/DL (ref 63–159)
LYMPHOCYTES # BLD AUTO: 1.9 K/UL (ref 1–4.8)
LYMPHOCYTES NFR BLD: 38 % (ref 18–48)
MCH RBC QN AUTO: 29.2 PG (ref 27–31)
MCHC RBC AUTO-ENTMCNC: 30.6 G/DL (ref 32–36)
MCV RBC AUTO: 96 FL (ref 82–98)
MONOCYTES # BLD AUTO: 0.8 K/UL (ref 0.3–1)
MONOCYTES NFR BLD: 17 % (ref 4–15)
NEUTROPHILS # BLD AUTO: 2 K/UL (ref 1.8–7.7)
NEUTROPHILS NFR BLD: 41.1 % (ref 38–73)
NONHDLC SERPL-MCNC: 170 MG/DL
NRBC BLD-RTO: 6 /100 WBC
PLATELET # BLD AUTO: 190 K/UL (ref 150–350)
PMV BLD AUTO: 11.1 FL (ref 9.2–12.9)
POTASSIUM SERPL-SCNC: 5.1 MMOL/L (ref 3.5–5.1)
PROT SERPL-MCNC: 7 G/DL (ref 6–8.4)
PTH-INTACT SERPL-MCNC: 279 PG/ML (ref 9–77)
RBC # BLD AUTO: 3.9 M/UL (ref 4–5.4)
SODIUM SERPL-SCNC: 140 MMOL/L (ref 136–145)
TRIGL SERPL-MCNC: 111 MG/DL (ref 30–150)
TSH SERPL DL<=0.005 MIU/L-ACNC: 2.8 UIU/ML (ref 0.4–4)
WBC # BLD AUTO: 4.87 K/UL (ref 3.9–12.7)

## 2020-09-03 PROCEDURE — 83970 ASSAY OF PARATHORMONE: CPT

## 2020-09-03 PROCEDURE — 84443 ASSAY THYROID STIM HORMONE: CPT

## 2020-09-03 PROCEDURE — 85025 COMPLETE CBC W/AUTO DIFF WBC: CPT

## 2020-09-03 PROCEDURE — 80061 LIPID PANEL: CPT

## 2020-09-03 PROCEDURE — 82533 TOTAL CORTISOL: CPT

## 2020-09-03 PROCEDURE — 82306 VITAMIN D 25 HYDROXY: CPT

## 2020-09-03 PROCEDURE — 80053 COMPREHEN METABOLIC PANEL: CPT

## 2020-09-04 ENCOUNTER — TELEPHONE (OUTPATIENT)
Dept: INTERNAL MEDICINE | Facility: CLINIC | Age: 80
End: 2020-09-04

## 2020-09-04 DIAGNOSIS — E11.69 HYPERLIPIDEMIA ASSOCIATED WITH TYPE 2 DIABETES MELLITUS: Primary | ICD-10-CM

## 2020-09-04 DIAGNOSIS — E78.5 HYPERLIPIDEMIA ASSOCIATED WITH TYPE 2 DIABETES MELLITUS: Primary | ICD-10-CM

## 2020-09-04 DIAGNOSIS — M19.90 ARTHRITIS: ICD-10-CM

## 2020-09-04 RX ORDER — EZETIMIBE 10 MG/1
10 TABLET ORAL DAILY
Qty: 90 TABLET | Refills: 3 | Status: SHIPPED | OUTPATIENT
Start: 2020-09-04 | End: 2021-08-26

## 2020-09-15 ENCOUNTER — TELEPHONE (OUTPATIENT)
Dept: INTERNAL MEDICINE | Facility: CLINIC | Age: 80
End: 2020-09-15

## 2020-09-15 NOTE — TELEPHONE ENCOUNTER
----- Message from Marilyn Livingston sent at 9/15/2020 11:45 AM CDT -----  Regarding: Medication  Contact: Cailin  Type: Patient Call Back    Who called:Cailin    What is the request in detail:Patient called to check the status on her refill for tramadol. Please call to advise    Can the clinic reply by [facility]?    Would the patient rather a call back or a response via [facility]? Call    Best call back number:[phone]

## 2020-09-29 ENCOUNTER — PATIENT MESSAGE (OUTPATIENT)
Dept: OTHER | Facility: OTHER | Age: 80
End: 2020-09-29

## 2020-10-15 DIAGNOSIS — M19.90 ARTHRITIS: ICD-10-CM

## 2020-10-15 RX ORDER — TRAMADOL HYDROCHLORIDE 50 MG/1
50 TABLET ORAL EVERY 12 HOURS
Qty: 180 TABLET | Refills: 3 | Status: CANCELLED | OUTPATIENT
Start: 2020-10-15

## 2020-10-15 RX ORDER — TRAMADOL HYDROCHLORIDE 50 MG/1
50 TABLET ORAL EVERY 12 HOURS
Qty: 60 TABLET | Refills: 0 | Status: SHIPPED | OUTPATIENT
Start: 2020-10-15 | End: 2021-05-25 | Stop reason: SDUPTHER

## 2020-10-15 NOTE — TELEPHONE ENCOUNTER
----- Message from Marilyn Livingston sent at 9/15/2020 11:45 AM CDT -----  Regarding: Medication  Contact: Cailin  Type: Patient Call Back    Who called:Cailin    What is the request in detail:Patient called to check the status on her refill for tramadol. Please call to advise    Can the clinic reply by [facility]?    Would the patient rather a call back or a response via [facility]? Call    Best call back number:[phone]

## 2020-11-16 DIAGNOSIS — M19.90 ARTHRITIS: ICD-10-CM

## 2020-11-16 RX ORDER — TRAMADOL HYDROCHLORIDE 50 MG/1
50 TABLET ORAL EVERY 12 HOURS
Qty: 60 TABLET | Refills: 0 | OUTPATIENT
Start: 2020-11-16

## 2020-11-16 NOTE — TELEPHONE ENCOUNTER
----- Message from Jo Perez sent at 11/16/2020 12:42 PM CST -----  Regarding: tramadol  Contact: patient  Type:  RX Refill Request    Who Called: patiemt  Refill or New Rx:refill  RX Name and Strength:Tramadol, 50mg  How is the patient currently taking it? (ex. 1XDay):2 times daily  Is this a 30 day or 90 day RX:90  [facility]  Local or Mail Order:mail order  Ordering Provider:Dr Bishop  Would the patient rather a call back or a response via [facility]? call  Best Call Back Number:[phone]   Additional Information:

## 2020-12-01 ENCOUNTER — TELEPHONE (OUTPATIENT)
Dept: INTERNAL MEDICINE | Facility: CLINIC | Age: 80
End: 2020-12-01

## 2020-12-01 DIAGNOSIS — M19.90 ARTHRITIS: ICD-10-CM

## 2020-12-01 RX ORDER — TRAMADOL HYDROCHLORIDE 50 MG/1
50 TABLET ORAL EVERY 12 HOURS
Qty: 60 TABLET | Refills: 0 | Status: CANCELLED | OUTPATIENT
Start: 2020-12-01

## 2020-12-01 NOTE — TELEPHONE ENCOUNTER
Pt refill was denied on 11/16/2020 due to pt needing appt scx. S/w pt to scx appt and pt stated that appt date was too far out on 12/22/2020 due to pt preference of Tuesdays or Thursdays appointment slots. Pt informed that Tramadol would probably not be prescribed for 90 day supply but can see when she comes for her appt. Pt then denied appt being scx to come in regarding medication refill./Kmw

## 2020-12-01 NOTE — TELEPHONE ENCOUNTER
----- Message from Nara Denton sent at 12/1/2020 12:23 PM CST -----  Called last week about refill tramadol 50 mg please call back  [phone]

## 2021-01-12 ENCOUNTER — TELEPHONE (OUTPATIENT)
Dept: HEMATOLOGY/ONCOLOGY | Facility: CLINIC | Age: 81
End: 2021-01-12

## 2021-02-09 ENCOUNTER — APPOINTMENT (OUTPATIENT)
Dept: LAB | Facility: HOSPITAL | Age: 81
End: 2021-02-09
Attending: INTERNAL MEDICINE
Payer: MEDICARE

## 2021-02-09 DIAGNOSIS — N18.6 TYPE 2 DIABETES MELLITUS WITH CHRONIC KIDNEY DISEASE ON CHRONIC DIALYSIS, WITHOUT LONG-TERM CURRENT USE OF INSULIN: Chronic | ICD-10-CM

## 2021-02-09 DIAGNOSIS — Z99.2 TYPE 2 DIABETES MELLITUS WITH CHRONIC KIDNEY DISEASE ON CHRONIC DIALYSIS, WITHOUT LONG-TERM CURRENT USE OF INSULIN: Chronic | ICD-10-CM

## 2021-02-09 DIAGNOSIS — E11.22 TYPE 2 DIABETES MELLITUS WITH CHRONIC KIDNEY DISEASE ON CHRONIC DIALYSIS, WITHOUT LONG-TERM CURRENT USE OF INSULIN: Chronic | ICD-10-CM

## 2021-02-09 DIAGNOSIS — Z85.07 HISTORY OF PANCREATIC CANCER: ICD-10-CM

## 2021-02-09 LAB
ALBUMIN SERPL BCP-MCNC: 2.8 G/DL (ref 3.5–5.2)
ALP SERPL-CCNC: 109 U/L (ref 55–135)
ALT SERPL W/O P-5'-P-CCNC: 22 U/L (ref 10–44)
ANION GAP SERPL CALC-SCNC: 9 MMOL/L (ref 8–16)
AST SERPL-CCNC: 26 U/L (ref 10–40)
BASOPHILS # BLD AUTO: 0.06 K/UL (ref 0–0.2)
BASOPHILS NFR BLD: 0.5 % (ref 0–1.9)
BILIRUB SERPL-MCNC: 0.6 MG/DL (ref 0.1–1)
BUN SERPL-MCNC: 34 MG/DL (ref 8–23)
CALCIUM SERPL-MCNC: 9.2 MG/DL (ref 8.7–10.5)
CHLORIDE SERPL-SCNC: 97 MMOL/L (ref 95–110)
CO2 SERPL-SCNC: 30 MMOL/L (ref 23–29)
CREAT SERPL-MCNC: 6.2 MG/DL (ref 0.5–1.4)
DIFFERENTIAL METHOD: ABNORMAL
EOSINOPHIL # BLD AUTO: 0.1 K/UL (ref 0–0.5)
EOSINOPHIL NFR BLD: 0.5 % (ref 0–8)
ERYTHROCYTE [DISTWIDTH] IN BLOOD BY AUTOMATED COUNT: 15.9 % (ref 11.5–14.5)
EST. GFR  (AFRICAN AMERICAN): 7 ML/MIN/1.73 M^2
EST. GFR  (NON AFRICAN AMERICAN): 6 ML/MIN/1.73 M^2
ESTIMATED AVG GLUCOSE: 194 MG/DL (ref 68–131)
GLUCOSE SERPL-MCNC: 165 MG/DL (ref 70–110)
HBA1C MFR BLD: 8.4 % (ref 4–5.6)
HCT VFR BLD AUTO: 32.7 % (ref 37–48.5)
HGB BLD-MCNC: 9.9 G/DL (ref 12–16)
IMM GRANULOCYTES # BLD AUTO: 0.06 K/UL (ref 0–0.04)
IMM GRANULOCYTES NFR BLD AUTO: 0.5 % (ref 0–0.5)
LYMPHOCYTES # BLD AUTO: 1.6 K/UL (ref 1–4.8)
LYMPHOCYTES NFR BLD: 14.4 % (ref 18–48)
MCH RBC QN AUTO: 28.9 PG (ref 27–31)
MCHC RBC AUTO-ENTMCNC: 30.3 G/DL (ref 32–36)
MCV RBC AUTO: 96 FL (ref 82–98)
MONOCYTES # BLD AUTO: 1.6 K/UL (ref 0.3–1)
MONOCYTES NFR BLD: 14 % (ref 4–15)
NEUTROPHILS # BLD AUTO: 8 K/UL (ref 1.8–7.7)
NEUTROPHILS NFR BLD: 70.1 % (ref 38–73)
NRBC BLD-RTO: 0 /100 WBC
PLATELET # BLD AUTO: 383 K/UL (ref 150–350)
PMV BLD AUTO: 9.5 FL (ref 9.2–12.9)
POTASSIUM SERPL-SCNC: 4.8 MMOL/L (ref 3.5–5.1)
PROT SERPL-MCNC: 7 G/DL (ref 6–8.4)
RBC # BLD AUTO: 3.42 M/UL (ref 4–5.4)
SODIUM SERPL-SCNC: 136 MMOL/L (ref 136–145)
WBC # BLD AUTO: 11.42 K/UL (ref 3.9–12.7)

## 2021-02-09 PROCEDURE — 80053 COMPREHEN METABOLIC PANEL: CPT

## 2021-02-09 PROCEDURE — 36415 COLL VENOUS BLD VENIPUNCTURE: CPT

## 2021-02-09 PROCEDURE — 86301 IMMUNOASSAY TUMOR CA 19-9: CPT

## 2021-02-09 PROCEDURE — 83036 HEMOGLOBIN GLYCOSYLATED A1C: CPT

## 2021-02-09 PROCEDURE — 85025 COMPLETE CBC W/AUTO DIFF WBC: CPT

## 2021-02-10 ENCOUNTER — NURSE TRIAGE (OUTPATIENT)
Dept: ADMINISTRATIVE | Facility: CLINIC | Age: 81
End: 2021-02-10

## 2021-02-10 ENCOUNTER — HOSPITAL ENCOUNTER (OUTPATIENT)
Facility: HOSPITAL | Age: 81
Discharge: HOME | End: 2021-02-13
Attending: EMERGENCY MEDICINE | Admitting: INTERNAL MEDICINE
Payer: MEDICARE

## 2021-02-10 DIAGNOSIS — D64.9 ANEMIA, UNSPECIFIED TYPE: Primary | ICD-10-CM

## 2021-02-10 DIAGNOSIS — R73.9 HYPERGLYCEMIA: ICD-10-CM

## 2021-02-10 DIAGNOSIS — J90 PLEURAL EFFUSION, LEFT: ICD-10-CM

## 2021-02-10 DIAGNOSIS — R07.9 CHEST PAIN: ICD-10-CM

## 2021-02-10 DIAGNOSIS — J90 PLEURAL EFFUSION: ICD-10-CM

## 2021-02-10 LAB
ALBUMIN SERPL BCP-MCNC: 2.6 G/DL (ref 3.5–5.2)
ALP SERPL-CCNC: 110 U/L (ref 55–135)
ALT SERPL W/O P-5'-P-CCNC: 33 U/L (ref 10–44)
ANION GAP SERPL CALC-SCNC: 12 MMOL/L (ref 8–16)
AST SERPL-CCNC: 40 U/L (ref 10–40)
BASOPHILS # BLD AUTO: 0.06 K/UL (ref 0–0.2)
BASOPHILS NFR BLD: 0.6 % (ref 0–1.9)
BILIRUB SERPL-MCNC: 0.4 MG/DL (ref 0.1–1)
BNP SERPL-MCNC: 814 PG/ML (ref 0–99)
BUN SERPL-MCNC: 15 MG/DL (ref 8–23)
CALCIUM SERPL-MCNC: 9.2 MG/DL (ref 8.7–10.5)
CANCER AG19-9 SERPL-ACNC: <2 U/ML (ref 2–40)
CHLORIDE SERPL-SCNC: 100 MMOL/L (ref 95–110)
CO2 SERPL-SCNC: 24 MMOL/L (ref 23–29)
CREAT SERPL-MCNC: 3.4 MG/DL (ref 0.5–1.4)
CTP QC/QA: YES
DIFFERENTIAL METHOD: ABNORMAL
EOSINOPHIL # BLD AUTO: 0.1 K/UL (ref 0–0.5)
EOSINOPHIL NFR BLD: 1.2 % (ref 0–8)
ERYTHROCYTE [DISTWIDTH] IN BLOOD BY AUTOMATED COUNT: 15.5 % (ref 11.5–14.5)
EST. GFR  (AFRICAN AMERICAN): 14 ML/MIN/1.73 M^2
EST. GFR  (NON AFRICAN AMERICAN): 12 ML/MIN/1.73 M^2
GLUCOSE SERPL-MCNC: 225 MG/DL (ref 70–110)
HCT VFR BLD AUTO: 30.2 % (ref 37–48.5)
HCV AB SERPL QL IA: NEGATIVE
HGB BLD-MCNC: 9.4 G/DL (ref 12–16)
IMM GRANULOCYTES # BLD AUTO: 0.05 K/UL (ref 0–0.04)
IMM GRANULOCYTES NFR BLD AUTO: 0.5 % (ref 0–0.5)
INR PPP: 1 (ref 0.8–1.2)
LYMPHOCYTES # BLD AUTO: 1.3 K/UL (ref 1–4.8)
LYMPHOCYTES NFR BLD: 13.8 % (ref 18–48)
MCH RBC QN AUTO: 29.1 PG (ref 27–31)
MCHC RBC AUTO-ENTMCNC: 31.1 G/DL (ref 32–36)
MCV RBC AUTO: 94 FL (ref 82–98)
MONOCYTES # BLD AUTO: 1.4 K/UL (ref 0.3–1)
MONOCYTES NFR BLD: 15.3 % (ref 4–15)
NEUTROPHILS # BLD AUTO: 6.4 K/UL (ref 1.8–7.7)
NEUTROPHILS NFR BLD: 68.6 % (ref 38–73)
NRBC BLD-RTO: 0 /100 WBC
PLATELET # BLD AUTO: 392 K/UL (ref 150–350)
PMV BLD AUTO: 9.7 FL (ref 9.2–12.9)
POTASSIUM SERPL-SCNC: 4.3 MMOL/L (ref 3.5–5.1)
PROT SERPL-MCNC: 6.8 G/DL (ref 6–8.4)
PROTHROMBIN TIME: 10.7 SEC (ref 9–12.5)
RBC # BLD AUTO: 3.23 M/UL (ref 4–5.4)
SARS-COV-2 RDRP RESP QL NAA+PROBE: NEGATIVE
SODIUM SERPL-SCNC: 136 MMOL/L (ref 136–145)
TROPONIN I SERPL DL<=0.01 NG/ML-MCNC: 0.01 NG/ML (ref 0–0.03)
WBC # BLD AUTO: 9.27 K/UL (ref 3.9–12.7)

## 2021-02-10 PROCEDURE — G0378 HOSPITAL OBSERVATION PER HR: HCPCS

## 2021-02-10 PROCEDURE — 80053 COMPREHEN METABOLIC PANEL: CPT

## 2021-02-10 PROCEDURE — 63600175 PHARM REV CODE 636 W HCPCS: Performed by: INTERNAL MEDICINE

## 2021-02-10 PROCEDURE — 96372 THER/PROPH/DIAG INJ SC/IM: CPT | Mod: 59

## 2021-02-10 PROCEDURE — 93005 ELECTROCARDIOGRAM TRACING: CPT

## 2021-02-10 PROCEDURE — 83880 ASSAY OF NATRIURETIC PEPTIDE: CPT

## 2021-02-10 PROCEDURE — U0002 COVID-19 LAB TEST NON-CDC: HCPCS | Performed by: EMERGENCY MEDICINE

## 2021-02-10 PROCEDURE — 86803 HEPATITIS C AB TEST: CPT

## 2021-02-10 PROCEDURE — 25000003 PHARM REV CODE 250: Performed by: EMERGENCY MEDICINE

## 2021-02-10 PROCEDURE — 85025 COMPLETE CBC W/AUTO DIFF WBC: CPT

## 2021-02-10 PROCEDURE — 93010 ELECTROCARDIOGRAM REPORT: CPT | Mod: ,,, | Performed by: INTERNAL MEDICINE

## 2021-02-10 PROCEDURE — 96374 THER/PROPH/DIAG INJ IV PUSH: CPT

## 2021-02-10 PROCEDURE — 96375 TX/PRO/DX INJ NEW DRUG ADDON: CPT

## 2021-02-10 PROCEDURE — 36415 COLL VENOUS BLD VENIPUNCTURE: CPT

## 2021-02-10 PROCEDURE — 25000003 PHARM REV CODE 250: Performed by: INTERNAL MEDICINE

## 2021-02-10 PROCEDURE — 63600175 PHARM REV CODE 636 W HCPCS: Performed by: EMERGENCY MEDICINE

## 2021-02-10 PROCEDURE — 99285 EMERGENCY DEPT VISIT HI MDM: CPT | Mod: 25

## 2021-02-10 PROCEDURE — 93010 EKG 12-LEAD: ICD-10-PCS | Mod: ,,, | Performed by: INTERNAL MEDICINE

## 2021-02-10 PROCEDURE — 84484 ASSAY OF TROPONIN QUANT: CPT

## 2021-02-10 PROCEDURE — 85610 PROTHROMBIN TIME: CPT

## 2021-02-10 PROCEDURE — C9399 UNCLASSIFIED DRUGS OR BIOLOG: HCPCS | Performed by: INTERNAL MEDICINE

## 2021-02-10 RX ORDER — TRAMADOL HYDROCHLORIDE 50 MG/1
50 TABLET ORAL 2 TIMES DAILY PRN
Status: DISCONTINUED | OUTPATIENT
Start: 2021-02-10 | End: 2021-02-13 | Stop reason: HOSPADM

## 2021-02-10 RX ORDER — HEPARIN SODIUM 5000 [USP'U]/ML
5000 INJECTION, SOLUTION INTRAVENOUS; SUBCUTANEOUS EVERY 8 HOURS
Status: DISCONTINUED | OUTPATIENT
Start: 2021-02-10 | End: 2021-02-13 | Stop reason: HOSPADM

## 2021-02-10 RX ORDER — ONDANSETRON 2 MG/ML
4 INJECTION INTRAMUSCULAR; INTRAVENOUS
Status: COMPLETED | OUTPATIENT
Start: 2021-02-10 | End: 2021-02-10

## 2021-02-10 RX ORDER — INSULIN ASPART 100 [IU]/ML
0-5 INJECTION, SOLUTION INTRAVENOUS; SUBCUTANEOUS
Status: DISCONTINUED | OUTPATIENT
Start: 2021-02-10 | End: 2021-02-13 | Stop reason: HOSPADM

## 2021-02-10 RX ORDER — IBUPROFEN 200 MG
16 TABLET ORAL
Status: DISCONTINUED | OUTPATIENT
Start: 2021-02-10 | End: 2021-02-13 | Stop reason: HOSPADM

## 2021-02-10 RX ORDER — IBUPROFEN 200 MG
24 TABLET ORAL
Status: DISCONTINUED | OUTPATIENT
Start: 2021-02-10 | End: 2021-02-13 | Stop reason: HOSPADM

## 2021-02-10 RX ORDER — GLUCAGON 1 MG
1 KIT INJECTION
Status: DISCONTINUED | OUTPATIENT
Start: 2021-02-10 | End: 2021-02-13 | Stop reason: HOSPADM

## 2021-02-10 RX ORDER — ATORVASTATIN CALCIUM 40 MG/1
40 TABLET, FILM COATED ORAL DAILY
Status: DISCONTINUED | OUTPATIENT
Start: 2021-02-11 | End: 2021-02-13 | Stop reason: HOSPADM

## 2021-02-10 RX ORDER — MORPHINE SULFATE 2 MG/ML
2 INJECTION, SOLUTION INTRAMUSCULAR; INTRAVENOUS
Status: COMPLETED | OUTPATIENT
Start: 2021-02-10 | End: 2021-02-10

## 2021-02-10 RX ORDER — ONDANSETRON 2 MG/ML
4 INJECTION INTRAMUSCULAR; INTRAVENOUS EVERY 8 HOURS PRN
Status: DISCONTINUED | OUTPATIENT
Start: 2021-02-10 | End: 2021-02-13 | Stop reason: HOSPADM

## 2021-02-10 RX ORDER — ASPIRIN 81 MG/1
81 TABLET ORAL DAILY
Status: DISCONTINUED | OUTPATIENT
Start: 2021-02-10 | End: 2021-02-13 | Stop reason: HOSPADM

## 2021-02-10 RX ORDER — PANTOPRAZOLE SODIUM 40 MG/1
40 TABLET, DELAYED RELEASE ORAL DAILY
Status: DISCONTINUED | OUTPATIENT
Start: 2021-02-11 | End: 2021-02-13 | Stop reason: HOSPADM

## 2021-02-10 RX ORDER — SODIUM CHLORIDE 0.9 % (FLUSH) 0.9 %
10 SYRINGE (ML) INJECTION
Status: DISCONTINUED | OUTPATIENT
Start: 2021-02-10 | End: 2021-02-12

## 2021-02-10 RX ORDER — NITROGLYCERIN 0.4 MG/1
0.4 TABLET SUBLINGUAL EVERY 5 MIN PRN
Status: DISCONTINUED | OUTPATIENT
Start: 2021-02-10 | End: 2021-02-13 | Stop reason: HOSPADM

## 2021-02-10 RX ORDER — GABAPENTIN 100 MG/1
200 CAPSULE ORAL 3 TIMES DAILY
Status: DISCONTINUED | OUTPATIENT
Start: 2021-02-10 | End: 2021-02-13 | Stop reason: HOSPADM

## 2021-02-10 RX ADMIN — ONDANSETRON 4 MG: 2 INJECTION INTRAMUSCULAR; INTRAVENOUS at 07:02

## 2021-02-10 RX ADMIN — MORPHINE SULFATE 2 MG: 2 INJECTION, SOLUTION INTRAMUSCULAR; INTRAVENOUS at 07:02

## 2021-02-10 RX ADMIN — GABAPENTIN 200 MG: 100 CAPSULE ORAL at 10:02

## 2021-02-10 RX ADMIN — HEPARIN SODIUM 5000 UNITS: 5000 INJECTION INTRAVENOUS; SUBCUTANEOUS at 10:02

## 2021-02-10 RX ADMIN — INSULIN DETEMIR 5 UNITS: 100 INJECTION, SOLUTION SUBCUTANEOUS at 10:02

## 2021-02-10 RX ADMIN — ASPIRIN 81 MG: 81 TABLET, COATED ORAL at 09:02

## 2021-02-10 RX ADMIN — NITROGLYCERIN 1 INCH: 20 OINTMENT TOPICAL at 07:02

## 2021-02-11 PROBLEM — R79.89 D-DIMER, ELEVATED: Status: ACTIVE | Noted: 2021-02-11

## 2021-02-11 LAB
ALBUMIN SERPL BCP-MCNC: 2.4 G/DL (ref 3.5–5.2)
ALP SERPL-CCNC: 108 U/L (ref 55–135)
ALT SERPL W/O P-5'-P-CCNC: 30 U/L (ref 10–44)
ANION GAP SERPL CALC-SCNC: 12 MMOL/L (ref 8–16)
AORTIC ROOT ANNULUS: 3.15 CM
ASCENDING AORTA: 3.11 CM
AST SERPL-CCNC: 34 U/L (ref 10–40)
AV INDEX (PROSTH): 0.88
AV MEAN GRADIENT: 7 MMHG
AV PEAK GRADIENT: 11 MMHG
AV VALVE AREA: 2.76 CM2
AV VELOCITY RATIO: 0.83
BASOPHILS # BLD AUTO: 0.06 K/UL (ref 0–0.2)
BASOPHILS NFR BLD: 0.8 % (ref 0–1.9)
BILIRUB SERPL-MCNC: 0.3 MG/DL (ref 0.1–1)
BSA FOR ECHO PROCEDURE: 1.8 M2
BUN SERPL-MCNC: 21 MG/DL (ref 8–23)
CALCIUM SERPL-MCNC: 8.8 MG/DL (ref 8.7–10.5)
CHLORIDE SERPL-SCNC: 98 MMOL/L (ref 95–110)
CO2 SERPL-SCNC: 27 MMOL/L (ref 23–29)
CREAT SERPL-MCNC: 4.2 MG/DL (ref 0.5–1.4)
CV ECHO LV RWT: 0.79 CM
D DIMER PPP IA.FEU-MCNC: 9.31 MG/L FEU
DIFFERENTIAL METHOD: ABNORMAL
DOP CALC AO PEAK VEL: 1.68 M/S
DOP CALC AO VTI: 33.25 CM
DOP CALC LVOT AREA: 3.1 CM2
DOP CALC LVOT DIAMETER: 2 CM
DOP CALC LVOT PEAK VEL: 1.4 M/S
DOP CALC LVOT STROKE VOLUME: 91.81 CM3
DOP CALC RVOT PEAK VEL: 0.95 M/S
DOP CALC RVOT VTI: 18.67 CM
DOP CALCLVOT PEAK VEL VTI: 29.24 CM
E WAVE DECELERATION TIME: 245.15 MSEC
E/A RATIO: 1.33
E/E' RATIO: 10.9 M/S
ECHO LV POSTERIOR WALL: 1.27 CM (ref 0.6–1.1)
EOSINOPHIL # BLD AUTO: 0.2 K/UL (ref 0–0.5)
EOSINOPHIL NFR BLD: 2.3 % (ref 0–8)
ERYTHROCYTE [DISTWIDTH] IN BLOOD BY AUTOMATED COUNT: 15.6 % (ref 11.5–14.5)
EST. GFR  (AFRICAN AMERICAN): 11 ML/MIN/1.73 M^2
EST. GFR  (NON AFRICAN AMERICAN): 10 ML/MIN/1.73 M^2
FRACTIONAL SHORTENING: 32 % (ref 28–44)
GLUCOSE SERPL-MCNC: 228 MG/DL (ref 70–110)
HCT VFR BLD AUTO: 29 % (ref 37–48.5)
HGB BLD-MCNC: 8.8 G/DL (ref 12–16)
IMM GRANULOCYTES # BLD AUTO: 0.04 K/UL (ref 0–0.04)
IMM GRANULOCYTES NFR BLD AUTO: 0.5 % (ref 0–0.5)
INTERVENTRICULAR SEPTUM: 1.46 CM (ref 0.6–1.1)
IVRT: 45.67 MSEC
LA MAJOR: 5.17 CM
LA MINOR: 3.51 CM
LEFT ATRIUM SIZE: 3.44 CM
LEFT INTERNAL DIMENSION IN SYSTOLE: 2.2 CM (ref 2.1–4)
LEFT VENTRICLE DIASTOLIC VOLUME INDEX: 23.58 ML/M2
LEFT VENTRICLE DIASTOLIC VOLUME: 41.97 ML
LEFT VENTRICLE MASS INDEX: 84 G/M2
LEFT VENTRICLE SYSTOLIC VOLUME INDEX: 9.1 ML/M2
LEFT VENTRICLE SYSTOLIC VOLUME: 16.26 ML
LEFT VENTRICULAR INTERNAL DIMENSION IN DIASTOLE: 3.23 CM (ref 3.5–6)
LEFT VENTRICULAR MASS: 148.7 G
LV LATERAL E/E' RATIO: 9.91 M/S
LV SEPTAL E/E' RATIO: 12.11 M/S
LYMPHOCYTES # BLD AUTO: 1.6 K/UL (ref 1–4.8)
LYMPHOCYTES NFR BLD: 19.6 % (ref 18–48)
MAGNESIUM SERPL-MCNC: 2.6 MG/DL (ref 1.6–2.6)
MCH RBC QN AUTO: 29 PG (ref 27–31)
MCHC RBC AUTO-ENTMCNC: 30.3 G/DL (ref 32–36)
MCV RBC AUTO: 96 FL (ref 82–98)
MONOCYTES # BLD AUTO: 1.3 K/UL (ref 0.3–1)
MONOCYTES NFR BLD: 16.4 % (ref 4–15)
MV PEAK A VEL: 0.82 M/S
MV PEAK E VEL: 1.09 M/S
NEUTROPHILS # BLD AUTO: 4.8 K/UL (ref 1.8–7.7)
NEUTROPHILS NFR BLD: 60.4 % (ref 38–73)
NRBC BLD-RTO: 0 /100 WBC
PISA MRMAX VEL: 0.04 M/S
PISA TR MAX VEL: 2.93 M/S
PLATELET # BLD AUTO: 418 K/UL (ref 150–350)
PMV BLD AUTO: 9.8 FL (ref 9.2–12.9)
POCT GLUCOSE: 130 MG/DL (ref 70–110)
POCT GLUCOSE: 180 MG/DL (ref 70–110)
POCT GLUCOSE: 241 MG/DL (ref 70–110)
POCT GLUCOSE: 251 MG/DL (ref 70–110)
POTASSIUM SERPL-SCNC: 4.5 MMOL/L (ref 3.5–5.1)
PROT SERPL-MCNC: 6.3 G/DL (ref 6–8.4)
PV MEAN GRADIENT: 2 MMHG
RA MAJOR: 4.44 CM
RA PRESSURE: 3 MMHG
RBC # BLD AUTO: 3.03 M/UL (ref 4–5.4)
SINUS: 3.22 CM
SODIUM SERPL-SCNC: 137 MMOL/L (ref 136–145)
STJ: 3.17 CM
TDI LATERAL: 0.11 M/S
TDI SEPTAL: 0.09 M/S
TDI: 0.1 M/S
TR MAX PG: 34 MMHG
TRICUSPID ANNULAR PLANE SYSTOLIC EXCURSION: 2.01 CM
TROPONIN I SERPL DL<=0.01 NG/ML-MCNC: 0.01 NG/ML (ref 0–0.03)
TV REST PULMONARY ARTERY PRESSURE: 37 MMHG
WBC # BLD AUTO: 7.91 K/UL (ref 3.9–12.7)

## 2021-02-11 PROCEDURE — 63600175 PHARM REV CODE 636 W HCPCS: Performed by: NURSE PRACTITIONER

## 2021-02-11 PROCEDURE — 85379 FIBRIN DEGRADATION QUANT: CPT

## 2021-02-11 PROCEDURE — 25000003 PHARM REV CODE 250: Performed by: INTERNAL MEDICINE

## 2021-02-11 PROCEDURE — 94761 N-INVAS EAR/PLS OXIMETRY MLT: CPT

## 2021-02-11 PROCEDURE — 99220 PR INITIAL OBSERVATION CARE,LEVL III: CPT | Mod: 25,,, | Performed by: INTERNAL MEDICINE

## 2021-02-11 PROCEDURE — 96372 THER/PROPH/DIAG INJ SC/IM: CPT

## 2021-02-11 PROCEDURE — 99215 PR OFFICE/OUTPT VISIT, EST, LEVL V, 40-54 MIN: ICD-10-PCS | Mod: ,,, | Performed by: INTERNAL MEDICINE

## 2021-02-11 PROCEDURE — 99220 PR INITIAL OBSERVATION CARE,LEVL III: ICD-10-PCS | Mod: 25,,, | Performed by: INTERNAL MEDICINE

## 2021-02-11 PROCEDURE — 83735 ASSAY OF MAGNESIUM: CPT

## 2021-02-11 PROCEDURE — 36415 COLL VENOUS BLD VENIPUNCTURE: CPT

## 2021-02-11 PROCEDURE — 84484 ASSAY OF TROPONIN QUANT: CPT

## 2021-02-11 PROCEDURE — 80053 COMPREHEN METABOLIC PANEL: CPT

## 2021-02-11 PROCEDURE — 25500020 PHARM REV CODE 255: Performed by: FAMILY MEDICINE

## 2021-02-11 PROCEDURE — 96372 THER/PROPH/DIAG INJ SC/IM: CPT | Mod: 59

## 2021-02-11 PROCEDURE — G0378 HOSPITAL OBSERVATION PER HR: HCPCS

## 2021-02-11 PROCEDURE — C9399 UNCLASSIFIED DRUGS OR BIOLOG: HCPCS | Performed by: NURSE PRACTITIONER

## 2021-02-11 PROCEDURE — 85025 COMPLETE CBC W/AUTO DIFF WBC: CPT

## 2021-02-11 PROCEDURE — 25000003 PHARM REV CODE 250: Performed by: NURSE PRACTITIONER

## 2021-02-11 PROCEDURE — 63600175 PHARM REV CODE 636 W HCPCS: Performed by: INTERNAL MEDICINE

## 2021-02-11 PROCEDURE — 99215 OFFICE O/P EST HI 40 MIN: CPT | Mod: ,,, | Performed by: INTERNAL MEDICINE

## 2021-02-11 RX ORDER — ACETAMINOPHEN 325 MG/1
650 TABLET ORAL EVERY 6 HOURS PRN
Status: DISCONTINUED | OUTPATIENT
Start: 2021-02-11 | End: 2021-02-13 | Stop reason: HOSPADM

## 2021-02-11 RX ORDER — REGADENOSON 0.08 MG/ML
0.4 INJECTION, SOLUTION INTRAVENOUS ONCE
Status: COMPLETED | OUTPATIENT
Start: 2021-02-11 | End: 2021-02-11

## 2021-02-11 RX ORDER — AMLODIPINE BESYLATE 2.5 MG/1
2.5 TABLET ORAL DAILY
Status: DISCONTINUED | OUTPATIENT
Start: 2021-02-11 | End: 2021-02-13 | Stop reason: HOSPADM

## 2021-02-11 RX ADMIN — GABAPENTIN 200 MG: 100 CAPSULE ORAL at 02:02

## 2021-02-11 RX ADMIN — REGADENOSON 0.4 MG: 0.08 INJECTION, SOLUTION INTRAVENOUS at 01:02

## 2021-02-11 RX ADMIN — GABAPENTIN 200 MG: 100 CAPSULE ORAL at 09:02

## 2021-02-11 RX ADMIN — HEPARIN SODIUM 5000 UNITS: 5000 INJECTION INTRAVENOUS; SUBCUTANEOUS at 06:02

## 2021-02-11 RX ADMIN — ACETAMINOPHEN 650 MG: 325 TABLET ORAL at 10:02

## 2021-02-11 RX ADMIN — ASPIRIN 81 MG: 81 TABLET, COATED ORAL at 09:02

## 2021-02-11 RX ADMIN — HEPARIN SODIUM 5000 UNITS: 5000 INJECTION INTRAVENOUS; SUBCUTANEOUS at 09:02

## 2021-02-11 RX ADMIN — HEPARIN SODIUM 5000 UNITS: 5000 INJECTION INTRAVENOUS; SUBCUTANEOUS at 02:02

## 2021-02-11 RX ADMIN — INSULIN DETEMIR 10 UNITS: 100 INJECTION, SOLUTION SUBCUTANEOUS at 09:02

## 2021-02-11 RX ADMIN — IOHEXOL 100 ML: 350 INJECTION, SOLUTION INTRAVENOUS at 03:02

## 2021-02-11 RX ADMIN — INSULIN ASPART 2 UNITS: 100 INJECTION, SOLUTION INTRAVENOUS; SUBCUTANEOUS at 11:02

## 2021-02-11 RX ADMIN — PANTOPRAZOLE SODIUM 40 MG: 40 TABLET, DELAYED RELEASE ORAL at 09:02

## 2021-02-11 RX ADMIN — ATORVASTATIN CALCIUM 40 MG: 40 TABLET, FILM COATED ORAL at 09:02

## 2021-02-11 RX ADMIN — INSULIN ASPART 1 UNITS: 100 INJECTION, SOLUTION INTRAVENOUS; SUBCUTANEOUS at 09:02

## 2021-02-11 RX ADMIN — AMLODIPINE BESYLATE 2.5 MG: 2.5 TABLET ORAL at 05:02

## 2021-02-12 PROBLEM — J90 PLEURAL EFFUSION: Status: ACTIVE | Noted: 2021-02-12

## 2021-02-12 PROBLEM — R50.9 FEVER: Status: ACTIVE | Noted: 2021-02-12

## 2021-02-12 LAB
ALBUMIN SERPL BCP-MCNC: 2.1 G/DL (ref 3.5–5.2)
ANION GAP SERPL CALC-SCNC: 10 MMOL/L (ref 8–16)
APPEARANCE FLD: NORMAL
BACTERIA #/AREA URNS HPF: ABNORMAL /HPF
BASOPHILS # BLD AUTO: 0.07 K/UL (ref 0–0.2)
BASOPHILS NFR BLD: 0.6 % (ref 0–1.9)
BILIRUB UR QL STRIP: NEGATIVE
BODY FLD TYPE: NORMAL
BUN SERPL-MCNC: 41 MG/DL (ref 8–23)
CALCIUM SERPL-MCNC: 8.5 MG/DL (ref 8.7–10.5)
CHLORIDE SERPL-SCNC: 95 MMOL/L (ref 95–110)
CLARITY UR: CLEAR
CO2 SERPL-SCNC: 26 MMOL/L (ref 23–29)
COLOR FLD: YELLOW
COLOR UR: YELLOW
CREAT SERPL-MCNC: 7 MG/DL (ref 0.5–1.4)
CV STRESS BASE HR: 68 BPM
DIASTOLIC BLOOD PRESSURE: 51 MMHG
DIFFERENTIAL METHOD: ABNORMAL
EOSINOPHIL # BLD AUTO: 0.2 K/UL (ref 0–0.5)
EOSINOPHIL NFR BLD: 1.7 % (ref 0–8)
EOSINOPHIL NFR FLD MANUAL: 1 %
ERYTHROCYTE [DISTWIDTH] IN BLOOD BY AUTOMATED COUNT: 15.7 % (ref 11.5–14.5)
EST. GFR  (AFRICAN AMERICAN): 6 ML/MIN/1.73 M^2
EST. GFR  (NON AFRICAN AMERICAN): 5 ML/MIN/1.73 M^2
GLUCOSE SERPL-MCNC: 199 MG/DL (ref 70–110)
GLUCOSE SERPL-MCNC: 293 MG/DL (ref 70–110)
GLUCOSE UR QL STRIP: NEGATIVE
GRAM STN SPEC: NORMAL
GRAM STN SPEC: NORMAL
HCT VFR BLD AUTO: 28.1 % (ref 37–48.5)
HGB BLD-MCNC: 8.5 G/DL (ref 12–16)
HGB UR QL STRIP: ABNORMAL
HYALINE CASTS #/AREA URNS LPF: 0 /LPF
IMM GRANULOCYTES # BLD AUTO: 0.05 K/UL (ref 0–0.04)
IMM GRANULOCYTES NFR BLD AUTO: 0.5 % (ref 0–0.5)
KETONES UR QL STRIP: ABNORMAL
LDH SERPL L TO P-CCNC: 281 U/L (ref 110–260)
LEUKOCYTE ESTERASE UR QL STRIP: ABNORMAL
LYMPHOCYTES # BLD AUTO: 1.9 K/UL (ref 1–4.8)
LYMPHOCYTES NFR BLD: 16.9 % (ref 18–48)
LYMPHOCYTES NFR FLD MANUAL: 1 %
MCH RBC QN AUTO: 28.9 PG (ref 27–31)
MCHC RBC AUTO-ENTMCNC: 30.2 G/DL (ref 32–36)
MCV RBC AUTO: 96 FL (ref 82–98)
MICROSCOPIC COMMENT: ABNORMAL
MONOCYTES # BLD AUTO: 1.5 K/UL (ref 0.3–1)
MONOCYTES NFR BLD: 13.7 % (ref 4–15)
MONOS+MACROS NFR FLD MANUAL: 55 %
NEUTROPHILS # BLD AUTO: 7.4 K/UL (ref 1.8–7.7)
NEUTROPHILS NFR BLD: 66.6 % (ref 38–73)
NEUTROPHILS NFR FLD MANUAL: 43 %
NITRITE UR QL STRIP: NEGATIVE
NRBC BLD-RTO: 0 /100 WBC
NUC REST DIASTOLIC VOLUME INDEX: 76
NUC REST EJECTION FRACTION: 84
NUC REST SYSTOLIC VOLUME INDEX: 12
NUC STRESS DIASTOLIC VOLUME INDEX: 82
NUC STRESS EJECTION FRACTION: 83 %
NUC STRESS SYSTOLIC VOLUME INDEX: 14
OHS CV CPX 85 PERCENT MAX PREDICTED HEART RATE MALE: 117
OHS CV CPX MAX PREDICTED HEART RATE: 137
OHS CV CPX PATIENT IS FEMALE: 1
OHS CV CPX PATIENT IS MALE: 0
OHS CV CPX PEAK DIASTOLIC BLOOD PRESSURE: 51 MMHG
OHS CV CPX PEAK HEAR RATE: 81 BPM
OHS CV CPX PEAK RATE PRESSURE PRODUCT: NORMAL
OHS CV CPX PEAK SYSTOLIC BLOOD PRESSURE: 149 MMHG
OHS CV CPX PERCENT MAX PREDICTED HEART RATE ACHIEVED: 59
OHS CV CPX RATE PRESSURE PRODUCT PRESENTING: NORMAL
PH UR STRIP: 7 [PH] (ref 5–8)
PHOSPHATE SERPL-MCNC: 3.8 MG/DL (ref 2.7–4.5)
PLATELET # BLD AUTO: 467 K/UL (ref 150–350)
PMV BLD AUTO: 9.5 FL (ref 9.2–12.9)
POCT GLUCOSE: 142 MG/DL (ref 70–110)
POCT GLUCOSE: 231 MG/DL (ref 70–110)
POCT GLUCOSE: 277 MG/DL (ref 70–110)
POCT GLUCOSE: 300 MG/DL (ref 70–110)
POTASSIUM SERPL-SCNC: 4.8 MMOL/L (ref 3.5–5.1)
PROT SERPL-MCNC: 6.2 G/DL (ref 6–8.4)
PROT UR QL STRIP: ABNORMAL
RBC # BLD AUTO: 2.94 M/UL (ref 4–5.4)
RBC #/AREA URNS HPF: 0 /HPF (ref 0–4)
SODIUM SERPL-SCNC: 131 MMOL/L (ref 136–145)
SP GR UR STRIP: 1.01 (ref 1–1.03)
SYSTOLIC BLOOD PRESSURE: 149 MMHG
URN SPEC COLLECT METH UR: ABNORMAL
UROBILINOGEN UR STRIP-ACNC: NEGATIVE EU/DL
WBC # BLD AUTO: 11.11 K/UL (ref 3.9–12.7)
WBC # FLD: 4294 /CU MM
WBC #/AREA URNS HPF: >100 /HPF (ref 0–5)

## 2021-02-12 PROCEDURE — 87070 CULTURE OTHR SPECIMN AEROBIC: CPT

## 2021-02-12 PROCEDURE — 87088 URINE BACTERIA CULTURE: CPT

## 2021-02-12 PROCEDURE — 87205 SMEAR GRAM STAIN: CPT | Mod: 59

## 2021-02-12 PROCEDURE — 89051 BODY FLUID CELL COUNT: CPT

## 2021-02-12 PROCEDURE — 32555 ASPIRATE PLEURA W/ IMAGING: CPT

## 2021-02-12 PROCEDURE — 82945 GLUCOSE OTHER FLUID: CPT

## 2021-02-12 PROCEDURE — 88112 CYTOPATH CELL ENHANCE TECH: CPT | Mod: 26,,, | Performed by: PATHOLOGY

## 2021-02-12 PROCEDURE — 87077 CULTURE AEROBIC IDENTIFY: CPT | Mod: 59

## 2021-02-12 PROCEDURE — 36415 COLL VENOUS BLD VENIPUNCTURE: CPT

## 2021-02-12 PROCEDURE — G0257 UNSCHED DIALYSIS ESRD PT HOS: HCPCS

## 2021-02-12 PROCEDURE — 94761 N-INVAS EAR/PLS OXIMETRY MLT: CPT

## 2021-02-12 PROCEDURE — 63600175 PHARM REV CODE 636 W HCPCS: Performed by: INTERNAL MEDICINE

## 2021-02-12 PROCEDURE — 88112 PR  CYTOPATH, CELL ENHANCE TECH: ICD-10-PCS | Mod: 26,,, | Performed by: PATHOLOGY

## 2021-02-12 PROCEDURE — 25000003 PHARM REV CODE 250: Performed by: INTERNAL MEDICINE

## 2021-02-12 PROCEDURE — 99215 PR OFFICE/OUTPT VISIT, EST, LEVL V, 40-54 MIN: ICD-10-PCS | Mod: ,,, | Performed by: INTERNAL MEDICINE

## 2021-02-12 PROCEDURE — 85025 COMPLETE CBC W/AUTO DIFF WBC: CPT

## 2021-02-12 PROCEDURE — 88305 TISSUE EXAM BY PATHOLOGIST: CPT | Performed by: PATHOLOGY

## 2021-02-12 PROCEDURE — 87205 SMEAR GRAM STAIN: CPT

## 2021-02-12 PROCEDURE — 80069 RENAL FUNCTION PANEL: CPT

## 2021-02-12 PROCEDURE — 87086 URINE CULTURE/COLONY COUNT: CPT

## 2021-02-12 PROCEDURE — 81000 URINALYSIS NONAUTO W/SCOPE: CPT

## 2021-02-12 PROCEDURE — 88305 TISSUE EXAM BY PATHOLOGIST: ICD-10-PCS | Mod: 26,,, | Performed by: PATHOLOGY

## 2021-02-12 PROCEDURE — 83615 LACTATE (LD) (LDH) ENZYME: CPT

## 2021-02-12 PROCEDURE — G0378 HOSPITAL OBSERVATION PER HR: HCPCS

## 2021-02-12 PROCEDURE — 99215 OFFICE O/P EST HI 40 MIN: CPT | Mod: ,,, | Performed by: INTERNAL MEDICINE

## 2021-02-12 PROCEDURE — 99900035 HC TECH TIME PER 15 MIN (STAT)

## 2021-02-12 PROCEDURE — 96372 THER/PROPH/DIAG INJ SC/IM: CPT | Mod: 59

## 2021-02-12 PROCEDURE — 27000221 HC OXYGEN, UP TO 24 HOURS

## 2021-02-12 PROCEDURE — 82947 ASSAY GLUCOSE BLOOD QUANT: CPT | Mod: 91

## 2021-02-12 PROCEDURE — 63600175 PHARM REV CODE 636 W HCPCS: Performed by: NURSE PRACTITIONER

## 2021-02-12 PROCEDURE — 84157 ASSAY OF PROTEIN OTHER: CPT

## 2021-02-12 PROCEDURE — 88112 CYTOPATH CELL ENHANCE TECH: CPT | Performed by: PATHOLOGY

## 2021-02-12 PROCEDURE — 88305 TISSUE EXAM BY PATHOLOGIST: CPT | Mod: 26,,, | Performed by: PATHOLOGY

## 2021-02-12 PROCEDURE — 84155 ASSAY OF PROTEIN SERUM: CPT

## 2021-02-12 PROCEDURE — 96375 TX/PRO/DX INJ NEW DRUG ADDON: CPT

## 2021-02-12 PROCEDURE — 87186 SC STD MICRODIL/AGAR DIL: CPT

## 2021-02-12 RX ORDER — HEPARIN SODIUM 1000 [USP'U]/ML
2000 INJECTION, SOLUTION INTRAVENOUS; SUBCUTANEOUS ONCE
Status: COMPLETED | OUTPATIENT
Start: 2021-02-12 | End: 2021-02-12

## 2021-02-12 RX ADMIN — GABAPENTIN 200 MG: 100 CAPSULE ORAL at 08:02

## 2021-02-12 RX ADMIN — INSULIN ASPART 2 UNITS: 100 INJECTION, SOLUTION INTRAVENOUS; SUBCUTANEOUS at 05:02

## 2021-02-12 RX ADMIN — ATORVASTATIN CALCIUM 40 MG: 40 TABLET, FILM COATED ORAL at 08:02

## 2021-02-12 RX ADMIN — INSULIN DETEMIR 10 UNITS: 100 INJECTION, SOLUTION SUBCUTANEOUS at 09:02

## 2021-02-12 RX ADMIN — PANTOPRAZOLE SODIUM 40 MG: 40 TABLET, DELAYED RELEASE ORAL at 08:02

## 2021-02-12 RX ADMIN — AMLODIPINE BESYLATE 2.5 MG: 2.5 TABLET ORAL at 08:02

## 2021-02-12 RX ADMIN — EPOETIN ALFA-EPBX 10000 UNITS: 10000 INJECTION, SOLUTION INTRAVENOUS; SUBCUTANEOUS at 02:02

## 2021-02-12 RX ADMIN — INSULIN ASPART 2 UNITS: 100 INJECTION, SOLUTION INTRAVENOUS; SUBCUTANEOUS at 09:02

## 2021-02-12 RX ADMIN — GABAPENTIN 200 MG: 100 CAPSULE ORAL at 09:02

## 2021-02-12 RX ADMIN — HEPARIN SODIUM 5000 UNITS: 5000 INJECTION INTRAVENOUS; SUBCUTANEOUS at 09:02

## 2021-02-12 RX ADMIN — HEPARIN SODIUM 2000 UNITS: 1000 INJECTION, SOLUTION INTRAVENOUS; SUBCUTANEOUS at 02:02

## 2021-02-12 RX ADMIN — CEFTRIAXONE 1 G: 1 INJECTION, SOLUTION INTRAVENOUS at 06:02

## 2021-02-12 RX ADMIN — ASPIRIN 81 MG: 81 TABLET, COATED ORAL at 08:02

## 2021-02-12 RX ADMIN — HEPARIN SODIUM 5000 UNITS: 5000 INJECTION INTRAVENOUS; SUBCUTANEOUS at 05:02

## 2021-02-12 RX ADMIN — INSULIN ASPART 3 UNITS: 100 INJECTION, SOLUTION INTRAVENOUS; SUBCUTANEOUS at 11:02

## 2021-02-13 VITALS
BODY MASS INDEX: 24.2 KG/M2 | DIASTOLIC BLOOD PRESSURE: 61 MMHG | HEART RATE: 76 BPM | OXYGEN SATURATION: 96 % | RESPIRATION RATE: 16 BRPM | WEIGHT: 150.56 LBS | TEMPERATURE: 99 F | HEIGHT: 66 IN | SYSTOLIC BLOOD PRESSURE: 132 MMHG

## 2021-02-13 PROBLEM — R50.9 FEVER: Status: RESOLVED | Noted: 2021-02-12 | Resolved: 2021-02-13

## 2021-02-13 LAB
ALBUMIN SERPL BCP-MCNC: 2.2 G/DL (ref 3.5–5.2)
ANION GAP SERPL CALC-SCNC: 10 MMOL/L (ref 8–16)
BASOPHILS # BLD AUTO: 0.1 K/UL (ref 0–0.2)
BASOPHILS NFR BLD: 1.1 % (ref 0–1.9)
BUN SERPL-MCNC: 21 MG/DL (ref 8–23)
CALCIUM SERPL-MCNC: 9 MG/DL (ref 8.7–10.5)
CHLORIDE SERPL-SCNC: 101 MMOL/L (ref 95–110)
CO2 SERPL-SCNC: 25 MMOL/L (ref 23–29)
CREAT SERPL-MCNC: 4.7 MG/DL (ref 0.5–1.4)
DIFFERENTIAL METHOD: ABNORMAL
EOSINOPHIL # BLD AUTO: 0.2 K/UL (ref 0–0.5)
EOSINOPHIL NFR BLD: 2.3 % (ref 0–8)
ERYTHROCYTE [DISTWIDTH] IN BLOOD BY AUTOMATED COUNT: 15.9 % (ref 11.5–14.5)
EST. GFR  (AFRICAN AMERICAN): 10 ML/MIN/1.73 M^2
EST. GFR  (NON AFRICAN AMERICAN): 8 ML/MIN/1.73 M^2
GLUCOSE FLD-MCNC: 270 MG/DL
GLUCOSE SERPL-MCNC: 67 MG/DL (ref 70–110)
HCT VFR BLD AUTO: 30.4 % (ref 37–48.5)
HGB BLD-MCNC: 9.3 G/DL (ref 12–16)
IMM GRANULOCYTES # BLD AUTO: 0.08 K/UL (ref 0–0.04)
IMM GRANULOCYTES NFR BLD AUTO: 0.8 % (ref 0–0.5)
LYMPHOCYTES # BLD AUTO: 2.4 K/UL (ref 1–4.8)
LYMPHOCYTES NFR BLD: 25.6 % (ref 18–48)
MCH RBC QN AUTO: 29 PG (ref 27–31)
MCHC RBC AUTO-ENTMCNC: 30.6 G/DL (ref 32–36)
MCV RBC AUTO: 95 FL (ref 82–98)
MONOCYTES # BLD AUTO: 1.6 K/UL (ref 0.3–1)
MONOCYTES NFR BLD: 17.2 % (ref 4–15)
NEUTROPHILS # BLD AUTO: 5 K/UL (ref 1.8–7.7)
NEUTROPHILS NFR BLD: 53 % (ref 38–73)
NRBC BLD-RTO: 0 /100 WBC
PHOSPHATE SERPL-MCNC: 2.8 MG/DL (ref 2.7–4.5)
PLATELET # BLD AUTO: 517 K/UL (ref 150–350)
PMV BLD AUTO: 9.8 FL (ref 9.2–12.9)
POCT GLUCOSE: 139 MG/DL (ref 70–110)
POCT GLUCOSE: 67 MG/DL (ref 70–110)
POCT GLUCOSE: 78 MG/DL (ref 70–110)
POTASSIUM SERPL-SCNC: 4.2 MMOL/L (ref 3.5–5.1)
PROT FLD-MCNC: 3.4 G/DL
RBC # BLD AUTO: 3.21 M/UL (ref 4–5.4)
SODIUM SERPL-SCNC: 136 MMOL/L (ref 136–145)
SPECIMEN SOURCE: NORMAL
SPECIMEN SOURCE: NORMAL
WBC # BLD AUTO: 9.45 K/UL (ref 3.9–12.7)

## 2021-02-13 PROCEDURE — 94760 N-INVAS EAR/PLS OXIMETRY 1: CPT

## 2021-02-13 PROCEDURE — 85025 COMPLETE CBC W/AUTO DIFF WBC: CPT

## 2021-02-13 PROCEDURE — 25000003 PHARM REV CODE 250: Performed by: INTERNAL MEDICINE

## 2021-02-13 PROCEDURE — 63600175 PHARM REV CODE 636 W HCPCS: Performed by: INTERNAL MEDICINE

## 2021-02-13 PROCEDURE — 36415 COLL VENOUS BLD VENIPUNCTURE: CPT

## 2021-02-13 PROCEDURE — 96372 THER/PROPH/DIAG INJ SC/IM: CPT

## 2021-02-13 PROCEDURE — G0378 HOSPITAL OBSERVATION PER HR: HCPCS

## 2021-02-13 PROCEDURE — 80069 RENAL FUNCTION PANEL: CPT

## 2021-02-13 RX ORDER — CEFUROXIME AXETIL 500 MG/1
500 TABLET ORAL DAILY
Qty: 5 TABLET | Refills: 0 | Status: SHIPPED | OUTPATIENT
Start: 2021-02-13 | End: 2021-02-18

## 2021-02-13 RX ORDER — ASPIRIN 81 MG/1
81 TABLET ORAL DAILY
Qty: 30 TABLET | Refills: 0 | Status: SHIPPED | OUTPATIENT
Start: 2021-02-14 | End: 2023-11-16

## 2021-02-13 RX ORDER — AMLODIPINE BESYLATE 2.5 MG/1
2.5 TABLET ORAL DAILY
Qty: 30 TABLET | Refills: 0 | Status: SHIPPED | OUTPATIENT
Start: 2021-02-14 | End: 2021-06-21 | Stop reason: SDUPTHER

## 2021-02-13 RX ADMIN — GABAPENTIN 200 MG: 100 CAPSULE ORAL at 08:02

## 2021-02-13 RX ADMIN — PANTOPRAZOLE SODIUM 40 MG: 40 TABLET, DELAYED RELEASE ORAL at 08:02

## 2021-02-13 RX ADMIN — HEPARIN SODIUM 5000 UNITS: 5000 INJECTION INTRAVENOUS; SUBCUTANEOUS at 06:02

## 2021-02-13 RX ADMIN — AMLODIPINE BESYLATE 2.5 MG: 2.5 TABLET ORAL at 08:02

## 2021-02-13 RX ADMIN — ATORVASTATIN CALCIUM 40 MG: 40 TABLET, FILM COATED ORAL at 08:02

## 2021-02-13 RX ADMIN — ASPIRIN 81 MG: 81 TABLET, COATED ORAL at 08:02

## 2021-02-15 LAB
BACTERIA SPEC AEROBE CULT: ABNORMAL
BACTERIA SPEC AEROBE CULT: ABNORMAL
BACTERIA UR CULT: ABNORMAL
GRAM STN SPEC: ABNORMAL
PATH INTERP FLD-IMP: NORMAL

## 2021-02-17 ENCOUNTER — TELEPHONE (OUTPATIENT)
Dept: PULMONOLOGY | Facility: CLINIC | Age: 81
End: 2021-02-17

## 2021-02-17 ENCOUNTER — PATIENT OUTREACH (OUTPATIENT)
Dept: ADMINISTRATIVE | Facility: OTHER | Age: 81
End: 2021-02-17

## 2021-02-17 LAB — FINAL PATHOLOGIC DIAGNOSIS: NORMAL

## 2021-02-18 ENCOUNTER — TELEPHONE (OUTPATIENT)
Dept: PULMONOLOGY | Facility: CLINIC | Age: 81
End: 2021-02-18

## 2021-02-18 ENCOUNTER — OFFICE VISIT (OUTPATIENT)
Dept: PULMONOLOGY | Facility: CLINIC | Age: 81
End: 2021-02-18
Payer: MEDICARE

## 2021-02-18 VITALS
HEIGHT: 66 IN | HEART RATE: 79 BPM | SYSTOLIC BLOOD PRESSURE: 140 MMHG | RESPIRATION RATE: 20 BRPM | DIASTOLIC BLOOD PRESSURE: 70 MMHG | WEIGHT: 149.06 LBS | BODY MASS INDEX: 23.95 KG/M2 | OXYGEN SATURATION: 99 %

## 2021-02-18 DIAGNOSIS — J90 PLEURAL EFFUSION: Primary | ICD-10-CM

## 2021-02-18 DIAGNOSIS — B37.0 THRUSH, ORAL: ICD-10-CM

## 2021-02-18 DIAGNOSIS — I15.2 HYPERTENSION ASSOCIATED WITH DIABETES: Chronic | ICD-10-CM

## 2021-02-18 DIAGNOSIS — E11.59 HYPERTENSION ASSOCIATED WITH DIABETES: Chronic | ICD-10-CM

## 2021-02-18 PROCEDURE — 99999 PR PBB SHADOW E&M-EST. PATIENT-LVL IV: ICD-10-PCS | Mod: PBBFAC,,, | Performed by: INTERNAL MEDICINE

## 2021-02-18 PROCEDURE — 99204 PR OFFICE/OUTPT VISIT, NEW, LEVL IV, 45-59 MIN: ICD-10-PCS | Mod: S$GLB,,, | Performed by: INTERNAL MEDICINE

## 2021-02-18 PROCEDURE — 99204 OFFICE O/P NEW MOD 45 MIN: CPT | Mod: S$GLB,,, | Performed by: INTERNAL MEDICINE

## 2021-02-18 PROCEDURE — 99999 PR PBB SHADOW E&M-EST. PATIENT-LVL IV: CPT | Mod: PBBFAC,,, | Performed by: INTERNAL MEDICINE

## 2021-02-22 ENCOUNTER — TELEPHONE (OUTPATIENT)
Dept: PULMONOLOGY | Facility: CLINIC | Age: 81
End: 2021-02-22

## 2021-03-16 ENCOUNTER — OFFICE VISIT (OUTPATIENT)
Dept: HEMATOLOGY/ONCOLOGY | Facility: CLINIC | Age: 81
End: 2021-03-16
Payer: MEDICARE

## 2021-03-16 VITALS
BODY MASS INDEX: 24.66 KG/M2 | SYSTOLIC BLOOD PRESSURE: 169 MMHG | HEART RATE: 93 BPM | HEIGHT: 66 IN | DIASTOLIC BLOOD PRESSURE: 64 MMHG | TEMPERATURE: 98 F | WEIGHT: 153.44 LBS | OXYGEN SATURATION: 96 %

## 2021-03-16 DIAGNOSIS — N18.6 ANEMIA DUE TO CHRONIC KIDNEY DISEASE, ON CHRONIC DIALYSIS: ICD-10-CM

## 2021-03-16 DIAGNOSIS — E11.69 HYPERLIPIDEMIA ASSOCIATED WITH TYPE 2 DIABETES MELLITUS: ICD-10-CM

## 2021-03-16 DIAGNOSIS — E11.59 HYPERTENSION ASSOCIATED WITH DIABETES: Chronic | ICD-10-CM

## 2021-03-16 DIAGNOSIS — I15.2 HYPERTENSION ASSOCIATED WITH DIABETES: Chronic | ICD-10-CM

## 2021-03-16 DIAGNOSIS — J90 PLEURAL EFFUSION: ICD-10-CM

## 2021-03-16 DIAGNOSIS — Z99.2 ESRD NEEDING DIALYSIS: ICD-10-CM

## 2021-03-16 DIAGNOSIS — E78.5 HYPERLIPIDEMIA ASSOCIATED WITH TYPE 2 DIABETES MELLITUS: ICD-10-CM

## 2021-03-16 DIAGNOSIS — Z85.07 HISTORY OF PANCREATIC CANCER: Primary | ICD-10-CM

## 2021-03-16 DIAGNOSIS — N18.6 ESRD NEEDING DIALYSIS: ICD-10-CM

## 2021-03-16 DIAGNOSIS — D63.1 ANEMIA DUE TO CHRONIC KIDNEY DISEASE, ON CHRONIC DIALYSIS: ICD-10-CM

## 2021-03-16 DIAGNOSIS — Z12.89 ENCOUNTER FOR SCREENING FOR MALIGNANT NEOPLASM OF OTHER SITES: ICD-10-CM

## 2021-03-16 DIAGNOSIS — Z99.2 ANEMIA DUE TO CHRONIC KIDNEY DISEASE, ON CHRONIC DIALYSIS: ICD-10-CM

## 2021-03-16 PROCEDURE — 99215 PR OFFICE/OUTPT VISIT, EST, LEVL V, 40-54 MIN: ICD-10-PCS | Mod: S$GLB,,, | Performed by: NURSE PRACTITIONER

## 2021-03-16 PROCEDURE — 99215 OFFICE O/P EST HI 40 MIN: CPT | Mod: S$GLB,,, | Performed by: NURSE PRACTITIONER

## 2021-03-16 PROCEDURE — 99999 PR PBB SHADOW E&M-EST. PATIENT-LVL V: CPT | Mod: PBBFAC,,, | Performed by: NURSE PRACTITIONER

## 2021-03-16 PROCEDURE — 99999 PR PBB SHADOW E&M-EST. PATIENT-LVL V: ICD-10-PCS | Mod: PBBFAC,,, | Performed by: NURSE PRACTITIONER

## 2021-03-16 RX ORDER — OMEPRAZOLE 20 MG/1
CAPSULE, DELAYED RELEASE ORAL
COMMUNITY
Start: 2021-01-28 | End: 2021-04-22

## 2021-03-16 RX ORDER — INSULIN DEGLUDEC 100 U/ML
8 INJECTION, SOLUTION SUBCUTANEOUS
COMMUNITY
Start: 2021-02-01

## 2021-03-18 ENCOUNTER — OFFICE VISIT (OUTPATIENT)
Dept: CARDIOLOGY | Facility: CLINIC | Age: 81
End: 2021-03-18
Payer: MEDICARE

## 2021-03-18 ENCOUNTER — HOSPITAL ENCOUNTER (OUTPATIENT)
Dept: RADIOLOGY | Facility: HOSPITAL | Age: 81
Discharge: HOME | End: 2021-03-18
Attending: NURSE PRACTITIONER
Payer: MEDICARE

## 2021-03-18 VITALS
SYSTOLIC BLOOD PRESSURE: 130 MMHG | OXYGEN SATURATION: 95 % | HEART RATE: 79 BPM | DIASTOLIC BLOOD PRESSURE: 58 MMHG | BODY MASS INDEX: 24.98 KG/M2 | WEIGHT: 154.75 LBS

## 2021-03-18 DIAGNOSIS — Z99.2 ESRD NEEDING DIALYSIS: ICD-10-CM

## 2021-03-18 DIAGNOSIS — E11.59 HYPERTENSION ASSOCIATED WITH DIABETES: ICD-10-CM

## 2021-03-18 DIAGNOSIS — Z12.89 ENCOUNTER FOR SCREENING FOR MALIGNANT NEOPLASM OF OTHER SITES: ICD-10-CM

## 2021-03-18 DIAGNOSIS — D64.9 ANEMIA, UNSPECIFIED TYPE: ICD-10-CM

## 2021-03-18 DIAGNOSIS — J90 PLEURAL EFFUSION, LEFT: ICD-10-CM

## 2021-03-18 DIAGNOSIS — I20.89 STABLE ANGINA PECTORIS: Primary | ICD-10-CM

## 2021-03-18 DIAGNOSIS — E78.00 PURE HYPERCHOLESTEROLEMIA: ICD-10-CM

## 2021-03-18 DIAGNOSIS — R73.9 HYPERGLYCEMIA: ICD-10-CM

## 2021-03-18 DIAGNOSIS — N18.6 ESRD NEEDING DIALYSIS: ICD-10-CM

## 2021-03-18 DIAGNOSIS — J90 PLEURAL EFFUSION: ICD-10-CM

## 2021-03-18 DIAGNOSIS — R06.02 SOB (SHORTNESS OF BREATH): ICD-10-CM

## 2021-03-18 DIAGNOSIS — I15.2 HYPERTENSION ASSOCIATED WITH DIABETES: ICD-10-CM

## 2021-03-18 DIAGNOSIS — I49.5 SICK SINUS SYNDROME: ICD-10-CM

## 2021-03-18 PROCEDURE — 74177 CT ABD & PELVIS W/CONTRAST: CPT | Mod: TC

## 2021-03-18 PROCEDURE — 99999 PR PBB SHADOW E&M-EST. PATIENT-LVL IV: CPT | Mod: PBBFAC,,, | Performed by: INTERNAL MEDICINE

## 2021-03-18 PROCEDURE — 99214 PR OFFICE/OUTPT VISIT, EST, LEVL IV, 30-39 MIN: ICD-10-PCS | Mod: S$GLB,,, | Performed by: INTERNAL MEDICINE

## 2021-03-18 PROCEDURE — 25500020 PHARM REV CODE 255: Performed by: NURSE PRACTITIONER

## 2021-03-18 PROCEDURE — 71260 CT THORAX DX C+: CPT | Mod: TC

## 2021-03-18 PROCEDURE — 99214 OFFICE O/P EST MOD 30 MIN: CPT | Mod: S$GLB,,, | Performed by: INTERNAL MEDICINE

## 2021-03-18 PROCEDURE — 99999 PR PBB SHADOW E&M-EST. PATIENT-LVL IV: ICD-10-PCS | Mod: PBBFAC,,, | Performed by: INTERNAL MEDICINE

## 2021-03-18 RX ADMIN — IOHEXOL 100 ML: 350 INJECTION, SOLUTION INTRAVENOUS at 08:03

## 2021-03-25 ENCOUNTER — OFFICE VISIT (OUTPATIENT)
Dept: OBSTETRICS AND GYNECOLOGY | Facility: CLINIC | Age: 81
End: 2021-03-25
Payer: MEDICARE

## 2021-03-25 VITALS
WEIGHT: 151.88 LBS | DIASTOLIC BLOOD PRESSURE: 60 MMHG | BODY MASS INDEX: 24.41 KG/M2 | HEIGHT: 66 IN | SYSTOLIC BLOOD PRESSURE: 142 MMHG

## 2021-03-25 DIAGNOSIS — Z01.419 WELL WOMAN EXAM WITH ROUTINE GYNECOLOGICAL EXAM: ICD-10-CM

## 2021-03-25 DIAGNOSIS — Z12.31 ENCOUNTER FOR SCREENING MAMMOGRAM FOR MALIGNANT NEOPLASM OF BREAST: Primary | ICD-10-CM

## 2021-03-25 PROCEDURE — 99397 PR PREVENTIVE VISIT,EST,65 & OVER: ICD-10-PCS | Mod: S$GLB,,, | Performed by: OBSTETRICS & GYNECOLOGY

## 2021-03-25 PROCEDURE — 99397 PER PM REEVAL EST PAT 65+ YR: CPT | Mod: S$GLB,,, | Performed by: OBSTETRICS & GYNECOLOGY

## 2021-03-25 PROCEDURE — 99999 PR PBB SHADOW E&M-EST. PATIENT-LVL IV: CPT | Mod: PBBFAC,,, | Performed by: OBSTETRICS & GYNECOLOGY

## 2021-03-25 PROCEDURE — 99999 PR PBB SHADOW E&M-EST. PATIENT-LVL IV: ICD-10-PCS | Mod: PBBFAC,,, | Performed by: OBSTETRICS & GYNECOLOGY

## 2021-03-25 RX ORDER — PEN NEEDLE, DIABETIC 32 GX 1/4"
NEEDLE, DISPOSABLE MISCELLANEOUS
COMMUNITY
Start: 2021-03-23

## 2021-03-25 RX ORDER — BLOOD SUGAR DIAGNOSTIC
STRIP MISCELLANEOUS
COMMUNITY
Start: 2021-03-22

## 2021-03-30 ENCOUNTER — OFFICE VISIT (OUTPATIENT)
Dept: HEMATOLOGY/ONCOLOGY | Facility: CLINIC | Age: 81
End: 2021-03-30
Payer: MEDICARE

## 2021-03-30 VITALS
TEMPERATURE: 97 F | OXYGEN SATURATION: 99 % | SYSTOLIC BLOOD PRESSURE: 149 MMHG | HEART RATE: 82 BPM | BODY MASS INDEX: 24.41 KG/M2 | DIASTOLIC BLOOD PRESSURE: 74 MMHG | WEIGHT: 151.88 LBS | HEIGHT: 66 IN

## 2021-03-30 DIAGNOSIS — Z85.07 HISTORY OF PANCREATIC CANCER: ICD-10-CM

## 2021-03-30 DIAGNOSIS — N18.6 ANEMIA DUE TO CHRONIC KIDNEY DISEASE, ON CHRONIC DIALYSIS: ICD-10-CM

## 2021-03-30 DIAGNOSIS — Z99.2 ANEMIA DUE TO CHRONIC KIDNEY DISEASE, ON CHRONIC DIALYSIS: ICD-10-CM

## 2021-03-30 DIAGNOSIS — N18.6 ESRD NEEDING DIALYSIS: Primary | ICD-10-CM

## 2021-03-30 DIAGNOSIS — D63.1 ANEMIA DUE TO CHRONIC KIDNEY DISEASE, ON CHRONIC DIALYSIS: ICD-10-CM

## 2021-03-30 DIAGNOSIS — Z99.2 ESRD NEEDING DIALYSIS: Primary | ICD-10-CM

## 2021-03-30 DIAGNOSIS — J90 PLEURAL EFFUSION: ICD-10-CM

## 2021-03-30 PROCEDURE — 99214 OFFICE O/P EST MOD 30 MIN: CPT | Mod: S$GLB,,, | Performed by: INTERNAL MEDICINE

## 2021-03-30 PROCEDURE — 99999 PR PBB SHADOW E&M-EST. PATIENT-LVL IV: ICD-10-PCS | Mod: PBBFAC,,, | Performed by: INTERNAL MEDICINE

## 2021-03-30 PROCEDURE — 99999 PR PBB SHADOW E&M-EST. PATIENT-LVL IV: CPT | Mod: PBBFAC,,, | Performed by: INTERNAL MEDICINE

## 2021-03-30 PROCEDURE — 99214 PR OFFICE/OUTPT VISIT, EST, LEVL IV, 30-39 MIN: ICD-10-PCS | Mod: S$GLB,,, | Performed by: INTERNAL MEDICINE

## 2021-04-13 ENCOUNTER — HOSPITAL ENCOUNTER (OUTPATIENT)
Dept: RADIOLOGY | Facility: HOSPITAL | Age: 81
Discharge: HOME | End: 2021-04-13
Attending: OBSTETRICS & GYNECOLOGY
Payer: MEDICARE

## 2021-04-13 VITALS — WEIGHT: 151.88 LBS | BODY MASS INDEX: 24.41 KG/M2 | HEIGHT: 66 IN

## 2021-04-13 DIAGNOSIS — Z12.31 ENCOUNTER FOR SCREENING MAMMOGRAM FOR MALIGNANT NEOPLASM OF BREAST: ICD-10-CM

## 2021-04-13 PROCEDURE — 77067 SCR MAMMO BI INCL CAD: CPT | Mod: TC

## 2021-04-13 PROCEDURE — 77063 BREAST TOMOSYNTHESIS BI: CPT | Mod: 26,,, | Performed by: RADIOLOGY

## 2021-04-13 PROCEDURE — 77063 MAMMO DIGITAL SCREENING BILAT WITH TOMO: ICD-10-PCS | Mod: 26,,, | Performed by: RADIOLOGY

## 2021-04-13 PROCEDURE — 77067 SCR MAMMO BI INCL CAD: CPT | Mod: 26,,, | Performed by: RADIOLOGY

## 2021-04-13 PROCEDURE — 77067 MAMMO DIGITAL SCREENING BILAT WITH TOMO: ICD-10-PCS | Mod: 26,,, | Performed by: RADIOLOGY

## 2021-04-15 ENCOUNTER — OFFICE VISIT (OUTPATIENT)
Dept: CARDIOLOGY | Facility: CLINIC | Age: 81
End: 2021-04-15
Payer: MEDICARE

## 2021-04-15 VITALS
SYSTOLIC BLOOD PRESSURE: 136 MMHG | DIASTOLIC BLOOD PRESSURE: 64 MMHG | OXYGEN SATURATION: 98 % | WEIGHT: 153.25 LBS | BODY MASS INDEX: 24.73 KG/M2 | HEART RATE: 57 BPM

## 2021-04-15 DIAGNOSIS — R73.9 HYPERGLYCEMIA: ICD-10-CM

## 2021-04-15 DIAGNOSIS — I15.2 HYPERTENSION ASSOCIATED WITH DIABETES: ICD-10-CM

## 2021-04-15 DIAGNOSIS — E11.59 HYPERTENSION ASSOCIATED WITH DIABETES: ICD-10-CM

## 2021-04-15 DIAGNOSIS — I49.5 SICK SINUS SYNDROME: ICD-10-CM

## 2021-04-15 DIAGNOSIS — Z99.2 ESRD NEEDING DIALYSIS: Primary | ICD-10-CM

## 2021-04-15 DIAGNOSIS — R06.02 SOB (SHORTNESS OF BREATH): ICD-10-CM

## 2021-04-15 DIAGNOSIS — E78.00 PURE HYPERCHOLESTEROLEMIA: ICD-10-CM

## 2021-04-15 DIAGNOSIS — N18.6 ESRD NEEDING DIALYSIS: Primary | ICD-10-CM

## 2021-04-15 DIAGNOSIS — D64.9 ANEMIA, UNSPECIFIED TYPE: ICD-10-CM

## 2021-04-15 DIAGNOSIS — J90 PLEURAL EFFUSION: ICD-10-CM

## 2021-04-15 DIAGNOSIS — J90 PLEURAL EFFUSION, LEFT: ICD-10-CM

## 2021-04-15 PROCEDURE — 99999 PR PBB SHADOW E&M-EST. PATIENT-LVL IV: CPT | Mod: PBBFAC,,, | Performed by: INTERNAL MEDICINE

## 2021-04-15 PROCEDURE — 99999 PR PBB SHADOW E&M-EST. PATIENT-LVL IV: ICD-10-PCS | Mod: PBBFAC,,, | Performed by: INTERNAL MEDICINE

## 2021-04-15 PROCEDURE — 99215 OFFICE O/P EST HI 40 MIN: CPT | Mod: S$GLB,,, | Performed by: INTERNAL MEDICINE

## 2021-04-15 PROCEDURE — 99215 PR OFFICE/OUTPT VISIT, EST, LEVL V, 40-54 MIN: ICD-10-PCS | Mod: S$GLB,,, | Performed by: INTERNAL MEDICINE

## 2021-04-22 ENCOUNTER — OFFICE VISIT (OUTPATIENT)
Dept: INTERNAL MEDICINE | Facility: CLINIC | Age: 81
End: 2021-04-22
Payer: MEDICARE

## 2021-04-22 VITALS
WEIGHT: 150.81 LBS | RESPIRATION RATE: 16 BRPM | BODY MASS INDEX: 24.24 KG/M2 | TEMPERATURE: 98 F | HEART RATE: 86 BPM | DIASTOLIC BLOOD PRESSURE: 58 MMHG | OXYGEN SATURATION: 96 % | HEIGHT: 66 IN | SYSTOLIC BLOOD PRESSURE: 138 MMHG

## 2021-04-22 DIAGNOSIS — R41.3 MEMORY DEFICITS: ICD-10-CM

## 2021-04-22 DIAGNOSIS — Z86.010 HISTORY OF COLON POLYPS: ICD-10-CM

## 2021-04-22 DIAGNOSIS — Z99.2 ANEMIA DUE TO CHRONIC KIDNEY DISEASE, ON CHRONIC DIALYSIS: ICD-10-CM

## 2021-04-22 DIAGNOSIS — Z85.07 HISTORY OF PANCREATIC CANCER: ICD-10-CM

## 2021-04-22 DIAGNOSIS — J30.1 SEASONAL ALLERGIC RHINITIS DUE TO POLLEN: ICD-10-CM

## 2021-04-22 DIAGNOSIS — R25.1 TREMOR OF BOTH HANDS: ICD-10-CM

## 2021-04-22 DIAGNOSIS — Z99.2 TYPE 2 DIABETES MELLITUS WITH CHRONIC KIDNEY DISEASE ON CHRONIC DIALYSIS, WITHOUT LONG-TERM CURRENT USE OF INSULIN: ICD-10-CM

## 2021-04-22 DIAGNOSIS — E11.69 HYPERLIPIDEMIA ASSOCIATED WITH TYPE 2 DIABETES MELLITUS: ICD-10-CM

## 2021-04-22 DIAGNOSIS — I15.2 HYPERTENSION ASSOCIATED WITH DIABETES: Primary | Chronic | ICD-10-CM

## 2021-04-22 DIAGNOSIS — N18.6 ESRD NEEDING DIALYSIS: ICD-10-CM

## 2021-04-22 DIAGNOSIS — Z99.2 ESRD NEEDING DIALYSIS: ICD-10-CM

## 2021-04-22 DIAGNOSIS — E78.5 HYPERLIPIDEMIA ASSOCIATED WITH TYPE 2 DIABETES MELLITUS: ICD-10-CM

## 2021-04-22 DIAGNOSIS — E11.22 TYPE 2 DIABETES MELLITUS WITH CHRONIC KIDNEY DISEASE ON CHRONIC DIALYSIS, WITHOUT LONG-TERM CURRENT USE OF INSULIN: ICD-10-CM

## 2021-04-22 DIAGNOSIS — E11.42 TYPE 2 DIABETES MELLITUS WITH DIABETIC POLYNEUROPATHY, WITHOUT LONG-TERM CURRENT USE OF INSULIN: ICD-10-CM

## 2021-04-22 DIAGNOSIS — N18.6 ANEMIA DUE TO CHRONIC KIDNEY DISEASE, ON CHRONIC DIALYSIS: ICD-10-CM

## 2021-04-22 DIAGNOSIS — N25.81 SECONDARY HYPERPARATHYROIDISM OF RENAL ORIGIN: ICD-10-CM

## 2021-04-22 DIAGNOSIS — E11.59 HYPERTENSION ASSOCIATED WITH DIABETES: Primary | Chronic | ICD-10-CM

## 2021-04-22 DIAGNOSIS — D63.1 ANEMIA DUE TO CHRONIC KIDNEY DISEASE, ON CHRONIC DIALYSIS: ICD-10-CM

## 2021-04-22 DIAGNOSIS — N18.6 TYPE 2 DIABETES MELLITUS WITH CHRONIC KIDNEY DISEASE ON CHRONIC DIALYSIS, WITHOUT LONG-TERM CURRENT USE OF INSULIN: ICD-10-CM

## 2021-04-22 PROBLEM — Z90.5 ACQUIRED ABSENCE OF KIDNEY: Status: ACTIVE | Noted: 2019-04-09

## 2021-04-22 PROBLEM — J90 PLEURAL EFFUSION: Status: RESOLVED | Noted: 2021-02-12 | Resolved: 2021-04-22

## 2021-04-22 PROBLEM — R07.9 CHEST PAIN: Status: RESOLVED | Noted: 2021-02-10 | Resolved: 2021-04-22

## 2021-04-22 PROCEDURE — 99999 PR PBB SHADOW E&M-EST. PATIENT-LVL V: CPT | Mod: PBBFAC,,, | Performed by: FAMILY MEDICINE

## 2021-04-22 PROCEDURE — 99214 OFFICE O/P EST MOD 30 MIN: CPT | Mod: S$GLB,,, | Performed by: FAMILY MEDICINE

## 2021-04-22 PROCEDURE — 99214 PR OFFICE/OUTPT VISIT, EST, LEVL IV, 30-39 MIN: ICD-10-PCS | Mod: S$GLB,,, | Performed by: FAMILY MEDICINE

## 2021-04-22 PROCEDURE — 99999 PR PBB SHADOW E&M-EST. PATIENT-LVL V: ICD-10-PCS | Mod: PBBFAC,,, | Performed by: FAMILY MEDICINE

## 2021-04-27 ENCOUNTER — PATIENT OUTREACH (OUTPATIENT)
Dept: ADMINISTRATIVE | Facility: HOSPITAL | Age: 81
End: 2021-04-27

## 2021-04-29 ENCOUNTER — LAB VISIT (OUTPATIENT)
Dept: LAB | Facility: HOSPITAL | Age: 81
End: 2021-04-29
Attending: FAMILY MEDICINE
Payer: MEDICARE

## 2021-04-29 DIAGNOSIS — E11.69 HYPERLIPIDEMIA ASSOCIATED WITH TYPE 2 DIABETES MELLITUS: ICD-10-CM

## 2021-04-29 DIAGNOSIS — Z99.2 TYPE 2 DIABETES MELLITUS WITH CHRONIC KIDNEY DISEASE ON CHRONIC DIALYSIS, WITHOUT LONG-TERM CURRENT USE OF INSULIN: ICD-10-CM

## 2021-04-29 DIAGNOSIS — E78.5 HYPERLIPIDEMIA ASSOCIATED WITH TYPE 2 DIABETES MELLITUS: ICD-10-CM

## 2021-04-29 DIAGNOSIS — E11.59 HYPERTENSION ASSOCIATED WITH DIABETES: Chronic | ICD-10-CM

## 2021-04-29 DIAGNOSIS — E11.22 TYPE 2 DIABETES MELLITUS WITH CHRONIC KIDNEY DISEASE ON CHRONIC DIALYSIS, WITHOUT LONG-TERM CURRENT USE OF INSULIN: ICD-10-CM

## 2021-04-29 DIAGNOSIS — D63.1 ANEMIA DUE TO CHRONIC KIDNEY DISEASE, ON CHRONIC DIALYSIS: ICD-10-CM

## 2021-04-29 DIAGNOSIS — I15.2 HYPERTENSION ASSOCIATED WITH DIABETES: Chronic | ICD-10-CM

## 2021-04-29 DIAGNOSIS — Z99.2 ANEMIA DUE TO CHRONIC KIDNEY DISEASE, ON CHRONIC DIALYSIS: ICD-10-CM

## 2021-04-29 DIAGNOSIS — N25.81 SECONDARY HYPERPARATHYROIDISM OF RENAL ORIGIN: ICD-10-CM

## 2021-04-29 DIAGNOSIS — N18.6 TYPE 2 DIABETES MELLITUS WITH CHRONIC KIDNEY DISEASE ON CHRONIC DIALYSIS, WITHOUT LONG-TERM CURRENT USE OF INSULIN: ICD-10-CM

## 2021-04-29 DIAGNOSIS — N18.6 ANEMIA DUE TO CHRONIC KIDNEY DISEASE, ON CHRONIC DIALYSIS: ICD-10-CM

## 2021-04-29 LAB
CHOLEST SERPL-MCNC: 126 MG/DL (ref 120–199)
CHOLEST/HDLC SERPL: 2.1 {RATIO} (ref 2–5)
ESTIMATED AVG GLUCOSE: 163 MG/DL (ref 68–131)
HBA1C MFR BLD: 7.3 % (ref 4–5.6)
HDLC SERPL-MCNC: 59 MG/DL (ref 40–75)
HDLC SERPL: 46.8 % (ref 20–50)
LDLC SERPL CALC-MCNC: 54.6 MG/DL (ref 63–159)
NONHDLC SERPL-MCNC: 67 MG/DL
PTH-INTACT SERPL-MCNC: 161 PG/ML (ref 9–77)
TRIGL SERPL-MCNC: 62 MG/DL (ref 30–150)
TSH SERPL DL<=0.005 MIU/L-ACNC: 2.54 UIU/ML (ref 0.4–4)
VIT B12 SERPL-MCNC: 1215 PG/ML (ref 210–950)

## 2021-04-29 PROCEDURE — 84443 ASSAY THYROID STIM HORMONE: CPT | Performed by: FAMILY MEDICINE

## 2021-04-29 PROCEDURE — 36415 COLL VENOUS BLD VENIPUNCTURE: CPT | Performed by: FAMILY MEDICINE

## 2021-04-29 PROCEDURE — 83036 HEMOGLOBIN GLYCOSYLATED A1C: CPT | Performed by: FAMILY MEDICINE

## 2021-04-29 PROCEDURE — 83970 ASSAY OF PARATHORMONE: CPT | Performed by: FAMILY MEDICINE

## 2021-04-29 PROCEDURE — 80061 LIPID PANEL: CPT | Performed by: FAMILY MEDICINE

## 2021-04-29 PROCEDURE — 82607 VITAMIN B-12: CPT | Performed by: FAMILY MEDICINE

## 2021-05-07 ENCOUNTER — TELEPHONE (OUTPATIENT)
Dept: OBSTETRICS AND GYNECOLOGY | Facility: CLINIC | Age: 81
End: 2021-05-07

## 2021-05-07 ENCOUNTER — TELEPHONE (OUTPATIENT)
Dept: INTERNAL MEDICINE | Facility: CLINIC | Age: 81
End: 2021-05-07

## 2021-05-11 ENCOUNTER — TELEPHONE (OUTPATIENT)
Dept: OBSTETRICS AND GYNECOLOGY | Facility: CLINIC | Age: 81
End: 2021-05-11

## 2021-05-25 DIAGNOSIS — M19.90 ARTHRITIS: ICD-10-CM

## 2021-05-25 RX ORDER — TRAMADOL HYDROCHLORIDE 50 MG/1
50 TABLET ORAL EVERY 12 HOURS
Qty: 30 TABLET | Refills: 0 | Status: SHIPPED | OUTPATIENT
Start: 2021-05-25 | End: 2021-06-21 | Stop reason: SDUPTHER

## 2021-05-27 DIAGNOSIS — G62.0 DRUG-INDUCED POLYNEUROPATHY: ICD-10-CM

## 2021-05-28 RX ORDER — GABAPENTIN 100 MG/1
200 CAPSULE ORAL 3 TIMES DAILY
Qty: 540 CAPSULE | Refills: 4 | Status: SHIPPED | OUTPATIENT
Start: 2021-05-28 | End: 2021-05-28 | Stop reason: SDUPTHER

## 2021-05-28 RX ORDER — GABAPENTIN 100 MG/1
200 CAPSULE ORAL 3 TIMES DAILY
Qty: 540 CAPSULE | Refills: 0 | Status: SHIPPED | OUTPATIENT
Start: 2021-05-28 | End: 2021-08-05 | Stop reason: SDUPTHER

## 2021-06-02 ENCOUNTER — PATIENT OUTREACH (OUTPATIENT)
Dept: ADMINISTRATIVE | Facility: OTHER | Age: 81
End: 2021-06-02

## 2021-06-03 ENCOUNTER — HOSPITAL ENCOUNTER (OUTPATIENT)
Dept: RADIOLOGY | Facility: HOSPITAL | Age: 81
Discharge: HOME | End: 2021-06-03
Attending: PODIATRIST
Payer: MEDICARE

## 2021-06-03 ENCOUNTER — OFFICE VISIT (OUTPATIENT)
Dept: OBSTETRICS AND GYNECOLOGY | Facility: CLINIC | Age: 81
End: 2021-06-03
Payer: MEDICARE

## 2021-06-03 ENCOUNTER — OFFICE VISIT (OUTPATIENT)
Dept: PODIATRY | Facility: CLINIC | Age: 81
End: 2021-06-03
Payer: MEDICARE

## 2021-06-03 VITALS
BODY MASS INDEX: 23.95 KG/M2 | HEART RATE: 74 BPM | DIASTOLIC BLOOD PRESSURE: 70 MMHG | HEIGHT: 66 IN | SYSTOLIC BLOOD PRESSURE: 132 MMHG | WEIGHT: 149 LBS

## 2021-06-03 DIAGNOSIS — M79.672 CHRONIC FOOT PAIN, LEFT: Primary | ICD-10-CM

## 2021-06-03 DIAGNOSIS — E11.42 TYPE 2 DIABETES MELLITUS WITH DIABETIC POLYNEUROPATHY, WITHOUT LONG-TERM CURRENT USE OF INSULIN: ICD-10-CM

## 2021-06-03 DIAGNOSIS — G89.29 CHRONIC FOOT PAIN, LEFT: Primary | ICD-10-CM

## 2021-06-03 DIAGNOSIS — G89.29 CHRONIC FOOT PAIN, LEFT: ICD-10-CM

## 2021-06-03 DIAGNOSIS — R23.2 HOT FLASHES: Primary | ICD-10-CM

## 2021-06-03 DIAGNOSIS — M79.672 CHRONIC FOOT PAIN, LEFT: ICD-10-CM

## 2021-06-03 PROCEDURE — 73630 X-RAY EXAM OF FOOT: CPT | Mod: TC,LT

## 2021-06-03 PROCEDURE — 99499 NO LOS: ICD-10-PCS | Mod: S$GLB,,, | Performed by: NURSE PRACTITIONER

## 2021-06-03 PROCEDURE — 99999 PR PBB SHADOW E&M-EST. PATIENT-LVL III: CPT | Mod: PBBFAC,,, | Performed by: NURSE PRACTITIONER

## 2021-06-03 PROCEDURE — 99203 OFFICE O/P NEW LOW 30 MIN: CPT | Mod: 25,S$GLB,, | Performed by: PODIATRIST

## 2021-06-03 PROCEDURE — 99999 PR PBB SHADOW E&M-EST. PATIENT-LVL IV: ICD-10-PCS | Mod: PBBFAC,,, | Performed by: PODIATRIST

## 2021-06-03 PROCEDURE — 99999 PR PBB SHADOW E&M-EST. PATIENT-LVL III: ICD-10-PCS | Mod: PBBFAC,,, | Performed by: NURSE PRACTITIONER

## 2021-06-03 PROCEDURE — 73630 XR FOOT COMPLETE 3 VIEW LEFT: ICD-10-PCS | Mod: 26,LT,, | Performed by: RADIOLOGY

## 2021-06-03 PROCEDURE — 99999 PR PBB SHADOW E&M-EST. PATIENT-LVL IV: CPT | Mod: PBBFAC,,, | Performed by: PODIATRIST

## 2021-06-03 PROCEDURE — 99203 PR OFFICE/OUTPT VISIT, NEW, LEVL III, 30-44 MIN: ICD-10-PCS | Mod: 25,S$GLB,, | Performed by: PODIATRIST

## 2021-06-03 PROCEDURE — 73630 X-RAY EXAM OF FOOT: CPT | Mod: 26,LT,, | Performed by: RADIOLOGY

## 2021-06-03 PROCEDURE — 99499 UNLISTED E&M SERVICE: CPT | Mod: S$GLB,,, | Performed by: NURSE PRACTITIONER

## 2021-06-03 RX ORDER — REPAGLINIDE 1 MG/1
1 TABLET ORAL
Status: ON HOLD | COMMUNITY
Start: 2021-05-10 | End: 2022-01-18 | Stop reason: ALTCHOICE

## 2021-06-21 DIAGNOSIS — M19.90 ARTHRITIS: ICD-10-CM

## 2021-06-21 RX ORDER — AMLODIPINE BESYLATE 2.5 MG/1
2.5 TABLET ORAL DAILY
Qty: 90 TABLET | Refills: 1 | Status: SHIPPED | OUTPATIENT
Start: 2021-06-21 | End: 2021-12-29

## 2021-06-21 RX ORDER — TRAMADOL HYDROCHLORIDE 50 MG/1
50 TABLET ORAL EVERY 12 HOURS
Qty: 30 TABLET | Refills: 0 | Status: SHIPPED | OUTPATIENT
Start: 2021-06-21

## 2021-07-08 DIAGNOSIS — M19.90 ARTHRITIS: ICD-10-CM

## 2021-07-08 RX ORDER — TRAMADOL HYDROCHLORIDE 50 MG/1
50 TABLET ORAL EVERY 12 HOURS
Qty: 30 TABLET | Refills: 0 | OUTPATIENT
Start: 2021-07-08

## 2021-07-15 ENCOUNTER — TELEPHONE (OUTPATIENT)
Dept: URGENT CARE | Facility: CLINIC | Age: 81
End: 2021-07-15

## 2021-07-15 ENCOUNTER — TELEPHONE (OUTPATIENT)
Dept: INTERNAL MEDICINE | Facility: CLINIC | Age: 81
End: 2021-07-15

## 2021-07-16 ENCOUNTER — TELEPHONE (OUTPATIENT)
Dept: INTERNAL MEDICINE | Facility: CLINIC | Age: 81
End: 2021-07-16

## 2021-08-04 ENCOUNTER — TELEPHONE (OUTPATIENT)
Dept: CARDIOLOGY | Facility: CLINIC | Age: 81
End: 2021-08-04

## 2021-08-05 ENCOUNTER — TELEPHONE (OUTPATIENT)
Dept: CARDIOLOGY | Facility: CLINIC | Age: 81
End: 2021-08-05

## 2021-08-05 DIAGNOSIS — G62.0 DRUG-INDUCED POLYNEUROPATHY: ICD-10-CM

## 2021-08-05 RX ORDER — GABAPENTIN 100 MG/1
200 CAPSULE ORAL 3 TIMES DAILY
Qty: 540 CAPSULE | Refills: 0 | Status: SHIPPED | OUTPATIENT
Start: 2021-08-05 | End: 2021-10-28 | Stop reason: SDUPTHER

## 2021-08-09 ENCOUNTER — PATIENT OUTREACH (OUTPATIENT)
Dept: ADMINISTRATIVE | Facility: OTHER | Age: 81
End: 2021-08-09

## 2021-08-10 ENCOUNTER — OFFICE VISIT (OUTPATIENT)
Dept: CARDIOLOGY | Facility: CLINIC | Age: 81
End: 2021-08-10
Payer: MEDICARE

## 2021-08-10 VITALS
DIASTOLIC BLOOD PRESSURE: 56 MMHG | WEIGHT: 155.88 LBS | OXYGEN SATURATION: 99 % | HEART RATE: 66 BPM | SYSTOLIC BLOOD PRESSURE: 120 MMHG | HEIGHT: 66 IN | BODY MASS INDEX: 25.05 KG/M2

## 2021-08-10 DIAGNOSIS — E11.59 HYPERTENSION ASSOCIATED WITH DIABETES: ICD-10-CM

## 2021-08-10 DIAGNOSIS — N18.6 ESRD NEEDING DIALYSIS: ICD-10-CM

## 2021-08-10 DIAGNOSIS — J90 PLEURAL EFFUSION: ICD-10-CM

## 2021-08-10 DIAGNOSIS — Z99.2 ESRD NEEDING DIALYSIS: ICD-10-CM

## 2021-08-10 DIAGNOSIS — R73.9 HYPERGLYCEMIA: ICD-10-CM

## 2021-08-10 DIAGNOSIS — R07.9 CHEST PAIN, UNSPECIFIED TYPE: ICD-10-CM

## 2021-08-10 DIAGNOSIS — I15.2 HYPERTENSION ASSOCIATED WITH DIABETES: ICD-10-CM

## 2021-08-10 DIAGNOSIS — E78.00 PURE HYPERCHOLESTEROLEMIA: ICD-10-CM

## 2021-08-10 DIAGNOSIS — J90 PLEURAL EFFUSION, LEFT: Primary | ICD-10-CM

## 2021-08-10 PROCEDURE — 99214 OFFICE O/P EST MOD 30 MIN: CPT | Mod: S$GLB,,, | Performed by: INTERNAL MEDICINE

## 2021-08-10 PROCEDURE — 99999 PR PBB SHADOW E&M-EST. PATIENT-LVL V: CPT | Mod: PBBFAC,,, | Performed by: INTERNAL MEDICINE

## 2021-08-10 PROCEDURE — 99214 PR OFFICE/OUTPT VISIT, EST, LEVL IV, 30-39 MIN: ICD-10-PCS | Mod: S$GLB,,, | Performed by: INTERNAL MEDICINE

## 2021-08-10 PROCEDURE — 99999 PR PBB SHADOW E&M-EST. PATIENT-LVL V: ICD-10-PCS | Mod: PBBFAC,,, | Performed by: INTERNAL MEDICINE

## 2021-08-31 ENCOUNTER — HOSPITAL ENCOUNTER (OUTPATIENT)
Dept: CARDIOLOGY | Facility: HOSPITAL | Age: 81
Discharge: HOME | End: 2021-08-31
Attending: INTERNAL MEDICINE
Payer: MEDICARE

## 2021-08-31 VITALS
HEIGHT: 66 IN | WEIGHT: 155 LBS | SYSTOLIC BLOOD PRESSURE: 120 MMHG | BODY MASS INDEX: 24.91 KG/M2 | DIASTOLIC BLOOD PRESSURE: 56 MMHG

## 2021-08-31 DIAGNOSIS — R73.9 HYPERGLYCEMIA: ICD-10-CM

## 2021-08-31 DIAGNOSIS — N18.6 ESRD NEEDING DIALYSIS: ICD-10-CM

## 2021-08-31 DIAGNOSIS — E11.59 HYPERTENSION ASSOCIATED WITH DIABETES: ICD-10-CM

## 2021-08-31 DIAGNOSIS — J90 PLEURAL EFFUSION: ICD-10-CM

## 2021-08-31 DIAGNOSIS — I15.2 HYPERTENSION ASSOCIATED WITH DIABETES: ICD-10-CM

## 2021-08-31 DIAGNOSIS — E78.00 PURE HYPERCHOLESTEROLEMIA: ICD-10-CM

## 2021-08-31 DIAGNOSIS — Z99.2 ESRD NEEDING DIALYSIS: ICD-10-CM

## 2021-08-31 DIAGNOSIS — J90 PLEURAL EFFUSION, LEFT: ICD-10-CM

## 2021-08-31 DIAGNOSIS — R07.9 CHEST PAIN, UNSPECIFIED TYPE: ICD-10-CM

## 2021-08-31 LAB
AORTIC ROOT ANNULUS: 2.71 CM
AV INDEX (PROSTH): 0.92
AV MEAN GRADIENT: 3 MMHG
AV PEAK GRADIENT: 6 MMHG
AV VALVE AREA: 2.82 CM2
AV VELOCITY RATIO: 0.97
BSA FOR ECHO PROCEDURE: 1.81 M2
CV ECHO LV RWT: 0.41 CM
DOP CALC AO PEAK VEL: 1.19 M/S
DOP CALC AO VTI: 29.84 CM
DOP CALC LVOT AREA: 3.1 CM2
DOP CALC LVOT DIAMETER: 1.98 CM
DOP CALC LVOT PEAK VEL: 1.15 M/S
DOP CALC LVOT STROKE VOLUME: 84.05 CM3
DOP CALC MV VTI: 23.2 CM
DOP CALC RVOT PEAK VEL: 0.81 M/S
DOP CALC RVOT VTI: 19.26 CM
DOP CALCLVOT PEAK VEL VTI: 27.31 CM
E WAVE DECELERATION TIME: 177.49 MSEC
E/A RATIO: 1.25
E/E' RATIO: 8.55 M/S
ECHO LV POSTERIOR WALL: 0.96 CM (ref 0.6–1.1)
EJECTION FRACTION: 65 %
FRACTIONAL SHORTENING: 48 % (ref 28–44)
INTERVENTRICULAR SEPTUM: 0.94 CM (ref 0.6–1.1)
IVRT: 99.9 MSEC
LA MAJOR: 4.37 CM
LA MINOR: 3.97 CM
LA WIDTH: 2.79 CM
LEFT ATRIUM SIZE: 3.39 CM
LEFT ATRIUM VOLUME INDEX: 18.7 ML/M2
LEFT ATRIUM VOLUME: 33.45 CM3
LEFT INTERNAL DIMENSION IN SYSTOLE: 2.43 CM (ref 2.1–4)
LEFT VENTRICLE DIASTOLIC VOLUME INDEX: 56.35 ML/M2
LEFT VENTRICLE DIASTOLIC VOLUME: 100.86 ML
LEFT VENTRICLE MASS INDEX: 85 G/M2
LEFT VENTRICLE SYSTOLIC VOLUME INDEX: 11.6 ML/M2
LEFT VENTRICLE SYSTOLIC VOLUME: 20.77 ML
LEFT VENTRICULAR INTERNAL DIMENSION IN DIASTOLE: 4.67 CM (ref 3.5–6)
LEFT VENTRICULAR MASS: 151.81 G
LV LATERAL E/E' RATIO: 8.55 M/S
LV SEPTAL E/E' RATIO: 8.55 M/S
MV A" WAVE DURATION": 8.28 MSEC
MV MEAN GRADIENT: 0 MMHG
MV PEAK A VEL: 0.75 M/S
MV PEAK E VEL: 0.94 M/S
MV PEAK GRADIENT: 3 MMHG
MV VALVE AREA BY CONTINUITY EQUATION: 3.62 CM2
PISA TR MAX VEL: 2.93 M/S
PULM VEIN S/D RATIO: 1
PV MEAN GRADIENT: 1 MMHG
PV PEAK D VEL: 0.49 M/S
PV PEAK S VEL: 0.49 M/S
PV PEAK VELOCITY: 0.95 CM/S
RA MAJOR: 4.35 CM
RA PRESSURE: 3 MMHG
RA WIDTH: 2.31 CM
RIGHT VENTRICULAR END-DIASTOLIC DIMENSION: 2.19 CM
SINUS: 2.8 CM
STJ: 2.77 CM
TDI LATERAL: 0.11 M/S
TDI SEPTAL: 0.11 M/S
TDI: 0.11 M/S
TR MAX PG: 34 MMHG
TRICUSPID ANNULAR PLANE SYSTOLIC EXCURSION: 2.08 CM
TV REST PULMONARY ARTERY PRESSURE: 37 MMHG

## 2021-08-31 PROCEDURE — 93306 TTE W/DOPPLER COMPLETE: CPT | Mod: 26,,, | Performed by: INTERNAL MEDICINE

## 2021-08-31 PROCEDURE — 93306 TTE W/DOPPLER COMPLETE: CPT

## 2021-08-31 PROCEDURE — 93306 ECHO (CUPID ONLY): ICD-10-PCS | Mod: 26,,, | Performed by: INTERNAL MEDICINE

## 2021-09-01 ENCOUNTER — TELEPHONE (OUTPATIENT)
Dept: CARDIOLOGY | Facility: CLINIC | Age: 81
End: 2021-09-01

## 2021-10-04 ENCOUNTER — TELEPHONE (OUTPATIENT)
Dept: HEMATOLOGY/ONCOLOGY | Facility: CLINIC | Age: 81
End: 2021-10-04

## 2021-10-28 ENCOUNTER — LAB VISIT (OUTPATIENT)
Dept: LAB | Facility: HOSPITAL | Age: 81
End: 2021-10-28
Attending: NURSE PRACTITIONER
Payer: MEDICARE

## 2021-10-28 ENCOUNTER — OFFICE VISIT (OUTPATIENT)
Dept: HEMATOLOGY/ONCOLOGY | Facility: CLINIC | Age: 81
End: 2021-10-28
Payer: MEDICARE

## 2021-10-28 VITALS
HEIGHT: 66 IN | WEIGHT: 152.75 LBS | BODY MASS INDEX: 24.55 KG/M2 | OXYGEN SATURATION: 99 % | HEART RATE: 76 BPM | TEMPERATURE: 98 F | SYSTOLIC BLOOD PRESSURE: 127 MMHG | DIASTOLIC BLOOD PRESSURE: 67 MMHG

## 2021-10-28 DIAGNOSIS — N18.6 ANEMIA DUE TO CHRONIC KIDNEY DISEASE, ON CHRONIC DIALYSIS: ICD-10-CM

## 2021-10-28 DIAGNOSIS — D63.1 ANEMIA DUE TO CHRONIC KIDNEY DISEASE, ON CHRONIC DIALYSIS: ICD-10-CM

## 2021-10-28 DIAGNOSIS — Z85.07 HISTORY OF PANCREATIC CANCER: ICD-10-CM

## 2021-10-28 DIAGNOSIS — R13.10 DYSPHAGIA, UNSPECIFIED TYPE: ICD-10-CM

## 2021-10-28 DIAGNOSIS — G62.0 DRUG-INDUCED POLYNEUROPATHY: ICD-10-CM

## 2021-10-28 DIAGNOSIS — Z85.07 HISTORY OF PANCREATIC CANCER: Primary | ICD-10-CM

## 2021-10-28 DIAGNOSIS — Z99.2 ANEMIA DUE TO CHRONIC KIDNEY DISEASE, ON CHRONIC DIALYSIS: ICD-10-CM

## 2021-10-28 LAB
ALBUMIN SERPL BCP-MCNC: 3.3 G/DL (ref 3.5–5.2)
ALP SERPL-CCNC: 111 U/L (ref 55–135)
ALT SERPL W/O P-5'-P-CCNC: 22 U/L (ref 10–44)
ANION GAP SERPL CALC-SCNC: 12 MMOL/L (ref 8–16)
AST SERPL-CCNC: 28 U/L (ref 10–40)
BASOPHILS # BLD AUTO: 0.03 K/UL (ref 0–0.2)
BASOPHILS NFR BLD: 0.7 % (ref 0–1.9)
BILIRUB SERPL-MCNC: 0.5 MG/DL (ref 0.1–1)
BUN SERPL-MCNC: 19 MG/DL (ref 8–23)
CALCIUM SERPL-MCNC: 9.9 MG/DL (ref 8.7–10.5)
CHLORIDE SERPL-SCNC: 101 MMOL/L (ref 95–110)
CO2 SERPL-SCNC: 27 MMOL/L (ref 23–29)
CREAT SERPL-MCNC: 5.5 MG/DL (ref 0.5–1.4)
DIFFERENTIAL METHOD: ABNORMAL
EOSINOPHIL # BLD AUTO: 0.1 K/UL (ref 0–0.5)
EOSINOPHIL NFR BLD: 2.9 % (ref 0–8)
ERYTHROCYTE [DISTWIDTH] IN BLOOD BY AUTOMATED COUNT: 15.1 % (ref 11.5–14.5)
EST. GFR  (AFRICAN AMERICAN): 8 ML/MIN/1.73 M^2
EST. GFR  (NON AFRICAN AMERICAN): 7 ML/MIN/1.73 M^2
FOLATE SERPL-MCNC: 9.8 NG/ML (ref 4–24)
GLUCOSE SERPL-MCNC: 145 MG/DL (ref 70–110)
HCT VFR BLD AUTO: 38.1 % (ref 37–48.5)
HGB BLD-MCNC: 11.9 G/DL (ref 12–16)
IMM GRANULOCYTES # BLD AUTO: 0.01 K/UL (ref 0–0.04)
IMM GRANULOCYTES NFR BLD AUTO: 0.2 % (ref 0–0.5)
IRON SERPL-MCNC: 93 UG/DL (ref 30–160)
LYMPHOCYTES # BLD AUTO: 1.1 K/UL (ref 1–4.8)
LYMPHOCYTES NFR BLD: 26.4 % (ref 18–48)
MCH RBC QN AUTO: 30.2 PG (ref 27–31)
MCHC RBC AUTO-ENTMCNC: 31.2 G/DL (ref 32–36)
MCV RBC AUTO: 97 FL (ref 82–98)
MONOCYTES # BLD AUTO: 0.8 K/UL (ref 0.3–1)
MONOCYTES NFR BLD: 18.7 % (ref 4–15)
NEUTROPHILS # BLD AUTO: 2.1 K/UL (ref 1.8–7.7)
NEUTROPHILS NFR BLD: 51.1 % (ref 38–73)
NRBC BLD-RTO: 1 /100 WBC
PLATELET # BLD AUTO: 185 K/UL (ref 150–450)
PMV BLD AUTO: 9.6 FL (ref 9.2–12.9)
POTASSIUM SERPL-SCNC: 4 MMOL/L (ref 3.5–5.1)
PROT SERPL-MCNC: 7.2 G/DL (ref 6–8.4)
RBC # BLD AUTO: 3.94 M/UL (ref 4–5.4)
SATURATED IRON: 40 % (ref 20–50)
SODIUM SERPL-SCNC: 140 MMOL/L (ref 136–145)
TOTAL IRON BINDING CAPACITY: 234 UG/DL (ref 250–450)
TRANSFERRIN SERPL-MCNC: 158 MG/DL (ref 200–375)
VIT B12 SERPL-MCNC: 1685 PG/ML (ref 210–950)
WBC # BLD AUTO: 4.17 K/UL (ref 3.9–12.7)

## 2021-10-28 PROCEDURE — 99214 OFFICE O/P EST MOD 30 MIN: CPT | Mod: S$GLB,,, | Performed by: NURSE PRACTITIONER

## 2021-10-28 PROCEDURE — 85025 COMPLETE CBC W/AUTO DIFF WBC: CPT | Performed by: NURSE PRACTITIONER

## 2021-10-28 PROCEDURE — 82728 ASSAY OF FERRITIN: CPT | Performed by: NURSE PRACTITIONER

## 2021-10-28 PROCEDURE — 82607 VITAMIN B-12: CPT | Performed by: NURSE PRACTITIONER

## 2021-10-28 PROCEDURE — 99999 PR PBB SHADOW E&M-EST. PATIENT-LVL IV: CPT | Mod: PBBFAC,,, | Performed by: NURSE PRACTITIONER

## 2021-10-28 PROCEDURE — 99214 PR OFFICE/OUTPT VISIT, EST, LEVL IV, 30-39 MIN: ICD-10-PCS | Mod: S$GLB,,, | Performed by: NURSE PRACTITIONER

## 2021-10-28 PROCEDURE — 99999 PR PBB SHADOW E&M-EST. PATIENT-LVL IV: ICD-10-PCS | Mod: PBBFAC,,, | Performed by: NURSE PRACTITIONER

## 2021-10-28 PROCEDURE — 36415 COLL VENOUS BLD VENIPUNCTURE: CPT | Performed by: NURSE PRACTITIONER

## 2021-10-28 PROCEDURE — 80053 COMPREHEN METABOLIC PANEL: CPT | Performed by: NURSE PRACTITIONER

## 2021-10-28 PROCEDURE — 84466 ASSAY OF TRANSFERRIN: CPT | Performed by: NURSE PRACTITIONER

## 2021-10-28 PROCEDURE — 82746 ASSAY OF FOLIC ACID SERUM: CPT | Performed by: NURSE PRACTITIONER

## 2021-10-28 RX ORDER — GABAPENTIN 100 MG/1
200 CAPSULE ORAL 3 TIMES DAILY
Qty: 540 CAPSULE | Refills: 0 | Status: SHIPPED | OUTPATIENT
Start: 2021-10-28 | End: 2022-03-15 | Stop reason: DRUGHIGH

## 2021-10-29 LAB — FERRITIN SERPL-MCNC: 1796 NG/ML (ref 20–300)

## 2021-11-02 ENCOUNTER — HOSPITAL ENCOUNTER (EMERGENCY)
Facility: HOSPITAL | Age: 81
Discharge: HOME | End: 2021-11-02
Attending: EMERGENCY MEDICINE
Payer: MEDICARE

## 2021-11-02 VITALS
SYSTOLIC BLOOD PRESSURE: 145 MMHG | DIASTOLIC BLOOD PRESSURE: 71 MMHG | RESPIRATION RATE: 16 BRPM | TEMPERATURE: 98 F | HEART RATE: 98 BPM | OXYGEN SATURATION: 99 %

## 2021-11-02 DIAGNOSIS — R13.10 ODYNOPHAGIA: ICD-10-CM

## 2021-11-02 DIAGNOSIS — K20.90 ESOPHAGITIS: Primary | ICD-10-CM

## 2021-11-02 PROCEDURE — 25000003 PHARM REV CODE 250: Performed by: EMERGENCY MEDICINE

## 2021-11-02 PROCEDURE — 99283 EMERGENCY DEPT VISIT LOW MDM: CPT | Mod: 25

## 2021-11-02 RX ORDER — OMEPRAZOLE 20 MG/1
20 CAPSULE, DELAYED RELEASE ORAL DAILY
Qty: 30 CAPSULE | Refills: 1 | Status: SHIPPED | OUTPATIENT
Start: 2021-11-02 | End: 2021-12-14

## 2021-11-02 RX ADMIN — LIDOCAINE HYDROCHLORIDE 50 ML: 20 SOLUTION TOPICAL at 01:11

## 2021-12-09 ENCOUNTER — OFFICE VISIT (OUTPATIENT)
Dept: CARDIOLOGY | Facility: CLINIC | Age: 81
End: 2021-12-09
Payer: MEDICARE

## 2021-12-09 ENCOUNTER — HOSPITAL ENCOUNTER (OUTPATIENT)
Dept: CARDIOLOGY | Facility: HOSPITAL | Age: 81
Discharge: HOME | End: 2021-12-09
Attending: INTERNAL MEDICINE
Payer: MEDICARE

## 2021-12-09 VITALS
WEIGHT: 152.13 LBS | SYSTOLIC BLOOD PRESSURE: 128 MMHG | HEIGHT: 66 IN | BODY MASS INDEX: 24.45 KG/M2 | HEART RATE: 70 BPM | DIASTOLIC BLOOD PRESSURE: 60 MMHG

## 2021-12-09 DIAGNOSIS — I20.89 STABLE ANGINA PECTORIS: ICD-10-CM

## 2021-12-09 DIAGNOSIS — R73.9 HYPERGLYCEMIA: ICD-10-CM

## 2021-12-09 DIAGNOSIS — D64.9 ANEMIA, UNSPECIFIED TYPE: ICD-10-CM

## 2021-12-09 DIAGNOSIS — J90 PLEURAL EFFUSION, LEFT: ICD-10-CM

## 2021-12-09 DIAGNOSIS — I49.5 SICK SINUS SYNDROME: Primary | ICD-10-CM

## 2021-12-09 DIAGNOSIS — E78.00 PURE HYPERCHOLESTEROLEMIA: ICD-10-CM

## 2021-12-09 DIAGNOSIS — I49.5 SICK SINUS SYNDROME: ICD-10-CM

## 2021-12-09 DIAGNOSIS — R07.9 CHEST PAIN, UNSPECIFIED TYPE: ICD-10-CM

## 2021-12-09 DIAGNOSIS — J90 PLEURAL EFFUSION: ICD-10-CM

## 2021-12-09 DIAGNOSIS — E11.59 HYPERTENSION ASSOCIATED WITH DIABETES: ICD-10-CM

## 2021-12-09 DIAGNOSIS — I15.2 HYPERTENSION ASSOCIATED WITH DIABETES: ICD-10-CM

## 2021-12-09 DIAGNOSIS — Z99.2 ESRD NEEDING DIALYSIS: Primary | ICD-10-CM

## 2021-12-09 DIAGNOSIS — N18.6 ESRD NEEDING DIALYSIS: Primary | ICD-10-CM

## 2021-12-09 PROCEDURE — 93005 ELECTROCARDIOGRAM TRACING: CPT

## 2021-12-09 PROCEDURE — 93010 EKG 12-LEAD: ICD-10-PCS | Mod: ,,, | Performed by: INTERNAL MEDICINE

## 2021-12-09 PROCEDURE — 99214 PR OFFICE/OUTPT VISIT, EST, LEVL IV, 30-39 MIN: ICD-10-PCS | Mod: S$GLB,,, | Performed by: INTERNAL MEDICINE

## 2021-12-09 PROCEDURE — 99999 PR PBB SHADOW E&M-EST. PATIENT-LVL IV: CPT | Mod: PBBFAC,,, | Performed by: INTERNAL MEDICINE

## 2021-12-09 PROCEDURE — 99214 OFFICE O/P EST MOD 30 MIN: CPT | Mod: S$GLB,,, | Performed by: INTERNAL MEDICINE

## 2021-12-09 PROCEDURE — 93010 ELECTROCARDIOGRAM REPORT: CPT | Mod: ,,, | Performed by: INTERNAL MEDICINE

## 2021-12-09 PROCEDURE — 99999 PR PBB SHADOW E&M-EST. PATIENT-LVL IV: ICD-10-PCS | Mod: PBBFAC,,, | Performed by: INTERNAL MEDICINE

## 2021-12-13 ENCOUNTER — PATIENT OUTREACH (OUTPATIENT)
Dept: ADMINISTRATIVE | Facility: OTHER | Age: 81
End: 2021-12-13
Payer: MEDICARE

## 2021-12-14 ENCOUNTER — OFFICE VISIT (OUTPATIENT)
Dept: GASTROENTEROLOGY | Facility: CLINIC | Age: 81
End: 2021-12-14
Payer: MEDICARE

## 2021-12-14 VITALS
HEIGHT: 66 IN | SYSTOLIC BLOOD PRESSURE: 126 MMHG | DIASTOLIC BLOOD PRESSURE: 80 MMHG | BODY MASS INDEX: 24.94 KG/M2 | HEART RATE: 74 BPM | WEIGHT: 155.19 LBS

## 2021-12-14 DIAGNOSIS — K21.9 GASTROESOPHAGEAL REFLUX DISEASE, UNSPECIFIED WHETHER ESOPHAGITIS PRESENT: ICD-10-CM

## 2021-12-14 DIAGNOSIS — Z85.07 HISTORY OF PANCREATIC CANCER: ICD-10-CM

## 2021-12-14 DIAGNOSIS — R13.10 DYSPHAGIA, UNSPECIFIED TYPE: Primary | ICD-10-CM

## 2021-12-14 PROCEDURE — 99999 PR PBB SHADOW E&M-EST. PATIENT-LVL V: ICD-10-PCS | Mod: PBBFAC,,, | Performed by: NURSE PRACTITIONER

## 2021-12-14 PROCEDURE — 99204 OFFICE O/P NEW MOD 45 MIN: CPT | Mod: S$GLB,,, | Performed by: NURSE PRACTITIONER

## 2021-12-14 PROCEDURE — 99999 PR PBB SHADOW E&M-EST. PATIENT-LVL V: CPT | Mod: PBBFAC,,, | Performed by: NURSE PRACTITIONER

## 2021-12-14 PROCEDURE — 99204 PR OFFICE/OUTPT VISIT, NEW, LEVL IV, 45-59 MIN: ICD-10-PCS | Mod: S$GLB,,, | Performed by: NURSE PRACTITIONER

## 2021-12-14 RX ORDER — OMEPRAZOLE 40 MG/1
40 CAPSULE, DELAYED RELEASE ORAL DAILY
Qty: 30 CAPSULE | Refills: 11 | Status: SHIPPED | OUTPATIENT
Start: 2021-12-14 | End: 2023-11-02

## 2021-12-14 RX ORDER — SEVELAMER CARBONATE 800 MG/1
2 TABLET, FILM COATED ORAL
COMMUNITY
Start: 2021-08-27

## 2021-12-14 RX ORDER — SUCROFERRIC OXYHYDROXIDE 500 MG/1
1 TABLET, CHEWABLE ORAL 3 TIMES DAILY
COMMUNITY
Start: 2021-12-03

## 2021-12-27 ENCOUNTER — NURSE TRIAGE (OUTPATIENT)
Dept: ADMINISTRATIVE | Facility: CLINIC | Age: 81
End: 2021-12-27
Payer: MEDICARE

## 2021-12-28 ENCOUNTER — TELEPHONE (OUTPATIENT)
Dept: GASTROENTEROLOGY | Facility: CLINIC | Age: 81
End: 2021-12-28
Payer: MEDICARE

## 2021-12-29 DIAGNOSIS — M10.9 ACUTE GOUT OF FOOT, UNSPECIFIED CAUSE, UNSPECIFIED LATERALITY: ICD-10-CM

## 2021-12-29 DIAGNOSIS — E78.00 PURE HYPERCHOLESTEROLEMIA: Chronic | ICD-10-CM

## 2021-12-29 RX ORDER — UBIDECARENONE 30 MG
1 CAPSULE ORAL 2 TIMES DAILY
Qty: 180 CAPSULE | Refills: 3 | Status: SHIPPED | OUTPATIENT
Start: 2021-12-29

## 2021-12-29 RX ORDER — ALLOPURINOL 100 MG/1
100 TABLET ORAL DAILY
Qty: 90 TABLET | Refills: 3 | Status: SHIPPED | OUTPATIENT
Start: 2021-12-29

## 2021-12-29 RX ORDER — ATORVASTATIN CALCIUM 40 MG/1
40 TABLET, FILM COATED ORAL DAILY
Qty: 90 TABLET | Refills: 3 | Status: SHIPPED | OUTPATIENT
Start: 2021-12-29

## 2021-12-29 RX ORDER — AMLODIPINE BESYLATE 2.5 MG/1
TABLET ORAL
Qty: 90 TABLET | Refills: 1 | Status: SHIPPED | OUTPATIENT
Start: 2021-12-29 | End: 2022-06-09 | Stop reason: SDUPTHER

## 2021-12-29 NOTE — TELEPHONE ENCOUNTER
----- Message from Sandrine Meyers sent at 12/29/2021  1:04 PM CST -----  Regarding: refill  Contact: [facility]  What is the name of the medication you are requesting? Atorvastatin tabs  What is the dose? 40mg  How do you take the medication? Orally, Topically, etc?  How often do you take this medication?  Do you need a 30 day or 90 day supply? 90  How many refills are you requesting?  What is your preferred pharmacy and location of pharmacy? [facility]. [phone]  [facility]    What is the name of the medication you are requesting? allopurinol  What is the dose? 100mg  How do you take the medication? Orally, Topically, etc?  How often do you take this medication?  Do you need a 30 day or 90 day supply? 90  How many refills are you requesting?  What is your preferred pharmacy and location of pharmacy?  Who can we contact with further questions?    What is the name of the medication you are requesting? Coenzyme Q10 caps  What is the dose? 30 mg  How do you take the medication? Orally, Topically, etc?  How often do you take this medication?  Do you need a 30 day or 90 day supply? 90  How many refills are you requesting?  What is your preferred pharmacy and location of pharmacy?  Who can we contact with further questions?

## 2021-12-29 NOTE — TELEPHONE ENCOUNTER
No new care gaps identified.  Powered by [employer]. Reference number: [id number].   12/29/2021 3:27:47 PM CST

## 2022-01-18 ENCOUNTER — ANESTHESIA (OUTPATIENT)
Dept: ENDOSCOPY | Facility: HOSPITAL | Age: 82
End: 2022-01-18
Payer: MEDICARE

## 2022-01-18 ENCOUNTER — ANESTHESIA EVENT (OUTPATIENT)
Dept: ENDOSCOPY | Facility: HOSPITAL | Age: 82
End: 2022-01-18
Payer: MEDICARE

## 2022-01-18 ENCOUNTER — HOSPITAL ENCOUNTER (OUTPATIENT)
Facility: HOSPITAL | Age: 82
Discharge: HOME | End: 2022-01-18
Attending: INTERNAL MEDICINE | Admitting: INTERNAL MEDICINE
Payer: MEDICARE

## 2022-01-18 VITALS
TEMPERATURE: 97 F | SYSTOLIC BLOOD PRESSURE: 136 MMHG | RESPIRATION RATE: 18 BRPM | HEART RATE: 72 BPM | DIASTOLIC BLOOD PRESSURE: 59 MMHG | OXYGEN SATURATION: 96 %

## 2022-01-18 DIAGNOSIS — R13.10 DYSPHAGIA, UNSPECIFIED TYPE: Primary | ICD-10-CM

## 2022-01-18 LAB
CTP QC/QA: YES
POCT GLUCOSE: 117 MG/DL (ref 70–110)
POCT GLUCOSE: 62 MG/DL (ref 70–110)
POTASSIUM SERPL-SCNC: 4.3 MMOL/L (ref 3.5–5.1)
SARS-COV-2 RDRP RESP QL NAA+PROBE: NEGATIVE

## 2022-01-18 PROCEDURE — U0002 COVID-19 LAB TEST NON-CDC: HCPCS | Performed by: INTERNAL MEDICINE

## 2022-01-18 PROCEDURE — 84132 ASSAY OF SERUM POTASSIUM: CPT | Performed by: INTERNAL MEDICINE

## 2022-01-18 PROCEDURE — 25000003 PHARM REV CODE 250: Performed by: NURSE ANESTHETIST, CERTIFIED REGISTERED

## 2022-01-18 PROCEDURE — 43239 EGD BIOPSY SINGLE/MULTIPLE: CPT | Performed by: INTERNAL MEDICINE

## 2022-01-18 PROCEDURE — 88305 TISSUE EXAM BY PATHOLOGIST: CPT | Mod: 59 | Performed by: PATHOLOGY

## 2022-01-18 PROCEDURE — 37000009 HC ANESTHESIA EA ADD 15 MINS: Performed by: INTERNAL MEDICINE

## 2022-01-18 PROCEDURE — 43239 PR EGD, FLEX, W/BIOPSY, SGL/MULTI: ICD-10-PCS | Mod: ,,, | Performed by: INTERNAL MEDICINE

## 2022-01-18 PROCEDURE — 88305 TISSUE EXAM BY PATHOLOGIST: ICD-10-PCS | Mod: 26,,, | Performed by: PATHOLOGY

## 2022-01-18 PROCEDURE — 43239 EGD BIOPSY SINGLE/MULTIPLE: CPT | Mod: ,,, | Performed by: INTERNAL MEDICINE

## 2022-01-18 PROCEDURE — 88305 TISSUE EXAM BY PATHOLOGIST: CPT | Mod: 26,,, | Performed by: PATHOLOGY

## 2022-01-18 PROCEDURE — 82962 GLUCOSE BLOOD TEST: CPT | Performed by: INTERNAL MEDICINE

## 2022-01-18 PROCEDURE — 25000003 PHARM REV CODE 250: Performed by: INTERNAL MEDICINE

## 2022-01-18 PROCEDURE — 63600175 PHARM REV CODE 636 W HCPCS: Performed by: NURSE ANESTHETIST, CERTIFIED REGISTERED

## 2022-01-18 PROCEDURE — 27201012 HC FORCEPS, HOT/COLD, DISP: Performed by: INTERNAL MEDICINE

## 2022-01-18 PROCEDURE — 37000008 HC ANESTHESIA 1ST 15 MINUTES: Performed by: INTERNAL MEDICINE

## 2022-01-18 RX ORDER — SODIUM CHLORIDE, SODIUM LACTATE, POTASSIUM CHLORIDE, CALCIUM CHLORIDE 600; 310; 30; 20 MG/100ML; MG/100ML; MG/100ML; MG/100ML
INJECTION, SOLUTION INTRAVENOUS CONTINUOUS PRN
Status: DISCONTINUED | OUTPATIENT
Start: 2022-01-18 | End: 2022-01-18

## 2022-01-18 RX ORDER — PROPOFOL 10 MG/ML
VIAL (ML) INTRAVENOUS
Status: DISCONTINUED | OUTPATIENT
Start: 2022-01-18 | End: 2022-01-18

## 2022-01-18 RX ORDER — SODIUM CHLORIDE 0.9 % (FLUSH) 0.9 %
10 SYRINGE (ML) INJECTION
Status: DISCONTINUED | OUTPATIENT
Start: 2022-01-18 | End: 2022-01-18 | Stop reason: HOSPADM

## 2022-01-18 RX ORDER — LIDOCAINE HYDROCHLORIDE 10 MG/ML
INJECTION, SOLUTION EPIDURAL; INFILTRATION; INTRACAUDAL; PERINEURAL
Status: DISCONTINUED | OUTPATIENT
Start: 2022-01-18 | End: 2022-01-18

## 2022-01-18 RX ADMIN — PROPOFOL 20 MG: 10 INJECTION, EMULSION INTRAVENOUS at 11:01

## 2022-01-18 RX ADMIN — DEXTROSE MONOHYDRATE 12.5 G: 25 INJECTION, SOLUTION INTRAVENOUS at 10:01

## 2022-01-18 RX ADMIN — SODIUM CHLORIDE, SODIUM LACTATE, POTASSIUM CHLORIDE, AND CALCIUM CHLORIDE: .6; .31; .03; .02 INJECTION, SOLUTION INTRAVENOUS at 11:01

## 2022-01-18 RX ADMIN — GLYCOPYRROLATE 0.2 MG: 0.2 INJECTION, SOLUTION INTRAMUSCULAR; INTRAVITREAL at 11:01

## 2022-01-18 RX ADMIN — PROPOFOL 100 MG: 10 INJECTION, EMULSION INTRAVENOUS at 11:01

## 2022-01-18 RX ADMIN — LIDOCAINE HYDROCHLORIDE 50 MG: 10 INJECTION, SOLUTION EPIDURAL; INFILTRATION; INTRACAUDAL; PERINEURAL at 11:01

## 2022-01-18 NOTE — [employer]ATION PATIENT INSTRUCTIONS
Discharge Summary/Instructions after an Endoscopic Procedure  Patient Name: Cailin Pickett  Patient MRN: [id number]  Patient YOB: 1940 Tuesday, January 18, 2022 Ivett Quarles MD  Dear patient,  As a result of recent federal legislation (The Federal Cures Act), you may   receive lab or pathology results from your procedure in your [facility]   account before your physician is able to contact you. Your physician or   their representative will relay the results to you with their   recommendations at their soonest availability.  Thank you,  RESTRICTIONS:  During your procedure today, you received medications for sedation.  These   medications may affect your judgment, balance and coordination.  Therefore,   for 24 hours, you have the following restrictions:   - DO NOT drive a car, operate machinery, make legal/financial decisions,   sign important papers or drink alcohol.    ACTIVITY:  Today: no heavy lifting, straining or running due to procedural   sedation/anesthesia.  The following day: return to full activity including work.  DIET:  Eat and drink normally unless instructed otherwise.     TREATMENT FOR COMMON SIDE EFFECTS:  - Mild abdominal pain, nausea, belching, bloating or excessive gas:  rest,   eat lightly and use a heating pad.  - Sore Throat: treat with throat lozenges and/or gargle with warm salt   water.  - Because air was used during the procedure, expelling large amounts of air   from your rectum or belching is normal.  - If a bowel prep was taken, you may not have a bowel movement for 1-3 days.    This is normal.  SYMPTOMS TO WATCH FOR AND REPORT TO YOUR PHYSICIAN:  1. Abdominal pain or bloating, other than gas cramps.  2. Chest pain.  3. Back pain.  4. Signs of infection such as: chills or fever occurring within 24 hours   after the procedure.  5. Rectal bleeding, which would show as bright red, maroon, or black stools.   (A tablespoon of blood from the rectum is not serious,  especially if   hemorrhoids are present.)  6. Vomiting.  7. Weakness or dizziness.  GO DIRECTLY TO THE NEAREST EMERGENCY ROOM IF YOU HAVE ANY OF THE FOLLOWING:      Difficulty breathing              Chills and/or fever over 101 F   Persistent vomiting and/or vomiting blood   Severe abdominal pain   Severe chest pain   Black, tarry stools   Bleeding- more than one tablespoon   Any other symptom or condition that you feel may need urgent attention  Your doctor recommends these additional instructions:  If any biopsies were taken, your doctors clinic will contact you in 1 to 2   weeks with any results.  - Discharge patient to home.   - Resume previous diet.   - Continue present medications.   - Await pathology results.   - If biopsies do not show evidence of eosinophilic esophagitis then consider   performing esophageal manometry for further evaluation of dysphagia.  For questions, problems or results please call your physician Ivett Quarles MD at Work:  [phone]  If you have any questions about the above instructions, call the GI   department at [phone] or call the endoscopy unit at [phone]   from 7am until 3 pm.  [facility] [place], EMERGENCY ROOM PHONE NUMBER:   [phone]  IF A COMPLICATION OR EMERGENCY SITUATION ARISES AND YOU ARE UNABLE TO REACH   YOUR PHYSICIAN - GO DIRECTLY TO THE EMERGENCY ROOM.  I have read or have had read to me these discharge instructions for my   procedure and have received a written copy.  I understand these   instructions and will follow-up with my physician if I have any questions.     __________________________________       _____________________________________  Nurse Signature                                          Patient/Designated   Responsible Party Signature  MD Ivett Lester MD  1/18/2022 12:30:32 PM  This report has been verified and signed electronically.  Dear patient,  As a result  of recent federal legislation (The Federal Cures Act), you may   receive lab or pathology results from your procedure in your [facility]   account before your physician is able to contact you. Your physician or   their representative will relay the results to you with their   recommendations at their soonest availability.  Thank you,  [employer]ATION

## 2022-01-18 NOTE — ANESTHESIA POSTPROCEDURE EVALUATION
Anesthesia Post Evaluation    Patient: Cailin Pickett    Procedure(s) Performed: Procedure(s) (LRB):  ESOPHAGOGASTRODUODENOSCOPY (EGD) (N/A)    Final Anesthesia Type: MAC      Patient location during evaluation: PACU  Patient participation: No - Unable to Participate, Sedation  Level of consciousness: sedated  Post-procedure vital signs: reviewed and stable  Pain management: adequate  Airway patency: patent    PONV status at discharge: No PONV  Anesthetic complications: no      Cardiovascular status: blood pressure returned to baseline and hemodynamically stable  Respiratory status: unassisted and spontaneous ventilation  Hydration status: euvolemic  Follow-up not needed.                No case tracking events are documented in the log.      Pain/Blanca Score: No data recorded

## 2022-01-18 NOTE — OR NURSING
Pt adequately sedated.  Final time out done and agreed by all staff.  See ANESTHESIA records for all medications and vital signs.  Patient denied anything loose or removable in mouth. Bite block placed in mouth while patient awake, no complaints from patient.

## 2022-01-18 NOTE — ANESTHESIA PREPROCEDURE EVALUATION
01/18/2022  Cailin Pickett is a 81 y.o., female.    Anesthesia Evaluation    I have reviewed the Patient Summary Reports.    I have reviewed the Nursing Notes. I have reviewed the NPO Status.   I have reviewed the Medications.     Review of Systems  Anesthesia Hx:  No problems with previous Anesthesia  Denies Family Hx of Anesthesia complications.   Denies Personal Hx of Anesthesia complications.   Social:  Former Smoker, No Alcohol Use    Hematology/Oncology:         -- Anemia: Oncology Comments: Pancreatic     Cardiovascular:   Hypertension Denies MI.   Denies CABG/stent.      hyperlipidemia ECG has been reviewed. ekg 12/2021:  Normal sinus rhythm 72  Minimal voltage criteria for LVH, may be normal variant ( R in aVL )   Anterior infarct (cited on or before 09-DEC-2021)   Abnormal ECG   When compared with ECG of 12-FEB-2021 09:32,   No significant change was found     Echo 3/2019:   1 - Concentric hypertrophy.     2 - No wall motion abnormalities.     3 - Normal left ventricular systolic function (EF 60-65%).     4 - Impaired LV relaxation, elevated LAP (grade 2 diastolic dysfunction).     5 - Normal right ventricular systolic function .     6 - Pulmonary hypertension. The estimated PA systolic pressure is 42 mmHg.     7 - Mild mitral regurgitation.     8 - Mild tricuspid regurgitation.     9 - Small pericardial effusion    Sick sinus syndrome   Pulmonary:   Denies COPD.  Denies Asthma.  Denies Sleep Apnea.    Renal/:   Chronic Renal Disease, ESRD, Dialysis    Hepatic/GI:   Denies GERD. Denies Liver Disease.  Denies Hepatitis.    Musculoskeletal:   gout   Neurological:   Denies CVA. Denies Seizures.   Peripheral Neuropathy    Endocrine:   Diabetes Denies Hypothyroidism. Denies Hyperthyroidism. Secondary hyperparathyroidism of renal origin       Physical Exam  General:  Well nourished     Airway/Jaw/Neck:  Airway Findings: Mouth Opening: Normal Tongue: Normal  General Airway Assessment: Adult  Mallampati: II      Dental:  Dental Findings: In tact   Chest/Lungs:  Chest/Lungs Findings: Clear to auscultation, Normal Respiratory Rate     Heart/Vascular:  Heart Findings: Rate: Normal  Rhythm: Regular Rhythm             Anesthesia Plan  Type of Anesthesia, risks & benefits discussed:  Anesthesia Type:  MAC    Patient's Preference:   Plan Factors:          Intra-op Monitoring Plan: standard ASA monitors  Intra-op Monitoring Plan Comments:   Post Op Pain Control Plan:   Post Op Pain Control Plan Comments:     Induction:   IV  Beta Blocker:  Patient is not currently on a Beta-Blocker (No further documentation required).       Informed Consent: Patient understands risks and agrees with Anesthesia plan.  Questions answered. Anesthesia consent signed with patient.  ASA Score: 3     Day of Surgery Review of History & Physical: I have interviewed and examined the patient. I have reviewed the patient's H&P dated:  There are no significant changes.          Ready For Surgery From Anesthesia Perspective.

## 2022-01-18 NOTE — H&P
PRE PROCEDURE H&P    Patient Name: Cailin Pickett  MRN: [id number]  : 1940  Date of Procedure:  2022  Referring Physician: Fany Carver NP  Primary Physician: Brii Junior MD  Procedure Physician: Ivett Quarles MD       Planned Procedure: EGD  Diagnosis: dyspahgia, especially with pills  Chief Complaint: Same as above    HPI: Patient is an 81 y.o. female is here for the above.     Risks and benefits of the procedure were discussed including the risks of bleeding, perforation, requiring surgery, risks related to anesthesia. The patient expressed understanding and agreed to proceed. No language barriers were present.       Past Medical History:   Past Medical History:   Diagnosis Date    Anemia     CKD (chronic kidney disease) stage 5, GFR less than 15 ml/min 3/14/2019    CKD (chronic kidney disease), stage III     Diabetes mellitus type II     Encounter for blood transfusion     Family history of colonic polyps 2017    Gout, unspecified     Hyperlipidemia     Hypertension     Neuropathy     Pancreatic cancer     Renal failure     Secondary hyperparathyroidism of renal origin 3/14/2019    Thyroid disease         Past Surgical History:  Past Surgical History:   Procedure Laterality Date    COLONOSCOPY      Dr. Favio Mims    COLONOSCOPY N/A 2017    Procedure: screening colonoscopy;  Surgeon: Kenneth Kamara MD;  Location: [facility] ENDO;  Service: Endoscopy;  Laterality: N/A;    FISTULOGRAM N/A 4/10/2019    Procedure: FISTULOGRAM;  Surgeon: Ruddy Fitzpatrick MD;  Location: [facility] CATH LAB;  Service: Vascular;  Laterality: N/A;  0830 start    HYSTERECTOMY      KNEE SURGERY Left 2018    NEPHRECTOMY Left 2013    Dr Carter     PANCREAS SURGERY      distal pancreatectomy    SPLENECTOMY, TOTAL      THYROIDECTOMY, PARTIAL      VENTRICULOATRIAL SHUNT Left 2014     left arm        Home Medications:  Prior to Admission medications    Medication Sig  Start Date End Date Taking? Authorizing Provider   allopurinoL (ZYLOPRIM) 100 MG tablet Take 1 tablet (100 mg total) by mouth once daily. 12/29/21  Yes Mick Varela MD   amLODIPine (NORVASC) 2.5 MG tablet TAKE 1 TABLET(2.5 MG) BY MOUTH EVERY DAY 12/29/21  Yes Mahsa Bishop MD   aspirin (ECOTRIN) 81 MG EC tablet Take 1 tablet (81 mg total) by mouth once daily. 2/14/21 2/14/22 Yes Lyssa Mims NP   atorvastatin (LIPITOR) 40 MG tablet Take 1 tablet (40 mg total) by mouth once daily. 12/29/21  Yes Mick Varela MD   AURYXIA 210 mg iron Tab  1/16/20  Yes Historical Provider   cetirizine (ZYRTEC) 10 MG tablet Take 10 mg by mouth once daily.   Yes Historical Provider   co-enzyme Q-10 30 mg capsule Take 1 capsule (30 mg total) by mouth 2 (two) times daily. 12/29/21  Yes Mick Varela MD   docusate sodium (COLACE) 50 MG capsule Take by mouth. 5/10/21  Yes Historical Provider   ezetimibe (ZETIA) 10 mg tablet TAKE 1 TABLET DAILY 8/26/21  Yes Mahsa Bishop MD   furosemide (LASIX) 40 MG tablet TAKE 1 TABLET DAILY 3/28/21  Yes Awilda Bowman MD   gabapentin (NEURONTIN) 100 MG capsule Take 2 capsules (200 mg total) by mouth 3 (three) times daily. 10/28/21 1/26/22 Yes Lyssa Campbell NP   glucosamine-chondroitin 250-200 mg Tab Take 1 tablet by mouth 2 (two) times daily.   Yes Historical Provider   insulin degludec (TRESIBA FLEXTOUCH U-100) 100 unit/mL (3 mL) InPn Inject 8 Units into the skin.  2/1/21  Yes Historical Provider   omeprazole (PRILOSEC) 40 MG capsule Take 1 capsule (40 mg total) by mouth once daily. 12/14/21 6/12/22 Yes Fany Carver NP   sevelamer carbonate (RENVELA) 800 mg Tab Take 2 tablets by mouth. 8/27/21  Yes Historical Provider   VELPHORO 500 mg Chew Take 1 tablet by mouth 3 (three) times daily. 12/3/21  Yes Historical Provider   vitamin renal formula, B-complex-vitamin c-folic acid, (NEPHROCAP) 1 mg Cap Take 1 capsule by mouth once daily. 4/12/19  Yes Elaina Vasquez NP   BD  "ULTRA-FINE MICRO PEN NEEDLE 32 gauge x 1/4" Ndle  3/23/21   Historical Provider   fluocinolone (DERMA-SMOOTHE) 0.01 % external oil Apply oil to scalp once a day 19  Verena Johnson MD   methoxy peg-epoetin beta (MIRCERA INJ) 75 mcg. 21  Historical Provider   pen needle, diabetic 32 gauge x 1/4" Ndle Use to test blood sugars bid 3/22/21   Historical Provider   traMADoL (ULTRAM) 50 mg tablet Take 1 tablet (50 mg total) by mouth every 12 (twelve) hours. 21   Mahsa Bishop MD   repaglinide (PRANDIN) 1 MG tablet Take 1 tablet by mouth. 5/10/21 1/18/22  Historical Provider        Allergies:  Review of patient's allergies indicates:   Allergen Reactions    Allegra [fexofenadine] Swelling    Codeine Hives, Itching and Nausea And Vomiting        Social History:   Social History     Socioeconomic History    Marital status:     Number of children: 0   Occupational History     Comment: stay home    Tobacco Use    Smoking status: Former Smoker     Packs/day: 1.00     Years: 35.00     Pack years: 35.00     Start date: 3/14/1962     Quit date: 2001     Years since quittin.0    Smokeless tobacco: Never Used   Substance and Sexual Activity    Alcohol use: No    Drug use: No   Social History Narrative    She lives [place] from [place]       Family History:  Family History   Problem Relation Age of Onset    Heart disease Mother 60        MI    Hypertension Mother     Stroke Mother     Breast cancer Mother     Cancer Father         prostate    Cancer Brother         renal cell carcinoma    Diabetes Brother     Kidney disease Brother         ESRD s/p kidney transplant    Breast cancer Sister     Breast cancer Sister        ROS: No acute cardiac events, no acute respiratory complaints.     Physical Exam (all patients):    BP (!) 149/56 (BP Location: Right arm, Patient Position: Lying)   Pulse 74   Resp 16   LMP 1982 (Exact Date)   SpO2 95%   " Breastfeeding No   Lungs: Clear to auscultation bilaterally, respirations unlabored  Heart: Regular rate and rhythm, S1 and S2 normal, no obvious murmurs  Abdomen:         Soft, non-tender, bowel sounds normal, no masses, no organomegaly    Lab Results   Component Value Date    WBC 4.17 10/28/2021    MCV 97 10/28/2021    RDW 15.1 (H) 10/28/2021     10/28/2021    INR 1.0 02/10/2021     (H) 10/28/2021    HGBA1C 7.3 (H) 04/29/2021    BUN 19 10/28/2021     10/28/2021    K 4.3 01/18/2022     10/28/2021        SEDATION PLAN: per anesthesia      History reviewed, vital signs satisfactory, cardiopulmonary status satisfactory, sedation options, risks and plans have been discussed with the patient  All their questions were answered and the patient agrees to the sedation procedures as planned and the patient is deemed an appropriate candidate for the sedation as planned.    Procedure explained to patient, informed consent obtained and placed in chart.    Ivett Quarles  1/18/2022  11:32 AM

## 2022-01-18 NOTE — TRANSFER OF CARE
Anesthesia Transfer of Care Note    Patient: Cailin Pickett    Procedure(s) Performed: Procedure(s) (LRB):  ESOPHAGOGASTRODUODENOSCOPY (EGD) (N/A)    Patient location: PACU    Anesthesia Type: MAC    Transport from OR: Transported from OR on room air with adequate spontaneous ventilation    Post pain: adequate analgesia    Post assessment: no apparent anesthetic complications and tolerated procedure well    Post vital signs: stable    Level of consciousness: sedated    Nausea/Vomiting: no nausea/vomiting    Complications: none    Transfer of care protocol was followed      Last vitals:   Visit Vitals  BP (!) 149/56 (BP Location: Right arm, Patient Position: Lying)   Pulse 74   Resp 16   LMP 07/27/1982 (Exact Date)   SpO2 95%   Breastfeeding No

## 2022-01-26 LAB
FINAL PATHOLOGIC DIAGNOSIS: NORMAL
GROSS: NORMAL
Lab: NORMAL

## 2022-02-10 DIAGNOSIS — R13.10 DYSPHAGIA, UNSPECIFIED TYPE: Primary | ICD-10-CM

## 2022-02-28 ENCOUNTER — TELEPHONE (OUTPATIENT)
Dept: GASTROENTEROLOGY | Facility: CLINIC | Age: 82
End: 2022-02-28
Payer: MEDICARE

## 2022-02-28 NOTE — TELEPHONE ENCOUNTER
----- Message from Kimmy Coates sent at 2/28/2022 10:00 AM CST -----  Contact: pt  Type:  Patient Returning Call    Who Called: pt  Who Left Message for Patient: unknown   Does the patient know what this is regarding?: no  Would the patient rather a call back or a response via [facility]? Call back  Best Call Back Number: [phone]  Additional Information: n/a

## 2022-03-15 ENCOUNTER — OFFICE VISIT (OUTPATIENT)
Dept: NEUROLOGY | Facility: CLINIC | Age: 82
End: 2022-03-15
Payer: MEDICARE

## 2022-03-15 VITALS
BODY MASS INDEX: 24.73 KG/M2 | DIASTOLIC BLOOD PRESSURE: 60 MMHG | SYSTOLIC BLOOD PRESSURE: 148 MMHG | RESPIRATION RATE: 16 BRPM | HEIGHT: 66 IN | WEIGHT: 153.88 LBS

## 2022-03-15 DIAGNOSIS — N25.81 SECONDARY HYPERPARATHYROIDISM OF RENAL ORIGIN: ICD-10-CM

## 2022-03-15 DIAGNOSIS — Z99.2 ESRD NEEDING DIALYSIS: ICD-10-CM

## 2022-03-15 DIAGNOSIS — I49.5 SICK SINUS SYNDROME: ICD-10-CM

## 2022-03-15 DIAGNOSIS — Z99.2 DEPENDENCE ON RENAL DIALYSIS: ICD-10-CM

## 2022-03-15 DIAGNOSIS — N18.6 ESRD NEEDING DIALYSIS: ICD-10-CM

## 2022-03-15 DIAGNOSIS — R29.898 OTHER SYMPTOMS AND SIGNS INVOLVING THE MUSCULOSKELETAL SYSTEM: ICD-10-CM

## 2022-03-15 DIAGNOSIS — N18.6 TYPE 2 DIABETES MELLITUS WITH CHRONIC KIDNEY DISEASE ON CHRONIC DIALYSIS, WITHOUT LONG-TERM CURRENT USE OF INSULIN: ICD-10-CM

## 2022-03-15 DIAGNOSIS — Z85.07 HISTORY OF PANCREATIC CANCER: ICD-10-CM

## 2022-03-15 DIAGNOSIS — G62.9 POLYNEUROPATHY: ICD-10-CM

## 2022-03-15 DIAGNOSIS — Z90.5 ACQUIRED ABSENCE OF KIDNEY: ICD-10-CM

## 2022-03-15 DIAGNOSIS — E89.0 S/P PARTIAL THYROIDECTOMY: ICD-10-CM

## 2022-03-15 DIAGNOSIS — R29.90 MULTIPLE NEUROLOGICAL SYMPTOMS: Primary | ICD-10-CM

## 2022-03-15 DIAGNOSIS — E11.22 TYPE 2 DIABETES MELLITUS WITH CHRONIC KIDNEY DISEASE ON CHRONIC DIALYSIS, WITHOUT LONG-TERM CURRENT USE OF INSULIN: ICD-10-CM

## 2022-03-15 DIAGNOSIS — R41.89 SUBJECTIVE MEMORY COMPLAINTS: ICD-10-CM

## 2022-03-15 DIAGNOSIS — Z99.2 TYPE 2 DIABETES MELLITUS WITH CHRONIC KIDNEY DISEASE ON CHRONIC DIALYSIS, WITHOUT LONG-TERM CURRENT USE OF INSULIN: ICD-10-CM

## 2022-03-15 DIAGNOSIS — L29.9 ITCHING: ICD-10-CM

## 2022-03-15 PROCEDURE — 99417 PROLNG OP E/M EACH 15 MIN: CPT | Mod: S$GLB,,, | Performed by: PSYCHIATRY & NEUROLOGY

## 2022-03-15 PROCEDURE — 1126F AMNT PAIN NOTED NONE PRSNT: CPT | Mod: CPTII,S$GLB,, | Performed by: PSYCHIATRY & NEUROLOGY

## 2022-03-15 PROCEDURE — 1101F PR PT FALLS ASSESS DOC 0-1 FALLS W/OUT INJ PAST YR: ICD-10-PCS | Mod: CPTII,S$GLB,, | Performed by: PSYCHIATRY & NEUROLOGY

## 2022-03-15 PROCEDURE — 99999 PR PBB SHADOW E&M-EST. PATIENT-LVL IV: ICD-10-PCS | Mod: PBBFAC,,, | Performed by: PSYCHIATRY & NEUROLOGY

## 2022-03-15 PROCEDURE — 3077F SYST BP >= 140 MM HG: CPT | Mod: CPTII,S$GLB,, | Performed by: PSYCHIATRY & NEUROLOGY

## 2022-03-15 PROCEDURE — 99417 PR PROLONGED SVC, OUTPT, W/WO DIRECT PT CONTACT,  EA ADDTL 15 MIN: ICD-10-PCS | Mod: S$GLB,,, | Performed by: PSYCHIATRY & NEUROLOGY

## 2022-03-15 PROCEDURE — 99999 PR PBB SHADOW E&M-EST. PATIENT-LVL IV: CPT | Mod: PBBFAC,,, | Performed by: PSYCHIATRY & NEUROLOGY

## 2022-03-15 PROCEDURE — 3051F PR MOST RECENT HEMOGLOBIN A1C LEVEL 7.0 - < 8.0%: ICD-10-PCS | Mod: CPTII,S$GLB,, | Performed by: PSYCHIATRY & NEUROLOGY

## 2022-03-15 PROCEDURE — 3288F PR FALLS RISK ASSESSMENT DOCUMENTED: ICD-10-PCS | Mod: CPTII,S$GLB,, | Performed by: PSYCHIATRY & NEUROLOGY

## 2022-03-15 PROCEDURE — 1157F PR ADVANCE CARE PLAN OR EQUIV PRESENT IN MEDICAL RECORD: ICD-10-PCS | Mod: CPTII,S$GLB,, | Performed by: PSYCHIATRY & NEUROLOGY

## 2022-03-15 PROCEDURE — 1157F ADVNC CARE PLAN IN RCRD: CPT | Mod: CPTII,S$GLB,, | Performed by: PSYCHIATRY & NEUROLOGY

## 2022-03-15 PROCEDURE — 3078F DIAST BP <80 MM HG: CPT | Mod: CPTII,S$GLB,, | Performed by: PSYCHIATRY & NEUROLOGY

## 2022-03-15 PROCEDURE — 3288F FALL RISK ASSESSMENT DOCD: CPT | Mod: CPTII,S$GLB,, | Performed by: PSYCHIATRY & NEUROLOGY

## 2022-03-15 PROCEDURE — 1126F PR PAIN SEVERITY QUANTIFIED, NO PAIN PRESENT: ICD-10-PCS | Mod: CPTII,S$GLB,, | Performed by: PSYCHIATRY & NEUROLOGY

## 2022-03-15 PROCEDURE — 3077F PR MOST RECENT SYSTOLIC BLOOD PRESSURE >= 140 MM HG: ICD-10-PCS | Mod: CPTII,S$GLB,, | Performed by: PSYCHIATRY & NEUROLOGY

## 2022-03-15 PROCEDURE — 1159F PR MEDICATION LIST DOCUMENTED IN MEDICAL RECORD: ICD-10-PCS | Mod: CPTII,S$GLB,, | Performed by: PSYCHIATRY & NEUROLOGY

## 2022-03-15 PROCEDURE — 99205 OFFICE O/P NEW HI 60 MIN: CPT | Mod: S$GLB,,, | Performed by: PSYCHIATRY & NEUROLOGY

## 2022-03-15 PROCEDURE — 3051F HG A1C>EQUAL 7.0%<8.0%: CPT | Mod: CPTII,S$GLB,, | Performed by: PSYCHIATRY & NEUROLOGY

## 2022-03-15 PROCEDURE — 1101F PT FALLS ASSESS-DOCD LE1/YR: CPT | Mod: CPTII,S$GLB,, | Performed by: PSYCHIATRY & NEUROLOGY

## 2022-03-15 PROCEDURE — 1159F MED LIST DOCD IN RCRD: CPT | Mod: CPTII,S$GLB,, | Performed by: PSYCHIATRY & NEUROLOGY

## 2022-03-15 PROCEDURE — 3078F PR MOST RECENT DIASTOLIC BLOOD PRESSURE < 80 MM HG: ICD-10-PCS | Mod: CPTII,S$GLB,, | Performed by: PSYCHIATRY & NEUROLOGY

## 2022-03-15 PROCEDURE — 99205 PR OFFICE/OUTPT VISIT, NEW, LEVL V, 60-74 MIN: ICD-10-PCS | Mod: S$GLB,,, | Performed by: PSYCHIATRY & NEUROLOGY

## 2022-03-15 RX ORDER — GABAPENTIN 600 MG/1
600 TABLET ORAL DAILY
Qty: 90 TABLET | Refills: 3 | Status: SHIPPED | OUTPATIENT
Start: 2022-03-15 | End: 2023-11-02 | Stop reason: SDUPTHER

## 2022-03-15 NOTE — PROGRESS NOTES
Subjective:       Patient ID: Cailin Pickett is a 81 y.o. female.    Chief Complaint: Tremors          HPI       The patient says she is here for neuropathy. Describes longstanding itching , numbness and tingling (hands and feet) since 2013. Was diagnosed with peripheral neuropathy and treated initially with PGB then GBP. Has history of T2DM, ESRD-HD and Pancreatic CA S/P Chemotherapy. B12, FA and TFT. Takes  mg TID despite being on HD.    Was initially referred in  for tremors and memory difficulties. Denies having tremors anymore and reports mild memory lapses. Remains independent and highly functioning.     The patient received Monoclonal Abs yesterday for COVID-19.         Review of Systems   Constitutional: Negative for appetite change and fatigue.   HENT: Negative for hearing loss and tinnitus.    Eyes: Negative for photophobia and visual disturbance.   Respiratory: Negative for apnea and shortness of breath.    Cardiovascular: Negative for chest pain and palpitations.   Gastrointestinal: Negative for nausea and vomiting.   Endocrine: Negative for cold intolerance and heat intolerance.   Genitourinary: Negative for difficulty urinating and urgency.   Musculoskeletal: Negative for arthralgias, back pain, gait problem, joint swelling, myalgias, neck pain and neck stiffness.   Skin: Negative for color change and rash.   Allergic/Immunologic: Negative for environmental allergies and immunocompromised state.   Neurological: Positive for numbness. Negative for dizziness, tremors, seizures, syncope, facial asymmetry, speech difficulty, weakness, light-headedness and headaches.   Hematological: Negative for adenopathy. Does not bruise/bleed easily.   Psychiatric/Behavioral: Negative for agitation, behavioral problems, confusion, decreased concentration, dysphoric mood, hallucinations, self-injury, sleep disturbance and suicidal ideas. The patient is not hyperactive.                  Current  "Outpatient Medications:     allopurinoL (ZYLOPRIM) 100 MG tablet, Take 1 tablet (100 mg total) by mouth once daily., Disp: 90 tablet, Rfl: 3    amLODIPine (NORVASC) 2.5 MG tablet, TAKE 1 TABLET(2.5 MG) BY MOUTH EVERY DAY, Disp: 90 tablet, Rfl: 1    atorvastatin (LIPITOR) 40 MG tablet, Take 1 tablet (40 mg total) by mouth once daily., Disp: 90 tablet, Rfl: 3    AURYXIA 210 mg iron Tab, , Disp: , Rfl:     BD ULTRA-FINE MICRO PEN NEEDLE 32 gauge x 1/4" Ndle, , Disp: , Rfl:     cetirizine (ZYRTEC) 10 MG tablet, Take 10 mg by mouth once daily., Disp: , Rfl:     co-enzyme Q-10 30 mg capsule, Take 1 capsule (30 mg total) by mouth 2 (two) times daily., Disp: 180 capsule, Rfl: 3    docusate sodium (COLACE) 50 MG capsule, Take by mouth., Disp: , Rfl:     ezetimibe (ZETIA) 10 mg tablet, TAKE 1 TABLET DAILY, Disp: 90 tablet, Rfl: 0    furosemide (LASIX) 40 MG tablet, TAKE 1 TABLET DAILY, Disp: 90 tablet, Rfl: 3    glucosamine-chondroitin 250-200 mg Tab, Take 1 tablet by mouth 2 (two) times daily., Disp: , Rfl:     insulin degludec (TRESIBA FLEXTOUCH U-100) 100 unit/mL (3 mL) InPn, Inject 8 Units into the skin. , Disp: , Rfl:     methoxy peg-epoetin beta (MIRCERA INJ), 75 mcg., Disp: , Rfl:     omeprazole (PRILOSEC) 40 MG capsule, Take 1 capsule (40 mg total) by mouth once daily., Disp: 30 capsule, Rfl: 11    pen needle, diabetic 32 gauge x 1/4" Ndle, Use to test blood sugars bid, Disp: , Rfl:     sevelamer carbonate (RENVELA) 800 mg Tab, Take 2 tablets by mouth., Disp: , Rfl:     traMADoL (ULTRAM) 50 mg tablet, Take 1 tablet (50 mg total) by mouth every 12 (twelve) hours., Disp: 30 tablet, Rfl: 0    VELPHORO 500 mg Chew, Take 1 tablet by mouth 3 (three) times daily., Disp: , Rfl:     vitamin renal formula, B-complex-vitamin c-folic acid, (NEPHROCAP) 1 mg Cap, Take 1 capsule by mouth once daily., Disp: 30 capsule, Rfl: 0    aspirin (ECOTRIN) 81 MG EC tablet, Take 1 tablet (81 mg total) by mouth once daily., Disp: 30 tablet, Rfl: " 0    fluocinolone (DERMA-SMOOTHE) 0.01 % external oil, Apply oil to scalp once a day, Disp: 1 Bottle, Rfl: 5    gabapentin (NEURONTIN) 600 MG tablet, Take 1 tablet (600 mg total) by mouth once daily. Daily at 05:00 PM, Disp: 90 tablet, Rfl: 3  Past Medical History:   Diagnosis Date    Anemia     CKD (chronic kidney disease) stage 5, GFR less than 15 ml/min 3/14/2019    CKD (chronic kidney disease), stage III     Diabetes mellitus type II     Encounter for blood transfusion     Family history of colonic polyps 2017    Gout, unspecified     Hyperlipidemia     Hypertension     Neuropathy     Pancreatic cancer     Renal failure     Secondary hyperparathyroidism of renal origin 3/14/2019    Thyroid disease      Past Surgical History:   Procedure Laterality Date    COLONOSCOPY      Dr. Favio Mims    COLONOSCOPY N/A 2017    Procedure: screening colonoscopy;  Surgeon: Kenneth Kamara MD;  Location: [facility] ENDO;  Service: Endoscopy;  Laterality: N/A;    ESOPHAGOGASTRODUODENOSCOPY N/A 2022    Procedure: ESOPHAGOGASTRODUODENOSCOPY (EGD);  Surgeon: Ivett Quarles MD;  Location: [facility] ENDO;  Service: Endoscopy;  Laterality: N/A;    FISTULOGRAM N/A 4/10/2019    Procedure: FISTULOGRAM;  Surgeon: Ruddy Fitzpatrick MD;  Location: [facility] CATH LAB;  Service: Vascular;  Laterality: N/A;  0830 start    HYSTERECTOMY      KNEE SURGERY Left 2018    NEPHRECTOMY Left 2013    Dr Carter     PANCREAS SURGERY      distal pancreatectomy    SPLENECTOMY, TOTAL      THYROIDECTOMY, PARTIAL      VENTRICULOATRIAL SHUNT Left 2014     left arm     Social History     Socioeconomic History    Marital status:     Number of children: 0   Occupational History     Comment: stay home    Tobacco Use    Smoking status: Former Smoker     Packs/day: 1.00     Years: 35.00     Pack years: 35.00     Start date: 3/14/1962     Quit date: 2001     Years since quittin.2    Smokeless tobacco: Never Used   Substance and  Sexual Activity    Alcohol use: No    Drug use: No    Sexual activity: Not Currently   Social History Narrative    She lives [place] from [place]             Past/Current Medical/Surgical History, Past/Current Social History, Past/Current Family History and Past/Current Medications were reviewed in detail.        Objective:           VITAL SIGNS WERE REVIEWED      GENERAL APPEARANCE:     The patient looks comfortable.    BMI 24.84    No signs of respiratory distress.    Normal breathing pattern.    No dysmorphic features    Normal eye contact.     GENERAL MEDICAL EXAM:    HEENT:  Head is atraumatic normocephalic. Fundoscopic (Ophthalmoscopic) exam showed no disc edema.      Neck and Axillae: No JVD. No visible lesions.    Cardiopulmonary: No cyanosis. No tachypnea. Normal respiratory effort.    Gastrointestinal/Urogenital:  No jaundice. No stomas or lesions. No visible hernias. No catheters.     Skin, Hair and Nails: No pathognonomic skin rash. No neurofibromatosis. No visible lesions.No stigmata of autoimmune disease. No clubbing.    Limbs: No varicose veins. No visible swelling.HD access     Muskoskeletal: No visible deformities.No visible lesions.           Neurologic Exam     Mental Status   Oriented to person, place, and time.   Follows 3 step commands.   Attention: normal. Concentration: normal.   Speech: speech is normal   Level of consciousness: alert  Able to name object. Able to repeat. Normal comprehension.     Cranial Nerves   Cranial nerves II through XII intact.     CN II   Visual fields full to confrontation.   Visual acuity: normal with correction  Right visual field deficit: none  Left visual field deficit: none     CN III, IV, VI   Pupils are equal, round, and reactive to light.  Extraocular motions are normal.   Right pupil: Size: 2 mm. Shape: regular. Reactivity: brisk. Consensual response: intact. Accommodation: intact.   Left pupil: Size: 2 mm. Shape: regular. Reactivity: brisk.  Consensual response: intact. Accommodation: intact.   CN III: no CN III palsy  CN VI: no CN VI palsy  Nystagmus: none   Diplopia: none  Ophthalmoparesis: none  Upgaze: normal  Downgaze: normal  Conjugate gaze: present  Vestibulo-ocular reflex: present    CN V   Facial sensation intact.   Right facial sensation deficit: none  Left facial sensation deficit: none    CN VII   Facial expression full, symmetric.   Right facial weakness: none  Left facial weakness: none    CN VIII   CN VIII normal.   Hearing: intact    CN IX, X   CN IX normal.   CN X normal.   Palate: symmetric    CN XI   CN XI normal.   Right sternocleidomastoid strength: normal  Left sternocleidomastoid strength: normal  Right trapezius strength: normal  Left trapezius strength: normal    CN XII   CN XII normal.   Tongue: not atrophic  Fasciculations: absent  Tongue deviation: none    Motor Exam   Muscle bulk: normal  Overall muscle tone: normal  Right arm tone: normal  Left arm tone: normal  Right arm pronator drift: absent  Left arm pronator drift: absent  Right leg tone: normal  Left leg tone: normal    Strength   Strength 5/5 throughout.   Right neck flexion: 5/5  Left neck flexion: 5/5  Right neck extension: 5/5  Left neck extension: 5/5  Right deltoid: 5/5  Left deltoid: 5/5  Right biceps: 5/5  Left biceps: 5/5  Right triceps: 5/5  Left triceps: 5/5  Right wrist flexion: 5/5  Left wrist flexion: 5/5  Right wrist extension: 5/5  Left wrist extension: 5/5  Right interossei: 5/5  Left interossei: 5/5  Right iliopsoas: 5/5  Left iliopsoas: 5/5  Right quadriceps: 5/5  Left quadriceps: 5/5  Right hamstrin/5  Left hamstrin/5  Right glutei: 5/5  Left glutei: 5/5  Right anterior tibial: 5/5  Left anterior tibial: 5/5  Right posterior tibial: 5/5  Left posterior tibial: 5/5  Right peroneal: 5/5  Left peroneal: 5/5  Right gastroc: 5/5  Left gastroc: 5/5    Sensory Exam   Light touch normal.   Right arm light touch: normal  Left arm light touch:  normal  Right leg light touch: normal  Left leg light touch: normal  Vibration normal.   Right arm vibration: normal  Left arm vibration: normal  Right leg vibration: normal  Left leg vibration: normal  Proprioception normal.   Right arm proprioception: normal  Left arm proprioception: normal  Right leg proprioception: normal  Left leg proprioception: normal  Pinprick normal.   Right arm pinprick: normal  Left arm pinprick: normal  Right leg pinprick: normal  Left leg pinprick: normal  Graphesthesia: normal  Stereognosis: normal    Gait, Coordination, and Reflexes     Gait  Gait: normal    Coordination   Romberg: negative  Finger to nose coordination: normal  Heel to shin coordination: normal  Tandem walking coordination: normal    Tremor   Resting tremor: absent  Intention tremor: absent  Action tremor: absent    Reflexes   Right brachioradialis: 2+  Left brachioradialis: 2+  Right biceps: 2+  Left biceps: 2+  Right triceps: 2+  Left triceps: 2+  Right patellar: 2+  Left patellar: 2+  Right achilles: 2+  Left achilles: 2+  Right : 2+  Left : 2+  Right plantar: normal  Left plantar: normal  Right Yi: absent  Left Yi: absent  Right ankle clonus: absent  Left ankle clonus: absent  Right pendular knee jerk: absent  Left pendular knee jerk: absent      Lab Results   Component Value Date    WBC 4.17 10/28/2021    HGB 11.9 (L) 10/28/2021    HCT 38.1 10/28/2021    MCV 97 10/28/2021     10/28/2021     Sodium   Date Value Ref Range Status   10/28/2021 140 136 - 145 mmol/L Final     Potassium   Date Value Ref Range Status   01/18/2022 4.3 3.5 - 5.1 mmol/L Final     Chloride   Date Value Ref Range Status   10/28/2021 101 95 - 110 mmol/L Final     CO2   Date Value Ref Range Status   10/28/2021 27 23 - 29 mmol/L Final     Glucose   Date Value Ref Range Status   10/28/2021 145 (H) 70 - 110 mg/dL Final     BUN   Date Value Ref Range Status   10/28/2021 19 8 - 23 mg/dL Final     Creatinine   Date Value Ref  Range Status   10/28/2021 5.5 (H) 0.5 - 1.4 mg/dL Final     Calcium   Date Value Ref Range Status   10/28/2021 9.9 8.7 - 10.5 mg/dL Final     Total Protein   Date Value Ref Range Status   10/28/2021 7.2 6.0 - 8.4 g/dL Final     Albumin   Date Value Ref Range Status   10/28/2021 3.3 (L) 3.5 - 5.2 g/dL Final     Total Bilirubin   Date Value Ref Range Status   10/28/2021 0.5 0.1 - 1.0 mg/dL Final     Comment:     For infants and newborns, interpretation of results should be based  on gestational age, weight and in agreement with clinical  observations.    Premature Infant recommended reference ranges:  Up to 24 hours.............<8.0 mg/dL  Up to 48 hours............<12.0 mg/dL  3-5 days..................<15.0 mg/dL  6-29 days.................<15.0 mg/dL       Alkaline Phosphatase   Date Value Ref Range Status   10/28/2021 111 55 - 135 U/L Final     AST   Date Value Ref Range Status   10/28/2021 28 10 - 40 U/L Final     ALT   Date Value Ref Range Status   10/28/2021 22 10 - 44 U/L Final     Anion Gap   Date Value Ref Range Status   10/28/2021 12 8 - 16 mmol/L Final     eGFR if    Date Value Ref Range Status   10/28/2021 8 (A) >60 mL/min/1.73 m^2 Final     eGFR if non    Date Value Ref Range Status   10/28/2021 7 (A) >60 mL/min/1.73 m^2 Final     Comment:     Calculation used to obtain the estimated glomerular filtration  rate (eGFR) is the CKD-EPI equation.        Lab Results   Component Value Date    AARIALHS09 1685 (H) 10/28/2021     Lab Results   Component Value Date    TSH 2.535 04/29/2021    FREET4 0.87 07/31/2019 2013-2022    B12, FA, TFT, SPEP-IPEP NL     03-    NCS/EMG RUE RLE Borderline (Only absent Sural and SPN)    10-    NCS/EMG RUE RLE Borderline-NL again (identical)     Reviewed the neuroimaging independently       Assessment:       1. Multiple neurological symptoms    2. Type 2 diabetes mellitus with chronic kidney disease on chronic dialysis, without  long-term current use of insulin    3. S/P partial thyroidectomy    4. ESRD needing dialysis    5. Secondary hyperparathyroidism of renal origin    6. Sick sinus syndrome    7. History of pancreatic cancer    8. Dependence on renal dialysis    9. Acquired absence of kidney    10. Polyneuropathy    11. Other symptoms and signs involving the musculoskeletal system     12. Itching    13. Subjective memory complaints        Plan:                 POLYNEUROPATHY, LIKELY SMALL FIBER NEUROPATHY (SFN)    MULTIFACTORIAL: DM, ESRD, CHEMOTHERAPY          NCS/EMG RUE RLE.     Continue Gabapentin/GBP-Neurontin but needs HD adjustment. Change to 600 mg QD at 05:00 pm (HD TIW 10:00 am to 02:00 pm ).    Failed. Pregabalin/PGB-Lyrica.    NEXT OPTIONS:    Amitriptyline/Elavil which can cause sleepiness, dry eyes, dry mouth, urinary retention and rarely cardiac arrhythmias. Will titrate to 75 mg QHS. The patient verbalized understanding.    Duloxetine/Cymbalta which causes sedation, weight gain and sexual dysfunction and potentially interactions can cause serotonin syndrome. Will titrate slowly to 60 mg daily. The patient verbalized understanding.      I encouraged the patient to read the leaflet for any newly prescribed medication for more details and report any side effect whether listed or not listed.    Discussed with the patient some of the neuropathy precautions like:    Always use oven mitts.    Test water with an unaffected hand or foot.    Use caution when trimming nails. File sharp areas    Wear shoes that fit well to avoid pressure points, blisters, and ulcers.    Inspect your hands and feet carefully (including the soles of your feet and between your toes) at least once a week.        MEMORY CHANGES AND COGNITIVE CONCERNS       Will bring her back in 2 weeks after recovery from COVID for cognitive evaluation.            MEDICAL/SURGICAL COMORBIDITIES     All relevant medical comorbidities noted and managed by primary care  physician and medical care team.          HEALTHY LIFESTYLE AND PREVENTATIVE CARE    The patient to adhere to the age-appropriate health maintenance guidelines including screening tests and vaccinations. The patient to adhere to  healthy lifestyle, optimal weight, exercise, healthy diet, good sleep hygiene and avoiding drugs including smoking, alcohol and recreational drugs.          Eva Zapata MD, FAAN    Attending Neurologist/Epileptologist         Diplomate, American Board of Psychiatry and Neurology    Diplomate, American Board of Clinical Neurophysiology     Fellow, American Academy of Neurology         E/M 89

## 2022-03-29 ENCOUNTER — OFFICE VISIT (OUTPATIENT)
Dept: NEUROLOGY | Facility: CLINIC | Age: 82
End: 2022-03-29
Payer: MEDICARE

## 2022-03-29 ENCOUNTER — PROCEDURE VISIT (OUTPATIENT)
Dept: NEUROLOGY | Facility: CLINIC | Age: 82
End: 2022-03-29
Payer: MEDICARE

## 2022-03-29 ENCOUNTER — TELEPHONE (OUTPATIENT)
Dept: NEUROLOGY | Facility: CLINIC | Age: 82
End: 2022-03-29

## 2022-03-29 VITALS
WEIGHT: 149.25 LBS | DIASTOLIC BLOOD PRESSURE: 80 MMHG | BODY MASS INDEX: 23.99 KG/M2 | HEART RATE: 72 BPM | HEIGHT: 66 IN | SYSTOLIC BLOOD PRESSURE: 134 MMHG | RESPIRATION RATE: 16 BRPM | OXYGEN SATURATION: 91 %

## 2022-03-29 DIAGNOSIS — R20.2 NUMBNESS AND TINGLING: Primary | ICD-10-CM

## 2022-03-29 DIAGNOSIS — N18.6 TYPE 2 DIABETES MELLITUS WITH CHRONIC KIDNEY DISEASE ON CHRONIC DIALYSIS, WITHOUT LONG-TERM CURRENT USE OF INSULIN: ICD-10-CM

## 2022-03-29 DIAGNOSIS — G31.84 MILD COGNITIVE IMPAIRMENT: ICD-10-CM

## 2022-03-29 DIAGNOSIS — G62.9 POLYNEUROPATHY: ICD-10-CM

## 2022-03-29 DIAGNOSIS — Z99.2 TYPE 2 DIABETES MELLITUS WITH CHRONIC KIDNEY DISEASE ON CHRONIC DIALYSIS, WITHOUT LONG-TERM CURRENT USE OF INSULIN: ICD-10-CM

## 2022-03-29 DIAGNOSIS — Z99.2 DEPENDENCE ON RENAL DIALYSIS: ICD-10-CM

## 2022-03-29 DIAGNOSIS — E11.22 TYPE 2 DIABETES MELLITUS WITH CHRONIC KIDNEY DISEASE ON CHRONIC DIALYSIS, WITHOUT LONG-TERM CURRENT USE OF INSULIN: ICD-10-CM

## 2022-03-29 DIAGNOSIS — R20.0 NUMBNESS AND TINGLING: Primary | ICD-10-CM

## 2022-03-29 DIAGNOSIS — R29.898 OTHER SYMPTOMS AND SIGNS INVOLVING THE MUSCULOSKELETAL SYSTEM: ICD-10-CM

## 2022-03-29 DIAGNOSIS — R29.90 MULTIPLE NEUROLOGICAL SYMPTOMS: Primary | ICD-10-CM

## 2022-03-29 PROCEDURE — 1159F PR MEDICATION LIST DOCUMENTED IN MEDICAL RECORD: ICD-10-PCS | Mod: CPTII,S$GLB,, | Performed by: NURSE PRACTITIONER

## 2022-03-29 PROCEDURE — 3079F PR MOST RECENT DIASTOLIC BLOOD PRESSURE 80-89 MM HG: ICD-10-PCS | Mod: CPTII,S$GLB,, | Performed by: NURSE PRACTITIONER

## 2022-03-29 PROCEDURE — 3051F HG A1C>EQUAL 7.0%<8.0%: CPT | Mod: CPTII,S$GLB,, | Performed by: NURSE PRACTITIONER

## 2022-03-29 PROCEDURE — 99215 PR OFFICE/OUTPT VISIT, EST, LEVL V, 40-54 MIN: ICD-10-PCS | Mod: S$GLB,,, | Performed by: NURSE PRACTITIONER

## 2022-03-29 PROCEDURE — 3288F FALL RISK ASSESSMENT DOCD: CPT | Mod: CPTII,S$GLB,, | Performed by: NURSE PRACTITIONER

## 2022-03-29 PROCEDURE — 1160F PR REVIEW ALL MEDS BY PRESCRIBER/CLIN PHARMACIST DOCUMENTED: ICD-10-PCS | Mod: CPTII,S$GLB,, | Performed by: NURSE PRACTITIONER

## 2022-03-29 PROCEDURE — 3051F PR MOST RECENT HEMOGLOBIN A1C LEVEL 7.0 - < 8.0%: ICD-10-PCS | Mod: CPTII,S$GLB,, | Performed by: NURSE PRACTITIONER

## 2022-03-29 PROCEDURE — 3075F PR MOST RECENT SYSTOLIC BLOOD PRESS GE 130-139MM HG: ICD-10-PCS | Mod: CPTII,S$GLB,, | Performed by: NURSE PRACTITIONER

## 2022-03-29 PROCEDURE — 99999 PR PBB SHADOW E&M-EST. PATIENT-LVL V: CPT | Mod: PBBFAC,,, | Performed by: NURSE PRACTITIONER

## 2022-03-29 PROCEDURE — 99215 OFFICE O/P EST HI 40 MIN: CPT | Mod: S$GLB,,, | Performed by: NURSE PRACTITIONER

## 2022-03-29 PROCEDURE — 95911 NRV CNDJ TEST 9-10 STUDIES: CPT | Mod: S$GLB,,, | Performed by: PSYCHIATRY & NEUROLOGY

## 2022-03-29 PROCEDURE — 1101F PR PT FALLS ASSESS DOC 0-1 FALLS W/OUT INJ PAST YR: ICD-10-PCS | Mod: CPTII,S$GLB,, | Performed by: NURSE PRACTITIONER

## 2022-03-29 PROCEDURE — 1157F ADVNC CARE PLAN IN RCRD: CPT | Mod: CPTII,S$GLB,, | Performed by: NURSE PRACTITIONER

## 2022-03-29 PROCEDURE — 99999 PR PBB SHADOW E&M-EST. PATIENT-LVL V: ICD-10-PCS | Mod: PBBFAC,,, | Performed by: NURSE PRACTITIONER

## 2022-03-29 PROCEDURE — 1101F PT FALLS ASSESS-DOCD LE1/YR: CPT | Mod: CPTII,S$GLB,, | Performed by: NURSE PRACTITIONER

## 2022-03-29 PROCEDURE — 95911 PR NERVE CONDUCTION STUDY; 9-10 STUDIES: ICD-10-PCS | Mod: S$GLB,,, | Performed by: PSYCHIATRY & NEUROLOGY

## 2022-03-29 PROCEDURE — 3075F SYST BP GE 130 - 139MM HG: CPT | Mod: CPTII,S$GLB,, | Performed by: NURSE PRACTITIONER

## 2022-03-29 PROCEDURE — 1157F PR ADVANCE CARE PLAN OR EQUIV PRESENT IN MEDICAL RECORD: ICD-10-PCS | Mod: CPTII,S$GLB,, | Performed by: NURSE PRACTITIONER

## 2022-03-29 PROCEDURE — 1126F AMNT PAIN NOTED NONE PRSNT: CPT | Mod: CPTII,S$GLB,, | Performed by: NURSE PRACTITIONER

## 2022-03-29 PROCEDURE — 3079F DIAST BP 80-89 MM HG: CPT | Mod: CPTII,S$GLB,, | Performed by: NURSE PRACTITIONER

## 2022-03-29 PROCEDURE — 1159F MED LIST DOCD IN RCRD: CPT | Mod: CPTII,S$GLB,, | Performed by: NURSE PRACTITIONER

## 2022-03-29 PROCEDURE — 3288F PR FALLS RISK ASSESSMENT DOCUMENTED: ICD-10-PCS | Mod: CPTII,S$GLB,, | Performed by: NURSE PRACTITIONER

## 2022-03-29 PROCEDURE — 1126F PR PAIN SEVERITY QUANTIFIED, NO PAIN PRESENT: ICD-10-PCS | Mod: CPTII,S$GLB,, | Performed by: NURSE PRACTITIONER

## 2022-03-29 PROCEDURE — 1160F RVW MEDS BY RX/DR IN RCRD: CPT | Mod: CPTII,S$GLB,, | Performed by: NURSE PRACTITIONER

## 2022-03-29 NOTE — PROCEDURES
[facility]   Phoebe Ochoa  Department of Neurology  [address] [place] [address] MARYAM Daniels  [id number]  Phone [phone]     Fax  [phone]        Patient: Piyush Simeon YOB: 1940  Patient ID: [id number] Age: 81 Years 9 Months  Sex: Female Ref. Provider: Eva Zapata MD  Notes: NCS/RUE/RLE/PN workup. C/O: Longstanding itching, numbness, and tingling in hands and feet. PMHX: DM2, ESRD on HD, pancreatic cancer s/p chemotherapy, anemia, HLD, HTN, thyroid disease.      SUMMARY       Nerve conduction studies were performed in the right upper and lower extremity. The right median motor study recording the abductor pollicis brevis showed normal amplitude, normal distal latency and normal conduction velocity. The right ulnar motor study recording the abductor digiti minimi showed a normal amplitude, normal distal latency and normal conduction velocity. No conduction block or focal slowing was present across the elbow.       The right median sensory response recording digit two showed normal amplitude, normal latency and normal conduction velocity. The right ulnar sensory response recording digit five showed normal amplitude, normal latency and normal conduction velocity. The right radial sensory response recording over the extensor snuff box showed normal amplitude, normal latency and normal conduction velocity.     The right peroneal motor study recording the extensor digitorum brevis showed normal amplitude, borderline normal distal latency and conduction velocity. No conduction block or focal slowing was present across the fibular neck. The right tibial motor study recording the abductor hallucis brevis showed normal amplitude, borderline normal distal latency and conduction velocity.     Right sural sensory response showed an absent response. Right superficial peroneal sensory response showed an absent response.          IMPRESSION     This is a borderline normal study.     The absent sensory  responses in the lower extremity can be age-related vs. length-dependent axonal, predominantly sensory peripheral polyneuropathy.      ---------------------------------               Eva Zapata M.D., F.A.A.N.      Diplomate, American Board of Psychiatry and Neurology  Diplomate, American Board of Clinical Neurophysiology   Fellow, American Academy of Neurology       Sensory NCS      Nerve / Sites Rec. Site Peak NP Amp PP Amp Dist Reynold d Lat.2     ms µV µV cm m/s ms   R MEDIAN - Dig II      Wrist II 2.97 5.5 8.1 13 53.1 2.97      Ref.  3.80  10.0  48.0    R ULNAR - Dig V      Wrist Dig V 3.02 5.8 6.8 11 50.3 3.02      Ref.  3.20  5.0  48.0    R RADIAL - Snuff box      Forearm Snuff box 2.60 14.7 19.9 10 50.5 2.60      Ref.  2.80  10.0      R MEDIAN - Ulnar - Palmar      Median Wrist 2.29 7.7 10.6 8 42.7 2.29      Ulnar Wrist 2.40 9.1 6.5 8 1536.0 0.10   R SURAL - Lat Mall      Calf Lat Mall NR NR NR 14 NR NR      Ref.  4.60  3.0  40.0    R SUP PERONEAL      Lat Leg Ankle NR NR NR 10 NR NR      Ref.  4.60  3.0  40.0        Motor NCS      Nerve / Sites Rec. Site Lat Amp Dist Reynold     ms mV cm m/s   R MEDIAN - APB      Wrist APB 2.97 5.5 8       Ref.  4.00 5.0        Elbow APB 7.29 5.4 22 50.9      Ref.     45.0   R ULNAR - ADM      Wrist ADM 2.29 11.6 8       Ref.  3.10 7.0        B.Elbow ADM 5.99 11.0 19 51.4      Ref.     50.0      A.Elbow ADM 7.71 10.5 10 58.2   R COMM PERONEAL - EDB      Ankle EDB 3.96 3.5 8       Ref.  6.00 2.5        FibHead EDB 11.46 3.2 33 44.0      Ref.     40.0      Knee EDB 13.75 2.7 10 43.6   R TIBIAL (KNEE) - AH      Ankle AH 4.79 12.0 8       Ref.  6.00 4.0        Knee AH 13.28 8.5 38 44.8      Ref.     40.0

## 2022-03-29 NOTE — PROGRESS NOTES
Subjective:       Patient ID: Cailin Pickett is a 81 y.o. female.    Chief Complaint: Memory Loss    HPI       The patient says she is here for neuropathy. Describes longstanding itching , numbness and tingling (hands and feet) since 2013. Was diagnosed with peripheral neuropathy and treated initially with PGB then GBP. Has history of T2DM, ESRD-HD and Pancreatic CA S/P Chemotherapy. B12, FA and TFT. Takes  mg TID despite being on HD.    Was initially referred in  for tremors and memory difficulties. Denies having tremors anymore and reports mild memory lapses. Remains independent and highly functioning.     The patient received Monoclonal Abs yesterday for COVID-19.       Interval History 3-: Patient established with Dr. Zapata, new to me. Patient presents to appointment for MOCA evaluation unaccompanied. Patient states she noticed trouble with her short term memory around 2020. She often forgets words she wants to use. Denies forgetting conversations or names. Denies misplacing items within the home. Denies family member commenting on her memory. She does not believe her memory loss is progressing. She states she lives with her  and is independent in all ADLS. She is able to keep up with finacnes, medications, and appointments on her own. She continues to drive without getting lost. Denies falls. Denies drinking alcohol. States she will wake up in the middle of the night but is able to fall back alseep. Admits to decrease appetite. Denies history of TBI or stroke. Denies anxiety or depression. States she had a partial thyroidectomy in the past but on no meds for hypothyroidism. Denies family history of dementia. 3- RPR non-reactive. 4- TSH NL. 10- folate, B12 NL    States she is taking 600 mg QHS for neuropathy, which is helping significantly. Denies adverse effects.        Review of Systems   Constitutional: Positive for appetite change (decreased ). Negative  "for fatigue.   HENT: Negative for hearing loss and tinnitus.    Eyes: Negative for photophobia and visual disturbance.   Respiratory: Negative for apnea and shortness of breath.    Cardiovascular: Negative for chest pain and palpitations.   Gastrointestinal: Negative for nausea and vomiting.   Endocrine: Negative for cold intolerance and heat intolerance.   Genitourinary: Negative for difficulty urinating and urgency.   Musculoskeletal: Negative for arthralgias, back pain, gait problem, joint swelling, myalgias, neck pain and neck stiffness.   Skin: Negative for color change and rash.   Allergic/Immunologic: Negative for environmental allergies and immunocompromised state.   Neurological: Positive for numbness. Negative for dizziness, tremors, seizures, syncope, facial asymmetry, speech difficulty, weakness, light-headedness and headaches.   Hematological: Negative for adenopathy. Does not bruise/bleed easily.   Psychiatric/Behavioral: Negative for agitation, behavioral problems, confusion, decreased concentration, dysphoric mood, hallucinations, self-injury, sleep disturbance and suicidal ideas. The patient is not hyperactive.                  Current Outpatient Medications:     allopurinoL (ZYLOPRIM) 100 MG tablet, Take 1 tablet (100 mg total) by mouth once daily., Disp: 90 tablet, Rfl: 3    amLODIPine (NORVASC) 2.5 MG tablet, TAKE 1 TABLET(2.5 MG) BY MOUTH EVERY DAY, Disp: 90 tablet, Rfl: 1    atorvastatin (LIPITOR) 40 MG tablet, Take 1 tablet (40 mg total) by mouth once daily., Disp: 90 tablet, Rfl: 3    AURYXIA 210 mg iron Tab, , Disp: , Rfl:     BD ULTRA-FINE MICRO PEN NEEDLE 32 gauge x 1/4" Ndgeovany, , Disp: , Rfl:     cetirizine (ZYRTEC) 10 MG tablet, Take 10 mg by mouth once daily., Disp: , Rfl:     co-enzyme Q-10 30 mg capsule, Take 1 capsule (30 mg total) by mouth 2 (two) times daily., Disp: 180 capsule, Rfl: 3    docusate sodium (COLACE) 50 MG capsule, Take by mouth., Disp: , Rfl:     ezetimibe " "(ZETIA) 10 mg tablet, TAKE 1 TABLET DAILY, Disp: 90 tablet, Rfl: 0    furosemide (LASIX) 40 MG tablet, TAKE 1 TABLET DAILY, Disp: 90 tablet, Rfl: 3    gabapentin (NEURONTIN) 600 MG tablet, Take 1 tablet (600 mg total) by mouth once daily. Daily at 05:00 PM, Disp: 90 tablet, Rfl: 3    glucosamine-chondroitin 250-200 mg Tab, Take 1 tablet by mouth 2 (two) times daily., Disp: , Rfl:     insulin degludec (TRESIBA FLEXTOUCH U-100) 100 unit/mL (3 mL) InPn, Inject 8 Units into the skin. , Disp: , Rfl:     methoxy peg-epoetin beta (MIRCERA INJ), 75 mcg., Disp: , Rfl:     omeprazole (PRILOSEC) 40 MG capsule, Take 1 capsule (40 mg total) by mouth once daily., Disp: 30 capsule, Rfl: 11    pen needle, diabetic 32 gauge x 1/4" Ndle, Use to test blood sugars bid, Disp: , Rfl:     sevelamer carbonate (RENVELA) 800 mg Tab, Take 2 tablets by mouth., Disp: , Rfl:     traMADoL (ULTRAM) 50 mg tablet, Take 1 tablet (50 mg total) by mouth every 12 (twelve) hours., Disp: 30 tablet, Rfl: 0    VELPHORO 500 mg Chew, Take 1 tablet by mouth 3 (three) times daily., Disp: , Rfl:     vitamin renal formula, B-complex-vitamin c-folic acid, (NEPHROCAP) 1 mg Cap, Take 1 capsule by mouth once daily., Disp: 30 capsule, Rfl: 0    aspirin (ECOTRIN) 81 MG EC tablet, Take 1 tablet (81 mg total) by mouth once daily., Disp: 30 tablet, Rfl: 0    fluocinolone (DERMA-SMOOTHE) 0.01 % external oil, Apply oil to scalp once a day, Disp: 1 Bottle, Rfl: 5  Past Medical History:   Diagnosis Date    Anemia     CKD (chronic kidney disease) stage 5, GFR less than 15 ml/min 3/14/2019    CKD (chronic kidney disease), stage III     Diabetes mellitus type II     Encounter for blood transfusion     Family history of colonic polyps 7/18/2017    Gout, unspecified     Hyperlipidemia     Hypertension     Neuropathy     Pancreatic cancer     Renal failure     Secondary hyperparathyroidism of renal origin 3/14/2019    Thyroid disease      Past Surgical " History:   Procedure Laterality Date    COLONOSCOPY      Dr. Favio Mims    COLONOSCOPY N/A 2017    Procedure: screening colonoscopy;  Surgeon: Kenneth Kamara MD;  Location: [facility] ENDO;  Service: Endoscopy;  Laterality: N/A;    ESOPHAGOGASTRODUODENOSCOPY N/A 2022    Procedure: ESOPHAGOGASTRODUODENOSCOPY (EGD);  Surgeon: Ivett Quarles MD;  Location: [facility] ENDO;  Service: Endoscopy;  Laterality: N/A;    FISTULOGRAM N/A 4/10/2019    Procedure: FISTULOGRAM;  Surgeon: Ruddy Fitzpatrick MD;  Location: [facility] CATH LAB;  Service: Vascular;  Laterality: N/A;  0830 start    HYSTERECTOMY      KNEE SURGERY Left 2018    NEPHRECTOMY Left 2013    Dr Carter     PANCREAS SURGERY      distal pancreatectomy    SPLENECTOMY, TOTAL      THYROIDECTOMY, PARTIAL      VENTRICULOATRIAL SHUNT Left 2014     left arm     Social History     Socioeconomic History    Marital status:     Number of children: 0   Occupational History     Comment: stay home    Tobacco Use    Smoking status: Former Smoker     Packs/day: 1.00     Years: 35.00     Pack years: 35.00     Start date: 3/14/1962     Quit date: 2001     Years since quittin.2    Smokeless tobacco: Never Used   Substance and Sexual Activity    Alcohol use: No    Drug use: No    Sexual activity: Not Currently   Social History Narrative    She [place] from [place]             Past/Current Medical/Surgical History, Past/Current Social History, Past/Current Family History and Past/Current Medications were reviewed in detail.        Objective:       VITAL SIGNS WERE REVIEWED      GENERAL APPEARANCE:     The patient looks comfortable.    BMI 24.09    No signs of respiratory distress.    Normal breathing pattern.    No dysmorphic features    Normal eye contact.     GENERAL MEDICAL EXAM:    HEENT:  Head is atraumatic normocephalic. Fundoscopic (Ophthalmoscopic) exam showed no disc edema.      Neck and Axillae: No JVD.  No visible lesions.    Cardiopulmonary: No cyanosis. No tachypnea. Normal respiratory effort.    Gastrointestinal/Urogenital:  No jaundice. No stomas or lesions. No visible hernias. No catheters.     Skin, Hair and Nails: No pathognonomic skin rash. No neurofibromatosis. No visible lesions.No stigmata of autoimmune disease. No clubbing.    Limbs: No varicose veins. No visible swelling.HD access     Muskoskeletal: No visible deformities.No visible lesions.           Neurologic Exam     Mental Status   Oriented to person, place, and time.   Follows 3 step commands.   Attention: normal. Concentration: normal.   Speech: speech is normal   Level of consciousness: alert  Able to name object. Able to repeat. Normal comprehension.       MOCA 25/30    Visuospatial/Executive 4/5    Naming                            3/3    Attention                         5/6    Language                         3/3    Abstraction                    1/2    Recall                                3/5 (2 with cues)    Orientation                     6/6          MILD DEMENTIA 20-25         Cranial Nerves   Cranial nerves II through XII intact.     CN II   Visual fields full to confrontation.   Visual acuity: normal with correction  Right visual field deficit: none  Left visual field deficit: none     CN III, IV, VI   Pupils are equal, round, and reactive to light.  Extraocular motions are normal.   Right pupil: Size: 2 mm. Shape: regular. Reactivity: brisk. Consensual response: intact. Accommodation: intact.   Left pupil: Size: 2 mm. Shape: regular. Reactivity: brisk. Consensual response: intact. Accommodation: intact.   CN III: no CN III palsy  CN VI: no CN VI palsy  Nystagmus: none   Diplopia: none  Ophthalmoparesis: none  Upgaze: normal  Downgaze: normal  Conjugate gaze: absent    CN V   Facial sensation intact.   Right facial sensation deficit: none  Left facial sensation deficit: none    CN VII   Facial expression full, symmetric.   Right  facial weakness: none  Left facial weakness: none    CN VIII   CN VIII normal.   Hearing: intact    CN IX, X   CN IX normal.   CN X normal.   Palate: symmetric    CN XI   CN XI normal.   Right sternocleidomastoid strength: normal  Left sternocleidomastoid strength: normal  Right trapezius strength: normal  Left trapezius strength: normal    CN XII   CN XII normal.   Tongue: not atrophic  Fasciculations: absent  Tongue deviation: none    Motor Exam   Muscle bulk: normal  Overall muscle tone: normal  Right arm tone: normal  Left arm tone: normal  Right arm pronator drift: absent  Left arm pronator drift: absent  Right leg tone: normal  Left leg tone: normal    Strength   Strength 5/5 throughout.   Right neck flexion: 5/5  Left neck flexion: 5/5  Right neck extension: 5/5  Left neck extension: 5/5  Right deltoid: 5/5  Left deltoid: 5/5  Right biceps: 5/5  Left biceps: 5/5  Right triceps: 5/5  Left triceps: 5/5  Right wrist flexion: 5/5  Left wrist flexion: 5/5  Right wrist extension: 5/5  Left wrist extension: 5/5  Right interossei: 5/5  Left interossei: 5/5  Right iliopsoas: 5/5  Left iliopsoas: 5/5  Right quadriceps: 5/5  Left quadriceps: 5/5  Right hamstrin/5  Left hamstrin/5  Right glutei: 5/5  Left glutei: 5/5  Right anterior tibial: 5/5  Left anterior tibial: 5/5  Right posterior tibial: 5/5  Left posterior tibial: 5/5  Right peroneal: 5/5  Left peroneal: 5/5  Right gastroc: 5/5  Left gastroc: 5/5    Sensory Exam   Light touch normal.   Right arm light touch: normal  Left arm light touch: normal  Right leg light touch: normal  Left leg light touch: normal  Vibration normal.   Right arm vibration: normal  Left arm vibration: normal  Right leg vibration: normal  Left leg vibration: normal  Proprioception normal.   Right arm proprioception: normal  Left arm proprioception: normal  Right leg proprioception: normal  Left leg proprioception: normal  Pinprick normal.   Right arm pinprick: normal  Left arm  pinprick: normal  Right leg pinprick: normal  Left leg pinprick: normal  Graphesthesia: normal  Stereognosis: normal    Gait, Coordination, and Reflexes     Gait  Gait: normal    Coordination   Romberg: negative  Finger to nose coordination: normal  Heel to shin coordination: normal  Tandem walking coordination: normal    Tremor   Resting tremor: absent  Intention tremor: absent  Action tremor: absent    Reflexes   Right brachioradialis: 2+  Left brachioradialis: 2+  Right biceps: 2+  Left biceps: 2+  Right triceps: 2+  Left triceps: 2+  Right patellar: 2+  Left patellar: 2+  Right achilles: 2+  Left achilles: 2+  Right : 2+  Left : 2+  Right plantar: normal  Left plantar: normal  Right Yi: absent  Left Yi: absent  Right ankle clonus: absent  Left ankle clonus: absent  Right pendular knee jerk: absent  Left pendular knee jerk: absent      Lab Results   Component Value Date    WBC 4.17 10/28/2021    HGB 11.9 (L) 10/28/2021    HCT 38.1 10/28/2021    MCV 97 10/28/2021     10/28/2021     Sodium   Date Value Ref Range Status   10/28/2021 140 136 - 145 mmol/L Final     Potassium   Date Value Ref Range Status   01/18/2022 4.3 3.5 - 5.1 mmol/L Final     Chloride   Date Value Ref Range Status   10/28/2021 101 95 - 110 mmol/L Final     CO2   Date Value Ref Range Status   10/28/2021 27 23 - 29 mmol/L Final     Glucose   Date Value Ref Range Status   10/28/2021 145 (H) 70 - 110 mg/dL Final     BUN   Date Value Ref Range Status   10/28/2021 19 8 - 23 mg/dL Final     Creatinine   Date Value Ref Range Status   10/28/2021 5.5 (H) 0.5 - 1.4 mg/dL Final     Calcium   Date Value Ref Range Status   10/28/2021 9.9 8.7 - 10.5 mg/dL Final     Total Protein   Date Value Ref Range Status   10/28/2021 7.2 6.0 - 8.4 g/dL Final     Albumin   Date Value Ref Range Status   10/28/2021 3.3 (L) 3.5 - 5.2 g/dL Final     Total Bilirubin   Date Value Ref Range Status   10/28/2021 0.5 0.1 - 1.0 mg/dL Final     Comment:     For  infants and newborns, interpretation of results should be based  on gestational age, weight and in agreement with clinical  observations.    Premature Infant recommended reference ranges:  Up to 24 hours.............<8.0 mg/dL  Up to 48 hours............<12.0 mg/dL  3-5 days..................<15.0 mg/dL  6-29 days.................<15.0 mg/dL       Alkaline Phosphatase   Date Value Ref Range Status   10/28/2021 111 55 - 135 U/L Final     AST   Date Value Ref Range Status   10/28/2021 28 10 - 40 U/L Final     ALT   Date Value Ref Range Status   10/28/2021 22 10 - 44 U/L Final     Anion Gap   Date Value Ref Range Status   10/28/2021 12 8 - 16 mmol/L Final     eGFR if    Date Value Ref Range Status   10/28/2021 8 (A) >60 mL/min/1.73 m^2 Final     eGFR if non    Date Value Ref Range Status   10/28/2021 7 (A) >60 mL/min/1.73 m^2 Final     Comment:     Calculation used to obtain the estimated glomerular filtration  rate (eGFR) is the CKD-EPI equation.        Lab Results   Component Value Date    THADIBEG94 1685 (H) 10/28/2021     Lab Results   Component Value Date    TSH 2.535 04/29/2021    FREET4 0.87 07/31/2019 2013-2022    B12, FA, TFT, SPEP-IPEP NL     RPR non-reactive         Reviewed the neuroimaging independently       Assessment:       1. Multiple neurological symptoms    2. Polyneuropathy    3. Mild cognitive impairment    4. Type 2 diabetes mellitus with chronic kidney disease on chronic dialysis, without long-term current use of insulin    5. Dependence on renal dialysis        Plan:         POLYNEUROPATHY, LIKELY SMALL FIBER NEUROPATHY (SFN)    MULTIFACTORIAL: DM, ESRD, CHEMOTHERAPY          Continue Gabapentin/GBP-Neurontin  600 mg QD at 05:00 pm (HD TIW 10:00 am to 02:00 PM)    Failed. Pregabalin/PGB-Lyrica.    NEXT OPTIONS:    Amitriptyline/Elavil which can cause sleepiness, dry eyes, dry mouth, urinary retention and rarely cardiac arrhythmias. Will titrate to 75 mg QHS.  The patient verbalized understanding.    Duloxetine/Cymbalta which causes sedation, weight gain and sexual dysfunction and potentially interactions can cause serotonin syndrome. Will titrate slowly to 60 mg daily. The patient verbalized understanding.      I encouraged the patient to read the leaflet for any newly prescribed medication for more details and report any side effect whether listed or not listed.    Discussed with the patient some of the neuropathy precautions like:    Always use oven mitts.    Test water with an unaffected hand or foot.    Use caution when trimming nails. File sharp areas    Wear shoes that fit well to avoid pressure points, blisters, and ulcers.    Inspect your hands and feet carefully (including the soles of your feet and between your toes) at least once a week.        MILD COGNITIVE IMPAIRMENT      Deferred MRI brain at this time    Deferred Neuropsych testing at this time.    Repeat MOCA in 6 months    Instructed patient to call office if she decides to proceed with testing.      Memory Strategies    Internal Memory Strategies:   PREPARE yourself to pay attention   REHEARSAL: Mentally repeat information over and over.   VISUALIZATION: Make a mental picture of information.   ASSOCIATION: Mentally associate new information with something familiar.   REGROUPING: Organize long lists of information by category.   FIRST LETTER MNEMONICS:   LISTEN FOR THE MAIN IDEA: who, what, when, where, why, how, what's next   CHUNKING: Group digits of numbers into chunks (ex. [id number])    Break sentences and instructions down by ideas. (ex. Physical    therapy / will be at 3:00 / instead of 1:30 / because I have a    meeting).   CHAINING: Recall 3-4 words by linking them together in a sentence.    External Memory Strategies:   NOTETAKING:   TAPE RECORDING: Can use to record someone giving length information such     as a lecture, verbal instructions, or directions.   MEMORY BOOK / DAY  PLANNER: To record up-coming appointments and    events.   WATCH ALARMS: Use as a reminder to take medications or check daily    schedule.            MEDICAL/SURGICAL COMORBIDITIES     All relevant medical comorbidities noted and managed by primary care physician and medical care team.          HEALTHY LIFESTYLE AND PREVENTATIVE CARE    The patient to adhere to the age-appropriate health maintenance guidelines including screening tests and vaccinations. The patient to adhere to  healthy lifestyle, optimal weight, exercise, healthy diet, good sleep hygiene and avoiding drugs including smoking, alcohol and recreational drugs.        I spent a total of 40 minutes on the day of the visit.  This includes face to face time and non-face to face time preparing to see the patient (eg, review of tests), obtaining and/or reviewing separately obtained history, documenting clinical information in the electronic or other health record, independently interpreting results and communicating results to the patient/family/caregiver, or care coordinator.          Follow-up in 6 months with family member        Daysi Soto, MSN, NP    Collaborating Provider: Eva Zapata MD, FAAN Neurologist/Epileptologist

## 2022-03-29 NOTE — TELEPHONE ENCOUNTER
----- Message from Eva Zapata MD sent at 3/29/2022  2:57 PM CDT -----  03-    [facility]/Borderline

## 2022-04-01 ENCOUNTER — TELEPHONE (OUTPATIENT)
Dept: HEMATOLOGY/ONCOLOGY | Facility: CLINIC | Age: 82
End: 2022-04-01
Payer: MEDICARE

## 2022-04-01 NOTE — TELEPHONE ENCOUNTER
Nurse spoke with pt in reference to rescheduling her appt per the pts request.pt has been rescheduled. Verbalized understanding

## 2022-04-07 ENCOUNTER — HOSPITAL ENCOUNTER (OUTPATIENT)
Facility: HOSPITAL | Age: 82
Discharge: HOME | End: 2022-04-07
Attending: INTERNAL MEDICINE | Admitting: INTERNAL MEDICINE
Payer: MEDICARE

## 2022-04-07 VITALS
DIASTOLIC BLOOD PRESSURE: 60 MMHG | TEMPERATURE: 96 F | RESPIRATION RATE: 16 BRPM | SYSTOLIC BLOOD PRESSURE: 135 MMHG | HEART RATE: 74 BPM | WEIGHT: 148 LBS | OXYGEN SATURATION: 98 % | HEIGHT: 65 IN | BODY MASS INDEX: 24.66 KG/M2

## 2022-04-07 PROCEDURE — 91037 PR GERD TST W/ NASAL IMPEDENCE ELECTROD: ICD-10-PCS | Mod: 26,,, | Performed by: INTERNAL MEDICINE

## 2022-04-07 PROCEDURE — 91010 ESOPHAGUS MOTILITY STUDY: CPT | Mod: 26,,, | Performed by: INTERNAL MEDICINE

## 2022-04-07 PROCEDURE — 91010 ESOPHAGUS MOTILITY STUDY: CPT | Performed by: INTERNAL MEDICINE

## 2022-04-07 PROCEDURE — 91010 PR ESOPHAGEAL MOTILITY STUDY, MA2METRY: ICD-10-PCS | Mod: 26,,, | Performed by: INTERNAL MEDICINE

## 2022-04-07 PROCEDURE — 91037 ESOPH IMPED FUNCTION TEST: CPT | Performed by: INTERNAL MEDICINE

## 2022-04-07 PROCEDURE — 25000003 PHARM REV CODE 250: Performed by: INTERNAL MEDICINE

## 2022-04-07 PROCEDURE — 91037 ESOPH IMPED FUNCTION TEST: CPT | Mod: 26,,, | Performed by: INTERNAL MEDICINE

## 2022-04-07 RX ORDER — LIDOCAINE HYDROCHLORIDE 20 MG/ML
2 SOLUTION OROPHARYNGEAL ONCE
Status: COMPLETED | OUTPATIENT
Start: 2022-04-07 | End: 2022-04-07

## 2022-04-07 RX ADMIN — LIDOCAINE HYDROCHLORIDE 2 ML: 20 SOLUTION ORAL; TOPICAL at 08:04

## 2022-04-18 NOTE — PROVATION PATIENT INSTRUCTIONS
Discharge Summary/Instructions after an Endoscopic Procedure  Patient Name: Cailin Pickett  Patient MRN: [id number]  Patient YOB: 1940 Thursday, April 7, 2022 Natacha Norman MD  Dear patient,  As a result of recent federal legislation (The Federal Cures Act), you may   receive lab or pathology results from your procedure in your [facility]   account before your physician is able to contact you. Your physician or   their representative will relay the results to you with their   recommendations at their soonest availability.  Thank you,  RESTRICTIONS:  During your procedure today, you received medications for sedation.  These   medications may affect your judgment, balance and coordination.  Therefore,   for 24 hours, you have the following restrictions:   - DO NOT drive a car, operate machinery, make legal/financial decisions,   sign important papers or drink alcohol.    ACTIVITY:  Today: no heavy lifting, straining or running due to procedural   sedation/anesthesia.  The following day: return to full activity including work.  DIET:  Eat and drink normally unless instructed otherwise.     TREATMENT FOR COMMON SIDE EFFECTS:  - Mild abdominal pain, nausea, belching, bloating or excessive gas:  rest,   eat lightly and use a heating pad.  - Sore Throat: treat with throat lozenges and/or gargle with warm salt   water.  - Because air was used during the procedure, expelling large amounts of air   from your rectum or belching is normal.  - If a bowel prep was taken, you may not have a bowel movement for 1-3 days.    This is normal.  SYMPTOMS TO WATCH FOR AND REPORT TO YOUR PHYSICIAN:  1. Abdominal pain or bloating, other than gas cramps.  2. Chest pain.  3. Back pain.  4. Signs of infection such as: chills or fever occurring within 24 hours   after the procedure.  5. Rectal bleeding, which would show as bright red, maroon, or black stools.   (A tablespoon of blood from the rectum is not serious, especially if    hemorrhoids are present.)  6. Vomiting.  7. Weakness or dizziness.  GO DIRECTLY TO THE NEAREST EMERGENCY ROOM IF YOU HAVE ANY OF THE FOLLOWING:      Difficulty breathing              Chills and/or fever over 101 F   Persistent vomiting and/or vomiting blood   Severe abdominal pain   Severe chest pain   Black, tarry stools   Bleeding- more than one tablespoon   Any other symptom or condition that you feel may need urgent attention  Your doctor recommends these additional instructions:  If any biopsies were taken, your doctors clinic will contact you in 1 to 2   weeks with any results.  - Return to referring provider.  For questions, problems or results please call your physician Natacha Norman MD at Work:  [phone]  If you have any questions about the above instructions, call the GI   department at [phone] or call the endoscopy unit at [phone]   from 7am until 3 pm.  [facility] [place], EMERGENCY ROOM PHONE NUMBER:   [phone]  IF A COMPLICATION OR EMERGENCY SITUATION ARISES AND YOU ARE UNABLE TO REACH   YOUR PHYSICIAN - GO DIRECTLY TO THE EMERGENCY ROOM.  I have read or have had read to me these discharge instructions for my   procedure and have received a written copy.  I understand these   instructions and will follow-up with my physician if I have any questions.     __________________________________       _____________________________________  Nurse Signature                                          Patient/Designated   Responsible Party Signature  MD Natacha Ross MD  4/18/2022 2:34:51 PM  This report has been verified and signed electronically.  Dear patient,  As a result of recent federal legislation (The Federal Cures Act), you may   receive lab or pathology results from your procedure in your [facility]   account before your physician is able to contact you. Your physician or   their representative will relay the results to you with their    recommendations at their soonest availability.  Thank you,  PROVATION

## 2022-04-20 ENCOUNTER — PATIENT MESSAGE (OUTPATIENT)
Dept: GASTROENTEROLOGY | Facility: CLINIC | Age: 82
End: 2022-04-20
Payer: MEDICARE

## 2022-05-10 ENCOUNTER — TELEPHONE (OUTPATIENT)
Dept: NEUROLOGY | Facility: CLINIC | Age: 82
End: 2022-05-10
Payer: MEDICARE

## 2022-05-10 NOTE — TELEPHONE ENCOUNTER
Attempted to contact patient to inform her that since she was just seen in March, she did not need to keep her appointment today.VM left-BR

## 2022-05-10 NOTE — TELEPHONE ENCOUNTER
----- Message from Daysi Soto NP sent at 5/10/2022  7:57 AM CDT -----  Please call patient and let her know we can cancel today's appointment. I think this appointment was booked prior to her seeing me in March. She can just follow up with me in 6 months. She already has an appointment in September with me. Thanks!

## 2022-05-31 DIAGNOSIS — Z12.31 BREAST CANCER SCREENING BY MAMMOGRAM: Primary | ICD-10-CM

## 2022-06-08 NOTE — PROGRESS NOTES
Subjective:   Patient ID:  Cailin Pickett is a 81 y.o. female who presents for follow-up of No chief complaint on file.  Hypertension  This is a chronic problem. The current episode started more than 1 year ago. The problem has been gradually improving since onset. The problem is controlled. Pertinent negatives include no chest pain, palpitations or shortness of breath. Past treatments include diuretics. The current treatment provides moderate improvement. There are no compliance problems.    Hyperlipidemia  This is a chronic problem. The current episode started more than 1 year ago. The problem is controlled. Pertinent negatives include no chest pain or shortness of breath. Current antihyperlipidemic treatment includes statins. There are no compliance problems.    Clinically patient is stabilized improved.  She is on dialysis and needs to pull more fluid off by dialysis.  She is on Lasix daily with some diuresis.  I told the patient to use her inhaler to make sure that she can expand her lungs.  This pleasant patient presents after going to the hospital with evidence of pleural effusions and need for tap on the left.  Patient has evidence of bilateral pleural effusions and evidence of edema.  Cardiac echo showed normal LV function is no evidence of cardiac ischemia by nuclear stress test.  Repeat CT scan of the chest today shows improvement in left pleural effusion stable and mild on the right side.  There is evidence of atelectasis.     The finds the patient feeling well today and doing well current medications.    Today the patient is doing well no recurrence symptoms all medications reviewed and renewed as necessary.  Last echo showed normal LV function nuclear stress test was stable.      Review of Systems   Constitutional: Negative for chills, diaphoresis, night sweats, weight gain and weight loss.   HENT: Negative for congestion, hoarse voice, sore throat and stridor.    Eyes: Negative for double vision  and pain.   Cardiovascular: Negative for chest pain, claudication, cyanosis, dyspnea on exertion, irregular heartbeat, leg swelling, near-syncope, orthopnea, palpitations, paroxysmal nocturnal dyspnea and syncope.   Respiratory: Negative for cough, hemoptysis, shortness of breath, sleep disturbances due to breathing, snoring, sputum production and wheezing.    Endocrine: Negative for cold intolerance, heat intolerance and polydipsia.   Hematologic/Lymphatic: Negative for bleeding problem. Does not bruise/bleed easily.   Skin: Negative for color change, dry skin and rash.   Musculoskeletal: Negative for joint swelling and muscle cramps.   Gastrointestinal: Negative for bloating, abdominal pain, constipation, diarrhea, dysphagia, melena, nausea and vomiting.   Genitourinary: Negative for flank pain and urgency.   Neurological: Negative for dizziness, focal weakness, headaches, light-headedness, loss of balance, seizures and weakness.   Psychiatric/Behavioral: Negative for altered mental status and memory loss. The patient is not nervous/anxious.      Family History   Problem Relation Age of Onset    Heart disease Mother 60        MI    Hypertension Mother     Stroke Mother     Breast cancer Mother     Cancer Father         prostate    Cancer Brother         renal cell carcinoma    Diabetes Brother     Kidney disease Brother         ESRD s/p kidney transplant    Breast cancer Sister     Breast cancer Sister      Past Medical History:   Diagnosis Date    Anemia     CKD (chronic kidney disease) stage 5, GFR less than 15 ml/min 3/14/2019    CKD (chronic kidney disease), stage III     Diabetes mellitus type II     Encounter for blood transfusion     Family history of colonic polyps 7/18/2017    Gout, unspecified     Hyperlipidemia     Hypertension     Neuropathy     Pancreatic cancer     Renal failure     Secondary hyperparathyroidism of renal origin 3/14/2019    Thyroid disease      Social History  "    Socioeconomic History    Marital status:     Number of children: 0   Occupational History     Comment: stay home    Tobacco Use    Smoking status: Former Smoker     Packs/day: 1.00     Years: 35.00     Pack years: 35.00     Start date: 3/14/1962     Quit date: 2001     Years since quittin.4    Smokeless tobacco: Never Used   Substance and Sexual Activity    Alcohol use: No    Drug use: No    Sexual activity: Not Currently   Social History Narrative    She lives [place] from [place]     Current Outpatient Medications on File Prior to Visit   Medication Sig Dispense Refill    allopurinoL (ZYLOPRIM) 100 MG tablet Take 1 tablet (100 mg total) by mouth once daily. 90 tablet 3    amLODIPine (NORVASC) 2.5 MG tablet TAKE 1 TABLET(2.5 MG) BY MOUTH EVERY DAY 90 tablet 1    aspirin (ECOTRIN) 81 MG EC tablet Take 1 tablet (81 mg total) by mouth once daily. 30 tablet 0    atorvastatin (LIPITOR) 40 MG tablet Take 1 tablet (40 mg total) by mouth once daily. 90 tablet 3    AURYXIA 210 mg iron Tab       BD ULTRA-FINE MICRO PEN NEEDLE 32 gauge x 1/4" Ndle       cetirizine (ZYRTEC) 10 MG tablet Take 10 mg by mouth once daily.      co-enzyme Q-10 30 mg capsule Take 1 capsule (30 mg total) by mouth 2 (two) times daily. 180 capsule 3    docusate sodium (COLACE) 50 MG capsule Take by mouth.      ezetimibe (ZETIA) 10 mg tablet TAKE 1 TABLET DAILY 90 tablet 0    fluocinolone (DERMA-SMOOTHE) 0.01 % external oil Apply oil to scalp once a day 1 Bottle 5    furosemide (LASIX) 40 MG tablet TAKE 1 TABLET DAILY 90 tablet 3    gabapentin (NEURONTIN) 600 MG tablet Take 1 tablet (600 mg total) by mouth once daily. Daily at 05:00 PM 90 tablet 3    glucosamine-chondroitin 250-200 mg Tab Take 1 tablet by mouth 2 (two) times daily.      insulin degludec (TRESIBA FLEXTOUCH U-100) 100 unit/mL (3 mL) InPn Inject 8 Units into the skin.       omeprazole (PRILOSEC) 40 MG capsule Take 1 capsule (40 mg " "total) by mouth once daily. 30 capsule 11    pen needle, diabetic 32 gauge x 1/4" Ndle Use to test blood sugars bid      sevelamer carbonate (RENVELA) 800 mg Tab Take 2 tablets by mouth.      traMADoL (ULTRAM) 50 mg tablet Take 1 tablet (50 mg total) by mouth every 12 (twelve) hours. 30 tablet 0    VELPHORO 500 mg Chew Take 1 tablet by mouth 3 (three) times daily.      vitamin renal formula, B-complex-vitamin c-folic acid, (NEPHROCAP) 1 mg Cap Take 1 capsule by mouth once daily. 30 capsule 0     No current facility-administered medications on file prior to visit.     Review of patient's allergies indicates:   Allergen Reactions    Allegra [fexofenadine] Swelling    Codeine Hives, Itching and Nausea And Vomiting       Objective:     Physical Exam  Eyes:      Pupils: Pupils are equal, round, and reactive to light.   Neck:      Trachea: No tracheal deviation.   Cardiovascular:      Rate and Rhythm: Normal rate and regular rhythm.      Pulses: Intact distal pulses.           Carotid pulses are 2+ on the right side and 2+ on the left side.       Radial pulses are 2+ on the right side and 2+ on the left side.        Femoral pulses are 2+ on the right side and 2+ on the left side.       Popliteal pulses are 2+ on the right side and 2+ on the left side.        Dorsalis pedis pulses are 2+ on the right side and 2+ on the left side.        Posterior tibial pulses are 2+ on the right side and 2+ on the left side.      Heart sounds: Normal heart sounds. No murmur heard.    No friction rub. No gallop.   Pulmonary:      Effort: Pulmonary effort is normal. No respiratory distress.      Breath sounds: Normal breath sounds. No stridor. No wheezing or rales.   Chest:      Chest wall: No tenderness.   Abdominal:      General: There is no distension.      Tenderness: There is no abdominal tenderness. There is no rebound.   Musculoskeletal:         General: No tenderness.      Cervical back: Normal range of motion.   Skin:     " General: Skin is warm and dry.   Neurological:      Mental Status: She is alert and oriented to person, place, and time.     Cardiac echo 08/10/2021  · The left ventricle is normal in size with normal systolic function.  · The estimated ejection fraction is 65%.  · Normal left ventricular diastolic function.  · Normal right ventricular size with normal right ventricular systolic function.  · Normal central venous pressure (3 mmHg).  · Mild tricuspid regurgitation.  · Mild mitral regurgitation.  · The estimated PA systolic pressure is 37 mmHg.        Assessment:     1. Hypertension associated with diabetes    2. Hyperlipidemia associated with type 2 diabetes mellitus    3. Sick sinus syndrome    4. ESRD needing dialysis        Plan:     Hypertension associated with diabetes    Hyperlipidemia associated with type 2 diabetes mellitus    Sick sinus syndrome    ESRD needing dialysis      Impression 1 hypertension good control today on amlodipine.  2. Hyperlipidemia stable   3. Sick sinus syndrome stable no recurrence symptoms doing well follow-up evaluation again in 6 months review sooner if there are acute recurrence symptoms.

## 2022-06-09 ENCOUNTER — OFFICE VISIT (OUTPATIENT)
Dept: TRANSPLANT | Facility: CLINIC | Age: 82
End: 2022-06-09
Payer: MEDICARE

## 2022-06-09 VITALS
HEIGHT: 65 IN | RESPIRATION RATE: 16 BRPM | OXYGEN SATURATION: 97 % | DIASTOLIC BLOOD PRESSURE: 56 MMHG | HEART RATE: 86 BPM | BODY MASS INDEX: 24.91 KG/M2 | WEIGHT: 149.5 LBS | SYSTOLIC BLOOD PRESSURE: 134 MMHG

## 2022-06-09 DIAGNOSIS — E11.59 HYPERTENSION ASSOCIATED WITH DIABETES: Primary | Chronic | ICD-10-CM

## 2022-06-09 DIAGNOSIS — Z99.2 ESRD NEEDING DIALYSIS: ICD-10-CM

## 2022-06-09 DIAGNOSIS — E78.5 HYPERLIPIDEMIA ASSOCIATED WITH TYPE 2 DIABETES MELLITUS: ICD-10-CM

## 2022-06-09 DIAGNOSIS — E11.69 HYPERLIPIDEMIA ASSOCIATED WITH TYPE 2 DIABETES MELLITUS: ICD-10-CM

## 2022-06-09 DIAGNOSIS — I15.2 HYPERTENSION ASSOCIATED WITH DIABETES: Primary | Chronic | ICD-10-CM

## 2022-06-09 DIAGNOSIS — I49.5 SICK SINUS SYNDROME: ICD-10-CM

## 2022-06-09 DIAGNOSIS — N18.6 ESRD NEEDING DIALYSIS: ICD-10-CM

## 2022-06-09 PROCEDURE — 1159F PR MEDICATION LIST DOCUMENTED IN MEDICAL RECORD: ICD-10-PCS | Mod: CPTII,S$GLB,, | Performed by: INTERNAL MEDICINE

## 2022-06-09 PROCEDURE — 3075F PR MOST RECENT SYSTOLIC BLOOD PRESS GE 130-139MM HG: ICD-10-PCS | Mod: CPTII,S$GLB,, | Performed by: INTERNAL MEDICINE

## 2022-06-09 PROCEDURE — 3075F SYST BP GE 130 - 139MM HG: CPT | Mod: CPTII,S$GLB,, | Performed by: INTERNAL MEDICINE

## 2022-06-09 PROCEDURE — 1160F PR REVIEW ALL MEDS BY PRESCRIBER/CLIN PHARMACIST DOCUMENTED: ICD-10-PCS | Mod: CPTII,S$GLB,, | Performed by: INTERNAL MEDICINE

## 2022-06-09 PROCEDURE — 1157F PR ADVANCE CARE PLAN OR EQUIV PRESENT IN MEDICAL RECORD: ICD-10-PCS | Mod: CPTII,S$GLB,, | Performed by: INTERNAL MEDICINE

## 2022-06-09 PROCEDURE — 99214 PR OFFICE/OUTPT VISIT, EST, LEVL IV, 30-39 MIN: ICD-10-PCS | Mod: S$GLB,,, | Performed by: INTERNAL MEDICINE

## 2022-06-09 PROCEDURE — 1126F PR PAIN SEVERITY QUANTIFIED, NO PAIN PRESENT: ICD-10-PCS | Mod: CPTII,S$GLB,, | Performed by: INTERNAL MEDICINE

## 2022-06-09 PROCEDURE — 3078F DIAST BP <80 MM HG: CPT | Mod: CPTII,S$GLB,, | Performed by: INTERNAL MEDICINE

## 2022-06-09 PROCEDURE — 99999 PR PBB SHADOW E&M-EST. PATIENT-LVL V: CPT | Mod: PBBFAC,,, | Performed by: INTERNAL MEDICINE

## 2022-06-09 PROCEDURE — 1126F AMNT PAIN NOTED NONE PRSNT: CPT | Mod: CPTII,S$GLB,, | Performed by: INTERNAL MEDICINE

## 2022-06-09 PROCEDURE — 3288F PR FALLS RISK ASSESSMENT DOCUMENTED: ICD-10-PCS | Mod: CPTII,S$GLB,, | Performed by: INTERNAL MEDICINE

## 2022-06-09 PROCEDURE — 3078F PR MOST RECENT DIASTOLIC BLOOD PRESSURE < 80 MM HG: ICD-10-PCS | Mod: CPTII,S$GLB,, | Performed by: INTERNAL MEDICINE

## 2022-06-09 PROCEDURE — 99999 PR PBB SHADOW E&M-EST. PATIENT-LVL V: ICD-10-PCS | Mod: PBBFAC,,, | Performed by: INTERNAL MEDICINE

## 2022-06-09 PROCEDURE — 99214 OFFICE O/P EST MOD 30 MIN: CPT | Mod: S$GLB,,, | Performed by: INTERNAL MEDICINE

## 2022-06-09 PROCEDURE — 3288F FALL RISK ASSESSMENT DOCD: CPT | Mod: CPTII,S$GLB,, | Performed by: INTERNAL MEDICINE

## 2022-06-09 PROCEDURE — 1101F PT FALLS ASSESS-DOCD LE1/YR: CPT | Mod: CPTII,S$GLB,, | Performed by: INTERNAL MEDICINE

## 2022-06-09 PROCEDURE — 1157F ADVNC CARE PLAN IN RCRD: CPT | Mod: CPTII,S$GLB,, | Performed by: INTERNAL MEDICINE

## 2022-06-09 PROCEDURE — 1160F RVW MEDS BY RX/DR IN RCRD: CPT | Mod: CPTII,S$GLB,, | Performed by: INTERNAL MEDICINE

## 2022-06-09 PROCEDURE — 1101F PR PT FALLS ASSESS DOC 0-1 FALLS W/OUT INJ PAST YR: ICD-10-PCS | Mod: CPTII,S$GLB,, | Performed by: INTERNAL MEDICINE

## 2022-06-09 PROCEDURE — 1159F MED LIST DOCD IN RCRD: CPT | Mod: CPTII,S$GLB,, | Performed by: INTERNAL MEDICINE

## 2022-06-09 RX ORDER — AMLODIPINE BESYLATE 2.5 MG/1
2.5 TABLET ORAL DAILY
Qty: 90 TABLET | Refills: 3 | Status: SHIPPED | OUTPATIENT
Start: 2022-06-09 | End: 2022-06-09 | Stop reason: SDUPTHER

## 2022-06-09 RX ORDER — AMLODIPINE BESYLATE 2.5 MG/1
2.5 TABLET ORAL DAILY
Qty: 90 TABLET | Refills: 3 | Status: SHIPPED | OUTPATIENT
Start: 2022-06-09

## 2022-06-09 NOTE — LETTER
June 9, 2022        Tank Vinson  [address]  [place] [address]  Phone: [phone]  Fax: [phone]             [facility] ([facility] [address])  [address] [place] [address]  Phone: [phone]  Fax: [phone]   Patient: Cailin Pickett   MR Number: [id number]   YOB: 1940   Date of Visit: 6/9/2022       Dear Dr. Tank Vinson    Thank you for referring Cailin Pickett to me for evaluation. Attached you will find relevant portions of my assessment and plan of care.    If you have questions, please do not hesitate to call me. I look forward to following Cailin Pickett along with you.    Sincerely,    Alejandro Willis MD    Enclosure    If you would like to receive this communication electronically, please contact [email or web address] or [phone] to request [employer] access.    [employer] is a tool which provides read-only access to select patient information with whom you have a relationship. Its easy to use and provides real time access to review your patients record including encounter summaries, notes, results, and demographic information.    If you feel you have received this communication in error or would no longer like to receive these types of communications, please e-mail [email or web address]

## 2022-06-12 DIAGNOSIS — E11.69 HYPERLIPIDEMIA ASSOCIATED WITH TYPE 2 DIABETES MELLITUS: ICD-10-CM

## 2022-06-12 DIAGNOSIS — E78.5 HYPERLIPIDEMIA ASSOCIATED WITH TYPE 2 DIABETES MELLITUS: ICD-10-CM

## 2022-06-13 RX ORDER — EZETIMIBE 10 MG/1
TABLET ORAL
Qty: 90 TABLET | Refills: 0 | Status: SHIPPED | OUTPATIENT
Start: 2022-06-13 | End: 2022-08-24

## 2022-06-13 NOTE — TELEPHONE ENCOUNTER
Refill Routing Note   Medication(s) are not appropriate for processing by [facility] for the following reason(s):      - Non-participating provider    [facility] action(s):  Route          Medication reconciliation completed: No     Appointments  past 12m or future 3m with PCP    Date Provider   Last Visit   4/22/2021 Mahsa Bishop MD   Next Visit   Visit date not found Mahsa Bishop MD   ED visits in past 90 days: 0        Note composed:1:18 PM 06/13/2022

## 2022-06-20 ENCOUNTER — TELEPHONE (OUTPATIENT)
Dept: OBSTETRICS AND GYNECOLOGY | Facility: CLINIC | Age: 82
End: 2022-06-20
Payer: MEDICARE

## 2022-06-20 NOTE — TELEPHONE ENCOUNTER
----- Message from Sun De Luna sent at 6/20/2022  8:54 AM CDT -----  Contact: Patient, [phone]  Calling to reschedule her appointment she can not do Mondays. Please call her. Thanks.

## 2022-07-07 ENCOUNTER — HOSPITAL ENCOUNTER (OUTPATIENT)
Dept: RADIOLOGY | Facility: HOSPITAL | Age: 82
Discharge: HOME | End: 2022-07-07
Attending: NURSE PRACTITIONER
Payer: MEDICARE

## 2022-07-07 VITALS — HEIGHT: 65 IN | BODY MASS INDEX: 24.91 KG/M2 | WEIGHT: 149.5 LBS

## 2022-07-07 DIAGNOSIS — Z12.31 BREAST CANCER SCREENING BY MAMMOGRAM: ICD-10-CM

## 2022-07-07 PROCEDURE — 77063 BREAST TOMOSYNTHESIS BI: CPT | Mod: 26,,, | Performed by: RADIOLOGY

## 2022-07-07 PROCEDURE — 77063 MAMMO DIGITAL SCREENING BILAT WITH TOMO: ICD-10-PCS | Mod: 26,,, | Performed by: RADIOLOGY

## 2022-07-07 PROCEDURE — 77067 MAMMO DIGITAL SCREENING BILAT WITH TOMO: ICD-10-PCS | Mod: 26,,, | Performed by: RADIOLOGY

## 2022-07-07 PROCEDURE — 77067 SCR MAMMO BI INCL CAD: CPT | Mod: 26,,, | Performed by: RADIOLOGY

## 2022-07-07 PROCEDURE — 77063 BREAST TOMOSYNTHESIS BI: CPT | Mod: TC

## 2022-07-14 ENCOUNTER — LAB VISIT (OUTPATIENT)
Dept: LAB | Facility: HOSPITAL | Age: 82
End: 2022-07-14
Attending: INTERNAL MEDICINE
Payer: MEDICARE

## 2022-07-14 ENCOUNTER — OFFICE VISIT (OUTPATIENT)
Dept: HEMATOLOGY/ONCOLOGY | Facility: CLINIC | Age: 82
End: 2022-07-14
Payer: MEDICARE

## 2022-07-14 VITALS
DIASTOLIC BLOOD PRESSURE: 73 MMHG | BODY MASS INDEX: 25.08 KG/M2 | TEMPERATURE: 97 F | WEIGHT: 150.56 LBS | HEART RATE: 78 BPM | SYSTOLIC BLOOD PRESSURE: 148 MMHG | OXYGEN SATURATION: 98 % | HEIGHT: 65 IN

## 2022-07-14 DIAGNOSIS — D63.1 ANEMIA DUE TO CHRONIC KIDNEY DISEASE, ON CHRONIC DIALYSIS: ICD-10-CM

## 2022-07-14 DIAGNOSIS — Z99.2 ANEMIA DUE TO CHRONIC KIDNEY DISEASE, ON CHRONIC DIALYSIS: ICD-10-CM

## 2022-07-14 DIAGNOSIS — Z85.07 HISTORY OF PANCREATIC CANCER: ICD-10-CM

## 2022-07-14 DIAGNOSIS — N18.6 ANEMIA DUE TO CHRONIC KIDNEY DISEASE, ON CHRONIC DIALYSIS: ICD-10-CM

## 2022-07-14 DIAGNOSIS — Z85.07 HISTORY OF PANCREATIC CANCER: Primary | ICD-10-CM

## 2022-07-14 LAB
ALBUMIN SERPL BCP-MCNC: 3.4 G/DL (ref 3.5–5.2)
ALP SERPL-CCNC: 128 U/L (ref 55–135)
ALT SERPL W/O P-5'-P-CCNC: 15 U/L (ref 10–44)
ANION GAP SERPL CALC-SCNC: 11 MMOL/L (ref 8–16)
AST SERPL-CCNC: 22 U/L (ref 10–40)
BASOPHILS # BLD AUTO: 0.04 K/UL (ref 0–0.2)
BASOPHILS NFR BLD: 0.6 % (ref 0–1.9)
BILIRUB SERPL-MCNC: 0.3 MG/DL (ref 0.1–1)
BUN SERPL-MCNC: 42 MG/DL (ref 8–23)
CALCIUM SERPL-MCNC: 9.7 MG/DL (ref 8.7–10.5)
CHLORIDE SERPL-SCNC: 99 MMOL/L (ref 95–110)
CO2 SERPL-SCNC: 29 MMOL/L (ref 23–29)
CREAT SERPL-MCNC: 6.4 MG/DL (ref 0.5–1.4)
DIFFERENTIAL METHOD: ABNORMAL
EOSINOPHIL # BLD AUTO: 0 K/UL (ref 0–0.5)
EOSINOPHIL NFR BLD: 0.5 % (ref 0–8)
ERYTHROCYTE [DISTWIDTH] IN BLOOD BY AUTOMATED COUNT: 16.2 % (ref 11.5–14.5)
EST. GFR  (AFRICAN AMERICAN): 7 ML/MIN/1.73 M^2
EST. GFR  (NON AFRICAN AMERICAN): 6 ML/MIN/1.73 M^2
GLUCOSE SERPL-MCNC: 107 MG/DL (ref 70–110)
HCT VFR BLD AUTO: 37.4 % (ref 37–48.5)
HGB BLD-MCNC: 11.5 G/DL (ref 12–16)
IMM GRANULOCYTES # BLD AUTO: 0.02 K/UL (ref 0–0.04)
IMM GRANULOCYTES NFR BLD AUTO: 0.3 % (ref 0–0.5)
LYMPHOCYTES # BLD AUTO: 1 K/UL (ref 1–4.8)
LYMPHOCYTES NFR BLD: 15.5 % (ref 18–48)
MCH RBC QN AUTO: 29.6 PG (ref 27–31)
MCHC RBC AUTO-ENTMCNC: 30.7 G/DL (ref 32–36)
MCV RBC AUTO: 96 FL (ref 82–98)
MONOCYTES # BLD AUTO: 0.4 K/UL (ref 0.3–1)
MONOCYTES NFR BLD: 5.4 % (ref 4–15)
NEUTROPHILS # BLD AUTO: 5 K/UL (ref 1.8–7.7)
NEUTROPHILS NFR BLD: 77.7 % (ref 38–73)
NRBC BLD-RTO: 0 /100 WBC
PLATELET # BLD AUTO: 228 K/UL (ref 150–450)
PMV BLD AUTO: 9.9 FL (ref 9.2–12.9)
POTASSIUM SERPL-SCNC: 5.7 MMOL/L (ref 3.5–5.1)
PROT SERPL-MCNC: 7.5 G/DL (ref 6–8.4)
RBC # BLD AUTO: 3.89 M/UL (ref 4–5.4)
SODIUM SERPL-SCNC: 139 MMOL/L (ref 136–145)
WBC # BLD AUTO: 6.47 K/UL (ref 3.9–12.7)

## 2022-07-14 PROCEDURE — 1101F PT FALLS ASSESS-DOCD LE1/YR: CPT | Mod: CPTII,S$GLB,, | Performed by: NURSE PRACTITIONER

## 2022-07-14 PROCEDURE — 1101F PR PT FALLS ASSESS DOC 0-1 FALLS W/OUT INJ PAST YR: ICD-10-PCS | Mod: CPTII,S$GLB,, | Performed by: NURSE PRACTITIONER

## 2022-07-14 PROCEDURE — 3078F DIAST BP <80 MM HG: CPT | Mod: CPTII,S$GLB,, | Performed by: NURSE PRACTITIONER

## 2022-07-14 PROCEDURE — 1159F PR MEDICATION LIST DOCUMENTED IN MEDICAL RECORD: ICD-10-PCS | Mod: CPTII,S$GLB,, | Performed by: NURSE PRACTITIONER

## 2022-07-14 PROCEDURE — 1125F AMNT PAIN NOTED PAIN PRSNT: CPT | Mod: CPTII,S$GLB,, | Performed by: NURSE PRACTITIONER

## 2022-07-14 PROCEDURE — 99214 PR OFFICE/OUTPT VISIT, EST, LEVL IV, 30-39 MIN: ICD-10-PCS | Mod: S$GLB,,, | Performed by: NURSE PRACTITIONER

## 2022-07-14 PROCEDURE — 99999 PR PBB SHADOW E&M-EST. PATIENT-LVL IV: CPT | Mod: PBBFAC,,, | Performed by: NURSE PRACTITIONER

## 2022-07-14 PROCEDURE — 3288F FALL RISK ASSESSMENT DOCD: CPT | Mod: CPTII,S$GLB,, | Performed by: NURSE PRACTITIONER

## 2022-07-14 PROCEDURE — 99999 PR PBB SHADOW E&M-EST. PATIENT-LVL IV: ICD-10-PCS | Mod: PBBFAC,,, | Performed by: NURSE PRACTITIONER

## 2022-07-14 PROCEDURE — 3077F PR MOST RECENT SYSTOLIC BLOOD PRESSURE >= 140 MM HG: ICD-10-PCS | Mod: CPTII,S$GLB,, | Performed by: NURSE PRACTITIONER

## 2022-07-14 PROCEDURE — 1157F PR ADVANCE CARE PLAN OR EQUIV PRESENT IN MEDICAL RECORD: ICD-10-PCS | Mod: CPTII,S$GLB,, | Performed by: NURSE PRACTITIONER

## 2022-07-14 PROCEDURE — 99214 OFFICE O/P EST MOD 30 MIN: CPT | Mod: S$GLB,,, | Performed by: NURSE PRACTITIONER

## 2022-07-14 PROCEDURE — 1160F RVW MEDS BY RX/DR IN RCRD: CPT | Mod: CPTII,S$GLB,, | Performed by: NURSE PRACTITIONER

## 2022-07-14 PROCEDURE — 85025 COMPLETE CBC W/AUTO DIFF WBC: CPT | Performed by: NURSE PRACTITIONER

## 2022-07-14 PROCEDURE — 3077F SYST BP >= 140 MM HG: CPT | Mod: CPTII,S$GLB,, | Performed by: NURSE PRACTITIONER

## 2022-07-14 PROCEDURE — 1125F PR PAIN SEVERITY QUANTIFIED, PAIN PRESENT: ICD-10-PCS | Mod: CPTII,S$GLB,, | Performed by: NURSE PRACTITIONER

## 2022-07-14 PROCEDURE — 3078F PR MOST RECENT DIASTOLIC BLOOD PRESSURE < 80 MM HG: ICD-10-PCS | Mod: CPTII,S$GLB,, | Performed by: NURSE PRACTITIONER

## 2022-07-14 PROCEDURE — 1160F PR REVIEW ALL MEDS BY PRESCRIBER/CLIN PHARMACIST DOCUMENTED: ICD-10-PCS | Mod: CPTII,S$GLB,, | Performed by: NURSE PRACTITIONER

## 2022-07-14 PROCEDURE — 3288F PR FALLS RISK ASSESSMENT DOCUMENTED: ICD-10-PCS | Mod: CPTII,S$GLB,, | Performed by: NURSE PRACTITIONER

## 2022-07-14 PROCEDURE — 36415 COLL VENOUS BLD VENIPUNCTURE: CPT | Performed by: NURSE PRACTITIONER

## 2022-07-14 PROCEDURE — 80053 COMPREHEN METABOLIC PANEL: CPT | Performed by: NURSE PRACTITIONER

## 2022-07-14 PROCEDURE — 1159F MED LIST DOCD IN RCRD: CPT | Mod: CPTII,S$GLB,, | Performed by: NURSE PRACTITIONER

## 2022-07-14 PROCEDURE — 86301 IMMUNOASSAY TUMOR CA 19-9: CPT | Performed by: NURSE PRACTITIONER

## 2022-07-14 PROCEDURE — 1157F ADVNC CARE PLAN IN RCRD: CPT | Mod: CPTII,S$GLB,, | Performed by: NURSE PRACTITIONER

## 2022-07-14 NOTE — ASSESSMENT & PLAN NOTE
No clinical concerns noted. Most recent CT chest/abdomen/pelvis ASHLY       F/u MD 6 months with cbc, cmp ca 19 9. Defer further imaging in absence of clinical concerns

## 2022-07-14 NOTE — PROGRESS NOTES
Subjective:       Patient ID: Cailin Pickett is a 82 y.o. female.    Chief Complaint: f/u h/o pancreatic cancer     HPI: 82 y.o female with h/o ESRD on dialysis, HTN, HLD, pancreatic cancer here today for follow up of her h/o pancreatic cancer. In regards to her h/o pancreatic adenocarcinoma patient was treated with neoadjuvant chemotherapy and chemoradiation with 5 fluorouracil followed by resection 2013 by Dr. Carter at [facility], [place] path [id number] (treatment effect noted). She has been in surveillance. The patient had previously completed neoadjuvant chemotherapy and 5-FU chemoradiation with excellent response.  negative.     She has end-stage renal disease on hemodialysis with recurrent pleural effusion.  Pleural fluid analysis negative for malignant cells.  She receives [facility] with dialysis.     Most recent restaging CT scan 3/2021 reviewed.     Today she feels well. She is without complaints. Denies bladder or bowel changes, weight loss, fatigue and all other symptoms.    Social History     Socioeconomic History    Marital status:     Number of children: 0   Occupational History     Comment: stay home    Tobacco Use    Smoking status: Former Smoker     Packs/day: 1.00     Years: 35.00     Pack years: 35.00     Start date: 3/14/1962     Quit date: 2001     Years since quittin.5    Smokeless tobacco: Never Used   Substance and Sexual Activity    Alcohol use: No    Drug use: No    Sexual activity: Not Currently   Social History Narrative    [place] lives [place] from [place]       Past Medical History:   Diagnosis Date    Anemia     CKD (chronic kidney disease) stage 5, GFR less than 15 ml/min 3/14/2019    CKD (chronic kidney disease), stage III     Diabetes mellitus type II     Encounter for blood transfusion     Family history of colonic polyps 2017    Gout, unspecified     Hyperlipidemia     Hypertension     Neuropathy     Pancreatic cancer      Renal failure     Secondary hyperparathyroidism of renal origin 3/14/2019    Thyroid disease        Family History   Problem Relation Age of Onset    Heart disease Mother 60        MI    Hypertension Mother     Stroke Mother     Breast cancer Mother     Cancer Father         prostate    Cancer Brother         renal cell carcinoma    Diabetes Brother     Kidney disease Brother         ESRD s/p kidney transplant    Breast cancer Sister     Breast cancer Sister        Past Surgical History:   Procedure Laterality Date    COLONOSCOPY  2011    Dr. Favio Mims    COLONOSCOPY N/A 7/18/2017    Procedure: screening colonoscopy;  Surgeon: Kenneth Kamara MD;  Location: [facility] ENDO;  Service: Endoscopy;  Laterality: N/A;    ESOPHAGEAL MANOMETRY WITH MEASUREMENT OF IMPEDANCE N/A 4/7/2022    Procedure: MANOMETRY-ESOPHAGEAL-WITH IMPEDANCE;  Surgeon: Jah Rodgers RN;  Location[facility] ENDO;  Service: Endoscopy;  Laterality: N/A;    ESOPHAGOGASTRODUODENOSCOPY N/A 1/18/2022    Procedure: ESOPHAGOGASTRODUODENOSCOPY (EGD);  Surgeon: Ivett Quarles MD;  Location: [facility] ENDO;  Service: Endoscopy;  Laterality: N/A;    FISTULOGRAM N/A 4/10/2019    Procedure: FISTULOGRAM;  Surgeon: Ruddy Fitzpatrick MD;  Location: [facility] CATH LAB;  Service: Vascular;  Laterality: N/A;  0830 start    HYSTERECTOMY      KNEE SURGERY Left 05/31/2018    NEPHRECTOMY Left 5/2013    Dr Carter     PANCREAS SURGERY      distal pancreatectomy    SPLENECTOMY, TOTAL      THYROIDECTOMY, PARTIAL      VENTRICULOATRIAL SHUNT Left 12/4/2014     left arm       Review of Systems   Constitutional: Negative for activity change, appetite change, chills, fatigue, fever and unexpected weight change.   HENT: Negative for congestion, mouth sores, nosebleeds, sore throat, trouble swallowing and voice change.    Eyes: Negative for visual disturbance.   Respiratory: Negative for cough, chest tightness, shortness of breath and wheezing.    Cardiovascular:  "Negative for chest pain and leg swelling.   Gastrointestinal: Negative for abdominal distention, abdominal pain, blood in stool, constipation, diarrhea, nausea and vomiting.   Genitourinary: Negative for difficulty urinating, dysuria and hematuria.   Musculoskeletal: Negative for arthralgias, back pain and myalgias.   Skin: Negative for pallor, rash and wound.   Neurological: Negative for dizziness, syncope, weakness and headaches.   Hematological: Negative for adenopathy. Does not bruise/bleed easily.   Psychiatric/Behavioral: The patient is not nervous/anxious.          Medication List with Changes/Refills   Current Medications    ALLOPURINOL (ZYLOPRIM) 100 MG TABLET    Take 1 tablet (100 mg total) by mouth once daily.    AMLODIPINE (NORVASC) 2.5 MG TABLET    Take 1 tablet (2.5 mg total) by mouth once daily.    ASPIRIN (ECOTRIN) 81 MG EC TABLET    Take 1 tablet (81 mg total) by mouth once daily.    ATORVASTATIN (LIPITOR) 40 MG TABLET    Take 1 tablet (40 mg total) by mouth once daily.    AURYXIA 210 MG IRON TAB        BD ULTRA-FINE MICRO PEN NEEDLE 32 GAUGE X 1/4" NDLE        CETIRIZINE (ZYRTEC) 10 MG TABLET    Take 10 mg by mouth once daily.    CO-ENZYME Q-10 30 MG CAPSULE    Take 1 capsule (30 mg total) by mouth 2 (two) times daily.    DOCUSATE SODIUM (COLACE) 50 MG CAPSULE    Take by mouth.    EZETIMIBE (ZETIA) 10 MG TABLET    TAKE 1 TABLET DAILY    FLUOCINOLONE (DERMA-SMOOTHE) 0.01 % EXTERNAL OIL    Apply oil to scalp once a day    FUROSEMIDE (LASIX) 40 MG TABLET    TAKE 1 TABLET DAILY    GABAPENTIN (NEURONTIN) 600 MG TABLET    Take 1 tablet (600 mg total) by mouth once daily. Daily at 05:00 PM    GLUCOSAMINE-CHONDROITIN 250-200 MG TAB    Take 1 tablet by mouth 2 (two) times daily.    INSULIN DEGLUDEC (TRESIBA FLEXTOUCH U-100) 100 UNIT/ML (3 ML) INPN    Inject 8 Units into the skin.     OMEPRAZOLE (PRILOSEC) 40 MG CAPSULE    Take 1 capsule (40 mg total) by mouth once daily.    PEN NEEDLE, DIABETIC 32 GAUGE X " "1/4" NDLE    Use to test blood sugars bid    SEVELAMER CARBONATE (RENVELA) 800 MG TAB    Take 2 tablets by mouth.    TRAMADOL (ULTRAM) 50 MG TABLET    Take 1 tablet (50 mg total) by mouth every 12 (twelve) hours.    VELPHORO 500 MG CHEW    Take 1 tablet by mouth 3 (three) times daily.    VITAMIN RENAL FORMULA, B-COMPLEX-VITAMIN C-FOLIC ACID, (NEPHROCAP) 1 MG CAP    Take 1 capsule by mouth once daily.     Objective:     Vitals:    07/14/22 1430   BP: (!) 148/73   Pulse: 78   Temp: 97.2 °F (36.2 °C)     Lab Results   Component Value Date    WBC 6.47 07/14/2022    HGB 11.5 (L) 07/14/2022    HCT 37.4 07/14/2022    MCV 96 07/14/2022     07/14/2022       BMP  Lab Results   Component Value Date     07/14/2022    K 5.7 (H) 07/14/2022    CL 99 07/14/2022    CO2 29 07/14/2022    BUN 42 (H) 07/14/2022    CREATININE 6.4 (H) 07/14/2022    CALCIUM 9.7 07/14/2022    ANIONGAP 11 07/14/2022    ESTGFRAFRICA 7 (A) 07/14/2022    EGFRNONAA 6 (A) 07/14/2022     Lab Results   Component Value Date    ALT 15 07/14/2022    AST 22 07/14/2022    ALKPHOS 128 07/14/2022    BILITOT 0.3 07/14/2022         Physical Exam  Vitals reviewed.   Constitutional:       Appearance: She is well-developed.   HENT:      Head: Normocephalic.      Right Ear: Hearing and tympanic membrane normal. No drainage.      Left Ear: Hearing and tympanic membrane normal. No drainage.      Nose: Nose normal.   Eyes:      General: Lids are normal. No scleral icterus.        Right eye: No discharge.         Left eye: No discharge.      Conjunctiva/sclera: Conjunctivae normal.   Neck:      Thyroid: No thyroid mass.   Cardiovascular:      Rate and Rhythm: Normal rate and regular rhythm.      Heart sounds: Normal heart sounds. No murmur heard.  Pulmonary:      Effort: Pulmonary effort is normal. No respiratory distress.   Abdominal:      General: There is no distension.   Genitourinary:     Comments: deferred  Musculoskeletal:         General: Normal range of " motion.      Cervical back: Normal range of motion.   Lymphadenopathy:      Head:      Right side of head: No submandibular, preauricular or posterior auricular adenopathy.      Left side of head: No submandibular, preauricular or posterior auricular adenopathy.   Skin:     General: Skin is warm and dry.   Neurological:      Mental Status: She is alert and oriented to person, place, and time.   Psychiatric:         Speech: Speech normal.         Behavior: Behavior normal. Behavior is cooperative.         Thought Content: Thought content normal.          Assessment:     Problem List Items Addressed This Visit        Oncology    Anemia due to chronic kidney disease     Patient on HD MWF. Treated with Procrit/iron PRN through dialysis           History of pancreatic cancer - Primary     No clinical concerns noted. Most recent CT chest/abdomen/pelvis ASHLY       F/u MD 6 months with cbc, cmp ca 19 9. Defer further imaging in absence of clinical concerns                    Relevant Orders    CBC Auto Differential    Comprehensive Metabolic Panel    Cancer Antigen 19-9            Plan:     History of pancreatic cancer  -     CBC Auto Differential; Future; Expected date: 07/14/2022  -     Comprehensive Metabolic Panel; Future; Expected date: 07/14/2022  -     Cancer Antigen 19-9; Future; Expected date: 07/14/2022    Anemia due to chronic kidney disease, on chronic dialysis            VY Garber

## 2022-07-15 LAB — CANCER AG19-9 SERPL-ACNC: <2.1 U/ML (ref 0–40)

## 2022-08-24 DIAGNOSIS — E11.69 HYPERLIPIDEMIA ASSOCIATED WITH TYPE 2 DIABETES MELLITUS: ICD-10-CM

## 2022-08-24 DIAGNOSIS — E78.5 HYPERLIPIDEMIA ASSOCIATED WITH TYPE 2 DIABETES MELLITUS: ICD-10-CM

## 2022-08-24 RX ORDER — EZETIMIBE 10 MG/1
TABLET ORAL
Qty: 90 TABLET | Refills: 0 | Status: SHIPPED | OUTPATIENT
Start: 2022-08-24

## 2022-08-24 NOTE — TELEPHONE ENCOUNTER
Refill Routing Note   Medication(s) are not appropriate for processing by [facility] for the following reason(s):      - Required laboratory values are outdated  - Unclear if patient follows with you     [facility] action(s):  Defer       Medication Therapy Plan: Brii Junior MD, unclear if patient follows with you; defer to you  Medication reconciliation completed: No     Appointments  past 12m or future 3m with PCP    Date Provider   Last Visit   4/22/2021 Mahsa Bishop MD   Next Visit   Visit date not found Mahsa Bishop MD   ED visits in past 90 days: 0        Note composed:5:24 AM 08/24/2022

## 2022-09-29 ENCOUNTER — OFFICE VISIT (OUTPATIENT)
Dept: NEUROLOGY | Facility: CLINIC | Age: 82
End: 2022-09-29
Payer: MEDICARE

## 2022-09-29 VITALS
DIASTOLIC BLOOD PRESSURE: 68 MMHG | BODY MASS INDEX: 24.32 KG/M2 | HEART RATE: 71 BPM | OXYGEN SATURATION: 97 % | HEIGHT: 65 IN | WEIGHT: 145.94 LBS | SYSTOLIC BLOOD PRESSURE: 162 MMHG | RESPIRATION RATE: 16 BRPM

## 2022-09-29 DIAGNOSIS — N18.6 ESRD NEEDING DIALYSIS: ICD-10-CM

## 2022-09-29 DIAGNOSIS — E11.22 TYPE 2 DIABETES MELLITUS WITH CHRONIC KIDNEY DISEASE ON CHRONIC DIALYSIS, WITHOUT LONG-TERM CURRENT USE OF INSULIN: ICD-10-CM

## 2022-09-29 DIAGNOSIS — G62.9 POLYNEUROPATHY: ICD-10-CM

## 2022-09-29 DIAGNOSIS — Z85.07 HISTORY OF PANCREATIC CANCER: ICD-10-CM

## 2022-09-29 DIAGNOSIS — N18.6 TYPE 2 DIABETES MELLITUS WITH CHRONIC KIDNEY DISEASE ON CHRONIC DIALYSIS, WITHOUT LONG-TERM CURRENT USE OF INSULIN: ICD-10-CM

## 2022-09-29 DIAGNOSIS — R20.0 NUMBNESS AND TINGLING: ICD-10-CM

## 2022-09-29 DIAGNOSIS — R29.90 MULTIPLE NEUROLOGICAL SYMPTOMS: Primary | ICD-10-CM

## 2022-09-29 DIAGNOSIS — G31.84 MILD COGNITIVE IMPAIRMENT: ICD-10-CM

## 2022-09-29 DIAGNOSIS — Z99.2 TYPE 2 DIABETES MELLITUS WITH CHRONIC KIDNEY DISEASE ON CHRONIC DIALYSIS, WITHOUT LONG-TERM CURRENT USE OF INSULIN: ICD-10-CM

## 2022-09-29 DIAGNOSIS — Z99.2 ESRD NEEDING DIALYSIS: ICD-10-CM

## 2022-09-29 DIAGNOSIS — E11.42 TYPE 2 DIABETES MELLITUS WITH DIABETIC POLYNEUROPATHY, WITHOUT LONG-TERM CURRENT USE OF INSULIN: ICD-10-CM

## 2022-09-29 DIAGNOSIS — R20.2 NUMBNESS AND TINGLING: ICD-10-CM

## 2022-09-29 DIAGNOSIS — Z99.2 DEPENDENCE ON RENAL DIALYSIS: ICD-10-CM

## 2022-09-29 PROCEDURE — 3078F DIAST BP <80 MM HG: CPT | Mod: CPTII,S$GLB,, | Performed by: NURSE PRACTITIONER

## 2022-09-29 PROCEDURE — 1101F PR PT FALLS ASSESS DOC 0-1 FALLS W/OUT INJ PAST YR: ICD-10-PCS | Mod: CPTII,S$GLB,, | Performed by: NURSE PRACTITIONER

## 2022-09-29 PROCEDURE — 99999 PR PBB SHADOW E&M-EST. PATIENT-LVL V: ICD-10-PCS | Mod: PBBFAC,,, | Performed by: NURSE PRACTITIONER

## 2022-09-29 PROCEDURE — 1125F PR PAIN SEVERITY QUANTIFIED, PAIN PRESENT: ICD-10-PCS | Mod: CPTII,S$GLB,, | Performed by: NURSE PRACTITIONER

## 2022-09-29 PROCEDURE — 1101F PT FALLS ASSESS-DOCD LE1/YR: CPT | Mod: CPTII,S$GLB,, | Performed by: NURSE PRACTITIONER

## 2022-09-29 PROCEDURE — 3077F PR MOST RECENT SYSTOLIC BLOOD PRESSURE >= 140 MM HG: ICD-10-PCS | Mod: CPTII,S$GLB,, | Performed by: NURSE PRACTITIONER

## 2022-09-29 PROCEDURE — 99213 PR OFFICE/OUTPT VISIT, EST, LEVL III, 20-29 MIN: ICD-10-PCS | Mod: S$GLB,,, | Performed by: NURSE PRACTITIONER

## 2022-09-29 PROCEDURE — 3288F PR FALLS RISK ASSESSMENT DOCUMENTED: ICD-10-PCS | Mod: CPTII,S$GLB,, | Performed by: NURSE PRACTITIONER

## 2022-09-29 PROCEDURE — 1159F MED LIST DOCD IN RCRD: CPT | Mod: CPTII,S$GLB,, | Performed by: NURSE PRACTITIONER

## 2022-09-29 PROCEDURE — 99213 OFFICE O/P EST LOW 20 MIN: CPT | Mod: S$GLB,,, | Performed by: NURSE PRACTITIONER

## 2022-09-29 PROCEDURE — 1157F PR ADVANCE CARE PLAN OR EQUIV PRESENT IN MEDICAL RECORD: ICD-10-PCS | Mod: CPTII,S$GLB,, | Performed by: NURSE PRACTITIONER

## 2022-09-29 PROCEDURE — 3288F FALL RISK ASSESSMENT DOCD: CPT | Mod: CPTII,S$GLB,, | Performed by: NURSE PRACTITIONER

## 2022-09-29 PROCEDURE — 1160F PR REVIEW ALL MEDS BY PRESCRIBER/CLIN PHARMACIST DOCUMENTED: ICD-10-PCS | Mod: CPTII,S$GLB,, | Performed by: NURSE PRACTITIONER

## 2022-09-29 PROCEDURE — 3078F PR MOST RECENT DIASTOLIC BLOOD PRESSURE < 80 MM HG: ICD-10-PCS | Mod: CPTII,S$GLB,, | Performed by: NURSE PRACTITIONER

## 2022-09-29 PROCEDURE — 3077F SYST BP >= 140 MM HG: CPT | Mod: CPTII,S$GLB,, | Performed by: NURSE PRACTITIONER

## 2022-09-29 PROCEDURE — 99999 PR PBB SHADOW E&M-EST. PATIENT-LVL V: CPT | Mod: PBBFAC,,, | Performed by: NURSE PRACTITIONER

## 2022-09-29 PROCEDURE — 1160F RVW MEDS BY RX/DR IN RCRD: CPT | Mod: CPTII,S$GLB,, | Performed by: NURSE PRACTITIONER

## 2022-09-29 PROCEDURE — 1159F PR MEDICATION LIST DOCUMENTED IN MEDICAL RECORD: ICD-10-PCS | Mod: CPTII,S$GLB,, | Performed by: NURSE PRACTITIONER

## 2022-09-29 PROCEDURE — 1157F ADVNC CARE PLAN IN RCRD: CPT | Mod: CPTII,S$GLB,, | Performed by: NURSE PRACTITIONER

## 2022-09-29 PROCEDURE — 1125F AMNT PAIN NOTED PAIN PRSNT: CPT | Mod: CPTII,S$GLB,, | Performed by: NURSE PRACTITIONER

## 2022-09-29 NOTE — PROGRESS NOTES
Subjective:       Patient ID: Cailin Pickett is a 82 y.o. female.    Chief Complaint: Follow-up    HPI     BACKGROUND    The patient says she is here for neuropathy. Describes longstanding itching , numbness and tingling (hands and feet) since 2013. Was diagnosed with peripheral neuropathy and treated initially with PGB then GBP. Has history of T2DM, ESRD-HD and Pancreatic CA S/P Chemotherapy. B12, FA and TFT. Takes  mg TID despite being on HD. GBP was changed to 600 mg QHS for neuropathy, which is helping significantly. Denies adverse effects.    Was initially referred in  for tremors and memory difficulties. Denies having tremors anymore and reports mild memory lapses. Patient states she noticed trouble with her short term memory around 2020. She often forgets words she wants to use. Denies forgetting conversations or names. Denies misplacing items within the home. Denies family member commenting on her memory. She does not believe her memory loss is progressing. She states she lives with her  and is independent in all ADLS. She is able to keep up with finacnes, medications, and appointments on her own. She continues to drive without getting lost. Denies falls. Denies drinking alcohol. States she will wake up in the middle of the night but is able to fall back alseep. Admits to decrease appetite. Denies history of TBI or stroke. Denies anxiety or depression. States she had a partial thyroidectomy in the past but on no meds for hypothyroidism. Denies family history of dementia. 3- RPR non-reactive. 4- TSH NL. 10- folate, B12 NL    The patient received Monoclonal Abs on 3- for COVID-19.       Interval History 9-: Patient presents to follow up appointment unaccompanied. Patient states that  mg QHS has improved her neuropathy pain significantly. 3- EMG/NCT shows borderline normal study. The absent sensory responses in the lower extremity can be  age-related vs. length-dependent axonal, predominantly sensory peripheral polyneuropathy. Discussed results with patient. Patient request repeat NCT to look for neuropathy in BLE.    Patient states her memory is stable. She is interested in MRI brain but not in repeat MOCA.    Review of Systems   Constitutional:  Positive for appetite change (decreased ). Negative for fatigue.   HENT:  Negative for hearing loss and tinnitus.    Eyes:  Negative for photophobia and visual disturbance.   Respiratory:  Negative for apnea and shortness of breath.    Cardiovascular:  Negative for chest pain and palpitations.   Gastrointestinal:  Negative for nausea and vomiting.   Endocrine: Negative for cold intolerance and heat intolerance.   Genitourinary:  Negative for difficulty urinating and urgency.   Musculoskeletal:  Positive for back pain. Negative for arthralgias, gait problem, joint swelling, myalgias, neck pain and neck stiffness.   Skin:  Negative for color change and rash.   Allergic/Immunologic: Negative for environmental allergies and immunocompromised state.   Neurological:  Positive for numbness. Negative for dizziness, tremors, seizures, syncope, facial asymmetry, speech difficulty, weakness, light-headedness and headaches.   Hematological:  Negative for adenopathy. Does not bruise/bleed easily.   Psychiatric/Behavioral:  Negative for agitation, behavioral problems, confusion, decreased concentration, dysphoric mood, hallucinations, self-injury, sleep disturbance and suicidal ideas. The patient is not hyperactive.                Current Outpatient Medications:     allopurinoL (ZYLOPRIM) 100 MG tablet, Take 1 tablet (100 mg total) by mouth once daily., Disp: 90 tablet, Rfl: 3    amLODIPine (NORVASC) 2.5 MG tablet, Take 1 tablet (2.5 mg total) by mouth once daily., Disp: 90 tablet, Rfl: 3    atorvastatin (LIPITOR) 40 MG tablet, Take 1 tablet (40 mg total) by mouth once daily., Disp: 90 tablet, Rfl: 3    AURYXIA 210 mg  "iron Tab, , Disp: , Rfl:     BD ULTRA-FINE MICRO PEN NEEDLE 32 gauge x 1/4" Ndle, , Disp: , Rfl:     cetirizine (ZYRTEC) 10 MG tablet, Take 10 mg by mouth once daily., Disp: , Rfl:     co-enzyme Q-10 30 mg capsule, Take 1 capsule (30 mg total) by mouth 2 (two) times daily., Disp: 180 capsule, Rfl: 3    docusate sodium (COLACE) 50 MG capsule, Take by mouth., Disp: , Rfl:     ezetimibe (ZETIA) 10 mg tablet, TAKE 1 TABLET DAILY, Disp: 90 tablet, Rfl: 0    furosemide (LASIX) 40 MG tablet, TAKE 1 TABLET DAILY, Disp: 90 tablet, Rfl: 3    gabapentin (NEURONTIN) 600 MG tablet, Take 1 tablet (600 mg total) by mouth once daily. Daily at 05:00 PM, Disp: 90 tablet, Rfl: 3    glucosamine-chondroitin 250-200 mg Tab, Take 1 tablet by mouth 2 (two) times daily., Disp: , Rfl:     insulin degludec (TRESIBA FLEXTOUCH U-100) 100 unit/mL (3 mL) InPn, Inject 8 Units into the skin. , Disp: , Rfl:     pen needle, diabetic 32 gauge x 1/4" Ndle, Use to test blood sugars bid, Disp: , Rfl:     sevelamer carbonate (RENVELA) 800 mg Tab, Take 2 tablets by mouth., Disp: , Rfl:     traMADoL (ULTRAM) 50 mg tablet, Take 1 tablet (50 mg total) by mouth every 12 (twelve) hours., Disp: 30 tablet, Rfl: 0    VELPHORO 500 mg Chew, Take 1 tablet by mouth 3 (three) times daily., Disp: , Rfl:     vitamin renal formula, B-complex-vitamin c-folic acid, (NEPHROCAP) 1 mg Cap, Take 1 capsule by mouth once daily., Disp: 30 capsule, Rfl: 0    aspirin (ECOTRIN) 81 MG EC tablet, Take 1 tablet (81 mg total) by mouth once daily., Disp: 30 tablet, Rfl: 0    fluocinolone (DERMA-SMOOTHE) 0.01 % external oil, Apply oil to scalp once a day, Disp: 1 Bottle, Rfl: 5    omeprazole (PRILOSEC) 40 MG capsule, Take 1 capsule (40 mg total) by mouth once daily., Disp: 30 capsule, Rfl: 11  Past Medical History:   Diagnosis Date    Anemia     CKD (chronic kidney disease) stage 5, GFR less than 15 ml/min 3/14/2019    CKD (chronic kidney disease), stage III     Diabetes mellitus type II  "    Encounter for blood transfusion     Family history of colonic polyps 2017    Gout, unspecified     Hyperlipidemia     Hypertension     Neuropathy     Pancreatic cancer     Renal failure     Secondary hyperparathyroidism of renal origin 3/14/2019    Thyroid disease      Past Surgical History:   Procedure Laterality Date    COLONOSCOPY      Dr. Favio Mims    COLONOSCOPY N/A 2017    Procedure: screening colonoscopy;  Surgeon: Kenneth Kamara MD;  Location: [facility];  Service: Endoscopy;  Laterality: N/A;    ESOPHAGEAL MANOMETRY WITH MEASUREMENT OF IMPEDANCE N/A 2022    Procedure: MANOMETRY-ESOPHAGEAL-WITH IMPEDANCE;  Surgeon: Jah Rodgers RN;  Location: [facility];  Service: Endoscopy;  Laterality: N/A;    ESOPHAGOGASTRODUODENOSCOPY N/A 2022    Procedure: ESOPHAGOGASTRODUODENOSCOPY (EGD);  Surgeon: Ivett Quarles MD;  Location: [facility];  Service: Endoscopy;  Laterality: N/A;    FISTULOGRAM N/A 4/10/2019    Procedure: FISTULOGRAM;  Surgeon: Ruddy Fitzpatrick MD;  Location: [facility] CATH LAB;  Service: Vascular;  Laterality: N/A;  0830 start    HYSTERECTOMY      KNEE SURGERY Left 2018    NEPHRECTOMY Left 2013    Dr Carter     PANCREAS SURGERY      distal pancreatectomy    SPLENECTOMY, TOTAL      THYROIDECTOMY, PARTIAL      VENTRICULOATRIAL SHUNT Left 2014     left arm     Social History     Socioeconomic History    Marital status:     Number of children: 0   Occupational History     Comment: stay home    Tobacco Use    Smoking status: Former     Packs/day: 1.00     Years: 35.00     Pack years: 35.00     Types: Cigarettes     Start date: 3/14/1962     Quit date: 2001     Years since quittin.7    Smokeless tobacco: Never   Substance and Sexual Activity    Alcohol use: No    Drug use: No    Sexual activity: Not Currently   Social History Narrative    She [place] from [place]             Past/Current Medical/Surgical History, Past/Current  Social History, Past/Current Family History and Past/Current Medications were reviewed in detail.        Objective:       VITAL SIGNS WERE REVIEWED      GENERAL APPEARANCE:     The patient looks comfortable.    BMI 24.29    No signs of respiratory distress.    Normal breathing pattern.    No dysmorphic features    Normal eye contact.     GENERAL MEDICAL EXAM:    HEENT:  Head is atraumatic normocephalic. Fundoscopic (Ophthalmoscopic) exam showed no disc edema.      Neck and Axillae: No JVD. No visible lesions.    Cardiopulmonary: No cyanosis. No tachypnea. Normal respiratory effort.    Gastrointestinal/Urogenital:  No jaundice. No stomas or lesions. No visible hernias. No catheters.     Skin, Hair and Nails: No pathognonomic skin rash. No neurofibromatosis. No visible lesions.No stigmata of autoimmune disease. No clubbing.    Limbs: No varicose veins. No visible swelling.HD access     Muskoskeletal: No visible deformities.No visible lesions.           Neurologic Exam     Mental Status   Oriented to person, place, and time.   Follows 3 step commands.   Attention: normal. Concentration: normal.   Speech: speech is normal   Level of consciousness: alert  Able to name object. Able to repeat. Normal comprehension.       3- MOCA 25/30    Visuospatial/Executive 4/5    Naming                            3/3    Attention                         5/6    Language                         3/3    Abstraction                    1/2    Recall                                3/5 (2 with cues)    Orientation                     6/6          MILD DEMENTIA 20-25         Cranial Nerves   Cranial nerves II through XII intact.     CN II   Visual fields full to confrontation.   Visual acuity: normal with correction  Right visual field deficit: none  Left visual field deficit: none     CN III, IV, VI   Pupils are equal, round, and reactive to light.  Extraocular motions are normal.   Right pupil: Size: 2 mm. Shape: regular. Reactivity:  brisk. Consensual response: intact. Accommodation: intact.   Left pupil: Size: 2 mm. Shape: regular. Reactivity: brisk. Consensual response: intact. Accommodation: intact.   CN III: no CN III palsy  CN VI: no CN VI palsy  Nystagmus: none   Diplopia: none  Ophthalmoparesis: none  Upgaze: normal  Downgaze: normal  Conjugate gaze: present  Vestibulo-ocular reflex: present    CN V   Facial sensation intact.   Right facial sensation deficit: none  Left facial sensation deficit: none    CN VII   Facial expression full, symmetric.   Right facial weakness: none  Left facial weakness: none    CN VIII   CN VIII normal.   Hearing: intact    CN IX, X   CN IX normal.   CN X normal.   Palate: symmetric    CN XI   CN XI normal.   Right sternocleidomastoid strength: normal  Left sternocleidomastoid strength: normal  Right trapezius strength: normal  Left trapezius strength: normal    CN XII   CN XII normal.   Tongue: not atrophic  Fasciculations: absent  Tongue deviation: none    Motor Exam   Muscle bulk: normal  Overall muscle tone: normal  Right arm tone: normal  Left arm tone: normal  Right arm pronator drift: absent  Left arm pronator drift: absent  Right leg tone: normal  Left leg tone: normal    Strength   Strength 5/5 throughout.   Right neck flexion: 5/5  Left neck flexion: 5/5  Right neck extension: 5/5  Left neck extension: 5/5  Right deltoid: 5/5  Left deltoid: 5/5  Right biceps: 5/5  Left biceps: 5/5  Right triceps: 5/5  Left triceps: 5/5  Right wrist flexion: 5/5  Left wrist flexion: 5/5  Right wrist extension: 5/5  Left wrist extension: 5/5  Right interossei: 5/5  Left interossei: 5/5  Right iliopsoas: 5/5  Left iliopsoas: 5/5  Right quadriceps: 5/5  Left quadriceps: 5/5  Right hamstrin/5  Left hamstrin/5  Right glutei: 5/5  Left glutei: 5/5  Right anterior tibial: 5/5  Left anterior tibial: 5/5  Right posterior tibial: 5/5  Left posterior tibial: 5/5  Right peroneal: 5/5  Left peroneal: 5/5  Right gastroc:  5/5  Left gastroc: 5/5    Sensory Exam   Light touch normal.   Right arm light touch: normal  Left arm light touch: normal  Right leg light touch: normal  Left leg light touch: normal  Vibration normal.   Right arm vibration: normal  Left arm vibration: normal  Right leg vibration: normal  Left leg vibration: normal  Proprioception normal.   Right arm proprioception: normal  Left arm proprioception: normal  Right leg proprioception: normal  Left leg proprioception: normal  Pinprick normal.   Right arm pinprick: normal  Left arm pinprick: normal  Right leg pinprick: normal  Left leg pinprick: normal  Graphesthesia: normal  Stereognosis: normal    Gait, Coordination, and Reflexes     Gait  Gait: normal    Coordination   Romberg: negative  Finger to nose coordination: normal  Heel to shin coordination: normal  Tandem walking coordination: normal    Tremor   Resting tremor: absent  Intention tremor: absent  Action tremor: absent    Reflexes   Right brachioradialis: 2+  Left brachioradialis: 2+  Right biceps: 2+  Left biceps: 2+  Right triceps: 2+  Left triceps: 2+  Right patellar: 2+  Left patellar: 2+  Right achilles: 2+  Left achilles: 2+  Right plantar: normal  Left plantar: normal  Right Yi: absent  Left Yi: absent  Right ankle clonus: absent  Left ankle clonus: absent  Right pendular knee jerk: absent  Left pendular knee jerk: absent    Lab Results   Component Value Date    WBC 6.47 07/14/2022    HGB 11.5 (L) 07/14/2022    HCT 37.4 07/14/2022    MCV 96 07/14/2022     07/14/2022     Sodium   Date Value Ref Range Status   07/14/2022 139 136 - 145 mmol/L Final     Potassium   Date Value Ref Range Status   07/14/2022 5.7 (H) 3.5 - 5.1 mmol/L Final     Chloride   Date Value Ref Range Status   07/14/2022 99 95 - 110 mmol/L Final     CO2   Date Value Ref Range Status   07/14/2022 29 23 - 29 mmol/L Final     Glucose   Date Value Ref Range Status   07/14/2022 107 70 - 110 mg/dL Final     BUN   Date Value  Ref Range Status   07/14/2022 42 (H) 8 - 23 mg/dL Final     Creatinine   Date Value Ref Range Status   07/14/2022 6.4 (H) 0.5 - 1.4 mg/dL Final     Calcium   Date Value Ref Range Status   07/14/2022 9.7 8.7 - 10.5 mg/dL Final     Total Protein   Date Value Ref Range Status   07/14/2022 7.5 6.0 - 8.4 g/dL Final     Albumin   Date Value Ref Range Status   07/14/2022 3.4 (L) 3.5 - 5.2 g/dL Final     Total Bilirubin   Date Value Ref Range Status   07/14/2022 0.3 0.1 - 1.0 mg/dL Final     Comment:     For infants and newborns, interpretation of results should be based  on gestational age, weight and in agreement with clinical  observations.    Premature Infant recommended reference ranges:  Up to 24 hours.............<8.0 mg/dL  Up to 48 hours............<12.0 mg/dL  3-5 days..................<15.0 mg/dL  6-29 days.................<15.0 mg/dL       Alkaline Phosphatase   Date Value Ref Range Status   07/14/2022 128 55 - 135 U/L Final     AST   Date Value Ref Range Status   07/14/2022 22 10 - 40 U/L Final     ALT   Date Value Ref Range Status   07/14/2022 15 10 - 44 U/L Final     Anion Gap   Date Value Ref Range Status   07/14/2022 11 8 - 16 mmol/L Final     eGFR if    Date Value Ref Range Status   07/14/2022 7 (A) >60 mL/min/1.73 m^2 Final     eGFR if non    Date Value Ref Range Status   07/14/2022 6 (A) >60 mL/min/1.73 m^2 Final     Comment:     Calculation used to obtain the estimated glomerular filtration  rate (eGFR) is the CKD-EPI equation.        Lab Results   Component Value Date    UBNAGVGT43 1685 (H) 10/28/2021     Lab Results   Component Value Date    TSH 2.535 04/29/2021    FREET4 0.87 07/31/2019 2013-2022    B12, FA, TFT, SPEP-IPEP NL     RPR non-reactive     3-    EMG/NCT shows borderline normal study. The absent sensory responses in the lower extremity can be age-related vs. length-dependent axonal, predominantly sensory peripheral polyneuropathy.    Reviewed the  neuroimaging independently       Assessment:       1. Multiple neurological symptoms    2. Mild cognitive impairment    3. Polyneuropathy    4. Type 2 diabetes mellitus with diabetic polyneuropathy, without long-term current use of insulin    5. Type 2 diabetes mellitus with chronic kidney disease on chronic dialysis, without long-term current use of insulin    6. Dependence on renal dialysis    7. Numbness and tingling    8. ESRD needing dialysis    9. History of pancreatic cancer          Plan:         POLYNEUROPATHY, LIKELY SMALL FIBER NEUROPATHY (SFN)    MULTIFACTORIAL: DM, ESRD, CHEMOTHERAPY          Continue Gabapentin/GBP-Neurontin  600 mg QD at 05:00 pm (HD TIW 10:00 am to 02:00 PM)    Failed. Pregabalin/PGB-Lyrica.    NEXT OPTIONS:    Amitriptyline/Elavil which can cause sleepiness, dry eyes, dry mouth, urinary retention and rarely cardiac arrhythmias. Will titrate to 75 mg QHS. The patient verbalized understanding.    Duloxetine/Cymbalta which causes sedation, weight gain and sexual dysfunction and potentially interactions can cause serotonin syndrome. Will titrate slowly to 60 mg daily. The patient verbalized understanding.      I encouraged the patient to read the leaflet for any newly prescribed medication for more details and report any side effect whether listed or not listed.    Discussed with the patient some of the neuropathy precautions like:    Always use oven mitts.    Test water with an unaffected hand or foot.    Use caution when trimming nails. File sharp areas    Wear shoes that fit well to avoid pressure points, blisters, and ulcers.    Inspect your hands and feet carefully (including the soles of your feet and between your toes) at least once a week.        MILD COGNITIVE IMPAIRMENT      Evaluate MRI brain at this time    Deferred MOCA and Neuropsych testing at this time.    Instructed patient to call office if she decides to proceed with testing.      Memory Strategies    Internal Memory  Strategies:   PREPARE yourself to pay attention   REHEARSAL: Mentally repeat information over and over.   VISUALIZATION: Make a mental picture of information.   ASSOCIATION: Mentally associate new information with something familiar.   REGROUPING: Organize long lists of information by category.   FIRST LETTER MNEMONICS:   LISTEN FOR THE MAIN IDEA: who, what, when, where, why, how, what's next   CHUNKING: Group digits of numbers into chunks (ex. [id number])    Break sentences and instructions down by ideas. (ex. Physical    therapy / will be at 3:00 / instead of 1:30 / because I have a    meeting).   CHAINING: Recall 3-4 words by linking them together in a sentence.    External Memory Strategies:   NOTETAKING:   TAPE RECORDING: Can use to record someone giving length information such     as a lecture, verbal instructions, or directions.   MEMORY BOOK / DAY PLANNER: To record up-coming appointments and    events.   WATCH ALARMS: Use as a reminder to take medications or check daily    schedule.            MEDICAL/SURGICAL COMORBIDITIES     All relevant medical comorbidities noted and managed by primary care physician and medical care team.          HEALTHY LIFESTYLE AND PREVENTATIVE CARE    The patient to adhere to the age-appropriate health maintenance guidelines including screening tests and vaccinations. The patient to adhere to  healthy lifestyle, optimal weight, exercise, healthy diet, good sleep hygiene and avoiding drugs including smoking, alcohol and recreational drugs.        I spent a total of 23 minutes on the day of the visit.  This includes face to face time and non-face to face time preparing to see the patient (eg, review of tests), obtaining and/or reviewing separately obtained history, documenting clinical information in the electronic or other health record, independently interpreting results and communicating results to the patient/family/caregiver, or care coordinator.          Follow-up in 6 months  and RAYSA Soto, MSN, NP    Collaborating Provider: Eva Zapata MD, FAAN Neurologist/Epileptologist

## 2022-10-11 ENCOUNTER — PROCEDURE VISIT (OUTPATIENT)
Dept: NEUROLOGY | Facility: CLINIC | Age: 82
End: 2022-10-11
Payer: MEDICARE

## 2022-10-11 DIAGNOSIS — G62.9 POLYNEUROPATHY: ICD-10-CM

## 2022-10-11 DIAGNOSIS — E11.42 TYPE 2 DIABETES MELLITUS WITH DIABETIC POLYNEUROPATHY, WITHOUT LONG-TERM CURRENT USE OF INSULIN: ICD-10-CM

## 2022-10-11 PROCEDURE — 95911 PR NERVE CONDUCTION STUDY; 9-10 STUDIES: ICD-10-PCS | Mod: S$GLB,,, | Performed by: PSYCHIATRY & NEUROLOGY

## 2022-10-11 PROCEDURE — 95911 NRV CNDJ TEST 9-10 STUDIES: CPT | Mod: S$GLB,,, | Performed by: PSYCHIATRY & NEUROLOGY

## 2022-10-11 NOTE — PROCEDURES
[facility]   Phoebe Ochoa  Department of Neurology  [address] [place] [address] MARYAM Daniels  [id number]  Phone [phone]     Fax  [phone]        Full Name: Cailin Pickett Gender: Male  Patient ID: [id number] YOB: 1940      Visit Date: 10/11/2022 11:04 AM  Age: 82 Years  Examining Physician: Eva Zapata M.D.  Referring Physician: Daysi Soto NP  History: C/O: Longstanding itching, numbness, and tingling of hands and feet. Patient requested BLE be tested.  PMHX: CKD on HD, DM2, gout, HLD, HTN, hyperparathyroidism, anemia, pancreatic cancer s/p chemotherapy, mild cognitive impairment.         SUMMARY     Nerve conduction studies were performed in the left and right lower extremity. The left peroneal motor study recording the extensor digitorum brevis showed normal amplitude, normal distal latency and normal conduction velocity. No conduction block or focal slowing was present across the fibular neck. The left tibial motor study recording the abductor hallucis brevis showed a normal amplitude, normal distal latency and normal conduction velocity.     Left sural sensory response could not be elicited. Left superficial peroneal sensory could not be elicited.    The right peroneal motor study recording the extensor digitorum brevis showed a normal amplitude, normal distal latency and normal conduction velocity. No conduction block or focal slowing was present across the fibular neck. The right tibial motor study recording the abductor hallucis brevis showed a normal amplitude, normal distal latency and normal conduction velocity.     Right sural sensory response could not be elicited. Right superficial peroneal sensory could not be elicited        IMPRESSION    There is no interval change compared to the study completed on 03-.    There is no electrophysiologic evidence of larger fiber peripheral polyneuropathy.       ---------------------------------             Eva Zapata  M.D., MICHAEL.SAROJ.      Diplomate, American Board of Psychiatry and Neurology  Diplomate, American Board of Clinical Neurophysiology  Fellow, American Academy of Neurology               SENSORY NCS      Nerve / Sites Rec. Site Peak NP Amp PP Amp Dist Reynold d Lat.2     ms µV µV cm m/s ms   L Superficial peroneal      Lat Leg Ankle NR  NR 10 NR       Ref.  4.60  3.0  40.0    L Sural - Lat Mall      Calf Lat Mall NR  NR 14 NR       Ref.  4.60  3.0  40.0    R Superficial peroneal      Lat Leg Ankle NR  NR 10 NR       Ref.  4.60  3.0  40.0    R Sural - Lat Mall      Calf Lat Mall NR  NR 14 NR       Ref.  4.60  3.0  40.0    R Radial - Snuff box      Forearm Snuff box 2.92  18.5 10 44.4       Ref.  2.80  10.0          MOTOR NCS      Nerve / Sites Rec. Site Lat Amp Dist Reynold     ms mV cm m/s   L Peroneal - EDB      Ankle EDB 4.58 2.4 8       Ref.  6.00 2.5        FibHead EDB 13.40 5.1 37 42.0      Ref.     40.0      Knee EDB 14.81 4.7 10 70.6      Accessory EDB 5.15 2.1 8 8.3   L Tibial - AH      Ankle AH 4.35 11.9 8       Ref.  6.00 4.0        Knee AH 14.06 7.0 40 41.2      Ref.     40.0   R Peroneal - EDB      Ankle EDB 4.25 3.0 8       Ref.  6.00 2.5        FibHead EDB 11.63 2.6 36 48.8      Ref.     40.0      Knee EDB 14.06 2.6 10 41.0   R Tibial - AH      Ankle AH 5.46 12.3 8       Ref.  6.00 4.0        Knee AH 13.73 7.9 40 48.4      Ref.     40.0

## 2022-10-11 NOTE — PROGRESS NOTES
EMG/NCT Reviewed    10-    There is no interval change compared to the study completed on 03-. There is no electrophysiologic evidence of large fiber peripheral polyneuropathy.

## 2022-10-18 ENCOUNTER — HOSPITAL ENCOUNTER (OUTPATIENT)
Dept: RADIOLOGY | Facility: HOSPITAL | Age: 82
Discharge: HOME | End: 2022-10-18
Attending: NURSE PRACTITIONER
Payer: MEDICARE

## 2022-10-18 DIAGNOSIS — G31.84 MILD COGNITIVE IMPAIRMENT: ICD-10-CM

## 2022-10-18 PROCEDURE — 70551 MRI BRAIN STEM W/O DYE: CPT | Mod: TC

## 2022-11-07 ENCOUNTER — TELEPHONE (OUTPATIENT)
Dept: CARDIOLOGY | Facility: CLINIC | Age: 82
End: 2022-11-07
Payer: MEDICARE

## 2022-11-07 ENCOUNTER — TELEPHONE (OUTPATIENT)
Dept: HEMATOLOGY/ONCOLOGY | Facility: CLINIC | Age: 82
End: 2022-11-07
Payer: MEDICARE

## 2022-11-07 NOTE — TELEPHONE ENCOUNTER
"Spoke with patient who asks if she can get an appointment to "get a scan to see if she has cancer in her back".  Appointment scheduled with Dr. Godfrey for 11/17 with lab at the [facility] on 11/15.  Patient acknowledged understanding, call ended well.     "

## 2022-11-07 NOTE — TELEPHONE ENCOUNTER
Spoke with patient to scheduled an Pre- op appointment on 11/08/2022 at 10 am @ [facility]  ----- Message from Bela Jones, RT sent at 11/7/2022  8:34 AM CST -----  Patient call ing to discuss up coming surgery,, please call her back at [phone]

## 2022-11-07 NOTE — PROGRESS NOTES
Subjective:   Patient ID:  Cailin Pickett is a 82 y.o. female who presents for follow-up of No chief complaint on file.  Hypertension  This is a chronic problem. The current episode started more than 1 year ago. The problem has been gradually improving since onset. The problem is controlled. Pertinent negatives include no chest pain, palpitations or shortness of breath. Past treatments include diuretics. The current treatment provides moderate improvement. There are no compliance problems.    Hyperlipidemia  This is a chronic problem. The current episode started more than 1 year ago. The problem is controlled. Pertinent negatives include no chest pain or shortness of breath. Current antihyperlipidemic treatment includes statins. There are no compliance problems.    Clinically patient is stabilized improved.  She is on dialysis and needs to pull more fluid off by dialysis.  She is on Lasix daily with some diuresis.  I told the patient to use her inhaler to make sure that she can expand her lungs.  This pleasant patient presents after going to the hospital with evidence of pleural effusions and need for tap on the left.  Patient has evidence of bilateral pleural effusions and evidence of edema.  Cardiac echo showed normal LV function is no evidence of cardiac ischemia by nuclear stress test.  Repeat CT scan of the chest today shows improvement in left pleural effusion stable and mild on the right side.  There is evidence of atelectasis.     The finds the patient feeling well today and doing well current medications.     Today the patient is doing well no recurrence symptoms all medications reviewed and renewed as necessary.  Last echo showed normal LV function nuclear stress test was stable.   NO FOCAL CNS SYMPTOMS OR SIGNS TO SUGGEST TIA OR STROKE  NO ANGINA OR EQUIVALENT  NO UNUSUAL HYDE. NO ORTHOPNEA OR PND  NO PALPITATIONS  NO NEAR SYNCOPE OR SYNCOPE  NO EDEMA. NO CALVE TENDERNESS         Clinically patient  doing well today no exertional symptoms chest pain shortness breath.  Evaluation in last year showed normal cardiac echo and normal perfusion scan by nuclear scan and no evidence of cardiac ischemia.  Heart rate blood pressure stable today she will be cleared for knee surgery.      Review of Systems   Constitutional: Negative for chills, diaphoresis, night sweats, weight gain and weight loss.   HENT:  Negative for congestion, hoarse voice, sore throat and stridor.    Eyes:  Negative for double vision and pain.   Cardiovascular:  Negative for chest pain, claudication, cyanosis, dyspnea on exertion, irregular heartbeat, leg swelling, near-syncope, orthopnea, palpitations, paroxysmal nocturnal dyspnea and syncope.   Respiratory:  Negative for cough, hemoptysis, shortness of breath, sleep disturbances due to breathing, snoring, sputum production and wheezing.    Endocrine: Negative for cold intolerance, heat intolerance and polydipsia.   Hematologic/Lymphatic: Negative for bleeding problem. Does not bruise/bleed easily.   Skin:  Negative for color change, dry skin and rash.   Musculoskeletal:  Negative for joint swelling and muscle cramps.   Gastrointestinal:  Negative for bloating, abdominal pain, constipation, diarrhea, dysphagia, melena, nausea and vomiting.   Genitourinary:  Negative for flank pain and urgency.   Neurological:  Negative for dizziness, focal weakness, headaches, light-headedness, loss of balance, seizures and weakness.   Psychiatric/Behavioral:  Negative for altered mental status and memory loss. The patient is not nervous/anxious.    Family History   Problem Relation Age of Onset    Heart disease Mother 60        MI    Hypertension Mother     Stroke Mother     Breast cancer Mother     Cancer Father         prostate    Cancer Brother         renal cell carcinoma    Diabetes Brother     Kidney disease Brother         ESRD s/p kidney transplant    Breast cancer Sister     Breast cancer Sister      Past  "Medical History:   Diagnosis Date    Anemia     CKD (chronic kidney disease) stage 5, GFR less than 15 ml/min 3/14/2019    CKD (chronic kidney disease), stage III     Diabetes mellitus type II     Encounter for blood transfusion     Family history of colonic polyps 2017    Gout, unspecified     Hyperlipidemia     Hypertension     Neuropathy     Pancreatic cancer     Renal failure     Secondary hyperparathyroidism of renal origin 3/14/2019    Thyroid disease      Social History     Socioeconomic History    Marital status:     Number of children: 0   Occupational History     Comment: stay home    Tobacco Use    Smoking status: Former     Packs/day: 1.00     Years: 35.00     Pack years: 35.00     Types: Cigarettes     Start date: 3/14/1962     Quit date: 2001     Years since quittin.8    Smokeless tobacco: Never   Substance and Sexual Activity    Alcohol use: No    Drug use: No    Sexual activity: Not Currently   Social History Narrative    She lives [place] from [place]     Current Outpatient Medications on File Prior to Visit   Medication Sig Dispense Refill    allopurinoL (ZYLOPRIM) 100 MG tablet Take 1 tablet (100 mg total) by mouth once daily. 90 tablet 3    amLODIPine (NORVASC) 2.5 MG tablet Take 1 tablet (2.5 mg total) by mouth once daily. 90 tablet 3    aspirin (ECOTRIN) 81 MG EC tablet Take 1 tablet (81 mg total) by mouth once daily. 30 tablet 0    atorvastatin (LIPITOR) 40 MG tablet Take 1 tablet (40 mg total) by mouth once daily. 90 tablet 3    AURYXIA 210 mg iron Tab       BD ULTRA-FINE MICRO PEN NEEDLE 32 gauge x 1/4" Ndle       cetirizine (ZYRTEC) 10 MG tablet Take 10 mg by mouth once daily.      co-enzyme Q-10 30 mg capsule Take 1 capsule (30 mg total) by mouth 2 (two) times daily. 180 capsule 3    docusate sodium (COLACE) 50 MG capsule Take by mouth.      ezetimibe (ZETIA) 10 mg tablet TAKE 1 TABLET DAILY 90 tablet 0    fluocinolone (DERMA-SMOOTHE) 0.01 % external oil " "Apply oil to scalp once a day 1 Bottle 5    furosemide (LASIX) 40 MG tablet TAKE 1 TABLET DAILY 90 tablet 3    gabapentin (NEURONTIN) 600 MG tablet Take 1 tablet (600 mg total) by mouth once daily. Daily at 05:00 PM 90 tablet 3    glucosamine-chondroitin 250-200 mg Tab Take 1 tablet by mouth 2 (two) times daily.      insulin degludec (TRESIBA FLEXTOUCH U-100) 100 unit/mL (3 mL) InPn Inject 8 Units into the skin.       omeprazole (PRILOSEC) 40 MG capsule Take 1 capsule (40 mg total) by mouth once daily. 30 capsule 11    pen needle, diabetic 32 gauge x 1/4" Ndle Use to test blood sugars bid      sevelamer carbonate (RENVELA) 800 mg Tab Take 2 tablets by mouth.      traMADoL (ULTRAM) 50 mg tablet Take 1 tablet (50 mg total) by mouth every 12 (twelve) hours. 30 tablet 0    VELPHORO 500 mg Chew Take 1 tablet by mouth 3 (three) times daily.      vitamin renal formula, B-complex-vitamin c-folic acid, (NEPHROCAP) 1 mg Cap Take 1 capsule by mouth once daily. 30 capsule 0     No current facility-administered medications on file prior to visit.     Review of patient's allergies indicates:   Allergen Reactions    Allegra [fexofenadine] Swelling    Codeine Hives, Itching and Nausea And Vomiting       Objective:     Physical Exam  Eyes:      Pupils: Pupils are equal, round, and reactive to light.   Neck:      Trachea: No tracheal deviation.   Cardiovascular:      Rate and Rhythm: Normal rate and regular rhythm.      Pulses: Intact distal pulses.           Carotid pulses are 2+ on the right side and 2+ on the left side.       Radial pulses are 2+ on the right side and 2+ on the left side.        Femoral pulses are 2+ on the right side and 2+ on the left side.       Popliteal pulses are 2+ on the right side and 2+ on the left side.        Dorsalis pedis pulses are 2+ on the right side and 2+ on the left side.        Posterior tibial pulses are 2+ on the right side and 2+ on the left side.      Heart sounds: Normal heart sounds. No " murmur heard.    No friction rub. No gallop.   Pulmonary:      Effort: Pulmonary effort is normal. No respiratory distress.      Breath sounds: Normal breath sounds. No stridor. No wheezing or rales.   Chest:      Chest wall: No tenderness.   Abdominal:      General: There is no distension.      Tenderness: There is no abdominal tenderness. There is no rebound.   Musculoskeletal:         General: No tenderness.      Cervical back: Normal range of motion.   Skin:     General: Skin is warm and dry.   Neurological:      Mental Status: She is alert and oriented to person, place, and time.     Assessment:     1. Hypertension associated with diabetes    2. Stable angina pectoris    3. Anemia, unspecified type    4. Sick sinus syndrome    5. Pure hypercholesterolemia    6. Pleural effusion    7. Pleural effusion, left    8. ESRD needing dialysis    9. SOB (shortness of breath)    10. Chest pain, unspecified type        Plan:     Hypertension associated with diabetes    Stable angina pectoris    Anemia, unspecified type    Sick sinus syndrome    Pure hypercholesterolemia    Pleural effusion    Pleural effusion, left    ESRD needing dialysis    SOB (shortness of breath)    Chest pain, unspecified type    Impression 1.  Chest discomfort resolved no acute issues and normal cardiac echo normal perfusion scan by nuclear scan last year.  The patient has had no exertional symptoms.  Patient can walk greater than 4 METS of activity without SOB.  2 pre op: patient is cleared for knee surgery at low to moderate cardiac risk given age. No active symptoms and will continue on amlodipine and will stop ASA before procedure for 5 days

## 2022-11-08 ENCOUNTER — OFFICE VISIT (OUTPATIENT)
Dept: CARDIOLOGY | Facility: CLINIC | Age: 82
End: 2022-11-08
Payer: MEDICARE

## 2022-11-08 VITALS
BODY MASS INDEX: 24.29 KG/M2 | OXYGEN SATURATION: 98 % | DIASTOLIC BLOOD PRESSURE: 72 MMHG | HEART RATE: 78 BPM | WEIGHT: 145.94 LBS | SYSTOLIC BLOOD PRESSURE: 136 MMHG

## 2022-11-08 DIAGNOSIS — I15.2 HYPERTENSION ASSOCIATED WITH DIABETES: Primary | ICD-10-CM

## 2022-11-08 DIAGNOSIS — I20.89 STABLE ANGINA PECTORIS: ICD-10-CM

## 2022-11-08 DIAGNOSIS — I49.5 SICK SINUS SYNDROME: ICD-10-CM

## 2022-11-08 DIAGNOSIS — R07.9 CHEST PAIN, UNSPECIFIED TYPE: ICD-10-CM

## 2022-11-08 DIAGNOSIS — D64.9 ANEMIA, UNSPECIFIED TYPE: ICD-10-CM

## 2022-11-08 DIAGNOSIS — E11.59 HYPERTENSION ASSOCIATED WITH DIABETES: Primary | ICD-10-CM

## 2022-11-08 DIAGNOSIS — R06.02 SOB (SHORTNESS OF BREATH): ICD-10-CM

## 2022-11-08 DIAGNOSIS — E78.00 PURE HYPERCHOLESTEROLEMIA: ICD-10-CM

## 2022-11-08 DIAGNOSIS — N18.6 ESRD NEEDING DIALYSIS: ICD-10-CM

## 2022-11-08 DIAGNOSIS — J90 PLEURAL EFFUSION: ICD-10-CM

## 2022-11-08 DIAGNOSIS — Z99.2 ESRD NEEDING DIALYSIS: ICD-10-CM

## 2022-11-08 DIAGNOSIS — J90 PLEURAL EFFUSION, LEFT: ICD-10-CM

## 2022-11-08 PROCEDURE — 3078F DIAST BP <80 MM HG: CPT | Mod: CPTII,S$GLB,, | Performed by: INTERNAL MEDICINE

## 2022-11-08 PROCEDURE — 1159F PR MEDICATION LIST DOCUMENTED IN MEDICAL RECORD: ICD-10-PCS | Mod: CPTII,S$GLB,, | Performed by: INTERNAL MEDICINE

## 2022-11-08 PROCEDURE — 99214 PR OFFICE/OUTPT VISIT, EST, LEVL IV, 30-39 MIN: ICD-10-PCS | Mod: S$GLB,,, | Performed by: INTERNAL MEDICINE

## 2022-11-08 PROCEDURE — 1157F ADVNC CARE PLAN IN RCRD: CPT | Mod: CPTII,S$GLB,, | Performed by: INTERNAL MEDICINE

## 2022-11-08 PROCEDURE — 99999 PR PBB SHADOW E&M-EST. PATIENT-LVL III: CPT | Mod: PBBFAC,,, | Performed by: INTERNAL MEDICINE

## 2022-11-08 PROCEDURE — 1159F MED LIST DOCD IN RCRD: CPT | Mod: CPTII,S$GLB,, | Performed by: INTERNAL MEDICINE

## 2022-11-08 PROCEDURE — 99999 PR PBB SHADOW E&M-EST. PATIENT-LVL III: ICD-10-PCS | Mod: PBBFAC,,, | Performed by: INTERNAL MEDICINE

## 2022-11-08 PROCEDURE — 1157F PR ADVANCE CARE PLAN OR EQUIV PRESENT IN MEDICAL RECORD: ICD-10-PCS | Mod: CPTII,S$GLB,, | Performed by: INTERNAL MEDICINE

## 2022-11-08 PROCEDURE — 3075F PR MOST RECENT SYSTOLIC BLOOD PRESS GE 130-139MM HG: ICD-10-PCS | Mod: CPTII,S$GLB,, | Performed by: INTERNAL MEDICINE

## 2022-11-08 PROCEDURE — 1160F RVW MEDS BY RX/DR IN RCRD: CPT | Mod: CPTII,S$GLB,, | Performed by: INTERNAL MEDICINE

## 2022-11-08 PROCEDURE — 99214 OFFICE O/P EST MOD 30 MIN: CPT | Mod: S$GLB,,, | Performed by: INTERNAL MEDICINE

## 2022-11-08 PROCEDURE — 3078F PR MOST RECENT DIASTOLIC BLOOD PRESSURE < 80 MM HG: ICD-10-PCS | Mod: CPTII,S$GLB,, | Performed by: INTERNAL MEDICINE

## 2022-11-08 PROCEDURE — 1160F PR REVIEW ALL MEDS BY PRESCRIBER/CLIN PHARMACIST DOCUMENTED: ICD-10-PCS | Mod: CPTII,S$GLB,, | Performed by: INTERNAL MEDICINE

## 2022-11-08 PROCEDURE — 3075F SYST BP GE 130 - 139MM HG: CPT | Mod: CPTII,S$GLB,, | Performed by: INTERNAL MEDICINE

## 2022-11-15 ENCOUNTER — LAB VISIT (OUTPATIENT)
Dept: LAB | Facility: HOSPITAL | Age: 82
End: 2022-11-15
Attending: NURSE PRACTITIONER
Payer: MEDICARE

## 2022-11-15 DIAGNOSIS — Z85.07 HISTORY OF PANCREATIC CANCER: ICD-10-CM

## 2022-11-15 LAB
ALBUMIN SERPL BCP-MCNC: 3.6 G/DL (ref 3.5–5.2)
ALP SERPL-CCNC: 110 U/L (ref 55–135)
ALT SERPL W/O P-5'-P-CCNC: 16 U/L (ref 10–44)
ANION GAP SERPL CALC-SCNC: 15 MMOL/L (ref 8–16)
AST SERPL-CCNC: 18 U/L (ref 10–40)
BASOPHILS # BLD AUTO: 0.05 K/UL (ref 0–0.2)
BASOPHILS NFR BLD: 0.9 % (ref 0–1.9)
BILIRUB SERPL-MCNC: 0.4 MG/DL (ref 0.1–1)
BUN SERPL-MCNC: 33 MG/DL (ref 8–23)
CALCIUM SERPL-MCNC: 9.5 MG/DL (ref 8.7–10.5)
CANCER AG19-9 SERPL-ACNC: <2.1 U/ML (ref 0–40)
CHLORIDE SERPL-SCNC: 99 MMOL/L (ref 95–110)
CO2 SERPL-SCNC: 26 MMOL/L (ref 23–29)
CREAT SERPL-MCNC: 6.6 MG/DL (ref 0.5–1.4)
DIFFERENTIAL METHOD: ABNORMAL
EOSINOPHIL # BLD AUTO: 0.1 K/UL (ref 0–0.5)
EOSINOPHIL NFR BLD: 2.6 % (ref 0–8)
ERYTHROCYTE [DISTWIDTH] IN BLOOD BY AUTOMATED COUNT: 14.8 % (ref 11.5–14.5)
EST. GFR  (NO RACE VARIABLE): 6 ML/MIN/1.73 M^2
GLUCOSE SERPL-MCNC: 164 MG/DL (ref 70–110)
HCT VFR BLD AUTO: 37.6 % (ref 37–48.5)
HGB BLD-MCNC: 11.8 G/DL (ref 12–16)
IMM GRANULOCYTES # BLD AUTO: 0.01 K/UL (ref 0–0.04)
IMM GRANULOCYTES NFR BLD AUTO: 0.2 % (ref 0–0.5)
LYMPHOCYTES # BLD AUTO: 1.7 K/UL (ref 1–4.8)
LYMPHOCYTES NFR BLD: 30.8 % (ref 18–48)
MCH RBC QN AUTO: 31 PG (ref 27–31)
MCHC RBC AUTO-ENTMCNC: 31.4 G/DL (ref 32–36)
MCV RBC AUTO: 99 FL (ref 82–98)
MONOCYTES # BLD AUTO: 0.8 K/UL (ref 0.3–1)
MONOCYTES NFR BLD: 15.5 % (ref 4–15)
NEUTROPHILS # BLD AUTO: 2.7 K/UL (ref 1.8–7.7)
NEUTROPHILS NFR BLD: 50 % (ref 38–73)
NRBC BLD-RTO: 0 /100 WBC
PLATELET # BLD AUTO: 229 K/UL (ref 150–450)
PMV BLD AUTO: 10.2 FL (ref 9.2–12.9)
POTASSIUM SERPL-SCNC: 4.5 MMOL/L (ref 3.5–5.1)
PROT SERPL-MCNC: 6.9 G/DL (ref 6–8.4)
RBC # BLD AUTO: 3.81 M/UL (ref 4–5.4)
SODIUM SERPL-SCNC: 140 MMOL/L (ref 136–145)
WBC # BLD AUTO: 5.35 K/UL (ref 3.9–12.7)

## 2022-11-15 PROCEDURE — 85025 COMPLETE CBC W/AUTO DIFF WBC: CPT | Performed by: NURSE PRACTITIONER

## 2022-11-15 PROCEDURE — 80053 COMPREHEN METABOLIC PANEL: CPT | Performed by: NURSE PRACTITIONER

## 2022-11-15 PROCEDURE — 36415 COLL VENOUS BLD VENIPUNCTURE: CPT | Performed by: NURSE PRACTITIONER

## 2022-11-15 PROCEDURE — 86301 IMMUNOASSAY TUMOR CA 19-9: CPT | Performed by: NURSE PRACTITIONER

## 2022-11-17 ENCOUNTER — OFFICE VISIT (OUTPATIENT)
Dept: HEMATOLOGY/ONCOLOGY | Facility: CLINIC | Age: 82
End: 2022-11-17
Payer: MEDICARE

## 2022-11-17 VITALS
SYSTOLIC BLOOD PRESSURE: 151 MMHG | TEMPERATURE: 98 F | HEIGHT: 65 IN | WEIGHT: 145.75 LBS | RESPIRATION RATE: 14 BRPM | DIASTOLIC BLOOD PRESSURE: 70 MMHG | BODY MASS INDEX: 24.28 KG/M2 | HEART RATE: 71 BPM | OXYGEN SATURATION: 98 %

## 2022-11-17 DIAGNOSIS — M54.50 ACUTE BILATERAL LOW BACK PAIN WITHOUT SCIATICA: Primary | ICD-10-CM

## 2022-11-17 DIAGNOSIS — Z85.07 HISTORY OF PANCREATIC CANCER: ICD-10-CM

## 2022-11-17 DIAGNOSIS — N18.6 ESRD NEEDING DIALYSIS: ICD-10-CM

## 2022-11-17 DIAGNOSIS — D63.1 ANEMIA DUE TO CHRONIC KIDNEY DISEASE, ON CHRONIC DIALYSIS: ICD-10-CM

## 2022-11-17 DIAGNOSIS — N18.6 ANEMIA DUE TO CHRONIC KIDNEY DISEASE, ON CHRONIC DIALYSIS: ICD-10-CM

## 2022-11-17 DIAGNOSIS — Z99.2 ANEMIA DUE TO CHRONIC KIDNEY DISEASE, ON CHRONIC DIALYSIS: ICD-10-CM

## 2022-11-17 DIAGNOSIS — Z99.2 ESRD NEEDING DIALYSIS: ICD-10-CM

## 2022-11-17 PROCEDURE — 1160F PR REVIEW ALL MEDS BY PRESCRIBER/CLIN PHARMACIST DOCUMENTED: ICD-10-PCS | Mod: CPTII,S$GLB,, | Performed by: STUDENT IN AN ORGANIZED HEALTH CARE EDUCATION/TRAINING PROGRAM

## 2022-11-17 PROCEDURE — 3077F SYST BP >= 140 MM HG: CPT | Mod: CPTII,S$GLB,, | Performed by: STUDENT IN AN ORGANIZED HEALTH CARE EDUCATION/TRAINING PROGRAM

## 2022-11-17 PROCEDURE — 1101F PT FALLS ASSESS-DOCD LE1/YR: CPT | Mod: CPTII,S$GLB,, | Performed by: STUDENT IN AN ORGANIZED HEALTH CARE EDUCATION/TRAINING PROGRAM

## 2022-11-17 PROCEDURE — 1157F PR ADVANCE CARE PLAN OR EQUIV PRESENT IN MEDICAL RECORD: ICD-10-PCS | Mod: CPTII,S$GLB,, | Performed by: STUDENT IN AN ORGANIZED HEALTH CARE EDUCATION/TRAINING PROGRAM

## 2022-11-17 PROCEDURE — 1160F RVW MEDS BY RX/DR IN RCRD: CPT | Mod: CPTII,S$GLB,, | Performed by: STUDENT IN AN ORGANIZED HEALTH CARE EDUCATION/TRAINING PROGRAM

## 2022-11-17 PROCEDURE — 99999 PR PBB SHADOW E&M-EST. PATIENT-LVL V: CPT | Mod: PBBFAC,,, | Performed by: STUDENT IN AN ORGANIZED HEALTH CARE EDUCATION/TRAINING PROGRAM

## 2022-11-17 PROCEDURE — 1101F PR PT FALLS ASSESS DOC 0-1 FALLS W/OUT INJ PAST YR: ICD-10-PCS | Mod: CPTII,S$GLB,, | Performed by: STUDENT IN AN ORGANIZED HEALTH CARE EDUCATION/TRAINING PROGRAM

## 2022-11-17 PROCEDURE — 1157F ADVNC CARE PLAN IN RCRD: CPT | Mod: CPTII,S$GLB,, | Performed by: STUDENT IN AN ORGANIZED HEALTH CARE EDUCATION/TRAINING PROGRAM

## 2022-11-17 PROCEDURE — 1126F PR PAIN SEVERITY QUANTIFIED, NO PAIN PRESENT: ICD-10-PCS | Mod: CPTII,S$GLB,, | Performed by: STUDENT IN AN ORGANIZED HEALTH CARE EDUCATION/TRAINING PROGRAM

## 2022-11-17 PROCEDURE — 99215 PR OFFICE/OUTPT VISIT, EST, LEVL V, 40-54 MIN: ICD-10-PCS | Mod: S$GLB,,, | Performed by: STUDENT IN AN ORGANIZED HEALTH CARE EDUCATION/TRAINING PROGRAM

## 2022-11-17 PROCEDURE — 1126F AMNT PAIN NOTED NONE PRSNT: CPT | Mod: CPTII,S$GLB,, | Performed by: STUDENT IN AN ORGANIZED HEALTH CARE EDUCATION/TRAINING PROGRAM

## 2022-11-17 PROCEDURE — 3288F PR FALLS RISK ASSESSMENT DOCUMENTED: ICD-10-PCS | Mod: CPTII,S$GLB,, | Performed by: STUDENT IN AN ORGANIZED HEALTH CARE EDUCATION/TRAINING PROGRAM

## 2022-11-17 PROCEDURE — 99999 PR PBB SHADOW E&M-EST. PATIENT-LVL V: ICD-10-PCS | Mod: PBBFAC,,, | Performed by: STUDENT IN AN ORGANIZED HEALTH CARE EDUCATION/TRAINING PROGRAM

## 2022-11-17 PROCEDURE — 3288F FALL RISK ASSESSMENT DOCD: CPT | Mod: CPTII,S$GLB,, | Performed by: STUDENT IN AN ORGANIZED HEALTH CARE EDUCATION/TRAINING PROGRAM

## 2022-11-17 PROCEDURE — 3078F DIAST BP <80 MM HG: CPT | Mod: CPTII,S$GLB,, | Performed by: STUDENT IN AN ORGANIZED HEALTH CARE EDUCATION/TRAINING PROGRAM

## 2022-11-17 PROCEDURE — 1159F MED LIST DOCD IN RCRD: CPT | Mod: CPTII,S$GLB,, | Performed by: STUDENT IN AN ORGANIZED HEALTH CARE EDUCATION/TRAINING PROGRAM

## 2022-11-17 PROCEDURE — 3078F PR MOST RECENT DIASTOLIC BLOOD PRESSURE < 80 MM HG: ICD-10-PCS | Mod: CPTII,S$GLB,, | Performed by: STUDENT IN AN ORGANIZED HEALTH CARE EDUCATION/TRAINING PROGRAM

## 2022-11-17 PROCEDURE — 99215 OFFICE O/P EST HI 40 MIN: CPT | Mod: S$GLB,,, | Performed by: STUDENT IN AN ORGANIZED HEALTH CARE EDUCATION/TRAINING PROGRAM

## 2022-11-17 PROCEDURE — 3077F PR MOST RECENT SYSTOLIC BLOOD PRESSURE >= 140 MM HG: ICD-10-PCS | Mod: CPTII,S$GLB,, | Performed by: STUDENT IN AN ORGANIZED HEALTH CARE EDUCATION/TRAINING PROGRAM

## 2022-11-17 PROCEDURE — 1159F PR MEDICATION LIST DOCUMENTED IN MEDICAL RECORD: ICD-10-PCS | Mod: CPTII,S$GLB,, | Performed by: STUDENT IN AN ORGANIZED HEALTH CARE EDUCATION/TRAINING PROGRAM

## 2022-11-17 RX ORDER — B,C/FOLIC/ZINC/SELENOMETH/D3/E 0.8-12.5MG
1 TABLET ORAL
COMMUNITY
Start: 2022-10-16

## 2022-11-17 RX ORDER — B,C/FOLIC/ZINC/SELENOMETH/D3/E 0.8-12.5MG
1 TABLET ORAL DAILY
COMMUNITY
Start: 2022-10-17

## 2022-11-17 RX ORDER — B,C/FERROUS FUM/FA/D3/ZINC OX 8MG-800MCG
1 TABLET ORAL DAILY
COMMUNITY
Start: 2022-10-10

## 2022-11-17 NOTE — PROGRESS NOTES
Subjective:       Patient ID: Cailin Pickett is a 82 y.o. female.    Chief Complaint: f/u h/o pancreatic cancer     HPI: 82 y.o female with h/o ESRD on dialysis, HTN, HLD, pancreatic cancer here today for follow up of her h/o pancreatic cancer. In regards to her h/o pancreatic adenocarcinoma patient was treated with neoadjuvant chemotherapy and chemoradiation with 5 fluorouracil followed by resection 2013 by Dr. Carter at [facility], [place] path [id number] (treatment effect noted). She has been in surveillance. The patient had previously completed neoadjuvant chemotherapy and 5-FU chemoradiation with excellent response.  negative.     She has end-stage renal disease on hemodialysis with recurrent pleural effusion.  Pleural fluid analysis negative for malignant cells.  She receives [facility] with dialysis.     Most recent restaging CT scan 3/2021 reviewed.     Today she presents with 1 month of acute lower back pain that wraps around her hips and goes to the front. Pain is constant throughout the day and changes in intensity. Worsened with changing of position. Other symptoms include constipation. Alleviating factors include heating pad (sometimes).  Never had this pain before.  Has not yet tried laxatives.  She is alone at today's visit.    Social History     Socioeconomic History    Marital status:     Number of children: 0   Occupational History     Comment: stay home    Tobacco Use    Smoking status: Former     Packs/day: 1.00     Years: 35.00     Pack years: 35.00     Types: Cigarettes     Start date: 3/14/1962     Quit date: 2001     Years since quittin.8    Smokeless tobacco: Never   Substance and Sexual Activity    Alcohol use: No    Drug use: No    Sexual activity: Not Currently   Social History Narrative    She lives [place] from [place]       Past Medical History:   Diagnosis Date    Anemia     CKD (chronic kidney disease) stage 5, GFR less than 15 ml/min 3/14/2019    CKD  (chronic kidney disease), stage III     Diabetes mellitus type II     Encounter for blood transfusion     Family history of colonic polyps 7/18/2017    Gout, unspecified     Hyperlipidemia     Hypertension     Neuropathy     Pancreatic cancer     Renal failure     Secondary hyperparathyroidism of renal origin 3/14/2019    Thyroid disease        Family History   Problem Relation Age of Onset    Heart disease Mother 60        MI    Hypertension Mother     Stroke Mother     Breast cancer Mother     Cancer Father         prostate    Cancer Brother         renal cell carcinoma    Diabetes Brother     Kidney disease Brother         ESRD s/p kidney transplant    Breast cancer Sister     Breast cancer Sister        Past Surgical History:   Procedure Laterality Date    COLONOSCOPY  2011    Dr. Favio Mims    COLONOSCOPY N/A 7/18/2017    Procedure: screening colonoscopy;  Surgeon: Kenneth Kamara MD;  Location: [facility];  Service: Endoscopy;  Laterality: N/A;    ESOPHAGEAL MANOMETRY WITH MEASUREMENT OF IMPEDANCE N/A 4/7/2022    Procedure: MANOMETRY-ESOPHAGEAL-WITH IMPEDANCE;  Surgeon: Jah Rodgers RN;  Location: [facility];  Service: Endoscopy;  Laterality: N/A;    ESOPHAGOGASTRODUODENOSCOPY N/A 1/18/2022    Procedure: ESOPHAGOGASTRODUODENOSCOPY (EGD);  Surgeon: Ivett Quarles MD;  Location: [facility] ENDO;  Service: Endoscopy;  Laterality: N/A;    FISTULOGRAM N/A 4/10/2019    Procedure: FISTULOGRAM;  Surgeon: Ruddy Fitzpatrick MD;  Location: [facility] CATH LAB;  Service: Vascular;  Laterality: N/A;  0830 start    HYSTERECTOMY      KNEE SURGERY Left 05/31/2018    NEPHRECTOMY Left 5/2013    Dr Carter     PANCREAS SURGERY      distal pancreatectomy    SPLENECTOMY, TOTAL      THYROIDECTOMY, PARTIAL      VENTRICULOATRIAL SHUNT Left 12/4/2014     left arm       Review of Systems   Constitutional:  Negative for activity change, appetite change, chills, fatigue, fever and unexpected weight change.   HENT:  Negative for congestion,  "mouth sores, nosebleeds, sore throat, trouble swallowing and voice change.    Eyes:  Negative for visual disturbance.   Respiratory:  Negative for cough, chest tightness, shortness of breath and wheezing.    Cardiovascular:  Negative for chest pain and leg swelling.   Gastrointestinal:  Positive for constipation. Negative for abdominal distention, abdominal pain, blood in stool, diarrhea, nausea and vomiting.   Genitourinary:  Negative for difficulty urinating, dysuria and hematuria.   Musculoskeletal:  Positive for back pain. Negative for arthralgias and myalgias.   Skin:  Negative for pallor, rash and wound.   Neurological:  Negative for dizziness, syncope, weakness and headaches.   Hematological:  Negative for adenopathy. Does not bruise/bleed easily.   Psychiatric/Behavioral:  The patient is not nervous/anxious.        Medication List with Changes/Refills   Current Medications    ALLOPURINOL (ZYLOPRIM) 100 MG TABLET    Take 1 tablet (100 mg total) by mouth once daily.    AMLODIPINE (NORVASC) 2.5 MG TABLET    Take 1 tablet (2.5 mg total) by mouth once daily.    ASPIRIN (ECOTRIN) 81 MG EC TABLET    Take 1 tablet (81 mg total) by mouth once daily.    ATORVASTATIN (LIPITOR) 40 MG TABLET    Take 1 tablet (40 mg total) by mouth once daily.    AURYXIA 210 MG IRON TAB        BD ULTRA-FINE MICRO PEN NEEDLE 32 GAUGE X 1/4" NDLE        CETIRIZINE (ZYRTEC) 10 MG TABLET    Take 10 mg by mouth once daily.    CO-ENZYME Q-10 30 MG CAPSULE    Take 1 capsule (30 mg total) by mouth 2 (two) times daily.    DOCUSATE SODIUM (COLACE) 50 MG CAPSULE    Take by mouth.    EZETIMIBE (ZETIA) 10 MG TABLET    TAKE 1 TABLET DAILY    FLUOCINOLONE (DERMA-SMOOTHE) 0.01 % EXTERNAL OIL    Apply oil to scalp once a day    FUROSEMIDE (LASIX) 40 MG TABLET    TAKE 1 TABLET DAILY    GABAPENTIN (NEURONTIN) 600 MG TABLET    Take 1 tablet (600 mg total) by mouth once daily. Daily at 05:00 PM    GLUCOSAMINE-CHONDROITIN 250-200 MG TAB    Take 1 tablet by " "mouth 2 (two) times daily.    INSULIN DEGLUDEC (TRESIBA FLEXTOUCH U-100) 100 UNIT/ML (3 ML) INPN    Inject 8 Units into the skin.     METHOXY PEG-EPOETIN BETA (MIRCERA INJ)    50 mcg.    OMEPRAZOLE (PRILOSEC) 40 MG CAPSULE    Take 1 capsule (40 mg total) by mouth once daily.    PEN NEEDLE, DIABETIC 32 GAUGE X 1/4" NDLE    Use to test blood sugars bid    PRORENAL 8 MG IRON-800 MCG-1,000 UNIT TAB    Take 1 tablet by mouth once daily.    RENAPLEX-D 800 MCG-12.5 MG -2,000 UNIT TAB    Take 1 tablet by mouth once daily.    SEVELAMER CARBONATE (RENVELA) 800 MG TAB    Take 2 tablets by mouth.    SODIUM CHLORIDE 0.9% SOLP 100 ML WITH IRON SUCROSE 100 MG IRON/5 ML SOLN 100 MG    50 mg.    TRAMADOL (ULTRAM) 50 MG TABLET    Take 1 tablet (50 mg total) by mouth every 12 (twelve) hours.    VELPHORO 500 MG CHEW    Take 1 tablet by mouth 3 (three) times daily.    VIT B,C-FA-ZINC-SELEN-VIT D3-E (RENAPLEX-D) 800 MCG-12.5 MG -2,000 UNIT TAB    Take 1 tablet by mouth.    VITAMIN RENAL FORMULA, B-COMPLEX-VITAMIN C-FOLIC ACID, (NEPHROCAP) 1 MG CAP    Take 1 capsule by mouth once daily.     Objective:     Vitals:    11/17/22 0836   BP: (!) 151/70   Pulse: 71   Resp: 14   Temp: 98 °F (36.7 °C)     Recent Results (from the past 168 hour(s))   CBC Auto Differential    Collection Time: 11/15/22  9:46 AM   Result Value Ref Range    WBC 5.35 3.90 - 12.70 K/uL    RBC 3.81 (L) 4.00 - 5.40 M/uL    Hemoglobin 11.8 (L) 12.0 - 16.0 g/dL    Hematocrit 37.6 37.0 - 48.5 %    MCV 99 (H) 82 - 98 fL    MCH 31.0 27.0 - 31.0 pg    MCHC 31.4 (L) 32.0 - 36.0 g/dL    RDW 14.8 (H) 11.5 - 14.5 %    Platelets 229 150 - 450 K/uL    MPV 10.2 9.2 - 12.9 fL    Immature Granulocytes 0.2 0.0 - 0.5 %    Gran # (ANC) 2.7 1.8 - 7.7 K/uL    Immature Grans (Abs) 0.01 0.00 - 0.04 K/uL    Lymph # 1.7 1.0 - 4.8 K/uL    Mono # 0.8 0.3 - 1.0 K/uL    Eos # 0.1 0.0 - 0.5 K/uL    Baso # 0.05 0.00 - 0.20 K/uL    nRBC 0 0 /100 WBC    Gran % 50.0 38.0 - 73.0 %    Lymph % 30.8 18.0 - " 48.0 %    Mono % 15.5 (H) 4.0 - 15.0 %    Eosinophil % 2.6 0.0 - 8.0 %    Basophil % 0.9 0.0 - 1.9 %    Differential Method Automated    Comprehensive Metabolic Panel    Collection Time: 11/15/22  9:46 AM   Result Value Ref Range    Sodium 140 136 - 145 mmol/L    Potassium 4.5 3.5 - 5.1 mmol/L    Chloride 99 95 - 110 mmol/L    CO2 26 23 - 29 mmol/L    Glucose 164 (H) 70 - 110 mg/dL    BUN 33 (H) 8 - 23 mg/dL    Creatinine 6.6 (H) 0.5 - 1.4 mg/dL    Calcium 9.5 8.7 - 10.5 mg/dL    Total Protein 6.9 6.0 - 8.4 g/dL    Albumin 3.6 3.5 - 5.2 g/dL    Total Bilirubin 0.4 0.1 - 1.0 mg/dL    Alkaline Phosphatase 110 55 - 135 U/L    AST 18 10 - 40 U/L    ALT 16 10 - 44 U/L    Anion Gap 15 8 - 16 mmol/L    eGFR 6 (A) >60 mL/min/1.73 m^2   Cancer Antigen 19-9    Collection Time: 11/15/22  9:46 AM   Result Value Ref Range    CA 19-9 <2.1 0.0 - 40.0 U/mL         Physical Exam  Constitutional:       General: She is not in acute distress.     Appearance: Normal appearance. She is not ill-appearing.   HENT:      Head: Normocephalic and atraumatic.      Mouth/Throat:      Pharynx: No oropharyngeal exudate or posterior oropharyngeal erythema.   Eyes:      General: No scleral icterus.     Extraocular Movements: Extraocular movements intact.      Conjunctiva/sclera: Conjunctivae normal.      Pupils: Pupils are equal, round, and reactive to light.   Cardiovascular:      Rate and Rhythm: Normal rate and regular rhythm.      Heart sounds: No murmur heard.    No friction rub. No gallop.      Comments: Left upper extremity palpable thrill  Pulmonary:      Effort: Pulmonary effort is normal. No respiratory distress.      Breath sounds: No stridor. No wheezing, rhonchi or rales.   Abdominal:      General: Bowel sounds are normal. There is no distension.      Palpations: Abdomen is soft. There is no mass.      Tenderness: There is no abdominal tenderness. There is no guarding or rebound.   Musculoskeletal:         General: Normal range of  motion.      Cervical back: Normal range of motion and neck supple.      Right lower leg: No edema.      Left lower leg: No edema.   Skin:     General: Skin is warm and dry.   Neurological:      General: No focal deficit present.      Mental Status: She is alert.        Assessment:     1. Acute bilateral low back pain without sciatica    2. History of pancreatic cancer    3. ESRD needing dialysis    4. Anemia due to chronic kidney disease, on chronic dialysis      Plan:     Problem List Items Addressed This Visit          Renal/    ESRD needing dialysis    Current Assessment & Plan     Left palpable thrill. Creatinine within range  -continue HD            Oncology    Anemia due to chronic kidney disease, on chronic dialysis    Current Assessment & Plan     -hg >10, no additional epo needed at this time         History of pancreatic cancer    Overview     Neoadjuvant: 4 cycles of FOLFIRINOX followed by chemoradiation with 5-FU  5/15/2013 Surgery with Dr. Carter : 1.  Distal pancreatectomy with splenectomy. 2.  Left nephrectomy. 3. Adrenalectomy.         Current Assessment & Plan     New acute lower back pain that has not improved over 4 weeks. Differentials include recurrent pancreatic cancer. CA 19-9 not elevated but it was not elevated when she had treatment for her pancreatic cancer either. Will obtain CT scans.  -if scans negative, labs and follow up in 6 months         Relevant Orders    CT Chest Abdomen Pelvis With Contrast (xpd)    CBC Auto Differential    Comprehensive Metabolic Panel    CANCER ANTIGEN 19-9       Orthopedic    Acute bilateral low back pain without sciatica - Primary    Current Assessment & Plan     Acute lower back pain wrapping around hips x4 weeks without improvement.  Constant aching that fluctuates in intensity throughout the day. Worsened when changing from sitting to standing position. No improvement with tramadol or lidocaine patch. Sometimes alleviated with heating pads. No  associated incontinence or radicular symptoms.  Differentials include acute pain secondary to degenerative disc disease vs. referred pain from recurrent malignancy vs. Pain secondary to constipation vs. msk pain.  -CT scan to evaluate spine and possibility of malignancy  -recommend patient review laxatives she has at home with dialysis physician and then treating her constipation.  If treatment of constipation alleviates pain then told her it would be ok to call and cancel the CT.  -if no significant DDD or constipation, consider musculoskeletal etiology and trial of flexeril.         Relevant Orders    CT Chest Abdomen Pelvis With Contrast (xpd)     Douglas Godfrey MD  Hematology Oncology    This is my first time meeting the patient.  I, Dr. Godfrey, personally spent 62 minutes during this encounter.  Time includes face-to-face time and direct counseling and/or coordination of care, and non-face-to-face time including review of results including labs and imaging, documentation, orders, and scheduling.

## 2022-11-17 NOTE — Clinical Note
When to follow up: in 6 months to review labs Labs needed: 6 months cmp cbc ca 19 9 Images: CT chest abd pelvis some time in the next 2 weeks Chemotherapy Regimen:  Next Treatment Date No active treatment plan for patient. Provider: Bekah

## 2022-11-17 NOTE — ASSESSMENT & PLAN NOTE
New acute lower back pain that has not improved over 4 weeks. Differentials include recurrent pancreatic cancer. CA 19-9 not elevated but it was not elevated when she had treatment for her pancreatic cancer either. Will obtain CT scans.  -if scans negative, labs and follow up in 6 months

## 2022-11-17 NOTE — ASSESSMENT & PLAN NOTE
Acute lower back pain wrapping around hips x4 weeks without improvement.  Constant aching that fluctuates in intensity throughout the day. Worsened when changing from sitting to standing position. No improvement with tramadol or lidocaine patch. Sometimes alleviated with heating pads. No associated incontinence or radicular symptoms.  Differentials include acute pain secondary to degenerative disc disease vs. referred pain from recurrent malignancy vs. Pain secondary to constipation vs. msk pain.  -CT scan to evaluate spine and possibility of malignancy  -recommend patient review laxatives she has at home with dialysis physician and then treating her constipation.  If treatment of constipation alleviates pain then told her it would be ok to call and cancel the CT.  -if no significant DDD or constipation, consider musculoskeletal etiology and trial of flexeril.

## 2022-11-29 ENCOUNTER — HOSPITAL ENCOUNTER (OUTPATIENT)
Dept: RADIOLOGY | Facility: HOSPITAL | Age: 82
Discharge: HOME | End: 2022-11-29
Attending: STUDENT IN AN ORGANIZED HEALTH CARE EDUCATION/TRAINING PROGRAM
Payer: MEDICARE

## 2022-11-29 ENCOUNTER — TELEPHONE (OUTPATIENT)
Dept: HEMATOLOGY/ONCOLOGY | Facility: HOSPITAL | Age: 82
End: 2022-11-29
Payer: MEDICARE

## 2022-11-29 ENCOUNTER — TELEPHONE (OUTPATIENT)
Dept: CARDIOLOGY | Facility: CLINIC | Age: 82
End: 2022-11-29
Payer: MEDICARE

## 2022-11-29 DIAGNOSIS — M54.50 ACUTE BILATERAL LOW BACK PAIN WITHOUT SCIATICA: ICD-10-CM

## 2022-11-29 DIAGNOSIS — Z85.07 HISTORY OF PANCREATIC CANCER: ICD-10-CM

## 2022-11-29 DIAGNOSIS — K55.9 MESENTERIC ISCHEMIA: Primary | ICD-10-CM

## 2022-11-29 PROCEDURE — 71260 CT THORAX DX C+: CPT | Mod: TC

## 2022-11-29 PROCEDURE — 25500020 PHARM REV CODE 255: Performed by: STUDENT IN AN ORGANIZED HEALTH CARE EDUCATION/TRAINING PROGRAM

## 2022-11-29 PROCEDURE — 74177 CT ABD & PELVIS W/CONTRAST: CPT | Mod: TC

## 2022-11-29 RX ADMIN — IOHEXOL 100 ML: 350 INJECTION, SOLUTION INTRAVENOUS at 08:11

## 2022-11-29 RX ADMIN — IOHEXOL 30 ML: 350 INJECTION, SOLUTION INTRAVENOUS at 08:11

## 2022-11-29 NOTE — TELEPHONE ENCOUNTER
----- Message from Daphne Jones MA sent at 11/29/2022  1:15 PM CST -----  Regarding: FW: referral vascular surgery  I was told to send these to you for review  ----- Message -----  From: Clau Bhagat LPN  Sent: 11/29/2022  12:31 PM CST  To: Douglas Godfrey MD, #  Subject: FW: referral vascular surgery                    Please contact patient to schedule.    Thank you.   ----- Message -----  From: Douglas Godfrey MD  Sent: 11/29/2022  12:28 PM CST  To: Sandy Rangel LPN, #  Subject: referral vascular surgery                        Left a voicemail regarding her ct results; recommend further evaluation with vascular surgery    Referral for vascular surgery is in. Needs scheduling  Thanks  -e

## 2022-12-01 DIAGNOSIS — M25.561 CHRONIC PAIN OF BOTH KNEES: Primary | ICD-10-CM

## 2022-12-01 DIAGNOSIS — G89.29 CHRONIC PAIN OF BOTH KNEES: Primary | ICD-10-CM

## 2022-12-01 DIAGNOSIS — M25.562 CHRONIC PAIN OF BOTH KNEES: Primary | ICD-10-CM

## 2022-12-12 DIAGNOSIS — E78.00 PURE HYPERCHOLESTEROLEMIA: Chronic | ICD-10-CM

## 2022-12-12 RX ORDER — UBIDECARENONE 30 MG
CAPSULE ORAL
Qty: 180 CAPSULE | Refills: 3 | OUTPATIENT
Start: 2022-12-12

## 2022-12-12 RX ORDER — ATORVASTATIN CALCIUM 40 MG/1
TABLET, FILM COATED ORAL
Qty: 90 TABLET | Refills: 3 | OUTPATIENT
Start: 2022-12-12

## 2022-12-12 NOTE — TELEPHONE ENCOUNTER
Quick DC. Inappropriate Request    Refill Authorization Note   Cailin Pickett  is requesting a refill authorization.  Brief Assessment and Rationale for Refill:  Quick Discontinue  Medication Therapy Plan:  Pt est care with Brii Junior 1/18/22    Medication Reconciliation Completed:  No      Comments:     Note composed:4:43 PM 12/12/2022

## 2023-01-26 ENCOUNTER — TELEPHONE (OUTPATIENT)
Dept: GASTROENTEROLOGY | Facility: CLINIC | Age: 83
End: 2023-01-26
Payer: MEDICARE

## 2023-01-26 DIAGNOSIS — Z12.31 ENCOUNTER FOR SCREENING MAMMOGRAM FOR BREAST CANCER: Primary | ICD-10-CM

## 2023-01-26 NOTE — TELEPHONE ENCOUNTER
----- Message from Charissa Washington sent at 1/26/2023 12:41 PM CST -----  Contact: Cailin  Patient is calling to speak with nurse regarding procedure. Reports wanting provider to send in order for a colonoscopy. Please give patient a call back at [phone].

## 2023-02-28 ENCOUNTER — OFFICE VISIT (OUTPATIENT)
Dept: GASTROENTEROLOGY | Facility: CLINIC | Age: 83
End: 2023-02-28
Payer: MEDICARE

## 2023-02-28 VITALS
HEART RATE: 72 BPM | WEIGHT: 143.75 LBS | BODY MASS INDEX: 23.95 KG/M2 | SYSTOLIC BLOOD PRESSURE: 154 MMHG | HEIGHT: 65 IN | DIASTOLIC BLOOD PRESSURE: 82 MMHG

## 2023-02-28 DIAGNOSIS — R13.10 DYSPHAGIA, UNSPECIFIED TYPE: Primary | ICD-10-CM

## 2023-02-28 DIAGNOSIS — Z12.11 COLON CANCER SCREENING: ICD-10-CM

## 2023-02-28 PROCEDURE — 1157F ADVNC CARE PLAN IN RCRD: CPT | Mod: CPTII,S$GLB,, | Performed by: NURSE PRACTITIONER

## 2023-02-28 PROCEDURE — 3079F DIAST BP 80-89 MM HG: CPT | Mod: CPTII,S$GLB,, | Performed by: NURSE PRACTITIONER

## 2023-02-28 PROCEDURE — 3288F FALL RISK ASSESSMENT DOCD: CPT | Mod: CPTII,S$GLB,, | Performed by: NURSE PRACTITIONER

## 2023-02-28 PROCEDURE — 3079F PR MOST RECENT DIASTOLIC BLOOD PRESSURE 80-89 MM HG: ICD-10-PCS | Mod: CPTII,S$GLB,, | Performed by: NURSE PRACTITIONER

## 2023-02-28 PROCEDURE — 99213 OFFICE O/P EST LOW 20 MIN: CPT | Mod: S$GLB,,, | Performed by: NURSE PRACTITIONER

## 2023-02-28 PROCEDURE — 3077F SYST BP >= 140 MM HG: CPT | Mod: CPTII,S$GLB,, | Performed by: NURSE PRACTITIONER

## 2023-02-28 PROCEDURE — 1159F MED LIST DOCD IN RCRD: CPT | Mod: CPTII,S$GLB,, | Performed by: NURSE PRACTITIONER

## 2023-02-28 PROCEDURE — 1126F PR PAIN SEVERITY QUANTIFIED, NO PAIN PRESENT: ICD-10-PCS | Mod: CPTII,S$GLB,, | Performed by: NURSE PRACTITIONER

## 2023-02-28 PROCEDURE — 1157F PR ADVANCE CARE PLAN OR EQUIV PRESENT IN MEDICAL RECORD: ICD-10-PCS | Mod: CPTII,S$GLB,, | Performed by: NURSE PRACTITIONER

## 2023-02-28 PROCEDURE — 3077F PR MOST RECENT SYSTOLIC BLOOD PRESSURE >= 140 MM HG: ICD-10-PCS | Mod: CPTII,S$GLB,, | Performed by: NURSE PRACTITIONER

## 2023-02-28 PROCEDURE — 99999 PR PBB SHADOW E&M-EST. PATIENT-LVL III: ICD-10-PCS | Mod: PBBFAC,,, | Performed by: NURSE PRACTITIONER

## 2023-02-28 PROCEDURE — 1126F AMNT PAIN NOTED NONE PRSNT: CPT | Mod: CPTII,S$GLB,, | Performed by: NURSE PRACTITIONER

## 2023-02-28 PROCEDURE — 1101F PR PT FALLS ASSESS DOC 0-1 FALLS W/OUT INJ PAST YR: ICD-10-PCS | Mod: CPTII,S$GLB,, | Performed by: NURSE PRACTITIONER

## 2023-02-28 PROCEDURE — 1160F RVW MEDS BY RX/DR IN RCRD: CPT | Mod: CPTII,S$GLB,, | Performed by: NURSE PRACTITIONER

## 2023-02-28 PROCEDURE — 1159F PR MEDICATION LIST DOCUMENTED IN MEDICAL RECORD: ICD-10-PCS | Mod: CPTII,S$GLB,, | Performed by: NURSE PRACTITIONER

## 2023-02-28 PROCEDURE — 1101F PT FALLS ASSESS-DOCD LE1/YR: CPT | Mod: CPTII,S$GLB,, | Performed by: NURSE PRACTITIONER

## 2023-02-28 PROCEDURE — 1160F PR REVIEW ALL MEDS BY PRESCRIBER/CLIN PHARMACIST DOCUMENTED: ICD-10-PCS | Mod: CPTII,S$GLB,, | Performed by: NURSE PRACTITIONER

## 2023-02-28 PROCEDURE — 3288F PR FALLS RISK ASSESSMENT DOCUMENTED: ICD-10-PCS | Mod: CPTII,S$GLB,, | Performed by: NURSE PRACTITIONER

## 2023-02-28 PROCEDURE — 99999 PR PBB SHADOW E&M-EST. PATIENT-LVL III: CPT | Mod: PBBFAC,,, | Performed by: NURSE PRACTITIONER

## 2023-02-28 PROCEDURE — 99213 PR OFFICE/OUTPT VISIT, EST, LEVL III, 20-29 MIN: ICD-10-PCS | Mod: S$GLB,,, | Performed by: NURSE PRACTITIONER

## 2023-02-28 RX ORDER — LANCETS 33 GAUGE
EACH MISCELLANEOUS 2 TIMES DAILY
COMMUNITY
Start: 2022-11-21

## 2023-02-28 RX ORDER — DOCUSATE CALCIUM 240 MG
CAPSULE ORAL
COMMUNITY
End: 2023-02-28 | Stop reason: SDUPTHER

## 2023-02-28 RX ORDER — MULTIVIT WITH IRON,MINERALS
TABLET,CHEWABLE ORAL
COMMUNITY
End: 2023-02-28 | Stop reason: SDUPTHER

## 2023-02-28 RX ORDER — BENZONATATE 100 MG/1
200 CAPSULE ORAL
COMMUNITY
Start: 2023-02-12

## 2023-02-28 NOTE — PROGRESS NOTES
Clinic Consult:  [facility] Consultation Note    Reason for Consult:  The primary encounter diagnosis was Dysphagia, unspecified type. A diagnosis of Colon cancer screening was also pertinent to this visit.    PCP: Brii Junior       HPI:  This is a 82 y.o. female here for evaluation of the above.     # Colon cancer screening   Prior colonoscopy?: yes  Date of last colonoscopy: 2017  History of colon polyps: yes  Recall: 5 years   Family history of colon cancer: no    # dysphagia  - she has issues occasionally with her afternoon pills. She also takes these same pills in the morning and does not have difficulty getting them done.   - she does better when eating before taking them. She will also finish eating after she takes them.   - EGD and manometry unrevealing for cause of dysphagia.     Review of Systems   Constitutional:  Negative for fever, malaise/fatigue and weight loss.   HENT:  Negative for congestion.    Respiratory:  Negative for cough and shortness of breath.    Cardiovascular:  Negative for chest pain.   Gastrointestinal:  Negative for abdominal pain, blood in stool, constipation, diarrhea, heartburn, nausea and vomiting.     Medical History:  has a past medical history of Anemia, CKD (chronic kidney disease) stage 5, GFR less than 15 ml/min (3/14/2019), CKD (chronic kidney disease), stage III, Diabetes mellitus type II, Encounter for blood transfusion, Family history of colonic polyps (7/18/2017), Gout, unspecified, Hyperlipidemia, Hypertension, Neuropathy, Pancreatic cancer, Renal failure, Secondary hyperparathyroidism of renal origin (3/14/2019), and Thyroid disease.    Surgical History:  has a past surgical history that includes Thyroidectomy, partial; Hysterectomy; Nephrectomy (Left, 5/2013); Splenectomy, total; Pancreas surgery; Colonoscopy (2011); Ventriculoatrial shunt (Left, 12/4/2014 ); Colonoscopy (N/A, 7/18/2017); Knee surgery (Left, 05/31/2018); Fistulogram (N/A,  "4/10/2019); Esophagogastroduodenoscopy (N/A, 1/18/2022); and Esophageal manometry with measurement of impedance (N/A, 4/7/2022).    Family History: family history includes Breast cancer in her mother, sister, and sister; Cancer in her brother and father; Diabetes in her brother; Heart disease (age of onset: 60) in her mother; Hypertension in her mother; Kidney disease in her brother; Stroke in her mother..     Social History:  reports that she quit smoking about 22 years ago. She started smoking about 61 years ago. She has a 35.00 pack-year smoking history. She has never used smokeless tobacco. She reports that she does not drink alcohol and does not use drugs.    Allergies: Reviewed    Home Medications:   Current Outpatient Medications on File Prior to Visit   Medication Sig Dispense Refill    allopurinoL (ZYLOPRIM) 100 MG tablet Take 1 tablet (100 mg total) by mouth once daily. 90 tablet 3    amLODIPine (NORVASC) 2.5 MG tablet Take 1 tablet (2.5 mg total) by mouth once daily. 90 tablet 3    atorvastatin (LIPITOR) 40 MG tablet Take 1 tablet (40 mg total) by mouth once daily. 90 tablet 3    AURYXIA 210 mg iron Tab       BD ULTRA-FINE MICRO PEN NEEDLE 32 gauge x 1/4" Ndle       benzonatate (TESSALON) 100 MG capsule Take 200 mg by mouth.      blood sugar diagnostic Strp USE TO MONITOR BLOOD GLUCOSE DAILY      cetirizine (ZYRTEC) 10 MG tablet Take 10 mg by mouth once daily.      co-enzyme Q-10 30 mg capsule Take 1 capsule (30 mg total) by mouth 2 (two) times daily. 180 capsule 3    docusate sodium (COLACE) 50 MG capsule Take by mouth.      ezetimibe (ZETIA) 10 mg tablet TAKE 1 TABLET DAILY 90 tablet 0    furosemide (LASIX) 40 MG tablet TAKE 1 TABLET DAILY 90 tablet 3    gabapentin (NEURONTIN) 600 MG tablet Take 1 tablet (600 mg total) by mouth once daily. Daily at 05:00 PM 90 tablet 3    glucosamine-chondroitin 250-200 mg Tab Take 1 tablet by mouth 2 (two) times daily.      insulin degludec (TRESIBA FLEXTOUCH U-100) " "100 unit/mL (3 mL) InPn Inject 8 Units into the skin.       methoxy peg-epoetin beta (MIRCERA INJ) 50 mcg.      ONETOUCH DELICA PLUS LANCET 30 gauge Misc 2 (two) times daily. Use to check blood glucose      pen needle, diabetic 32 gauge x 1/4" Ndle Use to test blood sugars bid      PRORENAL 8 mg iron-800 mcg-1,000 unit Tab Take 1 tablet by mouth once daily.      RENAPLEX-D 800 mcg-12.5 mg -2,000 unit Tab Take 1 tablet by mouth once daily.      sevelamer carbonate (RENVELA) 800 mg Tab Take 2 tablets by mouth.      sodium chloride 0.9% SolP 100 mL with iron sucrose 100 mg iron/5 mL Soln 100 mg 50 mg.      traMADoL (ULTRAM) 50 mg tablet Take 1 tablet (50 mg total) by mouth every 12 (twelve) hours. 30 tablet 0    VELPHORO 500 mg Chew Take 1 tablet by mouth 3 (three) times daily.      vit B,C-FA-zinc-selen-vit D3-E (RENAPLEX-D) 800 mcg-12.5 mg -2,000 unit Tab Take 1 tablet by mouth.      vitamin renal formula, B-complex-vitamin c-folic acid, (NEPHROCAP) 1 mg Cap Take 1 capsule by mouth once daily. 30 capsule 0    aspirin (ECOTRIN) 81 MG EC tablet Take 1 tablet (81 mg total) by mouth once daily. 30 tablet 0    fluocinolone (DERMA-SMOOTHE) 0.01 % external oil Apply oil to scalp once a day 1 Bottle 5    omeprazole (PRILOSEC) 40 MG capsule Take 1 capsule (40 mg total) by mouth once daily. 30 capsule 11    [DISCONTINUED] docusate calcium (SURFAK) 240 mg capsule 1 capsule as needed Orally Once a day      [DISCONTINUED] glucosamine-chondroitin 250-200 mg Tab 1 tablet with a meal Orally Once a day       No current facility-administered medications on file prior to visit.       Physical Exam:  BP (!) 154/82 (BP Location: Left arm, Patient Position: Sitting, BP Method: Medium (Manual))   Pulse 72   Ht 5' 5" (1.651 m)   Wt 65.2 kg (143 lb 11.8 oz)   LMP 07/27/1982 (Exact Date)   BMI 23.92 kg/m²   Body mass index is 23.92 kg/m².  Physical Exam  Constitutional:       General: She is not in acute distress.  HENT:      Head: " Normocephalic and atraumatic.   Eyes:      General: No scleral icterus.     Conjunctiva/sclera: Conjunctivae normal.   Cardiovascular:      Rate and Rhythm: Normal rate and regular rhythm.      Heart sounds: No murmur heard.  Pulmonary:      Effort: Pulmonary effort is normal. No respiratory distress.      Breath sounds: Normal breath sounds. No wheezing.   Abdominal:      General: Abdomen is flat. Bowel sounds are normal.      Palpations: Abdomen is soft.      Tenderness: There is no abdominal tenderness.   Skin:     General: Skin is warm and dry.   Neurological:      General: No focal deficit present.      Mental Status: She is alert and oriented to person, place, and time.      Cranial Nerves: No cranial nerve deficit.   Psychiatric:         Mood and Affect: Mood normal.         Judgment: Judgment normal.       Labs: Pertinent labs reviewed.    Assessment:  1. Dysphagia, unspecified type - no etiology found as the cause of her dysphagia.    2. Colon cancer screening - discussed risks and benefits of screening colonoscopy        Recommendations:   - patient declines screening colonoscopy     Dysphagia, unspecified type    Colon cancer screening      Follow up if symptoms worsen or fail to improve.    Thank you so much for allowing me to participate in the care of Cailin Pickett  VY Lo

## 2023-03-07 ENCOUNTER — HOSPITAL ENCOUNTER (OUTPATIENT)
Dept: RADIOLOGY | Facility: HOSPITAL | Age: 83
Discharge: HOME | End: 2023-03-07
Attending: NURSE PRACTITIONER
Payer: MEDICARE

## 2023-03-07 DIAGNOSIS — Z12.31 ENCOUNTER FOR SCREENING MAMMOGRAM FOR BREAST CANCER: ICD-10-CM

## 2023-04-13 ENCOUNTER — TELEPHONE (OUTPATIENT)
Dept: CARDIOLOGY | Facility: CLINIC | Age: 83
End: 2023-04-13
Payer: MEDICARE

## 2023-04-13 ENCOUNTER — TELEPHONE (OUTPATIENT)
Dept: SPORTS MEDICINE | Facility: CLINIC | Age: 83
End: 2023-04-13

## 2023-04-13 ENCOUNTER — HOSPITAL ENCOUNTER (OUTPATIENT)
Dept: RADIOLOGY | Facility: HOSPITAL | Age: 83
Discharge: HOME | End: 2023-04-13
Attending: STUDENT IN AN ORGANIZED HEALTH CARE EDUCATION/TRAINING PROGRAM
Payer: MEDICARE

## 2023-04-13 ENCOUNTER — OFFICE VISIT (OUTPATIENT)
Dept: SPORTS MEDICINE | Facility: CLINIC | Age: 83
End: 2023-04-13
Payer: MEDICARE

## 2023-04-13 VITALS — WEIGHT: 144.63 LBS | HEIGHT: 65 IN | BODY MASS INDEX: 24.1 KG/M2 | RESPIRATION RATE: 18 BRPM

## 2023-04-13 DIAGNOSIS — Z96.652 HX OF TOTAL KNEE ARTHROPLASTY, LEFT: ICD-10-CM

## 2023-04-13 DIAGNOSIS — G89.29 CHRONIC PAIN OF BOTH KNEES: ICD-10-CM

## 2023-04-13 DIAGNOSIS — M11.261 CHONDROCALCINOSIS OF RIGHT KNEE: ICD-10-CM

## 2023-04-13 DIAGNOSIS — M25.561 CHRONIC PAIN OF BOTH KNEES: ICD-10-CM

## 2023-04-13 DIAGNOSIS — N18.6 ESRD NEEDING DIALYSIS: ICD-10-CM

## 2023-04-13 DIAGNOSIS — E11.42 TYPE 2 DIABETES MELLITUS WITH DIABETIC POLYNEUROPATHY, WITHOUT LONG-TERM CURRENT USE OF INSULIN: ICD-10-CM

## 2023-04-13 DIAGNOSIS — M25.562 CHRONIC PAIN OF BOTH KNEES: ICD-10-CM

## 2023-04-13 DIAGNOSIS — Z99.2 ESRD NEEDING DIALYSIS: ICD-10-CM

## 2023-04-13 DIAGNOSIS — M17.11 PRIMARY OSTEOARTHRITIS OF RIGHT KNEE: Primary | ICD-10-CM

## 2023-04-13 PROCEDURE — 97110 THERAPEUTIC EXERCISES: CPT | Mod: GP,S$GLB,, | Performed by: STUDENT IN AN ORGANIZED HEALTH CARE EDUCATION/TRAINING PROGRAM

## 2023-04-13 PROCEDURE — 1157F ADVNC CARE PLAN IN RCRD: CPT | Mod: CPTII,S$GLB,, | Performed by: STUDENT IN AN ORGANIZED HEALTH CARE EDUCATION/TRAINING PROGRAM

## 2023-04-13 PROCEDURE — 97110 PR THERAPEUTIC EXERCISES: ICD-10-PCS | Mod: GP,S$GLB,, | Performed by: STUDENT IN AN ORGANIZED HEALTH CARE EDUCATION/TRAINING PROGRAM

## 2023-04-13 PROCEDURE — 99999 PR PBB SHADOW E&M-EST. PATIENT-LVL III: ICD-10-PCS | Mod: PBBFAC,,, | Performed by: STUDENT IN AN ORGANIZED HEALTH CARE EDUCATION/TRAINING PROGRAM

## 2023-04-13 PROCEDURE — 99204 OFFICE O/P NEW MOD 45 MIN: CPT | Mod: 25,S$GLB,, | Performed by: STUDENT IN AN ORGANIZED HEALTH CARE EDUCATION/TRAINING PROGRAM

## 2023-04-13 PROCEDURE — 1159F MED LIST DOCD IN RCRD: CPT | Mod: CPTII,S$GLB,, | Performed by: STUDENT IN AN ORGANIZED HEALTH CARE EDUCATION/TRAINING PROGRAM

## 2023-04-13 PROCEDURE — 20611 DRAIN/INJ JOINT/BURSA W/US: CPT | Mod: RT,S$GLB,, | Performed by: STUDENT IN AN ORGANIZED HEALTH CARE EDUCATION/TRAINING PROGRAM

## 2023-04-13 PROCEDURE — 1159F PR MEDICATION LIST DOCUMENTED IN MEDICAL RECORD: ICD-10-PCS | Mod: CPTII,S$GLB,, | Performed by: STUDENT IN AN ORGANIZED HEALTH CARE EDUCATION/TRAINING PROGRAM

## 2023-04-13 PROCEDURE — 73564 XR KNEE ORTHO BILAT WITH FLEXION: ICD-10-PCS | Mod: 26,50,, | Performed by: STUDENT IN AN ORGANIZED HEALTH CARE EDUCATION/TRAINING PROGRAM

## 2023-04-13 PROCEDURE — 1125F PR PAIN SEVERITY QUANTIFIED, PAIN PRESENT: ICD-10-PCS | Mod: CPTII,S$GLB,, | Performed by: STUDENT IN AN ORGANIZED HEALTH CARE EDUCATION/TRAINING PROGRAM

## 2023-04-13 PROCEDURE — 73564 X-RAY EXAM KNEE 4 OR MORE: CPT | Mod: TC,50

## 2023-04-13 PROCEDURE — 99999 PR PBB SHADOW E&M-EST. PATIENT-LVL III: CPT | Mod: PBBFAC,,, | Performed by: STUDENT IN AN ORGANIZED HEALTH CARE EDUCATION/TRAINING PROGRAM

## 2023-04-13 PROCEDURE — 1157F PR ADVANCE CARE PLAN OR EQUIV PRESENT IN MEDICAL RECORD: ICD-10-PCS | Mod: CPTII,S$GLB,, | Performed by: STUDENT IN AN ORGANIZED HEALTH CARE EDUCATION/TRAINING PROGRAM

## 2023-04-13 PROCEDURE — 73564 X-RAY EXAM KNEE 4 OR MORE: CPT | Mod: 26,50,, | Performed by: STUDENT IN AN ORGANIZED HEALTH CARE EDUCATION/TRAINING PROGRAM

## 2023-04-13 PROCEDURE — 99204 PR OFFICE/OUTPT VISIT, NEW, LEVL IV, 45-59 MIN: ICD-10-PCS | Mod: 25,S$GLB,, | Performed by: STUDENT IN AN ORGANIZED HEALTH CARE EDUCATION/TRAINING PROGRAM

## 2023-04-13 PROCEDURE — 20611 LARGE JOINT ASPIRATION/INJECTION: R KNEE: ICD-10-PCS | Mod: RT,S$GLB,, | Performed by: STUDENT IN AN ORGANIZED HEALTH CARE EDUCATION/TRAINING PROGRAM

## 2023-04-13 PROCEDURE — 1125F AMNT PAIN NOTED PAIN PRSNT: CPT | Mod: CPTII,S$GLB,, | Performed by: STUDENT IN AN ORGANIZED HEALTH CARE EDUCATION/TRAINING PROGRAM

## 2023-04-13 RX ORDER — LIDOCAINE HYDROCHLORIDE 10 MG/ML
2 INJECTION INFILTRATION; PERINEURAL
Status: DISCONTINUED | OUTPATIENT
Start: 2023-04-13 | End: 2023-04-13 | Stop reason: HOSPADM

## 2023-04-13 RX ORDER — TRIAMCINOLONE ACETONIDE 40 MG/ML
40 INJECTION, SUSPENSION INTRA-ARTICULAR; INTRAMUSCULAR
Status: DISCONTINUED | OUTPATIENT
Start: 2023-04-13 | End: 2023-04-13 | Stop reason: HOSPADM

## 2023-04-13 RX ORDER — BUPIVACAINE HYDROCHLORIDE 5 MG/ML
2 INJECTION, SOLUTION PERINEURAL
Status: DISCONTINUED | OUTPATIENT
Start: 2023-04-13 | End: 2023-04-13 | Stop reason: HOSPADM

## 2023-04-13 RX ADMIN — BUPIVACAINE HYDROCHLORIDE 2 ML: 5 INJECTION, SOLUTION PERINEURAL at 09:04

## 2023-04-13 RX ADMIN — LIDOCAINE HYDROCHLORIDE 2 ML: 10 INJECTION INFILTRATION; PERINEURAL at 09:04

## 2023-04-13 RX ADMIN — TRIAMCINOLONE ACETONIDE 40 MG: 40 INJECTION, SUSPENSION INTRA-ARTICULAR; INTRAMUSCULAR at 09:04

## 2023-04-13 NOTE — TELEPHONE ENCOUNTER
----- Message from Fatimah Thomas sent at 4/13/2023 10:40 AM CDT -----  Regarding: Patient Callback  Contact: Cailin Simeon is requesting a callback from the nurse in regards to her appointment.    Cailin can be reached at [phone] (yvrs)      Thanks

## 2023-04-13 NOTE — PROGRESS NOTES
Patient ID: Cailin Pickett  YOB: 1940  MRN: [id number]    Chief Complaint: Pain of the Right Knee    Referred By: Self for right knee surgical opinion     History of Present Illness: Cailin Pickett is a 82 y.o. female who presents today with right knee pain.     Occupation: Retired    Cailin Pickett states it is Chronic in nature and there was not a specific mechanism.  She has had persistent right knee pain for over a decade with no relief with conservative intervention.  Cailin Pickett describes the pain as a continuous ache and sharp pain throughout right knee joint line and posterior. Treatment to date includes intra-articular corticosteroid injection, physical therapy, and lifestyle changes. They believe that they are unchanged with this treatment. Current pain level at rest is 10/10, pain level at worst is 10/10 (Numeric Pain Rating Scale).  Associated symptoms include: Swelling Yes, Instability Yes, Pain that affects your sleep Yes, Mechanical Yes, locking/catching Yes, Neurological No, limited range of motion Yes.     Aggravating activities include weight bearing, walking, night, knee extension.   Alleviating activities include rest.     They admits to formal physical therapy for this. Previous pertinent orthopedic injuries include left knee TKA (2018).    Hemoglobin A1C   Date Value Ref Range Status   11/04/2022 5.8 <=6.5 % Final   04/29/2021 7.3 (H) 4.0 - 5.6 % Final     Comment:     ADA Screening Guidelines:  5.7-6.4%  Consistent with prediabetes  >or=6.5%  Consistent with diabetes    High levels of fetal hemoglobin interfere with the HbA1C  assay. Heterozygous hemoglobin variants (HbS, HgC, etc)do  not significantly interfere with this assay.   However, presence of multiple variants may affect accuracy.     02/09/2021 8.4 (H) 4.0 - 5.6 % Final     Comment:     ADA Screening Guidelines:  5.7-6.4%  Consistent with prediabetes  >or=6.5%  Consistent with  diabetes    High levels of fetal hemoglobin interfere with the HbA1C  assay. Heterozygous hemoglobin variants (HbS, HgC, etc)do  not significantly interfere with this assay.   However, presence of multiple variants may affect accuracy.     07/02/2020 5.8 (H) 4.0 - 5.6 % Final     Comment:     ADA Screening Guidelines:  5.7-6.4%  Consistent with prediabetes  >or=6.5%  Consistent with diabetes  High levels of fetal hemoglobin interfere with the HbA1C  assay. Heterozygous hemoglobin variants (HbS, HgC, etc)do  not significantly interfere with this assay.   However, presence of multiple variants may affect accuracy.           Past Medical History:   Past Medical History:   Diagnosis Date    Anemia     CKD (chronic kidney disease) stage 5, GFR less than 15 ml/min 3/14/2019    CKD (chronic kidney disease), stage III     Diabetes mellitus type II     Encounter for blood transfusion     Family history of colonic polyps 7/18/2017    Gout, unspecified     Hyperlipidemia     Hypertension     Neuropathy     Pancreatic cancer     Renal failure     Secondary hyperparathyroidism of renal origin 3/14/2019    Thyroid disease      Past Surgical History:   Procedure Laterality Date    COLONOSCOPY  2011    Dr. Favio Mims    COLONOSCOPY N/A 7/18/2017    Procedure: screening colonoscopy;  Surgeon: Kenneth Kamara MD;  Location: [facility];  Service: Endoscopy;  Laterality: N/A;    ESOPHAGEAL MANOMETRY WITH MEASUREMENT OF IMPEDANCE N/A 4/7/2022    Procedure: MANOMETRY-ESOPHAGEAL-WITH IMPEDANCE;  Surgeon: Jah Rodgers RN;  Location: [facility];  Service: Endoscopy;  Laterality: N/A;    ESOPHAGOGASTRODUODENOSCOPY N/A 1/18/2022    Procedure: ESOPHAGOGASTRODUODENOSCOPY (EGD);  Surgeon: Ivett Quarles MD;  Location: [facility];  Service: Endoscopy;  Laterality: N/A;    FISTULOGRAM N/A 4/10/2019    Procedure: FISTULOGRAM;  Surgeon: Ruddy Fitzpatrick MD;  Location: [facility] CATH LAB;  Service: Vascular;  Laterality: N/A;  0830 start     "HYSTERECTOMY      KNEE SURGERY Left 2018    NEPHRECTOMY Left 2013    Dr Carter     PANCREAS SURGERY      distal pancreatectomy    SPLENECTOMY, TOTAL      THYROIDECTOMY, PARTIAL      VENTRICULOATRIAL SHUNT Left 2014     left arm     Family History   Problem Relation Age of Onset    Heart disease Mother 60        MI    Hypertension Mother     Stroke Mother     Breast cancer Mother     Cancer Father         prostate    Cancer Brother         renal cell carcinoma    Diabetes Brother     Kidney disease Brother         ESRD s/p kidney transplant    Breast cancer Sister     Breast cancer Sister      Social History     Socioeconomic History    Marital status:     Number of children: 0   Occupational History     Comment: stay home    Tobacco Use    Smoking status: Former     Packs/day: 1.00     Years: 35.00     Pack years: 35.00     Types: Cigarettes     Start date: 3/14/1962     Quit date: 2001     Years since quittin.2    Smokeless tobacco: Never   Substance and Sexual Activity    Alcohol use: No    Drug use: No    Sexual activity: Not Currently   Social History Narrative    [place] lives [place] from [place]     Medication List with Changes/Refills   Current Medications    ALLOPURINOL (ZYLOPRIM) 100 MG TABLET    Take 1 tablet (100 mg total) by mouth once daily.    AMLODIPINE (NORVASC) 2.5 MG TABLET    Take 1 tablet (2.5 mg total) by mouth once daily.    ASPIRIN (ECOTRIN) 81 MG EC TABLET    Take 1 tablet (81 mg total) by mouth once daily.    ATORVASTATIN (LIPITOR) 40 MG TABLET    Take 1 tablet (40 mg total) by mouth once daily.    AURYXIA 210 MG IRON TAB        BD ULTRA-FINE MICRO PEN NEEDLE 32 GAUGE X 1/4" NDLE        BENZONATATE (TESSALON) 100 MG CAPSULE    Take 200 mg by mouth.    BLOOD SUGAR DIAGNOSTIC STRP    USE TO MONITOR BLOOD GLUCOSE DAILY    CETIRIZINE (ZYRTEC) 10 MG TABLET    Take 10 mg by mouth once daily.    CO-ENZYME Q-10 30 MG CAPSULE    Take 1 capsule (30 mg total) by " "mouth 2 (two) times daily.    DOCUSATE SODIUM (COLACE) 50 MG CAPSULE    Take by mouth.    EZETIMIBE (ZETIA) 10 MG TABLET    TAKE 1 TABLET DAILY    FLUOCINOLONE (DERMA-SMOOTHE) 0.01 % EXTERNAL OIL    Apply oil to scalp once a day    FUROSEMIDE (LASIX) 40 MG TABLET    TAKE 1 TABLET DAILY    GABAPENTIN (NEURONTIN) 600 MG TABLET    Take 1 tablet (600 mg total) by mouth once daily. Daily at 05:00 PM    GLUCOSAMINE-CHONDROITIN 250-200 MG TAB    Take 1 tablet by mouth 2 (two) times daily.    INSULIN DEGLUDEC (TRESIBA FLEXTOUCH U-100) 100 UNIT/ML (3 ML) INPN    Inject 8 Units into the skin.     METHOXY PEG-EPOETIN BETA (MIRCERA INJ)    50 mcg.    OMEPRAZOLE (PRILOSEC) 40 MG CAPSULE    Take 1 capsule (40 mg total) by mouth once daily.    ONETOUCH DELICA PLUS LANCET 30 GAUGE MISC    2 (two) times daily. Use to check blood glucose    PEN NEEDLE, DIABETIC 32 GAUGE X 1/4" NDLE    Use to test blood sugars bid    PRORENAL 8 MG IRON-800 MCG-1,000 UNIT TAB    Take 1 tablet by mouth once daily.    RENAPLEX-D 800 MCG-12.5 MG -2,000 UNIT TAB    Take 1 tablet by mouth once daily.    SEVELAMER CARBONATE (RENVELA) 800 MG TAB    Take 2 tablets by mouth.    SODIUM CHLORIDE 0.9% SOLP 100 ML WITH IRON SUCROSE 100 MG IRON/5 ML SOLN 100 MG    50 mg.    TRAMADOL (ULTRAM) 50 MG TABLET    Take 1 tablet (50 mg total) by mouth every 12 (twelve) hours.    VELPHORO 500 MG CHEW    Take 1 tablet by mouth 3 (three) times daily.    VIT B,C-FA-ZINC-SELEN-VIT D3-E (RENAPLEX-D) 800 MCG-12.5 MG -2,000 UNIT TAB    Take 1 tablet by mouth.    VITAMIN RENAL FORMULA, B-COMPLEX-VITAMIN C-FOLIC ACID, (NEPHROCAP) 1 MG CAP    Take 1 capsule by mouth once daily.     Review of patient's allergies indicates:   Allergen Reactions    Allegra [fexofenadine] Swelling    Codeine Hives, Itching and Nausea And Vomiting       Physical Exam:   Body mass index is 24.07 kg/m².    GENERAL: Well appearing, in no acute distress.  HEAD: Normocephalic and atraumatic.  ENT: External ears " and nose grossly normal.  EYES: EOMI bilaterally  PULMONARY: Respirations are grossly even and non-labored.  NEURO: Awake, alert, and oriented x 3.  SKIN: No obvious rashes appreciated.  PSYCH: Mood & affect are appropriate.    Detailed MSK exam:   Genu varum, no ecchymosis, no erythema. No effusion. Limited knee flexion and knee extension. Patellar Apprehension negative. Crepitus and hypo patellar mobility. Ligamentous exam: Lachman negative. Valgus @ 0 positive +1B. Valgus @ 30 positive +1B. Varus negative. Anterior drawer negative. Posterior Drawer negative. Mensicus exam: Thessaly positive, Pain with maximal passive knee flexion positive, Pain/click Bj positive, Pain with forced hyperextension positive, Hx of catching/locking reported positive, Joint line tenderness positive.     N/V Exam:  Tibial:    Normal sensory (plantar foot)  Normal motor (FHL)    Sup Peroneal:   Normal sensory (dorsal foot)  Normal motor (Peroneals)            Deep Peroneal:   Normal sensory (1st web space)Normal motor (EHL)    Sural:   Normal sensory (lateral foot)   Saphenous:   Normal sensory (medial lower leg)   All compartments are soft and compressible. Calf soft non-tender.  Normal pedal pulses, warm and well perfused with capillary refill < 2 sec  Patella tendon reflex normal  Achilles tendon reflex normal      Imaging:  X-ray Knee Ortho Bilateral with Flexion  Narrative: EXAM:  XR KNEE ORTHO BILAT WITH FLEXION    CLINICAL HISTORY: Right knee pain, left knee pain, chronic pain    FINDINGS:  There is postsurgical change of total left knee arthroplasty.  Tibial and femoral components appear in good position without surrounding lucency.  No acute fracture.  Joint alignment is anatomic.  No significant left joint effusion.  Suspect small right effusion.  There is joint space loss of the medial and lateral compartments of the right knee in addition to the patellofemoral compartment with mild marginal osteophyte production.  There  is calcification of the medial and lateral meniscus of the right knee.  Loose osseous body noted posterior to the lateral right femoral condyle.  There is calcification at the quadriceps tendon insertion on the right.  There is prominent vascular calcification bilaterally.  Impression: Tricompartmental osteoarthritis of the right knee with calcification of the meniscus and suspected small effusion.    Total left knee arthroplasty.    Report Date: 4/13/2023 8:16 AM    Finalized on: 4/13/2023 8:16 AM By:  Brenda Arrieta MD  [facility]# [id number]      2023-04-13 08:18:10.287    [facility]      Relevant imaging results were reviewed and interpreted by me and per my read of.  This was discussed with the patient and / or family today.     Assessment:  Cailin Pickett is a 82 y.o. female presents today for right knee pain consistent with tricompartmental osteoarthritis of the right knee.  Status post left knee replacement 2018 had good results.  Since then has been placed on dialysis is little bit worried about the risks of surgery moving forward with a right knee would like some relief.  She is never tried viscosupplementation we discussed moving forward with that in the meantime we will give her corticosteroid injection today and some home exercises to work on she will check her sugars twice a day for the next 3-4 days and continue with home exercises until she comes back from her trip in [place].  Start [place] following her [place] trip and manage conservatively from there.    MEDICAL NECESSITY FOR VISCOSUPPLEMETNATION: After thorough evaluation of the patient, I have determined that visco-supplementation is medically necessary. The patient has painful degenerative changes of the knee with failure of conservative treatments including lifestyle modifications and rehabilitation exercises.  Oral analgesis/NSAIDs have not adequately controlled symptoms and there is radiographic evidence of Kellgren David grade 2 or greater  osteoarthritic changes, or in lack of radiographic evidence, there is arthroscopic or other evidence of chondrosis.     At least 8 minutes were spent teaching the patient a therapeutic home exercise program and they were provided this plan in writing.  CPT 44487    Primary osteoarthritis of right knee  -     Cancel: Prior authorization Order  -     Prior authorization Order  -     Sports Medicine US - Guidance for Needle Placement  -     Large Joint Aspiration/Injection: R knee    Chondrocalcinosis of right knee    Hx of total knee arthroplasty, left    Type 2 diabetes mellitus with diabetic polyneuropathy, without long-term current use of insulin    ESRD needing dialysis         A copy of today's visit note has been sent to the referring provider.       Rodolfo Wellington MD    Disclaimer: This note was prepared using a voice recognition system and is likely to have sound alike errors within the text.

## 2023-04-13 NOTE — PATIENT INSTRUCTIONS
Assessment:  Cailin Pickett is a 82 y.o. female   Chief Complaint   Patient presents with    Right Knee - Pain       Encounter Diagnosis   Name Primary?    Primary osteoarthritis of right knee Yes        Plan:  Reviewed your x-rays with you today and discussed pertinent findings.   We have reviewed the natural history of this disorder and discussed treatment and management options moving forward   Provided corticosteroid injection to right knee  We discussed the proper protocols after the injection such as no submerging pools, baths tubs, or hot tubs for 24 hr.  Showering is okay today.  We also discussed that blood sugars can be elevated after an injection and asked patient to properly checked her sugars over the next few days and contact their PCP if there are any concerns.  We discussed red flags such as fevers, chills, red, warm, tender joint at the area of injection to please seek medical care immediately.    Complete home exercises as below. At least 8 minutes were spent developing, teaching, and performing a home exercise program.  A written summary was provided and all questions were answered. This service was performed under direction of Rodolfo Wellington MD. CPT 21735-VW.  Placed a medication referral for visco-supplementation injections. This is a series of three injections over three weeks. In this procedure, a gel-like fluid called hyaluronic acid is injected into the knee joint. Hyaluronic acid is a naturally occurring substance found in the synovial fluid surrounding joints. People with osteoarthritis have a lower-than-normal concentration of hyaluronic acid in their joints The theory is that adding hyaluronic acid to the arthritic joint will facilitate movement and reduce pain through a cellular level of improvement. The receptors within the knee are then down regulated to reduce the sensitivity and there is a greater stimulation production of hyaluronic acid.         STRETCHING  EXERCISES            STRENGTHENING EXERCISES                  If you have any difficulties reading this information, you may visit the online version using the following link: Knee Conditioning Program ([email or web address]/globalassets/[email or web address])     Follow-up: For injection or sooner if there are any problems between now and then.    Thank you for choosing [facility] and Dr. Rodolfo Wellington for your orthopedic & sports medicine care. It is our goal to provide you with exceptional care that will help keep you healthy, active, and get you back in the game.    Please do not hesitate to reach out to us via email, phone, or MyChart with any questions, concerns, or feedback.    If you felt that you received exemplary care today, please consider leaving us feedback on Healthgrades at:  [email or web address]    If you are experiencing pain/discomfort ,or have questions after 5pm and would like to be connected to the [facility] on-call team, please call this number and specify which Sports Medicine provider is treating you: [phone]

## 2023-04-13 NOTE — PROCEDURES
Large Joint Aspiration/Injection: R knee    Date/Time: 4/13/2023 9:20 AM  Performed by: Rodolfo Wellington MD  Authorized by: Rodolfo Wellington MD     Consent Done?:  Yes (Verbal)  Indications:  Pain and joint swelling  Site marked: the procedure site was marked    Timeout: prior to procedure the correct patient, procedure, and site was verified    Prep: patient was prepped and draped in usual sterile fashion      Local anesthesia used?: Yes    Local anesthetic:  Topical anesthetic    Details:  Needle Size:  22 G  Ultrasonic Guidance for needle placement?: Yes    Images are saved and documented.  Approach: Superior lateral.  Location:  Knee  Site:  R knee  Medications:  40 mg triamcinolone acetonide 40 mg/mL; 2 mL LIDOcaine HCL 10 mg/ml (1%) 10 mg/mL (1 %); 2 mL BUPivacaine 0.5 % (5 mg/mL)  Patient tolerance:  Patient tolerated the procedure well with no immediate complications     Ultrasound guidance was used for needle localization. Images were saved and stored for documentation. The appropriate structures were visualized. Dynamic visualization of the needle was continuous throughout the procedures and maintained good position.     We discussed the proper protocols after the injection such as no submerging pools, baths tubs, or hot tubs for 24 hr.  Showering is okay today.  We also discussed that blood sugars can be elevated after an injection and asked patient to properly checked her sugars over the next few days and contact their PCP if there are any concerns.  We discussed red flags such as fevers, chills, red, warm, tender joint at the area of injection to please seek medical care immediately.

## 2023-04-13 NOTE — TELEPHONE ENCOUNTER
Spoke with patient to rescheduled patient office visit to 06/13/2023.     ----- Message from Fatimah Thomas sent at 4/13/2023 10:37 AM CDT -----  Regarding: Patient Callback Request  Contact: Cailin Simeon is requesting a callback from the nurse in regards to her appointment.    Cailin can be reached at [phone] (altm)      Thanks

## 2023-05-30 ENCOUNTER — OFFICE VISIT (OUTPATIENT)
Dept: NEUROLOGY | Facility: CLINIC | Age: 83
End: 2023-05-30
Payer: MEDICARE

## 2023-05-30 VITALS
WEIGHT: 146.38 LBS | HEIGHT: 65 IN | BODY MASS INDEX: 24.39 KG/M2 | DIASTOLIC BLOOD PRESSURE: 65 MMHG | HEART RATE: 69 BPM | SYSTOLIC BLOOD PRESSURE: 126 MMHG

## 2023-05-30 DIAGNOSIS — Z85.07 HISTORY OF PANCREATIC CANCER: ICD-10-CM

## 2023-05-30 DIAGNOSIS — Z99.2 ESRD NEEDING DIALYSIS: ICD-10-CM

## 2023-05-30 DIAGNOSIS — R29.90 MULTIPLE NEUROLOGICAL SYMPTOMS: Primary | ICD-10-CM

## 2023-05-30 DIAGNOSIS — R20.2 NUMBNESS AND TINGLING: ICD-10-CM

## 2023-05-30 DIAGNOSIS — K57.30 DIVERTICULOSIS OF LARGE INTESTINE WITHOUT HEMORRHAGE: ICD-10-CM

## 2023-05-30 DIAGNOSIS — N18.6 ESRD NEEDING DIALYSIS: ICD-10-CM

## 2023-05-30 DIAGNOSIS — E89.0 S/P PARTIAL THYROIDECTOMY: ICD-10-CM

## 2023-05-30 DIAGNOSIS — E11.69 HYPERLIPIDEMIA ASSOCIATED WITH TYPE 2 DIABETES MELLITUS: ICD-10-CM

## 2023-05-30 DIAGNOSIS — Z90.5 ACQUIRED ABSENCE OF KIDNEY: ICD-10-CM

## 2023-05-30 DIAGNOSIS — Z99.2 ANEMIA DUE TO CHRONIC KIDNEY DISEASE, ON CHRONIC DIALYSIS: ICD-10-CM

## 2023-05-30 DIAGNOSIS — R23.2 HOT FLASHES: ICD-10-CM

## 2023-05-30 DIAGNOSIS — N18.6 ANEMIA DUE TO CHRONIC KIDNEY DISEASE, ON CHRONIC DIALYSIS: ICD-10-CM

## 2023-05-30 DIAGNOSIS — E11.22 TYPE 2 DIABETES MELLITUS WITH CHRONIC KIDNEY DISEASE ON CHRONIC DIALYSIS, WITHOUT LONG-TERM CURRENT USE OF INSULIN: ICD-10-CM

## 2023-05-30 DIAGNOSIS — I15.2 HYPERTENSION ASSOCIATED WITH DIABETES: Chronic | ICD-10-CM

## 2023-05-30 DIAGNOSIS — G62.9 POLYNEUROPATHY: ICD-10-CM

## 2023-05-30 DIAGNOSIS — M79.2 NEUROPATHIC PAIN: ICD-10-CM

## 2023-05-30 DIAGNOSIS — N18.6 TYPE 2 DIABETES MELLITUS WITH CHRONIC KIDNEY DISEASE ON CHRONIC DIALYSIS, WITHOUT LONG-TERM CURRENT USE OF INSULIN: ICD-10-CM

## 2023-05-30 DIAGNOSIS — E78.5 HYPERLIPIDEMIA ASSOCIATED WITH TYPE 2 DIABETES MELLITUS: ICD-10-CM

## 2023-05-30 DIAGNOSIS — Z99.2 TYPE 2 DIABETES MELLITUS WITH CHRONIC KIDNEY DISEASE ON CHRONIC DIALYSIS, WITHOUT LONG-TERM CURRENT USE OF INSULIN: ICD-10-CM

## 2023-05-30 DIAGNOSIS — R20.0 NUMBNESS AND TINGLING: ICD-10-CM

## 2023-05-30 DIAGNOSIS — Z99.2 DEPENDENCE ON RENAL DIALYSIS: ICD-10-CM

## 2023-05-30 DIAGNOSIS — G31.84 MILD COGNITIVE IMPAIRMENT: ICD-10-CM

## 2023-05-30 DIAGNOSIS — L29.9 ITCHING: ICD-10-CM

## 2023-05-30 DIAGNOSIS — E11.59 HYPERTENSION ASSOCIATED WITH DIABETES: Chronic | ICD-10-CM

## 2023-05-30 DIAGNOSIS — E11.42 TYPE 2 DIABETES MELLITUS WITH DIABETIC POLYNEUROPATHY, WITHOUT LONG-TERM CURRENT USE OF INSULIN: ICD-10-CM

## 2023-05-30 DIAGNOSIS — D63.1 ANEMIA DUE TO CHRONIC KIDNEY DISEASE, ON CHRONIC DIALYSIS: ICD-10-CM

## 2023-05-30 DIAGNOSIS — R29.898 OTHER SYMPTOMS AND SIGNS INVOLVING THE MUSCULOSKELETAL SYSTEM: ICD-10-CM

## 2023-05-30 PROCEDURE — 1160F RVW MEDS BY RX/DR IN RCRD: CPT | Mod: CPTII,S$GLB,, | Performed by: NURSE PRACTITIONER

## 2023-05-30 PROCEDURE — 3074F PR MOST RECENT SYSTOLIC BLOOD PRESSURE < 130 MM HG: ICD-10-PCS | Mod: CPTII,S$GLB,, | Performed by: NURSE PRACTITIONER

## 2023-05-30 PROCEDURE — 3078F DIAST BP <80 MM HG: CPT | Mod: CPTII,S$GLB,, | Performed by: NURSE PRACTITIONER

## 2023-05-30 PROCEDURE — 1101F PR PT FALLS ASSESS DOC 0-1 FALLS W/OUT INJ PAST YR: ICD-10-PCS | Mod: CPTII,S$GLB,, | Performed by: NURSE PRACTITIONER

## 2023-05-30 PROCEDURE — 1157F PR ADVANCE CARE PLAN OR EQUIV PRESENT IN MEDICAL RECORD: ICD-10-PCS | Mod: CPTII,S$GLB,, | Performed by: NURSE PRACTITIONER

## 2023-05-30 PROCEDURE — 1160F PR REVIEW ALL MEDS BY PRESCRIBER/CLIN PHARMACIST DOCUMENTED: ICD-10-PCS | Mod: CPTII,S$GLB,, | Performed by: NURSE PRACTITIONER

## 2023-05-30 PROCEDURE — 1157F ADVNC CARE PLAN IN RCRD: CPT | Mod: CPTII,S$GLB,, | Performed by: NURSE PRACTITIONER

## 2023-05-30 PROCEDURE — 1101F PT FALLS ASSESS-DOCD LE1/YR: CPT | Mod: CPTII,S$GLB,, | Performed by: NURSE PRACTITIONER

## 2023-05-30 PROCEDURE — 1125F PR PAIN SEVERITY QUANTIFIED, PAIN PRESENT: ICD-10-PCS | Mod: CPTII,S$GLB,, | Performed by: NURSE PRACTITIONER

## 2023-05-30 PROCEDURE — 3288F FALL RISK ASSESSMENT DOCD: CPT | Mod: CPTII,S$GLB,, | Performed by: NURSE PRACTITIONER

## 2023-05-30 PROCEDURE — 1159F MED LIST DOCD IN RCRD: CPT | Mod: CPTII,S$GLB,, | Performed by: NURSE PRACTITIONER

## 2023-05-30 PROCEDURE — 1159F PR MEDICATION LIST DOCUMENTED IN MEDICAL RECORD: ICD-10-PCS | Mod: CPTII,S$GLB,, | Performed by: NURSE PRACTITIONER

## 2023-05-30 PROCEDURE — 99215 OFFICE O/P EST HI 40 MIN: CPT | Mod: S$GLB,,, | Performed by: NURSE PRACTITIONER

## 2023-05-30 PROCEDURE — 3074F SYST BP LT 130 MM HG: CPT | Mod: CPTII,S$GLB,, | Performed by: NURSE PRACTITIONER

## 2023-05-30 PROCEDURE — 3078F PR MOST RECENT DIASTOLIC BLOOD PRESSURE < 80 MM HG: ICD-10-PCS | Mod: CPTII,S$GLB,, | Performed by: NURSE PRACTITIONER

## 2023-05-30 PROCEDURE — 99999 PR PBB SHADOW E&M-EST. PATIENT-LVL V: CPT | Mod: PBBFAC,,, | Performed by: NURSE PRACTITIONER

## 2023-05-30 PROCEDURE — 99999 PR PBB SHADOW E&M-EST. PATIENT-LVL V: ICD-10-PCS | Mod: PBBFAC,,, | Performed by: NURSE PRACTITIONER

## 2023-05-30 PROCEDURE — 1125F AMNT PAIN NOTED PAIN PRSNT: CPT | Mod: CPTII,S$GLB,, | Performed by: NURSE PRACTITIONER

## 2023-05-30 PROCEDURE — 99215 PR OFFICE/OUTPT VISIT, EST, LEVL V, 40-54 MIN: ICD-10-PCS | Mod: S$GLB,,, | Performed by: NURSE PRACTITIONER

## 2023-05-30 PROCEDURE — 3288F PR FALLS RISK ASSESSMENT DOCUMENTED: ICD-10-PCS | Mod: CPTII,S$GLB,, | Performed by: NURSE PRACTITIONER

## 2023-05-30 RX ORDER — AMITRIPTYLINE HYDROCHLORIDE 10 MG/1
10 TABLET, FILM COATED ORAL NIGHTLY
Qty: 30 TABLET | Refills: 11 | Status: SHIPPED | OUTPATIENT
Start: 2023-05-30 | End: 2023-07-14

## 2023-05-30 NOTE — PATIENT INSTRUCTIONS
Use written reminders.    Avoid multitasking.    Healthy diet, exercise (physical and cognitive).    Good sleep hygiene.        HERE ARE 10 WAYS TO REDUCE RISK OF DEMENTIA AND SLOW DOWN THE PROGRESSION OF DEMENTIA    1.Be physically active.    2. Avoid smoking and alcohol consumption.    3. Track your numbers. Keep your blood pressure, cholesterol, blood sugar and weight within recommended ranges.    4. Stay socially connected.    5. Make healthy food choices. Eat a well-balance and healthy diet that rich in cereals, fish, legumes, and vegetables.    6. Reduce stress.     7. Challenge your brain by trying something new, playing games, or learning a new language.    8. Take care of your hearing. Avoid being continuously exposed to loud sounds and wear a hearing aid if hearing does become a problem.    9. Lower risk of falls. Consider installing handrails on all stairs and grab bars in bathrooms.    10. Reduce your exposure to air pollution, such as exposure to exhaust in heavy traffic.         Limit overstimulation    Decrease distractions by turning off the TV, computer, any fluorescent lights that hum, etc.  Ask any casual visitors to leave--the fewer people the better  If the person is very agitated, avoid touching them, and respect their personal space  Sit down and ask the person to sit down also      SLEEP HYGIENE     Poor sleep has a negative effect on cognition. Several strategies have been shown to improve sleep:     Caffeine intake in the afternoon and evening, as well as stuffing oneself at supper, can decrease the quality of restful sleep throughout the night.   Bedtime and wake-up times should be consistent every night and morning so the body becomes used to a single routine, even on the weekends.  Engage in daily physical activity, but not 2-3 hours before bedtime.   No technology use (television, computer, iPad) 1-2 hours before bed.   Have a wind down routine (e.g., soft lights in the house, bath  before bed, reduced fluid intake, songs, reading, less noise) to promote sleep readiness.   Visit the www.sleepfoundation.org for more strategies.      COGNITIVE HYGIENE     Engage in regular exercise, which increases alertness and arousal and can improve attention and focus.  Consider lower impact exercises, such as yoga or light walking.  Get a good nights sleep, as this can enhance alertness and cognition.  Eat healthy foods and balanced meals. It is notable that research indicates certain nutrients may aid in brain function, such as B vitamins (especially B6, B12, and folic acid), antioxidants (such as vitamins C and E, and beta carotene), and Omega-3 fatty acids. Talk with your physician or nutritionist about whats right for you.   Keep your brain active. Find activities to stay mentally active, such as reading, games (cards, checkers), puzzles (crosswords, Sudoku, jig saw), crafts (models, woodworking), gardening, or participating in activities in the community.  Stay socially engaged. Continue staying active with your family and friends.

## 2023-06-01 ENCOUNTER — OFFICE VISIT (OUTPATIENT)
Dept: SPORTS MEDICINE | Facility: CLINIC | Age: 83
End: 2023-06-01
Payer: MEDICARE

## 2023-06-01 VITALS — HEIGHT: 65 IN | BODY MASS INDEX: 24.32 KG/M2 | WEIGHT: 146 LBS

## 2023-06-01 DIAGNOSIS — M17.11 PRIMARY OSTEOARTHRITIS OF RIGHT KNEE: Primary | ICD-10-CM

## 2023-06-01 PROCEDURE — 20611 LARGE JOINT ASPIRATION/INJECTION: R KNEE: ICD-10-PCS | Mod: RT,S$GLB,, | Performed by: STUDENT IN AN ORGANIZED HEALTH CARE EDUCATION/TRAINING PROGRAM

## 2023-06-01 PROCEDURE — 99499 NO LOS: ICD-10-PCS | Mod: S$GLB,,, | Performed by: STUDENT IN AN ORGANIZED HEALTH CARE EDUCATION/TRAINING PROGRAM

## 2023-06-01 PROCEDURE — 99999 PR PBB SHADOW E&M-EST. PATIENT-LVL II: ICD-10-PCS | Mod: PBBFAC,,, | Performed by: STUDENT IN AN ORGANIZED HEALTH CARE EDUCATION/TRAINING PROGRAM

## 2023-06-01 PROCEDURE — 20611 DRAIN/INJ JOINT/BURSA W/US: CPT | Mod: RT,S$GLB,, | Performed by: STUDENT IN AN ORGANIZED HEALTH CARE EDUCATION/TRAINING PROGRAM

## 2023-06-01 PROCEDURE — 99499 UNLISTED E&M SERVICE: CPT | Mod: S$GLB,,, | Performed by: STUDENT IN AN ORGANIZED HEALTH CARE EDUCATION/TRAINING PROGRAM

## 2023-06-01 PROCEDURE — 99999 PR PBB SHADOW E&M-EST. PATIENT-LVL II: CPT | Mod: PBBFAC,,, | Performed by: STUDENT IN AN ORGANIZED HEALTH CARE EDUCATION/TRAINING PROGRAM

## 2023-06-02 NOTE — PROCEDURES
Large Joint Aspiration/Injection: R knee    Date/Time: 6/1/2023 11:40 AM  Performed by: Rodolfo Wellington MD  Authorized by: Rodolfo Wellington MD     Consent Done?:  Yes (Verbal)  Indications:  Arthritis and pain  Site marked: the procedure site was marked    Timeout: prior to procedure the correct patient, procedure, and site was verified    Prep: patient was prepped and draped in usual sterile fashion      Local anesthesia used?: Yes    Local anesthetic:  Topical anesthetic    Details:  Needle Size:  22 G  Ultrasonic Guidance for needle placement?: Yes    Images are saved and documented.  Approach: Superior lateral.  Location:  Knee  Site:  R knee  Medications:  30 mg sodium hyaluronate (orthovisc) 30 mg/2 mL  Patient tolerance:  Patient tolerated the procedure well with no immediate complications     Ultrasound guidance was used for needle localization. Images were saved and stored for documentation. The appropriate structures were visualized. Dynamic visualization of the needle was continuous throughout the procedures and maintained good position.     We discussed the proper protocols after the injection such as no submerging pools, baths tubs, or hot tubs for 24 hr.  We discussed red flags such as fevers, chills, red, warm, tender joint at the area of injection to please seek medical care immediately.      MEDICAL NECESSITY FOR VISCOSUPPLEMETNATION: After thorough evaluation of the patient, I have determined that visco-supplementation is medically necessary. The patient has painful degenerative changes of the knee with failure of conservative treatments including lifestyle modifications and rehabilitation exercises.  Oral analgesis/NSAIDs have not adequately controlled symptoms and there is radiographic evidence of Kellgren David grade 2 or greater osteoarthritic changes, or in lack of radiographic evidence, there is arthroscopic or other evidence of chondrosis.     Orthovisc:  Right knee 1/3

## 2023-06-08 ENCOUNTER — OFFICE VISIT (OUTPATIENT)
Dept: SPORTS MEDICINE | Facility: CLINIC | Age: 83
End: 2023-06-08
Payer: MEDICARE

## 2023-06-08 VITALS — BODY MASS INDEX: 24.32 KG/M2 | HEIGHT: 65 IN | WEIGHT: 146 LBS

## 2023-06-08 DIAGNOSIS — M17.11 PRIMARY OSTEOARTHRITIS OF RIGHT KNEE: Primary | ICD-10-CM

## 2023-06-08 PROCEDURE — 99999 PR PBB SHADOW E&M-EST. PATIENT-LVL IV: CPT | Mod: PBBFAC,,, | Performed by: STUDENT IN AN ORGANIZED HEALTH CARE EDUCATION/TRAINING PROGRAM

## 2023-06-08 PROCEDURE — 99999 PR PBB SHADOW E&M-EST. PATIENT-LVL IV: ICD-10-PCS | Mod: PBBFAC,,, | Performed by: STUDENT IN AN ORGANIZED HEALTH CARE EDUCATION/TRAINING PROGRAM

## 2023-06-08 PROCEDURE — 99499 UNLISTED E&M SERVICE: CPT | Mod: S$GLB,,, | Performed by: STUDENT IN AN ORGANIZED HEALTH CARE EDUCATION/TRAINING PROGRAM

## 2023-06-08 PROCEDURE — 20611 LARGE JOINT ASPIRATION/INJECTION: R KNEE: ICD-10-PCS | Mod: RT,S$GLB,, | Performed by: STUDENT IN AN ORGANIZED HEALTH CARE EDUCATION/TRAINING PROGRAM

## 2023-06-08 PROCEDURE — 20611 DRAIN/INJ JOINT/BURSA W/US: CPT | Mod: RT,S$GLB,, | Performed by: STUDENT IN AN ORGANIZED HEALTH CARE EDUCATION/TRAINING PROGRAM

## 2023-06-08 PROCEDURE — 99499 NO LOS: ICD-10-PCS | Mod: S$GLB,,, | Performed by: STUDENT IN AN ORGANIZED HEALTH CARE EDUCATION/TRAINING PROGRAM

## 2023-06-08 NOTE — PROCEDURES
Large Joint Aspiration/Injection: R knee    Date/Time: 6/8/2023 10:40 AM  Performed by: Rodolfo Wellington MD  Authorized by: Rodolfo Wellington MD     Consent Done?:  Yes (Verbal)  Indications:  Arthritis and pain  Site marked: the procedure site was marked    Timeout: prior to procedure the correct patient, procedure, and site was verified    Prep: patient was prepped and draped in usual sterile fashion      Local anesthesia used?: Yes    Local anesthetic:  Topical anesthetic    Details:  Needle Size:  22 G  Ultrasonic Guidance for needle placement?: Yes    Images are saved and documented.  Approach: Superior lateral.  Location:  Knee  Site:  R knee  Medications:  30 mg sodium hyaluronate (orthovisc) 30 mg/2 mL  Patient tolerance:  Patient tolerated the procedure well with no immediate complications     Ultrasound guidance was used for needle localization. Images were saved and stored for documentation. The appropriate structures were visualized. Dynamic visualization of the needle was continuous throughout the procedures and maintained good position.     We discussed the proper protocols after the injection such as no submerging pools, baths tubs, or hot tubs for 24 hr.  We discussed red flags such as fevers, chills, red, warm, tender joint at the area of injection to please seek medical care immediately.      MEDICAL NECESSITY FOR VISCOSUPPLEMETNATION: After thorough evaluation of the patient, I have determined that visco-supplementation is medically necessary. The patient has painful degenerative changes of the knee with failure of conservative treatments including lifestyle modifications and rehabilitation exercises.  Oral analgesis/NSAIDs have not adequately controlled symptoms and there is radiographic evidence of Kellgren David grade 2 or greater osteoarthritic changes, or in lack of radiographic evidence, there is arthroscopic or other evidence of chondrosis.     Orthovisc:  Right knee 2/3

## 2023-06-12 DIAGNOSIS — I20.89 STABLE ANGINA PECTORIS: Primary | ICD-10-CM

## 2023-06-13 ENCOUNTER — HOSPITAL ENCOUNTER (OUTPATIENT)
Dept: CARDIOLOGY | Facility: HOSPITAL | Age: 83
Discharge: HOME | End: 2023-06-13
Attending: INTERNAL MEDICINE
Payer: MEDICARE

## 2023-06-13 ENCOUNTER — OFFICE VISIT (OUTPATIENT)
Dept: CARDIOLOGY | Facility: CLINIC | Age: 83
End: 2023-06-13
Payer: MEDICARE

## 2023-06-13 VITALS
OXYGEN SATURATION: 97 % | HEIGHT: 65 IN | SYSTOLIC BLOOD PRESSURE: 114 MMHG | DIASTOLIC BLOOD PRESSURE: 48 MMHG | HEART RATE: 76 BPM | BODY MASS INDEX: 24.39 KG/M2 | WEIGHT: 146.38 LBS

## 2023-06-13 DIAGNOSIS — E78.00 PURE HYPERCHOLESTEROLEMIA: ICD-10-CM

## 2023-06-13 DIAGNOSIS — R06.02 SOB (SHORTNESS OF BREATH): ICD-10-CM

## 2023-06-13 DIAGNOSIS — R07.9 CHEST PAIN, UNSPECIFIED TYPE: ICD-10-CM

## 2023-06-13 DIAGNOSIS — J90 PLEURAL EFFUSION: ICD-10-CM

## 2023-06-13 DIAGNOSIS — I20.89 STABLE ANGINA PECTORIS: Primary | ICD-10-CM

## 2023-06-13 DIAGNOSIS — J90 PLEURAL EFFUSION, LEFT: ICD-10-CM

## 2023-06-13 DIAGNOSIS — R73.9 HYPERGLYCEMIA: ICD-10-CM

## 2023-06-13 DIAGNOSIS — D64.9 ANEMIA, UNSPECIFIED TYPE: ICD-10-CM

## 2023-06-13 DIAGNOSIS — I15.2 HYPERTENSION ASSOCIATED WITH DIABETES: ICD-10-CM

## 2023-06-13 DIAGNOSIS — Z99.2 ESRD NEEDING DIALYSIS: ICD-10-CM

## 2023-06-13 DIAGNOSIS — N18.6 ESRD NEEDING DIALYSIS: ICD-10-CM

## 2023-06-13 DIAGNOSIS — E11.59 HYPERTENSION ASSOCIATED WITH DIABETES: ICD-10-CM

## 2023-06-13 DIAGNOSIS — I20.89 STABLE ANGINA PECTORIS: ICD-10-CM

## 2023-06-13 DIAGNOSIS — I49.5 SICK SINUS SYNDROME: ICD-10-CM

## 2023-06-13 PROCEDURE — 93010 ELECTROCARDIOGRAM REPORT: CPT | Mod: ,,, | Performed by: INTERNAL MEDICINE

## 2023-06-13 PROCEDURE — 3078F PR MOST RECENT DIASTOLIC BLOOD PRESSURE < 80 MM HG: ICD-10-PCS | Mod: CPTII,S$GLB,, | Performed by: INTERNAL MEDICINE

## 2023-06-13 PROCEDURE — 3074F PR MOST RECENT SYSTOLIC BLOOD PRESSURE < 130 MM HG: ICD-10-PCS | Mod: CPTII,S$GLB,, | Performed by: INTERNAL MEDICINE

## 2023-06-13 PROCEDURE — 99999 PR PBB SHADOW E&M-EST. PATIENT-LVL V: ICD-10-PCS | Mod: PBBFAC,,, | Performed by: INTERNAL MEDICINE

## 2023-06-13 PROCEDURE — 1101F PR PT FALLS ASSESS DOC 0-1 FALLS W/OUT INJ PAST YR: ICD-10-PCS | Mod: CPTII,S$GLB,, | Performed by: INTERNAL MEDICINE

## 2023-06-13 PROCEDURE — 1160F RVW MEDS BY RX/DR IN RCRD: CPT | Mod: CPTII,S$GLB,, | Performed by: INTERNAL MEDICINE

## 2023-06-13 PROCEDURE — 93005 ELECTROCARDIOGRAM TRACING: CPT

## 2023-06-13 PROCEDURE — 99999 PR PBB SHADOW E&M-EST. PATIENT-LVL V: CPT | Mod: PBBFAC,,, | Performed by: INTERNAL MEDICINE

## 2023-06-13 PROCEDURE — 1157F PR ADVANCE CARE PLAN OR EQUIV PRESENT IN MEDICAL RECORD: ICD-10-PCS | Mod: CPTII,S$GLB,, | Performed by: INTERNAL MEDICINE

## 2023-06-13 PROCEDURE — 1125F PR PAIN SEVERITY QUANTIFIED, PAIN PRESENT: ICD-10-PCS | Mod: CPTII,S$GLB,, | Performed by: INTERNAL MEDICINE

## 2023-06-13 PROCEDURE — 93010 EKG 12-LEAD: ICD-10-PCS | Mod: ,,, | Performed by: INTERNAL MEDICINE

## 2023-06-13 PROCEDURE — 1160F PR REVIEW ALL MEDS BY PRESCRIBER/CLIN PHARMACIST DOCUMENTED: ICD-10-PCS | Mod: CPTII,S$GLB,, | Performed by: INTERNAL MEDICINE

## 2023-06-13 PROCEDURE — 3078F DIAST BP <80 MM HG: CPT | Mod: CPTII,S$GLB,, | Performed by: INTERNAL MEDICINE

## 2023-06-13 PROCEDURE — 3074F SYST BP LT 130 MM HG: CPT | Mod: CPTII,S$GLB,, | Performed by: INTERNAL MEDICINE

## 2023-06-13 PROCEDURE — 99214 PR OFFICE/OUTPT VISIT, EST, LEVL IV, 30-39 MIN: ICD-10-PCS | Mod: S$GLB,,, | Performed by: INTERNAL MEDICINE

## 2023-06-13 PROCEDURE — 3288F PR FALLS RISK ASSESSMENT DOCUMENTED: ICD-10-PCS | Mod: CPTII,S$GLB,, | Performed by: INTERNAL MEDICINE

## 2023-06-13 PROCEDURE — 1125F AMNT PAIN NOTED PAIN PRSNT: CPT | Mod: CPTII,S$GLB,, | Performed by: INTERNAL MEDICINE

## 2023-06-13 PROCEDURE — 3288F FALL RISK ASSESSMENT DOCD: CPT | Mod: CPTII,S$GLB,, | Performed by: INTERNAL MEDICINE

## 2023-06-13 PROCEDURE — 1159F MED LIST DOCD IN RCRD: CPT | Mod: CPTII,S$GLB,, | Performed by: INTERNAL MEDICINE

## 2023-06-13 PROCEDURE — 99214 OFFICE O/P EST MOD 30 MIN: CPT | Mod: S$GLB,,, | Performed by: INTERNAL MEDICINE

## 2023-06-13 PROCEDURE — 1157F ADVNC CARE PLAN IN RCRD: CPT | Mod: CPTII,S$GLB,, | Performed by: INTERNAL MEDICINE

## 2023-06-13 PROCEDURE — 1101F PT FALLS ASSESS-DOCD LE1/YR: CPT | Mod: CPTII,S$GLB,, | Performed by: INTERNAL MEDICINE

## 2023-06-13 PROCEDURE — 1159F PR MEDICATION LIST DOCUMENTED IN MEDICAL RECORD: ICD-10-PCS | Mod: CPTII,S$GLB,, | Performed by: INTERNAL MEDICINE

## 2023-06-13 NOTE — PROGRESS NOTES
Subjective:   Patient ID:  Cailin Pickett is a 82 y.o. female who presents for follow-up of No chief complaint on file.  Hypertension  This is a chronic problem. The current episode started more than 1 year ago. The problem has been gradually improving since onset. The problem is controlled. Pertinent negatives include no chest pain, palpitations or shortness of breath. Past treatments include diuretics. The current treatment provides moderate improvement. There are no compliance problems.    Hyperlipidemia  This is a chronic problem. The current episode started more than 1 year ago. The problem is controlled. Pertinent negatives include no chest pain or shortness of breath. Current antihyperlipidemic treatment includes statins. There are no compliance problems.    Clinically patient is stabilized improved.  She is on dialysis and needs to pull more fluid off by dialysis.  She is on Lasix daily with some diuresis.  I told the patient to use her inhaler to make sure that she can expand her lungs.  This pleasant patient presents after going to the hospital with evidence of pleural effusions and need for tap on the left.  Patient has evidence of bilateral pleural effusions and evidence of edema.  Cardiac echo showed normal LV function is no evidence of cardiac ischemia by nuclear stress test.  Repeat CT scan of the chest today shows improvement in left pleural effusion stable and mild on the right side.  There is evidence of atelectasis.     The finds the patient feeling well today and doing well current medications.     Today the patient is doing well no recurrence symptoms all medications reviewed and renewed as necessary.  Last echo showed normal LV function nuclear stress test was stable.   NO FOCAL CNS SYMPTOMS OR SIGNS TO SUGGEST TIA OR STROKE  NO ANGINA OR EQUIVALENT  NO UNUSUAL HYDE. NO ORTHOPNEA OR PND  NO PALPITATIONS  NO NEAR SYNCOPE OR SYNCOPE  NO EDEMA. NO CALVE TENDERNESS           Clinically patient  doing well today no exertional symptoms chest pain shortness breath.  Evaluation in last year showed normal cardiac echo and normal perfusion scan by nuclear scan and no evidence of cardiac ischemia.  Heart rate blood pressure stable today she will be cleared for knee surgery.        06/13/2023 overall patient doing well no exertional symptoms chest pain shortness breath stable on dialysis 3 times a week heart rate blood pressure stable on medications stable no other changes today.  No  Complaints today.      Review of Systems   Constitutional: Negative for chills, diaphoresis, night sweats, weight gain and weight loss.   HENT:  Negative for congestion, hoarse voice, sore throat and stridor.    Eyes:  Negative for double vision and pain.   Cardiovascular:  Negative for chest pain, claudication, cyanosis, dyspnea on exertion, irregular heartbeat, leg swelling, near-syncope, orthopnea, palpitations, paroxysmal nocturnal dyspnea and syncope.   Respiratory:  Negative for cough, hemoptysis, shortness of breath, sleep disturbances due to breathing, snoring, sputum production and wheezing.    Endocrine: Negative for cold intolerance, heat intolerance and polydipsia.   Hematologic/Lymphatic: Negative for bleeding problem. Does not bruise/bleed easily.   Skin:  Negative for color change, dry skin and rash.   Musculoskeletal:  Negative for joint swelling and muscle cramps.   Gastrointestinal:  Negative for bloating, abdominal pain, constipation, diarrhea, dysphagia, melena, nausea and vomiting.   Genitourinary:  Negative for flank pain and urgency.   Neurological:  Negative for dizziness, focal weakness, headaches, light-headedness, loss of balance, seizures and weakness.   Psychiatric/Behavioral:  Negative for altered mental status and memory loss. The patient is not nervous/anxious.    Family History   Problem Relation Age of Onset    Heart disease Mother 60        MI    Hypertension Mother     Stroke Mother     Breast  "cancer Mother     Cancer Father         prostate    Cancer Brother         renal cell carcinoma    Diabetes Brother     Kidney disease Brother         ESRD s/p kidney transplant    Breast cancer Sister     Breast cancer Sister      Past Medical History:   Diagnosis Date    Anemia     CKD (chronic kidney disease) stage 5, GFR less than 15 ml/min 3/14/2019    CKD (chronic kidney disease), stage III     Diabetes mellitus type II     Encounter for blood transfusion     Family history of colonic polyps 2017    Gout, unspecified     Hyperlipidemia     Hypertension     Neuropathy     Pancreatic cancer     Renal failure     Secondary hyperparathyroidism of renal origin 3/14/2019    Thyroid disease      Social History     Socioeconomic History    Marital status:     Number of children: 0   Occupational History     Comment: stay home    Tobacco Use    Smoking status: Former     Packs/day: 1.00     Years: 35.00     Pack years: 35.00     Types: Cigarettes     Start date: 3/14/1962     Quit date: 2001     Years since quittin.4    Smokeless tobacco: Never   Substance and Sexual Activity    Alcohol use: No    Drug use: No    Sexual activity: Not Currently   Social History Narrative    She lives [place] from [place]     Current Outpatient Medications on File Prior to Visit   Medication Sig Dispense Refill    allopurinoL (ZYLOPRIM) 100 MG tablet Take 1 tablet (100 mg total) by mouth once daily. 90 tablet 3    amitriptyline (ELAVIL) 10 MG tablet Take 1 tablet (10 mg total) by mouth every evening. 30 tablet 11    amLODIPine (NORVASC) 2.5 MG tablet Take 1 tablet (2.5 mg total) by mouth once daily. 90 tablet 3    aspirin (ECOTRIN) 81 MG EC tablet Take 1 tablet (81 mg total) by mouth once daily. 30 tablet 0    atorvastatin (LIPITOR) 40 MG tablet Take 1 tablet (40 mg total) by mouth once daily. 90 tablet 3    AURYXIA 210 mg iron Tab       BD ULTRA-FINE MICRO PEN NEEDLE 32 gauge x /" Ndle       " "benzonatate (TESSALON) 100 MG capsule Take 200 mg by mouth.      blood sugar diagnostic Strp USE TO MONITOR BLOOD GLUCOSE DAILY      cetirizine (ZYRTEC) 10 MG tablet Take 10 mg by mouth once daily.      co-enzyme Q-10 30 mg capsule Take 1 capsule (30 mg total) by mouth 2 (two) times daily. 180 capsule 3    docusate sodium (COLACE) 50 MG capsule Take by mouth.      ezetimibe (ZETIA) 10 mg tablet TAKE 1 TABLET DAILY 90 tablet 0    fluocinolone (DERMA-SMOOTHE) 0.01 % external oil Apply oil to scalp once a day 1 Bottle 5    furosemide (LASIX) 40 MG tablet TAKE 1 TABLET DAILY 90 tablet 3    gabapentin (NEURONTIN) 600 MG tablet Take 1 tablet (600 mg total) by mouth once daily. Daily at 05:00 PM 90 tablet 3    glucosamine-chondroitin 250-200 mg Tab Take 1 tablet by mouth 2 (two) times daily.      insulin degludec (TRESIBA FLEXTOUCH U-100) 100 unit/mL (3 mL) InPn Inject 8 Units into the skin.       methoxy peg-epoetin beta (MIRCERA INJ) 50 mcg.      omeprazole (PRILOSEC) 40 MG capsule Take 1 capsule (40 mg total) by mouth once daily. 30 capsule 11    ONETOUCH DELICA PLUS LANCET 30 gauge Misc 2 (two) times daily. Use to check blood glucose      pen needle, diabetic 32 gauge x 1/4" Ndle Use to test blood sugars bid      PRORENAL 8 mg iron-800 mcg-1,000 unit Tab Take 1 tablet by mouth once daily.      RENAPLEX-D 800 mcg-12.5 mg -2,000 unit Tab Take 1 tablet by mouth once daily.      sevelamer carbonate (RENVELA) 800 mg Tab Take 2 tablets by mouth.      sodium chloride 0.9% SolP 100 mL with iron sucrose 100 mg iron/5 mL Soln 100 mg 50 mg.      traMADoL (ULTRAM) 50 mg tablet Take 1 tablet (50 mg total) by mouth every 12 (twelve) hours. 30 tablet 0    VELPHORO 500 mg Chew Take 1 tablet by mouth 3 (three) times daily.      vit B,C-FA-zinc-selen-vit D3-E (RENAPLEX-D) 800 mcg-12.5 mg -2,000 unit Tab Take 1 tablet by mouth.      vitamin renal formula, B-complex-vitamin c-folic acid, (NEPHROCAP) 1 mg Cap Take 1 capsule by mouth once " daily. 30 capsule 0     No current facility-administered medications on file prior to visit.     Review of patient's allergies indicates:   Allergen Reactions    Allegra [fexofenadine] Swelling    Codeine Hives, Itching and Nausea And Vomiting       Objective:     Physical Exam  Eyes:      Pupils: Pupils are equal, round, and reactive to light.   Neck:      Trachea: No tracheal deviation.   Cardiovascular:      Rate and Rhythm: Normal rate and regular rhythm.      Pulses: Intact distal pulses.           Carotid pulses are 2+ on the right side and 2+ on the left side.       Radial pulses are 2+ on the right side and 2+ on the left side.        Femoral pulses are 2+ on the right side and 2+ on the left side.       Popliteal pulses are 2+ on the right side and 2+ on the left side.        Dorsalis pedis pulses are 2+ on the right side and 2+ on the left side.        Posterior tibial pulses are 2+ on the right side and 2+ on the left side.      Heart sounds: Normal heart sounds. No murmur heard.    No friction rub. No gallop.   Pulmonary:      Effort: Pulmonary effort is normal. No respiratory distress.      Breath sounds: Normal breath sounds. No stridor. No wheezing or rales.   Chest:      Chest wall: No tenderness.   Abdominal:      General: There is no distension.      Tenderness: There is no abdominal tenderness. There is no rebound.   Musculoskeletal:         General: No tenderness.      Cervical back: Normal range of motion.   Skin:     General: Skin is warm and dry.   Neurological:      Mental Status: She is alert and oriented to person, place, and time.     EKG today shows normal sinus rhythm LVH otherwise within normal limitsThis EKG was personally reviewed by myself, I agree with the interpretation.  Assessment:     1. Stable angina pectoris    2. Pure hypercholesterolemia    3. SOB (shortness of breath)    4. Hypertension associated with diabetes    5. Pleural effusion    6. Chest pain, unspecified type    7.  Anemia, unspecified type    8. Pleural effusion, left    9. Hyperglycemia    10. Sick sinus syndrome    11. ESRD needing dialysis        Plan:     Stable angina pectoris    Pure hypercholesterolemia    SOB (shortness of breath)    Hypertension associated with diabetes    Pleural effusion    Chest pain, unspecified type    Anemia, unspecified type    Pleural effusion, left    Hyperglycemia    Sick sinus syndrome    ESRD needing dialysis          Impression 1 chronic dialysis stable   2. Hypertension stable current medications   3.  Chest discomfort resolved   All questions answered patient doing well current medications follow-up evaluation again in 6 months sooner if acute recurrence symptoms.

## 2023-06-15 ENCOUNTER — TELEPHONE (OUTPATIENT)
Dept: PAIN MEDICINE | Facility: CLINIC | Age: 83
End: 2023-06-15
Payer: MEDICARE

## 2023-06-15 ENCOUNTER — OFFICE VISIT (OUTPATIENT)
Dept: SPORTS MEDICINE | Facility: CLINIC | Age: 83
End: 2023-06-15
Payer: MEDICARE

## 2023-06-15 VITALS — HEIGHT: 65 IN | BODY MASS INDEX: 24.32 KG/M2 | WEIGHT: 146 LBS

## 2023-06-15 DIAGNOSIS — M17.11 PRIMARY OSTEOARTHRITIS OF RIGHT KNEE: ICD-10-CM

## 2023-06-15 DIAGNOSIS — G89.29 CHRONIC BILATERAL LOW BACK PAIN, UNSPECIFIED WHETHER SCIATICA PRESENT: Primary | ICD-10-CM

## 2023-06-15 DIAGNOSIS — M54.50 CHRONIC BILATERAL LOW BACK PAIN, UNSPECIFIED WHETHER SCIATICA PRESENT: Primary | ICD-10-CM

## 2023-06-15 PROCEDURE — 99499 UNLISTED E&M SERVICE: CPT | Mod: S$GLB,,, | Performed by: STUDENT IN AN ORGANIZED HEALTH CARE EDUCATION/TRAINING PROGRAM

## 2023-06-15 PROCEDURE — 20611 DRAIN/INJ JOINT/BURSA W/US: CPT | Mod: RT,S$GLB,, | Performed by: STUDENT IN AN ORGANIZED HEALTH CARE EDUCATION/TRAINING PROGRAM

## 2023-06-15 PROCEDURE — 99999 PR PBB SHADOW E&M-EST. PATIENT-LVL IV: ICD-10-PCS | Mod: PBBFAC,,, | Performed by: STUDENT IN AN ORGANIZED HEALTH CARE EDUCATION/TRAINING PROGRAM

## 2023-06-15 PROCEDURE — 99999 PR PBB SHADOW E&M-EST. PATIENT-LVL IV: CPT | Mod: PBBFAC,,, | Performed by: STUDENT IN AN ORGANIZED HEALTH CARE EDUCATION/TRAINING PROGRAM

## 2023-06-15 PROCEDURE — 99499 NO LOS: ICD-10-PCS | Mod: S$GLB,,, | Performed by: STUDENT IN AN ORGANIZED HEALTH CARE EDUCATION/TRAINING PROGRAM

## 2023-06-15 PROCEDURE — 20611 LARGE JOINT ASPIRATION/INJECTION: R KNEE: ICD-10-PCS | Mod: RT,S$GLB,, | Performed by: STUDENT IN AN ORGANIZED HEALTH CARE EDUCATION/TRAINING PROGRAM

## 2023-06-15 NOTE — PROCEDURES
Large Joint Aspiration/Injection: R knee    Date/Time: 6/15/2023 11:40 AM  Performed by: Rodolfo Wellington MD  Authorized by: Rodolfo Wellington MD     Consent Done?:  Yes (Verbal)  Indications:  Arthritis and pain  Site marked: the procedure site was marked    Timeout: prior to procedure the correct patient, procedure, and site was verified    Prep: patient was prepped and draped in usual sterile fashion      Local anesthesia used?: Yes    Local anesthetic:  Topical anesthetic    Details:  Needle Size:  22 G  Ultrasonic Guidance for needle placement?: Yes    Images are saved and documented.  Approach: Superior lateral.  Location:  Knee  Site:  R knee  Medications:  30 mg sodium hyaluronate (orthovisc) 30 mg/2 mL  Patient tolerance:  Patient tolerated the procedure well with no immediate complications     Ultrasound guidance was used for needle localization. Images were saved and stored for documentation. The appropriate structures were visualized. Dynamic visualization of the needle was continuous throughout the procedures and maintained good position.     We discussed the proper protocols after the injection such as no submerging pools, baths tubs, or hot tubs for 24 hr.  We discussed red flags such as fevers, chills, red, warm, tender joint at the area of injection to please seek medical care immediately.      MEDICAL NECESSITY FOR VISCOSUPPLEMETNATION: After thorough evaluation of the patient, I have determined that visco-supplementation is medically necessary. The patient has painful degenerative changes of the knee with failure of conservative treatments including lifestyle modifications and rehabilitation exercises.  Oral analgesis/NSAIDs have not adequately controlled symptoms and there is radiographic evidence of Kellgren David grade 2 or greater osteoarthritic changes, or in lack of radiographic evidence, there is arthroscopic or other evidence of chondrosis.     Orthovisc:  Right knee 3/3 follow-up  paige

## 2023-07-11 ENCOUNTER — HOSPITAL ENCOUNTER (OUTPATIENT)
Dept: RADIOLOGY | Facility: HOSPITAL | Age: 83
Discharge: HOME | End: 2023-07-11
Attending: NURSE PRACTITIONER
Payer: MEDICARE

## 2023-07-11 PROCEDURE — 77063 MAMMO DIGITAL SCREENING BILAT WITH TOMO: ICD-10-PCS | Mod: 26,,, | Performed by: RADIOLOGY

## 2023-07-11 PROCEDURE — 77063 BREAST TOMOSYNTHESIS BI: CPT | Mod: 26,,, | Performed by: RADIOLOGY

## 2023-07-11 PROCEDURE — 77067 MAMMO DIGITAL SCREENING BILAT WITH TOMO: ICD-10-PCS | Mod: 26,,, | Performed by: RADIOLOGY

## 2023-07-11 PROCEDURE — 77067 SCR MAMMO BI INCL CAD: CPT | Mod: TC

## 2023-07-11 PROCEDURE — 77067 SCR MAMMO BI INCL CAD: CPT | Mod: 26,,, | Performed by: RADIOLOGY

## 2023-07-14 RX ORDER — AMITRIPTYLINE HYDROCHLORIDE 10 MG/1
10 TABLET, FILM COATED ORAL NIGHTLY
Qty: 90 TABLET | Refills: 1 | Status: SHIPPED | OUTPATIENT
Start: 2023-07-14 | End: 2023-11-02

## 2023-08-07 ENCOUNTER — TELEPHONE (OUTPATIENT)
Dept: SPORTS MEDICINE | Facility: CLINIC | Age: 83
End: 2023-08-07
Payer: MEDICARE

## 2023-08-07 NOTE — TELEPHONE ENCOUNTER
Called and got pt scheduled for Sept 21 at 11:40 am. Pt verbally understood and accepted appointment.   ----- Message from Negar Coates sent at 8/7/2023 11:47 AM CDT -----  Contact: SELF [phone]  Patient called in this morning requesting an injection in her right knee. Please call back [phone]. Thanks tpw

## 2023-08-22 ENCOUNTER — TELEPHONE (OUTPATIENT)
Dept: GASTROENTEROLOGY | Facility: CLINIC | Age: 83
End: 2023-08-22
Payer: MEDICARE

## 2023-08-22 NOTE — TELEPHONE ENCOUNTER
----- Message from Meghan John sent at 8/22/2023 12:40 PM CDT -----  Contact: Cailin  Type:  Needs Medical Advice    Who Called: Cailin  Symptoms (please be specific): Sore throat    How long has patient had these symptoms:  approximately 2 weeks   Pharmacy name and phone #:   [facility] - [address] [address]  Phone: [phone] Fax: [phone]  Would the patient rather a call back or a response via [facility]? call  Best Call Back Number: [phone]  Additional Information: Patient is requesting  call back.  Thank you,   TH

## 2023-08-22 NOTE — TELEPHONE ENCOUNTER
Patient called complaining of throat pain cough and sneezing. After conferring with GI provider it has been recommended that patient check with her Primary care provider first to rile out issues beside GI. If PCP deems it necessary to be evaluated by GI, patient was instruct to call and get scheduled

## 2023-09-21 ENCOUNTER — OFFICE VISIT (OUTPATIENT)
Dept: SPORTS MEDICINE | Facility: CLINIC | Age: 83
End: 2023-09-21
Payer: MEDICARE

## 2023-09-21 VITALS — HEIGHT: 65 IN | BODY MASS INDEX: 24.32 KG/M2 | WEIGHT: 146 LBS

## 2023-09-21 DIAGNOSIS — M17.11 PRIMARY OSTEOARTHRITIS OF RIGHT KNEE: Primary | ICD-10-CM

## 2023-09-21 PROCEDURE — 1125F PR PAIN SEVERITY QUANTIFIED, PAIN PRESENT: ICD-10-PCS | Mod: CPTII,S$GLB,, | Performed by: STUDENT IN AN ORGANIZED HEALTH CARE EDUCATION/TRAINING PROGRAM

## 2023-09-21 PROCEDURE — 1157F ADVNC CARE PLAN IN RCRD: CPT | Mod: CPTII,S$GLB,, | Performed by: STUDENT IN AN ORGANIZED HEALTH CARE EDUCATION/TRAINING PROGRAM

## 2023-09-21 PROCEDURE — 99999 PR PBB SHADOW E&M-EST. PATIENT-LVL IV: ICD-10-PCS | Mod: PBBFAC,,, | Performed by: STUDENT IN AN ORGANIZED HEALTH CARE EDUCATION/TRAINING PROGRAM

## 2023-09-21 PROCEDURE — 1159F PR MEDICATION LIST DOCUMENTED IN MEDICAL RECORD: ICD-10-PCS | Mod: CPTII,S$GLB,, | Performed by: STUDENT IN AN ORGANIZED HEALTH CARE EDUCATION/TRAINING PROGRAM

## 2023-09-21 PROCEDURE — 1159F MED LIST DOCD IN RCRD: CPT | Mod: CPTII,S$GLB,, | Performed by: STUDENT IN AN ORGANIZED HEALTH CARE EDUCATION/TRAINING PROGRAM

## 2023-09-21 PROCEDURE — 1157F PR ADVANCE CARE PLAN OR EQUIV PRESENT IN MEDICAL RECORD: ICD-10-PCS | Mod: CPTII,S$GLB,, | Performed by: STUDENT IN AN ORGANIZED HEALTH CARE EDUCATION/TRAINING PROGRAM

## 2023-09-21 PROCEDURE — 20611 DRAIN/INJ JOINT/BURSA W/US: CPT | Mod: RT,S$GLB,, | Performed by: STUDENT IN AN ORGANIZED HEALTH CARE EDUCATION/TRAINING PROGRAM

## 2023-09-21 PROCEDURE — 99214 PR OFFICE/OUTPT VISIT, EST, LEVL IV, 30-39 MIN: ICD-10-PCS | Mod: 25,S$GLB,, | Performed by: STUDENT IN AN ORGANIZED HEALTH CARE EDUCATION/TRAINING PROGRAM

## 2023-09-21 PROCEDURE — 99999 PR PBB SHADOW E&M-EST. PATIENT-LVL IV: CPT | Mod: PBBFAC,,, | Performed by: STUDENT IN AN ORGANIZED HEALTH CARE EDUCATION/TRAINING PROGRAM

## 2023-09-21 PROCEDURE — 99214 OFFICE O/P EST MOD 30 MIN: CPT | Mod: 25,S$GLB,, | Performed by: STUDENT IN AN ORGANIZED HEALTH CARE EDUCATION/TRAINING PROGRAM

## 2023-09-21 PROCEDURE — 20611 LARGE JOINT ASPIRATION/INJECTION: R KNEE: ICD-10-PCS | Mod: RT,S$GLB,, | Performed by: STUDENT IN AN ORGANIZED HEALTH CARE EDUCATION/TRAINING PROGRAM

## 2023-09-21 PROCEDURE — 1125F AMNT PAIN NOTED PAIN PRSNT: CPT | Mod: CPTII,S$GLB,, | Performed by: STUDENT IN AN ORGANIZED HEALTH CARE EDUCATION/TRAINING PROGRAM

## 2023-09-21 RX ORDER — TRIAMCINOLONE ACETONIDE 40 MG/ML
40 INJECTION, SUSPENSION INTRA-ARTICULAR; INTRAMUSCULAR
Status: DISCONTINUED | OUTPATIENT
Start: 2023-09-21 | End: 2023-09-21 | Stop reason: HOSPADM

## 2023-09-21 RX ORDER — BUPIVACAINE HYDROCHLORIDE 5 MG/ML
2 INJECTION, SOLUTION PERINEURAL
Status: DISCONTINUED | OUTPATIENT
Start: 2023-09-21 | End: 2023-09-21 | Stop reason: HOSPADM

## 2023-09-21 RX ORDER — LIDOCAINE HYDROCHLORIDE 10 MG/ML
2 INJECTION INFILTRATION; PERINEURAL
Status: DISCONTINUED | OUTPATIENT
Start: 2023-09-21 | End: 2023-09-21 | Stop reason: HOSPADM

## 2023-09-21 RX ADMIN — TRIAMCINOLONE ACETONIDE 40 MG: 40 INJECTION, SUSPENSION INTRA-ARTICULAR; INTRAMUSCULAR at 11:09

## 2023-09-21 RX ADMIN — LIDOCAINE HYDROCHLORIDE 2 ML: 10 INJECTION INFILTRATION; PERINEURAL at 11:09

## 2023-09-21 RX ADMIN — BUPIVACAINE HYDROCHLORIDE 2 ML: 5 INJECTION, SOLUTION PERINEURAL at 11:09

## 2023-09-21 NOTE — PROGRESS NOTES
Patient ID: Cailin Pickett  YOB: 1940  MRN: [id number]    Chief Complaint: Pain of the Right Knee      History of Present Illness: Cailin Pickett is a 83-year-old female presenting for follow-up for right knee primary osteoarthritis.  Status post Visco 3 months ago.  Feels like it only lasted a few days did not get much benefit from her last round of gel shots.  Requesting pain relief today.  Is not interested in surgery at this time.  Notes pain is similar no new injuries falls traumas.  Past Medical History:   Past Medical History:   Diagnosis Date    Anemia     CKD (chronic kidney disease) stage 5, GFR less than 15 ml/min 03/14/2019    CKD (chronic kidney disease), stage III     Diabetes mellitus type II     Encounter for blood transfusion     Family history of colonic polyps 07/18/2017    Gout, unspecified     Hyperlipidemia     Hypertension     Neuropathy     Pancreatic cancer     Renal failure     Secondary hyperparathyroidism of renal origin 03/14/2019    Thyroid disease      Past Surgical History:   Procedure Laterality Date    COLONOSCOPY  2011    Dr. Favio Mims    COLONOSCOPY N/A 07/18/2017    Procedure: screening colonoscopy;  Surgeon: Kenneth Kamara MD;  Location: [facility];  Service: Endoscopy;  Laterality: N/A;    ESOPHAGEAL MANOMETRY WITH MEASUREMENT OF IMPEDANCE N/A 04/07/2022    Procedure: MANOMETRY-ESOPHAGEAL-WITH IMPEDANCE;  Surgeon: Jah Rodgers RN;  Location: [facility];  Service: Endoscopy;  Laterality: N/A;    ESOPHAGOGASTRODUODENOSCOPY N/A 01/18/2022    Procedure: ESOPHAGOGASTRODUODENOSCOPY (EGD);  Surgeon: Ivett Quarles MD;  Location: [facility];  Service: Endoscopy;  Laterality: N/A;    FISTULOGRAM N/A 04/10/2019    Procedure: FISTULOGRAM;  Surgeon: Ruddy Fitzpatrick MD;  Location: [facility] CATH LAB;  Service: Vascular;  Laterality: N/A;  0830 start    HYSTERECTOMY      KNEE SURGERY Left 05/31/2018    NEPHRECTOMY Left 05/2013    Dr Raul SUTTON  "SURGERY      distal pancreatectomy    SPLENECTOMY, TOTAL      THYROIDECTOMY, PARTIAL      VENTRICULOATRIAL SHUNT Left 2014    left arm     Family History   Problem Relation Age of Onset    Heart disease Mother 60        MI    Hypertension Mother     Stroke Mother     Breast cancer Mother     Cancer Father         prostate    Cancer Brother         renal cell carcinoma    Diabetes Brother     Kidney disease Brother         ESRD s/p kidney transplant    Breast cancer Sister     Breast cancer Sister      Social History     Socioeconomic History    Marital status:     Number of children: 0   Occupational History     Comment: stay home    Tobacco Use    Smoking status: Former     Current packs/day: 0.00     Average packs/day: 1 pack/day for 38.8 years (38.8 ttl pk-yrs)     Types: Cigarettes     Start date: 3/14/1962     Quit date: 2001     Years since quittin.7    Smokeless tobacco: Never   Substance and Sexual Activity    Alcohol use: No    Drug use: No    Sexual activity: Not Currently   Social History Narrative    [place] lives [place] from [place]     Medication List with Changes/Refills   Current Medications    ALLOPURINOL (ZYLOPRIM) 100 MG TABLET    Take 1 tablet (100 mg total) by mouth once daily.    AMITRIPTYLINE (ELAVIL) 10 MG TABLET    Take 1 tablet (10 mg total) by mouth every evening.    AMLODIPINE (NORVASC) 2.5 MG TABLET    Take 1 tablet (2.5 mg total) by mouth once daily.    ASPIRIN (ECOTRIN) 81 MG EC TABLET    Take 1 tablet (81 mg total) by mouth once daily.    ATORVASTATIN (LIPITOR) 40 MG TABLET    Take 1 tablet (40 mg total) by mouth once daily.    AURYXIA 210 MG IRON TAB        BD ULTRA-FINE MICRO PEN NEEDLE 32 GAUGE X 1/4" NDLE        BENZONATATE (TESSALON) 100 MG CAPSULE    Take 200 mg by mouth.    BLOOD SUGAR DIAGNOSTIC STRP    USE TO MONITOR BLOOD GLUCOSE DAILY    CETIRIZINE (ZYRTEC) 10 MG TABLET    Take 10 mg by mouth once daily.    CO-ENZYME Q-10 30 MG CAPSULE    " "Take 1 capsule (30 mg total) by mouth 2 (two) times daily.    DOCUSATE SODIUM (COLACE) 50 MG CAPSULE    Take by mouth.    EZETIMIBE (ZETIA) 10 MG TABLET    TAKE 1 TABLET DAILY    FLUOCINOLONE (DERMA-SMOOTHE) 0.01 % EXTERNAL OIL    Apply oil to scalp once a day    FUROSEMIDE (LASIX) 40 MG TABLET    TAKE 1 TABLET DAILY    GABAPENTIN (NEURONTIN) 600 MG TABLET    Take 1 tablet (600 mg total) by mouth once daily. Daily at 05:00 PM    GLUCOSAMINE-CHONDROITIN 250-200 MG TAB    Take 1 tablet by mouth 2 (two) times daily.    INSULIN DEGLUDEC (TRESIBA FLEXTOUCH U-100) 100 UNIT/ML (3 ML) INPN    Inject 8 Units into the skin.     METHOXY PEG-EPOETIN BETA (MIRCERA INJ)    50 mcg.    OMEPRAZOLE (PRILOSEC) 40 MG CAPSULE    Take 1 capsule (40 mg total) by mouth once daily.    ONETOUCH DELICA PLUS LANCET 30 GAUGE MISC    2 (two) times daily. Use to check blood glucose    PEN NEEDLE, DIABETIC 32 GAUGE X 1/4" NDLE    Use to test blood sugars bid    PRORENAL 8 MG IRON-800 MCG-1,000 UNIT TAB    Take 1 tablet by mouth once daily.    RENAPLEX-D 800 MCG-12.5 MG -2,000 UNIT TAB    Take 1 tablet by mouth once daily.    SEVELAMER CARBONATE (RENVELA) 800 MG TAB    Take 2 tablets by mouth.    SODIUM CHLORIDE 0.9% SOLP 100 ML WITH IRON SUCROSE 100 MG IRON/5 ML SOLN 100 MG    50 mg.    TRAMADOL (ULTRAM) 50 MG TABLET    Take 1 tablet (50 mg total) by mouth every 12 (twelve) hours.    VELPHORO 500 MG CHEW    Take 1 tablet by mouth 3 (three) times daily.    VIT B,C-FA-ZINC-SELEN-VIT D3-E (RENAPLEX-D) 800 MCG-12.5 MG -2,000 UNIT TAB    Take 1 tablet by mouth.    VITAMIN RENAL FORMULA, B-COMPLEX-VITAMIN C-FOLIC ACID, (NEPHROCAP) 1 MG CAP    Take 1 capsule by mouth once daily.     Review of patient's allergies indicates:   Allergen Reactions    Allegra [fexofenadine] Swelling    Codeine Hives, Itching and Nausea And Vomiting       Physical Exam:   Body mass index is 24.3 kg/m².    GENERAL: Well appearing, in no acute distress.  HEAD: Normocephalic and " atraumatic.  ENT: External ears and nose grossly normal.  EYES: EOMI bilaterally  PULMONARY: Respirations are grossly even and non-labored.  NEURO: Awake, alert, and oriented x 3.  SKIN: No obvious rashes appreciated.  PSYCH: Mood & affect are appropriate.    Detailed MSK exam:     Right knee exam tenderness over the medial joint line.  Moderate effusion noted    Imaging:    No new imaging    ssessment:  Cailin Pickett is a 83 y.o. female presents today for primary osteoarthritis of the right knee.  Discussed role of intra-articular injection including corticosteroid today.  She is interested in that as she seems get more relief from that then her gel shots most recently.  Discussed moving forward please refer to procedure note for the details.  Discussed potential Iovera in the future and may need a surgical referral as well.  She is on dialysis and had questions about her eligibility.  I discussed that would be a question for her surgeon in the future.  Follow-up as needed.    Primary osteoarthritis of right knee  -     Sports Medicine US - Guidance for Needle Placement           Rodolfo Wellington MD    Disclaimer: This note was prepared using a voice recognition system and is likely to have sound alike errors within the text.

## 2023-09-21 NOTE — PROCEDURES
Large Joint Aspiration/Injection: R knee    Date/Time: 9/21/2023 11:40 AM    Performed by: Rodolfo Wellington MD  Authorized by: Rodolfo Wellington MD    Consent Done?:  Yes (Verbal)  Indications:  Pain and joint swelling  Site marked: the procedure site was marked    Timeout: prior to procedure the correct patient, procedure, and site was verified    Prep: patient was prepped and draped in usual sterile fashion      Local anesthesia used?: Yes    Local anesthetic:  Topical anesthetic    Details:  Needle Size:  22 G  Ultrasonic Guidance for needle placement?: Yes    Images are saved and documented.  Approach: Superior lateral.  Location:  Knee  Site:  R knee  Medications:  40 mg triamcinolone acetonide 40 mg/mL; 2 mL LIDOcaine HCL 10 mg/ml (1%) 10 mg/mL (1 %); 2 mL BUPivacaine 0.5 % (5 mg/mL)  Patient tolerance:  Patient tolerated the procedure well with no immediate complications     Ultrasound guidance was used for needle localization. Images were saved and stored for documentation. The appropriate structures were visualized. Dynamic visualization of the needle was continuous throughout the procedures and maintained good position.     We discussed the proper protocols after the injection such as no submerging pools, baths tubs, or hot tubs for 24 hr.  Showering is okay today.  We also discussed that blood sugars can be elevated after an injection and asked patient to properly checked her sugars over the next few days and contact their PCP if there are any concerns.  We discussed red flags such as fevers, chills, red, warm, tender joint at the area of injection to please seek medical care immediately.

## 2023-09-21 NOTE — PATIENT INSTRUCTIONS
Assessment:  Cailin Pickett is a 83 y.o. female   Chief Complaint   Patient presents with    Right Knee - Pain       Encounter Diagnosis   Name Primary?    Primary osteoarthritis of right knee Yes        Plan:  We discussed the proper protocols after the injection such as no submerging pools, baths tubs, or hot tubs for 24 hr.  Showering is okay today.  We also discussed that blood sugars can be elevated after an injection and asked patient to properly checked her sugars over the next few days and contact their PCP if there are any concerns.  We discussed red flags such as fevers, chills, red, warm, tender joint at the area of injection to please seek medical care immediately.              Follow-up: As needed or sooner if there are any problems between now and then.    Thank you for choosing [facility] and Dr. Rodolfo Wellington for your orthopedic & sports medicine care. It is our goal to provide you with exceptional care that will help keep you healthy, active, and get you back in the game.    Please do not hesitate to reach out to us via email, phone, or MyChart with any questions, concerns, or feedback.    If you felt that you received exemplary care today, please consider leaving us feedback on [employer] at:  [email or web address]    If you are experiencing pain/discomfort ,or have questions after 5pm and would like to be connected to the [facility] on-call team, please call this number and specify which Sports Medicine provider is treating you: [phone]

## 2023-10-18 NOTE — PROGRESS NOTES
New Patient Chronic Pain Note (Initial Visit)    Referring Physician: Rodolfo Wellington MD    PCP: Brii Junior MD    Chief Complaint:   Chief Complaint   Patient presents with    Low-back Pain    Hip Pain        SUBJECTIVE:    Cailin Pickett is a 83 y.o. female with past medical history significant for HLD, DMII with polyneuropathy, SSS, ESRD on HD, h/o pancreatic CA, diverticulosis, gout who presents to the clinic for the evaluation of lower back pain.  Patient reports pain has been present for several years without inciting accident or injury.  Patient does report remote history of pancreatic cancer, in remission for 10 years, and pain is in the same territory.  Pain is intermittent and today is rated an 8/10.  Pain at its best is a 2/10 and at its worse is a 10/10.  Pain is described as stabbing in nature.  Patient reports pain in the left lower back and flank territory.  Pain is exacerbated predominantly with prolonged sitting.  Patient reports she is only able to ambulate approximately half a block before requiring rest.  She denies more distal radiculopathy into the lower extremities or feet or bowel or bladder incontinence.  Patient is currently taking gabapentin, prescribed 600 mg by Neurology but was told to take 300 mg per Nephrology, once daily.  Patient reports no improvement on this medication.  She is also prescribed tramadol by her primary care provider, 50 mg twice daily with no improvement on this medication.  Patient states I do not think it is doing anything.   Patient has performed physician directed physical therapy exercises at home over the last 6 weeks without meaningful improvement in her pain.    Patient denies night fever/night sweats, urinary incontinence, bowel incontinence, significant weight loss, significant motor weakness, and loss of sensations.    Pain Disability Index Review:         10/19/2023    11:34 AM   Last 3 PDI Scores   Pain Disability Index (PDI) 39        Non-Pharmacologic Treatments:  Physical Therapy/Home Exercise: yes  Ice/Heat:yes  TENS: no  Acupuncture: no  Massage: no  Chiropractic: no    Other: no      Pain Medications:  - Opioids: Ultram (Tramadol HCL)  - Adjuvant Medications: Elavil (Amitriptyline) and Neurontin (Gabapentin)  - Anti-Coagulants: Aspirin    Pain Procedures:     Dr. Wellington:    -09/21/2023: Right intra-articular knee injection with triamcinolone  -06/01/2023, 06/08/2023, 06/15/2023: Right intra-articular knee injection with Orthovisc  -04/13/2023:  Right intra-articular knee injection with triamcinolone      Past Medical History:   Diagnosis Date    Anemia     CKD (chronic kidney disease) stage 5, GFR less than 15 ml/min 03/14/2019    CKD (chronic kidney disease), stage III     Diabetes mellitus type II     Encounter for blood transfusion     Family history of colonic polyps 07/18/2017    Gout, unspecified     Hyperlipidemia     Hypertension     Neuropathy     Pancreatic cancer     Renal failure     Secondary hyperparathyroidism of renal origin 03/14/2019    Thyroid disease      Past Surgical History:   Procedure Laterality Date    COLONOSCOPY  2011    Dr. Favio Mims    COLONOSCOPY N/A 07/18/2017    Procedure: screening colonoscopy;  Surgeon: Kenneth Kamara MD;  Location: [facility];  Service: Endoscopy;  Laterality: N/A;    ESOPHAGEAL MANOMETRY WITH MEASUREMENT OF IMPEDANCE N/A 04/07/2022    Procedure: MANOMETRY-ESOPHAGEAL-WITH IMPEDANCE;  Surgeon: Jah Rodgers RN;  Location: [facility];  Service: Endoscopy;  Laterality: N/A;    ESOPHAGOGASTRODUODENOSCOPY N/A 01/18/2022    Procedure: ESOPHAGOGASTRODUODENOSCOPY (EGD);  Surgeon: Ivett Quarles MD;  Location: [facility];  Service: Endoscopy;  Laterality: N/A;    FISTULOGRAM N/A 04/10/2019    Procedure: FISTULOGRAM;  Surgeon: Ruddy Fitzpatrick MD;  Location: [facility] CATH LAB;  Service: Vascular;  Laterality: N/A;  0830 start    HYSTERECTOMY      KNEE SURGERY Left 05/31/2018     "NEPHRECTOMY Left 05/2013    Dr Carter     PANCREAS SURGERY      distal pancreatectomy    SPLENECTOMY, TOTAL      THYROIDECTOMY, PARTIAL      VENTRICULOATRIAL SHUNT Left 12/04/2014    left arm     Review of patient's allergies indicates:   Allergen Reactions    Allegra [fexofenadine] Swelling    Codeine Hives, Itching and Nausea And Vomiting       Current Outpatient Medications   Medication Sig    allopurinoL (ZYLOPRIM) 100 MG tablet Take 1 tablet (100 mg total) by mouth once daily.    amitriptyline (ELAVIL) 10 MG tablet Take 1 tablet (10 mg total) by mouth every evening.    amLODIPine (NORVASC) 2.5 MG tablet Take 1 tablet (2.5 mg total) by mouth once daily.    aspirin (ECOTRIN) 81 MG EC tablet Take 1 tablet (81 mg total) by mouth once daily.    atorvastatin (LIPITOR) 40 MG tablet Take 1 tablet (40 mg total) by mouth once daily.    AURYXIA 210 mg iron Tab     BD ULTRA-FINE MICRO PEN NEEDLE 32 gauge x 1/4" Ndle     benzonatate (TESSALON) 100 MG capsule Take 200 mg by mouth.    blood sugar diagnostic Strp USE TO MONITOR BLOOD GLUCOSE DAILY    cetirizine (ZYRTEC) 10 MG tablet Take 10 mg by mouth once daily.    co-enzyme Q-10 30 mg capsule Take 1 capsule (30 mg total) by mouth 2 (two) times daily.    docusate sodium (COLACE) 50 MG capsule Take by mouth.    ezetimibe (ZETIA) 10 mg tablet TAKE 1 TABLET DAILY    fluocinolone (DERMA-SMOOTHE) 0.01 % external oil Apply oil to scalp once a day    furosemide (LASIX) 40 MG tablet TAKE 1 TABLET DAILY    gabapentin (NEURONTIN) 600 MG tablet Take 1 tablet (600 mg total) by mouth once daily. Daily at 05:00 PM    glucosamine-chondroitin 250-200 mg Tab Take 1 tablet by mouth 2 (two) times daily.    insulin degludec (TRESIBA FLEXTOUCH U-100) 100 unit/mL (3 mL) InPn Inject 8 Units into the skin.     methoxy peg-epoetin beta (MIRCERA INJ) 50 mcg.    ONETOUCH DELICA PLUS LANCET 30 gauge Misc 2 (two) times daily. Use to check blood glucose    pen needle, diabetic 32 gauge x 1/4" Ndle Use to " "test blood sugars bid    PRORENAL 8 mg iron-800 mcg-1,000 unit Tab Take 1 tablet by mouth once daily.    RENAPLEX-D 800 mcg-12.5 mg -2,000 unit Tab Take 1 tablet by mouth once daily.    sevelamer carbonate (RENVELA) 800 mg Tab Take 2 tablets by mouth.    traMADoL (ULTRAM) 50 mg tablet Take 1 tablet (50 mg total) by mouth every 12 (twelve) hours.    VELPHORO 500 mg Chew Take 1 tablet by mouth 3 (three) times daily.    vit B,C-FA-zinc-selen-vit D3-E (RENAPLEX-D) 800 mcg-12.5 mg -2,000 unit Tab Take 1 tablet by mouth.    vitamin renal formula, B-complex-vitamin c-folic acid, (NEPHROCAP) 1 mg Cap Take 1 capsule by mouth once daily.    omeprazole (PRILOSEC) 40 MG capsule Take 1 capsule (40 mg total) by mouth once daily.     No current facility-administered medications for this visit.       Review of Systems     GENERAL:  No weight loss, malaise or fevers.  HEENT:   No recent changes in vision or hearing  NECK:  Negative for lumps, no difficulty with swallowing.  RESPIRATORY:  Negative for cough, wheezing or shortness of breath, patient denies any recent URI.  CARDIOVASCULAR:  Negative for chest pain or palpitations.  GI:  Negative for abdominal discomfort, blood in stools or black stools or change in bowel habits.  MUSCULOSKELETAL:  See HPI.  SKIN:  Negative for lesions, rash, and itching.  PSYCH:  No mood disorder or recent psychosocial stressors.   HEMATOLOGY/LYMPHOLOGY:  Negative for prolonged bleeding, bruising easily or swollen nodes.    NEURO:   No history of syncope, paralysis, seizures or tremors.  All other reviewed and negative other than HPI.    OBJECTIVE:    BP (!) 142/68   Pulse 92   Ht 5' 5" (1.651 m)   Wt 66.3 kg (146 lb 0.9 oz)   LMP 07/27/1982 (Exact Date)   BMI 24.30 kg/m²       Physical Exam    GENERAL: Well appearing, in no acute distress, alert and oriented x3.  PSYCH:  Mood and affect appropriate.  SKIN: Skin color, texture, turgor normal, no rashes or lesions.  HEAD/FACE:  Normocephalic, " atraumatic. Cranial nerves grossly intact.    CV: RRR with palpation of the radial artery.  PULM: No evidence of respiratory difficulty, symmetric chest rise.  GI:  Soft and non-tender.    BACK: Straight leg raising in the sitting and supine positions is negative to radicular pain.  pain to palpation over the facet joints of the lumbar spine on L or spinous processes. Normal range of motion without pain reproduction.  EXTREMITIES: Peripheral joint ROM is full and pain free without obvious instability or laxity in all four extremities. No deformities, edema, or skin discoloration. Good capillary refill.  MUSCULOSKELETAL: Able to stand on heels & toes.   Shoulder, hip, and knee provocative maneuvers are negative.  There is no pain with palpation over the sacroiliac joints bilaterally.  Gaenslen's, Distraction/Compression and  FABERs test is negative.  Facet loading test is positive on L.   Bilateral upper and lower extremity strength is normal and symmetric.  No atrophy or tone abnormalities are noted.    RIGHT Lower extremity: Hip flexion 5/5, Hip Abduction 5/5, Hip Adduction 5/5, Knee extension 5/5, Knee flexion 5/5, Ankle dorsiflexion5/5, Extensor hallucis longus 5/5, Ankle plantarflexion 5/5  LEFT Lower extremity:  Hip flexion 5/5, Hip Abduction 5/5,Hip Adduction 5/5, Knee extension 5/5, Knee flexion 5/5, Ankle dorsiflexion 5/5, Extensor hallucis longus 5/5, Ankle plantarflexion 5/5  -Normal testing knee (patellar) jerk and ankle (achilles) jerk    NEURO: Bilateral upper and lower extremity coordination and muscle stretch reflexes are physiologic and symmetric. No loss of sensation is noted.  GAIT: normal.    Imagin17    X-Ray Lumbar Spine AP And Lateral    Narrative  Lumbar spine three views.    Findings: There is multilevel degenerative vertebral endplate spurring with marked disc space narrowing at L4-L5.  Bilateral facet arthropathy present at L4-L5 and L5-S1.  Pedicles appear intact.  No  compression fracture or subluxation.                ASSESSMENT: 83 y.o. year old female with     1. DDD (degenerative disc disease), lumbar  Ambulatory referral/consult to Physical/Occupational Therapy      2. Lumbar facet arthropathy  Ambulatory referral/consult to [facility]    Ambulatory referral/consult to Physical/Occupational Therapy      3. Lumbar spondylosis  Case Request-RAD/Other Procedure Area: Left L3, 4, 5 MBB    Ambulatory referral/consult to Physical/Occupational Therapy            PLAN:   - Interventions:  Schedule for left-sided L3-5 lumbar medial branch block to see if this helps with axial back pain.. Explained the risks and benefits of the procedure in detail with the patient today in clinic along with alternative treatment options, and the patient elected to pursue the intervention at this time.      - Anticoagulation use: Yes aspirin  Per ALAN guidelines for primary prophylaxis, patient can continue aspirin for lumbar medial branch block.     report:  Reviewed and consistent with medication use as prescribed.    - Medications:  --  We have discussed increasing the patient's gabapentin to a more therapeutic goal.  Patient will increase his medication according to the following algorithm.  We have reviewed potential side effects of this medication including daytime somnolence, weight gain and peripheral edema  Gabapentin Titration:  Day 1: 600 mg qHS  Day 13: 600 mg BID    -We have discussed reducing tramadol 50 mg once daily to see if there is any discernible increase in pain.  We have discussed with no difference in pain level, discontinuing tramadol altogether.  I have encouraged the patient to reach out to her PCP to discuss inefficacy of this medication and discontinuation.    - Therapy:   We discussed initiating physical therapy to help manage the patient/s painful condition. The patient was counseled that muscle strengthening will improve the long term prognosis in regards to  pain and may also help increase range of motion and mobility. They were told that one of the goals of physical therapy is that they learn how to do the exercises so that they can do them independently at home daily upon completion. The patient's questions were answered and they were agreeable to this course. A referral for physical therapy was provided to the patient.EXT: [place], [place]    - Imaging: Reviewed available imaging (XR Lumbar spine) with patient and answered any questions they had regarding study.      - Follow up visit: return to clinic in 4-6 weeks      The above plan and management options were discussed at length with patient. Patient is in agreement with the above and verbalized understanding.    - I discussed the goals of interventional chronic pain management with the patient on today's visit. We discussed a multimodal and systematic approach to pain.  This includes diagnostic and therapeutic injections, adjuvant pharmacologic treatment, physical therapy, and at times psychiatry.  I emphasized the importance of regular exercise, core strengthening and stretching, diet and weight loss as a cornerstone of long-term pain management.    - This condition does not require this patient to take time off of work, and the primary goal of our Pain Management services is to improve the patient's functional capacity.  - Patient Questions: Answered all of the patient's questions regarding diagnoses, therapy, treatment and next steps        Geovany Qiu MD  Interventional Pain Management  [facility]    Disclaimer:  This note was prepared using voice recognition system and is likely to have sound alike errors that may have been overlooked even after proof reading.  Please call me with any questions

## 2023-10-18 NOTE — H&P (VIEW-ONLY)
New Patient Chronic Pain Note (Initial Visit)    Referring Physician: Rodolfo Wellington MD    PCP: Brii Junior MD    Chief Complaint:   Chief Complaint   Patient presents with    Low-back Pain    Hip Pain        SUBJECTIVE:    Cailin Pickett is a 83 y.o. female with past medical history significant for HLD, DMII with polyneuropathy, SSS, ESRD on HD, h/o pancreatic CA, diverticulosis, gout who presents to the clinic for the evaluation of lower back pain.  Patient reports pain has been present for several years without inciting accident or injury.  Patient does report remote history of pancreatic cancer, in remission for 10 years, and pain is in the same territory.  Pain is intermittent and today is rated an 8/10.  Pain at its best is a 2/10 and at its worse is a 10/10.  Pain is described as stabbing in nature.  Patient reports pain in the left lower back and flank territory.  Pain is exacerbated predominantly with prolonged sitting.  Patient reports she is only able to ambulate approximately half a block before requiring rest.  She denies more distal radiculopathy into the lower extremities or feet or bowel or bladder incontinence.  Patient is currently taking gabapentin, prescribed 600 mg by Neurology but was told to take 300 mg per Nephrology, once daily.  Patient reports no improvement on this medication.  She is also prescribed tramadol by her primary care provider, 50 mg twice daily with no improvement on this medication.  Patient states I do not think it is doing anything.   Patient has performed physician directed physical therapy exercises at home over the last 6 weeks without meaningful improvement in her pain.    Patient denies night fever/night sweats, urinary incontinence, bowel incontinence, significant weight loss, significant motor weakness, and loss of sensations.    Pain Disability Index Review:         10/19/2023    11:34 AM   Last 3 PDI Scores   Pain Disability Index (PDI) 39        Non-Pharmacologic Treatments:  Physical Therapy/Home Exercise: yes  Ice/Heat:yes  TENS: no  Acupuncture: no  Massage: no  Chiropractic: no    Other: no      Pain Medications:  - Opioids: Ultram (Tramadol HCL)  - Adjuvant Medications: Elavil (Amitriptyline) and Neurontin (Gabapentin)  - Anti-Coagulants: Aspirin    Pain Procedures:     Dr. Wellington:    -09/21/2023: Right intra-articular knee injection with triamcinolone  -06/01/2023, 06/08/2023, 06/15/2023: Right intra-articular knee injection with Orthovisc  -04/13/2023:  Right intra-articular knee injection with triamcinolone      Past Medical History:   Diagnosis Date    Anemia     CKD (chronic kidney disease) stage 5, GFR less than 15 ml/min 03/14/2019    CKD (chronic kidney disease), stage III     Diabetes mellitus type II     Encounter for blood transfusion     Family history of colonic polyps 07/18/2017    Gout, unspecified     Hyperlipidemia     Hypertension     Neuropathy     Pancreatic cancer     Renal failure     Secondary hyperparathyroidism of renal origin 03/14/2019    Thyroid disease      Past Surgical History:   Procedure Laterality Date    COLONOSCOPY  2011    Dr. Favio Mims    COLONOSCOPY N/A 07/18/2017    Procedure: screening colonoscopy;  Surgeon: Kenneth Kamara MD;  Location: [facility];  Service: Endoscopy;  Laterality: N/A;    ESOPHAGEAL MANOMETRY WITH MEASUREMENT OF IMPEDANCE N/A 04/07/2022    Procedure: MANOMETRY-ESOPHAGEAL-WITH IMPEDANCE;  Surgeon: Jah Rodgers RN;  Location: [facility];  Service: Endoscopy;  Laterality: N/A;    ESOPHAGOGASTRODUODENOSCOPY N/A 01/18/2022    Procedure: ESOPHAGOGASTRODUODENOSCOPY (EGD);  Surgeon: Ivett Quarles MD;  Location: [facility];  Service: Endoscopy;  Laterality: N/A;    FISTULOGRAM N/A 04/10/2019    Procedure: FISTULOGRAM;  Surgeon: Ruddy Fitzpatrick MD;  Location: [facility] CATH LAB;  Service: Vascular;  Laterality: N/A;  0830 start    HYSTERECTOMY      KNEE SURGERY Left 05/31/2018     "NEPHRECTOMY Left 05/2013    Dr Carter     PANCREAS SURGERY      distal pancreatectomy    SPLENECTOMY, TOTAL      THYROIDECTOMY, PARTIAL      VENTRICULOATRIAL SHUNT Left 12/04/2014    left arm     Review of patient's allergies indicates:   Allergen Reactions    Allegra [fexofenadine] Swelling    Codeine Hives, Itching and Nausea And Vomiting       Current Outpatient Medications   Medication Sig    allopurinoL (ZYLOPRIM) 100 MG tablet Take 1 tablet (100 mg total) by mouth once daily.    amitriptyline (ELAVIL) 10 MG tablet Take 1 tablet (10 mg total) by mouth every evening.    amLODIPine (NORVASC) 2.5 MG tablet Take 1 tablet (2.5 mg total) by mouth once daily.    aspirin (ECOTRIN) 81 MG EC tablet Take 1 tablet (81 mg total) by mouth once daily.    atorvastatin (LIPITOR) 40 MG tablet Take 1 tablet (40 mg total) by mouth once daily.    AURYXIA 210 mg iron Tab     BD ULTRA-FINE MICRO PEN NEEDLE 32 gauge x 1/4" Ndle     benzonatate (TESSALON) 100 MG capsule Take 200 mg by mouth.    blood sugar diagnostic Strp USE TO MONITOR BLOOD GLUCOSE DAILY    cetirizine (ZYRTEC) 10 MG tablet Take 10 mg by mouth once daily.    co-enzyme Q-10 30 mg capsule Take 1 capsule (30 mg total) by mouth 2 (two) times daily.    docusate sodium (COLACE) 50 MG capsule Take by mouth.    ezetimibe (ZETIA) 10 mg tablet TAKE 1 TABLET DAILY    fluocinolone (DERMA-SMOOTHE) 0.01 % external oil Apply oil to scalp once a day    furosemide (LASIX) 40 MG tablet TAKE 1 TABLET DAILY    gabapentin (NEURONTIN) 600 MG tablet Take 1 tablet (600 mg total) by mouth once daily. Daily at 05:00 PM    glucosamine-chondroitin 250-200 mg Tab Take 1 tablet by mouth 2 (two) times daily.    insulin degludec (TRESIBA FLEXTOUCH U-100) 100 unit/mL (3 mL) InPn Inject 8 Units into the skin.     methoxy peg-epoetin beta (MIRCERA INJ) 50 mcg.    ONETOUCH DELICA PLUS LANCET 30 gauge Misc 2 (two) times daily. Use to check blood glucose    pen needle, diabetic 32 gauge x 1/4" Ndle Use to " "test blood sugars bid    PRORENAL 8 mg iron-800 mcg-1,000 unit Tab Take 1 tablet by mouth once daily.    RENAPLEX-D 800 mcg-12.5 mg -2,000 unit Tab Take 1 tablet by mouth once daily.    sevelamer carbonate (RENVELA) 800 mg Tab Take 2 tablets by mouth.    traMADoL (ULTRAM) 50 mg tablet Take 1 tablet (50 mg total) by mouth every 12 (twelve) hours.    VELPHORO 500 mg Chew Take 1 tablet by mouth 3 (three) times daily.    vit B,C-FA-zinc-selen-vit D3-E (RENAPLEX-D) 800 mcg-12.5 mg -2,000 unit Tab Take 1 tablet by mouth.    vitamin renal formula, B-complex-vitamin c-folic acid, (NEPHROCAP) 1 mg Cap Take 1 capsule by mouth once daily.    omeprazole (PRILOSEC) 40 MG capsule Take 1 capsule (40 mg total) by mouth once daily.     No current facility-administered medications for this visit.       Review of Systems     GENERAL:  No weight loss, malaise or fevers.  HEENT:   No recent changes in vision or hearing  NECK:  Negative for lumps, no difficulty with swallowing.  RESPIRATORY:  Negative for cough, wheezing or shortness of breath, patient denies any recent URI.  CARDIOVASCULAR:  Negative for chest pain or palpitations.  GI:  Negative for abdominal discomfort, blood in stools or black stools or change in bowel habits.  MUSCULOSKELETAL:  See HPI.  SKIN:  Negative for lesions, rash, and itching.  PSYCH:  No mood disorder or recent psychosocial stressors.   HEMATOLOGY/LYMPHOLOGY:  Negative for prolonged bleeding, bruising easily or swollen nodes.    NEURO:   No history of syncope, paralysis, seizures or tremors.  All other reviewed and negative other than HPI.    OBJECTIVE:    BP (!) 142/68   Pulse 92   Ht 5' 5" (1.651 m)   Wt 66.3 kg (146 lb 0.9 oz)   LMP 07/27/1982 (Exact Date)   BMI 24.30 kg/m²       Physical Exam    GENERAL: Well appearing, in no acute distress, alert and oriented x3.  PSYCH:  Mood and affect appropriate.  SKIN: Skin color, texture, turgor normal, no rashes or lesions.  HEAD/FACE:  Normocephalic, " atraumatic. Cranial nerves grossly intact.    CV: RRR with palpation of the radial artery.  PULM: No evidence of respiratory difficulty, symmetric chest rise.  GI:  Soft and non-tender.    BACK: Straight leg raising in the sitting and supine positions is negative to radicular pain.  pain to palpation over the facet joints of the lumbar spine on L or spinous processes. Normal range of motion without pain reproduction.  EXTREMITIES: Peripheral joint ROM is full and pain free without obvious instability or laxity in all four extremities. No deformities, edema, or skin discoloration. Good capillary refill.  MUSCULOSKELETAL: Able to stand on heels & toes.   Shoulder, hip, and knee provocative maneuvers are negative.  There is no pain with palpation over the sacroiliac joints bilaterally.  Gaenslen's, Distraction/Compression and  FABERs test is negative.  Facet loading test is positive on L.   Bilateral upper and lower extremity strength is normal and symmetric.  No atrophy or tone abnormalities are noted.    RIGHT Lower extremity: Hip flexion 5/5, Hip Abduction 5/5, Hip Adduction 5/5, Knee extension 5/5, Knee flexion 5/5, Ankle dorsiflexion5/5, Extensor hallucis longus 5/5, Ankle plantarflexion 5/5  LEFT Lower extremity:  Hip flexion 5/5, Hip Abduction 5/5,Hip Adduction 5/5, Knee extension 5/5, Knee flexion 5/5, Ankle dorsiflexion 5/5, Extensor hallucis longus 5/5, Ankle plantarflexion 5/5  -Normal testing knee (patellar) jerk and ankle (achilles) jerk    NEURO: Bilateral upper and lower extremity coordination and muscle stretch reflexes are physiologic and symmetric. No loss of sensation is noted.  GAIT: normal.    Imagin17    X-Ray Lumbar Spine AP And Lateral    Narrative  Lumbar spine three views.    Findings: There is multilevel degenerative vertebral endplate spurring with marked disc space narrowing at L4-L5.  Bilateral facet arthropathy present at L4-L5 and L5-S1.  Pedicles appear intact.  No  compression fracture or subluxation.                ASSESSMENT: 83 y.o. year old female with     1. DDD (degenerative disc disease), lumbar  Ambulatory referral/consult to Physical/Occupational Therapy      2. Lumbar facet arthropathy  Ambulatory referral/consult to [facility]    Ambulatory referral/consult to Physical/Occupational Therapy      3. Lumbar spondylosis  Case Request-RAD/Other Procedure Area: Left L3, 4, 5 MBB    Ambulatory referral/consult to Physical/Occupational Therapy            PLAN:   - Interventions:  Schedule for left-sided L3-5 lumbar medial branch block to see if this helps with axial back pain.. Explained the risks and benefits of the procedure in detail with the patient today in clinic along with alternative treatment options, and the patient elected to pursue the intervention at this time.      - Anticoagulation use: Yes aspirin  Per ALAN guidelines for primary prophylaxis, patient can continue aspirin for lumbar medial branch block.     report:  Reviewed and consistent with medication use as prescribed.    - Medications:  --  We have discussed increasing the patient's gabapentin to a more therapeutic goal.  Patient will increase his medication according to the following algorithm.  We have reviewed potential side effects of this medication including daytime somnolence, weight gain and peripheral edema  Gabapentin Titration:  Day 1: 600 mg qHS  Day 13: 600 mg BID    -We have discussed reducing tramadol 50 mg once daily to see if there is any discernible increase in pain.  We have discussed with no difference in pain level, discontinuing tramadol altogether.  I have encouraged the patient to reach out to her PCP to discuss inefficacy of this medication and discontinuation.    - Therapy:   We discussed initiating physical therapy to help manage the patient/s painful condition. The patient was counseled that muscle strengthening will improve the long term prognosis in regards to  pain and may also help increase range of motion and mobility. They were told that one of the goals of physical therapy is that they learn how to do the exercises so that they can do them independently at home daily upon completion. The patient's questions were answered and they were agreeable to this course. A referral for physical therapy was provided to the patient.EXT: [place], [place]    - Imaging: Reviewed available imaging (XR Lumbar spine) with patient and answered any questions they had regarding study.      - Follow up visit: return to clinic in 4-6 weeks      The above plan and management options were discussed at length with patient. Patient is in agreement with the above and verbalized understanding.    - I discussed the goals of interventional chronic pain management with the patient on today's visit. We discussed a multimodal and systematic approach to pain.  This includes diagnostic and therapeutic injections, adjuvant pharmacologic treatment, physical therapy, and at times psychiatry.  I emphasized the importance of regular exercise, core strengthening and stretching, diet and weight loss as a cornerstone of long-term pain management.    - This condition does not require this patient to take time off of work, and the primary goal of our Pain Management services is to improve the patient's functional capacity.  - Patient Questions: Answered all of the patient's questions regarding diagnoses, therapy, treatment and next steps        Geovany Qiu MD  Interventional Pain Management  [facility]    Disclaimer:  This note was prepared using voice recognition system and is likely to have sound alike errors that may have been overlooked even after proof reading.  Please call me with any questions

## 2023-10-19 ENCOUNTER — OFFICE VISIT (OUTPATIENT)
Dept: PAIN MEDICINE | Facility: CLINIC | Age: 83
End: 2023-10-19
Payer: MEDICARE

## 2023-10-19 VITALS
SYSTOLIC BLOOD PRESSURE: 142 MMHG | HEIGHT: 65 IN | HEART RATE: 92 BPM | BODY MASS INDEX: 24.33 KG/M2 | WEIGHT: 146.06 LBS | DIASTOLIC BLOOD PRESSURE: 68 MMHG

## 2023-10-19 DIAGNOSIS — M47.816 LUMBAR SPONDYLOSIS: ICD-10-CM

## 2023-10-19 DIAGNOSIS — M51.36 DDD (DEGENERATIVE DISC DISEASE), LUMBAR: Primary | ICD-10-CM

## 2023-10-19 DIAGNOSIS — M47.816 LUMBAR FACET ARTHROPATHY: ICD-10-CM

## 2023-10-19 PROCEDURE — 99999 PR PBB SHADOW E&M-EST. PATIENT-LVL III: CPT | Mod: PBBFAC,,, | Performed by: ANESTHESIOLOGY

## 2023-10-19 PROCEDURE — 99999 PR PBB SHADOW E&M-EST. PATIENT-LVL III: ICD-10-PCS | Mod: PBBFAC,,, | Performed by: ANESTHESIOLOGY

## 2023-10-19 PROCEDURE — 3077F SYST BP >= 140 MM HG: CPT | Mod: CPTII,S$GLB,, | Performed by: ANESTHESIOLOGY

## 2023-10-19 PROCEDURE — 1125F PR PAIN SEVERITY QUANTIFIED, PAIN PRESENT: ICD-10-PCS | Mod: CPTII,S$GLB,, | Performed by: ANESTHESIOLOGY

## 2023-10-19 PROCEDURE — 1159F PR MEDICATION LIST DOCUMENTED IN MEDICAL RECORD: ICD-10-PCS | Mod: CPTII,S$GLB,, | Performed by: ANESTHESIOLOGY

## 2023-10-19 PROCEDURE — 3288F FALL RISK ASSESSMENT DOCD: CPT | Mod: CPTII,S$GLB,, | Performed by: ANESTHESIOLOGY

## 2023-10-19 PROCEDURE — 3078F PR MOST RECENT DIASTOLIC BLOOD PRESSURE < 80 MM HG: ICD-10-PCS | Mod: CPTII,S$GLB,, | Performed by: ANESTHESIOLOGY

## 2023-10-19 PROCEDURE — 3077F PR MOST RECENT SYSTOLIC BLOOD PRESSURE >= 140 MM HG: ICD-10-PCS | Mod: CPTII,S$GLB,, | Performed by: ANESTHESIOLOGY

## 2023-10-19 PROCEDURE — 1157F PR ADVANCE CARE PLAN OR EQUIV PRESENT IN MEDICAL RECORD: ICD-10-PCS | Mod: CPTII,S$GLB,, | Performed by: ANESTHESIOLOGY

## 2023-10-19 PROCEDURE — 1125F AMNT PAIN NOTED PAIN PRSNT: CPT | Mod: CPTII,S$GLB,, | Performed by: ANESTHESIOLOGY

## 2023-10-19 PROCEDURE — 1160F PR REVIEW ALL MEDS BY PRESCRIBER/CLIN PHARMACIST DOCUMENTED: ICD-10-PCS | Mod: CPTII,S$GLB,, | Performed by: ANESTHESIOLOGY

## 2023-10-19 PROCEDURE — 3078F DIAST BP <80 MM HG: CPT | Mod: CPTII,S$GLB,, | Performed by: ANESTHESIOLOGY

## 2023-10-19 PROCEDURE — 1101F PR PT FALLS ASSESS DOC 0-1 FALLS W/OUT INJ PAST YR: ICD-10-PCS | Mod: CPTII,S$GLB,, | Performed by: ANESTHESIOLOGY

## 2023-10-19 PROCEDURE — 99204 OFFICE O/P NEW MOD 45 MIN: CPT | Mod: S$GLB,,, | Performed by: ANESTHESIOLOGY

## 2023-10-19 PROCEDURE — 1101F PT FALLS ASSESS-DOCD LE1/YR: CPT | Mod: CPTII,S$GLB,, | Performed by: ANESTHESIOLOGY

## 2023-10-19 PROCEDURE — 1159F MED LIST DOCD IN RCRD: CPT | Mod: CPTII,S$GLB,, | Performed by: ANESTHESIOLOGY

## 2023-10-19 PROCEDURE — 99204 PR OFFICE/OUTPT VISIT, NEW, LEVL IV, 45-59 MIN: ICD-10-PCS | Mod: S$GLB,,, | Performed by: ANESTHESIOLOGY

## 2023-10-19 PROCEDURE — 1157F ADVNC CARE PLAN IN RCRD: CPT | Mod: CPTII,S$GLB,, | Performed by: ANESTHESIOLOGY

## 2023-10-19 PROCEDURE — 3288F PR FALLS RISK ASSESSMENT DOCUMENTED: ICD-10-PCS | Mod: CPTII,S$GLB,, | Performed by: ANESTHESIOLOGY

## 2023-10-19 PROCEDURE — 1160F RVW MEDS BY RX/DR IN RCRD: CPT | Mod: CPTII,S$GLB,, | Performed by: ANESTHESIOLOGY

## 2023-11-01 DIAGNOSIS — Z85.07 HISTORY OF PANCREATIC CANCER: Primary | ICD-10-CM

## 2023-11-02 ENCOUNTER — OFFICE VISIT (OUTPATIENT)
Dept: NEUROLOGY | Facility: CLINIC | Age: 83
End: 2023-11-02
Payer: MEDICARE

## 2023-11-02 ENCOUNTER — TELEPHONE (OUTPATIENT)
Dept: HEMATOLOGY/ONCOLOGY | Facility: CLINIC | Age: 83
End: 2023-11-02
Payer: MEDICARE

## 2023-11-02 ENCOUNTER — OFFICE VISIT (OUTPATIENT)
Dept: HEMATOLOGY/ONCOLOGY | Facility: CLINIC | Age: 83
End: 2023-11-02
Payer: MEDICARE

## 2023-11-02 ENCOUNTER — OFFICE VISIT (OUTPATIENT)
Dept: ALLERGY | Facility: CLINIC | Age: 83
End: 2023-11-02
Payer: MEDICARE

## 2023-11-02 ENCOUNTER — LAB VISIT (OUTPATIENT)
Dept: LAB | Facility: HOSPITAL | Age: 83
End: 2023-11-02
Attending: STUDENT IN AN ORGANIZED HEALTH CARE EDUCATION/TRAINING PROGRAM
Payer: MEDICARE

## 2023-11-02 VITALS
WEIGHT: 146.06 LBS | HEART RATE: 72 BPM | BODY MASS INDEX: 24.33 KG/M2 | OXYGEN SATURATION: 100 % | TEMPERATURE: 97 F | SYSTOLIC BLOOD PRESSURE: 140 MMHG | HEIGHT: 65 IN | DIASTOLIC BLOOD PRESSURE: 65 MMHG

## 2023-11-02 VITALS
WEIGHT: 145.5 LBS | DIASTOLIC BLOOD PRESSURE: 70 MMHG | SYSTOLIC BLOOD PRESSURE: 138 MMHG | HEIGHT: 65 IN | RESPIRATION RATE: 16 BRPM | BODY MASS INDEX: 24.24 KG/M2 | HEART RATE: 76 BPM

## 2023-11-02 VITALS
TEMPERATURE: 98 F | WEIGHT: 145.5 LBS | HEIGHT: 65 IN | SYSTOLIC BLOOD PRESSURE: 137 MMHG | HEART RATE: 75 BPM | DIASTOLIC BLOOD PRESSURE: 66 MMHG | BODY MASS INDEX: 24.24 KG/M2

## 2023-11-02 DIAGNOSIS — R41.89 SUBJECTIVE MEMORY COMPLAINTS: ICD-10-CM

## 2023-11-02 DIAGNOSIS — E55.9 VITAMIN D DEFICIENCY: ICD-10-CM

## 2023-11-02 DIAGNOSIS — D63.1 ANEMIA DUE TO CHRONIC KIDNEY DISEASE, ON CHRONIC DIALYSIS: ICD-10-CM

## 2023-11-02 DIAGNOSIS — Z99.2 ESRD NEEDING DIALYSIS: ICD-10-CM

## 2023-11-02 DIAGNOSIS — N18.6 TYPE 2 DIABETES MELLITUS WITH CHRONIC KIDNEY DISEASE ON CHRONIC DIALYSIS, WITHOUT LONG-TERM CURRENT USE OF INSULIN: ICD-10-CM

## 2023-11-02 DIAGNOSIS — R29.90 MULTIPLE NEUROLOGICAL SYMPTOMS: Primary | ICD-10-CM

## 2023-11-02 DIAGNOSIS — J30.1 SEASONAL ALLERGIC RHINITIS DUE TO POLLEN: ICD-10-CM

## 2023-11-02 DIAGNOSIS — E78.5 HYPERLIPIDEMIA ASSOCIATED WITH TYPE 2 DIABETES MELLITUS: ICD-10-CM

## 2023-11-02 DIAGNOSIS — Z85.07 HISTORY OF PANCREATIC CANCER: ICD-10-CM

## 2023-11-02 DIAGNOSIS — G62.9 POLYNEUROPATHY: ICD-10-CM

## 2023-11-02 DIAGNOSIS — E11.59 HYPERTENSION ASSOCIATED WITH DIABETES: Chronic | ICD-10-CM

## 2023-11-02 DIAGNOSIS — E89.0 S/P PARTIAL THYROIDECTOMY: ICD-10-CM

## 2023-11-02 DIAGNOSIS — E89.6 POSTPROCEDURAL ADRENOCORTICAL (-MEDULLARY) HYPOFUNCTION: ICD-10-CM

## 2023-11-02 DIAGNOSIS — M79.2 NEUROPATHIC PAIN: ICD-10-CM

## 2023-11-02 DIAGNOSIS — J31.0 CHRONIC RHINITIS: ICD-10-CM

## 2023-11-02 DIAGNOSIS — D63.1 ANEMIA IN CHRONIC KIDNEY DISEASE, UNSPECIFIED CKD STAGE: ICD-10-CM

## 2023-11-02 DIAGNOSIS — R09.82 PND (POST-NASAL DRIP): ICD-10-CM

## 2023-11-02 DIAGNOSIS — I49.5 SICK SINUS SYNDROME: ICD-10-CM

## 2023-11-02 DIAGNOSIS — Z99.2 ANEMIA DUE TO CHRONIC KIDNEY DISEASE, ON CHRONIC DIALYSIS: ICD-10-CM

## 2023-11-02 DIAGNOSIS — M25.361 RIGHT KNEE BUCKLING: ICD-10-CM

## 2023-11-02 DIAGNOSIS — Z99.2 TYPE 2 DIABETES MELLITUS WITH CHRONIC KIDNEY DISEASE ON CHRONIC DIALYSIS, WITHOUT LONG-TERM CURRENT USE OF INSULIN: ICD-10-CM

## 2023-11-02 DIAGNOSIS — Z86.010 HISTORY OF COLON POLYPS: ICD-10-CM

## 2023-11-02 DIAGNOSIS — K57.30 DIVERTICULOSIS OF LARGE INTESTINE WITHOUT HEMORRHAGE: ICD-10-CM

## 2023-11-02 DIAGNOSIS — E11.42 TYPE 2 DIABETES MELLITUS WITH DIABETIC POLYNEUROPATHY, WITHOUT LONG-TERM CURRENT USE OF INSULIN: ICD-10-CM

## 2023-11-02 DIAGNOSIS — I15.2 HYPERTENSION ASSOCIATED WITH DIABETES: Chronic | ICD-10-CM

## 2023-11-02 DIAGNOSIS — N18.6 ESRD NEEDING DIALYSIS: ICD-10-CM

## 2023-11-02 DIAGNOSIS — N18.9 ANEMIA IN CHRONIC KIDNEY DISEASE, UNSPECIFIED CKD STAGE: ICD-10-CM

## 2023-11-02 DIAGNOSIS — E11.22 TYPE 2 DIABETES MELLITUS WITH CHRONIC KIDNEY DISEASE ON CHRONIC DIALYSIS, WITHOUT LONG-TERM CURRENT USE OF INSULIN: ICD-10-CM

## 2023-11-02 DIAGNOSIS — Z90.5 ACQUIRED ABSENCE OF KIDNEY: ICD-10-CM

## 2023-11-02 DIAGNOSIS — N25.81 SECONDARY HYPERPARATHYROIDISM OF RENAL ORIGIN: ICD-10-CM

## 2023-11-02 DIAGNOSIS — K21.9 GASTROESOPHAGEAL REFLUX DISEASE WITHOUT ESOPHAGITIS: ICD-10-CM

## 2023-11-02 DIAGNOSIS — J31.0 CHRONIC RHINITIS: Primary | ICD-10-CM

## 2023-11-02 DIAGNOSIS — L29.9 ITCHING: ICD-10-CM

## 2023-11-02 DIAGNOSIS — M19.90 ARTHRITIS: ICD-10-CM

## 2023-11-02 DIAGNOSIS — G89.29 OTHER CHRONIC PAIN: ICD-10-CM

## 2023-11-02 DIAGNOSIS — Z85.07 HISTORY OF PANCREATIC CANCER: Primary | ICD-10-CM

## 2023-11-02 DIAGNOSIS — M10.9 GOUT, UNSPECIFIED CAUSE, UNSPECIFIED CHRONICITY, UNSPECIFIED SITE: ICD-10-CM

## 2023-11-02 DIAGNOSIS — E11.69 HYPERLIPIDEMIA ASSOCIATED WITH TYPE 2 DIABETES MELLITUS: ICD-10-CM

## 2023-11-02 DIAGNOSIS — N18.6 ANEMIA DUE TO CHRONIC KIDNEY DISEASE, ON CHRONIC DIALYSIS: ICD-10-CM

## 2023-11-02 PROBLEM — R29.898 OTHER SYMPTOMS AND SIGNS INVOLVING THE MUSCULOSKELETAL SYSTEM: Status: RESOLVED | Noted: 2022-03-15 | Resolved: 2023-11-02

## 2023-11-02 PROBLEM — M54.50 ACUTE BILATERAL LOW BACK PAIN WITHOUT SCIATICA: Status: RESOLVED | Noted: 2022-11-17 | Resolved: 2023-11-02

## 2023-11-02 PROBLEM — R20.2 NUMBNESS AND TINGLING: Status: RESOLVED | Noted: 2022-03-29 | Resolved: 2023-11-02

## 2023-11-02 PROBLEM — Z90.81 H/O SPLENECTOMY: Status: ACTIVE | Noted: 2022-06-17

## 2023-11-02 PROBLEM — R20.0 NUMBNESS AND TINGLING: Status: RESOLVED | Noted: 2022-03-29 | Resolved: 2023-11-02

## 2023-11-02 PROCEDURE — 1101F PR PT FALLS ASSESS DOC 0-1 FALLS W/OUT INJ PAST YR: ICD-10-PCS | Mod: CPTII,S$GLB,, | Performed by: STUDENT IN AN ORGANIZED HEALTH CARE EDUCATION/TRAINING PROGRAM

## 2023-11-02 PROCEDURE — 1101F PT FALLS ASSESS-DOCD LE1/YR: CPT | Mod: CPTII,S$GLB,, | Performed by: NURSE PRACTITIONER

## 2023-11-02 PROCEDURE — 3078F DIAST BP <80 MM HG: CPT | Mod: CPTII,S$GLB,, | Performed by: STUDENT IN AN ORGANIZED HEALTH CARE EDUCATION/TRAINING PROGRAM

## 2023-11-02 PROCEDURE — 3288F PR FALLS RISK ASSESSMENT DOCUMENTED: ICD-10-PCS | Mod: CPTII,S$GLB,, | Performed by: STUDENT IN AN ORGANIZED HEALTH CARE EDUCATION/TRAINING PROGRAM

## 2023-11-02 PROCEDURE — 3288F FALL RISK ASSESSMENT DOCD: CPT | Mod: CPTII,S$GLB,, | Performed by: PSYCHIATRY & NEUROLOGY

## 2023-11-02 PROCEDURE — 99214 PR OFFICE/OUTPT VISIT, EST, LEVL IV, 30-39 MIN: ICD-10-PCS | Mod: S$GLB,,, | Performed by: NURSE PRACTITIONER

## 2023-11-02 PROCEDURE — 1159F PR MEDICATION LIST DOCUMENTED IN MEDICAL RECORD: ICD-10-PCS | Mod: CPTII,S$GLB,, | Performed by: STUDENT IN AN ORGANIZED HEALTH CARE EDUCATION/TRAINING PROGRAM

## 2023-11-02 PROCEDURE — 3078F PR MOST RECENT DIASTOLIC BLOOD PRESSURE < 80 MM HG: ICD-10-PCS | Mod: CPTII,S$GLB,, | Performed by: PSYCHIATRY & NEUROLOGY

## 2023-11-02 PROCEDURE — 1157F PR ADVANCE CARE PLAN OR EQUIV PRESENT IN MEDICAL RECORD: ICD-10-PCS | Mod: CPTII,S$GLB,, | Performed by: PSYCHIATRY & NEUROLOGY

## 2023-11-02 PROCEDURE — 1157F ADVNC CARE PLAN IN RCRD: CPT | Mod: CPTII,S$GLB,, | Performed by: STUDENT IN AN ORGANIZED HEALTH CARE EDUCATION/TRAINING PROGRAM

## 2023-11-02 PROCEDURE — 1157F PR ADVANCE CARE PLAN OR EQUIV PRESENT IN MEDICAL RECORD: ICD-10-PCS | Mod: CPTII,S$GLB,, | Performed by: STUDENT IN AN ORGANIZED HEALTH CARE EDUCATION/TRAINING PROGRAM

## 2023-11-02 PROCEDURE — 3288F PR FALLS RISK ASSESSMENT DOCUMENTED: ICD-10-PCS | Mod: CPTII,S$GLB,, | Performed by: NURSE PRACTITIONER

## 2023-11-02 PROCEDURE — 1159F MED LIST DOCD IN RCRD: CPT | Mod: CPTII,S$GLB,, | Performed by: PSYCHIATRY & NEUROLOGY

## 2023-11-02 PROCEDURE — 1101F PT FALLS ASSESS-DOCD LE1/YR: CPT | Mod: CPTII,S$GLB,, | Performed by: STUDENT IN AN ORGANIZED HEALTH CARE EDUCATION/TRAINING PROGRAM

## 2023-11-02 PROCEDURE — 3075F PR MOST RECENT SYSTOLIC BLOOD PRESS GE 130-139MM HG: ICD-10-PCS | Mod: CPTII,S$GLB,, | Performed by: PSYCHIATRY & NEUROLOGY

## 2023-11-02 PROCEDURE — 1159F PR MEDICATION LIST DOCUMENTED IN MEDICAL RECORD: ICD-10-PCS | Mod: CPTII,S$GLB,, | Performed by: NURSE PRACTITIONER

## 2023-11-02 PROCEDURE — 1159F PR MEDICATION LIST DOCUMENTED IN MEDICAL RECORD: ICD-10-PCS | Mod: CPTII,S$GLB,, | Performed by: PSYCHIATRY & NEUROLOGY

## 2023-11-02 PROCEDURE — 1159F MED LIST DOCD IN RCRD: CPT | Mod: CPTII,S$GLB,, | Performed by: NURSE PRACTITIONER

## 2023-11-02 PROCEDURE — 99214 OFFICE O/P EST MOD 30 MIN: CPT | Mod: S$GLB,,, | Performed by: NURSE PRACTITIONER

## 2023-11-02 PROCEDURE — 1159F MED LIST DOCD IN RCRD: CPT | Mod: CPTII,S$GLB,, | Performed by: STUDENT IN AN ORGANIZED HEALTH CARE EDUCATION/TRAINING PROGRAM

## 2023-11-02 PROCEDURE — 99999 PR PBB SHADOW E&M-EST. PATIENT-LVL III: ICD-10-PCS | Mod: PBBFAC,,, | Performed by: STUDENT IN AN ORGANIZED HEALTH CARE EDUCATION/TRAINING PROGRAM

## 2023-11-02 PROCEDURE — 3075F PR MOST RECENT SYSTOLIC BLOOD PRESS GE 130-139MM HG: ICD-10-PCS | Mod: CPTII,S$GLB,, | Performed by: STUDENT IN AN ORGANIZED HEALTH CARE EDUCATION/TRAINING PROGRAM

## 2023-11-02 PROCEDURE — 99204 OFFICE O/P NEW MOD 45 MIN: CPT | Mod: S$GLB,,, | Performed by: STUDENT IN AN ORGANIZED HEALTH CARE EDUCATION/TRAINING PROGRAM

## 2023-11-02 PROCEDURE — 3075F SYST BP GE 130 - 139MM HG: CPT | Mod: CPTII,S$GLB,, | Performed by: STUDENT IN AN ORGANIZED HEALTH CARE EDUCATION/TRAINING PROGRAM

## 2023-11-02 PROCEDURE — 1160F PR REVIEW ALL MEDS BY PRESCRIBER/CLIN PHARMACIST DOCUMENTED: ICD-10-PCS | Mod: CPTII,S$GLB,, | Performed by: NURSE PRACTITIONER

## 2023-11-02 PROCEDURE — 99999 PR PBB SHADOW E&M-EST. PATIENT-LVL III: CPT | Mod: PBBFAC,,, | Performed by: STUDENT IN AN ORGANIZED HEALTH CARE EDUCATION/TRAINING PROGRAM

## 2023-11-02 PROCEDURE — 99204 PR OFFICE/OUTPT VISIT, NEW, LEVL IV, 45-59 MIN: ICD-10-PCS | Mod: S$GLB,,, | Performed by: STUDENT IN AN ORGANIZED HEALTH CARE EDUCATION/TRAINING PROGRAM

## 2023-11-02 PROCEDURE — 3078F PR MOST RECENT DIASTOLIC BLOOD PRESSURE < 80 MM HG: ICD-10-PCS | Mod: CPTII,S$GLB,, | Performed by: NURSE PRACTITIONER

## 2023-11-02 PROCEDURE — 1125F AMNT PAIN NOTED PAIN PRSNT: CPT | Mod: CPTII,S$GLB,, | Performed by: NURSE PRACTITIONER

## 2023-11-02 PROCEDURE — 3078F DIAST BP <80 MM HG: CPT | Mod: CPTII,S$GLB,, | Performed by: NURSE PRACTITIONER

## 2023-11-02 PROCEDURE — 99215 OFFICE O/P EST HI 40 MIN: CPT | Mod: S$GLB,,, | Performed by: PSYCHIATRY & NEUROLOGY

## 2023-11-02 PROCEDURE — 1160F RVW MEDS BY RX/DR IN RCRD: CPT | Mod: CPTII,S$GLB,, | Performed by: NURSE PRACTITIONER

## 2023-11-02 PROCEDURE — 99215 PR OFFICE/OUTPT VISIT, EST, LEVL V, 40-54 MIN: ICD-10-PCS | Mod: S$GLB,,, | Performed by: PSYCHIATRY & NEUROLOGY

## 2023-11-02 PROCEDURE — 1101F PR PT FALLS ASSESS DOC 0-1 FALLS W/OUT INJ PAST YR: ICD-10-PCS | Mod: CPTII,S$GLB,, | Performed by: NURSE PRACTITIONER

## 2023-11-02 PROCEDURE — 1125F AMNT PAIN NOTED PAIN PRSNT: CPT | Mod: CPTII,S$GLB,, | Performed by: PSYCHIATRY & NEUROLOGY

## 2023-11-02 PROCEDURE — 1126F PR PAIN SEVERITY QUANTIFIED, NO PAIN PRESENT: ICD-10-PCS | Mod: CPTII,S$GLB,, | Performed by: STUDENT IN AN ORGANIZED HEALTH CARE EDUCATION/TRAINING PROGRAM

## 2023-11-02 PROCEDURE — 3288F PR FALLS RISK ASSESSMENT DOCUMENTED: ICD-10-PCS | Mod: CPTII,S$GLB,, | Performed by: PSYCHIATRY & NEUROLOGY

## 2023-11-02 PROCEDURE — 1125F PR PAIN SEVERITY QUANTIFIED, PAIN PRESENT: ICD-10-PCS | Mod: CPTII,S$GLB,, | Performed by: NURSE PRACTITIONER

## 2023-11-02 PROCEDURE — 3078F PR MOST RECENT DIASTOLIC BLOOD PRESSURE < 80 MM HG: ICD-10-PCS | Mod: CPTII,S$GLB,, | Performed by: STUDENT IN AN ORGANIZED HEALTH CARE EDUCATION/TRAINING PROGRAM

## 2023-11-02 PROCEDURE — 86003 ALLG SPEC IGE CRUDE XTRC EA: CPT | Performed by: STUDENT IN AN ORGANIZED HEALTH CARE EDUCATION/TRAINING PROGRAM

## 2023-11-02 PROCEDURE — 86003 ALLG SPEC IGE CRUDE XTRC EA: CPT | Mod: 59 | Performed by: STUDENT IN AN ORGANIZED HEALTH CARE EDUCATION/TRAINING PROGRAM

## 2023-11-02 PROCEDURE — 3077F SYST BP >= 140 MM HG: CPT | Mod: CPTII,S$GLB,, | Performed by: NURSE PRACTITIONER

## 2023-11-02 PROCEDURE — 1126F AMNT PAIN NOTED NONE PRSNT: CPT | Mod: CPTII,S$GLB,, | Performed by: STUDENT IN AN ORGANIZED HEALTH CARE EDUCATION/TRAINING PROGRAM

## 2023-11-02 PROCEDURE — 3288F FALL RISK ASSESSMENT DOCD: CPT | Mod: CPTII,S$GLB,, | Performed by: STUDENT IN AN ORGANIZED HEALTH CARE EDUCATION/TRAINING PROGRAM

## 2023-11-02 PROCEDURE — 99999 PR PBB SHADOW E&M-EST. PATIENT-LVL V: ICD-10-PCS | Mod: PBBFAC,,, | Performed by: NURSE PRACTITIONER

## 2023-11-02 PROCEDURE — 99999 PR PBB SHADOW E&M-EST. PATIENT-LVL V: CPT | Mod: PBBFAC,,, | Performed by: PSYCHIATRY & NEUROLOGY

## 2023-11-02 PROCEDURE — 36415 COLL VENOUS BLD VENIPUNCTURE: CPT | Performed by: STUDENT IN AN ORGANIZED HEALTH CARE EDUCATION/TRAINING PROGRAM

## 2023-11-02 PROCEDURE — 99999 PR PBB SHADOW E&M-EST. PATIENT-LVL V: ICD-10-PCS | Mod: PBBFAC,,, | Performed by: PSYCHIATRY & NEUROLOGY

## 2023-11-02 PROCEDURE — 3077F PR MOST RECENT SYSTOLIC BLOOD PRESSURE >= 140 MM HG: ICD-10-PCS | Mod: CPTII,S$GLB,, | Performed by: NURSE PRACTITIONER

## 2023-11-02 PROCEDURE — 1160F RVW MEDS BY RX/DR IN RCRD: CPT | Mod: CPTII,S$GLB,, | Performed by: STUDENT IN AN ORGANIZED HEALTH CARE EDUCATION/TRAINING PROGRAM

## 2023-11-02 PROCEDURE — 1157F ADVNC CARE PLAN IN RCRD: CPT | Mod: CPTII,S$GLB,, | Performed by: PSYCHIATRY & NEUROLOGY

## 2023-11-02 PROCEDURE — 99999 PR PBB SHADOW E&M-EST. PATIENT-LVL V: CPT | Mod: PBBFAC,,, | Performed by: NURSE PRACTITIONER

## 2023-11-02 PROCEDURE — 3288F FALL RISK ASSESSMENT DOCD: CPT | Mod: CPTII,S$GLB,, | Performed by: NURSE PRACTITIONER

## 2023-11-02 PROCEDURE — 1157F ADVNC CARE PLAN IN RCRD: CPT | Mod: CPTII,S$GLB,, | Performed by: NURSE PRACTITIONER

## 2023-11-02 PROCEDURE — 1101F PT FALLS ASSESS-DOCD LE1/YR: CPT | Mod: CPTII,S$GLB,, | Performed by: PSYCHIATRY & NEUROLOGY

## 2023-11-02 PROCEDURE — 1125F PR PAIN SEVERITY QUANTIFIED, PAIN PRESENT: ICD-10-PCS | Mod: CPTII,S$GLB,, | Performed by: PSYCHIATRY & NEUROLOGY

## 2023-11-02 PROCEDURE — 3075F SYST BP GE 130 - 139MM HG: CPT | Mod: CPTII,S$GLB,, | Performed by: PSYCHIATRY & NEUROLOGY

## 2023-11-02 PROCEDURE — 3078F DIAST BP <80 MM HG: CPT | Mod: CPTII,S$GLB,, | Performed by: PSYCHIATRY & NEUROLOGY

## 2023-11-02 PROCEDURE — 1101F PR PT FALLS ASSESS DOC 0-1 FALLS W/OUT INJ PAST YR: ICD-10-PCS | Mod: CPTII,S$GLB,, | Performed by: PSYCHIATRY & NEUROLOGY

## 2023-11-02 PROCEDURE — 1160F PR REVIEW ALL MEDS BY PRESCRIBER/CLIN PHARMACIST DOCUMENTED: ICD-10-PCS | Mod: CPTII,S$GLB,, | Performed by: STUDENT IN AN ORGANIZED HEALTH CARE EDUCATION/TRAINING PROGRAM

## 2023-11-02 PROCEDURE — 1157F PR ADVANCE CARE PLAN OR EQUIV PRESENT IN MEDICAL RECORD: ICD-10-PCS | Mod: CPTII,S$GLB,, | Performed by: NURSE PRACTITIONER

## 2023-11-02 RX ORDER — FLUTICASONE PROPIONATE 50 MCG
1 SPRAY, SUSPENSION (ML) NASAL 2 TIMES DAILY
Qty: 16 G | Refills: 11 | Status: SHIPPED | OUTPATIENT
Start: 2023-11-02 | End: 2024-11-01

## 2023-11-02 RX ORDER — AZELASTINE 1 MG/ML
1 SPRAY, METERED NASAL 2 TIMES DAILY
Qty: 30 ML | Refills: 11 | Status: SHIPPED | OUTPATIENT
Start: 2023-11-02 | End: 2024-11-01

## 2023-11-02 RX ORDER — LORATADINE 10 MG/1
10 TABLET ORAL DAILY
Qty: 90 TABLET | Refills: 3 | Status: SHIPPED | OUTPATIENT
Start: 2023-11-02 | End: 2024-01-02

## 2023-11-02 RX ORDER — GABAPENTIN 600 MG/1
600 TABLET ORAL DAILY
Qty: 90 TABLET | Refills: 3 | Status: SHIPPED | OUTPATIENT
Start: 2023-11-02 | End: 2024-11-01

## 2023-11-02 NOTE — PROGRESS NOTES
Allergy and Immunology  New Patient Clinic Note    Date: 11/2/2023  Chief Complaint   Patient presents with    Sinus Problem     Referred by: Self, Aaareferral  No address on file    History  Cailin Pickett is a 83 y.o. female being seen as a New Patient today.    Chronic Rhinitis/PND   - Onset: Multiple years but started in adulthood  - Symptoms: Rhinorrhea, congestion, sneezing, PND, cough   - Suspected triggers include: Environmental, hormonal, vasomotor - multifactorial   - Medications: Taking Zyrtec at this time   - ESRD patient - best medication Claritin     Chronic or Inducible Urticaria  - No hx of chronic urticaria     Asthma   - No hx of asthma     CRSwNP  - No hx of CRSwNP     Eczema   - No hx of eczema     Eosinophilic Esophagitis  - No hx of eosinophilic esophagitis     Food Allergy  - No hx of food allergy     Drug Allergy  - Allegra: swelling? Tolerates other medications  - Codeine: hives, swelling, itching - likely direct antihistamine release     Recurrent Infections  - No hx of recurrent infections     Venom Allergy  - No hx of venom allergy    Allergies, PMH, PSH, Social, and Family History were reviewed.    Review of patient's allergies indicates:   Allergen Reactions    Allegra [fexofenadine] Swelling    Codeine Hives, Itching and Nausea And Vomiting      Past Medical History:   Diagnosis Date    Anemia     CKD (chronic kidney disease) stage 5, GFR less than 15 ml/min 03/14/2019    CKD (chronic kidney disease), stage III     Diabetes mellitus type II     Encounter for blood transfusion     Family history of colonic polyps 07/18/2017    Gout, unspecified     Hyperlipidemia     Hypertension     Neuropathy     Pancreatic cancer     Renal failure     Secondary hyperparathyroidism of renal origin 03/14/2019    Thyroid disease      Past Surgical History:   Procedure Laterality Date    COLONOSCOPY  2011    Dr. Favio Mims    COLONOSCOPY N/A 07/18/2017    Procedure: screening colonoscopy;   "Surgeon: Kenneth Kamara MD;  Location: [facility] ENDO;  Service: Endoscopy;  Laterality: N/A;    ESOPHAGEAL MANOMETRY WITH MEASUREMENT OF IMPEDANCE N/A 04/07/2022    Procedure: MANOMETRY-ESOPHAGEAL-WITH IMPEDANCE;  Surgeon: Jah Rodgers RN;  Location: [facility] ENDO;  Service: Endoscopy;  Laterality: N/A;    ESOPHAGOGASTRODUODENOSCOPY N/A 01/18/2022    Procedure: ESOPHAGOGASTRODUODENOSCOPY (EGD);  Surgeon: Ivett Quarles MD;  Location: [facility] ENDO;  Service: Endoscopy;  Laterality: N/A;    FISTULOGRAM N/A 04/10/2019    Procedure: FISTULOGRAM;  Surgeon: Ruddy Fitzpatrick MD;  Location: [facility] CATH LAB;  Service: Vascular;  Laterality: N/A;  0830 start    HYSTERECTOMY      KNEE SURGERY Left 05/31/2018    NEPHRECTOMY Left 05/2013    Dr Carter     PANCREAS SURGERY      distal pancreatectomy    SPLENECTOMY, TOTAL      THYROIDECTOMY, PARTIAL      VENTRICULOATRIAL SHUNT Left 12/04/2014    left arm     Social History     Social History Narrative    [place] from [place]     S/he reports that she quit smoking about 22 years ago. Her smoking use included cigarettes. She started smoking about 61 years ago. She has a 38.8 pack-year smoking history. She has never used smokeless tobacco. She reports that she does not drink alcohol and does not use drugs.    Current Outpatient Medications on File Prior to Visit   Medication Sig Dispense Refill    allopurinoL (ZYLOPRIM) 100 MG tablet Take 1 tablet (100 mg total) by mouth once daily. 90 tablet 3    amLODIPine (NORVASC) 2.5 MG tablet Take 1 tablet (2.5 mg total) by mouth once daily. 90 tablet 3    aspirin (ECOTRIN) 81 MG EC tablet Take 1 tablet (81 mg total) by mouth once daily. 30 tablet 0    atorvastatin (LIPITOR) 40 MG tablet Take 1 tablet (40 mg total) by mouth once daily. 90 tablet 3    AURYXIA 210 mg iron Tab       BD ULTRA-FINE MICRO PEN NEEDLE 32 gauge x 1/4" Ndle       benzonatate (TESSALON) 100 MG capsule Take 200 mg by mouth.      blood sugar diagnostic " "Strp USE TO MONITOR BLOOD GLUCOSE DAILY      co-enzyme Q-10 30 mg capsule Take 1 capsule (30 mg total) by mouth 2 (two) times daily. 180 capsule 3    docusate sodium (COLACE) 50 MG capsule Take by mouth.      ezetimibe (ZETIA) 10 mg tablet TAKE 1 TABLET DAILY 90 tablet 0    fluocinolone (DERMA-SMOOTHE) 0.01 % external oil Apply oil to scalp once a day 1 Bottle 5    furosemide (LASIX) 40 MG tablet TAKE 1 TABLET DAILY 90 tablet 3    gabapentin (NEURONTIN) 600 MG tablet Take 1 tablet (600 mg total) by mouth once daily. Daily at 05:00 PM 90 tablet 3    glucosamine-chondroitin 250-200 mg Tab Take 1 tablet by mouth 2 (two) times daily.      insulin degludec (TRESIBA FLEXTOUCH U-100) 100 unit/mL (3 mL) InPn Inject 8 Units into the skin.       omeprazole (PRILOSEC) 40 MG capsule Take 1 capsule (40 mg total) by mouth once daily. 30 capsule 11    ONETOUCH DELICA PLUS LANCET 30 gauge Misc 2 (two) times daily. Use to check blood glucose      pen needle, diabetic 32 gauge x 1/4" Ndle Use to test blood sugars bid      PRORENAL 8 mg iron-800 mcg-1,000 unit Tab Take 1 tablet by mouth once daily.      RENAPLEX-D 800 mcg-12.5 mg -2,000 unit Tab Take 1 tablet by mouth once daily.      sevelamer carbonate (RENVELA) 800 mg Tab Take 2 tablets by mouth.      traMADoL (ULTRAM) 50 mg tablet Take 1 tablet (50 mg total) by mouth every 12 (twelve) hours. 30 tablet 0    VELPHORO 500 mg Chew Take 1 tablet by mouth 3 (three) times daily.      vit B,C-FA-zinc-selen-vit D3-E (RENAPLEX-D) 800 mcg-12.5 mg -2,000 unit Tab Take 1 tablet by mouth.      vitamin renal formula, B-complex-vitamin c-folic acid, (NEPHROCAP) 1 mg Cap Take 1 capsule by mouth once daily. 30 capsule 0    [DISCONTINUED] cetirizine (ZYRTEC) 10 MG tablet Take 10 mg by mouth once daily.      [DISCONTINUED] amitriptyline (ELAVIL) 10 MG tablet Take 1 tablet (10 mg total) by mouth every evening. 90 tablet 1    [DISCONTINUED] gabapentin (NEURONTIN) 600 MG tablet Take 1 tablet (600 mg " total) by mouth once daily. Daily at 05:00 PM 90 tablet 3     No current facility-administered medications on file prior to visit.     Physical Examination  Vitals:    11/02/23 1045   BP: 137/66   Pulse: 75   Temp: 97.7 °F (36.5 °C)     GENERAL:  female in no apparent distress and well developed and well nourished  HEAD:  Normocephalic, without obvious abnormality, atraumatic  EYES: sclera anicteric, conjunctiva normochromic  EARS: normal TM's and external ear canals both ears  NOSE: without erythema or discharge and pale and boggy, clear and copious discharge, turbinates normal, swollen    OROPHARYNX: moist mucous membranes without erythema, exudates or petechiae  LYMPH NODES: normal, supple, no lymphadenopathy  LUNGS: clear to auscultation, no wheezes, rales or rhonchi, symmetric air entry.  HEART: normal rate, regular rhythm, normal S1, S2, no murmurs, rubs, clicks or gallops.  ABDOMEN: soft, nontender, nondistended, no masses or organomegaly.  MUSCULOSKELETAL: no gross joint deformity or swelling.  NEURO: alert, oriented, normal speech, no focal findings or movement disorder noted.  SKIN: normal coloration and turgor, no rashes, no suspicious skin lesions noted.     Assessment/Plan:   Problem List Items Addressed This Visit          ENT    Chronic rhinitis - Primary    Current Assessment & Plan     - Not controlled at this time   - Ordered Flonase 1 SEN BID   - Ordered Astelin 1 SEN BID   - Switched from Zyrtec to Claritin due to GFR   - Ordered Serum IgE to Aeroallergen   - Educated on proper use of intranasal sprays   - Will continue to monitor and reassess          Relevant Orders    Aspergillus fumagatus IgE    Bermuda grass IgE    Cat epithelium IgE    Cladosporium IgE    Cockroach, American IgE    [place], bald IgE    D. farinae IgE    D. pteronyssinus IgE    Dog dander IgE    Plantain, English IgE    Gigi grass IgE    Marsh elder, rough IgE    Mugwort IgE    Nettle IgE    Oak, white IgE    Penicillium  IgE    Ragweed, short, common IgE    Tanmay IgE    Allergen, Cocklebur    Allergen, Elm Republic    Allergen, Meadow Grass ([facility])    Allergen, Mucor Racemosus    Allergen, Pecan Tree IgE    Allergen, White Nadir    Allergen-Alternaria Alternata    Allergen-Republic    Allergen-Common Pigweed    Allergen-Silver Birch    Feather Panel #2    RAST Allergen for [place]    RAST Allergen Maple ([place])    RAST Allergen [place]    RAST Allergen, Lamb's Quarters    RAST Allergen, Sheep [place])    PND (post-nasal drip)    Relevant Orders    Aspergillus fumagatus IgE    Bermuda grass IgE    Cat epithelium IgE    Cladosporium IgE    Cockroach, American IgE    [place], bald IgE    D. farinae IgE    D. pteronyssinus IgE    Dog dander IgE    Plantain, English IgE    Gigi grass IgE    Marsh elder, rough IgE    Mugwort IgE    Nettle IgE    Oak, white IgE    Penicillium IgE    Ragweed, short, common IgE    Tanmay IgE    Allergen, Cocklebur    Allergen, Elm Republic    Allergen, Meadow Grass ([facility])    Allergen, Mucor Racemosus    Allergen, Pecan Tree IgE    Allergen, White Nadir    Allergen-Alternaria Alternata    Allergen-Republic    Allergen-Common Pigweed    Allergen-Silver Birch    Feather Panel #2    RAST Allergen for [place]    RAST Allergen Maple ([place])    RAST Allergen New Century    RAST Allergen, Lamb's Quarters    RAST Allergen, Sheep [place])     Follow up:  Follow up in about 2 months (around 1/2/2024) for PND/Rhinorrhea .    Basilio Reilly MD   [facility]  Allergy and Immunology

## 2023-11-02 NOTE — PATIENT INSTRUCTIONS
HERE ARE 10 WAYS TO REDUCE RISK OF DEMENTIA      Be physically active.    2. Avoid smoking and alcohol consumption.    3. Track your numbers. Keep your blood pressure, cholesterol, blood sugar and weight within recommended ranges.    4. Stay socially connected.    5. Make healthy food choices. Eat a well-balance and healthy diet that rich in cereals, fish, legumes, and vegetables.    6. Reduce stress.     7. Challenge your brain by trying something new, playing games, or learning a new language.    8. Take care of your hearing. Avoid being continuously exposed to loud sounds and wear a hearing aid if hearing does become a problem.    9. Lower risk of falls. Consider installing handrails on all stairs and grab bars in bathrooms.    10. Reduce your exposure to air pollution, such as exposure to exhaust in heavy traffic.

## 2023-11-02 NOTE — PROGRESS NOTES
Subjective:       Patient ID: Cailin Pickett is a 83 y.o. female.    Chief Complaint: f/u h/o pancreatic cancer. Lower back pain radiating towards abdomen      HPI: 82 y.o female with h/o ESRD on dialysis, HTN, HLD, pancreatic cancer. In regards to her h/o pancreatic adenocarcinoma patient was treated with neoadjuvant chemotherapy and chemoradiation with 5 fluorouracil followed by resection 2013 by Dr. Carter at [facility], [place] path [id number] (treatment effect noted). She has been in surveillance. The patient had previously completed neoadjuvant chemotherapy and 5-FU chemoradiation with excellent response.  negative.     She has end-stage renal disease on hemodialysis with recurrent pleural effusion.  Pleural fluid analysis negative for malignant cells.  She receives [facility] with dialysis.     CT scan 3/2021 without evidence of disease    Today she presents for follow up given complaints lower back pain radiating to abdomen. Denies unintentional weight loss, bowel or bladder changes, appetite changes or any other concerning symptoms. Of note patient presented 1 year prior 2022 with similar complaints for which CT was obtained negative for evidence of malignancy      Social History     Socioeconomic History    Marital status:     Number of children: 0   Occupational History     Comment: stay home    Tobacco Use    Smoking status: Former     Current packs/day: 0.00     Average packs/day: 1 pack/day for 38.8 years (38.8 ttl pk-yrs)     Types: Cigarettes     Start date: 3/14/1962     Quit date: 2001     Years since quittin.8    Smokeless tobacco: Never   Substance and Sexual Activity    Alcohol use: No    Drug use: No    Sexual activity: Not Currently   Social History Narrative    [place] from [place]       Past Medical History:   Diagnosis Date    Anemia     CKD (chronic kidney disease) stage 5, GFR less than 15 ml/min 2019    CKD (chronic kidney  disease), stage III     Diabetes mellitus type II     Encounter for blood transfusion     Family history of colonic polyps 07/18/2017    Gout, unspecified     Hyperlipidemia     Hypertension     Neuropathy     Pancreatic cancer     Renal failure     Secondary hyperparathyroidism of renal origin 03/14/2019    Thyroid disease        Family History   Problem Relation Age of Onset    Heart disease Mother 60        MI    Hypertension Mother     Stroke Mother     Breast cancer Mother     Cancer Father         prostate    Cancer Brother         renal cell carcinoma    Diabetes Brother     Kidney disease Brother         ESRD s/p kidney transplant    Breast cancer Sister     Breast cancer Sister        Past Surgical History:   Procedure Laterality Date    COLONOSCOPY  2011    Dr. Favio Mims    COLONOSCOPY N/A 07/18/2017    Procedure: screening colonoscopy;  Surgeon: Kenneth Kamara MD;  Location: [facility];  Service: Endoscopy;  Laterality: N/A;    ESOPHAGEAL MANOMETRY WITH MEASUREMENT OF IMPEDANCE N/A 04/07/2022    Procedure: MANOMETRY-ESOPHAGEAL-WITH IMPEDANCE;  Surgeon: Jah Rodgers RN;  Location: [facility];  Service: Endoscopy;  Laterality: N/A;    ESOPHAGOGASTRODUODENOSCOPY N/A 01/18/2022    Procedure: ESOPHAGOGASTRODUODENOSCOPY (EGD);  Surgeon: Ivett Quarles MD;  Location: [facility] ENDO;  Service: Endoscopy;  Laterality: N/A;    FISTULOGRAM N/A 04/10/2019    Procedure: FISTULOGRAM;  Surgeon: Ruddy Fitzpatrick MD;  Location: [facility] CATH LAB;  Service: Vascular;  Laterality: N/A;  0830 start    HYSTERECTOMY      KNEE SURGERY Left 05/31/2018    NEPHRECTOMY Left 05/2013    Dr Carter     PANCREAS SURGERY      distal pancreatectomy    SPLENECTOMY, TOTAL      THYROIDECTOMY, PARTIAL      VENTRICULOATRIAL SHUNT Left 12/04/2014    left arm       Review of Systems   Constitutional:  Negative for activity change, appetite change, chills, fatigue, fever and unexpected weight change.   HENT:   "Negative for congestion, mouth sores, nosebleeds, sore throat, trouble swallowing and voice change.    Eyes:  Negative for visual disturbance.   Respiratory:  Negative for cough, chest tightness, shortness of breath and wheezing.    Cardiovascular:  Negative for chest pain and leg swelling.   Gastrointestinal:  Positive for abdominal pain. Negative for abdominal distention, blood in stool, constipation, diarrhea, nausea and vomiting.   Genitourinary:  Negative for difficulty urinating, dysuria and hematuria.   Musculoskeletal:  Positive for back pain. Negative for arthralgias and myalgias.   Skin:  Negative for pallor, rash and wound.   Neurological:  Negative for dizziness, syncope, weakness and headaches.   Hematological:  Negative for adenopathy. Does not bruise/bleed easily.   Psychiatric/Behavioral:  The patient is not nervous/anxious.          Medication List with Changes/Refills   Current Medications    ALLOPURINOL (ZYLOPRIM) 100 MG TABLET    Take 1 tablet (100 mg total) by mouth once daily.    AMLODIPINE (NORVASC) 2.5 MG TABLET    Take 1 tablet (2.5 mg total) by mouth once daily.    ASPIRIN (ECOTRIN) 81 MG EC TABLET    Take 1 tablet (81 mg total) by mouth once daily.    ATORVASTATIN (LIPITOR) 40 MG TABLET    Take 1 tablet (40 mg total) by mouth once daily.    AURYXIA 210 MG IRON TAB        AZELASTINE (ASTELIN) 137 MCG (0.1 %) NASAL SPRAY    1 spray (137 mcg total) by Nasal route 2 (two) times daily.    BD ULTRA-FINE MICRO PEN NEEDLE 32 GAUGE X 1/4" NDLE        BENZONATATE (TESSALON) 100 MG CAPSULE    Take 200 mg by mouth.    BLOOD SUGAR DIAGNOSTIC STRP    USE TO MONITOR BLOOD GLUCOSE DAILY    CO-ENZYME Q-10 30 MG CAPSULE    Take 1 capsule (30 mg total) by mouth 2 (two) times daily.    DOCUSATE SODIUM (COLACE) 50 MG CAPSULE    Take by mouth.    EZETIMIBE (ZETIA) 10 MG TABLET    TAKE 1 TABLET DAILY    FLUOCINOLONE (DERMA-SMOOTHE) 0.01 % EXTERNAL OIL    Apply oil to scalp once a day    FLUTICASONE PROPIONATE " "(FLONASE) 50 MCG/ACTUATION NASAL SPRAY    1 spray (50 mcg total) by Each Nostril route 2 (two) times a day.    FUROSEMIDE (LASIX) 40 MG TABLET    TAKE 1 TABLET DAILY    GABAPENTIN (NEURONTIN) 600 MG TABLET    Take 1 tablet (600 mg total) by mouth once daily. Daily at 05:00 PM    GLUCOSAMINE-CHONDROITIN 250-200 MG TAB    Take 1 tablet by mouth 2 (two) times daily.    INSULIN DEGLUDEC (TRESIBA FLEXTOUCH U-100) 100 UNIT/ML (3 ML) INPN    Inject 8 Units into the skin.     LORATADINE (CLARITIN) 10 MG TABLET    Take 1 tablet (10 mg total) by mouth once daily.    OMEPRAZOLE (PRILOSEC) 40 MG CAPSULE    Take 1 capsule (40 mg total) by mouth once daily.    ONETOUCH DELICA PLUS LANCET 30 GAUGE MISC    2 (two) times daily. Use to check blood glucose    PEN NEEDLE, DIABETIC 32 GAUGE X 1/4" NDLE    Use to test blood sugars bid    PRORENAL 8 MG IRON-800 MCG-1,000 UNIT TAB    Take 1 tablet by mouth once daily.    RENAPLEX-D 800 MCG-12.5 MG -2,000 UNIT TAB    Take 1 tablet by mouth once daily.    SEVELAMER CARBONATE (RENVELA) 800 MG TAB    Take 2 tablets by mouth.    TRAMADOL (ULTRAM) 50 MG TABLET    Take 1 tablet (50 mg total) by mouth every 12 (twelve) hours.    VELPHORO 500 MG CHEW    Take 1 tablet by mouth 3 (three) times daily.    VIT B,C-FA-ZINC-SELEN-VIT D3-E (RENAPLEX-D) 800 MCG-12.5 MG -2,000 UNIT TAB    Take 1 tablet by mouth.    VITAMIN RENAL FORMULA, B-COMPLEX-VITAMIN C-FOLIC ACID, (NEPHROCAP) 1 MG CAP    Take 1 capsule by mouth once daily.     Objective:     Vitals:    11/02/23 1311   BP: (!) 140/65   Pulse: 72   Temp: 97.4 °F (36.3 °C)     Lab Results   Component Value Date    WBC 5.35 11/15/2022    HGB 11.8 (L) 11/15/2022    HCT 37.6 11/15/2022    MCV 99 (H) 11/15/2022     11/15/2022       BMP  Lab Results   Component Value Date     11/15/2022    K 4.5 11/15/2022    CL 99 11/15/2022    CO2 26 11/15/2022    BUN 33 (H) 11/15/2022    CREATININE 6.6 (H) 11/15/2022    CALCIUM 9.5 11/15/2022    ANIONGAP 15 " 11/15/2022    ESTGFRAFRICA 7 (A) 07/14/2022    EGFRNONAA 6 (A) 07/14/2022     Lab Results   Component Value Date    ALT 16 11/15/2022    AST 18 11/15/2022    ALKPHOS 110 11/15/2022    BILITOT 0.4 11/15/2022         Physical Exam  Vitals reviewed.   Constitutional:       Appearance: She is well-developed.   HENT:      Head: Normocephalic.      Right Ear: Hearing and external ear normal. No drainage.      Left Ear: Hearing and external ear normal. No drainage.      Nose: Nose normal.   Eyes:      General: Lids are normal. No scleral icterus.        Right eye: No discharge.         Left eye: No discharge.      Conjunctiva/sclera: Conjunctivae normal.   Neck:      Thyroid: No thyroid mass.   Cardiovascular:      Rate and Rhythm: Normal rate and regular rhythm.      Heart sounds: Normal heart sounds.   Pulmonary:      Effort: Pulmonary effort is normal. No respiratory distress.      Breath sounds: Normal breath sounds.   Abdominal:      General: There is no distension.   Genitourinary:     Comments: deferred  Musculoskeletal:         General: Normal range of motion.      Cervical back: Normal range of motion.   Lymphadenopathy:      Head:      Right side of head: No submandibular, preauricular or posterior auricular adenopathy.      Left side of head: No submandibular, preauricular or posterior auricular adenopathy.   Skin:     General: Skin is warm and dry.   Neurological:      Mental Status: She is alert and oriented to person, place, and time.   Psychiatric:         Speech: Speech normal.         Behavior: Behavior normal. Behavior is cooperative.         Thought Content: Thought content normal.        Assessment:     Problem List Items Addressed This Visit          Oncology    History of pancreatic cancer - Primary     Completed neoadjuvant chemotherapy and chemoradiation with 5 fluorouracil followed by resection 5/2013 by Dr. Carter at [facility], [place] path ypT3N0 (treatment effect noted)    Patient presented 1  year ago 11/2022 with c/o lower back pain radiating towards abdomen. Given clinical concerns for possible recurrent CT c/a/p with contrast obtained without evidence of disease. CA 19.9 negative    Today she presents with similar complaints. Will obtain CT c/a/p with contrast. Cbc, cmp, CA 19.9 on her way out. Arrange MD follow up after CT to review         Relevant Orders    CT Chest Abdomen Pelvis With IV Contrast (XPD)    Anemia in chronic kidney disease, on chronic dialysis     Management per dialysis. MWF dialysis             GI    History of colon polyps    Relevant Orders    Ambulatory referral/consult to Endo Procedure     CT Chest Abdomen Pelvis With IV Contrast (XPD)         Plan:     History of pancreatic cancer  -     CT Chest Abdomen Pelvis With IV Contrast (XPD); Future; Expected date: 11/02/2023    History of colon polyps  -     Ambulatory referral/consult to Endo Procedure ; Future; Expected date: 11/03/2023  -     CT Chest Abdomen Pelvis With IV Contrast (XPD); Future; Expected date: 11/02/2023    Anemia in chronic kidney disease, unspecified CKD stage          Med Onc Chart Routing      Follow up with physician Other. MD after CT scan to review results   Follow up with KIM    Infusion scheduling note    Injection scheduling note    Labs   Scheduling:  Preferred lab:  Lab interval:  Labs done today after visit   Imaging CT chest abdomen pelvis   please schedule CT asap. patient prefers Tuesday or Thursday   Pharmacy appointment No pharmacy appointment needed      Other referrals       No additional referrals needed             VY Garber

## 2023-11-02 NOTE — ASSESSMENT & PLAN NOTE
- Not controlled at this time   - Ordered Flonase 1 SEN BID   - Ordered Astelin 1 SEN BID   - Switched from Zyrtec to Claritin due to GFR   - Ordered Serum IgE to Aeroallergen   - Educated on proper use of intranasal sprays   - Will continue to monitor and reassess

## 2023-11-02 NOTE — PROGRESS NOTES
Subjective:       Patient ID: Cailin Pickett is a 83 y.o. female.    Chief Complaint: No chief complaint on file.          HPI       BACKGROUND HISTORY       The patient says she is here for neuropathy. Describes longstanding itching , numbness and tingling (hands and feet) since 2013. Was diagnosed with peripheral neuropathy and treated initially with PGB then GBP. Has history of T2DM, ESRD-HD and Pancreatic CA S/P Chemotherapy. B12, FA and TFT. Takes  mg TID despite being on HD. Ordered NCS/EMG RUE RLE which was unremarkable (On 03- NCS/EMG RUE RLE Borderline (Only absent Sural and SPN), 10- NCS/EMG RUE RLE Borderline-NL again (identical)).Continued Gabapentin/GBP-Neurontin and changed it to 600 mg QD at 05:00 pm (HD TIW 10:00 am to 02:00 pm).    Was initially referred in  for tremors and memory difficulties. Denied having tremors anymore and reported mild memory lapses. Remains independent and highly functioning.  Scored 25-27/30 on MOCA on 03-. On  10- MRI brain unremarkable.      INTERVAL HISTORY     Gabapentin/GBP-Neurontin 600 mg QD at 05:00 pm (HD TIW 10:00 am to 02:00 pm) has helped tremendously with no side effects and no drowsiness.     Cognitive function remains stable and normal for age with no problems. Remains independent and highly functioning.  On 11- scored 28/30 on MOCA.         Review of Systems   Constitutional:  Negative for appetite change and fatigue.   HENT:  Negative for hearing loss and tinnitus.    Eyes:  Negative for photophobia and visual disturbance.   Respiratory:  Negative for apnea and shortness of breath.    Cardiovascular:  Negative for chest pain and palpitations.   Gastrointestinal:  Negative for nausea and vomiting.   Endocrine: Negative for cold intolerance and heat intolerance.   Genitourinary:  Negative for difficulty urinating and urgency.   Musculoskeletal:  Negative for arthralgias, back pain, gait problem, joint  "swelling, myalgias, neck pain and neck stiffness.   Skin:  Negative for color change and rash.   Allergic/Immunologic: Negative for environmental allergies and immunocompromised state.   Neurological:  Positive for numbness. Negative for dizziness, tremors, seizures, syncope, facial asymmetry, speech difficulty, weakness, light-headedness and headaches.   Hematological:  Negative for adenopathy. Does not bruise/bleed easily.   Psychiatric/Behavioral:  Negative for agitation, behavioral problems, confusion, decreased concentration, dysphoric mood, hallucinations, self-injury, sleep disturbance and suicidal ideas. The patient is not hyperactive.                  Current Outpatient Medications:     allopurinoL (ZYLOPRIM) 100 MG tablet, Take 1 tablet (100 mg total) by mouth once daily., Disp: 90 tablet, Rfl: 3    amitriptyline (ELAVIL) 10 MG tablet, Take 1 tablet (10 mg total) by mouth every evening., Disp: 90 tablet, Rfl: 1    amLODIPine (NORVASC) 2.5 MG tablet, Take 1 tablet (2.5 mg total) by mouth once daily., Disp: 90 tablet, Rfl: 3    aspirin (ECOTRIN) 81 MG EC tablet, Take 1 tablet (81 mg total) by mouth once daily., Disp: 30 tablet, Rfl: 0    atorvastatin (LIPITOR) 40 MG tablet, Take 1 tablet (40 mg total) by mouth once daily., Disp: 90 tablet, Rfl: 3    AURYXIA 210 mg iron Tab, , Disp: , Rfl:     BD ULTRA-FINE MICRO PEN NEEDLE 32 gauge x 1/4" Ndle, , Disp: , Rfl:     benzonatate (TESSALON) 100 MG capsule, Take 200 mg by mouth., Disp: , Rfl:     blood sugar diagnostic Strp, USE TO MONITOR BLOOD GLUCOSE DAILY, Disp: , Rfl:     cetirizine (ZYRTEC) 10 MG tablet, Take 10 mg by mouth once daily., Disp: , Rfl:     co-enzyme Q-10 30 mg capsule, Take 1 capsule (30 mg total) by mouth 2 (two) times daily., Disp: 180 capsule, Rfl: 3    docusate sodium (COLACE) 50 MG capsule, Take by mouth., Disp: , Rfl:     ezetimibe (ZETIA) 10 mg tablet, TAKE 1 TABLET DAILY, Disp: 90 tablet, Rfl: 0    fluocinolone (DERMA-SMOOTHE) 0.01 % " "external oil, Apply oil to scalp once a day, Disp: 1 Bottle, Rfl: 5    furosemide (LASIX) 40 MG tablet, TAKE 1 TABLET DAILY, Disp: 90 tablet, Rfl: 3    gabapentin (NEURONTIN) 600 MG tablet, Take 1 tablet (600 mg total) by mouth once daily. Daily at 05:00 PM, Disp: 90 tablet, Rfl: 3    glucosamine-chondroitin 250-200 mg Tab, Take 1 tablet by mouth 2 (two) times daily., Disp: , Rfl:     insulin degludec (TRESIBA FLEXTOUCH U-100) 100 unit/mL (3 mL) InPn, Inject 8 Units into the skin. , Disp: , Rfl:     omeprazole (PRILOSEC) 40 MG capsule, Take 1 capsule (40 mg total) by mouth once daily., Disp: 30 capsule, Rfl: 11    ONETOUCH DELICA PLUS LANCET 30 gauge Misc, 2 (two) times daily. Use to check blood glucose, Disp: , Rfl:     pen needle, diabetic 32 gauge x 1/4" Ndle, Use to test blood sugars bid, Disp: , Rfl:     PRORENAL 8 mg iron-800 mcg-1,000 unit Tab, Take 1 tablet by mouth once daily., Disp: , Rfl:     RENAPLEX-D 800 mcg-12.5 mg -2,000 unit Tab, Take 1 tablet by mouth once daily., Disp: , Rfl:     sevelamer carbonate (RENVELA) 800 mg Tab, Take 2 tablets by mouth., Disp: , Rfl:     traMADoL (ULTRAM) 50 mg tablet, Take 1 tablet (50 mg total) by mouth every 12 (twelve) hours., Disp: 30 tablet, Rfl: 0    VELPHORO 500 mg Chew, Take 1 tablet by mouth 3 (three) times daily., Disp: , Rfl:     vit B,C-FA-zinc-selen-vit D3-E (RENAPLEX-D) 800 mcg-12.5 mg -2,000 unit Tab, Take 1 tablet by mouth., Disp: , Rfl:     vitamin renal formula, B-complex-vitamin c-folic acid, (NEPHROCAP) 1 mg Cap, Take 1 capsule by mouth once daily., Disp: 30 capsule, Rfl: 0  Past Medical History:   Diagnosis Date    Anemia     CKD (chronic kidney disease) stage 5, GFR less than 15 ml/min 03/14/2019    CKD (chronic kidney disease), stage III     Diabetes mellitus type II     Encounter for blood transfusion     Family history of colonic polyps 07/18/2017    Gout, unspecified     Hyperlipidemia     Hypertension     Neuropathy     Pancreatic cancer     " Renal failure     Secondary hyperparathyroidism of renal origin 2019    Thyroid disease      Past Surgical History:   Procedure Laterality Date    COLONOSCOPY      Dr. Favio Mims    COLONOSCOPY N/A 2017    Procedure: screening colonoscopy;  Surgeon: Kenneth Kamara MD;  Location: [facility];  Service: Endoscopy;  Laterality: N/A;    ESOPHAGEAL MANOMETRY WITH MEASUREMENT OF IMPEDANCE N/A 2022    Procedure: MANOMETRY-ESOPHAGEAL-WITH IMPEDANCE;  Surgeon: Jah Rodgers RN;  Location: [facility];  Service: Endoscopy;  Laterality: N/A;    ESOPHAGOGASTRODUODENOSCOPY N/A 2022    Procedure: ESOPHAGOGASTRODUODENOSCOPY (EGD);  Surgeon: Ivett Quarles MD;  Location: [facility];  Service: Endoscopy;  Laterality: N/A;    FISTULOGRAM N/A 04/10/2019    Procedure: FISTULOGRAM;  Surgeon: Ruddy Fitzpatrick MD;  Location: [facility] CATH LAB;  Service: Vascular;  Laterality: N/A;  0830 start    HYSTERECTOMY      KNEE SURGERY Left 2018    NEPHRECTOMY Left 2013    Dr Carter     PANCREAS SURGERY      distal pancreatectomy    SPLENECTOMY, TOTAL      THYROIDECTOMY, PARTIAL      VENTRICULOATRIAL SHUNT Left 2014    left arm     Social History     Socioeconomic History    Marital status:     Number of children: 0   Occupational History     Comment: stay home    Tobacco Use    Smoking status: Former     Current packs/day: 0.00     Average packs/day: 1 pack/day for 38.8 years (38.8 ttl pk-yrs)     Types: Cigarettes     Start date: 3/14/1962     Quit date: 2001     Years since quittin.8    Smokeless tobacco: Never   Substance and Sexual Activity    Alcohol use: No    Drug use: No    Sexual activity: Not Currently   Social History Narrative    She lives [place] from [place]             Past/Current Medical/Surgical History, Past/Current Social History, Past/Current Family History and Past/Current Medications were reviewed in detail.        Objective:           VITAL SIGNS WERE  REVIEWED      GENERAL APPEARANCE:     The patient looks comfortable.    BMI 24.21    No signs of respiratory distress.    Normal breathing pattern.    No dysmorphic features    Normal eye contact.     GENERAL MEDICAL EXAM:      HEENT:  Head is atraumatic normocephalic.    Fundoscopic (Ophthalmoscopic) exam showed no disc edema.      Neck and Axillae: No JVD. No visible lesions.    Cardiopulmonary: No cyanosis. No tachypnea. Normal respiratory effort.    Gastrointestinal/Urogenital:  No jaundice. No stomas or lesions. No visible hernias. No catheters.     Skin, Hair and Nails: No pathognonomic skin rash. No neurofibromatosis. No visible lesions.No stigmata of autoimmune disease. No clubbing.    Limbs: No varicose veins. No visible swelling.HD access     Muskoskeletal: No visible deformities.No visible lesions.           Neurologic Exam     Mental Status   Oriented to person, place, and time.   Follows 3 step commands.   Attention: normal. Concentration: normal.   Speech: speech is normal   Level of consciousness: alert  Able to name object. Able to repeat. Normal comprehension.     Cranial Nerves   Cranial nerves II through XII intact.     CN II   Visual fields full to confrontation.   Visual acuity: normal with correction  Right visual field deficit: none  Left visual field deficit: none     CN III, IV, VI   Pupils are equal, round, and reactive to light.  Extraocular motions are normal.   Right pupil: Size: 2 mm. Shape: regular. Reactivity: brisk. Consensual response: intact. Accommodation: intact.   Left pupil: Size: 2 mm. Shape: regular. Reactivity: brisk. Consensual response: intact. Accommodation: intact.   CN III: no CN III palsy  CN VI: no CN VI palsy  Nystagmus: none   Diplopia: none  Ophthalmoparesis: none  Upgaze: normal  Downgaze: normal  Conjugate gaze: present  Vestibulo-ocular reflex: present    CN V   Facial sensation intact.   Right facial sensation deficit: none  Left facial sensation deficit:  none    CN VII   Facial expression full, symmetric.   Right facial weakness: none  Left facial weakness: none    CN VIII   CN VIII normal.   Hearing: intact    CN IX, X   CN IX normal.   CN X normal.   Palate: symmetric    CN XI   CN XI normal.   Right sternocleidomastoid strength: normal  Left sternocleidomastoid strength: normal  Right trapezius strength: normal  Left trapezius strength: normal    CN XII   CN XII normal.   Tongue: not atrophic  Fasciculations: absent  Tongue deviation: none    Motor Exam   Muscle bulk: normal  Overall muscle tone: normal  Right arm tone: normal  Left arm tone: normal  Right arm pronator drift: absent  Left arm pronator drift: absent  Right leg tone: normal  Left leg tone: normal    Strength   Strength 5/5 throughout.   Right neck flexion: 5/5  Left neck flexion: 5/5  Right neck extension: 5/5  Left neck extension: 5/5  Right deltoid: 5/5  Left deltoid: 5/5  Right biceps: 5/5  Left biceps: 5/5  Right triceps: 5/5  Left triceps: 5/5  Right wrist flexion: 5/5  Left wrist flexion: 5/5  Right wrist extension: 5/5  Left wrist extension: 5/5  Right interossei: 5/5  Left interossei: 5/5  Right iliopsoas: 5/5  Left iliopsoas: 5/5  Right quadriceps: 5/5  Left quadriceps: 5/5  Right hamstrin/5  Left hamstrin/5  Right glutei: 5/5  Left glutei: 5/5  Right anterior tibial: 5/5  Left anterior tibial: 5/5  Right posterior tibial: 5/5  Left posterior tibial: 5/5  Right peroneal: 5/5  Left peroneal: 5/5  Right gastroc: 5/5  Left gastroc: 5/5    Sensory Exam   Light touch normal.   Right arm light touch: normal  Left arm light touch: normal  Right leg light touch: normal  Left leg light touch: normal  Vibration normal.   Right arm vibration: normal  Left arm vibration: normal  Right leg vibration: normal  Left leg vibration: normal  Proprioception normal.   Right arm proprioception: normal  Left arm proprioception: normal  Right leg proprioception: normal  Left leg proprioception:  normal  Pinprick normal.   Right arm pinprick: normal  Left arm pinprick: normal  Right leg pinprick: normal  Left leg pinprick: normal  Graphesthesia: normal  Stereognosis: normal    Gait, Coordination, and Reflexes     Gait  Gait: normal    Coordination   Romberg: negative  Finger to nose coordination: normal  Heel to shin coordination: normal  Tandem walking coordination: normal    Tremor   Resting tremor: absent  Intention tremor: absent  Action tremor: absent    Reflexes   Right brachioradialis: 2+  Left brachioradialis: 2+  Right biceps: 2+  Left biceps: 2+  Right triceps: 2+  Left triceps: 2+  Right patellar: 2+  Left patellar: 2+  Right achilles: 2+  Left achilles: 2+  Right : 2+  Left : 2+  Right plantar: normal  Left plantar: normal  Right Yi: absent  Left Yi: absent  Right ankle clonus: absent  Left ankle clonus: absent  Right pendular knee jerk: absent  Left pendular knee jerk: absent      Lab Results   Component Value Date    WBC 5.35 11/15/2022    HGB 11.8 (L) 11/15/2022    HCT 37.6 11/15/2022    MCV 99 (H) 11/15/2022     11/15/2022     Sodium   Date Value Ref Range Status   11/15/2022 140 136 - 145 mmol/L Final     Potassium   Date Value Ref Range Status   11/15/2022 4.5 3.5 - 5.1 mmol/L Final     Chloride   Date Value Ref Range Status   11/15/2022 99 95 - 110 mmol/L Final     CO2   Date Value Ref Range Status   11/15/2022 26 23 - 29 mmol/L Final     Glucose   Date Value Ref Range Status   11/15/2022 164 (H) 70 - 110 mg/dL Final     BUN   Date Value Ref Range Status   11/15/2022 33 (H) 8 - 23 mg/dL Final     Creatinine   Date Value Ref Range Status   11/15/2022 6.6 (H) 0.5 - 1.4 mg/dL Final     Calcium   Date Value Ref Range Status   11/15/2022 9.5 8.7 - 10.5 mg/dL Final     Total Protein   Date Value Ref Range Status   11/15/2022 6.9 6.0 - 8.4 g/dL Final     Albumin   Date Value Ref Range Status   11/15/2022 3.6 3.5 - 5.2 g/dL Final     Total Bilirubin   Date Value Ref Range  Status   11/15/2022 0.4 0.1 - 1.0 mg/dL Final     Comment:     For infants and newborns, interpretation of results should be based  on gestational age, weight and in agreement with clinical  observations.    Premature Infant recommended reference ranges:  Up to 24 hours.............<8.0 mg/dL  Up to 48 hours............<12.0 mg/dL  3-5 days..................<15.0 mg/dL  6-29 days.................<15.0 mg/dL       Alkaline Phosphatase   Date Value Ref Range Status   11/15/2022 110 55 - 135 U/L Final     AST   Date Value Ref Range Status   11/15/2022 18 10 - 40 U/L Final     ALT   Date Value Ref Range Status   11/15/2022 16 10 - 44 U/L Final     Anion Gap   Date Value Ref Range Status   11/15/2022 15 8 - 16 mmol/L Final     eGFR if    Date Value Ref Range Status   07/14/2022 7 (A) >60 mL/min/1.73 m^2 Final     eGFR if non    Date Value Ref Range Status   07/14/2022 6 (A) >60 mL/min/1.73 m^2 Final     Comment:     Calculation used to obtain the estimated glomerular filtration  rate (eGFR) is the CKD-EPI equation.        Lab Results   Component Value Date    TBHMRDKT10 1685 (H) 10/28/2021     Lab Results   Component Value Date    TSH 2.535 04/29/2021    FREET4 0.87 07/31/2019       LABORATORY EVALUATION      0951-4345    B12, FA, TFT, SPEP-IPEP NL         RADIOLOGY EVALUATION        10-     MRI brain unremarkable     NEUROPHYSIOLOGY EVALUATION      03-    NCS/EMG RUE RLE Borderline (Only absent Sural and SPN)      10-    NCS/EMG RUE RLE Borderline-NL again (identical)       NEUROCOGNITIVE EVALUATION         NICK COGNITIVE ASSESSMENT (MOCA) TOTAL SCORE 30         NORMAL-MILD NCD 26-30    MILD DEMENTIA 20-25    MODERATE DEMENTIA 10-19    SEVERE DEMENTIA <10        DATE 03- 11-      TOTAL SCORE 25-27 28      VISUOSPATIAL EXECUTIVE (5) 4 4      NAMING (3) 3 3      ATTENTION (6) 5 5      LANGUAGE (3) 3 3      ABSTRACTION(2) 1 2      RECALL (5) 3-5 5       ORIENTATION (6) 6 6          Reviewed the neuroimaging independently       Assessment:       1. Multiple neurological symptoms    2. Acquired absence of kidney    3. Seasonal allergic rhinitis due to pollen    4. Anemia due to chronic kidney disease, on chronic dialysis    5. Type 2 diabetes mellitus with diabetic polyneuropathy, without long-term current use of insulin    6. Type 2 diabetes mellitus with chronic kidney disease on chronic dialysis, without long-term current use of insulin    7. Subjective memory complaints    8. Sick sinus syndrome    9. Secondary hyperparathyroidism of renal origin    10. S/P partial thyroidectomy    11. Neuropathic pain    12. Hypertension associated with diabetes    13. Hyperlipidemia associated with type 2 diabetes mellitus    14. History of pancreatic cancer    15. History of colon polyps    16. ESRD needing dialysis    17. Diverticulosis of large intestine without hemorrhage    18. Vitamin D deficiency    19. Postprocedural adrenocortical (-medullary) hypofunction    20. Other chronic pain    21. Gout, unspecified cause, unspecified chronicity, unspecified site    22. Gastroesophageal reflux disease without esophagitis    23. Arthritis    24. Anemia in chronic kidney disease, unspecified CKD stage        Plan:                 POLYNEUROPATHY, LIKELY SMALL FIBER NEUROPATHY (SFN), MULTIFACTORIAL: DM, ESRD, CHEMOTHERAPY              Continue Gabapentin/GBP-Neurontin  600 mg QD at 05:00 pm (HD TIW 10:00 am to 02:00 pm).  GBP has helped tremendously with no side effects and no drowsiness.       Failed. Pregabalin/PGB-Lyrica.      NEXT OPTIONS:      Amitriptyline/Elavil, Duloxetine/Cymbalta, CBZ and LTG.        I encouraged the patient to read the leaflet for any newly prescribed medication for more details and report any side effect whether listed or not listed.    Discussed with the patient some of the neuropathy precautions like:    Always use oven mitts.    Test water with an  unaffected hand or foot.    Use caution when trimming nails. File sharp areas    Wear shoes that fit well to avoid pressure points, blisters, and ulcers.    Inspect your hands and feet carefully (including the soles of your feet and between your toes) at least once a week.        MEMORY CHANGES AND COGNITIVE CONCERNS WITH NORMAL COGNITIVE EXAMINATION              HERE ARE 10 WAYS TO REDUCE RISK OF DEMENTIA    Be physically active.    2. Avoid smoking and alcohol consumption.    3. Track your numbers. Keep your blood pressure, cholesterol, blood sugar and weight within recommended ranges.    4. Stay socially connected.    5. Make healthy food choices. Eat a well-balance and healthy diet that rich in cereals, fish, legumes, and vegetables.    6. Reduce stress.     7. Challenge your brain by trying something new, playing games, or learning a new language.    8. Take care of your hearing. Avoid being continuously exposed to loud sounds and wear a hearing aid if hearing does become a problem.    9. Lower risk of falls. Consider installing handrails on all stairs and grab bars in bathrooms.    10. Reduce your exposure to air pollution, such as exposure to exhaust in heavy traffic.           MEDICAL/SURGICAL COMORBIDITIES     All relevant medical comorbidities noted and managed by primary care physician and medical care team.          HEALTHY LIFESTYLE AND PREVENTATIVE CARE    The patient to adhere to the age-appropriate health maintenance guidelines including screening tests and vaccinations. The patient to adhere to  healthy lifestyle, optimal weight, exercise, healthy diet, good sleep hygiene and avoiding drugs including smoking, alcohol and recreational drugs.          RTC ANNUALLY         Eva Zapata MD, FAAN    Attending Neurologist/Epileptologist         Diplomate, American Board of Psychiatry and Neurology    Diplomate, American Board of Clinical Neurophysiology     Fellow, American Academy of Neurology       I  spent a total of 40 minutes on the day of the visit.  This includes face to face time and non-face to face time preparing to see the patient (eg, review of tests), obtaining and/or reviewing separately obtained history, documenting clinical information in the electronic or other health record, independently interpreting results and communicating results to the patient/family/caregiver, or care coordinator.

## 2023-11-02 NOTE — ASSESSMENT & PLAN NOTE
Completed neoadjuvant chemotherapy and chemoradiation with 5 fluorouracil followed by resection 5/2013 by Dr. Carter at [facility], [place] path [id number] (treatment effect noted)    Patient presented 1 year ago 11/2022 with c/o lower back pain radiating towards abdomen. Given clinical concerns for possible recurrent CT c/a/p with contrast obtained without evidence of disease. CA 19.9 negative    Today she presents with similar complaints. Will obtain CT c/a/p with contrast. Cbc, cmp, CA 19.9 on her way out. Arrange MD follow up after CT to review

## 2023-11-03 NOTE — PRE-PROCEDURE INSTRUCTIONS
Spoke with patient regarding procedure scheduled on 11.7     Arrival time 0730     Has patient been sick with fever or on antibiotics within the last 7 days? No     Does the patient have any open wounds, sores or rashes? No     Does the patient have any recent fractures? no     Has patient received a vaccination within the last 7 days? No     Received the COVID vaccination? yes     Has the patient stopped all medications as directed? na     Does patient have a pacemaker, defibrillator, or implantable stimulator? No     Does the patient have a ride to and from procedure and someone reliable to remain with patient?        Is the patient diabetic? yes     Does the patient have sleep apnea? Or use O2 at home? No and no     Is the patient receiving sedation? yes     Is the patient instructed to remain NPO beginning at midnight the night before their procedure? yes     Procedure location confirmed with patient? Yes     Covid- Denies signs/symptoms. Instructed to notify PAT/MD if any changes.

## 2023-11-06 LAB
A ALTERNATA IGE QN: 0.3 KU/L
A FUMIGATUS IGE QN: 0.24 KU/L
ALLERGEN BOXELDER MAPLE TREE IGE: 0.29 KU/L
ALLERGEN MAPLE (BOX ELDER) CLASS: ABNORMAL
ALLERGEN MULBERRY CLASS: ABNORMAL
ALLERGEN MULBERRY TREE IGE: 0.19 KU/L
ALLERGEN PIGWEED IGE: 0.2 KU/L
AMER SYCAMORE IGE QN: 1.18 KU/L
BALD CYPRESS IGE QN: <0.1 KU/L
BERMUDA GRASS IGE QN: 0.84 KU/L
C HERBARUM IGE QN: 0.25 KU/L
CAT DANDER IGE QN: 0.12 KU/L
CEDAR IGE QN: <0.1 KU/L
COCKLEBUR IGE QN: 0.29 KU/L
COMMON PIGWEED CLASS: ABNORMAL
COMMON RAGWEED IGE QN: 0.27 KU/L
D FARINAE IGE QN: 3.2 KU/L
D PTERONYSS IGE QN: 1.53 KU/L
DEPRECATED A ALTERNATA IGE RAST QL: ABNORMAL
DEPRECATED A FUMIGATUS IGE RAST QL: ABNORMAL
DEPRECATED BALD CYPRESS IGE RAST QL: NORMAL
DEPRECATED BERMUDA GRASS IGE RAST QL: ABNORMAL
DEPRECATED C HERBARUM IGE RAST QL: ABNORMAL
DEPRECATED CAT DANDER IGE RAST QL: ABNORMAL
DEPRECATED CEDAR IGE RAST QL: NORMAL
DEPRECATED COCKLEBUR IGE RAST QL: ABNORMAL
DEPRECATED COMMON RAGWEED IGE RAST QL: ABNORMAL
DEPRECATED D FARINAE IGE RAST QL: ABNORMAL
DEPRECATED D PTERONYSS IGE RAST QL: ABNORMAL
DEPRECATED DOG DANDER IGE RAST QL: ABNORMAL
DEPRECATED ELDER IGE RAST QL: ABNORMAL
DEPRECATED ENGL PLANTAIN IGE RAST QL: ABNORMAL
DEPRECATED GOOSEFOOT IGE RAST QL: ABNORMAL
DEPRECATED JOHNSON GRASS IGE RAST QL: ABNORMAL
DEPRECATED KENT BLUE GRASS IGE RAST QL: ABNORMAL
DEPRECATED M RACEMOSUS IGE RAST QL: ABNORMAL
DEPRECATED MUGWORT IGE RAST QL: ABNORMAL
DEPRECATED NETTLE IGE RAST QL: ABNORMAL
DEPRECATED P NOTATUM IGE RAST QL: ABNORMAL
DEPRECATED PECAN/HICK TREE IGE RAST QL: ABNORMAL
DEPRECATED ROACH IGE RAST QL: ABNORMAL
DEPRECATED SHEEP SORREL IGE RAST QL: ABNORMAL
DEPRECATED SILVER BIRCH IGE RAST QL: ABNORMAL
DEPRECATED TIMOTHY IGE RAST QL: ABNORMAL
DEPRECATED WHITE OAK IGE RAST QL: ABNORMAL
DOG DANDER IGE QN: 0.18 KU/L
ELDER IGE QN: 0.28 KU/L
ELM CEDAR CLASS: ABNORMAL
ELM CEDAR, IGE: 0.3 KU/L
ENGL PLANTAIN IGE QN: 0.29 KU/L
FEATHER PANEL #2: 0.36 KU/L
GOOSEFOOT IGE QN: 0.23 KU/L
JOHNSON GRASS IGE QN: 0.77 KU/L
KENT BLUE GRASS IGE QN: 3.49 KU/L
M RACEMOSUS IGE QN: 0.43 KU/L
MUGWORT IGE QN: 0.27 KU/L
NETTLE IGE QN: 0.29 KU/L
P NOTATUM IGE QN: 0.18 KU/L
PECAN/HICK TREE IGE QN: 0.23 KU/L
ROACH IGE QN: 0.26 KU/L
SHEEP SORREL IGE QN: 0.22 KU/L
SILVER BIRCH IGE QN: 0.23 KU/L
TIMOTHY IGE QN: 2.31 KU/L
WHITE OAK IGE QN: 0.27 KU/L

## 2023-11-07 ENCOUNTER — HOSPITAL ENCOUNTER (OUTPATIENT)
Facility: HOSPITAL | Age: 83
Discharge: HOME | End: 2023-11-07
Attending: ANESTHESIOLOGY | Admitting: ANESTHESIOLOGY
Payer: MEDICARE

## 2023-11-07 VITALS
DIASTOLIC BLOOD PRESSURE: 63 MMHG | HEIGHT: 65 IN | RESPIRATION RATE: 15 BRPM | WEIGHT: 147.69 LBS | BODY MASS INDEX: 24.61 KG/M2 | TEMPERATURE: 97 F | HEART RATE: 72 BPM | SYSTOLIC BLOOD PRESSURE: 132 MMHG | OXYGEN SATURATION: 98 %

## 2023-11-07 DIAGNOSIS — M54.16 LUMBAR RADICULOPATHY: ICD-10-CM

## 2023-11-07 PROBLEM — M47.816 LUMBAR SPONDYLOSIS: Status: ACTIVE | Noted: 2023-11-07

## 2023-11-07 LAB
ALLERGEN WHITE ASH TREE IGE: 0.34 KU/L
POCT GLUCOSE: 100 MG/DL (ref 70–110)
WHITE ASH CLASS: ABNORMAL

## 2023-11-07 PROCEDURE — 63600175 PHARM REV CODE 636 W HCPCS: Performed by: ANESTHESIOLOGY

## 2023-11-07 PROCEDURE — 64493 INJ PARAVERT F JNT L/S 1 LEV: CPT | Mod: LT | Performed by: ANESTHESIOLOGY

## 2023-11-07 PROCEDURE — 82962 GLUCOSE BLOOD TEST: CPT | Performed by: ANESTHESIOLOGY

## 2023-11-07 PROCEDURE — 64494 PR INJ DX/THER AGNT PARAVERT FACET JOINT,IMG GUIDE,LUMBAR/SAC, 2ND LEVEL: ICD-10-PCS | Mod: LT,,, | Performed by: ANESTHESIOLOGY

## 2023-11-07 PROCEDURE — 64494 INJ PARAVERT F JNT L/S 2 LEV: CPT | Mod: LT | Performed by: ANESTHESIOLOGY

## 2023-11-07 PROCEDURE — 64494 INJ PARAVERT F JNT L/S 2 LEV: CPT | Mod: LT,,, | Performed by: ANESTHESIOLOGY

## 2023-11-07 PROCEDURE — 64493 PR INJ DX/THER AGNT PARAVERT FACET JOINT,IMG GUIDE,LUMBAR/SAC,1ST LVL: ICD-10-PCS | Mod: LT,,, | Performed by: ANESTHESIOLOGY

## 2023-11-07 PROCEDURE — 64493 INJ PARAVERT F JNT L/S 1 LEV: CPT | Mod: LT,,, | Performed by: ANESTHESIOLOGY

## 2023-11-07 PROCEDURE — 25000003 PHARM REV CODE 250: Performed by: ANESTHESIOLOGY

## 2023-11-07 RX ORDER — FENTANYL CITRATE 50 UG/ML
INJECTION, SOLUTION INTRAMUSCULAR; INTRAVENOUS
Status: DISCONTINUED | OUTPATIENT
Start: 2023-11-07 | End: 2023-11-07 | Stop reason: HOSPADM

## 2023-11-07 RX ORDER — BUPIVACAINE HYDROCHLORIDE 5 MG/ML
INJECTION, SOLUTION EPIDURAL; INTRACAUDAL
Status: DISCONTINUED | OUTPATIENT
Start: 2023-11-07 | End: 2023-11-07 | Stop reason: HOSPADM

## 2023-11-07 RX ORDER — SODIUM BICARBONATE 1 MEQ/ML
SYRINGE (ML) INTRAVENOUS
Status: DISCONTINUED | OUTPATIENT
Start: 2023-11-07 | End: 2023-11-07 | Stop reason: HOSPADM

## 2023-11-07 RX ORDER — METHYLPREDNISOLONE ACETATE 40 MG/ML
INJECTION, SUSPENSION INTRA-ARTICULAR; INTRALESIONAL; INTRAMUSCULAR; SOFT TISSUE
Status: DISCONTINUED | OUTPATIENT
Start: 2023-11-07 | End: 2023-11-07 | Stop reason: HOSPADM

## 2023-11-07 NOTE — OP NOTE
Cailin Pickett  83 y.o. female      Vitals:    11/07/23 0835   BP: (!) 146/60   Pulse: 70   Resp: 17   Temp:        Procedure Date: 11/07/2023        INFORMED CONSENT: The procedure, risks, benefits and options were discussed with patient. There are no contraindications to the procedure. The patient expressed understanding and agreed to proceed. The personnel performing the procedure was discussed. I verify that I personally obtained consent prior to the start of the procedure and the signed consent can be found on the patient's chart.       Anesthesia:   Conscious sedation provided by M.D    The patient was monitored with continuous pulse oximetry, EKG, and intermittent blood pressure monitors.  The patient was hemodynamically stable throughout the entire process was responsive to voice, and breathing spontaneously.  Supplemental O2 was provided at 2L/min via nasal cannula.  Patient was comfortable for the duration of the procedure. (See nurse documentation and case log for sedation time)    There was a total of 0mg IV Midazolam and 50mcg Fentanyl titrated for the procedure     Pre Procedure diagnosis: Lumbar spondylosis [M47.816]  Post-Procedure diagnosis: SAME     PROCEDURE: LEFT L3,4,5 LUMBAR FACET MEDIAL BRANCH NERVE BLOCK        DESCRIPTION OF PROCEDURE:The patient was brought to the procedure room. After performing time out. IV access was obtained prior to the procedure. The patient was positioned prone on the fluoroscopy table. Continuous hemodynamic monitoring was initiated including blood pressure, EKG, and pulse oximetry. The area of the lumbar spine was prepped chlorhexidine and draped into a sterile field. Fluoroscopy was used to identify the location of the LEFT L3, L4, and L5 medial branch nerves at the junctions of the superior articular process and the transverse processes of L4, L5, and the sacral ala respectively. Skin anesthesia was achieved using 5 cc of Lidocaine 1% over the injection  "sites. A 22 gauge, 3 1/2" spinal needle was slowly inserted at each level using AP, lateral and oblique fluoroscopic imaging. Negative aspiration for blood or CSF was confirmed.  8 ml bupivacaine 0.25% with 1 mL Decadron was injected at all sites in divided doses. The needles were removed and bleeding was nil. A sterile dressing was applied. No specimens collected. Patient was taken back to the PACU for observation .       Blood Loss: Nill  Specimen: None    Geovany Qiu    "

## 2023-11-07 NOTE — DISCHARGE INSTRUCTIONS

## 2023-11-07 NOTE — DISCHARGE SUMMARY
Discharge Note  Short Stay      SUMMARY     Admit Date: 11/7/2023    Attending Physician: Geovany Qiu MD        Discharge Physician: Geovany Qiu MD        Discharge Date: 11/7/2023 8:40 AM    Procedure(s) (LRB):  Left L3, 4, 5 MBB (Left)    Final Diagnosis: Lumbar spondylosis [M47.816]    Disposition: Home or self care    Patient Instructions:   Current Discharge Medication List        CONTINUE these medications which have NOT CHANGED    Details   allopurinoL (ZYLOPRIM) 100 MG tablet Take 1 tablet (100 mg total) by mouth once daily.  Qty: 90 tablet, Refills: 3    Associated Diagnoses: Acute gout of foot, unspecified cause, unspecified laterality      amLODIPine (NORVASC) 2.5 MG tablet Take 1 tablet (2.5 mg total) by mouth once daily.  Qty: 90 tablet, Refills: 3    Comments: .  Associated Diagnoses: Hypertension associated with diabetes; Hyperlipidemia associated with type 2 diabetes mellitus; Sick sinus syndrome; ESRD needing dialysis      atorvastatin (LIPITOR) 40 MG tablet Take 1 tablet (40 mg total) by mouth once daily.  Qty: 90 tablet, Refills: 3    Associated Diagnoses: Pure hypercholesterolemia      co-enzyme Q-10 30 mg capsule Take 1 capsule (30 mg total) by mouth 2 (two) times daily.  Qty: 180 capsule, Refills: 3    Associated Diagnoses: Pure hypercholesterolemia      docusate sodium (COLACE) 50 MG capsule Take by mouth.      ezetimibe (ZETIA) 10 mg tablet TAKE 1 TABLET DAILY  Qty: 90 tablet, Refills: 0    Associated Diagnoses: Hyperlipidemia associated with type 2 diabetes mellitus      fluticasone propionate (FLONASE) 50 mcg/actuation nasal spray 1 spray (50 mcg total) by Each Nostril route 2 (two) times a day.  Qty: 16 g, Refills: 11      furosemide (LASIX) 40 MG tablet TAKE 1 TABLET DAILY  Qty: 90 tablet, Refills: 3    Associated Diagnoses: Localized edema      gabapentin (NEURONTIN) 600 MG tablet Take 1 tablet (600 mg total) by mouth once daily. Daily at 05:00 PM  Qty: 90 tablet, Refills: 3     "Associated Diagnoses: Polyneuropathy; Itching      insulin degludec (TRESIBA FLEXTOUCH U-100) 100 unit/mL (3 mL) InPn Inject 8 Units into the skin.       loratadine (CLARITIN) 10 mg tablet Take 1 tablet (10 mg total) by mouth once daily.  Qty: 90 tablet, Refills: 3      traMADoL (ULTRAM) 50 mg tablet Take 1 tablet (50 mg total) by mouth every 12 (twelve) hours.  Qty: 30 tablet, Refills: 0    Comments: Quantity prescribed more than 7 day supply? Yes, quantity medically necessary  Associated Diagnoses: Arthritis      !! vit B,C-FA-zinc-selen-vit D3-E (RENAPLEX-D) 800 mcg-12.5 mg -2,000 unit Tab Take 1 tablet by mouth.      aspirin (ECOTRIN) 81 MG EC tablet Take 1 tablet (81 mg total) by mouth once daily.  Qty: 30 tablet, Refills: 0      AURYXIA 210 mg iron Tab       azelastine (ASTELIN) 137 mcg (0.1 %) nasal spray 1 spray (137 mcg total) by Nasal route 2 (two) times daily.  Qty: 30 mL, Refills: 11      !! BD ULTRA-FINE MICRO PEN NEEDLE 32 gauge x 1/4" Ndle       benzonatate (TESSALON) 100 MG capsule Take 200 mg by mouth.      blood sugar diagnostic Strp USE TO MONITOR BLOOD GLUCOSE DAILY      fluocinolone (DERMA-SMOOTHE) 0.01 % external oil Apply oil to scalp once a day  Qty: 1 Bottle, Refills: 5    Comments: Dispense generic  Associated Diagnoses: Alopecia      glucosamine-chondroitin 250-200 mg Tab Take 1 tablet by mouth 2 (two) times daily.      omeprazole (PRILOSEC) 40 MG capsule Take 1 capsule (40 mg total) by mouth once daily.  Qty: 30 capsule, Refills: 11      ONETOUCH DELICA PLUS LANCET 30 gauge Misc 2 (two) times daily. Use to check blood glucose      !! pen needle, diabetic 32 gauge x 1/4" Ndle Use to test blood sugars bid      PRORENAL 8 mg iron-800 mcg-1,000 unit Tab Take 1 tablet by mouth once daily.      !! RENAPLEX-D 800 mcg-12.5 mg -2,000 unit Tab Take 1 tablet by mouth once daily.      sevelamer carbonate (RENVELA) 800 mg Tab Take 2 tablets by mouth.      VELPHORO 500 mg Chew Take 1 tablet by mouth 3 " (three) times daily.      vitamin renal formula, B-complex-vitamin c-folic acid, (NEPHROCAP) 1 mg Cap Take 1 capsule by mouth once daily.  Qty: 30 capsule, Refills: 0       !! - Potential duplicate medications found. Please discuss with provider.              Discharge Diagnosis: Lumbar spondylosis [M47.816]  Condition on Discharge: Stable with no complications to procedure   Diet on Discharge: Same as before.  Activity: as per instruction sheet.  Discharge to: Home with a responsible adult.  Follow up: 2-4 weeks       Please call the office at [phone] if you experience any weakness or loss of sensation, fever > 101.5, pain uncontrolled with oral medications, persistent nausea/vomiting/or diarrhea, redness or drainage from the incisions, or any other worrisome concerns. If physician on call was not reached or could not communicate with our office for any reason please go to the nearest emergency department

## 2023-11-09 ENCOUNTER — HOSPITAL ENCOUNTER (OUTPATIENT)
Dept: RADIOLOGY | Facility: HOSPITAL | Age: 83
Discharge: HOME | End: 2023-11-09
Attending: NURSE PRACTITIONER
Payer: MEDICARE

## 2023-11-09 DIAGNOSIS — Z85.07 HISTORY OF PANCREATIC CANCER: ICD-10-CM

## 2023-11-09 DIAGNOSIS — Z86.010 HISTORY OF COLON POLYPS: ICD-10-CM

## 2023-11-09 PROCEDURE — 74177 CT ABD & PELVIS W/CONTRAST: CPT | Mod: TC

## 2023-11-09 PROCEDURE — 25500020 PHARM REV CODE 255: Performed by: NURSE PRACTITIONER

## 2023-11-09 PROCEDURE — 71260 CT CHEST ABDOMEN PELVIS WITH IV CONTRAST (XPD): ICD-10-PCS | Mod: 26,,, | Performed by: STUDENT IN AN ORGANIZED HEALTH CARE EDUCATION/TRAINING PROGRAM

## 2023-11-09 PROCEDURE — A9698 NON-RAD CONTRAST MATERIALNOC: HCPCS | Performed by: NURSE PRACTITIONER

## 2023-11-09 PROCEDURE — 71260 CT THORAX DX C+: CPT | Mod: TC

## 2023-11-09 PROCEDURE — 74177 CT CHEST ABDOMEN PELVIS WITH IV CONTRAST (XPD): ICD-10-PCS | Mod: 26,,, | Performed by: STUDENT IN AN ORGANIZED HEALTH CARE EDUCATION/TRAINING PROGRAM

## 2023-11-09 PROCEDURE — 74177 CT ABD & PELVIS W/CONTRAST: CPT | Mod: 26,,, | Performed by: STUDENT IN AN ORGANIZED HEALTH CARE EDUCATION/TRAINING PROGRAM

## 2023-11-09 PROCEDURE — 71260 CT THORAX DX C+: CPT | Mod: 26,,, | Performed by: STUDENT IN AN ORGANIZED HEALTH CARE EDUCATION/TRAINING PROGRAM

## 2023-11-09 RX ADMIN — IOHEXOL 75 ML: 350 INJECTION, SOLUTION INTRAVENOUS at 03:11

## 2023-11-09 RX ADMIN — IOHEXOL 1000 ML: 12 SOLUTION ORAL at 01:11

## 2023-11-10 ENCOUNTER — PATIENT MESSAGE (OUTPATIENT)
Dept: SPORTS MEDICINE | Facility: CLINIC | Age: 83
End: 2023-11-10
Payer: MEDICARE

## 2023-11-14 ENCOUNTER — PATIENT MESSAGE (OUTPATIENT)
Dept: PAIN MEDICINE | Facility: CLINIC | Age: 83
End: 2023-11-14
Payer: MEDICARE

## 2023-11-14 ENCOUNTER — TELEPHONE (OUTPATIENT)
Dept: PAIN MEDICINE | Facility: CLINIC | Age: 83
End: 2023-11-14
Payer: MEDICARE

## 2023-11-14 NOTE — TELEPHONE ENCOUNTER
----- Message from Yajaira Whittington sent at 11/14/2023  8:48 AM CST -----  .Type:  Patient Call Back    Who Called: PT       Does the patient know what this is regarding?: PT CALLED TO SPEAK WITH THE OFFICE ABOUT HER PROCEDURE     Would the patient rather a call back YES     Best Call Back Number: [phone]    Additional Information: Thank You

## 2023-11-14 NOTE — TELEPHONE ENCOUNTER
Reach out to pt to give her the follow number to [phone].     Reach out to the patient to received their % relief from the MBB procedure. Pt reported 90 %. Inform patient that I will inform the provider and  I will give them a call with more information once the provides responds back. Pt understood.

## 2023-11-14 NOTE — TELEPHONE ENCOUNTER
Reach out to the patient to received their % relief from the MBB procedure. Pt is upset because she said that her insurance denied her MBB on 11/07/23 but she already had the procedure. Pt is afraid that she have to pay out of pocket for it. Inform pt that I will give her a call back once I find a correct number to give the pt.       José Miguel Moore   Medical Assistant

## 2023-11-16 ENCOUNTER — OFFICE VISIT (OUTPATIENT)
Dept: HEMATOLOGY/ONCOLOGY | Facility: CLINIC | Age: 83
End: 2023-11-16
Payer: MEDICARE

## 2023-11-16 VITALS
DIASTOLIC BLOOD PRESSURE: 64 MMHG | TEMPERATURE: 98 F | HEIGHT: 65 IN | BODY MASS INDEX: 23.42 KG/M2 | SYSTOLIC BLOOD PRESSURE: 141 MMHG | HEART RATE: 72 BPM | OXYGEN SATURATION: 99 % | RESPIRATION RATE: 18 BRPM | WEIGHT: 140.56 LBS

## 2023-11-16 DIAGNOSIS — Z85.07 HISTORY OF PANCREATIC CANCER: Primary | ICD-10-CM

## 2023-11-16 DIAGNOSIS — G62.0 CHEMOTHERAPY-INDUCED NEUROPATHY: ICD-10-CM

## 2023-11-16 DIAGNOSIS — T45.1X5A CHEMOTHERAPY-INDUCED NEUROPATHY: ICD-10-CM

## 2023-11-16 PROCEDURE — 99214 PR OFFICE/OUTPT VISIT, EST, LEVL IV, 30-39 MIN: ICD-10-PCS | Mod: S$GLB,,, | Performed by: INTERNAL MEDICINE

## 2023-11-16 PROCEDURE — 1159F PR MEDICATION LIST DOCUMENTED IN MEDICAL RECORD: ICD-10-PCS | Mod: CPTII,S$GLB,, | Performed by: INTERNAL MEDICINE

## 2023-11-16 PROCEDURE — 1101F PR PT FALLS ASSESS DOC 0-1 FALLS W/OUT INJ PAST YR: ICD-10-PCS | Mod: CPTII,S$GLB,, | Performed by: INTERNAL MEDICINE

## 2023-11-16 PROCEDURE — 1126F AMNT PAIN NOTED NONE PRSNT: CPT | Mod: CPTII,S$GLB,, | Performed by: INTERNAL MEDICINE

## 2023-11-16 PROCEDURE — 3288F FALL RISK ASSESSMENT DOCD: CPT | Mod: CPTII,S$GLB,, | Performed by: INTERNAL MEDICINE

## 2023-11-16 PROCEDURE — 1157F ADVNC CARE PLAN IN RCRD: CPT | Mod: CPTII,S$GLB,, | Performed by: INTERNAL MEDICINE

## 2023-11-16 PROCEDURE — 3078F DIAST BP <80 MM HG: CPT | Mod: CPTII,S$GLB,, | Performed by: INTERNAL MEDICINE

## 2023-11-16 PROCEDURE — 99999 PR PBB SHADOW E&M-EST. PATIENT-LVL III: ICD-10-PCS | Mod: PBBFAC,,, | Performed by: INTERNAL MEDICINE

## 2023-11-16 PROCEDURE — 3077F PR MOST RECENT SYSTOLIC BLOOD PRESSURE >= 140 MM HG: ICD-10-PCS | Mod: CPTII,S$GLB,, | Performed by: INTERNAL MEDICINE

## 2023-11-16 PROCEDURE — 3288F PR FALLS RISK ASSESSMENT DOCUMENTED: ICD-10-PCS | Mod: CPTII,S$GLB,, | Performed by: INTERNAL MEDICINE

## 2023-11-16 PROCEDURE — 1126F PR PAIN SEVERITY QUANTIFIED, NO PAIN PRESENT: ICD-10-PCS | Mod: CPTII,S$GLB,, | Performed by: INTERNAL MEDICINE

## 2023-11-16 PROCEDURE — 1101F PT FALLS ASSESS-DOCD LE1/YR: CPT | Mod: CPTII,S$GLB,, | Performed by: INTERNAL MEDICINE

## 2023-11-16 PROCEDURE — 1159F MED LIST DOCD IN RCRD: CPT | Mod: CPTII,S$GLB,, | Performed by: INTERNAL MEDICINE

## 2023-11-16 PROCEDURE — 3078F PR MOST RECENT DIASTOLIC BLOOD PRESSURE < 80 MM HG: ICD-10-PCS | Mod: CPTII,S$GLB,, | Performed by: INTERNAL MEDICINE

## 2023-11-16 PROCEDURE — 99999 PR PBB SHADOW E&M-EST. PATIENT-LVL III: CPT | Mod: PBBFAC,,, | Performed by: INTERNAL MEDICINE

## 2023-11-16 PROCEDURE — 3077F SYST BP >= 140 MM HG: CPT | Mod: CPTII,S$GLB,, | Performed by: INTERNAL MEDICINE

## 2023-11-16 PROCEDURE — 99214 OFFICE O/P EST MOD 30 MIN: CPT | Mod: S$GLB,,, | Performed by: INTERNAL MEDICINE

## 2023-11-16 PROCEDURE — 1157F PR ADVANCE CARE PLAN OR EQUIV PRESENT IN MEDICAL RECORD: ICD-10-PCS | Mod: CPTII,S$GLB,, | Performed by: INTERNAL MEDICINE

## 2023-11-16 NOTE — PROGRESS NOTES
[place]  [address] [address]  Phone: [phone];  Fax: [phone]    Patient ID: Cailin Pickett   Chief Complaint: Follow-up and Pancreatic Cancer     MRN:  [id number]     Oncologic Diagnosis:   History of pancreatic adenocarcinoma   Previous Treatment: NA FOLFIRINOX x4 cycles followed by chemoradiation w/5FU, 5/15/2013 Surgery with Dr. Carter : 1.  Distal pancreatectomy with splenectomy. 2.  Left nephrectomy. 3. Adrenalectomy.   Current Treatment:  Surveillance  Subjective   Cailin Pickett is a pleasant 83 y.o. female who presents to clinic to for pancreatic cancer surveillance.      The patient is more than 10 years out from a pancreatic cancer standpoint is doing very well.  Today is my 1st day meeting her but I think at her next 6 month appointment, we would order imaging and her appointments could be spaced out to annual.  I reviewed her CT scan with her in detail highlighting that there is no signs of recurrence of the pancreatic cancer.  It did show some severe atherosclerotic changes which she inquired about and I reviewed that with her as well.  I counseled her to follow-up with her PCP as well as Cardiology and explained what mesenteric ischemia is and the signs and symptoms to look out for.    Recently she has been having some intermittent abdominal pain.  She denies any gross bleeding.  Other than the abdominal pain she feels at her baseline health has not had any recent changes.    Review of Systems:  Review of Systems   Constitutional:  Negative for activity change, appetite change, chills, diaphoresis, fatigue, fever and unexpected weight change.   HENT:  Negative for nosebleeds.    Respiratory:  Negative for shortness of breath.    Cardiovascular:  Negative for chest pain.   Gastrointestinal:  Positive for abdominal pain (off and on). Negative for abdominal distention, anal bleeding, blood in stool, constipation, diarrhea, nausea  and vomiting.   Genitourinary:  Negative for difficulty urinating and hematuria.   Musculoskeletal:  Negative for arthralgias, back pain and myalgias.   Skin:  Negative for rash.   Neurological:  Negative for dizziness, weakness, light-headedness and headaches.   Hematological:  Does not bruise/bleed easily.   Psychiatric/Behavioral:  The patient is not nervous/anxious.      History     Oncology History    No history exists.       PREVIOUS ONCOLOGIC HISTORY:   I had the pleasure meeting for the 1st time Ms. Pickett, a 80-year-old woman with end-stage renal disease on hemodialysis and history of pancreatic adenocarcinoma in surveillance.     I reviewed her medical history notable for the following:     She has been followed in our clinic for anemia of chronic kidney disease and history of pancreatic adenocarcinoma treated with neoadjuvant chemotherapy and chemoradiation with 5 fluorouracil followed by resection 5/2013 by Dr. Carter at [facility], [place] path ypT3N0 (treatment effect noted). She has been in surveillance. The patient had previously completed neoadjuvant chemotherapy and 5-FU chemoradiation with excellent response.  negative.     She has end-stage renal disease on hemodialysis with recurrent pleural effusion.  Pleural fluid analysis negative for malignant cells.  She receives LUZ MARIA with dialysis.     She returns for restaging CT scan review.  She notes feeling well improvement in shortness of breath and lower extremity edema.  She continues on dialysis.    HPI    Past Medical History:   Diagnosis Date    Anemia     CKD (chronic kidney disease) stage 5, GFR less than 15 ml/min 03/14/2019    CKD (chronic kidney disease), stage III     Diabetes mellitus type II     Encounter for blood transfusion     Family history of colonic polyps 07/18/2017    Gout, unspecified     Hyperlipidemia     Hypertension     Neuropathy     Pancreatic cancer     Renal failure     Secondary hyperparathyroidism of renal  origin 03/14/2019    Thyroid disease        Past Surgical History:   Procedure Laterality Date    COLONOSCOPY  2011    Dr. Favio Mims    COLONOSCOPY N/A 07/18/2017    Procedure: screening colonoscopy;  Surgeon: Kenneth Kamara MD;  Location: [facility];  Service: Endoscopy;  Laterality: N/A;    ESOPHAGEAL MANOMETRY WITH MEASUREMENT OF IMPEDANCE N/A 04/07/2022    Procedure: MANOMETRY-ESOPHAGEAL-WITH IMPEDANCE;  Surgeon: Jah Rodgers RN;  Location: [facility] ENDO;  Service: Endoscopy;  Laterality: N/A;    ESOPHAGOGASTRODUODENOSCOPY N/A 01/18/2022    Procedure: ESOPHAGOGASTRODUODENOSCOPY (EGD);  Surgeon: Ivett Quarles MD;  Location: [facility] ENDO;  Service: Endoscopy;  Laterality: N/A;    FISTULOGRAM N/A 04/10/2019    Procedure: FISTULOGRAM;  Surgeon: Ruddy Fitzpatrick MD;  Location: [facility] CATH LAB;  Service: Vascular;  Laterality: N/A;  0830 start    HYSTERECTOMY      INJECTION OF ANESTHETIC AGENT AROUND MEDIAL BRANCH NERVES INNERVATING LUMBAR FACET JOINT Left 11/7/2023    Procedure: Left L3, 4, 5 MBB;  Surgeon: Geovany Qiu MD;  Location: [facility] PAIN MGT;  Service: Pain Management;  Laterality: Left;    KNEE SURGERY Left 05/31/2018    NEPHRECTOMY Left 05/2013    Dr Carter     PANCREAS SURGERY      distal pancreatectomy    SPLENECTOMY, TOTAL      THYROIDECTOMY, PARTIAL      VENTRICULOATRIAL SHUNT Left 12/04/2014    left arm       Family History   Problem Relation Age of Onset    Heart disease Mother 60        MI    Hypertension Mother     Stroke Mother     Breast cancer Mother     Cancer Father         prostate    Cancer Brother         renal cell carcinoma    Diabetes Brother     Kidney disease Brother         ESRD s/p kidney transplant    Breast cancer Sister     Breast cancer Sister        Review of patient's allergies indicates:   Allergen Reactions    Allegra [fexofenadine] Swelling    Codeine Hives, Itching and Nausea And Vomiting       Social History     Tobacco Use    Smoking status: Former     Current  "packs/day: 0.00     Average packs/day: 1 pack/day for 38.8 years (38.8 ttl pk-yrs)     Types: Cigarettes     Start date: 3/14/1962     Quit date: 2001     Years since quittin.8    Smokeless tobacco: Never   Substance Use Topics    Alcohol use: No    Drug use: No       Physical Exam   ECOG:   ECOG SCORE    1 - Restricted in strenuous activity-ambulatory and able to carry out work of a light nature          Vitals:  BP (!) 141/64   Pulse 72   Temp 97.5 °F (36.4 °C)   Resp 18   Ht 5' 5" (1.651 m)   Wt 63.7 kg (140 lb 8.7 oz)   LMP 1982 (Exact Date)   SpO2 99%   BMI 23.39 kg/m²     Physical Exam:  Physical Exam  Constitutional:       General: She is not in acute distress.     Appearance: Normal appearance. She is not ill-appearing.   HENT:      Head: Normocephalic and atraumatic.   Eyes:      Extraocular Movements: Extraocular movements intact.      Conjunctiva/sclera: Conjunctivae normal.   Cardiovascular:      Rate and Rhythm: Normal rate.   Pulmonary:      Effort: Pulmonary effort is normal. No respiratory distress.      Breath sounds: No rhonchi.   Abdominal:      General: Bowel sounds are normal. There is no distension.      Palpations: Abdomen is soft. There is no hepatomegaly, splenomegaly or mass.      Tenderness: There is no abdominal tenderness. There is no guarding.   Musculoskeletal:         General: Normal range of motion.      Cervical back: Neck supple. No tenderness.      Right lower leg: No edema.      Left lower leg: No edema.   Skin:     Findings: No rash.   Neurological:      General: No focal deficit present.      Mental Status: She is alert and oriented to person, place, and time.   Psychiatric:         Mood and Affect: Mood normal.         Behavior: Behavior normal.         Thought Content: Thought content normal.            Labs   Labs:  No visits with results within 2 Day(s) from this visit.   Latest known visit with results is:   Admission on 2023, Discharged on " 11/07/2023   Component Date Value Ref Range Status    POCT Glucose 11/07/2023 100  70 - 110 mg/dL Final        Imaging   CT Chest Abdomen Pelvis With IV Contrast (XPD) - 11/09/2023  Narrative & Impression  EXAMINATION:  CT CHEST ABDOMEN PELVIS WITH IV CONTRAST (XPD)     CLINICAL HISTORY:  h/o pancreatic cancer surveillance. back pain;Personal history of malignant neoplasm of pancreas     TECHNIQUE:  Low dose axial images, sagittal and coronal reformations were obtained from the thoracic inlet to the pubic synthesis following the administration of 75 cc intravenous Omnipaque 350 and 1000 cc orally administered Omnipaque 12. All CT scans at this facility are performed using dose optimization techniques including the following: automated exposure control; adjustment of the mA and/or kV; use of iterative reconstruction technique.     COMPARISON:  CT chest abdomen pelvis with contrast 11/29/2022     FINDINGS:  Thoracic soft tissues: The left thyroid lobe is not seen.     Aorta: The aorta is normal in course and caliber, however with severe atherosclerotic plaque.  Accessory hemiazygous vein incidentally seen to anastomose with the left brachiocephalic vein, coursing along the left aspect of the aortic arch.     Heart: Normal size without effusion.  There is abundant coronary artery calcification.     Emani/Mediastinum: No significant lymphadenopathy     Lungs: Well aerated, without consolidation or pleural fluid. No concerning pulmonary nodules.  There is bilateral dependent densities suggesting atelectatic change or fibrosis.     Liver: Normal in size and attenuation.  There is a subtle subcentimeter hypodensity at the hepatic dome, which appears smaller since the comparison exam which is suspected due to phase of contrast administration rather than actual decrease in size.  Finding remains compatible with flash filling hemangioma.  No new or concerning hepatic lesion.     Gallbladder: There is a 5 mm calcified gallstone  in the fundus.  Gallbladder is otherwise unremarkable.     Bile Ducts: The common bile duct is mildly prominent at 8 mm, not significantly changed since comparison exam.  No intrahepatic biliary duct dilatation.     Pancreas: Patient is status post partial pancreatectomy.  The residual pancreatic parenchyma at the head and uncinate process are unremarkable.  No peripancreatic fat stranding.     Spleen: Absent.     Adrenals: The right adrenal gland is unremarkable.  The left adrenal gland is absent.     Kidneys/ Ureters: The left kidney is absent.  The right kidney is normal in location, however with renal parenchyma almost entirely replaced by innumerable cysts which measure up to 6 cm.  There has been no significant change since comparison exam.  No definite enhancing renal lesion, noting multiple subcentimeter hypodensities are too small to definitively characterize. No hydronephrosis or nephrolithiasis. No ureteral dilatation.     Bladder: Decompressed.  No wall thickening.     Reproductive organs: The uterus is absent.     GI Tract/Mesentery: No evidence of bowel obstruction or inflammation. There is fecalization of small bowel contents at the terminal ileum, suggesting slowed motility.  Orally ingested contrast extends through the stomach and the majority of the small bowel.  There are rare colonic diverticula without inflammatory change of diverticulitis.  No pneumatosis or mural thickening.     Peritoneal Space: No ascites. No free air.     Retroperitoneum: No significant adenopathy.     Abdominal wall: Mild stranding present subcutaneously over the right lower quadrant, suspected related to recent injection site or other focal injury.  No fluid collection.  There is intramuscular lipoma within the left anterior thigh musculature.     Vasculature: There is severe aortic atherosclerosis with mild ectasia.  No aortic aneurysm.  Abundant atherosclerotic plaque lies at the origins of the right renal, superior  mesenteric, and celiac arteries.  There is approximately 50% stenosis at the origin of the celiac artery and greater than 50% stenosis of the superior mesenteric artery.  Findings are similar to the prior exam.     Bones: No acute fracture. There is degenerative change of the spine, most significant within the lumbar spine with sclerotic and erosive endplate changes, unchanged since the comparison exam.     Impression:     In this patient with a history of pancreatic cancer, there is postsurgical change of distal pancreatectomy and splenectomy.  No evidence for local recurrent or distant metastatic disease within the chest, abdomen, or pelvis.     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  Findings could result in mesenteric ischemia symptoms.  Correlate with clinical presentation.     Polycystic right kidney.  Status post left nephrectomy and adrenalectomy.     Cholelithiasis.        Electronically signed by: Brenda Arrieta  Date:                                            11/09/2023  Time:                                           17:04     Assessment and Plan   History of Pancreatic Cancer (ypT3N0 )  Diagnosed May 2013.  CA 19 9 at time of diagnosis was normal.  NA FOLFIRINOX x4 cycles followed by chemoradiation w/5FU, 5/15/2013 had Surgery w/Dr. Carter : 1.  Distal pancreatectomy with splenectomy. 2.  Left nephrectomy. 3. Adrenalectomy.   Restaging CT CPAP 11/09/2023 showed no evidence of pancreatic cancer recurrence; it did show some severe atherosclerotic changes in the SMA putting patient at risk for mesenteric ischemia.  Patient counseled to follow up with PCP and Cardiology as she has been having some recent intermittent abdominal pain; she did not any pain now  CA 19-9 today normal  As far as her pancreatic cancer surveillance, she can follow up with our clinic 6  months      Chemotherapy-induced peripheral neuropathy   The patient reports that ever since having chemotherapy she has had numbness tingling in her feet  Continue gabapentin      Cancer Screening  MMG 07/11/2023: [id number]   PAP Smear: Hx of hysterectomy  Colonoscopy 06/06/2017:   one 2 mm polyp in the distal transverse colon; Diverticulosis in the sigmoid colon. repeat colonoscopy in 5 years for surveillance.      Chronic Medical Conditions  ESRD on HD   History of left nephrectomy and adrenalectomy  Polycystic right kidney   Hypertension   Sick sinus syndrome   Dm II  Anemia CKD   Secondary hyperparathyroidism   Vitamin-D deficiency   History of splenectomy  GERD   Osteoarthritis multiple joints      Med Onc Chart Routing      Follow up with physician 6 months.   Follow up with [facility]    Infusion scheduling note    Injection scheduling note    Labs CBC, CMP and CA 19-9   Scheduling:  Preferred lab:  Lab interval:     Imaging    Pharmacy appointment    Other referrals                 The patient was seen, interviewed and examined. Pertinent lab and radiologic studies were reviewed. Pt instructed to call should they develop concerning signs/symptoms or have further questions.        Portions of the record may have been created with voice recognition software. Occasional wrong-word or sound-a-like substitutions may have occurred due to the inherent limitations of voice recognition software. Read the chart carefully and recognize, using context, where substitutions have occurred.      Dalia Bellamy MD    Hematology/Oncology

## 2023-11-17 PROBLEM — T45.1X5A CHEMOTHERAPY-INDUCED NEUROPATHY: Status: ACTIVE | Noted: 2020-03-02

## 2023-12-29 NOTE — PROGRESS NOTES
Established Patient Chronic Pain Note     Referring Physician: No ref. provider found    PCP: Brii Junior MD    Chief Complaint:   Chief Complaint   Patient presents with    Low-back Pain        SUBJECTIVE:  Interval Hx: 01/02/2024  Patient presents s/p L sided L3-5 lumbar MBB 11/07/2023.  Patient reports proximally 90% relief in left-sided axial back pain following her procedure.  Today she reports pain has insidiously returned.  Pain is intermittent and today is rated a 6/10.  Patient reports pain on the left side in a bandlike distribution in the lower back.  She denies lower extremity radiculopathy, lower extremity weakness or bowel or bladder incontinence.  Patient has continued gabapentin 600 mg twice daily.  She has also continued 8 weeks of twice weekly physical therapy at [facility] from 11/02/2023 through 01/02/2024.      HPI 10/19/2023  Cailin Pickett is a 83 y.o. female with past medical history significant for HLD, DMII with polyneuropathy, SSS, ESRD on HD, h/o pancreatic CA, diverticulosis, gout who presents to the clinic for the evaluation of lower back pain.  Patient reports pain has been present for several years without inciting accident or injury.  Patient does report remote history of pancreatic cancer, in remission for 10 years, and pain is in the same territory.  Pain is intermittent and today is rated an 8/10.  Pain at its best is a 2/10 and at its worse is a 10/10.  Pain is described as stabbing in nature.  Patient reports pain in the left lower back and flank territory.  Pain is exacerbated predominantly with prolonged sitting.  Patient reports she is only able to ambulate approximately half a block before requiring rest.  She denies more distal radiculopathy into the lower extremities or feet or bowel or bladder incontinence.  Patient is currently taking gabapentin, prescribed 600 mg by Neurology but was told to take 300 mg per Nephrology, once daily.  Patient  reports no improvement on this medication.  She is also prescribed tramadol by her primary care provider, 50 mg twice daily with no improvement on this medication.  Patient states I do not think it is doing anything.   Patient has performed physician directed physical therapy exercises at home over the last 6 weeks without meaningful improvement in her pain.    Patient denies night fever/night sweats, urinary incontinence, bowel incontinence, significant weight loss, significant motor weakness, and loss of sensations.    Pain Disability Index Review:         1/2/2024    12:24 PM 10/19/2023    11:34 AM   Last 3 PDI Scores   Pain Disability Index (PDI) 42 39       Non-Pharmacologic Treatments:  Physical Therapy/Home Exercise: yes  Ice/Heat:yes  TENS: no  Acupuncture: no  Massage: no  Chiropractic: no    Other: no      Pain Medications:  - Opioids: Ultram (Tramadol HCL)  - Adjuvant Medications: Elavil (Amitriptyline) and Neurontin (Gabapentin)  - Anti-Coagulants: Aspirin    Pain Procedures:   Dr. Qiu:  -11/-7/2023: Left sided L3-5 lumbar MBB      Dr. Wellington:  -09/21/2023: Right intra-articular knee injection with triamcinolone  -06/01/2023, 06/08/2023, 06/15/2023: Right intra-articular knee injection with Orthovisc  -04/13/2023:  Right intra-articular knee injection with triamcinolone      Past Medical History:   Diagnosis Date    Anemia     CKD (chronic kidney disease) stage 5, GFR less than 15 ml/min 03/14/2019    CKD (chronic kidney disease), stage III     Diabetes mellitus type II     Encounter for blood transfusion     Family history of colonic polyps 07/18/2017    Gout, unspecified     Hyperlipidemia     Hypertension     Neuropathy     Pancreatic cancer     Renal failure     Secondary hyperparathyroidism of renal origin 03/14/2019    Thyroid disease      Past Surgical History:   Procedure Laterality Date    COLONOSCOPY  2011    Dr. Favio Mims    COLONOSCOPY N/A 07/18/2017    Procedure: screening  "colonoscopy;  Surgeon: Kenneth Kamara MD;  Location: [facility] ENDO;  Service: Endoscopy;  Laterality: N/A;    ESOPHAGEAL MANOMETRY WITH MEASUREMENT OF IMPEDANCE N/A 04/07/2022    Procedure: MANOMETRY-ESOPHAGEAL-WITH IMPEDANCE;  Surgeon: Jah Rodgers RN;  Location: [facility] ENDO;  Service: Endoscopy;  Laterality: N/A;    ESOPHAGOGASTRODUODENOSCOPY N/A 01/18/2022    Procedure: ESOPHAGOGASTRODUODENOSCOPY (EGD);  Surgeon: Ivett Quarels MD;  Location: [facility] ENDO;  Service: Endoscopy;  Laterality: N/A;    FISTULOGRAM N/A 04/10/2019    Procedure: FISTULOGRAM;  Surgeon: Ruddy Fitzpatrick MD;  Location: [facility] CATH LAB;  Service: Vascular;  Laterality: N/A;  0830 start    HYSTERECTOMY      INJECTION OF ANESTHETIC AGENT AROUND MEDIAL BRANCH NERVES INNERVATING LUMBAR FACET JOINT Left 11/7/2023    Procedure: Left L3, 4, 5 MBB;  Surgeon: Geovany Qiu MD;  Location: [facility] PAIN MGT;  Service: Pain Management;  Laterality: Left;    KNEE SURGERY Left 05/31/2018    NEPHRECTOMY Left 05/2013    Dr Carter     PANCREAS SURGERY      distal pancreatectomy    SPLENECTOMY, TOTAL      THYROIDECTOMY, PARTIAL      VENTRICULOATRIAL SHUNT Left 12/04/2014    left arm     Review of patient's allergies indicates:   Allergen Reactions    Allegra [fexofenadine] Swelling    Codeine Hives, Itching and Nausea And Vomiting       Current Outpatient Medications   Medication Sig    allopurinoL (ZYLOPRIM) 100 MG tablet Take 1 tablet (100 mg total) by mouth once daily.    amLODIPine (NORVASC) 2.5 MG tablet Take 1 tablet (2.5 mg total) by mouth once daily.    atorvastatin (LIPITOR) 40 MG tablet Take 1 tablet (40 mg total) by mouth once daily.    AURYXIA 210 mg iron Tab     azelastine (ASTELIN) 137 mcg (0.1 %) nasal spray 1 spray (137 mcg total) by Nasal route 2 (two) times daily.    azelastine (ASTELIN) 137 mcg (0.1 %) nasal spray 1 spray (137 mcg total) by Nasal route 2 (two) times daily.    BD ULTRA-FINE MICRO PEN NEEDLE 32 gauge x 1/4" Ndle     benzonatate " "(TESSALON) 100 MG capsule Take 200 mg by mouth.    blood sugar diagnostic Strp USE TO MONITOR BLOOD GLUCOSE DAILY    co-enzyme Q-10 30 mg capsule Take 1 capsule (30 mg total) by mouth 2 (two) times daily.    COVID [id number],12up,,andu,,PF, (SPIKEVAX 8111-0676,12Y UP,,PF,) 50 mcg/0.5 mL injection Inject into the muscle.    docusate sodium (COLACE) 50 MG capsule Take by mouth.    ezetimibe (ZETIA) 10 mg tablet TAKE 1 TABLET DAILY    fluticasone propionate (FLONASE) 50 mcg/actuation nasal spray 1 spray (50 mcg total) by Each Nostril route 2 (two) times a day.    fluticasone propionate (FLONASE) 50 mcg/actuation nasal spray 1 spray (50 mcg total) by Each Nostril route 2 (two) times a day.    furosemide (LASIX) 40 MG tablet TAKE 1 TABLET DAILY    gabapentin (NEURONTIN) 600 MG tablet Take 1 tablet (600 mg total) by mouth once daily. Daily at 05:00 PM    glucosamine-chondroitin 250-200 mg Tab Take 1 tablet by mouth 2 (two) times daily.    insulin degludec (TRESIBA FLEXTOUCH U-100) 100 unit/mL (3 mL) InPn Inject 8 Units into the skin.     ONETOUCH DELICA PLUS LANCET 30 gauge Misc 2 (two) times daily. Use to check blood glucose    pen needle, diabetic 32 gauge x 1/4" Ndle Use to test blood sugars bid    PRORENAL 8 mg iron-800 mcg-1,000 unit Tab Take 1 tablet by mouth once daily.    RENAPLEX-D 800 mcg-12.5 mg -2,000 unit Tab Take 1 tablet by mouth once daily.    sevelamer carbonate (RENVELA) 800 mg Tab Take 2 tablets by mouth.    traMADoL (ULTRAM) 50 mg tablet Take 1 tablet (50 mg total) by mouth every 12 (twelve) hours.    VELPHORO 500 mg Chew Take 1 tablet by mouth 3 (three) times daily.    vit B,C-FA-zinc-selen-vit D3-E (RENAPLEX-D) 800 mcg-12.5 mg -2,000 unit Tab Take 1 tablet by mouth.    vitamin renal formula, B-complex-vitamin c-folic acid, (NEPHROCAP) 1 mg Cap Take 1 capsule by mouth once daily.    aspirin (ECOTRIN) 81 MG EC tablet Take 1 tablet (81 mg total) by mouth once daily.    fluocinolone (DERMA-SMOOTHE) 0.01 % " "external oil Apply oil to scalp once a day    omeprazole (PRILOSEC) 40 MG capsule Take 1 capsule (40 mg total) by mouth once daily.     No current facility-administered medications for this visit.       Review of Systems     GENERAL:  No weight loss, malaise or fevers.  HEENT:   No recent changes in vision or hearing  NECK:  Negative for lumps, no difficulty with swallowing.  RESPIRATORY:  Negative for cough, wheezing or shortness of breath, patient denies any recent URI.  CARDIOVASCULAR:  Negative for chest pain or palpitations.  GI:  Negative for abdominal discomfort, blood in stools or black stools or change in bowel habits.  MUSCULOSKELETAL:  See HPI.  SKIN:  Negative for lesions, rash, and itching.  PSYCH:  No mood disorder or recent psychosocial stressors.   HEMATOLOGY/LYMPHOLOGY:  Negative for prolonged bleeding, bruising easily or swollen nodes.    NEURO:   No history of syncope, paralysis, seizures or tremors.  All other reviewed and negative other than HPI.    OBJECTIVE:    BP (!) 152/74   Pulse (P) 73   Resp 17   Ht 5' 5" (1.651 m)   Wt 67 kg (147 lb 11.3 oz)   LMP 07/27/1982 (Exact Date)   BMI 24.58 kg/m²       Physical Exam    GENERAL: Well appearing, in no acute distress, alert and oriented x3.  PSYCH:  Mood and affect appropriate.  SKIN: Skin color, texture, turgor normal, no rashes or lesions.  HEAD/FACE:  Normocephalic, atraumatic. Cranial nerves grossly intact.    CV: RRR with palpation of the radial artery.  PULM: No evidence of respiratory difficulty, symmetric chest rise.  GI:  Soft and non-tender.    BACK: Straight leg raising in the sitting and supine positions is negative to radicular pain.  pain to palpation over the facet joints of the lumbar spine on L or spinous processes. Normal range of motion without pain reproduction.  EXTREMITIES: Peripheral joint ROM is full and pain free without obvious instability or laxity in all four extremities. No deformities, edema, or skin " discoloration. Good capillary refill.  MUSCULOSKELETAL: Able to stand on heels & toes.   Shoulder, hip, and knee provocative maneuvers are negative.  There is no pain with palpation over the sacroiliac joints bilaterally.  Gaenslen's, Distraction/Compression and  FABERs test is negative.  Facet loading test is positive on L.   Bilateral upper and lower extremity strength is normal and symmetric.  No atrophy or tone abnormalities are noted.    RIGHT Lower extremity: Hip flexion 5/5, Hip Abduction 5/5, Hip Adduction 5/5, Knee extension 5/5, Knee flexion 5/5, Ankle dorsiflexion5/5, Extensor hallucis longus 5/5, Ankle plantarflexion 5/5  LEFT Lower extremity:  Hip flexion 5/5, Hip Abduction 5/5,Hip Adduction 5/5, Knee extension 5/5, Knee flexion 5/5, Ankle dorsiflexion 5/5, Extensor hallucis longus 5/5, Ankle plantarflexion 5/5  -Normal testing knee (patellar) jerk and ankle (achilles) jerk    NEURO: Bilateral upper and lower extremity coordination and muscle stretch reflexes are physiologic and symmetric. No loss of sensation is noted.  GAIT: normal.    Imagin17    X-Ray Lumbar Spine AP And Lateral    Narrative  Lumbar spine three views.    Findings: There is multilevel degenerative vertebral endplate spurring with marked disc space narrowing at L4-L5.  Bilateral facet arthropathy present at L4-L5 and L5-S1.  Pedicles appear intact.  No compression fracture or subluxation.    ASSESSMENT: 83 y.o. year old female with     1. Lumbar radiculopathy        2. Lumbar spondylosis  Case Request-RAD/Other Procedure Area: Left L3, 4, 5 MBB    X-Ray Lumbar Complete Including Flex And Ext      3. DDD (degenerative disc disease), lumbar                PLAN:   - Interventions:  Schedule for #2 left-sided L3-5 lumbar medial branch block to see if this helps with axial back pain.. Explained the risks and benefits of the procedure in detail with the patient today in clinic along with alternative treatment options, and the  patient elected to pursue the intervention at this time.  We have discussed with significant relief exceeding 80% she may be a candidate for radiofrequency ablation for more sustained relief.    - Anticoagulation use: Yes aspirin  Per ALAN guidelines for primary prophylaxis, patient can continue aspirin for lumbar medial branch block.     report:  Reviewed and consistent with medication use as prescribed.    - Medications:  - We have discussed continuing gabapentin.  We have reviewed potential side effects of this medication including daytime somnolence, weight gain and peripheral edema  Gabapentin  600 mg BID    -We have previously discussed reducing tramadol 50 mg once daily to see if there is any discernible increase in pain.  We have discussed with no difference in pain level, discontinuing tramadol altogether.  I have encouraged the patient to reach out to her PCP to discuss inefficacy of this medication and discontinuation.    - Therapy:   We discussed continuing physical therapy to help manage the patient/s painful condition. The patient was counseled that muscle strengthening will improve the long term prognosis in regards to pain and may also help increase range of motion and mobility. They were told that one of the goals of physical therapy is that they learn how to do the exercises so that they can do them independently at home daily upon completion. The patient's questions were answered and they were agreeable to this course. A referral for physical therapy was provided to the patient.EXT: [facility]; is actively attending twice weekly for the last 2 months.    - Imaging:  X-ray lumbar spine to better evaluate pain    - Follow up visit: return to clinic in 4-6 weeks post-procedure      The above plan and management options were discussed at length with patient. Patient is in agreement with the above and verbalized understanding.    - I discussed the goals of interventional chronic pain  management with the patient on today's visit. We discussed a multimodal and systematic approach to pain.  This includes diagnostic and therapeutic injections, adjuvant pharmacologic treatment, physical therapy, and at times psychiatry.  I emphasized the importance of regular exercise, core strengthening and stretching, diet and weight loss as a cornerstone of long-term pain management.    - This condition does not require this patient to take time off of work, and the primary goal of our Pain Management services is to improve the patient's functional capacity.  - Patient Questions: Answered all of the patient's questions regarding diagnoses, therapy, treatment and next steps        Geovany Qiu MD  Interventional Pain Management  [facility]    Disclaimer:  This note was prepared using voice recognition system and is likely to have sound alike errors that may have been overlooked even after proof reading.  Please call me with any questions

## 2024-01-02 ENCOUNTER — HOSPITAL ENCOUNTER (OUTPATIENT)
Dept: RADIOLOGY | Facility: HOSPITAL | Age: 84
Discharge: HOME | End: 2024-01-02
Attending: ANESTHESIOLOGY
Payer: MEDICARE

## 2024-01-02 ENCOUNTER — OFFICE VISIT (OUTPATIENT)
Dept: PAIN MEDICINE | Facility: CLINIC | Age: 84
End: 2024-01-02
Payer: MEDICARE

## 2024-01-02 ENCOUNTER — OFFICE VISIT (OUTPATIENT)
Dept: ALLERGY | Facility: CLINIC | Age: 84
End: 2024-01-02
Payer: MEDICARE

## 2024-01-02 VITALS
OXYGEN SATURATION: 97 % | HEIGHT: 65 IN | BODY MASS INDEX: 24.83 KG/M2 | HEART RATE: 69 BPM | DIASTOLIC BLOOD PRESSURE: 64 MMHG | TEMPERATURE: 98 F | SYSTOLIC BLOOD PRESSURE: 149 MMHG | WEIGHT: 149.06 LBS

## 2024-01-02 VITALS
BODY MASS INDEX: 24.61 KG/M2 | DIASTOLIC BLOOD PRESSURE: 74 MMHG | HEIGHT: 65 IN | WEIGHT: 147.69 LBS | SYSTOLIC BLOOD PRESSURE: 152 MMHG | RESPIRATION RATE: 17 BRPM

## 2024-01-02 DIAGNOSIS — J30.81 ALLERGIC RHINITIS DUE TO ANIMAL DANDER: ICD-10-CM

## 2024-01-02 DIAGNOSIS — M47.816 LUMBAR SPONDYLOSIS: ICD-10-CM

## 2024-01-02 DIAGNOSIS — M51.36 DDD (DEGENERATIVE DISC DISEASE), LUMBAR: ICD-10-CM

## 2024-01-02 DIAGNOSIS — Z91.038 ALLERGY TO COCKROACHES: ICD-10-CM

## 2024-01-02 DIAGNOSIS — M54.16 LUMBAR RADICULOPATHY: Primary | ICD-10-CM

## 2024-01-02 DIAGNOSIS — J30.89 ALLERGIC RHINITIS DUE TO DUST MITE: ICD-10-CM

## 2024-01-02 DIAGNOSIS — R09.82 PND (POST-NASAL DRIP): ICD-10-CM

## 2024-01-02 DIAGNOSIS — J30.1 SEASONAL ALLERGIC RHINITIS DUE TO POLLEN: Primary | ICD-10-CM

## 2024-01-02 DIAGNOSIS — J30.89 ALLERGIC RHINITIS DUE TO MOLD: ICD-10-CM

## 2024-01-02 PROBLEM — J31.0 CHRONIC RHINITIS: Status: RESOLVED | Noted: 2023-11-02 | Resolved: 2024-01-02

## 2024-01-02 PROCEDURE — 1157F ADVNC CARE PLAN IN RCRD: CPT | Mod: CPTII,S$GLB,, | Performed by: ANESTHESIOLOGY

## 2024-01-02 PROCEDURE — 72114 X-RAY EXAM L-S SPINE BENDING: CPT | Mod: TC

## 2024-01-02 PROCEDURE — 1160F RVW MEDS BY RX/DR IN RCRD: CPT | Mod: CPTII,S$GLB,, | Performed by: ANESTHESIOLOGY

## 2024-01-02 PROCEDURE — 3288F FALL RISK ASSESSMENT DOCD: CPT | Mod: CPTII,S$GLB,, | Performed by: ANESTHESIOLOGY

## 2024-01-02 PROCEDURE — 3077F SYST BP >= 140 MM HG: CPT | Mod: CPTII,S$GLB,, | Performed by: STUDENT IN AN ORGANIZED HEALTH CARE EDUCATION/TRAINING PROGRAM

## 2024-01-02 PROCEDURE — 3078F DIAST BP <80 MM HG: CPT | Mod: CPTII,S$GLB,, | Performed by: STUDENT IN AN ORGANIZED HEALTH CARE EDUCATION/TRAINING PROGRAM

## 2024-01-02 PROCEDURE — 1126F AMNT PAIN NOTED NONE PRSNT: CPT | Mod: CPTII,S$GLB,, | Performed by: STUDENT IN AN ORGANIZED HEALTH CARE EDUCATION/TRAINING PROGRAM

## 2024-01-02 PROCEDURE — 1160F RVW MEDS BY RX/DR IN RCRD: CPT | Mod: CPTII,S$GLB,, | Performed by: STUDENT IN AN ORGANIZED HEALTH CARE EDUCATION/TRAINING PROGRAM

## 2024-01-02 PROCEDURE — 99214 OFFICE O/P EST MOD 30 MIN: CPT | Mod: S$GLB,,, | Performed by: STUDENT IN AN ORGANIZED HEALTH CARE EDUCATION/TRAINING PROGRAM

## 2024-01-02 PROCEDURE — 1101F PT FALLS ASSESS-DOCD LE1/YR: CPT | Mod: CPTII,S$GLB,, | Performed by: ANESTHESIOLOGY

## 2024-01-02 PROCEDURE — 72114 X-RAY EXAM L-S SPINE BENDING: CPT | Mod: 26,,, | Performed by: RADIOLOGY

## 2024-01-02 PROCEDURE — 99999 PR PBB SHADOW E&M-EST. PATIENT-LVL III: CPT | Mod: PBBFAC,,, | Performed by: STUDENT IN AN ORGANIZED HEALTH CARE EDUCATION/TRAINING PROGRAM

## 2024-01-02 PROCEDURE — 1159F MED LIST DOCD IN RCRD: CPT | Mod: CPTII,S$GLB,, | Performed by: STUDENT IN AN ORGANIZED HEALTH CARE EDUCATION/TRAINING PROGRAM

## 2024-01-02 PROCEDURE — 3078F DIAST BP <80 MM HG: CPT | Mod: CPTII,S$GLB,, | Performed by: ANESTHESIOLOGY

## 2024-01-02 PROCEDURE — 1101F PT FALLS ASSESS-DOCD LE1/YR: CPT | Mod: CPTII,S$GLB,, | Performed by: STUDENT IN AN ORGANIZED HEALTH CARE EDUCATION/TRAINING PROGRAM

## 2024-01-02 PROCEDURE — 1125F AMNT PAIN NOTED PAIN PRSNT: CPT | Mod: CPTII,S$GLB,, | Performed by: ANESTHESIOLOGY

## 2024-01-02 PROCEDURE — 1159F MED LIST DOCD IN RCRD: CPT | Mod: CPTII,S$GLB,, | Performed by: ANESTHESIOLOGY

## 2024-01-02 PROCEDURE — 1157F ADVNC CARE PLAN IN RCRD: CPT | Mod: CPTII,S$GLB,, | Performed by: STUDENT IN AN ORGANIZED HEALTH CARE EDUCATION/TRAINING PROGRAM

## 2024-01-02 PROCEDURE — 3077F SYST BP >= 140 MM HG: CPT | Mod: CPTII,S$GLB,, | Performed by: ANESTHESIOLOGY

## 2024-01-02 PROCEDURE — 3288F FALL RISK ASSESSMENT DOCD: CPT | Mod: CPTII,S$GLB,, | Performed by: STUDENT IN AN ORGANIZED HEALTH CARE EDUCATION/TRAINING PROGRAM

## 2024-01-02 PROCEDURE — 99214 OFFICE O/P EST MOD 30 MIN: CPT | Mod: S$GLB,,, | Performed by: ANESTHESIOLOGY

## 2024-01-02 PROCEDURE — 99999 PR PBB SHADOW E&M-EST. PATIENT-LVL V: CPT | Mod: PBBFAC,,, | Performed by: ANESTHESIOLOGY

## 2024-01-02 RX ORDER — AZELASTINE 1 MG/ML
1 SPRAY, METERED NASAL 2 TIMES DAILY
Qty: 30 ML | Refills: 11 | Status: SHIPPED | OUTPATIENT
Start: 2024-01-02 | End: 2025-01-01

## 2024-01-02 RX ORDER — FLUTICASONE PROPIONATE 50 MCG
1 SPRAY, SUSPENSION (ML) NASAL 2 TIMES DAILY
Qty: 16 G | Refills: 11 | Status: SHIPPED | OUTPATIENT
Start: 2024-01-02 | End: 2025-01-01

## 2024-01-02 NOTE — PROGRESS NOTES
"Allergy and Immunology  Established Patient Clinic Note    Date: 1/2/2024  Chief Complaint   Patient presents with    Follow-up     Pt stated that the nasal spray made her tongue red and sore.      History  Cailin Pickett is a 83 y.o. female being seen for follow-up today.    Allergic Rhinitis due to cat, dog, dust mites, mold, and tree/grass/weed pollen  Post Nasal Drip   - Patient on Flonase monotherapy  - Prescribed Claritin but concern for tongue rash/ulcer so discontinued   - Switching to Flonase and Astelin dual intranasal spray   - Not a candidate for AIT due to scheduling conflicts   - Pt to purchase dust mite covers    Allergies, PMH, PSH, Social, and Family History were reviewed.    Current Outpatient Medications on File Prior to Visit   Medication Sig Dispense Refill    allopurinoL (ZYLOPRIM) 100 MG tablet Take 1 tablet (100 mg total) by mouth once daily. 90 tablet 3    amLODIPine (NORVASC) 2.5 MG tablet Take 1 tablet (2.5 mg total) by mouth once daily. 90 tablet 3    atorvastatin (LIPITOR) 40 MG tablet Take 1 tablet (40 mg total) by mouth once daily. 90 tablet 3    AURYXIA 210 mg iron Tab       azelastine (ASTELIN) 137 mcg (0.1 %) nasal spray 1 spray (137 mcg total) by Nasal route 2 (two) times daily. 30 mL 11    BD ULTRA-FINE MICRO PEN NEEDLE 32 gauge x 1/4" Ndle       benzonatate (TESSALON) 100 MG capsule Take 200 mg by mouth.      blood sugar diagnostic Strp USE TO MONITOR BLOOD GLUCOSE DAILY      co-enzyme Q-10 30 mg capsule Take 1 capsule (30 mg total) by mouth 2 (two) times daily. 180 capsule 3    COVID [id number],12up,,andu,,PF, (SPIKEVAX 6491-6117,12Y UP,,PF,) 50 mcg/0.5 mL injection Inject into the muscle. 0.5 mL 0    docusate sodium (COLACE) 50 MG capsule Take by mouth.      ezetimibe (ZETIA) 10 mg tablet TAKE 1 TABLET DAILY 90 tablet 0    fluticasone propionate (FLONASE) 50 mcg/actuation nasal spray 1 spray (50 mcg total) by Each Nostril route 2 (two) times a day. 16 g 11    furosemide " "(LASIX) 40 MG tablet TAKE 1 TABLET DAILY 90 tablet 3    gabapentin (NEURONTIN) 600 MG tablet Take 1 tablet (600 mg total) by mouth once daily. Daily at 05:00 PM 90 tablet 3    glucosamine-chondroitin 250-200 mg Tab Take 1 tablet by mouth 2 (two) times daily.      insulin degludec (TRESIBA FLEXTOUCH U-100) 100 unit/mL (3 mL) InPn Inject 8 Units into the skin.       ONETOUCH DELICA PLUS LANCET 30 gauge Misc 2 (two) times daily. Use to check blood glucose      pen needle, diabetic 32 gauge x 1/4" Ndle Use to test blood sugars bid      PRORENAL 8 mg iron-800 mcg-1,000 unit Tab Take 1 tablet by mouth once daily.      RENAPLEX-D 800 mcg-12.5 mg -2,000 unit Tab Take 1 tablet by mouth once daily.      sevelamer carbonate (RENVELA) 800 mg Tab Take 2 tablets by mouth.      traMADoL (ULTRAM) 50 mg tablet Take 1 tablet (50 mg total) by mouth every 12 (twelve) hours. 30 tablet 0    VELPHORO 500 mg Chew Take 1 tablet by mouth 3 (three) times daily.      vit B,C-FA-zinc-selen-vit D3-E (RENAPLEX-D) 800 mcg-12.5 mg -2,000 unit Tab Take 1 tablet by mouth.      vitamin renal formula, B-complex-vitamin c-folic acid, (NEPHROCAP) 1 mg Cap Take 1 capsule by mouth once daily. 30 capsule 0    [DISCONTINUED] loratadine (CLARITIN) 10 mg tablet Take 1 tablet (10 mg total) by mouth once daily. 90 tablet 3    aspirin (ECOTRIN) 81 MG EC tablet Take 1 tablet (81 mg total) by mouth once daily. 30 tablet 0    fluocinolone (DERMA-SMOOTHE) 0.01 % external oil Apply oil to scalp once a day 1 Bottle 5    omeprazole (PRILOSEC) 40 MG capsule Take 1 capsule (40 mg total) by mouth once daily. 30 capsule 11     No current facility-administered medications on file prior to visit.       Physical Examination  Vitals:    01/02/24 0954   BP: (!) 149/64   Pulse: 69   Temp: 97.9 °F (36.6 °C)     GENERAL:  female in no apparent distress and well developed and well nourished  HEAD:  Normocephalic, without obvious abnormality, atraumatic  EYES: sclera anicteric, " conjunctiva normochromic  EARS: normal TM's and external ear canals both ears  NOSE: without erythema or discharge   OROPHARYNX: moist mucous membranes without erythema, exudates or petechiae, clear post-nasal drainage present  LYMPH NODES: normal, supple, no lymphadenopathy  LUNGS: clear to auscultation, no wheezes, rales or rhonchi, symmetric air entry.  HEART: normal rate, regular rhythm, normal S1, S2, no murmurs, rubs, clicks or gallops.  ABDOMEN: soft, nontender, nondistended, no masses or organomegaly.  MUSCULOSKELETAL: no gross joint deformity or swelling.  NEURO: alert, oriented, normal speech, no focal findings or movement disorder noted.  SKIN: normal coloration and turgor, no rashes, no suspicious skin lesions noted.     Assessment/Plan:   Problem List Items Addressed This Visit          ENT    PND (post-nasal drip)    Relevant Medications    azelastine (ASTELIN) 137 mcg (0.1 %) nasal spray    fluticasone propionate (FLONASE) 50 mcg/actuation nasal spray    Seasonal allergic rhinitis due to pollen - Primary    Overview     11/02/2023: Serum IgE to Aeroallergens positive to cat, dog, dust mites, cockroach, mold, and tree/grass/weed pollen         Current Assessment & Plan     - Not fully controlled at this time   - Ordered Flonase 1 SEN BID   - Ordered Astelin 1 SEN BID   - Educated on proper use of intranasal sprays   - Educated on environmental controls for AR triggers   - Recommended dust mite covers   - Will continue to monitor and reassess          Allergic rhinitis due to dust mite    Overview     11/02/2023: Serum IgE to Aeroallergens positive to cat, dog, dust mites, cockroach, mold, and tree/grass/weed pollen         Allergic rhinitis due to animal dander    Overview     11/02/2023: Serum IgE to Aeroallergens positive to cat, dog, dust mites, cockroach, mold, and tree/grass/weed pollen         Allergic rhinitis due to mold    Overview     11/02/2023: Serum IgE to Aeroallergens positive to cat,  dog, dust mites, cockroach, mold, and tree/grass/weed pollen            Other    Allergy to cockroaches    Overview     11/02/2023: Serum IgE to Aeroallergens positive to cat, dog, dust mites, cockroach, mold, and tree/grass/weed pollen          Follow up:  Follow up in about 3 months (around 4/2/2024).    Basilio Reilly MD   [facility]  Allergy and Immunology

## 2024-01-02 NOTE — ASSESSMENT & PLAN NOTE
- Not fully controlled at this time   - Ordered Flonase 1 SEN BID   - Ordered Astelin 1 SEN BID   - Educated on proper use of intranasal sprays   - Educated on environmental controls for AR triggers   - Recommended dust mite covers   - Will continue to monitor and reassess

## 2024-01-09 ENCOUNTER — OFFICE VISIT (OUTPATIENT)
Dept: CARDIOLOGY | Facility: CLINIC | Age: 84
End: 2024-01-09
Payer: MEDICARE

## 2024-01-09 VITALS
HEIGHT: 65 IN | BODY MASS INDEX: 24.43 KG/M2 | HEART RATE: 72 BPM | RESPIRATION RATE: 16 BRPM | DIASTOLIC BLOOD PRESSURE: 60 MMHG | SYSTOLIC BLOOD PRESSURE: 140 MMHG | OXYGEN SATURATION: 92 % | WEIGHT: 146.63 LBS

## 2024-01-09 DIAGNOSIS — R07.9 CHEST PAIN, UNSPECIFIED TYPE: ICD-10-CM

## 2024-01-09 DIAGNOSIS — J90 PLEURAL EFFUSION: ICD-10-CM

## 2024-01-09 DIAGNOSIS — J90 PLEURAL EFFUSION, LEFT: ICD-10-CM

## 2024-01-09 DIAGNOSIS — I20.89 STABLE ANGINA PECTORIS: Primary | ICD-10-CM

## 2024-01-09 DIAGNOSIS — N18.6 ESRD NEEDING DIALYSIS: ICD-10-CM

## 2024-01-09 DIAGNOSIS — I15.2 HYPERTENSION ASSOCIATED WITH DIABETES: ICD-10-CM

## 2024-01-09 DIAGNOSIS — R06.02 SOB (SHORTNESS OF BREATH): ICD-10-CM

## 2024-01-09 DIAGNOSIS — E11.59 HYPERTENSION ASSOCIATED WITH DIABETES: ICD-10-CM

## 2024-01-09 DIAGNOSIS — Z99.2 ESRD NEEDING DIALYSIS: ICD-10-CM

## 2024-01-09 DIAGNOSIS — D64.9 ANEMIA, UNSPECIFIED TYPE: ICD-10-CM

## 2024-01-09 PROCEDURE — 99214 OFFICE O/P EST MOD 30 MIN: CPT | Mod: S$GLB,,, | Performed by: INTERNAL MEDICINE

## 2024-01-09 PROCEDURE — 3078F DIAST BP <80 MM HG: CPT | Mod: CPTII,S$GLB,, | Performed by: INTERNAL MEDICINE

## 2024-01-09 PROCEDURE — 1160F RVW MEDS BY RX/DR IN RCRD: CPT | Mod: CPTII,S$GLB,, | Performed by: INTERNAL MEDICINE

## 2024-01-09 PROCEDURE — 1157F ADVNC CARE PLAN IN RCRD: CPT | Mod: CPTII,S$GLB,, | Performed by: INTERNAL MEDICINE

## 2024-01-09 PROCEDURE — 1159F MED LIST DOCD IN RCRD: CPT | Mod: CPTII,S$GLB,, | Performed by: INTERNAL MEDICINE

## 2024-01-09 PROCEDURE — 1101F PT FALLS ASSESS-DOCD LE1/YR: CPT | Mod: CPTII,S$GLB,, | Performed by: INTERNAL MEDICINE

## 2024-01-09 PROCEDURE — 99999 PR PBB SHADOW E&M-EST. PATIENT-LVL V: CPT | Mod: PBBFAC,,, | Performed by: INTERNAL MEDICINE

## 2024-01-09 PROCEDURE — 3288F FALL RISK ASSESSMENT DOCD: CPT | Mod: CPTII,S$GLB,, | Performed by: INTERNAL MEDICINE

## 2024-01-09 PROCEDURE — 3077F SYST BP >= 140 MM HG: CPT | Mod: CPTII,S$GLB,, | Performed by: INTERNAL MEDICINE

## 2024-01-09 NOTE — PROGRESS NOTES
Subjective:   Patient ID:  Cailin Pickett is a 83 y.o. female who presents for follow-up of No chief complaint on file.    Hypertension  This is a chronic problem. The current episode started more than 1 year ago. The problem has been gradually improving since onset. The problem is controlled. Pertinent negatives include no chest pain, palpitations or shortness of breath. Past treatments include diuretics. The current treatment provides moderate improvement. There are no compliance problems.    Hyperlipidemia  This is a chronic problem. The current episode started more than 1 year ago. The problem is controlled. Pertinent negatives include no chest pain or shortness of breath. Current antihyperlipidemic treatment includes statins. There are no compliance problems.    Clinically patient is stabilized improved.  She is on dialysis and needs to pull more fluid off by dialysis.  She is on Lasix daily with some diuresis.  I told the patient to use her inhaler to make sure that she can expand her lungs.  This pleasant patient presents after going to the hospital with evidence of pleural effusions and need for tap on the left.  Patient has evidence of bilateral pleural effusions and evidence of edema.  Cardiac echo showed normal LV function is no evidence of cardiac ischemia by nuclear stress test.  Repeat CT scan of the chest today shows improvement in left pleural effusion stable and mild on the right side.  There is evidence of atelectasis.     The finds the patient feeling well today and doing well current medications.     Today the patient is doing well no recurrence symptoms all medications reviewed and renewed as necessary.  Last echo showed normal LV function nuclear stress test was stable.   NO FOCAL CNS SYMPTOMS OR SIGNS TO SUGGEST TIA OR STROKE  NO ANGINA OR EQUIVALENT  NO UNUSUAL HYDE. NO ORTHOPNEA OR PND  NO PALPITATIONS  NO NEAR SYNCOPE OR SYNCOPE  NO EDEMA. NO CALVE TENDERNESS           Clinically  patient doing well today no exertional symptoms chest pain shortness breath.  Evaluation in last year showed normal cardiac echo and normal perfusion scan by nuclear scan and no evidence of cardiac ischemia.  Heart rate blood pressure stable today she will be cleared for knee surgery.         06/13/2023 overall patient doing well no exertional symptoms chest pain shortness breath stable on dialysis 3 times a week heart rate blood pressure stable on medications stable no other changes today.  No  Complaints today.        01/09/2024 overall stable doing well no recurrence symptoms no chest pain or shortness of breath continues on current medications.  No changes        Review of Systems   Constitutional: Negative for chills, diaphoresis, night sweats, weight gain and weight loss.   HENT:  Negative for congestion, hoarse voice, sore throat and stridor.    Eyes:  Negative for double vision and pain.   Cardiovascular:  Negative for chest pain, claudication, cyanosis, dyspnea on exertion, irregular heartbeat, leg swelling, near-syncope, orthopnea, palpitations, paroxysmal nocturnal dyspnea and syncope.   Respiratory:  Negative for cough, hemoptysis, shortness of breath, sleep disturbances due to breathing, snoring, sputum production and wheezing.    Endocrine: Negative for cold intolerance, heat intolerance and polydipsia.   Hematologic/Lymphatic: Negative for bleeding problem. Does not bruise/bleed easily.   Skin:  Negative for color change, dry skin and rash.   Musculoskeletal:  Negative for joint swelling and muscle cramps.   Gastrointestinal:  Negative for bloating, abdominal pain, constipation, diarrhea, dysphagia, melena, nausea and vomiting.   Genitourinary:  Negative for flank pain and urgency.   Neurological:  Negative for dizziness, focal weakness, headaches, light-headedness, loss of balance, seizures and weakness.   Psychiatric/Behavioral:  Negative for altered mental status and memory loss. The patient is not  nervous/anxious.    Family History   Problem Relation Age of Onset    Heart disease Mother 60        MI    Hypertension Mother     Stroke Mother     Breast cancer Mother     Cancer Father         prostate    Cancer Brother         renal cell carcinoma    Diabetes Brother     Kidney disease Brother         ESRD s/p kidney transplant    Breast cancer Sister     Breast cancer Sister      Past Medical History:   Diagnosis Date    Anemia     CKD (chronic kidney disease) stage 5, GFR less than 15 ml/min 2019    CKD (chronic kidney disease), stage III     Diabetes mellitus type II     Encounter for blood transfusion     Family history of colonic polyps 2017    Gout, unspecified     Hyperlipidemia     Hypertension     Neuropathy     Pancreatic cancer     Renal failure     Secondary hyperparathyroidism of renal origin 2019    Thyroid disease      Social History     Socioeconomic History    Marital status:     Number of children: 0   Occupational History     Comment: stay home    Tobacco Use    Smoking status: Former     Current packs/day: 0.00     Average packs/day: 1 pack/day for 38.8 years (38.8 ttl pk-yrs)     Types: Cigarettes     Start date: 3/14/1962     Quit date: 2001     Years since quittin.0    Smokeless tobacco: Never   Substance and Sexual Activity    Alcohol use: No    Drug use: No    Sexual activity: Not Currently   Social History Narrative    She lives [place] from [place]     Current Outpatient Medications on File Prior to Visit   Medication Sig Dispense Refill    allopurinoL (ZYLOPRIM) 100 MG tablet Take 1 tablet (100 mg total) by mouth once daily. 90 tablet 3    amLODIPine (NORVASC) 2.5 MG tablet Take 1 tablet (2.5 mg total) by mouth once daily. 90 tablet 3    aspirin (ECOTRIN) 81 MG EC tablet Take 1 tablet (81 mg total) by mouth once daily. 30 tablet 0    atorvastatin (LIPITOR) 40 MG tablet Take 1 tablet (40 mg total) by mouth once daily. 90 tablet 3     "AURYXIA 210 mg iron Tab       azelastine (ASTELIN) 137 mcg (0.1 %) nasal spray 1 spray (137 mcg total) by Nasal route 2 (two) times daily. 30 mL 11    azelastine (ASTELIN) 137 mcg (0.1 %) nasal spray 1 spray (137 mcg total) by Nasal route 2 (two) times daily. 30 mL 11    BD ULTRA-FINE MICRO PEN NEEDLE 32 gauge x 1/4" Ndle       benzonatate (TESSALON) 100 MG capsule Take 200 mg by mouth.      blood sugar diagnostic Strp USE TO MONITOR BLOOD GLUCOSE DAILY      co-enzyme Q-10 30 mg capsule Take 1 capsule (30 mg total) by mouth 2 (two) times daily. 180 capsule 3    COVID voe77-77,12up,,andu,,PF, (SPIKEVAX 6882-9901,12Y UP,,PF,) 50 mcg/0.5 mL injection Inject into the muscle. 0.5 mL 0    docusate sodium (COLACE) 50 MG capsule Take by mouth.      ezetimibe (ZETIA) 10 mg tablet TAKE 1 TABLET DAILY 90 tablet 0    fluocinolone (DERMA-SMOOTHE) 0.01 % external oil Apply oil to scalp once a day 1 Bottle 5    fluticasone propionate (FLONASE) 50 mcg/actuation nasal spray 1 spray (50 mcg total) by Each Nostril route 2 (two) times a day. 16 g 11    fluticasone propionate (FLONASE) 50 mcg/actuation nasal spray 1 spray (50 mcg total) by Each Nostril route 2 (two) times a day. 16 g 11    furosemide (LASIX) 40 MG tablet TAKE 1 TABLET DAILY 90 tablet 3    gabapentin (NEURONTIN) 600 MG tablet Take 1 tablet (600 mg total) by mouth once daily. Daily at 05:00 PM 90 tablet 3    glucosamine-chondroitin 250-200 mg Tab Take 1 tablet by mouth 2 (two) times daily.      insulin degludec (TRESIBA FLEXTOUCH U-100) 100 unit/mL (3 mL) InPn Inject 8 Units into the skin.       omeprazole (PRILOSEC) 40 MG capsule Take 1 capsule (40 mg total) by mouth once daily. 30 capsule 11    ONETOUCH DELICA PLUS LANCET 30 gauge Misc 2 (two) times daily. Use to check blood glucose      pen needle, diabetic 32 gauge x 1/4" Ndle Use to test blood sugars bid      PRORENAL 8 mg iron-800 mcg-1,000 unit Tab Take 1 tablet by mouth once daily.      RENAPLEX-D 800 mcg-12.5 mg " -2,000 unit Tab Take 1 tablet by mouth once daily.      sevelamer carbonate (RENVELA) 800 mg Tab Take 2 tablets by mouth.      traMADoL (ULTRAM) 50 mg tablet Take 1 tablet (50 mg total) by mouth every 12 (twelve) hours. 30 tablet 0    VELPHORO 500 mg Chew Take 1 tablet by mouth 3 (three) times daily.      vit B,C-FA-zinc-selen-vit D3-E (RENAPLEX-D) 800 mcg-12.5 mg -2,000 unit Tab Take 1 tablet by mouth.      vitamin renal formula, B-complex-vitamin c-folic acid, (NEPHROCAP) 1 mg Cap Take 1 capsule by mouth once daily. 30 capsule 0     No current facility-administered medications on file prior to visit.     Review of patient's allergies indicates:   Allergen Reactions    Allegra [fexofenadine] Swelling    Codeine Hives, Itching and Nausea And Vomiting       Objective:     Physical Exam  Eyes:      Pupils: Pupils are equal, round, and reactive to light.   Neck:      Trachea: No tracheal deviation.   Cardiovascular:      Rate and Rhythm: Normal rate and regular rhythm.      Pulses: Intact distal pulses.           Carotid pulses are 2+ on the right side and 2+ on the left side.       Radial pulses are 2+ on the right side and 2+ on the left side.        Femoral pulses are 2+ on the right side and 2+ on the left side.       Popliteal pulses are 2+ on the right side and 2+ on the left side.        Dorsalis pedis pulses are 2+ on the right side and 2+ on the left side.        Posterior tibial pulses are 2+ on the right side and 2+ on the left side.      Heart sounds: Normal heart sounds. No murmur heard.     No friction rub. No gallop.   Pulmonary:      Effort: Pulmonary effort is normal. No respiratory distress.      Breath sounds: Normal breath sounds. No stridor. No wheezing or rales.   Chest:      Chest wall: No tenderness.   Abdominal:      General: There is no distension.      Tenderness: There is no abdominal tenderness. There is no rebound.   Musculoskeletal:         General: No tenderness.      Cervical back:  Normal range of motion.   Skin:     General: Skin is warm and dry.   Neurological:      Mental Status: She is alert and oriented to person, place, and time.     Assessment:   1. Angina   2. Shortness breath   3.  Dialysis CKD    Plan:     Impression 1. Angina stable current medications   2 shortness breath stable heart function normal heart prior echo   3. Dialysis continues and doing well this time all medications reviewed renewed follow-up evaluation 6 months.  Sooner if acute changes.  No Questions no complaints today.

## 2024-01-22 ENCOUNTER — TELEPHONE (OUTPATIENT)
Dept: PAIN MEDICINE | Facility: CLINIC | Age: 84
End: 2024-01-22
Payer: MEDICARE

## 2024-01-22 NOTE — TELEPHONE ENCOUNTER
----- Message from Sandra Chirinos sent at 1/22/2024  1:37 PM CST -----  Name of Who is Calling:ERIK DEL RIO [[id number]]        What is the request in detail:Pt would like a callback from the office to leroy upcoming procedure. Please advise thank you       Can the clinic reply by [facility]:NO        What Number to Call Back [facility]Telephone Information:  Mobile          [phone]

## 2024-02-19 ENCOUNTER — TELEPHONE (OUTPATIENT)
Dept: PAIN MEDICINE | Facility: CLINIC | Age: 84
End: 2024-02-19
Payer: MEDICARE

## 2024-02-19 NOTE — TELEPHONE ENCOUNTER
Reach out to pt from the messages. Pt reported that she received a letter in the mail stating that her procedure was denied. The pt want to verify if it is  approved or not.

## 2024-02-19 NOTE — TELEPHONE ENCOUNTER
----- Message from Adriel Cortes sent at 2/19/2024  8:22 AM CST -----  Contact: Patient  Patient is calling to speak with the nurse regarding appt. Please give patient a call at .[phone]

## 2024-02-28 DIAGNOSIS — Z85.07 HISTORY OF PANCREATIC CANCER: Primary | ICD-10-CM

## 2024-03-06 NOTE — PROGRESS NOTES
Established Patient Chronic Pain Note     Referring Physician: No ref. provider found    PCP: Brii Junior MD    Chief Complaint:   Chief Complaint   Patient presents with    Low-back Pain    Knee Pain     right        SUBJECTIVE:  Interval History (3/14/2024):  Patient Cailin Pickett presents today for follow-up visit.  Patient for follow-up of lower back pain.  Pain is worse on the left than the right.  Pain primarily stays axial and does not radiate into the bilateral lower extremities.  She rates her pain today an 8/10.  She had her 1st left-sided L3-5 medial branch block with approximately 90% relief which has lasted around 2 months.  She is interested in repeating the medial branch block.  Patient denies night fever/night sweats, urinary incontinence, bowel incontinence, significant weight loss and significant motor weakness.   Patient denies any other complaints or concerns at this time.        Interval Hx: 01/02/2024  Patient presents s/p L sided L3-5 lumbar MBB 11/07/2023.  Patient reports proximally 90% relief in left-sided axial back pain following her procedure.  Today she reports pain has insidiously returned.  Pain is intermittent and today is rated a 6/10.  Patient reports pain on the left side in a bandlike distribution in the lower back.  She denies lower extremity radiculopathy, lower extremity weakness or bowel or bladder incontinence.  Patient has continued gabapentin 600 mg twice daily.  She has also continued 8 weeks of twice weekly physical therapy at [facility] from 11/02/2023 through 01/02/2024.      HPI 10/19/2023  Cailin Pickett is a 83 y.o. female with past medical history significant for HLD, DMII with polyneuropathy, SSS, ESRD on HD, h/o pancreatic CA, diverticulosis, gout who presents to the clinic for the evaluation of lower back pain.  Patient reports pain has been present for several years without inciting accident or injury.  Patient does report  remote history of pancreatic cancer, in remission for 10 years, and pain is in the same territory.  Pain is intermittent and today is rated an 8/10.  Pain at its best is a 2/10 and at its worse is a 10/10.  Pain is described as stabbing in nature.  Patient reports pain in the left lower back and flank territory.  Pain is exacerbated predominantly with prolonged sitting.  Patient reports she is only able to ambulate approximately half a block before requiring rest.  She denies more distal radiculopathy into the lower extremities or feet or bowel or bladder incontinence.  Patient is currently taking gabapentin, prescribed 600 mg by Neurology but was told to take 300 mg per Nephrology, once daily.  Patient reports no improvement on this medication.  She is also prescribed tramadol by her primary care provider, 50 mg twice daily with no improvement on this medication.  Patient states I do not think it is doing anything.   Patient has performed physician directed physical therapy exercises at home over the last 6 weeks without meaningful improvement in her pain.    Patient denies night fever/night sweats, urinary incontinence, bowel incontinence, significant weight loss, significant motor weakness, and loss of sensations.    Pain Disability Index Review:         3/14/2024    10:00 AM 1/2/2024    12:24 PM 10/19/2023    11:34 AM   Last 3 PDI Scores   Pain Disability Index (PDI) 0 42 39       Non-Pharmacologic Treatments:  Physical Therapy/Home Exercise: yes  Ice/Heat:yes  TENS: no  Acupuncture: no  Massage: no  Chiropractic: no    Other: no      Pain Medications:  - Opioids: Ultram (Tramadol HCL)  - Adjuvant Medications: Elavil (Amitriptyline) and Neurontin (Gabapentin)  - Anti-Coagulants: Aspirin    Pain Procedures:   Dr. Qiu:  -11/-7/2023: Left sided L3-5 lumbar MBB      Dr. Wellington:  -09/21/2023: Right intra-articular knee injection with triamcinolone  -06/01/2023, 06/08/2023, 06/15/2023: Right intra-articular knee  injection with Orthovisc  -04/13/2023:  Right intra-articular knee injection with triamcinolone      Past Medical History:   Diagnosis Date    Anemia     CKD (chronic kidney disease) stage 5, GFR less than 15 ml/min 03/14/2019    CKD (chronic kidney disease), stage III     Diabetes mellitus type II     Encounter for blood transfusion     Family history of colonic polyps 07/18/2017    Gout, unspecified     Hyperlipidemia     Hypertension     Neuropathy     Pancreatic cancer     Renal failure     Secondary hyperparathyroidism of renal origin 03/14/2019    Thyroid disease      Past Surgical History:   Procedure Laterality Date    COLONOSCOPY  2011    Dr. Favio Mims    COLONOSCOPY N/A 07/18/2017    Procedure: screening colonoscopy;  Surgeon: Kenneth Kamara MD;  Location: [facility];  Service: Endoscopy;  Laterality: N/A;    ESOPHAGEAL MANOMETRY WITH MEASUREMENT OF IMPEDANCE N/A 04/07/2022    Procedure: MANOMETRY-ESOPHAGEAL-WITH IMPEDANCE;  Surgeon: Jah Rodgers RN;  Location: [facility] ENDO;  Service: Endoscopy;  Laterality: N/A;    ESOPHAGOGASTRODUODENOSCOPY N/A 01/18/2022    Procedure: ESOPHAGOGASTRODUODENOSCOPY (EGD);  Surgeon: Ivett Quarles MD;  Location: [facility] ENDO;  Service: Endoscopy;  Laterality: N/A;    FISTULOGRAM N/A 04/10/2019    Procedure: FISTULOGRAM;  Surgeon: Ruddy Fitzpatrick MD;  Location: [facility] CATH LAB;  Service: Vascular;  Laterality: N/A;  0830 start    HYSTERECTOMY      INJECTION OF ANESTHETIC AGENT AROUND MEDIAL BRANCH NERVES INNERVATING LUMBAR FACET JOINT Left 11/7/2023    Procedure: Left L3, 4, 5 MBB;  Surgeon: Geovany Qiu MD;  Location: [facility] PAIN MGT;  Service: Pain Management;  Laterality: Left;    KNEE SURGERY Left 05/31/2018    NEPHRECTOMY Left 05/2013    Dr Carter     PANCREAS SURGERY      distal pancreatectomy    SPLENECTOMY, TOTAL      THYROIDECTOMY, PARTIAL      VENTRICULOATRIAL SHUNT Left 12/04/2014    left arm     Review of patient's allergies indicates:   Allergen Reactions  "   Allegra [fexofenadine] Swelling    Codeine Hives, Itching and Nausea And Vomiting       Current Outpatient Medications   Medication Sig    allopurinoL (ZYLOPRIM) 100 MG tablet Take 1 tablet (100 mg total) by mouth once daily.    amLODIPine (NORVASC) 2.5 MG tablet Take 1 tablet (2.5 mg total) by mouth once daily.    atorvastatin (LIPITOR) 40 MG tablet Take 1 tablet (40 mg total) by mouth once daily.    AURYXIA 210 mg iron Tab     azelastine (ASTELIN) 137 mcg (0.1 %) nasal spray 1 spray (137 mcg total) by Nasal route 2 (two) times daily.    azelastine (ASTELIN) 137 mcg (0.1 %) nasal spray 1 spray (137 mcg total) by Nasal route 2 (two) times daily.    BD ULTRA-FINE MICRO PEN NEEDLE 32 gauge x 1/4" Ndle     benzonatate (TESSALON) 100 MG capsule Take 200 mg by mouth.    blood sugar diagnostic Strp USE TO MONITOR BLOOD GLUCOSE DAILY    co-enzyme Q-10 30 mg capsule Take 1 capsule (30 mg total) by mouth 2 (two) times daily.    COVID [id number],,andu,,PF, (SPIKEVAX 3934-5068,12Y UP,,PF,) 50 mcg/0.5 mL injection Inject into the muscle.    docusate sodium (COLACE) 50 MG capsule Take by mouth.    ezetimibe (ZETIA) 10 mg tablet TAKE 1 TABLET DAILY    fluticasone propionate (FLONASE) 50 mcg/actuation nasal spray 1 spray (50 mcg total) by Each Nostril route 2 (two) times a day.    fluticasone propionate (FLONASE) 50 mcg/actuation nasal spray 1 spray (50 mcg total) by Each Nostril route 2 (two) times a day.    furosemide (LASIX) 40 MG tablet TAKE 1 TABLET DAILY    gabapentin (NEURONTIN) 600 MG tablet Take 1 tablet (600 mg total) by mouth once daily. Daily at 05:00 PM    glucosamine-chondroitin 250-200 mg Tab Take 1 tablet by mouth 2 (two) times daily.    insulin degludec (TRESIBA FLEXTOUCH U-100) 100 unit/mL (3 mL) InPn Inject 8 Units into the skin.     ONETOUCH DELICA PLUS LANCET 30 gauge Misc 2 (two) times daily. Use to check blood glucose    pen needle, diabetic 32 gauge x 1/4" Ndle Use to test blood sugars bid    PRORENAL " "8 mg iron-800 mcg-1,000 unit Tab Take 1 tablet by mouth once daily.    RENAPLEX-D 800 mcg-12.5 mg -2,000 unit Tab Take 1 tablet by mouth once daily.    sevelamer carbonate (RENVELA) 800 mg Tab Take 2 tablets by mouth.    traMADoL (ULTRAM) 50 mg tablet Take 1 tablet (50 mg total) by mouth every 12 (twelve) hours.    VELPHORO 500 mg Chew Take 1 tablet by mouth 3 (three) times daily.    vit B,C-FA-zinc-selen-vit D3-E (RENAPLEX-D) 800 mcg-12.5 mg -2,000 unit Tab Take 1 tablet by mouth.    vitamin renal formula, B-complex-vitamin c-folic acid, (NEPHROCAP) 1 mg Cap Take 1 capsule by mouth once daily.    aspirin (ECOTRIN) 81 MG EC tablet Take 1 tablet (81 mg total) by mouth once daily.    fluocinolone (DERMA-SMOOTHE) 0.01 % external oil Apply oil to scalp once a day    omeprazole (PRILOSEC) 40 MG capsule Take 1 capsule (40 mg total) by mouth once daily.     No current facility-administered medications for this visit.       Review of Systems     GENERAL:  No weight loss, malaise or fevers.  HEENT:   No recent changes in vision or hearing  NECK:  Negative for lumps, no difficulty with swallowing.  RESPIRATORY:  Negative for cough, wheezing or shortness of breath, patient denies any recent URI.  CARDIOVASCULAR:  Negative for chest pain or palpitations.  GI:  Negative for abdominal discomfort, blood in stools or black stools or change in bowel habits.  MUSCULOSKELETAL:  See HPI.  SKIN:  Negative for lesions, rash, and itching.  PSYCH:  No mood disorder or recent psychosocial stressors.   HEMATOLOGY/LYMPHOLOGY:  Negative for prolonged bleeding, bruising easily or swollen nodes.    NEURO:   No history of syncope, paralysis, seizures or tremors.  All other reviewed and negative other than HPI.    OBJECTIVE:    /61   Pulse 81   Ht 5' 5" (1.651 m)   Wt 65 kg (143 lb 4.8 oz)   LMP 07/27/1982 (Exact Date)   BMI 23.85 kg/m²       Physical Exam    GENERAL: Well appearing, in no acute distress, alert and oriented x3.  PSYCH:  " Mood and affect appropriate.  SKIN: Skin color, texture, turgor normal, no rashes or lesions.  HEAD/FACE:  Normocephalic, atraumatic. Cranial nerves grossly intact.    CV: RRR with palpation of the radial artery.  PULM: No evidence of respiratory difficulty, symmetric chest rise.  GI:  Soft and non-tender.    BACK: Straight leg raising in the sitting and supine positions is negative to radicular pain.  pain to palpation over the facet joints of the lumbar spine on L or spinous processes. Normal range of motion without pain reproduction.  EXTREMITIES: Peripheral joint ROM is full and pain free without obvious instability or laxity in all four extremities. No deformities, edema, or skin discoloration. Good capillary refill.  MUSCULOSKELETAL: Able to stand on heels & toes.   Shoulder, hip, and knee provocative maneuvers are negative.  There is no pain with palpation over the sacroiliac joints bilaterally.  Gaenslen's, Distraction/Compression and  FABERs test is negative.  Facet loading test is positive on L.   Bilateral upper and lower extremity strength is normal and symmetric.  No atrophy or tone abnormalities are noted.    RIGHT Lower extremity: Hip flexion 5/5, Hip Abduction 5/5, Hip Adduction 5/5, Knee extension 5/5, Knee flexion 5/5, Ankle dorsiflexion5/5, Extensor hallucis longus 5/5, Ankle plantarflexion 5/5  LEFT Lower extremity:  Hip flexion 5/5, Hip Abduction 5/5,Hip Adduction 5/5, Knee extension 5/5, Knee flexion 5/5, Ankle dorsiflexion 5/5, Extensor hallucis longus 5/5, Ankle plantarflexion 5/5  -Normal testing knee (patellar) jerk and ankle (achilles) jerk    NEURO: Bilateral upper and lower extremity coordination and muscle stretch reflexes are physiologic and symmetric. No loss of sensation is noted.  GAIT: normal.    Imagin2024 lumbar xray  FINDINGS: No fracture or acute osseous abnormality is identified.  Disc space narrowing at all levels in the lumbar spine most pronounced at L4-5.  There  are sclerotic changes involving vertebral body endplates which may be arthritic in nature.  Aortic and branch structure atherosclerosis.  Decreased range of motion on flexion and extension imaging.        Impression:   Advanced multilevel degenerative disc disease with endplate sclerosis throughout the lumbar region similar to CT scan 11/09/2023.  Decreased range of motion on flexion and extension imaging.  No acute findings identified.    03/06/17    X-Ray Lumbar Spine AP And Lateral    Narrative  Lumbar spine three views.    Findings: There is multilevel degenerative vertebral endplate spurring with marked disc space narrowing at L4-L5.  Bilateral facet arthropathy present at L4-L5 and L5-S1.  Pedicles appear intact.  No compression fracture or subluxation.    ASSESSMENT: 83 y.o. year old female with     1. Lumbar spondylosis        2. Lumbar radiculopathy        3. DDD (degenerative disc disease), lumbar                  PLAN:   - Interventions:  Schedule for #2 left-sided L3-5 lumbar medial branch block to see if this helps with axial back pain.. Explained the risks and benefits of the procedure in detail with the patient today in clinic along with alternative treatment options, and the patient elected to pursue the intervention at this time.  We have discussed with significant relief exceeding 80% she may be a candidate for radiofrequency ablation for more sustained relief.    - Anticoagulation use: Yes aspirin  Per ALAN guidelines for primary prophylaxis, patient can continue aspirin for lumbar medial branch block.     report:  Reviewed and consistent with medication use as prescribed.    - Medications:  - We have discussed continuing gabapentin.  We have reviewed potential side effects of this medication including daytime somnolence, weight gain and peripheral edema  Gabapentin  600 mg BID    -We have previously discussed reducing tramadol 50 mg once daily to see if there is any discernible increase in  pain.  We have discussed with no difference in pain level, discontinuing tramadol altogether.  I have encouraged the patient to reach out to her PCP to discuss inefficacy of this medication and discontinuation.    - Therapy:   We discussed continuing physical therapy to help manage the patient/s painful condition. The patient was counseled that muscle strengthening will improve the long term prognosis in regards to pain and may also help increase range of motion and mobility. They were told that one of the goals of physical therapy is that they learn how to do the exercises so that they can do them independently at home daily upon completion. The patient's questions were answered and they were agreeable to this course. A referral for physical therapy was provided to the patient.EXT: [facility]; is actively attending twice weekly for the last 2 months.    - Imaging:  Reviewed lumbar xray    - Follow up visit: 4-5 weeks after injection      The above plan and management options were discussed at length with patient. Patient is in agreement with the above and verbalized understanding.    - I discussed the goals of interventional chronic pain management with the patient on today's visit. We discussed a multimodal and systematic approach to pain.  This includes diagnostic and therapeutic injections, adjuvant pharmacologic treatment, physical therapy, and at times psychiatry.  I emphasized the importance of regular exercise, core strengthening and stretching, diet and weight loss as a cornerstone of long-term pain management.    - This condition does not require this patient to take time off of work, and the primary goal of our Pain Management services is to improve the patient's functional capacity.  - Patient Questions: Answered all of the patient's questions regarding diagnoses, therapy, treatment and next steps        Cecelia Cartagena NP  Interventional Pain Management  [facility]  [place]    Disclaimer:  This note was prepared using voice recognition system and is likely to have sound alike errors that may have been overlooked even after proof reading.  Please call me with any questions

## 2024-03-14 ENCOUNTER — OFFICE VISIT (OUTPATIENT)
Dept: PAIN MEDICINE | Facility: CLINIC | Age: 84
End: 2024-03-14
Payer: MEDICARE

## 2024-03-14 VITALS
DIASTOLIC BLOOD PRESSURE: 61 MMHG | HEIGHT: 65 IN | HEART RATE: 81 BPM | WEIGHT: 143.31 LBS | SYSTOLIC BLOOD PRESSURE: 119 MMHG | BODY MASS INDEX: 23.88 KG/M2

## 2024-03-14 DIAGNOSIS — M51.36 DDD (DEGENERATIVE DISC DISEASE), LUMBAR: ICD-10-CM

## 2024-03-14 DIAGNOSIS — M47.816 LUMBAR SPONDYLOSIS: Primary | ICD-10-CM

## 2024-03-14 DIAGNOSIS — M54.16 LUMBAR RADICULOPATHY: ICD-10-CM

## 2024-03-14 PROCEDURE — 99213 OFFICE O/P EST LOW 20 MIN: CPT | Mod: S$GLB,,, | Performed by: NURSE PRACTITIONER

## 2024-03-14 PROCEDURE — 99999 PR PBB SHADOW E&M-EST. PATIENT-LVL IV: CPT | Mod: PBBFAC,,, | Performed by: NURSE PRACTITIONER

## 2024-03-14 PROCEDURE — 3074F SYST BP LT 130 MM HG: CPT | Mod: CPTII,S$GLB,, | Performed by: NURSE PRACTITIONER

## 2024-03-14 PROCEDURE — 1101F PT FALLS ASSESS-DOCD LE1/YR: CPT | Mod: CPTII,S$GLB,, | Performed by: NURSE PRACTITIONER

## 2024-03-14 PROCEDURE — 1157F ADVNC CARE PLAN IN RCRD: CPT | Mod: CPTII,S$GLB,, | Performed by: NURSE PRACTITIONER

## 2024-03-14 PROCEDURE — 1159F MED LIST DOCD IN RCRD: CPT | Mod: CPTII,S$GLB,, | Performed by: NURSE PRACTITIONER

## 2024-03-14 PROCEDURE — 3078F DIAST BP <80 MM HG: CPT | Mod: CPTII,S$GLB,, | Performed by: NURSE PRACTITIONER

## 2024-03-14 PROCEDURE — 3288F FALL RISK ASSESSMENT DOCD: CPT | Mod: CPTII,S$GLB,, | Performed by: NURSE PRACTITIONER

## 2024-03-14 PROCEDURE — 1125F AMNT PAIN NOTED PAIN PRSNT: CPT | Mod: CPTII,S$GLB,, | Performed by: NURSE PRACTITIONER

## 2024-03-19 ENCOUNTER — TELEPHONE (OUTPATIENT)
Dept: PAIN MEDICINE | Facility: CLINIC | Age: 84
End: 2024-03-19
Payer: MEDICARE

## 2024-03-19 NOTE — TELEPHONE ENCOUNTER
----- Message from Chanelle Mercado sent at 3/19/2024 11:44 AM CDT -----  Name of Who is Calling: ERIK DEL RIO [[id number]]              What is the request in detail: Patient requesting a call back. Patient would not say what the call was in regards to              Can the clinic reply by [facility]: No              What Number to Call Back if not in [facility]: [phone]

## 2024-03-19 NOTE — TELEPHONE ENCOUNTER
Reach out to pt from the messages. pt call her insurance and they said that it is not. Pt is requesting for a call today to verify if she approved or not.

## 2024-03-20 NOTE — TELEPHONE ENCOUNTER
Spoke with the pt and explained to her that her procedure was npr. She understood and all questions were answered.

## 2024-03-22 ENCOUNTER — TELEPHONE (OUTPATIENT)
Dept: PAIN MEDICINE | Facility: CLINIC | Age: 84
End: 2024-03-22
Payer: MEDICARE

## 2024-03-22 NOTE — TELEPHONE ENCOUNTER
Spoke with the pt and she stated that she spoke with her insurance and they told her that the procedure is not covered under her insurance. I explained to her that we did have authorization for her procedure, but she stated that she still did not wish to proceed with the procedure.

## 2024-04-11 ENCOUNTER — OFFICE VISIT (OUTPATIENT)
Dept: ALLERGY | Facility: CLINIC | Age: 84
End: 2024-04-11
Payer: MEDICARE

## 2024-04-11 VITALS
TEMPERATURE: 98 F | HEART RATE: 76 BPM | DIASTOLIC BLOOD PRESSURE: 62 MMHG | BODY MASS INDEX: 24.02 KG/M2 | SYSTOLIC BLOOD PRESSURE: 138 MMHG | HEIGHT: 65 IN | OXYGEN SATURATION: 96 % | WEIGHT: 144.19 LBS

## 2024-04-11 DIAGNOSIS — R09.82 PND (POST-NASAL DRIP): ICD-10-CM

## 2024-04-11 DIAGNOSIS — J30.81 ALLERGIC RHINITIS DUE TO ANIMAL DANDER: ICD-10-CM

## 2024-04-11 DIAGNOSIS — J30.1 SEASONAL ALLERGIC RHINITIS DUE TO POLLEN: Primary | ICD-10-CM

## 2024-04-11 DIAGNOSIS — Z91.038 ALLERGY TO COCKROACHES: ICD-10-CM

## 2024-04-11 DIAGNOSIS — J30.89 ALLERGIC RHINITIS DUE TO DUST MITE: ICD-10-CM

## 2024-04-11 DIAGNOSIS — J30.89 ALLERGIC RHINITIS DUE TO MOLD: ICD-10-CM

## 2024-04-11 PROCEDURE — 1157F ADVNC CARE PLAN IN RCRD: CPT | Mod: CPTII,S$GLB,, | Performed by: STUDENT IN AN ORGANIZED HEALTH CARE EDUCATION/TRAINING PROGRAM

## 2024-04-11 PROCEDURE — 3075F SYST BP GE 130 - 139MM HG: CPT | Mod: CPTII,S$GLB,, | Performed by: STUDENT IN AN ORGANIZED HEALTH CARE EDUCATION/TRAINING PROGRAM

## 2024-04-11 PROCEDURE — 96372 THER/PROPH/DIAG INJ SC/IM: CPT | Mod: S$GLB,,, | Performed by: STUDENT IN AN ORGANIZED HEALTH CARE EDUCATION/TRAINING PROGRAM

## 2024-04-11 PROCEDURE — 1160F RVW MEDS BY RX/DR IN RCRD: CPT | Mod: CPTII,S$GLB,, | Performed by: STUDENT IN AN ORGANIZED HEALTH CARE EDUCATION/TRAINING PROGRAM

## 2024-04-11 PROCEDURE — 99214 OFFICE O/P EST MOD 30 MIN: CPT | Mod: 25,S$GLB,, | Performed by: STUDENT IN AN ORGANIZED HEALTH CARE EDUCATION/TRAINING PROGRAM

## 2024-04-11 PROCEDURE — 1126F AMNT PAIN NOTED NONE PRSNT: CPT | Mod: CPTII,S$GLB,, | Performed by: STUDENT IN AN ORGANIZED HEALTH CARE EDUCATION/TRAINING PROGRAM

## 2024-04-11 PROCEDURE — 99999 PR PBB SHADOW E&M-EST. PATIENT-LVL V: CPT | Mod: PBBFAC,,, | Performed by: STUDENT IN AN ORGANIZED HEALTH CARE EDUCATION/TRAINING PROGRAM

## 2024-04-11 PROCEDURE — 3078F DIAST BP <80 MM HG: CPT | Mod: CPTII,S$GLB,, | Performed by: STUDENT IN AN ORGANIZED HEALTH CARE EDUCATION/TRAINING PROGRAM

## 2024-04-11 PROCEDURE — 1159F MED LIST DOCD IN RCRD: CPT | Mod: CPTII,S$GLB,, | Performed by: STUDENT IN AN ORGANIZED HEALTH CARE EDUCATION/TRAINING PROGRAM

## 2024-04-11 PROCEDURE — 3288F FALL RISK ASSESSMENT DOCD: CPT | Mod: CPTII,S$GLB,, | Performed by: STUDENT IN AN ORGANIZED HEALTH CARE EDUCATION/TRAINING PROGRAM

## 2024-04-11 PROCEDURE — 1101F PT FALLS ASSESS-DOCD LE1/YR: CPT | Mod: CPTII,S$GLB,, | Performed by: STUDENT IN AN ORGANIZED HEALTH CARE EDUCATION/TRAINING PROGRAM

## 2024-04-11 RX ORDER — METHYLPREDNISOLONE ACETATE 40 MG/ML
40 INJECTION, SUSPENSION INTRA-ARTICULAR; INTRALESIONAL; INTRAMUSCULAR; SOFT TISSUE ONCE
Status: COMPLETED | OUTPATIENT
Start: 2024-04-11 | End: 2024-04-11

## 2024-04-11 RX ADMIN — METHYLPREDNISOLONE ACETATE 40 MG: 40 INJECTION, SUSPENSION INTRA-ARTICULAR; INTRALESIONAL; INTRAMUSCULAR; SOFT TISSUE at 10:04

## 2024-04-11 NOTE — ASSESSMENT & PLAN NOTE
- Not controlled at this time   - Continue current medications   - 1x IM Methylprednisolone 40 mg injection   - Discussed risks and benefits   - BG monitoring recommended   - No hx of glaucoma reported  - Educated on proper use of environmental controls for AR triggers

## 2024-04-11 NOTE — PROGRESS NOTES
"Allergy and Immunology  Established Patient Clinic Note    Date: 4/11/2024  Chief Complaint   Patient presents with    Follow-up     History  Cailin Pickett is a 83 y.o. female being seen for follow-up today.    Allergic Rhinitis due to cat, dog, dust mites, mold, and tree/grass/weed pollen  Post Nasal Drip   - Not controlled at this time   - Continue prior medications   - Denied AIT at this time   - Patient given IM Methylprednisolone 40 mg once   - Discussed risk and benefits - monitor BG   - Well controlled A1c reviewed - no glaucoma     Allergies, PMH, PSH, Social, and Family History were reviewed.    Current Outpatient Medications on File Prior to Visit   Medication Sig Dispense Refill    allopurinoL (ZYLOPRIM) 100 MG tablet Take 1 tablet (100 mg total) by mouth once daily. 90 tablet 3    amLODIPine (NORVASC) 2.5 MG tablet Take 1 tablet (2.5 mg total) by mouth once daily. 90 tablet 3    atorvastatin (LIPITOR) 40 MG tablet Take 1 tablet (40 mg total) by mouth once daily. 90 tablet 3    AURYXIA 210 mg iron Tab       azelastine (ASTELIN) 137 mcg (0.1 %) nasal spray 1 spray (137 mcg total) by Nasal route 2 (two) times daily. 30 mL 11    azelastine (ASTELIN) 137 mcg (0.1 %) nasal spray 1 spray (137 mcg total) by Nasal route 2 (two) times daily. 30 mL 11    BD ULTRA-FINE MICRO PEN NEEDLE 32 gauge x 1/4" Ndle       benzonatate (TESSALON) 100 MG capsule Take 200 mg by mouth.      blood sugar diagnostic Strp USE TO MONITOR BLOOD GLUCOSE DAILY      co-enzyme Q-10 30 mg capsule Take 1 capsule (30 mg total) by mouth 2 (two) times daily. 180 capsule 3    COVID [id number],12up,,andu,,PF, (SPIKEVAX 1566-2896,12Y UP,,PF,) 50 mcg/0.5 mL injection Inject into the muscle. 0.5 mL 0    docusate sodium (COLACE) 50 MG capsule Take by mouth.      ezetimibe (ZETIA) 10 mg tablet TAKE 1 TABLET DAILY 90 tablet 0    fluticasone propionate (FLONASE) 50 mcg/actuation nasal spray 1 spray (50 mcg total) by Each Nostril route 2 (two) times a " "day. 16 g 11    fluticasone propionate (FLONASE) 50 mcg/actuation nasal spray 1 spray (50 mcg total) by Each Nostril route 2 (two) times a day. 16 g 11    furosemide (LASIX) 40 MG tablet TAKE 1 TABLET DAILY 90 tablet 3    gabapentin (NEURONTIN) 600 MG tablet Take 1 tablet (600 mg total) by mouth once daily. Daily at 05:00 PM 90 tablet 3    glucosamine-chondroitin 250-200 mg Tab Take 1 tablet by mouth 2 (two) times daily.      insulin degludec (TRESIBA FLEXTOUCH U-100) 100 unit/mL (3 mL) InPn Inject 8 Units into the skin.       ONETOUCH DELICA PLUS LANCET 30 gauge Misc 2 (two) times daily. Use to check blood glucose      pen needle, diabetic 32 gauge x 1/4" Ndle Use to test blood sugars bid      PRORENAL 8 mg iron-800 mcg-1,000 unit Tab Take 1 tablet by mouth once daily.      RENAPLEX-D 800 mcg-12.5 mg -2,000 unit Tab Take 1 tablet by mouth once daily.      sevelamer carbonate (RENVELA) 800 mg Tab Take 2 tablets by mouth.      traMADoL (ULTRAM) 50 mg tablet Take 1 tablet (50 mg total) by mouth every 12 (twelve) hours. 30 tablet 0    VELPHORO 500 mg Chew Take 1 tablet by mouth 3 (three) times daily.      vit B,C-FA-zinc-selen-vit D3-E (RENAPLEX-D) 800 mcg-12.5 mg -2,000 unit Tab Take 1 tablet by mouth.      vitamin renal formula, B-complex-vitamin c-folic acid, (NEPHROCAP) 1 mg Cap Take 1 capsule by mouth once daily. 30 capsule 0    aspirin (ECOTRIN) 81 MG EC tablet Take 1 tablet (81 mg total) by mouth once daily. 30 tablet 0    fluocinolone (DERMA-SMOOTHE) 0.01 % external oil Apply oil to scalp once a day 1 Bottle 5    omeprazole (PRILOSEC) 40 MG capsule Take 1 capsule (40 mg total) by mouth once daily. 30 capsule 11     No current facility-administered medications on file prior to visit.     Physical Examination  Vitals:    04/11/24 0953   BP: 138/62   Pulse: 76   Temp: 97.5 °F (36.4 °C)     GENERAL:  female in no apparent distress and well developed and well nourished  HEAD:  Normocephalic, without obvious " abnormality, atraumatic  EYES: sclera anicteric, conjunctiva normochromic  EARS: normal TM's and external ear canals both ears  NOSE: without erythema or discharge, clear discharge, turbinates normal    OROPHARYNX: moist mucous membranes without erythema, exudates or petechiae  LYMPH NODES: normal, supple, no lymphadenopathy  LUNGS: clear to auscultation, no wheezes, rales or rhonchi, symmetric air entry.  HEART: normal rate, regular rhythm, normal S1, S2, no murmurs, rubs, clicks or gallops.  ABDOMEN: soft, nontender, nondistended, no masses or organomegaly.  MUSCULOSKELETAL: no gross joint deformity or swelling.  NEURO: alert, oriented, normal speech, no focal findings or movement disorder noted.  SKIN: normal coloration and turgor, no rashes, no suspicious skin lesions noted.     Assessment/Plan:   Problem List Items Addressed This Visit          ENT    PND (post-nasal drip)    Seasonal allergic rhinitis due to pollen - Primary    Overview     11/02/2023: Serum IgE to Aeroallergens positive to cat, dog, dust mites, cockroach, mold, and tree/grass/weed pollen         Current Assessment & Plan     - Not controlled at this time   - Continue current medications   - 1x IM Methylprednisolone 40 mg injection   - Discussed risks and benefits   - BG monitoring recommended   - No hx of glaucoma reported  - Educated on proper use of environmental controls for AR triggers          Allergic rhinitis due to dust mite    Overview     11/02/2023: Serum IgE to Aeroallergens positive to cat, dog, dust mites, cockroach, mold, and tree/grass/weed pollen         Allergic rhinitis due to animal dander    Overview     11/02/2023: Serum IgE to Aeroallergens positive to cat, dog, dust mites, cockroach, mold, and tree/grass/weed pollen         Allergic rhinitis due to mold    Overview     11/02/2023: Serum IgE to Aeroallergens positive to cat, dog, dust mites, cockroach, mold, and tree/grass/weed pollen            Other    Allergy to  cockroaches    Overview     11/02/2023: Serum IgE to Aeroallergens positive to cat, dog, dust mites, cockroach, mold, and tree/grass/weed pollen          Follow up:  Follow up in about 5 months (around 9/11/2024).    Basilio Reilly MD   [facility]  Allergy and Immunology

## 2024-04-26 ENCOUNTER — TELEPHONE (OUTPATIENT)
Dept: NEUROLOGY | Facility: CLINIC | Age: 84
End: 2024-04-26
Payer: MEDICARE

## 2024-04-26 DIAGNOSIS — R29.90 MULTIPLE NEUROLOGICAL SYMPTOMS: Primary | ICD-10-CM

## 2024-04-26 DIAGNOSIS — G62.9 POLYNEUROPATHY: ICD-10-CM

## 2024-04-26 DIAGNOSIS — M79.2 NEUROPATHIC PAIN: ICD-10-CM

## 2024-04-26 DIAGNOSIS — M47.816 LUMBAR SPONDYLOSIS: ICD-10-CM

## 2024-04-26 RX ORDER — AMITRIPTYLINE HYDROCHLORIDE 10 MG/1
10 TABLET, FILM COATED ORAL NIGHTLY
Qty: 90 TABLET | Refills: 3 | Status: SHIPPED | OUTPATIENT
Start: 2024-04-26 | End: 2025-04-26

## 2024-04-26 NOTE — TELEPHONE ENCOUNTER
----- Message from Fatimah Thomas sent at 4/26/2024  2:17 PM CDT -----  Regarding: Medical Advice  Contact: Cailin  .Type:  Needs Medical Advice    Who Called: Cailin   Symptoms (please be specific):    How long has patient had these symptoms:    Pharmacy name and phone #:    Would the patient rather a call back or a response via [facility]? call  Best Call Back Number: [phone]       Additional Information:  Cailin is requesting a callback from the nurse today in regard to medication.

## 2024-04-26 NOTE — TELEPHONE ENCOUNTER
Spoke with pt. Pt would like to be prescribed amitriptyline 10 mg. RX would like to be sent to [facility]. Medication was previously prescribed. Unable to attach the following prescription. Please advise.

## 2024-05-21 ENCOUNTER — TELEPHONE (OUTPATIENT)
Dept: PAIN MEDICINE | Facility: CLINIC | Age: 84
End: 2024-05-21
Payer: MEDICARE

## 2024-05-21 NOTE — TELEPHONE ENCOUNTER
Called pt regarding rescheduling and procedure that was not done. No answer,left vm for pt to call office back.    .Chetan SILVER

## 2024-05-21 NOTE — TELEPHONE ENCOUNTER
----- Message from Sharon Alba MA sent at 5/20/2024  4:38 PM CDT -----  Her procedure has never been scheduled. Her follow up has been cancelled due to it not being scheduled. This has been stated in prior message.  ----- Message -----  From: Chetan Schumacher MA  Sent: 5/17/2024  11:31 AM CDT  To: Sharon Alba MA    She wants to to schedule a injection follow up visit, this is something you can complete in the future please don't send follow up visit  for me to schedule.She is not asking for a procedure to be schedule on this message please read message in its entirely.  ----- Message -----  From: Sharon Alba MA  Sent: 5/15/2024   8:36 AM CDT  To: Chetan Schumacher MA      ----- Message -----  From: Cecelia Cartagena NP  Sent: 5/15/2024   8:10 AM CDT  To: Mitzi Rai Staff    Scheduled for injection follow up next week- she never scheduled MBB- please see if she needs assistance scheduling or needs to move appt

## 2024-05-21 NOTE — TELEPHONE ENCOUNTER
Spoke to pt regarding scheduling Lumbar MBB, pt stated that she would call back to schedule procedure when she decides weather she wants to proceed.    .Chetan SILVER

## 2024-05-22 ENCOUNTER — TELEPHONE (OUTPATIENT)
Dept: PAIN MEDICINE | Facility: CLINIC | Age: 84
End: 2024-05-22
Payer: MEDICARE

## 2024-05-22 NOTE — TELEPHONE ENCOUNTER
----- Message from Loretta Aguilar sent at 5/22/2024 10:15 AM CDT -----  Contact: Cailin Simeon is needing a call back regarding her cancelled appointment tomorrow. Please give her  a call at [phone]

## 2024-05-22 NOTE — TELEPHONE ENCOUNTER
Called pt and informed pt that her appt that was scheduled was an injection follow up. Pt verbalized understanding.    Guillermo SILVER

## 2024-06-04 DIAGNOSIS — M25.561 PAIN IN BOTH KNEES, UNSPECIFIED CHRONICITY: Primary | ICD-10-CM

## 2024-06-04 DIAGNOSIS — M25.562 PAIN IN BOTH KNEES, UNSPECIFIED CHRONICITY: Primary | ICD-10-CM

## 2024-06-13 ENCOUNTER — OFFICE VISIT (OUTPATIENT)
Dept: ORTHOPEDICS | Facility: CLINIC | Age: 84
End: 2024-06-13
Payer: MEDICARE

## 2024-06-13 ENCOUNTER — HOSPITAL ENCOUNTER (OUTPATIENT)
Dept: RADIOLOGY | Facility: HOSPITAL | Age: 84
Discharge: HOME | End: 2024-06-13
Attending: ORTHOPAEDIC SURGERY
Payer: MEDICARE

## 2024-06-13 VITALS — BODY MASS INDEX: 23.9 KG/M2 | WEIGHT: 143.44 LBS | HEIGHT: 65 IN

## 2024-06-13 DIAGNOSIS — M25.562 PAIN IN BOTH KNEES, UNSPECIFIED CHRONICITY: ICD-10-CM

## 2024-06-13 DIAGNOSIS — M17.11 ARTHRITIS OF KNEE, RIGHT: Primary | ICD-10-CM

## 2024-06-13 DIAGNOSIS — Z96.652 HISTORY OF TOTAL LEFT KNEE REPLACEMENT: Primary | ICD-10-CM

## 2024-06-13 DIAGNOSIS — Z96.652 PAIN DUE TO TOTAL LEFT KNEE REPLACEMENT, INITIAL ENCOUNTER: ICD-10-CM

## 2024-06-13 DIAGNOSIS — T84.84XA PAIN DUE TO TOTAL LEFT KNEE REPLACEMENT, INITIAL ENCOUNTER: ICD-10-CM

## 2024-06-13 DIAGNOSIS — M17.11 ARTHRITIS OF KNEE, RIGHT: ICD-10-CM

## 2024-06-13 DIAGNOSIS — M25.561 PAIN IN BOTH KNEES, UNSPECIFIED CHRONICITY: ICD-10-CM

## 2024-06-13 DIAGNOSIS — M21.061 ACQUIRED VALGUS DEFORMITY KNEE, RIGHT: ICD-10-CM

## 2024-06-13 DIAGNOSIS — Z96.652 HISTORY OF TOTAL LEFT KNEE REPLACEMENT: ICD-10-CM

## 2024-06-13 PROCEDURE — 1159F MED LIST DOCD IN RCRD: CPT | Mod: CPTII,S$GLB,, | Performed by: ORTHOPAEDIC SURGERY

## 2024-06-13 PROCEDURE — 73564 X-RAY EXAM KNEE 4 OR MORE: CPT | Mod: 26,50,, | Performed by: RADIOLOGY

## 2024-06-13 PROCEDURE — 1157F ADVNC CARE PLAN IN RCRD: CPT | Mod: CPTII,S$GLB,, | Performed by: ORTHOPAEDIC SURGERY

## 2024-06-13 PROCEDURE — 73564 X-RAY EXAM KNEE 4 OR MORE: CPT | Mod: TC,50

## 2024-06-13 PROCEDURE — 1101F PT FALLS ASSESS-DOCD LE1/YR: CPT | Mod: CPTII,S$GLB,, | Performed by: ORTHOPAEDIC SURGERY

## 2024-06-13 PROCEDURE — 20610 DRAIN/INJ JOINT/BURSA W/O US: CPT | Mod: RT,S$GLB,, | Performed by: ORTHOPAEDIC SURGERY

## 2024-06-13 PROCEDURE — 1125F AMNT PAIN NOTED PAIN PRSNT: CPT | Mod: CPTII,S$GLB,, | Performed by: ORTHOPAEDIC SURGERY

## 2024-06-13 PROCEDURE — 99204 OFFICE O/P NEW MOD 45 MIN: CPT | Mod: 25,S$GLB,, | Performed by: ORTHOPAEDIC SURGERY

## 2024-06-13 PROCEDURE — 3288F FALL RISK ASSESSMENT DOCD: CPT | Mod: CPTII,S$GLB,, | Performed by: ORTHOPAEDIC SURGERY

## 2024-06-13 PROCEDURE — 99999 PR PBB SHADOW E&M-EST. PATIENT-LVL IV: CPT | Mod: PBBFAC,,, | Performed by: ORTHOPAEDIC SURGERY

## 2024-06-13 RX ORDER — METHYLPREDNISOLONE ACETATE 80 MG/ML
80 INJECTION, SUSPENSION INTRA-ARTICULAR; INTRALESIONAL; INTRAMUSCULAR; SOFT TISSUE
Status: DISCONTINUED | OUTPATIENT
Start: 2024-06-13 | End: 2024-06-13 | Stop reason: HOSPADM

## 2024-06-13 RX ADMIN — METHYLPREDNISOLONE ACETATE 80 MG: 80 INJECTION, SUSPENSION INTRA-ARTICULAR; INTRALESIONAL; INTRAMUSCULAR; SOFT TISSUE at 10:06

## 2024-06-13 NOTE — PROCEDURES
Large Joint Aspiration/Injection: R knee    Date/Time: 6/13/2024 10:20 AM    Performed by: Alejandro De Dios MD  Authorized by: Alejandro De Dios MD    Consent Done?:  Yes (Verbal)  Indications:  Arthritis  Site marked: the procedure site was marked    Timeout: prior to procedure the correct patient, procedure, and site was verified      Local anesthesia used?: Yes    Local anesthetic:  Lidocaine 1% without epinephrine    Details:  Needle Size:  22 G  Ultrasonic Guidance for needle placement?: No    Approach:  Anterolateral  Location:  Knee  Site:  R knee  Medications:  80 mg methylPREDNISolone acetate 80 mg/mL  Patient tolerance:  Patient tolerated the procedure well with no immediate complications
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
## 2024-06-18 ENCOUNTER — OFFICE VISIT (OUTPATIENT)
Dept: HEMATOLOGY/ONCOLOGY | Facility: CLINIC | Age: 84
End: 2024-06-18
Payer: MEDICARE

## 2024-06-18 VITALS
HEART RATE: 68 BPM | WEIGHT: 143.88 LBS | OXYGEN SATURATION: 98 % | TEMPERATURE: 98 F | DIASTOLIC BLOOD PRESSURE: 68 MMHG | HEIGHT: 65 IN | SYSTOLIC BLOOD PRESSURE: 134 MMHG | BODY MASS INDEX: 23.97 KG/M2

## 2024-06-18 DIAGNOSIS — N18.6 ANEMIA DUE TO CHRONIC KIDNEY DISEASE, ON CHRONIC DIALYSIS: ICD-10-CM

## 2024-06-18 DIAGNOSIS — N18.6 ESRD NEEDING DIALYSIS: ICD-10-CM

## 2024-06-18 DIAGNOSIS — D63.1 ANEMIA DUE TO CHRONIC KIDNEY DISEASE, ON CHRONIC DIALYSIS: ICD-10-CM

## 2024-06-18 DIAGNOSIS — Z99.2 ANEMIA DUE TO CHRONIC KIDNEY DISEASE, ON CHRONIC DIALYSIS: ICD-10-CM

## 2024-06-18 DIAGNOSIS — Z99.2 ESRD NEEDING DIALYSIS: ICD-10-CM

## 2024-06-18 DIAGNOSIS — Z85.07 HISTORY OF PANCREATIC CANCER: Primary | ICD-10-CM

## 2024-06-18 PROCEDURE — 3288F FALL RISK ASSESSMENT DOCD: CPT | Mod: CPTII,S$GLB,, | Performed by: INTERNAL MEDICINE

## 2024-06-18 PROCEDURE — 1159F MED LIST DOCD IN RCRD: CPT | Mod: CPTII,S$GLB,, | Performed by: INTERNAL MEDICINE

## 2024-06-18 PROCEDURE — 1157F ADVNC CARE PLAN IN RCRD: CPT | Mod: CPTII,S$GLB,, | Performed by: INTERNAL MEDICINE

## 2024-06-18 PROCEDURE — 1126F AMNT PAIN NOTED NONE PRSNT: CPT | Mod: CPTII,S$GLB,, | Performed by: INTERNAL MEDICINE

## 2024-06-18 PROCEDURE — 99999 PR PBB SHADOW E&M-EST. PATIENT-LVL III: CPT | Mod: PBBFAC,,, | Performed by: INTERNAL MEDICINE

## 2024-06-18 PROCEDURE — 1101F PT FALLS ASSESS-DOCD LE1/YR: CPT | Mod: CPTII,S$GLB,, | Performed by: INTERNAL MEDICINE

## 2024-06-18 PROCEDURE — 3075F SYST BP GE 130 - 139MM HG: CPT | Mod: CPTII,S$GLB,, | Performed by: INTERNAL MEDICINE

## 2024-06-18 PROCEDURE — 3078F DIAST BP <80 MM HG: CPT | Mod: CPTII,S$GLB,, | Performed by: INTERNAL MEDICINE

## 2024-06-18 PROCEDURE — 99214 OFFICE O/P EST MOD 30 MIN: CPT | Mod: S$GLB,,, | Performed by: INTERNAL MEDICINE

## 2024-06-18 NOTE — PROGRESS NOTES
[facility] - Hematol Oncol [facility]  [address] [facility] [address]  Phone: [phone];  Fax: [phone]    Patient ID: Cailin Pickett   Chief Complaint: Follow-up  MRN:  [id number]     Oncologic Diagnosis:   History of pancreatic adenocarcinoma   Previous Treatment: NA FOLFIRINOX x4 cycles followed by chemoradiation w/5FU, 5/15/2013 Surgery with Dr. Carter : 1.  Distal pancreatectomy with splenectomy. 2.  Left nephrectomy. 3. Adrenalectomy.   Current Treatment:  Surveillance  Subjective   Cailin Pickett is a pleasant 83 y.o. female who presents to clinic to for pancreatic cancer surveillance.      She reports that she feels well overall; her only active issues are orthopedic issues for which she follows with orthopedics and renal disease for which she follows nephrology.  She has no acute complaints.  She inquires about imaging and I discussed that repeat imaging will be guided by any symptoms.    Review of Systems   Constitutional:  Negative for activity change, appetite change, chills, diaphoresis, fatigue, fever and unexpected weight change.   HENT:  Negative for nosebleeds.    Respiratory:  Negative for shortness of breath.    Cardiovascular:  Negative for chest pain.   Gastrointestinal:  Negative for abdominal distention, abdominal pain, anal bleeding, blood in stool, constipation, diarrhea, nausea and vomiting.   Genitourinary:  Negative for difficulty urinating and hematuria.   Musculoskeletal:  Negative for arthralgias, back pain and myalgias.   Skin:  Negative for rash.   Neurological:  Negative for dizziness, weakness, light-headedness and headaches.   Hematological:  Does not bruise/bleed easily.   Psychiatric/Behavioral:  The patient is not nervous/anxious.      History     Oncology History    No history exists.       PREVIOUS ONCOLOGIC HISTORY:   I had the pleasure meeting for the 1st time Ms. Pickett, a 80-year-old woman with end-stage renal disease on hemodialysis and  history of pancreatic adenocarcinoma in surveillance.     I reviewed her medical history notable for the following:     She has been followed in our clinic for anemia of chronic kidney disease and history of pancreatic adenocarcinoma treated with neoadjuvant chemotherapy and chemoradiation with 5 fluorouracil followed by resection 5/2013 by Dr. Carter at [facility], [place] path [id number] (treatment effect noted). She has been in surveillance. The patient had previously completed neoadjuvant chemotherapy and 5-FU chemoradiation with excellent response.  negative.     She has end-stage renal disease on hemodialysis with recurrent pleural effusion.  Pleural fluid analysis negative for malignant cells.  She receives LUZ MARIA with dialysis.     She returns for restaging CT scan review.  She notes feeling well improvement in shortness of breath and lower extremity edema.  She continues on dialysis.      Past Medical History:   Diagnosis Date    Anemia     CKD (chronic kidney disease) stage 5, GFR less than 15 ml/min 03/14/2019    CKD (chronic kidney disease), stage III     Diabetes mellitus type II     Encounter for blood transfusion     Family history of colonic polyps 07/18/2017    Gout, unspecified     Hyperlipidemia     Hypertension     Neuropathy     Pancreatic cancer     Renal failure     Secondary hyperparathyroidism of renal origin 03/14/2019    Thyroid disease        Past Surgical History:   Procedure Laterality Date    COLONOSCOPY  2011    Dr. Favio Mims    COLONOSCOPY N/A 07/18/2017    Procedure: screening colonoscopy;  Surgeon: Kenneth Kamara MD;  Location: [facility];  Service: Endoscopy;  Laterality: N/A;    ESOPHAGEAL MANOMETRY WITH MEASUREMENT OF IMPEDANCE N/A 04/07/2022    Procedure: MANOMETRY-ESOPHAGEAL-WITH IMPEDANCE;  Surgeon: Jah Rodgers RN;  Location: [facility];  Service: Endoscopy;  Laterality: N/A;    ESOPHAGOGASTRODUODENOSCOPY N/A 01/18/2022    Procedure: ESOPHAGOGASTRODUODENOSCOPY (EGD);   "Surgeon: Ivett Quarles MD;  Location: [facility] ENDO;  Service: Endoscopy;  Laterality: N/A;    FISTULOGRAM N/A 04/10/2019    Procedure: FISTULOGRAM;  Surgeon: Ruddy Fitzpatrick MD;  Location: [facility] CATH LAB;  Service: Vascular;  Laterality: N/A;  0830 start    HYSTERECTOMY      INJECTION OF ANESTHETIC AGENT AROUND MEDIAL BRANCH NERVES INNERVATING LUMBAR FACET JOINT Left 2023    Procedure: Left L3, 4, 5 MBB;  Surgeon: Geovany Qiu MD;  Location: [facility] PAIN MGT;  Service: Pain Management;  Laterality: Left;    KNEE SURGERY Left 2018    NEPHRECTOMY Left 2013    Dr Carter     PANCREAS SURGERY      distal pancreatectomy    SPLENECTOMY, TOTAL      THYROIDECTOMY, PARTIAL      VENTRICULOATRIAL SHUNT Left 2014    left arm       Family History   Problem Relation Name Age of Onset    Heart disease Mother  60        MI    Hypertension Mother      Stroke Mother      Breast cancer Mother      Cancer Father          prostate    Cancer Brother          renal cell carcinoma    Diabetes Brother      Kidney disease Brother          ESRD s/p kidney transplant    Breast cancer Sister      Breast cancer Sister         Review of patient's allergies indicates:   Allergen Reactions    Allegra [fexofenadine] Swelling    Codeine Hives, Itching and Nausea And Vomiting       Social History     Tobacco Use    Smoking status: Former     Current packs/day: 0.00     Average packs/day: 1 pack/day for 38.8 years (38.8 ttl pk-yrs)     Types: Cigarettes     Start date: 3/14/1962     Quit date: 2001     Years since quittin.4     Passive exposure: Never    Smokeless tobacco: Never   Substance Use Topics    Alcohol use: No    Drug use: No       Physical Exam   ECOG:   ECOG SCORE    0 - Fully active-able to carry on all pre-disease performance without restriction          Vitals:  /68   Pulse 68   Temp 97.8 °F (36.6 °C) (Temporal)   Ht 5' 5" (1.651 m)   Wt 65.2 kg (143 lb 13.6 oz)   LMP 1982 (Exact Date)   " SpO2 98%   BMI 23.94 kg/m²     Physical Exam:  Physical Exam  Constitutional:       General: She is not in acute distress.     Appearance: Normal appearance. She is not ill-appearing.   HENT:      Head: Normocephalic and atraumatic.   Eyes:      Extraocular Movements: Extraocular movements intact.      Conjunctiva/sclera: Conjunctivae normal.   Cardiovascular:      Rate and Rhythm: Normal rate.   Pulmonary:      Effort: Pulmonary effort is normal. No respiratory distress.      Breath sounds: No rhonchi.   Abdominal:      General: Bowel sounds are normal. There is no distension.      Palpations: Abdomen is soft. There is no hepatomegaly, splenomegaly or mass.      Tenderness: There is no abdominal tenderness. There is no guarding.   Musculoskeletal:         General: Normal range of motion.      Cervical back: Neck supple. No tenderness.      Right lower leg: No edema.      Left lower leg: No edema.   Skin:     Findings: No rash.   Neurological:      General: No focal deficit present.      Mental Status: She is alert and oriented to person, place, and time.   Psychiatric:         Mood and Affect: Mood normal.         Behavior: Behavior normal.         Thought Content: Thought content normal.            Labs   Labs:  No visits with results within 2 Day(s) from this visit.   Latest known visit with results is:   Lab Visit on 06/13/2024   Component Date Value Ref Range Status    Phosphorus 06/13/2024 3.8  2.7 - 4.5 mg/dL Final    Magnesium 06/13/2024 2.5  1.6 - 2.6 mg/dL Final    Sodium 06/13/2024 139  136 - 145 mmol/L Final    Potassium 06/13/2024 5.1  3.5 - 5.1 mmol/L Final    Chloride 06/13/2024 104  95 - 110 mmol/L Final    CO2 06/13/2024 24  23 - 29 mmol/L Final    Glucose 06/13/2024 175 (H)  70 - 110 mg/dL Final    BUN 06/13/2024 40 (H)  8 - 23 mg/dL Final    Creatinine 06/13/2024 6.5 (H)  0.5 - 1.4 mg/dL Final    Calcium 06/13/2024 9.6  8.7 - 10.5 mg/dL Final    Total Protein 06/13/2024 7.1  6.0 - 8.4 g/dL Final     Albumin 06/13/2024 3.2 (L)  3.5 - 5.2 g/dL Final    Total Bilirubin 06/13/2024 0.6  0.1 - 1.0 mg/dL Final    Comment: For infants and newborns, interpretation of results should be based  on gestational age, weight and in agreement with clinical  observations.    Premature Infant recommended reference ranges:  Up to 24 hours.............<8.0 mg/dL  Up to 48 hours............<12.0 mg/dL  3-5 days..................<15.0 mg/dL  6-29 days.................<15.0 mg/dL      Alkaline Phosphatase 06/13/2024 126  55 - 135 U/L Final    AST 06/13/2024 16  10 - 40 U/L Final    ALT 06/13/2024 9 (L)  10 - 44 U/L Final    eGFR 06/13/2024 6 (A)  >60 mL/min/1.73 m^2 Final    Anion Gap 06/13/2024 11  8 - 16 mmol/L Final    WBC 06/13/2024 4.56  3.90 - 12.70 K/uL Final    RBC 06/13/2024 3.81 (L)  4.00 - 5.40 M/uL Final    Hemoglobin 06/13/2024 11.6 (L)  12.0 - 16.0 g/dL Final    Hematocrit 06/13/2024 37.9  37.0 - 48.5 % Final    MCV 06/13/2024 100 (H)  82 - 98 fL Final    MCH 06/13/2024 30.4  27.0 - 31.0 pg Final    MCHC 06/13/2024 30.6 (L)  32.0 - 36.0 g/dL Final    RDW 06/13/2024 16.6 (H)  11.5 - 14.5 % Final    Platelets 06/13/2024 193  150 - 450 K/uL Final    MPV 06/13/2024 9.9  9.2 - 12.9 fL Final    Immature Granulocytes 06/13/2024 0.2  0.0 - 0.5 % Final    Gran # (ANC) 06/13/2024 2.3  1.8 - 7.7 K/uL Final    Immature Grans (Abs) 06/13/2024 0.01  0.00 - 0.04 K/uL Final    Comment: Mild elevation in immature granulocytes is non specific and   can be seen in a variety of conditions including stress response,   acute inflammation, trauma and pregnancy. Correlation with other   laboratory and clinical findings is essential.      Lymph # 06/13/2024 1.3  1.0 - 4.8 K/uL Final    Mono # 06/13/2024 0.8  0.3 - 1.0 K/uL Final    Eos # 06/13/2024 0.1  0.0 - 0.5 K/uL Final    Baso # 06/13/2024 0.05  0.00 - 0.20 K/uL Final    nRBC 06/13/2024 0  0 /100 WBC Final    Gran % 06/13/2024 51.1  38.0 - 73.0 % Final    Lymph % 06/13/2024 28.1  18.0 -  48.0 % Final    Mono % 06/13/2024 17.3 (H)  4.0 - 15.0 % Final    Eosinophil % 06/13/2024 2.2  0.0 - 8.0 % Final    Basophil % 06/13/2024 1.1  0.0 - 1.9 % Final    Differential Method 06/13/2024 Automated   Final    CA 19-9 06/13/2024 <2.1  0.0 - 40.0 U/mL Final    Comment: The testing method is a chemiluminescent microparticle immunoassay   manufactured by [employer] and performed on the [employer]   or   [employer] system. Values obtained with different assay manufacturers   for   methods may be different and cannot be used interchangeably.          Imaging   CT Chest Abdomen Pelvis With IV Contrast (XPD) - 11/09/2023  Narrative & Impression  EXAMINATION:  CT CHEST ABDOMEN PELVIS WITH IV CONTRAST (XPD)     CLINICAL HISTORY:  h/o pancreatic cancer surveillance. back pain;Personal history of malignant neoplasm of pancreas     TECHNIQUE:  Low dose axial images, sagittal and coronal reformations were obtained from the thoracic inlet to the pubic synthesis following the administration of 75 cc intravenous Omnipaque 350 and 1000 cc orally administered Omnipaque 12. All CT scans at this facility are performed using dose optimization techniques including the following: automated exposure control; adjustment of the mA and/or kV; use of iterative reconstruction technique.     COMPARISON:  CT chest abdomen pelvis with contrast 11/29/2022     FINDINGS:  Thoracic soft tissues: The left thyroid lobe is not seen.     Aorta: The aorta is normal in course and caliber, however with severe atherosclerotic plaque.  Accessory hemiazygous vein incidentally seen to anastomose with the left brachiocephalic vein, coursing along the left aspect of the aortic arch.     Heart: Normal size without effusion.  There is abundant coronary artery calcification.     Emani/Mediastinum: No significant lymphadenopathy     Lungs: Well aerated, without consolidation or pleural fluid. No concerning pulmonary nodules.  There is bilateral dependent  densities suggesting atelectatic change or fibrosis.     Liver: Normal in size and attenuation.  There is a subtle subcentimeter hypodensity at the hepatic dome, which appears smaller since the comparison exam which is suspected due to phase of contrast administration rather than actual decrease in size.  Finding remains compatible with flash filling hemangioma.  No new or concerning hepatic lesion.     Gallbladder: There is a 5 mm calcified gallstone in the fundus.  Gallbladder is otherwise unremarkable.     Bile Ducts: The common bile duct is mildly prominent at 8 mm, not significantly changed since comparison exam.  No intrahepatic biliary duct dilatation.     Pancreas: Patient is status post partial pancreatectomy.  The residual pancreatic parenchyma at the head and uncinate process are unremarkable.  No peripancreatic fat stranding.     Spleen: Absent.     Adrenals: The right adrenal gland is unremarkable.  The left adrenal gland is absent.     Kidneys/ Ureters: The left kidney is absent.  The right kidney is normal in location, however with renal parenchyma almost entirely replaced by innumerable cysts which measure up to 6 cm.  There has been no significant change since comparison exam.  No definite enhancing renal lesion, noting multiple subcentimeter hypodensities are too small to definitively characterize. No hydronephrosis or nephrolithiasis. No ureteral dilatation.     Bladder: Decompressed.  No wall thickening.     Reproductive organs: The uterus is absent.     GI Tract/Mesentery: No evidence of bowel obstruction or inflammation. There is fecalization of small bowel contents at the terminal ileum, suggesting slowed motility.  Orally ingested contrast extends through the stomach and the majority of the small bowel.  There are rare colonic diverticula without inflammatory change of diverticulitis.  No pneumatosis or mural thickening.     Peritoneal Space: No ascites. No free air.     Retroperitoneum: No  significant adenopathy.     Abdominal wall: Mild stranding present subcutaneously over the right lower quadrant, suspected related to recent injection site or other focal injury.  No fluid collection.  There is intramuscular lipoma within the left anterior thigh musculature.     Vasculature: There is severe aortic atherosclerosis with mild ectasia.  No aortic aneurysm.  Abundant atherosclerotic plaque lies at the origins of the right renal, superior mesenteric, and celiac arteries.  There is approximately 50% stenosis at the origin of the celiac artery and greater than 50% stenosis of the superior mesenteric artery.  Findings are similar to the prior exam.     Bones: No acute fracture. There is degenerative change of the spine, most significant within the lumbar spine with sclerotic and erosive endplate changes, unchanged since the comparison exam.     Impression:     In this patient with a history of pancreatic cancer, there is postsurgical change of distal pancreatectomy and splenectomy.  No evidence for local recurrent or distant metastatic disease within the chest, abdomen, or pelvis.     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  Findings could result in mesenteric ischemia symptoms.  Correlate with clinical presentation.     Polycystic right kidney.  Status post left nephrectomy and adrenalectomy.     Cholelithiasis.        Electronically signed by: Brenda Arrieta  Date:                                            11/09/2023  Time:                                           17:04     Assessment and Plan   History of Pancreatic Cancer (ypT3N0 )  Diagnosed May 2013.  CA 19 9 at time of diagnosis was normal.  NA FOLFIRINOX x4 cycles followed by chemoradiation w/5FU, 5/15/2013 had Surgery w/Dr. Carter : 1.  Distal pancreatectomy with splenectomy. 2.  Left nephrectomy. 3. Adrenalectomy.    Restaging CT CPAP 11/09/2023 showed no evidence of pancreatic cancer recurrence; it did show some severe atherosclerotic changes in the SMA putting patient at risk for mesenteric ischemia.  Patient counseled to follow up with PCP and Cardiology as she has been having some recent intermittent abdominal pain; she did not any pain now  Labs reviewed and stable; CA 19-9 today normal  Will obtain PET-CT at pt request  As far as her pancreatic cancer surveillance, she can follow up with our clinic 6 months      Chemotherapy-induced peripheral neuropathy   The patient reports that ever since having chemotherapy she has had numbness tingling in her feet  Continue gabapentin      Cancer Screening  MMG 07/11/2023: [id number]   PAP Smear: Hx of hysterectomy  Colonoscopy 06/06/2017:   one 2 mm polyp in the distal transverse colon; Diverticulosis in the sigmoid colon. repeat colonoscopy in 5 years for surveillance.      Chronic Medical Conditions  ESRD on HD   History of left nephrectomy and adrenalectomy  Polycystic right kidney   Hypertension   Sick sinus syndrome   DM II  Anemia CKD - H/H stable; continue to monitor  Secondary hyperparathyroidism   Vitamin-D deficiency   History of splenectomy  GERD   Osteoarthritis multiple joints      Med Onc Chart Routing      Follow up with physician 6 months.   Follow up with KIM    Infusion scheduling note    Injection scheduling note    Labs CBC, CMP, CA 19-9, magnesium and phosphorus   Scheduling:  Preferred lab:  Lab interval:     Imaging PET scan   prior to MD visit   Pharmacy appointment    Other referrals                 The patient was seen, interviewed and examined. Pertinent lab and radiologic studies were reviewed. Pt instructed to call should they develop concerning signs/symptoms or have further questions.        Portions of the record may have been created with voice recognition software. Occasional wrong-word or sound-a-like substitutions may have occurred due to the  inherent limitations of voice recognition software. Read the chart carefully and recognize, using context, where substitutions have occurred.      Dalia Bellamy MD    Hematology/Oncology

## 2024-07-03 ENCOUNTER — TELEPHONE (OUTPATIENT)
Dept: ORTHOPEDICS | Facility: CLINIC | Age: 84
End: 2024-07-03
Payer: MEDICARE

## 2024-07-03 NOTE — TELEPHONE ENCOUNTER
Called the patient in regards to their appointment on 09/19/24 with Dr. De Dios. Informed the patient that I need to reschedule their appointment due to the fact that Dr. De Dios will be out of the office that week. I got the patient reschedule for 10/17/24 at 10:00. Patient verbalized understanding.

## 2024-07-25 DIAGNOSIS — G62.9 POLYNEUROPATHY: ICD-10-CM

## 2024-07-25 DIAGNOSIS — L29.9 ITCHING: ICD-10-CM

## 2024-07-25 RX ORDER — GABAPENTIN 600 MG/1
600 TABLET ORAL DAILY
Qty: 90 TABLET | Refills: 3 | Status: SHIPPED | OUTPATIENT
Start: 2024-07-25 | End: 2025-07-25

## 2024-07-25 NOTE — TELEPHONE ENCOUNTER
----- Message from Hector Mccloud sent at 7/25/2024  2:28 PM CDT -----  .Type:  Needs Medical Advice    Who Called: pt    Would the patient rather a call back or a response via [facility]? Judd back  Best Call Back Number: [phone]  Additional Information:     Pt stated she would like a call back regarding her medication

## 2024-07-29 ENCOUNTER — TELEPHONE (OUTPATIENT)
Dept: OBSTETRICS AND GYNECOLOGY | Facility: CLINIC | Age: 84
End: 2024-07-29
Payer: MEDICARE

## 2024-07-29 DIAGNOSIS — Z12.31 ENCOUNTER FOR SCREENING MAMMOGRAM FOR BREAST CANCER: Primary | ICD-10-CM

## 2024-07-29 NOTE — TELEPHONE ENCOUNTER
----- Message from Anne Tyson sent at 7/29/2024 10:26 AM CDT -----  Contact: Cailin Simeon is needing a call back in regards to scheduling an annual appt with Niko Niko only. Please give her a call back at [phone]

## 2024-07-29 NOTE — TELEPHONE ENCOUNTER
Returned patient's call. Verified 2 patient identifiers.    Patient requested mmg appt and appt with Dr. Sigala.    Appointments have been scheduled.       Patient is also requesting an order for DEXA Scan. We can call her to schedule when the order is in.

## 2024-08-02 NOTE — TELEPHONE ENCOUNTER
"Returned call to patient and verified 2pt identifiers.     Advised of Dr. Sigala's note yue:    "Her last DEXA in 2019 looked good.  At 84 years old, she no longer needs bone density scans.  Can check with her PCP, though, and see if they feel she still needs them. "    Patient  expressed understanding  "

## 2024-08-06 DIAGNOSIS — L29.9 ITCHING: ICD-10-CM

## 2024-08-06 DIAGNOSIS — G62.9 POLYNEUROPATHY: ICD-10-CM

## 2024-08-06 RX ORDER — GABAPENTIN 600 MG/1
600 TABLET ORAL DAILY
Qty: 90 TABLET | Refills: 1 | Status: SHIPPED | OUTPATIENT
Start: 2024-08-06 | End: 2025-08-06

## 2024-08-13 ENCOUNTER — OFFICE VISIT (OUTPATIENT)
Dept: CARDIOLOGY | Facility: CLINIC | Age: 84
End: 2024-08-13
Payer: MEDICARE

## 2024-08-13 VITALS
BODY MASS INDEX: 23.49 KG/M2 | RESPIRATION RATE: 16 BRPM | HEART RATE: 69 BPM | OXYGEN SATURATION: 99 % | DIASTOLIC BLOOD PRESSURE: 58 MMHG | WEIGHT: 141 LBS | HEIGHT: 65 IN | SYSTOLIC BLOOD PRESSURE: 135 MMHG

## 2024-08-13 DIAGNOSIS — R06.09 OTHER FORMS OF DYSPNEA: ICD-10-CM

## 2024-08-13 DIAGNOSIS — I15.2 HYPERTENSION ASSOCIATED WITH DIABETES: ICD-10-CM

## 2024-08-13 DIAGNOSIS — D64.9 ANEMIA, UNSPECIFIED TYPE: ICD-10-CM

## 2024-08-13 DIAGNOSIS — I65.23 BILATERAL CAROTID ARTERY STENOSIS: Primary | ICD-10-CM

## 2024-08-13 DIAGNOSIS — R06.09 DOE (DYSPNEA ON EXERTION): ICD-10-CM

## 2024-08-13 DIAGNOSIS — Z99.2 ESRD NEEDING DIALYSIS: ICD-10-CM

## 2024-08-13 DIAGNOSIS — N18.6 ESRD NEEDING DIALYSIS: ICD-10-CM

## 2024-08-13 DIAGNOSIS — E11.59 HYPERTENSION ASSOCIATED WITH DIABETES: ICD-10-CM

## 2024-08-13 DIAGNOSIS — I25.10 CORONARY ARTERY CALCIFICATION SEEN ON CAT SCAN: ICD-10-CM

## 2024-08-13 PROCEDURE — 1157F ADVNC CARE PLAN IN RCRD: CPT | Mod: CPTII,S$GLB,, | Performed by: INTERNAL MEDICINE

## 2024-08-13 PROCEDURE — 99999 PR PBB SHADOW E&M-EST. PATIENT-LVL III: CPT | Mod: PBBFAC,,, | Performed by: INTERNAL MEDICINE

## 2024-08-13 PROCEDURE — 1101F PT FALLS ASSESS-DOCD LE1/YR: CPT | Mod: CPTII,S$GLB,, | Performed by: INTERNAL MEDICINE

## 2024-08-13 PROCEDURE — 99214 OFFICE O/P EST MOD 30 MIN: CPT | Mod: S$GLB,,, | Performed by: INTERNAL MEDICINE

## 2024-08-13 PROCEDURE — 3288F FALL RISK ASSESSMENT DOCD: CPT | Mod: CPTII,S$GLB,, | Performed by: INTERNAL MEDICINE

## 2024-08-13 PROCEDURE — 3078F DIAST BP <80 MM HG: CPT | Mod: CPTII,S$GLB,, | Performed by: INTERNAL MEDICINE

## 2024-08-13 PROCEDURE — 3075F SYST BP GE 130 - 139MM HG: CPT | Mod: CPTII,S$GLB,, | Performed by: INTERNAL MEDICINE

## 2024-08-13 PROCEDURE — 1159F MED LIST DOCD IN RCRD: CPT | Mod: CPTII,S$GLB,, | Performed by: INTERNAL MEDICINE

## 2024-08-13 NOTE — PROGRESS NOTES
Subjective:   Patient ID:  Cailin Pickett is a 84 y.o. female who presents for evaluation of No chief complaint on file.      HPI  8.13.2024  Here to establish care with new doctor she saw Dr. Allen in the past  83 yo female , on hd for the past 4 years , no chest pain ,  Has more HYDE lately , states that yesterday she had epigastric pain that lasts for about half an hour resolves on its own after end of dialysis.    Denies lower extremity swelling.    States more dyspneic lately.    She had a normal nuclear stress test back in 2021.    Denies syncope palpitations or presyncope.    She has a abundant coronary calcification on prior CT scan\  No slurred speech , focal weakness or numbness, amaurosis, dizziness       Past Medical History:   Diagnosis Date    Anemia     CKD (chronic kidney disease) stage 5, GFR less than 15 ml/min 03/14/2019    CKD (chronic kidney disease), stage III     Diabetes mellitus type II     Encounter for blood transfusion     Family history of colonic polyps 07/18/2017    Gout, unspecified     Hyperlipidemia     Hypertension     Neuropathy     Pancreatic cancer     Renal failure     Secondary hyperparathyroidism of renal origin 03/14/2019    Thyroid disease        Past Surgical History:   Procedure Laterality Date    COLONOSCOPY  2011    Dr. Favio Mims    COLONOSCOPY N/A 07/18/2017    Procedure: screening colonoscopy;  Surgeon: Kenneth Kamara MD;  Location: [facility];  Service: Endoscopy;  Laterality: N/A;    ESOPHAGEAL MANOMETRY WITH MEASUREMENT OF IMPEDANCE N/A 04/07/2022    Procedure: MANOMETRY-ESOPHAGEAL-WITH IMPEDANCE;  Surgeon: Jah Rodgers RN;  Location: [facility];  Service: Endoscopy;  Laterality: N/A;    ESOPHAGOGASTRODUODENOSCOPY N/A 01/18/2022    Procedure: ESOPHAGOGASTRODUODENOSCOPY (EGD);  Surgeon: Ivett Quarles MD;  Location: [facility];  Service: Endoscopy;  Laterality: N/A;    FISTULOGRAM N/A 04/10/2019    Procedure: FISTULOGRAM;  Surgeon: Ruddy Fitzpatrick  MD;  Location: [facility] CATH LAB;  Service: Vascular;  Laterality: N/A;  0830 start    HYSTERECTOMY      INJECTION OF ANESTHETIC AGENT AROUND MEDIAL BRANCH NERVES INNERVATING LUMBAR FACET JOINT Left 2023    Procedure: Left L3, 4, 5 MBB;  Surgeon: Geovany Qiu MD;  Location: [facility] PAIN MGT;  Service: Pain Management;  Laterality: Left;    KNEE SURGERY Left 2018    NEPHRECTOMY Left 2013    Dr Carter     PANCREAS SURGERY      distal pancreatectomy    SPLENECTOMY, TOTAL      THYROIDECTOMY, PARTIAL      VENTRICULOATRIAL SHUNT Left 2014    left arm       Social History     Tobacco Use    Smoking status: Former     Current packs/day: 0.00     Average packs/day: 1 pack/day for 38.8 years (38.8 ttl pk-yrs)     Types: Cigarettes     Start date: 3/14/1962     Quit date: 2001     Years since quittin.6     Passive exposure: Never    Smokeless tobacco: Never   Substance Use Topics    Alcohol use: No    Drug use: No       Family History   Problem Relation Name Age of Onset    Heart disease Mother  60        MI    Hypertension Mother      Stroke Mother      Breast cancer Mother      Cancer Father          prostate    Cancer Brother          renal cell carcinoma    Diabetes Brother      Kidney disease Brother          ESRD s/p kidney transplant    Breast cancer Sister      Breast cancer Sister         ROS    Current Outpatient Medications on File Prior to Visit   Medication Sig    allopurinoL (ZYLOPRIM) 100 MG tablet Take 1 tablet (100 mg total) by mouth once daily.    amitriptyline (ELAVIL) 10 MG tablet Take 1 tablet (10 mg total) by mouth every evening.    atorvastatin (LIPITOR) 40 MG tablet Take 1 tablet (40 mg total) by mouth once daily.    AURYXIA 210 mg iron Tab     azelastine (ASTELIN) 137 mcg (0.1 %) nasal spray 1 spray (137 mcg total) by Nasal route 2 (two) times daily.    azelastine (ASTELIN) 137 mcg (0.1 %) nasal spray 1 spray (137 mcg total) by Nasal route 2 (two) times daily.    BD ULTRA-FINE  "MICRO PEN NEEDLE 32 gauge x 1/4" Ndle     benzonatate (TESSALON) 100 MG capsule Take 200 mg by mouth.    blood sugar diagnostic Strp USE TO MONITOR BLOOD GLUCOSE DAILY    co-enzyme Q-10 30 mg capsule Take 1 capsule (30 mg total) by mouth 2 (two) times daily.    COVID [id number],12up,,andu,,PF, (SPIKEVAX 1862-4290,12Y UP,,PF,) 50 mcg/0.5 mL injection Inject into the muscle.    docusate sodium (COLACE) 50 MG capsule Take by mouth.    ezetimibe (ZETIA) 10 mg tablet TAKE 1 TABLET DAILY    fluticasone propionate (FLONASE) 50 mcg/actuation nasal spray 1 spray (50 mcg total) by Each Nostril route 2 (two) times a day.    fluticasone propionate (FLONASE) 50 mcg/actuation nasal spray 1 spray (50 mcg total) by Each Nostril route 2 (two) times a day.    gabapentin (NEURONTIN) 600 MG tablet Take 1 tablet (600 mg total) by mouth once daily. Daily at 05:00 PM    glucosamine-chondroitin 250-200 mg Tab Take 1 tablet by mouth 2 (two) times daily.    insulin degludec (TRESIBA FLEXTOUCH U-100) 100 unit/mL (3 mL) InPn Inject 8 Units into the skin.     ONETOUCH DELICA PLUS LANCET 30 gauge Misc 2 (two) times daily. Use to check blood glucose    pen needle, diabetic 32 gauge x 1/4" Ndle Use to test blood sugars bid    PRORENAL 8 mg iron-800 mcg-1,000 unit Tab Take 1 tablet by mouth once daily.    RENAPLEX-D 800 mcg-12.5 mg -2,000 unit Tab Take 1 tablet by mouth once daily.    sevelamer carbonate (RENVELA) 800 mg Tab Take 2 tablets by mouth.    traMADoL (ULTRAM) 50 mg tablet Take 1 tablet (50 mg total) by mouth every 12 (twelve) hours.    VELPHORO 500 mg Chew Take 1 tablet by mouth 3 (three) times daily.    vit B,C-FA-zinc-selen-vit D3-E (RENAPLEX-D) 800 mcg-12.5 mg -2,000 unit Tab Take 1 tablet by mouth.    vitamin renal formula, B-complex-vitamin c-folic acid, (NEPHROCAP) 1 mg Cap Take 1 capsule by mouth once daily.    fluocinolone (DERMA-SMOOTHE) 0.01 % external oil Apply oil to scalp once a day    omeprazole (PRILOSEC) 40 MG capsule Take " 1 capsule (40 mg total) by mouth once daily.     No current facility-administered medications on file prior to visit.       Objective:   Objective:  Wt Readings from Last 3 Encounters:   08/13/24 64 kg (141 lb)   06/18/24 65.2 kg (143 lb 13.6 oz)   06/13/24 65.1 kg (143 lb 6.6 oz)     Temp Readings from Last 3 Encounters:   06/18/24 97.8 °F (36.6 °C) (Temporal)   04/11/24 97.5 °F (36.4 °C)   01/02/24 97.9 °F (36.6 °C)     BP Readings from Last 3 Encounters:   08/13/24 (!) 135/58   06/18/24 134/68   04/11/24 138/62     Pulse Readings from Last 3 Encounters:   08/13/24 69   06/18/24 68   04/11/24 76       Physical Exam    Lab Results   Component Value Date    CHOL 126 04/29/2021    CHOL 243 (H) 09/03/2020    CHOL 230 (H) 11/18/2019     Lab Results   Component Value Date    HDL 59 04/29/2021    HDL 73 09/03/2020    HDL 80 (H) 11/18/2019     Lab Results   Component Value Date    LDLCALC 54.6 (L) 04/29/2021    LDLCALC 147.8 09/03/2020    LDLCALC 127.6 11/18/2019     Lab Results   Component Value Date    TRIG 62 04/29/2021    TRIG 111 09/03/2020    TRIG 112 11/18/2019     Lab Results   Component Value Date    CHOLHDL 46.8 04/29/2021    CHOLHDL 30.0 09/03/2020    CHOLHDL 34.8 11/18/2019       Chemistry        Component Value Date/Time     06/13/2024 0855    K 5.1 06/13/2024 0855     06/13/2024 0855    CO2 24 06/13/2024 0855    BUN 40 (H) 06/13/2024 0855    CREATININE 6.5 (H) 06/13/2024 0855     (H) 06/13/2024 0855        Component Value Date/Time    CALCIUM 9.6 06/13/2024 0855    ALKPHOS 126 06/13/2024 0855    AST 16 06/13/2024 0855    ALT 9 (L) 06/13/2024 0855    BILITOT 0.6 06/13/2024 0855    ESTGFRAFRICA 7 (A) 07/14/2022 1319    EGFRNONAA 6 (A) 07/14/2022 1319          Lab Results   Component Value Date    TSH 1.41 06/22/2023     Lab Results   Component Value Date    INR 1.0 11/04/2022    INR 1.0 02/10/2021    INR 1.22 01/28/2021     Lab Results   Component Value Date    WBC 4.56 06/13/2024    HGB  10.8 (L) 08/05/2024    HCT 37.9 06/13/2024     (H) 06/13/2024     06/13/2024     BNP  @LABRCNTIP(BNP,BNPTRIAGEBLO)@  CrCl cannot be calculated (Patient's most recent lab result is older than the maximum 7 days allowed.).     Imaging:  ======    No results found for this or any previous visit.    No results found for this or any previous visit.    Results for orders placed during the hospital encounter of 02/10/21    X-Ray Chest PA And Lateral    Narrative  EXAMINATION:  XR CHEST PA AND LATERAL    CLINICAL HISTORY:  Chest Pain;    TECHNIQUE:  PA and lateral views of the chest were performed.    COMPARISON:  04/08/2019.    FINDINGS:  Right lung is clear.  There is left pleural fluid.  No airspace opacity or pneumothorax.    The cardiac silhouette is normal in size. There is aortic atherosclerosis.  Mediastinal and hilar contours are unchanged.    Degenerative change of the shoulders.    Impression  Left pleural fluid new from the prior exam.      Electronically signed by: Kushal Hernandez  Date:    02/10/2021  Time:    19:25    No results found for this or any previous visit.    No valid procedures specified.    Results for orders placed during the hospital encounter of 04/08/19    Cath lab procedure (Preliminary)      Results for orders placed during the hospital encounter of 02/10/21    Nuclear Stress - Cardiology Interpreted    Interpretation Summary    Normal myocardial perfusion scan. There is no evidence of myocardial ischemia or infarction.    The gated perfusion images showed an ejection fraction of 84% at rest. The gated perfusion images showed an ejection fraction of 83% post stress. Normal ejection fraction is greater than 54%.    There is normal wall motion at rest and post stress.    LV cavity size is normal at rest and normal at stress.    The EKG portion of this study is negative for ischemia.    There were no arrhythmias during stress.      Results for orders placed during the hospital  encounter of 08/31/21    Echo    Interpretation Summary  · The left ventricle is normal in size with normal systolic function.  · The estimated ejection fraction is 65%.  · Normal left ventricular diastolic function.  · Normal right ventricular size with normal right ventricular systolic function.  · Normal central venous pressure (3 mmHg).  · Mild tricuspid regurgitation.  · Mild mitral regurgitation.  · The estimated PA systolic pressure is 37 mmHg.    CONCLUSIONS   There is 40 - 49% right Internal Carotid stenosis.   There is 20 - 39% left Internal Carotid stenosis.           This document has been electronically    SIGNED BY: Haroon Long MD On: 03/11/2019 21:13     Diagnostic Results:  ECG: Reviewed    The ASCVD Risk score (Nadia NUNO, et al., 2019) failed to calculate for the following reasons:    The 2019 ASCVD risk score is only valid for ages 40 to 79        Assessment and Plan:   Bilateral carotid artery stenosis  -     CV Ultrasound Bilateral Doppler Carotid; Future    HYDE (dyspnea on exertion)  -     Echo; Future    Other forms of dyspnea  -     Echo; Future  -     Nuclear Stress - Cardiology Interpreted; Future    Coronary artery calcification seen on CAT scan  -     Echo; Future  -     Nuclear Stress - Cardiology Interpreted; Future    ESRD needing dialysis    Anemia, unspecified type    Hypertension associated with diabetes      Given dyspnea on exertion and history of coronary calcification and on dialysis, we will get an ischemic evaluation with a nuclear stress test.  Repeat echocardiogram.    She has a carotid bruit both sides.  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  We will get a carotid artery ultrasound again last showed 40-49% on the right side.  In 2019  She has a abundant coronary calcification on prior CT scan  Bp low in dialysis , no change to regimen today.  On zetia and atorvastatin  Reviewed all tests and above medical conditions with patient in detail and formulated treatment  plan.  Risk factor modification discussed.   Cardiac low salt diet discussed.  Maintaining healthy weight and weight loss goals were discussed in clinic.    Follow up in  6 months

## 2024-08-27 ENCOUNTER — HOSPITAL ENCOUNTER (OUTPATIENT)
Dept: CARDIOLOGY | Facility: HOSPITAL | Age: 84
Discharge: HOME | End: 2024-08-27
Attending: INTERNAL MEDICINE
Payer: MEDICARE

## 2024-08-27 ENCOUNTER — HOSPITAL ENCOUNTER (OUTPATIENT)
Dept: RADIOLOGY | Facility: HOSPITAL | Age: 84
Discharge: HOME | End: 2024-08-27
Attending: INTERNAL MEDICINE
Payer: MEDICARE

## 2024-08-27 VITALS
HEIGHT: 65 IN | SYSTOLIC BLOOD PRESSURE: 147 MMHG | WEIGHT: 141 LBS | BODY MASS INDEX: 23.49 KG/M2 | DIASTOLIC BLOOD PRESSURE: 65 MMHG

## 2024-08-27 VITALS
BODY MASS INDEX: 23.49 KG/M2 | WEIGHT: 141 LBS | HEIGHT: 65 IN | DIASTOLIC BLOOD PRESSURE: 58 MMHG | SYSTOLIC BLOOD PRESSURE: 135 MMHG

## 2024-08-27 DIAGNOSIS — I25.10 CORONARY ARTERY CALCIFICATION SEEN ON CAT SCAN: ICD-10-CM

## 2024-08-27 DIAGNOSIS — I65.23 BILATERAL CAROTID ARTERY STENOSIS: ICD-10-CM

## 2024-08-27 DIAGNOSIS — R06.09 OTHER FORMS OF DYSPNEA: ICD-10-CM

## 2024-08-27 DIAGNOSIS — R06.09 DOE (DYSPNEA ON EXERTION): ICD-10-CM

## 2024-08-27 LAB
AORTIC ROOT ANNULUS: 2.93 CM
ASCENDING AORTA: 2.62 CM
AV INDEX (PROSTH): 1.08
AV MEAN GRADIENT: 3 MMHG
AV PEAK GRADIENT: 5 MMHG
AV VALVE AREA BY VELOCITY RATIO: 3.14 CM²
AV VALVE AREA: 3.36 CM²
AV VELOCITY RATIO: 1.01
BSA FOR ECHO PROCEDURE: 1.71 M2
CV ECHO LV RWT: 0.61 CM
CV STRESS BASE HR: 71 BPM
DIASTOLIC BLOOD PRESSURE: 64 MMHG
DOP CALC AO PEAK VEL: 1.12 M/S
DOP CALC AO VTI: 22.3 CM
DOP CALC LVOT AREA: 3.1 CM2
DOP CALC LVOT DIAMETER: 1.99 CM
DOP CALC LVOT PEAK VEL: 1.13 M/S
DOP CALC LVOT STROKE VOLUME: 74.92 CM3
DOP CALC RVOT PEAK VEL: 0.74 M/S
DOP CALC RVOT VTI: 17.1 CM
DOP CALCLVOT PEAK VEL VTI: 24.1 CM
E WAVE DECELERATION TIME: 215.77 MSEC
E/A RATIO: 0.87
E/E' RATIO: 6.5 M/S
ECHO LV POSTERIOR WALL: 1.05 CM (ref 0.6–1.1)
FRACTIONAL SHORTENING: 40 % (ref 28–44)
INTERVENTRICULAR SEPTUM: 1.18 CM (ref 0.6–1.1)
IVC DIAMETER: 1.36 CM
IVRT: 102.76 MSEC
LA MAJOR: 4.23 CM
LA MINOR: 4.01 CM
LA WIDTH: 3.5 CM
LEFT ATRIUM AREA SYSTOLIC (APICAL 2 CHAMBER): 12.93 CM2
LEFT ATRIUM AREA SYSTOLIC (APICAL 4 CHAMBER): 14.99 CM2
LEFT ATRIUM SIZE: 3.34 CM
LEFT ATRIUM VOLUME INDEX MOD: 22.3 ML/M2
LEFT ATRIUM VOLUME INDEX: 23.9 ML/M2
LEFT ATRIUM VOLUME MOD: 38.13 CM3
LEFT ATRIUM VOLUME: 40.91 CM3
LEFT CBA DIAS: 9 CM/S
LEFT CBA SYS: 103 CM/S
LEFT CCA DIST DIAS: 9 CM/S
LEFT CCA DIST SYS: 84 CM/S
LEFT CCA MID DIAS: 11 CM/S
LEFT CCA MID SYS: 78 CM/S
LEFT CCA PROX DIAS: 6 CM/S
LEFT CCA PROX SYS: 86 CM/S
LEFT ECA DIAS: 7 CM/S
LEFT ECA SYS: 253 CM/S
LEFT ICA DIST DIAS: 18 CM/S
LEFT ICA DIST SYS: 68 CM/S
LEFT ICA MID DIAS: 18 CM/S
LEFT ICA MID SYS: 90 CM/S
LEFT ICA PROX DIAS: 14 CM/S
LEFT ICA PROX SYS: 90 CM/S
LEFT INTERNAL DIMENSION IN SYSTOLE: 2.05 CM (ref 2.1–4)
LEFT VENTRICLE DIASTOLIC VOLUME INDEX: 28.48 ML/M2
LEFT VENTRICLE DIASTOLIC VOLUME: 48.7 ML
LEFT VENTRICLE END SYSTOLIC VOLUME APICAL 2 CHAMBER: 33.03 ML
LEFT VENTRICLE END SYSTOLIC VOLUME APICAL 4 CHAMBER: 40.62 ML
LEFT VENTRICLE MASS INDEX: 69 G/M2
LEFT VENTRICLE SYSTOLIC VOLUME INDEX: 7.9 ML/M2
LEFT VENTRICLE SYSTOLIC VOLUME: 13.52 ML
LEFT VENTRICULAR INTERNAL DIMENSION IN DIASTOLE: 3.44 CM (ref 3.5–6)
LEFT VENTRICULAR MASS: 118.39 G
LEFT VERTEBRAL DIAS: 11 CM/S
LEFT VERTEBRAL SYS: 61 CM/S
LV LATERAL E/E' RATIO: 5.42 M/S
LV SEPTAL E/E' RATIO: 8.13 M/S
LVED V (TEICH): 48.7 ML
LVES V (TEICH): 13.52 ML
LVOT MG: 2.5 MMHG
LVOT MV: 0.73 CM/S
MV PEAK A VEL: 0.75 M/S
MV PEAK E VEL: 0.65 M/S
MV STENOSIS PRESSURE HALF TIME: 62.57 MS
MV VALVE AREA P 1/2 METHOD: 3.52 CM2
NUC REST EJECTION FRACTION: 70
NUC STRESS EJECTION FRACTION: 70 %
OHS CV CAROTID RIGHT ICA EDV HIGHEST: 38
OHS CV CAROTID ULTRASOUND LEFT ICA/CCA RATIO: 1.07
OHS CV CAROTID ULTRASOUND RIGHT ICA/CCA RATIO: 1.96
OHS CV CPX 85 PERCENT MAX PREDICTED HEART RATE MALE: 117
OHS CV CPX ESTIMATED METS: 1
OHS CV CPX MAX PREDICTED HEART RATE: 138
OHS CV CPX PATIENT IS FEMALE: 1
OHS CV CPX PATIENT IS MALE: 0
OHS CV CPX PEAK DIASTOLIC BLOOD PRESSURE: 60 MMHG
OHS CV CPX PEAK HEAR RATE: 79 BPM
OHS CV CPX PEAK RATE PRESSURE PRODUCT: NORMAL
OHS CV CPX PEAK SYSTOLIC BLOOD PRESSURE: 154 MMHG
OHS CV CPX PERCENT MAX PREDICTED HEART RATE ACHIEVED: 59
OHS CV CPX RATE PRESSURE PRODUCT PRESENTING: NORMAL
OHS CV PV CAROTID LEFT HIGHEST CCA: 86
OHS CV PV CAROTID LEFT HIGHEST ICA: 90
OHS CV PV CAROTID RIGHT HIGHEST CCA: 76
OHS CV PV CAROTID RIGHT HIGHEST ICA: 131
OHS CV RV/LV RATIO: 0.67 CM
OHS CV US CAROTID LEFT HIGHEST EDV: 18
PISA TR MAX VEL: 2.93 M/S
PV MEAN GRADIENT: 1 MMHG
PV PEAK GRADIENT: 3 MMHG
PV PEAK VELOCITY: 0.83 M/S
RA MAJOR: 3.9 CM
RA PRESSURE ESTIMATED: 3 MMHG
RA WIDTH: 2.99 CM
RIGHT ARM DIASTOLIC BLOOD PRESSURE: 65 MMHG
RIGHT ARM SYSTOLIC BLOOD PRESSURE: 147 MMHG
RIGHT CBA DIAS: 12 CM/S
RIGHT CBA SYS: 67 CM/S
RIGHT CCA DIST DIAS: 12 CM/S
RIGHT CCA DIST SYS: 67 CM/S
RIGHT CCA MID DIAS: 8 CM/S
RIGHT CCA MID SYS: 76 CM/S
RIGHT CCA PROX DIAS: 9 CM/S
RIGHT CCA PROX SYS: 63 CM/S
RIGHT ECA DIAS: 12 CM/S
RIGHT ECA SYS: 282 CM/S
RIGHT ICA DIST DIAS: 38 CM/S
RIGHT ICA DIST SYS: 131 CM/S
RIGHT ICA MID DIAS: 18 CM/S
RIGHT ICA MID SYS: 85 CM/S
RIGHT ICA PROX DIAS: 14 CM/S
RIGHT ICA PROX SYS: 68 CM/S
RIGHT VENTRICULAR END-DIASTOLIC DIMENSION: 2.32 CM
RIGHT VERTEBRAL DIAS: 56 CM/S
RIGHT VERTEBRAL SYS: 101 CM/S
RV TB RVSP: 6 MMHG
SINUS: 3.43 CM
STJ: 2.85 CM
SYSTOLIC BLOOD PRESSURE: 150 MMHG
TDI LATERAL: 0.12 M/S
TDI SEPTAL: 0.08 M/S
TDI: 0.1 M/S
TR MAX PG: 34 MMHG
TRICUSPID ANNULAR PLANE SYSTOLIC EXCURSION: 1.81 CM
TV REST PULMONARY ARTERY PRESSURE: 37 MMHG
Z-SCORE OF LEFT VENTRICULAR DIMENSION IN END DIASTOLE: -3.22
Z-SCORE OF LEFT VENTRICULAR DIMENSION IN END SYSTOLE: -2.85

## 2024-08-27 PROCEDURE — 93880 EXTRACRANIAL BILAT STUDY: CPT

## 2024-08-27 PROCEDURE — 93017 CV STRESS TEST TRACING ONLY: CPT

## 2024-08-27 PROCEDURE — 93306 TTE W/DOPPLER COMPLETE: CPT | Mod: 26,,, | Performed by: INTERNAL MEDICINE

## 2024-08-27 PROCEDURE — 78452 HT MUSCLE IMAGE SPECT MULT: CPT

## 2024-08-27 PROCEDURE — 93306 TTE W/DOPPLER COMPLETE: CPT

## 2024-08-27 PROCEDURE — 63600175 PHARM REV CODE 636 W HCPCS: Performed by: INTERNAL MEDICINE

## 2024-08-27 PROCEDURE — A9502 TC99M TETROFOSMIN: HCPCS | Performed by: INTERNAL MEDICINE

## 2024-08-27 RX ORDER — REGADENOSON 0.08 MG/ML
0.4 INJECTION, SOLUTION INTRAVENOUS ONCE
Status: COMPLETED | OUTPATIENT
Start: 2024-08-27 | End: 2024-08-27

## 2024-08-27 RX ADMIN — TETROFOSMIN 31 MILLICURIE: 1.38 INJECTION, POWDER, LYOPHILIZED, FOR SOLUTION INTRAVENOUS at 02:08

## 2024-08-27 RX ADMIN — TETROFOSMIN 10.2 MILLICURIE: 1.38 INJECTION, POWDER, LYOPHILIZED, FOR SOLUTION INTRAVENOUS at 12:08

## 2024-08-27 RX ADMIN — REGADENOSON 0.4 MG: 0.08 INJECTION, SOLUTION INTRAVENOUS at 02:08

## 2024-09-13 ENCOUNTER — OFFICE VISIT (OUTPATIENT)
Dept: ALLERGY | Facility: CLINIC | Age: 84
End: 2024-09-13
Payer: MEDICARE

## 2024-09-13 VITALS
DIASTOLIC BLOOD PRESSURE: 65 MMHG | WEIGHT: 147.94 LBS | SYSTOLIC BLOOD PRESSURE: 130 MMHG | HEIGHT: 65 IN | TEMPERATURE: 98 F | HEART RATE: 75 BPM | OXYGEN SATURATION: 96 % | BODY MASS INDEX: 24.65 KG/M2

## 2024-09-13 DIAGNOSIS — J30.89 ALLERGIC RHINITIS DUE TO MOLD: ICD-10-CM

## 2024-09-13 DIAGNOSIS — J30.81 ALLERGIC RHINITIS DUE TO ANIMAL DANDER: ICD-10-CM

## 2024-09-13 DIAGNOSIS — J30.1 SEASONAL ALLERGIC RHINITIS DUE TO POLLEN: Primary | ICD-10-CM

## 2024-09-13 DIAGNOSIS — J30.89 ALLERGIC RHINITIS DUE TO DUST MITE: ICD-10-CM

## 2024-09-13 DIAGNOSIS — R09.82 PND (POST-NASAL DRIP): ICD-10-CM

## 2024-09-13 DIAGNOSIS — Z91.038 ALLERGY TO COCKROACHES: ICD-10-CM

## 2024-09-13 PROCEDURE — 99999 PR PBB SHADOW E&M-EST. PATIENT-LVL V: CPT | Mod: PBBFAC,,, | Performed by: STUDENT IN AN ORGANIZED HEALTH CARE EDUCATION/TRAINING PROGRAM

## 2024-09-13 RX ORDER — IPRATROPIUM BROMIDE 42 UG/1
2 SPRAY, METERED NASAL 4 TIMES DAILY
Qty: 15 ML | Refills: 11 | Status: SHIPPED | OUTPATIENT
Start: 2024-09-13 | End: 2025-09-13

## 2024-09-13 NOTE — PROGRESS NOTES
"Allergy and Immunology  Established Patient Clinic Note    Date: 9/13/2024  Chief Complaint   Patient presents with    Follow-up     History  Cailin Pickett is a 84 y.o. female being seen for follow-up today.    Allergic Rhinitis due to cat, dog, dust mites, mold, and tree/grass/weed pollen  Post Nasal Drip   - No controlled at this time   - Ordered Ipratropium monotherapy   - Pt did not like Flonase and Astelin   - Declined AIT   - Continue oral antihistamines     Allergies, PMH, PSH, Social, and Family History were reviewed.    Current Outpatient Medications on File Prior to Visit   Medication Sig Dispense Refill    allopurinoL (ZYLOPRIM) 100 MG tablet Take 1 tablet (100 mg total) by mouth once daily. 90 tablet 3    amitriptyline (ELAVIL) 10 MG tablet Take 1 tablet (10 mg total) by mouth every evening. 90 tablet 3    atorvastatin (LIPITOR) 40 MG tablet Take 1 tablet (40 mg total) by mouth once daily. 90 tablet 3    AURYXIA 210 mg iron Tab       BD ULTRA-FINE MICRO PEN NEEDLE 32 gauge x 1/4" Ndle       benzonatate (TESSALON) 100 MG capsule Take 200 mg by mouth.      blood sugar diagnostic Strp USE TO MONITOR BLOOD GLUCOSE DAILY      co-enzyme Q-10 30 mg capsule Take 1 capsule (30 mg total) by mouth 2 (two) times daily. 180 capsule 3    COVID mus52-48,12up,,andu,,PF, (SPIKEVAX 2922-0663,12Y UP,,PF,) 50 mcg/0.5 mL injection Inject into the muscle. 0.5 mL 0    docusate sodium (COLACE) 50 MG capsule Take by mouth.      ezetimibe (ZETIA) 10 mg tablet TAKE 1 TABLET DAILY 90 tablet 0    gabapentin (NEURONTIN) 600 MG tablet Take 1 tablet (600 mg total) by mouth once daily. Daily at 05:00 PM 90 tablet 1    glucosamine-chondroitin 250-200 mg Tab Take 1 tablet by mouth 2 (two) times daily.      insulin degludec (TRESIBA FLEXTOUCH U-100) 100 unit/mL (3 mL) InPn Inject 8 Units into the skin.       ONETOUCH DELICA PLUS LANCET 30 gauge Misc 2 (two) times daily. Use to check blood glucose      pen needle, diabetic 32 gauge x " "1/4" Ndle Use to test blood sugars bid      PRORENAL 8 mg iron-800 mcg-1,000 unit Tab Take 1 tablet by mouth once daily.      RENAPLEX-D 800 mcg-12.5 mg -2,000 unit Tab Take 1 tablet by mouth once daily.      sevelamer carbonate (RENVELA) 800 mg Tab Take 2 tablets by mouth.      traMADoL (ULTRAM) 50 mg tablet Take 1 tablet (50 mg total) by mouth every 12 (twelve) hours. 30 tablet 0    VELPHORO 500 mg Chew Take 1 tablet by mouth 3 (three) times daily.      vit B,C-FA-zinc-selen-vit D3-E (RENAPLEX-D) 800 mcg-12.5 mg -2,000 unit Tab Take 1 tablet by mouth.      vitamin renal formula, B-complex-vitamin c-folic acid, (NEPHROCAP) 1 mg Cap Take 1 capsule by mouth once daily. 30 capsule 0    [DISCONTINUED] azelastine (ASTELIN) 137 mcg (0.1 %) nasal spray 1 spray (137 mcg total) by Nasal route 2 (two) times daily. 30 mL 11    [DISCONTINUED] azelastine (ASTELIN) 137 mcg (0.1 %) nasal spray 1 spray (137 mcg total) by Nasal route 2 (two) times daily. 30 mL 11    [DISCONTINUED] fluticasone propionate (FLONASE) 50 mcg/actuation nasal spray 1 spray (50 mcg total) by Each Nostril route 2 (two) times a day. 16 g 11    [DISCONTINUED] fluticasone propionate (FLONASE) 50 mcg/actuation nasal spray 1 spray (50 mcg total) by Each Nostril route 2 (two) times a day. 16 g 11    fluocinolone (DERMA-SMOOTHE) 0.01 % external oil Apply oil to scalp once a day 1 Bottle 5    omeprazole (PRILOSEC) 40 MG capsule Take 1 capsule (40 mg total) by mouth once daily. 30 capsule 11     No current facility-administered medications on file prior to visit.     Physical Examination  Vitals:    09/13/24 1100   BP: 130/65   Pulse: 75   Temp: 97.8 °F (36.6 °C)     GENERAL:  female in no apparent distress and well developed and well nourished  HEAD:  Normocephalic, without obvious abnormality, atraumatic  EYES: sclera anicteric, conjunctiva normochromic  EARS: normal TM's and external ear canals both ears  NOSE: without erythema or discharge, clear discharge, " turbinates normal    OROPHARYNX: moist mucous membranes without erythema, exudates or petechiae  LYMPH NODES: normal, supple, no lymphadenopathy  LUNGS: clear to auscultation, no wheezes, rales or rhonchi, symmetric air entry.  HEART: normal rate, regular rhythm, normal S1, S2, no murmurs, rubs, clicks or gallops.  ABDOMEN: soft, nontender, nondistended, no masses or organomegaly.  MUSCULOSKELETAL: no gross joint deformity or swelling.  NEURO: alert, oriented, normal speech, no focal findings or movement disorder noted.  SKIN: normal coloration and turgor, no rashes, no suspicious skin lesions noted.     Assessment/Plan:   Problem List Items Addressed This Visit          ENT    PND (post-nasal drip)    Seasonal allergic rhinitis due to pollen - Primary    Overview     11/02/2023: Serum IgE to Aeroallergens positive to cat, dog, dust mites, cockroach, mold, and tree/grass/weed pollen         Current Assessment & Plan     - No controlled at this time   - Ordered Ipratropium monotherapy   - Pt did not like Flonase and Astelin   - Declined AIT   - Continue oral antihistamines  - Will continue to monitor and reassess          Allergic rhinitis due to dust mite    Overview     11/02/2023: Serum IgE to Aeroallergens positive to cat, dog, dust mites, cockroach, mold, and tree/grass/weed pollen         Allergic rhinitis due to animal dander    Overview     11/02/2023: Serum IgE to Aeroallergens positive to cat, dog, dust mites, cockroach, mold, and tree/grass/weed pollen         Allergic rhinitis due to mold    Overview     11/02/2023: Serum IgE to Aeroallergens positive to cat, dog, dust mites, cockroach, mold, and tree/grass/weed pollen            Other    Allergy to cockroaches    Overview     11/02/2023: Serum IgE to Aeroallergens positive to cat, dog, dust mites, cockroach, mold, and tree/grass/weed pollen          Follow up:  Follow up in about 5 months (around 2/13/2025).    Kesler Bourgoyne, MD [facility]  [facility]

## 2024-10-01 ENCOUNTER — HOSPITAL ENCOUNTER (OUTPATIENT)
Dept: RADIOLOGY | Facility: HOSPITAL | Age: 84
Discharge: HOME | End: 2024-10-01
Attending: OBSTETRICS & GYNECOLOGY
Payer: MEDICARE

## 2024-10-01 ENCOUNTER — OFFICE VISIT (OUTPATIENT)
Dept: OBSTETRICS AND GYNECOLOGY | Facility: CLINIC | Age: 84
End: 2024-10-01
Payer: MEDICARE

## 2024-10-01 VITALS
HEIGHT: 65 IN | DIASTOLIC BLOOD PRESSURE: 60 MMHG | WEIGHT: 147.69 LBS | BODY MASS INDEX: 24.61 KG/M2 | SYSTOLIC BLOOD PRESSURE: 122 MMHG

## 2024-10-01 DIAGNOSIS — Z12.31 ENCOUNTER FOR SCREENING MAMMOGRAM FOR BREAST CANCER: ICD-10-CM

## 2024-10-01 DIAGNOSIS — Z01.419 WELL WOMAN EXAM WITH ROUTINE GYNECOLOGICAL EXAM: Primary | ICD-10-CM

## 2024-10-01 PROCEDURE — 1159F MED LIST DOCD IN RCRD: CPT | Mod: CPTII,S$GLB,, | Performed by: OBSTETRICS & GYNECOLOGY

## 2024-10-01 PROCEDURE — 1157F ADVNC CARE PLAN IN RCRD: CPT | Mod: CPTII,S$GLB,, | Performed by: OBSTETRICS & GYNECOLOGY

## 2024-10-01 PROCEDURE — G0101 CA SCREEN;PELVIC/BREAST EXAM: HCPCS | Mod: S$GLB,,, | Performed by: OBSTETRICS & GYNECOLOGY

## 2024-10-01 PROCEDURE — 3288F FALL RISK ASSESSMENT DOCD: CPT | Mod: CPTII,S$GLB,, | Performed by: OBSTETRICS & GYNECOLOGY

## 2024-10-01 PROCEDURE — 3078F DIAST BP <80 MM HG: CPT | Mod: CPTII,S$GLB,, | Performed by: OBSTETRICS & GYNECOLOGY

## 2024-10-01 PROCEDURE — 1101F PT FALLS ASSESS-DOCD LE1/YR: CPT | Mod: CPTII,S$GLB,, | Performed by: OBSTETRICS & GYNECOLOGY

## 2024-10-01 PROCEDURE — 99999 PR PBB SHADOW E&M-EST. PATIENT-LVL III: CPT | Mod: PBBFAC,,, | Performed by: OBSTETRICS & GYNECOLOGY

## 2024-10-01 PROCEDURE — 77063 BREAST TOMOSYNTHESIS BI: CPT | Mod: 26,,, | Performed by: RADIOLOGY

## 2024-10-01 PROCEDURE — 1126F AMNT PAIN NOTED NONE PRSNT: CPT | Mod: CPTII,S$GLB,, | Performed by: OBSTETRICS & GYNECOLOGY

## 2024-10-01 PROCEDURE — 77067 SCR MAMMO BI INCL CAD: CPT | Mod: 26,,, | Performed by: RADIOLOGY

## 2024-10-01 PROCEDURE — 77067 SCR MAMMO BI INCL CAD: CPT | Mod: TC

## 2024-10-01 PROCEDURE — 3074F SYST BP LT 130 MM HG: CPT | Mod: CPTII,S$GLB,, | Performed by: OBSTETRICS & GYNECOLOGY

## 2024-10-01 RX ORDER — SODIUM ZIRCONIUM CYCLOSILICATE 10 G/10G
1 POWDER, FOR SUSPENSION ORAL
COMMUNITY
Start: 2024-08-22

## 2024-10-01 RX ORDER — CETIRIZINE HYDROCHLORIDE 10 MG/1
1 TABLET ORAL
COMMUNITY
Start: 2024-07-05

## 2024-10-01 RX ORDER — PEN NEEDLE, DIABETIC 32GX 5/32"
NEEDLE, DISPOSABLE MISCELLANEOUS
COMMUNITY
Start: 2024-08-10

## 2024-10-01 NOTE — PROGRESS NOTES
Subjective     Patient ID: Cailin Pickett is a 84 y.o. female.    Chief Complaint:  Well Woman      History of Present Illness  HPI  Presents for well-woman exam.  Pt had hysterectomy years ago for benign indications.  Still has both ovaries.  Not on HRT.  Not sexually active.  Denies any pelvic pain, vaginal bleeding, discharge, or odor.  MMG: did screening MMG today      GYN & OB History  Patient's last menstrual period was 1982 (exact date).   Date of Last Pap: No result found    OB History    Para Term  AB Living   3 0 0 0 3 0   SAB IAB Ectopic Multiple Live Births   3 0 0 0 0      # Outcome Date GA Lbr Gaudencio/2nd Weight Sex Type Anes PTL Lv   3 SAB            2 SAB            1 SAB                Review of Systems  Review of Systems       Objective   Physical Exam:   Constitutional: She is oriented to person, place, and time. She appears well-developed and well-nourished. No distress.      Neck: No thyromegaly present.     Pulmonary/Chest: Right breast exhibits no inverted nipple, no mass, no nipple discharge, no skin change, no tenderness, no bleeding and no swelling. Left breast exhibits no inverted nipple, no mass, no nipple discharge, no skin change, no tenderness, no bleeding and no swelling. Breasts are symmetrical.        Abdominal: Soft. She exhibits abdominal incision (midline upper abdominal laparotomy scar). She exhibits no distension and no mass. There is no abdominal tenderness. There is no rebound and no guarding. Hernia confirmed negative in the right inguinal area and confirmed negative in the left inguinal area.     Genitourinary:    Vagina normal.      Pelvic exam was performed with patient supine.   There is no rash, tenderness, lesion or injury on the right labia. There is no rash, tenderness, lesion or injury on the left labia. Right adnexum displays no mass, no tenderness and no fullness. Left adnexum displays no mass, no tenderness and no fullness. No erythema,  vaginal discharge, tenderness, bleeding, rectocele, cystocele or prolapse of vaginal walls in the vagina.    No foreign body in the vagina.      No signs of injury in the vagina.   Vaginal atrophy noted. Cervix is absent.Uterus is absent.               Neurological: She is alert and oriented to person, place, and time.     Psychiatric: She has a normal mood and affect.            Assessment and Plan     1. Well woman exam with routine gynecological exam             Plan:  Cailin was seen today for well woman.    Diagnoses and all orders for this visit:    Well woman exam with routine gynecological exam      Pt no longer needs pap smears.  Needs a pelvic exam q 2 years.  Discussed option to stop screening MMG's after 78 y/o, but okay to continue MMG's if pt would receive treatment for cancer if one were found.  States she plans to continue MMG's, especially given her family hx of breast cancer in her mom and 2 sisters.  Will likely space them out to q 2 years.  RTC 2 years.

## 2024-10-17 ENCOUNTER — OFFICE VISIT (OUTPATIENT)
Dept: ORTHOPEDICS | Facility: CLINIC | Age: 84
End: 2024-10-17
Payer: MEDICARE

## 2024-10-17 VITALS — BODY MASS INDEX: 24.61 KG/M2 | WEIGHT: 147.69 LBS | HEIGHT: 65 IN

## 2024-10-17 DIAGNOSIS — Z96.652 HISTORY OF TOTAL LEFT KNEE REPLACEMENT: ICD-10-CM

## 2024-10-17 DIAGNOSIS — M17.11 ARTHRITIS OF KNEE, RIGHT: Primary | ICD-10-CM

## 2024-10-17 DIAGNOSIS — M21.061 ACQUIRED VALGUS DEFORMITY KNEE, RIGHT: ICD-10-CM

## 2024-10-17 PROCEDURE — 99999 PR PBB SHADOW E&M-EST. PATIENT-LVL III: CPT | Mod: PBBFAC,,, | Performed by: ORTHOPAEDIC SURGERY

## 2024-10-17 RX ORDER — METHYLPREDNISOLONE ACETATE 80 MG/ML
80 INJECTION, SUSPENSION INTRA-ARTICULAR; INTRALESIONAL; INTRAMUSCULAR; SOFT TISSUE
Status: DISCONTINUED | OUTPATIENT
Start: 2024-10-17 | End: 2024-10-17 | Stop reason: HOSPADM

## 2024-10-17 RX ADMIN — METHYLPREDNISOLONE ACETATE 80 MG: 80 INJECTION, SUSPENSION INTRA-ARTICULAR; INTRALESIONAL; INTRAMUSCULAR; SOFT TISSUE at 10:10
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
## 2024-10-17 NOTE — PROCEDURES
Large Joint Aspiration/Injection: R knee    Date/Time: 10/17/2024 10:00 AM    Performed by: Alejandro De Dios MD  Authorized by: Alejandro De Dios MD    Consent Done?:  Yes (Verbal)  Indications:  Arthritis  Site marked: the procedure site was marked    Timeout: prior to procedure the correct patient, procedure, and site was verified      Local anesthesia used?: Yes    Local anesthetic:  Lidocaine 1% without epinephrine    Details:  Needle Size:  22 G  Ultrasonic Guidance for needle placement?: No    Approach:  Anterolateral  Location:  Knee  Site:  R knee  Medications:  80 mg methylPREDNISolone acetate 80 mg/mL  Patient tolerance:  Patient tolerated the procedure well with no immediate complications

## 2024-10-17 NOTE — PATIENT INSTRUCTIONS
Twisted her knee around the refrigerator and swelled up on you in hurt   Proceeded today injecting 80 mg Depo-Medrol mixed with 5 cc 1% lidocaine 10/17/2024  Will get her insurance to approve you for rooster comb gel.  Rooster comb gel takes roughly 3- 4 weeks to kick in however if it helps you can have it repeated once every 6 months  You can always get steroid injection once every 3 months if needed  Tylenol Arthritis maximum does is 1 tablet 3 times a day only if you hurting  Continue with the independent exercises now that you have finish the physical therapy we send you to  I will see you back in 3 months

## 2024-10-18 DIAGNOSIS — L29.9 ITCHING: ICD-10-CM

## 2024-10-18 DIAGNOSIS — G62.9 POLYNEUROPATHY: ICD-10-CM

## 2024-10-18 RX ORDER — GABAPENTIN 600 MG/1
600 TABLET ORAL DAILY
Qty: 90 TABLET | Refills: 1 | Status: SHIPPED | OUTPATIENT
Start: 2024-10-18 | End: 2025-10-18

## 2024-10-18 NOTE — TELEPHONE ENCOUNTER
----- Message from Loretta sent at 10/18/2024  3:24 PM CDT -----  Contact: Cailin  Type:  Patient Requesting a call back     Who Called:Cailin  What is the call back request regarding?:Requesting a call back from the nurse regarding gabeptin 600mg  Would the patient rather a call back or a response via [facility]?call  Best Call Back Number:[phone]   Additional Information:

## 2024-12-30 ENCOUNTER — LAB VISIT (OUTPATIENT)
Dept: LAB | Facility: HOSPITAL | Age: 84
End: 2024-12-30
Attending: INTERNAL MEDICINE
Payer: MEDICARE

## 2024-12-30 DIAGNOSIS — Z85.07 HISTORY OF PANCREATIC CANCER: ICD-10-CM

## 2024-12-30 LAB
ALBUMIN SERPL BCP-MCNC: 3.5 G/DL (ref 3.5–5.2)
ALP SERPL-CCNC: 121 U/L (ref 40–150)
ALT SERPL W/O P-5'-P-CCNC: 11 U/L (ref 10–44)
ANION GAP SERPL CALC-SCNC: 14 MMOL/L (ref 8–16)
AST SERPL-CCNC: 19 U/L (ref 10–40)
BASOPHILS # BLD AUTO: 0.06 K/UL (ref 0–0.2)
BASOPHILS NFR BLD: 1.6 % (ref 0–1.9)
BILIRUB SERPL-MCNC: 0.4 MG/DL (ref 0.1–1)
BUN SERPL-MCNC: 42 MG/DL (ref 8–23)
CALCIUM SERPL-MCNC: 9.8 MG/DL (ref 8.7–10.5)
CHLORIDE SERPL-SCNC: 102 MMOL/L (ref 95–110)
CO2 SERPL-SCNC: 24 MMOL/L (ref 23–29)
CREAT SERPL-MCNC: 7.3 MG/DL (ref 0.5–1.4)
DIFFERENTIAL METHOD BLD: ABNORMAL
EOSINOPHIL # BLD AUTO: 0.1 K/UL (ref 0–0.5)
EOSINOPHIL NFR BLD: 2.6 % (ref 0–8)
ERYTHROCYTE [DISTWIDTH] IN BLOOD BY AUTOMATED COUNT: 16.5 % (ref 11.5–14.5)
EST. GFR  (NO RACE VARIABLE): 5 ML/MIN/1.73 M^2
GLUCOSE SERPL-MCNC: 150 MG/DL (ref 70–110)
HCT VFR BLD AUTO: 40.2 % (ref 37–48.5)
HGB BLD-MCNC: 12.2 G/DL (ref 12–16)
IMM GRANULOCYTES # BLD AUTO: 0 K/UL (ref 0–0.04)
IMM GRANULOCYTES NFR BLD AUTO: 0 % (ref 0–0.5)
LYMPHOCYTES # BLD AUTO: 1.3 K/UL (ref 1–4.8)
LYMPHOCYTES NFR BLD: 33.1 % (ref 18–48)
MAGNESIUM SERPL-MCNC: 2.5 MG/DL (ref 1.6–2.6)
MCH RBC QN AUTO: 30.4 PG (ref 27–31)
MCHC RBC AUTO-ENTMCNC: 30.3 G/DL (ref 32–36)
MCV RBC AUTO: 100 FL (ref 82–98)
MONOCYTES # BLD AUTO: 0.8 K/UL (ref 0.3–1)
MONOCYTES NFR BLD: 20.2 % (ref 4–15)
NEUTROPHILS # BLD AUTO: 1.7 K/UL (ref 1.8–7.7)
NEUTROPHILS NFR BLD: 42.5 % (ref 38–73)
NRBC BLD-RTO: 1 /100 WBC
PHOSPHATE SERPL-MCNC: 3.5 MG/DL (ref 2.7–4.5)
PLATELET # BLD AUTO: 203 K/UL (ref 150–450)
PMV BLD AUTO: 10.7 FL (ref 9.2–12.9)
POTASSIUM SERPL-SCNC: 4.6 MMOL/L (ref 3.5–5.1)
PROT SERPL-MCNC: 7.2 G/DL (ref 6–8.4)
RBC # BLD AUTO: 4.01 M/UL (ref 4–5.4)
SODIUM SERPL-SCNC: 140 MMOL/L (ref 136–145)
WBC # BLD AUTO: 3.87 K/UL (ref 3.9–12.7)

## 2024-12-30 PROCEDURE — 83735 ASSAY OF MAGNESIUM: CPT | Performed by: INTERNAL MEDICINE

## 2024-12-30 PROCEDURE — 80053 COMPREHEN METABOLIC PANEL: CPT | Performed by: INTERNAL MEDICINE

## 2024-12-30 PROCEDURE — 36415 COLL VENOUS BLD VENIPUNCTURE: CPT | Performed by: INTERNAL MEDICINE

## 2024-12-30 PROCEDURE — 86301 IMMUNOASSAY TUMOR CA 19-9: CPT | Performed by: INTERNAL MEDICINE

## 2024-12-30 PROCEDURE — 84100 ASSAY OF PHOSPHORUS: CPT | Performed by: INTERNAL MEDICINE

## 2024-12-30 PROCEDURE — 85025 COMPLETE CBC W/AUTO DIFF WBC: CPT | Performed by: INTERNAL MEDICINE

## 2025-02-04 ENCOUNTER — TELEPHONE (OUTPATIENT)
Dept: NEUROLOGY | Facility: CLINIC | Age: 85
End: 2025-02-04
Payer: MEDICARE

## 2025-02-04 NOTE — TELEPHONE ENCOUNTER
----- Message from Liquid X sent at 2/4/2025 10:43 AM CST -----  Contact: ERIK DEL RIO [[id number]]  ..Type:  Patient Requesting Call    Who Called:ERIK DEL RIO [[id number]]  Would the patient rather a call back or a response via [facility]? Call  Best Call Back Number:.[phone] (home) and/ or [phone]  Additional Information: Patient called to schedule an appointment with provider.

## 2025-02-11 ENCOUNTER — TELEPHONE (OUTPATIENT)
Dept: ORTHOPEDICS | Facility: CLINIC | Age: 85
End: 2025-02-11
Payer: MEDICARE

## 2025-02-11 NOTE — TELEPHONE ENCOUNTER
Called the patient in regards to their appointment on Thursday 2/13/25 with Dr. De Dios. Left a message letting the patient know that I need to reschedule their appointment due to the fact that Dr. De Dios is on trauma call for the hospital. Asked the patient to give the office a call back to reschedule.

## 2025-02-11 NOTE — TELEPHONE ENCOUNTER
----- Message from Nurse Pope sent at 2/11/2025  1:37 PM CST -----  Contact: Self/ [phone]    ----- Message -----  From: Aracelis Saucedo  Sent: 2/11/2025   1:34 PM CST  To: [facility]    Patient is returning a phone call.    Who left a message for the patient:     Does patient know what this is regarding:      Would you like a call back, or a response through your [facility] portal?:   Call back     Comments:

## 2025-02-11 NOTE — TELEPHONE ENCOUNTER
Returned the patient's phone call in regards to their message. Informed the patient that I need to reschedule their appointment that is schedule for this Thursday 2/13/25 with Dr. De Dios. Due to the fact that Dr. De Dios is on trauma call for the hospital. Offered the patient to see Dr. De Dios's PA on Friday 2/14/25. Patient states that they can not come on Friday's. I got the patient reschedule for Thursday 2/27/25 at 9:00 with Mrs. Thelma Butts PA-C. Patient verbalized understanding.

## 2025-02-13 ENCOUNTER — OFFICE VISIT (OUTPATIENT)
Dept: ALLERGY | Facility: CLINIC | Age: 85
End: 2025-02-13
Payer: MEDICARE

## 2025-02-13 VITALS
HEART RATE: 79 BPM | TEMPERATURE: 98 F | WEIGHT: 146.81 LBS | HEIGHT: 65 IN | SYSTOLIC BLOOD PRESSURE: 155 MMHG | BODY MASS INDEX: 24.46 KG/M2 | DIASTOLIC BLOOD PRESSURE: 74 MMHG | OXYGEN SATURATION: 98 %

## 2025-02-13 DIAGNOSIS — J30.89 ALLERGIC RHINITIS DUE TO MOLD: ICD-10-CM

## 2025-02-13 DIAGNOSIS — J30.81 ALLERGIC RHINITIS DUE TO ANIMAL DANDER: ICD-10-CM

## 2025-02-13 DIAGNOSIS — Z91.038 ALLERGY TO COCKROACHES: ICD-10-CM

## 2025-02-13 DIAGNOSIS — J30.1 SEASONAL ALLERGIC RHINITIS DUE TO POLLEN: Primary | ICD-10-CM

## 2025-02-13 DIAGNOSIS — J30.89 ALLERGIC RHINITIS DUE TO DUST MITE: ICD-10-CM

## 2025-02-13 DIAGNOSIS — R09.82 PND (POST-NASAL DRIP): ICD-10-CM

## 2025-02-13 RX ORDER — MONTELUKAST SODIUM 10 MG/1
10 TABLET ORAL NIGHTLY
Qty: 90 TABLET | Refills: 3 | Status: SHIPPED | OUTPATIENT
Start: 2025-02-13 | End: 2025-02-13

## 2025-02-13 RX ORDER — IPRATROPIUM BROMIDE 42 UG/1
2 SPRAY, METERED NASAL 4 TIMES DAILY
Qty: 15 ML | Refills: 11 | Status: SHIPPED | OUTPATIENT
Start: 2025-02-13 | End: 2025-02-13

## 2025-02-13 RX ORDER — IPRATROPIUM BROMIDE 42 UG/1
2 SPRAY, METERED NASAL 4 TIMES DAILY
Qty: 15 ML | Refills: 11 | Status: SHIPPED | OUTPATIENT
Start: 2025-02-13 | End: 2026-02-13

## 2025-02-13 RX ORDER — MONTELUKAST SODIUM 10 MG/1
10 TABLET ORAL NIGHTLY
Qty: 90 TABLET | Refills: 3 | Status: SHIPPED | OUTPATIENT
Start: 2025-02-13 | End: 2026-02-13

## 2025-02-13 NOTE — PROGRESS NOTES
Allergy and Immunology  Established Patient Clinic Note    Date: 2/16/2025  Chief Complaint   Patient presents with    Follow-up     History  Cailin Pickett is a 84 y.o. female being seen for follow-up today.    Allergic Rhinitis due to cat, dog, dust mites, mold, and tree/grass/weed pollen  Post Nasal Drip   - No controlled at this time   - Ordered Ipratropium monotherapy   - Pt did not like Flonase and Astelin   - Declined AIT   - Adding Montelukast 10 mg nightly     Allergies, PMH, PSH, Social, and Family History were reviewed.    Medications Ordered Prior to Encounter[1]    Physical Examination  Vitals:    02/13/25 1123   BP: (!) 155/74   Pulse: 79   Temp: 97.7 °F (36.5 °C)     GENERAL:  female in no apparent distress and well developed and well nourished  HEAD:  Normocephalic, without obvious abnormality, atraumatic  EYES: sclera anicteric, conjunctiva normochromic  EARS: normal TM's and external ear canals both ears  NOSE: without erythema or discharge, clear discharge, turbinates normal    OROPHARYNX: moist mucous membranes without erythema, exudates or petechiae  LYMPH NODES: normal, supple, no lymphadenopathy  LUNGS: clear to auscultation, no wheezes, rales or rhonchi, symmetric air entry.  HEART: normal rate, regular rhythm, normal S1, S2, no murmurs, rubs, clicks or gallops.  ABDOMEN: soft, nontender, nondistended, no masses or organomegaly.  MUSCULOSKELETAL: no gross joint deformity or swelling.  NEURO: alert, oriented, normal speech, no focal findings or movement disorder noted.  SKIN: normal coloration and turgor, no rashes, no suspicious skin lesions noted.     Assessment/Plan:   Problem List Items Addressed This Visit       PND (post-nasal drip)    Seasonal allergic rhinitis due to pollen - Primary    Overview   11/02/2023: Serum IgE to Aeroallergens positive to cat, dog, dust mites, cockroach, mold, and tree/grass/weed pollen         Allergic rhinitis due to dust mite    Overview   11/02/2023:  "Serum IgE to Aeroallergens positive to cat, dog, dust mites, cockroach, mold, and tree/grass/weed pollen         Allergic rhinitis due to animal dander    Overview   11/02/2023: Serum IgE to Aeroallergens positive to cat, dog, dust mites, cockroach, mold, and tree/grass/weed pollen         Allergic rhinitis due to mold    Overview   11/02/2023: Serum IgE to Aeroallergens positive to cat, dog, dust mites, cockroach, mold, and tree/grass/weed pollen         Allergy to cockroaches    Overview   11/02/2023: Serum IgE to Aeroallergens positive to cat, dog, dust mites, cockroach, mold, and tree/grass/weed pollen          - No controlled at this time   - Ordered Ipratropium monotherapy   - Pt did not like Flonase and Astelin   - Declined AIT   - Adding Montelukast 10 mg nightly     Follow up:  Follow up in about 4 months (around 6/13/2025).    Basilio Reilly MD   [facility]  Allergy and Immunology        [1]   Current Outpatient Medications on File Prior to Visit   Medication Sig Dispense Refill    allopurinoL (ZYLOPRIM) 100 MG tablet Take 1 tablet (100 mg total) by mouth once daily. 90 tablet 3    atorvastatin (LIPITOR) 40 MG tablet Take 1 tablet (40 mg total) by mouth once daily. 90 tablet 3    BD ROSITA 2ND GEN PEN NEEDLE 32 gauge x 5/32" Ndle       BD ULTRA-FINE MICRO PEN NEEDLE 32 gauge x 1/4" Ndle       blood sugar diagnostic Strp USE TO MONITOR BLOOD GLUCOSE DAILY      co-enzyme Q-10 30 mg capsule Take 1 capsule (30 mg total) by mouth 2 (two) times daily. 180 capsule 3    COVID [id number],12up,,andu,,PF, (SPIKEVAX 6882-6025,12Y UP,,PF,) 50 mcg/0.5 mL injection Inject into the muscle. 0.5 mL 0    docusate sodium (COLACE) 50 MG capsule Take by mouth.      gabapentin (NEURONTIN) 600 MG tablet Take 1 tablet (600 mg total) by mouth once daily. Daily at 05:00 PM 90 tablet 1    insulin degludec (TRESIBA FLEXTOUCH U-100) 100 unit/mL (3 mL) InPn Inject 8 Units into the skin.       LOKELMA 10 gram packet Take 1 packet " "by mouth.      omeprazole (PRILOSEC) 40 MG capsule Take 1 capsule (40 mg total) by mouth once daily. 30 capsule 11    ONETOUCH DELICA PLUS LANCET 30 gauge Misc 2 (two) times daily. Use to check blood glucose      pen needle, diabetic 32 gauge x 1/4" Ndle Use to test blood sugars bid      sevelamer carbonate (RENVELA) 800 mg Tab Take 2 tablets by mouth.      traMADoL (ULTRAM) 50 mg tablet Take 1 tablet (50 mg total) by mouth every 12 (twelve) hours. 30 tablet 0     No current facility-administered medications on file prior to visit.     "

## 2025-02-18 ENCOUNTER — TELEPHONE (OUTPATIENT)
Dept: CARDIOLOGY | Facility: CLINIC | Age: 85
End: 2025-02-18
Payer: MEDICARE

## 2025-02-18 NOTE — TELEPHONE ENCOUNTER
Attempted to contact pt regarding appointment rescheduling lvm to call back.----- Message from Gustavo sent at 2/18/2025  4:14 PM CST -----  Contact: carolee Simeon is requesting a call back in regards to getting her appointment on 2/20 rescheduledPlease call her at [phone]

## 2025-02-19 ENCOUNTER — TELEPHONE (OUTPATIENT)
Dept: CARDIOLOGY | Facility: CLINIC | Age: 85
End: 2025-02-19
Payer: MEDICARE

## 2025-02-19 NOTE — TELEPHONE ENCOUNTER
Contacted pt to let her know she can still come to her appointment after her visit at the cancer center

## 2025-02-25 ENCOUNTER — OFFICE VISIT (OUTPATIENT)
Dept: CARDIOLOGY | Facility: CLINIC | Age: 85
End: 2025-02-25
Payer: MEDICARE

## 2025-02-25 VITALS
HEART RATE: 54 BPM | OXYGEN SATURATION: 96 % | WEIGHT: 149.56 LBS | DIASTOLIC BLOOD PRESSURE: 68 MMHG | SYSTOLIC BLOOD PRESSURE: 149 MMHG | BODY MASS INDEX: 24.89 KG/M2

## 2025-02-25 DIAGNOSIS — I25.10 CORONARY ARTERY CALCIFICATION SEEN ON CAT SCAN: ICD-10-CM

## 2025-02-25 DIAGNOSIS — I45.9 SKIPPED HEART BEATS: Primary | ICD-10-CM

## 2025-02-25 DIAGNOSIS — Z99.2 ESRD NEEDING DIALYSIS: ICD-10-CM

## 2025-02-25 DIAGNOSIS — R00.2 PALPITATIONS: ICD-10-CM

## 2025-02-25 DIAGNOSIS — I65.23 BILATERAL CAROTID ARTERY STENOSIS: ICD-10-CM

## 2025-02-25 DIAGNOSIS — E11.59 HYPERTENSION ASSOCIATED WITH DIABETES: ICD-10-CM

## 2025-02-25 DIAGNOSIS — N18.6 ESRD NEEDING DIALYSIS: ICD-10-CM

## 2025-02-25 DIAGNOSIS — I15.2 HYPERTENSION ASSOCIATED WITH DIABETES: ICD-10-CM

## 2025-02-25 PROCEDURE — 1157F ADVNC CARE PLAN IN RCRD: CPT | Mod: CPTII,S$GLB,, | Performed by: INTERNAL MEDICINE

## 2025-02-25 PROCEDURE — 3077F SYST BP >= 140 MM HG: CPT | Mod: CPTII,S$GLB,, | Performed by: INTERNAL MEDICINE

## 2025-02-25 PROCEDURE — 1125F AMNT PAIN NOTED PAIN PRSNT: CPT | Mod: CPTII,S$GLB,, | Performed by: INTERNAL MEDICINE

## 2025-02-25 PROCEDURE — 1101F PT FALLS ASSESS-DOCD LE1/YR: CPT | Mod: CPTII,S$GLB,, | Performed by: INTERNAL MEDICINE

## 2025-02-25 PROCEDURE — 99999 PR PBB SHADOW E&M-EST. PATIENT-LVL IV: CPT | Mod: PBBFAC,,, | Performed by: INTERNAL MEDICINE

## 2025-02-25 PROCEDURE — 1159F MED LIST DOCD IN RCRD: CPT | Mod: CPTII,S$GLB,, | Performed by: INTERNAL MEDICINE

## 2025-02-25 PROCEDURE — 3078F DIAST BP <80 MM HG: CPT | Mod: CPTII,S$GLB,, | Performed by: INTERNAL MEDICINE

## 2025-02-25 PROCEDURE — 3288F FALL RISK ASSESSMENT DOCD: CPT | Mod: CPTII,S$GLB,, | Performed by: INTERNAL MEDICINE

## 2025-02-25 PROCEDURE — 99214 OFFICE O/P EST MOD 30 MIN: CPT | Mod: S$GLB,,, | Performed by: INTERNAL MEDICINE

## 2025-02-25 NOTE — PROGRESS NOTES
Subjective:   Patient ID:  Cailin Pickett is a 84 y.o. female who presents for evaluation of Chest Pain      Chest Pain     2.25.2025  Here for six-month follow-up.    Denies chest pain.  No report of dyspnea, syncope or presyncope   She says that she does feel some skipped beats here and there.  She passed out in the summer come in home after dialysis she says it was hard this was in September.    She states it doctor came to her house from her insurance company and told her that she needs her heart checked because he has a murmur.  Her echocardiogram did not show any significant valvular disease she has a left arm AV fistula which could cause her a murmur.    Normal stress test after last visit.    Stable carotid artery disease.      8.13.2024  Here to establish care with new doctor she saw Dr. Allen in the past  85 yo female , on hd for the past 4 years , no chest pain ,  Has more HYDE lately , states that yesterday she had epigastric pain that lasts for about half an hour resolves on its own after end of dialysis.    Denies lower extremity swelling.    States more dyspneic lately.    She had a normal nuclear stress test back in 2021.    Denies syncope palpitations or presyncope.    She has a abundant coronary calcification on prior CT scan\  No slurred speech , focal weakness or numbness, amaurosis, dizziness       Past Medical History:   Diagnosis Date    Anemia     CKD (chronic kidney disease) stage 5, GFR less than 15 ml/min 03/14/2019    CKD (chronic kidney disease), stage III     Diabetes mellitus type II     Encounter for blood transfusion     Family history of colonic polyps 07/18/2017    Gout, unspecified     Hyperlipidemia     Hypertension     Neuropathy     Pancreatic cancer     Renal failure     Secondary hyperparathyroidism of renal origin 03/14/2019    Thyroid disease        Past Surgical History:   Procedure Laterality Date    COLONOSCOPY  2011    Dr. Favio Mims    COLONOSCOPY N/A  2017    Procedure: screening colonoscopy;  Surgeon: Kenneth Kamara MD;  Location: [facility] ENDO;  Service: Endoscopy;  Laterality: N/A;    ESOPHAGEAL MANOMETRY WITH MEASUREMENT OF IMPEDANCE N/A 2022    Procedure: MANOMETRY-ESOPHAGEAL-WITH IMPEDANCE;  Surgeon: Jah Rodgers RN;  Location: [facility] ENDO;  Service: Endoscopy;  Laterality: N/A;    ESOPHAGOGASTRODUODENOSCOPY N/A 2022    Procedure: ESOPHAGOGASTRODUODENOSCOPY (EGD);  Surgeon: Ivett Quarles MD;  Location: [facility] ENDO;  Service: Endoscopy;  Laterality: N/A;    FISTULOGRAM N/A 04/10/2019    Procedure: FISTULOGRAM;  Surgeon: Ruddy Fitzpatrick MD;  Location: [facility] CATH LAB;  Service: Vascular;  Laterality: N/A;  08 start    HYSTERECTOMY      INJECTION OF ANESTHETIC AGENT AROUND MEDIAL BRANCH NERVES INNERVATING LUMBAR FACET JOINT Left 2023    Procedure: Left L3, 4, 5 MBB;  Surgeon: Geovany Qiu MD;  Location: [facility] PAIN MGT;  Service: Pain Management;  Laterality: Left;    KNEE SURGERY Left 2018    NEPHRECTOMY Left 2013    Dr Carter     PANCREAS SURGERY      distal pancreatectomy    SPLENECTOMY, TOTAL      THYROIDECTOMY, PARTIAL      VENTRICULOATRIAL SHUNT Left 2014    left arm       Social History     Tobacco Use    Smoking status: Former     Current packs/day: 0.00     Average packs/day: 1 pack/day for 38.8 years (38.8 ttl pk-yrs)     Types: Cigarettes     Start date: 3/14/1962     Quit date: 2001     Years since quittin.1     Passive exposure: Never    Smokeless tobacco: Never   Substance Use Topics    Alcohol use: No    Drug use: No       Family History   Problem Relation Name Age of Onset    Heart disease Mother  60        MI    Hypertension Mother      Stroke Mother      Breast cancer Mother      Cancer Father          prostate    Cancer Brother          renal cell carcinoma    Diabetes Brother      Kidney disease Brother          ESRD s/p kidney transplant    Breast cancer Sister      Breast cancer Sister    "      Review of Systems   Cardiovascular:  Positive for chest pain.       Current Outpatient Medications on File Prior to Visit   Medication Sig    allopurinoL (ZYLOPRIM) 100 MG tablet Take 1 tablet (100 mg total) by mouth once daily.    atorvastatin (LIPITOR) 40 MG tablet Take 1 tablet (40 mg total) by mouth once daily.    BD ROSITA 2ND GEN PEN NEEDLE 32 gauge x 5/32" Ndle     BD ULTRA-FINE MICRO PEN NEEDLE 32 gauge x 1/4" Ndle     blood sugar diagnostic Strp USE TO MONITOR BLOOD GLUCOSE DAILY    co-enzyme Q-10 30 mg capsule Take 1 capsule (30 mg total) by mouth 2 (two) times daily.    COVID xzc19-03,12up,,andu,,PF, (SPIKEVAX 4915-8720,12Y UP,,PF,) 50 mcg/0.5 mL injection Inject into the muscle.    docusate sodium (COLACE) 50 MG capsule Take by mouth.    gabapentin (NEURONTIN) 600 MG tablet Take 1 tablet (600 mg total) by mouth once daily. Daily at 05:00 PM    insulin degludec (TRESIBA FLEXTOUCH U-100) 100 unit/mL (3 mL) InPn Inject 8 Units into the skin.     ipratropium (ATROVENT) 42 mcg (0.06 %) nasal spray 2 sprays by Each Nostril route 4 (four) times daily.    LOKELMA 10 gram packet Take 1 packet by mouth.    montelukast (SINGULAIR) 10 mg tablet Take 1 tablet (10 mg total) by mouth every evening.    ONETOUCH DELICA PLUS LANCET 30 gauge Misc 2 (two) times daily. Use to check blood glucose    pen needle, diabetic 32 gauge x 1/4" Ndle Use to test blood sugars bid    sevelamer carbonate (RENVELA) 800 mg Tab Take 2 tablets by mouth.    traMADoL (ULTRAM) 50 mg tablet Take 1 tablet (50 mg total) by mouth every 12 (twelve) hours.    omeprazole (PRILOSEC) 40 MG capsule Take 1 capsule (40 mg total) by mouth once daily.     No current facility-administered medications on file prior to visit.       Objective:   Objective:  Wt Readings from Last 3 Encounters:   02/25/25 67.8 kg (149 lb 9.3 oz)   02/13/25 66.6 kg (146 lb 13.2 oz)   10/17/24 67 kg (147 lb 11.3 oz)     Temp Readings from Last 3 Encounters:   02/13/25 97.7 °F " (36.5 °C) (Temporal)   09/13/24 97.8 °F (36.6 °C)   06/18/24 97.8 °F (36.6 °C) (Temporal)     BP Readings from Last 3 Encounters:   02/25/25 (!) 149/68   02/13/25 (!) 155/74   10/01/24 122/60     Pulse Readings from Last 3 Encounters:   02/25/25 (!) 54   02/13/25 79   09/13/24 75       Physical Exam    Lab Results   Component Value Date    CHOL 126 04/29/2021    CHOL 243 (H) 09/03/2020    CHOL 230 (H) 11/18/2019     Lab Results   Component Value Date    HDL 59 04/29/2021    HDL 73 09/03/2020    HDL 80 (H) 11/18/2019     Lab Results   Component Value Date    LDLCALC 54.6 (L) 04/29/2021    LDLCALC 147.8 09/03/2020    LDLCALC 127.6 11/18/2019     Lab Results   Component Value Date    TRIG 62 04/29/2021    TRIG 111 09/03/2020    TRIG 112 11/18/2019     Lab Results   Component Value Date    CHOLHDL 46.8 04/29/2021    CHOLHDL 30.0 09/03/2020    CHOLHDL 34.8 11/18/2019       Chemistry        Component Value Date/Time     12/30/2024 0921    K 4.6 12/30/2024 0921     12/30/2024 0921    CO2 24 12/30/2024 0921    BUN 42 (H) 12/30/2024 0921    CREATININE 7.3 (H) 12/30/2024 0921     (H) 12/30/2024 0921        Component Value Date/Time    CALCIUM 9.8 12/30/2024 0921    ALKPHOS 121 12/30/2024 0921    AST 19 12/30/2024 0921    ALT 11 12/30/2024 0921    BILITOT 0.4 12/30/2024 0921    ESTGFRAFRICA 7 (A) 07/14/2022 1319    EGFRNONAA 6 (A) 07/14/2022 1319          Lab Results   Component Value Date    TSH 1.41 06/22/2023     Lab Results   Component Value Date    INR 1.0 11/04/2022    INR 1.0 02/10/2021    INR 1.22 01/28/2021     Lab Results   Component Value Date    WBC 3.87 (L) 12/30/2024    HGB 10.8 (L) 02/10/2025    HCT 40.2 12/30/2024     (H) 12/30/2024     12/30/2024     BNP  @LABRCNTIP(BNP,BNPTRIAGEBLO)@  CrCl cannot be calculated (Patient's most recent lab result is older than the maximum 7 days allowed.).     Imaging:  ======    No results found for this or any previous visit.    No results  found for this or any previous visit.    Results for orders placed during the hospital encounter of 02/10/21    X-Ray Chest PA And Lateral    Narrative  EXAMINATION:  XR CHEST PA AND LATERAL    CLINICAL HISTORY:  Chest Pain;    TECHNIQUE:  PA and lateral views of the chest were performed.    COMPARISON:  04/08/2019.    FINDINGS:  Right lung is clear.  There is left pleural fluid.  No airspace opacity or pneumothorax.    The cardiac silhouette is normal in size. There is aortic atherosclerosis.  Mediastinal and hilar contours are unchanged.    Degenerative change of the shoulders.    Impression  Left pleural fluid new from the prior exam.      Electronically signed by: Kushal Hernandez  Date:    02/10/2021  Time:    19:25    No results found for this or any previous visit.    No valid procedures specified.    Results for orders placed during the hospital encounter of 04/08/19    Cath lab procedure (Preliminary)      Results for orders placed during the hospital encounter of 02/10/21    Nuclear Stress - Cardiology Interpreted    Interpretation Summary    Normal myocardial perfusion scan. There is no evidence of myocardial ischemia or infarction.    The gated perfusion images showed an ejection fraction of 84% at rest. The gated perfusion images showed an ejection fraction of 83% post stress. Normal ejection fraction is greater than 54%.    There is normal wall motion at rest and post stress.    LV cavity size is normal at rest and normal at stress.    The EKG portion of this study is negative for ischemia.    There were no arrhythmias during stress.      Results for orders placed during the hospital encounter of 08/31/21    Echo    Interpretation Summary  · The left ventricle is normal in size with normal systolic function.  · The estimated ejection fraction is 65%.  · Normal left ventricular diastolic function.  · Normal right ventricular size with normal right ventricular systolic function.  · Normal central venous  pressure (3 mmHg).  · Mild tricuspid regurgitation.  · Mild mitral regurgitation.  · The estimated PA systolic pressure is 37 mmHg.    CONCLUSIONS   There is 40 - 49% right Internal Carotid stenosis.   There is 20 - 39% left Internal Carotid stenosis.           This document has been electronically    SIGNED BY: Haroon Long MD On: 03/11/2019 21:13     Diagnostic Results:  ECG: Reviewed    The ASCVD Risk score (Nadia DK, et al., 2019) failed to calculate for the following reasons:    The 2019 ASCVD risk score is only valid for ages 40 to 79        Assessment and Plan:   Skipped heart beats  -     Holter monitor - 48 hour; Future    Palpitations  -     Holter monitor - 48 hour; Future    Bilateral carotid artery stenosis    ESRD needing dialysis    Hypertension associated with diabetes    Coronary artery calcification seen on CAT scan        Carotid artery disease 2024 stable when compared 2019.    Normal nuclear stress test 2024.    Non relevant echocardiogram 2024.    Syncope and palpitations we will get a 48 hour Holter monitor.  She has a abundant coronary calcification on prior CT scan  Bp low in dialysis , no change to regimen today.  On zetia and atorvastatin  Reviewed all tests and above medical conditions with patient in detail and formulated treatment plan.  Risk factor modification discussed.   Cardiac low salt diet discussed.  Maintaining healthy weight and weight loss goals were discussed in clinic.    Follow up in  6 months

## 2025-02-27 ENCOUNTER — OFFICE VISIT (OUTPATIENT)
Dept: ORTHOPEDICS | Facility: CLINIC | Age: 85
End: 2025-02-27
Payer: MEDICARE

## 2025-02-27 ENCOUNTER — HOSPITAL ENCOUNTER (OUTPATIENT)
Dept: CARDIOLOGY | Facility: HOSPITAL | Age: 85
Discharge: HOME | End: 2025-02-27
Attending: INTERNAL MEDICINE
Payer: MEDICARE

## 2025-02-27 VITALS — WEIGHT: 149 LBS | BODY MASS INDEX: 24.83 KG/M2 | HEIGHT: 65 IN

## 2025-02-27 DIAGNOSIS — M25.562 PAIN IN BOTH KNEES, UNSPECIFIED CHRONICITY: Primary | ICD-10-CM

## 2025-02-27 DIAGNOSIS — M17.11 ARTHRITIS OF KNEE, RIGHT: Primary | ICD-10-CM

## 2025-02-27 DIAGNOSIS — Z96.652 HISTORY OF TOTAL LEFT KNEE REPLACEMENT: ICD-10-CM

## 2025-02-27 DIAGNOSIS — R00.2 PALPITATIONS: ICD-10-CM

## 2025-02-27 DIAGNOSIS — I45.9 SKIPPED HEART BEATS: ICD-10-CM

## 2025-02-27 DIAGNOSIS — M21.061 ACQUIRED VALGUS DEFORMITY KNEE, RIGHT: ICD-10-CM

## 2025-02-27 DIAGNOSIS — M25.561 PAIN IN BOTH KNEES, UNSPECIFIED CHRONICITY: Primary | ICD-10-CM

## 2025-02-27 PROCEDURE — 99999 PR PBB SHADOW E&M-EST. PATIENT-LVL III: CPT | Mod: PBBFAC,,, | Performed by: PHYSICIAN ASSISTANT

## 2025-02-27 PROCEDURE — 93225 XTRNL ECG REC<48 HRS REC: CPT

## 2025-02-27 NOTE — PROCEDURES
Large Joint Aspiration/Injection: R knee    Date/Time: 2/27/2025 9:00 AM    Performed by: Thelma Mathis PA  Authorized by: Thelma Mathis PA    Consent Done?:  Yes (Verbal)  Indications:  Arthritis and pain  Site marked: the procedure site was marked    Timeout: prior to procedure the correct patient, procedure, and site was verified      Local anesthesia used?: Yes    Local anesthetic:  Topical anesthetic    Details:  Needle Size:  21 G  Approach:  Anterolateral  Location:  Knee  Site:  R knee  Medications:  88 mg hyaluronate sodium, stabilized 88 mg/4 mL  Patient tolerance:  Patient tolerated the procedure well with no immediate complications     Verbal consent was obtained  The patient's ID, site, side was verified  The site was sterile prepped in standard fashion  The injection was performed in the yumiko-lateral side without complication  A sterile Band-Aid was applied    Patient was directed to apply ice today at roughly 15 minutes at a time as needed.  It was discussed that they may be sore for the next few days or so.  Please avoid strenuous activity over the next 24 hours.  It was also discussed that the patient may have a increase in glucose if diabetic and should monitor levels.  Patient was instructed to call as needed.

## 2025-03-04 ENCOUNTER — OFFICE VISIT (OUTPATIENT)
Dept: NEUROLOGY | Facility: CLINIC | Age: 85
End: 2025-03-04
Payer: MEDICARE

## 2025-03-04 ENCOUNTER — LAB VISIT (OUTPATIENT)
Dept: LAB | Facility: HOSPITAL | Age: 85
End: 2025-03-04
Attending: PSYCHIATRY & NEUROLOGY
Payer: MEDICARE

## 2025-03-04 VITALS
HEART RATE: 67 BPM | DIASTOLIC BLOOD PRESSURE: 64 MMHG | WEIGHT: 150.13 LBS | HEIGHT: 65 IN | SYSTOLIC BLOOD PRESSURE: 145 MMHG | RESPIRATION RATE: 18 BRPM | BODY MASS INDEX: 25.01 KG/M2

## 2025-03-04 DIAGNOSIS — N25.81 SECONDARY HYPERPARATHYROIDISM OF RENAL ORIGIN: ICD-10-CM

## 2025-03-04 DIAGNOSIS — M47.816 LUMBAR SPONDYLOSIS: ICD-10-CM

## 2025-03-04 DIAGNOSIS — Z86.0100 HISTORY OF COLON POLYPS: ICD-10-CM

## 2025-03-04 DIAGNOSIS — E89.0 S/P PARTIAL THYROIDECTOMY: ICD-10-CM

## 2025-03-04 DIAGNOSIS — R29.90 MULTIPLE NEUROLOGICAL SYMPTOMS: Primary | ICD-10-CM

## 2025-03-04 DIAGNOSIS — N18.6 ANEMIA IN CHRONIC KIDNEY DISEASE, ON CHRONIC DIALYSIS: ICD-10-CM

## 2025-03-04 DIAGNOSIS — M19.90 ARTHRITIS: ICD-10-CM

## 2025-03-04 DIAGNOSIS — Z99.2 ESRD ON HEMODIALYSIS: ICD-10-CM

## 2025-03-04 DIAGNOSIS — J30.89 ALLERGIC RHINITIS DUE TO MOLD: ICD-10-CM

## 2025-03-04 DIAGNOSIS — K21.9 GASTROESOPHAGEAL REFLUX DISEASE WITHOUT ESOPHAGITIS: ICD-10-CM

## 2025-03-04 DIAGNOSIS — I15.2 HYPERTENSION ASSOCIATED WITH DIABETES: Chronic | ICD-10-CM

## 2025-03-04 DIAGNOSIS — R41.89 COGNITIVE CHANGE: ICD-10-CM

## 2025-03-04 DIAGNOSIS — Z99.2 ESRD NEEDING DIALYSIS: ICD-10-CM

## 2025-03-04 DIAGNOSIS — R09.82 PND (POST-NASAL DRIP): ICD-10-CM

## 2025-03-04 DIAGNOSIS — Z99.2 ANEMIA IN CHRONIC KIDNEY DISEASE, ON CHRONIC DIALYSIS: ICD-10-CM

## 2025-03-04 DIAGNOSIS — R41.89 SUBJECTIVE MEMORY COMPLAINTS: ICD-10-CM

## 2025-03-04 DIAGNOSIS — E11.69 HYPERLIPIDEMIA ASSOCIATED WITH TYPE 2 DIABETES MELLITUS: ICD-10-CM

## 2025-03-04 DIAGNOSIS — I49.5 SICK SINUS SYNDROME: ICD-10-CM

## 2025-03-04 DIAGNOSIS — Z85.07 HISTORY OF PANCREATIC CANCER: ICD-10-CM

## 2025-03-04 DIAGNOSIS — N18.6 TYPE 2 DIABETES MELLITUS WITH CHRONIC KIDNEY DISEASE ON CHRONIC DIALYSIS, WITHOUT LONG-TERM CURRENT USE OF INSULIN: ICD-10-CM

## 2025-03-04 DIAGNOSIS — M10.9 GOUT, UNSPECIFIED CAUSE, UNSPECIFIED CHRONICITY, UNSPECIFIED SITE: ICD-10-CM

## 2025-03-04 DIAGNOSIS — J30.1 SEASONAL ALLERGIC RHINITIS DUE TO POLLEN: ICD-10-CM

## 2025-03-04 DIAGNOSIS — E78.5 HYPERLIPIDEMIA ASSOCIATED WITH TYPE 2 DIABETES MELLITUS: ICD-10-CM

## 2025-03-04 DIAGNOSIS — G62.9 POLYNEUROPATHY: ICD-10-CM

## 2025-03-04 DIAGNOSIS — J30.89 ALLERGIC RHINITIS DUE TO DUST MITE: ICD-10-CM

## 2025-03-04 DIAGNOSIS — E11.59 HYPERTENSION ASSOCIATED WITH DIABETES: Chronic | ICD-10-CM

## 2025-03-04 DIAGNOSIS — D63.1 ANEMIA IN CHRONIC KIDNEY DISEASE, ON CHRONIC DIALYSIS: ICD-10-CM

## 2025-03-04 DIAGNOSIS — E89.6 POSTPROCEDURAL ADRENOCORTICAL (-MEDULLARY) HYPOFUNCTION: ICD-10-CM

## 2025-03-04 DIAGNOSIS — G62.0 CHEMOTHERAPY-INDUCED NEUROPATHY: ICD-10-CM

## 2025-03-04 DIAGNOSIS — Z91.038 ALLERGY TO COCKROACHES: ICD-10-CM

## 2025-03-04 DIAGNOSIS — T45.1X5A CHEMOTHERAPY-INDUCED NEUROPATHY: ICD-10-CM

## 2025-03-04 DIAGNOSIS — E11.22 TYPE 2 DIABETES MELLITUS WITH CHRONIC KIDNEY DISEASE ON CHRONIC DIALYSIS, WITHOUT LONG-TERM CURRENT USE OF INSULIN: ICD-10-CM

## 2025-03-04 DIAGNOSIS — N18.6 ESRD NEEDING DIALYSIS: ICD-10-CM

## 2025-03-04 DIAGNOSIS — E55.9 VITAMIN D DEFICIENCY: ICD-10-CM

## 2025-03-04 DIAGNOSIS — Z90.5 ACQUIRED ABSENCE OF KIDNEY: ICD-10-CM

## 2025-03-04 DIAGNOSIS — G89.29 OTHER CHRONIC PAIN: ICD-10-CM

## 2025-03-04 DIAGNOSIS — K57.30 DIVERTICULOSIS OF LARGE INTESTINE WITHOUT HEMORRHAGE: ICD-10-CM

## 2025-03-04 DIAGNOSIS — N18.6 ESRD ON HEMODIALYSIS: ICD-10-CM

## 2025-03-04 DIAGNOSIS — M79.2 NEUROPATHIC PAIN: ICD-10-CM

## 2025-03-04 DIAGNOSIS — E11.42 TYPE 2 DIABETES MELLITUS WITH DIABETIC POLYNEUROPATHY, WITHOUT LONG-TERM CURRENT USE OF INSULIN: ICD-10-CM

## 2025-03-04 DIAGNOSIS — Z99.2 TYPE 2 DIABETES MELLITUS WITH CHRONIC KIDNEY DISEASE ON CHRONIC DIALYSIS, WITHOUT LONG-TERM CURRENT USE OF INSULIN: ICD-10-CM

## 2025-03-04 DIAGNOSIS — Z90.81 H/O SPLENECTOMY: ICD-10-CM

## 2025-03-04 PROCEDURE — 84393 TAU PHOSPHORYLATED EA: CPT | Performed by: PSYCHIATRY & NEUROLOGY

## 2025-03-04 PROCEDURE — 99999 PR PBB SHADOW E&M-EST. PATIENT-LVL IV: CPT | Mod: PBBFAC,,, | Performed by: PSYCHIATRY & NEUROLOGY

## 2025-03-04 PROCEDURE — 83520 IMMUNOASSAY QUANT NOS NONAB: CPT | Performed by: PSYCHIATRY & NEUROLOGY

## 2025-03-04 PROCEDURE — 36415 COLL VENOUS BLD VENIPUNCTURE: CPT | Performed by: PSYCHIATRY & NEUROLOGY

## 2025-03-04 NOTE — PROGRESS NOTES
Subjective:       Patient ID: Cailin Pickett is a 84 y.o. female.    Chief Complaint: Multiple neurological symptoms          HPI         BACKGROUND HISTORY       The patient says she is here for neuropathy. Describes longstanding itching , numbness and tingling (hands and feet) since 2013. Was diagnosed with peripheral neuropathy and treated initially with PGB then GBP. Has history of T2DM, ESRD-HD and Pancreatic CA S/P Chemotherapy. B12, FA and TFT. Takes  mg TID despite being on HD. Ordered NCS/EMG RUE RLE which was unremarkable (On 03- NCS/EMG RUE RLE Borderline (Only absent Sural and SPN), 10- NCS/EMG RUE RLE Borderline-NL again (identical)).Continued Gabapentin/GBP-Neurontin and changed it to 600 mg QD at 05:00 pm (HD TIW 10:00 am to 02:00 pm).    Was initially referred in  for tremors and memory difficulties. Denied having tremors anymore and reported mild memory lapses. Remains independent and highly functioning.  Scored 25-27/30 on MOCA on 03-. On  10- MRI brain unremarkable.On 11- scored 28/30 on MOCA.       INTERVAL HISTORY     Presented on 03- for follow up evaluation.    Gabapentin/GBP-Neurontin 600 mg QD at 05:00 pm (HD TIW 10:00 am to 02:00 pm) has helped tremendously with no side effects and no drowsiness.     Cognitive function remains stable and normal for age with no problems. Remains independent and highly functioning. The patient remains concerned because of word finding difficulty.        Review of Systems   Constitutional:  Negative for appetite change and fatigue.   HENT:  Negative for hearing loss and tinnitus.    Eyes:  Negative for photophobia and visual disturbance.   Respiratory:  Negative for apnea and shortness of breath.    Cardiovascular:  Negative for chest pain and palpitations.   Gastrointestinal:  Negative for nausea and vomiting.   Endocrine: Negative for cold intolerance and heat intolerance.   Genitourinary:   "Negative for difficulty urinating and urgency.   Musculoskeletal:  Negative for arthralgias, back pain, gait problem, joint swelling, myalgias, neck pain and neck stiffness.   Skin:  Negative for color change and rash.   Allergic/Immunologic: Negative for environmental allergies and immunocompromised state.   Neurological:  Positive for numbness. Negative for dizziness, tremors, seizures, syncope, facial asymmetry, speech difficulty, weakness, light-headedness and headaches.   Hematological:  Negative for adenopathy. Does not bruise/bleed easily.   Psychiatric/Behavioral:  Negative for agitation, behavioral problems, confusion, decreased concentration, dysphoric mood, hallucinations, self-injury, sleep disturbance and suicidal ideas. The patient is not hyperactive.                  Current Outpatient Medications:     allopurinoL (ZYLOPRIM) 100 MG tablet, Take 1 tablet (100 mg total) by mouth once daily., Disp: 90 tablet, Rfl: 3    atorvastatin (LIPITOR) 40 MG tablet, Take 1 tablet (40 mg total) by mouth once daily., Disp: 90 tablet, Rfl: 3    BD ROSITA 2ND GEN PEN NEEDLE 32 gauge x 5/32" Ndle, , Disp: , Rfl:     BD ULTRA-FINE MICRO PEN NEEDLE 32 gauge x 1/4" Ndle, , Disp: , Rfl:     blood sugar diagnostic Strp, USE TO MONITOR BLOOD GLUCOSE DAILY, Disp: , Rfl:     co-enzyme Q-10 30 mg capsule, Take 1 capsule (30 mg total) by mouth 2 (two) times daily., Disp: 180 capsule, Rfl: 3    COVID [id number],,[id number],,PF, (SPIKEVAX [id number],12Y UP,,PF,) 50 mcg/0.5 mL injection, Inject into the muscle., Disp: 0.5 mL, Rfl: 0    docusate sodium (COLACE) 50 MG capsule, Take by mouth., Disp: , Rfl:     gabapentin (NEURONTIN) 600 MG tablet, Take 1 tablet (600 mg total) by mouth once daily. Daily at 05:00 PM, Disp: 90 tablet, Rfl: 1    insulin degludec (TRESIBA FLEXTOUCH U-100) 100 unit/mL (3 mL) InPn, Inject 8 Units into the skin. , Disp: , Rfl:     ipratropium (ATROVENT) 42 mcg (0.06 %) nasal spray, 2 sprays by Each Nostril route 4 " "(four) times daily., Disp: 15 mL, Rfl: 11    LOKELMA 10 gram packet, Take 1 packet by mouth., Disp: , Rfl:     montelukast (SINGULAIR) 10 mg tablet, Take 1 tablet (10 mg total) by mouth every evening., Disp: 90 tablet, Rfl: 3    omeprazole (PRILOSEC) 40 MG capsule, Take 1 capsule (40 mg total) by mouth once daily., Disp: 30 capsule, Rfl: 11    ONETOUCH DELICA PLUS LANCET 30 gauge Misc, 2 (two) times daily. Use to check blood glucose, Disp: , Rfl:     pen needle, diabetic 32 gauge x 1/4" Ndle, Use to test blood sugars bid, Disp: , Rfl:     sevelamer carbonate (RENVELA) 800 mg Tab, Take 2 tablets by mouth., Disp: , Rfl:     traMADoL (ULTRAM) 50 mg tablet, Take 1 tablet (50 mg total) by mouth every 12 (twelve) hours., Disp: 30 tablet, Rfl: 0  Past Medical History:   Diagnosis Date    Anemia     CKD (chronic kidney disease) stage 5, GFR less than 15 ml/min 03/14/2019    CKD (chronic kidney disease), stage III     Diabetes mellitus type II     Encounter for blood transfusion     Family history of colonic polyps 07/18/2017    Gout, unspecified     Hyperlipidemia     Hypertension     Neuropathy     Pancreatic cancer     Renal failure     Secondary hyperparathyroidism of renal origin 03/14/2019    Thyroid disease      Past Surgical History:   Procedure Laterality Date    COLONOSCOPY  2011    Dr. Favio Mims    COLONOSCOPY N/A 07/18/2017    Procedure: screening colonoscopy;  Surgeon: Kenneth Kamara MD;  Location: [facility];  Service: Endoscopy;  Laterality: N/A;    ESOPHAGEAL MANOMETRY WITH MEASUREMENT OF IMPEDANCE N/A 04/07/2022    Procedure: MANOMETRY-ESOPHAGEAL-WITH IMPEDANCE;  Surgeon: Jah Rodgers RN;  Location: [facility];  Service: Endoscopy;  Laterality: N/A;    ESOPHAGOGASTRODUODENOSCOPY N/A 01/18/2022    Procedure: ESOPHAGOGASTRODUODENOSCOPY (EGD);  Surgeon: Ivett Quarles MD;  Location: [facility];  Service: Endoscopy;  Laterality: N/A;    FISTULOGRAM N/A 04/10/2019    Procedure: FISTULOGRAM;  " Surgeon: Ruddy Fitzpatrick MD;  Location: [facility] CATH LAB;  Service: Vascular;  Laterality: N/A;  0830 start    HYSTERECTOMY      INJECTION OF ANESTHETIC AGENT AROUND MEDIAL BRANCH NERVES INNERVATING LUMBAR FACET JOINT Left 2023    Procedure: Left L3, 4, 5 MBB;  Surgeon: Geovany Qiu MD;  Location: [facility] PAIN MGT;  Service: Pain Management;  Laterality: Left;    KNEE SURGERY Left 2018    NEPHRECTOMY Left 2013    Dr Carter     PANCREAS SURGERY      distal pancreatectomy    SPLENECTOMY, TOTAL      THYROIDECTOMY, PARTIAL      VENTRICULOATRIAL SHUNT Left 2014    left arm     Social History     Socioeconomic History    Marital status:     Number of children: 0   Occupational History     Comment: stay home    Tobacco Use    Smoking status: Former     Current packs/day: 0.00     Average packs/day: 1 pack/day for 38.8 years (38.8 ttl pk-yrs)     Types: Cigarettes     Start date: 3/14/1962     Quit date: 2001     Years since quittin.1     Passive exposure: Never    Smokeless tobacco: Never   Substance and Sexual Activity    Alcohol use: No    Drug use: No    Sexual activity: Not Currently   Social History Narrative    [place] from [place]             Past/Current Medical/Surgical History, Past/Current Social History, Past/Current Family History and Past/Current Medications were reviewed in detail.        Objective:           VITAL SIGNS WERE REVIEWED      GENERAL APPEARANCE:     The patient looks comfortable.    BMI 24.98    No signs of respiratory distress.    Normal breathing pattern.    No dysmorphic features    Normal eye contact.     GENERAL MEDICAL EXAM:      HEENT:  Head is atraumatic normocephalic.    Fundoscopic (Ophthalmoscopic) exam showed no disc edema.      Neck and Axillae: No JVD. No visible lesions.    Cardiopulmonary: No cyanosis. No tachypnea. Normal respiratory effort.    Gastrointestinal/Urogenital:  No jaundice. No stomas or lesions. No visible  hernias. No catheters.     Skin, Hair and Nails: No pathognonomic skin rash. No neurofibromatosis. No visible lesions.No stigmata of autoimmune disease. No clubbing.    Limbs: No varicose veins. No visible swelling.HD access     Muskoskeletal: No visible deformities.No visible lesions.           Neurologic Exam     Mental Status   Oriented to person, place, and time.   Follows 3 step commands.   Attention: normal. Concentration: normal.   Speech: speech is normal   Level of consciousness: alert  Able to name object. Able to repeat. Normal comprehension.     Cranial Nerves   Cranial nerves II through XII intact.     CN II   Visual fields full to confrontation.   Visual acuity: normal with correction  Right visual field deficit: none  Left visual field deficit: none     CN III, IV, VI   Pupils are equal, round, and reactive to light.  Extraocular motions are normal.   Right pupil: Size: 2 mm. Shape: regular. Reactivity: brisk. Consensual response: intact. Accommodation: intact.   Left pupil: Size: 2 mm. Shape: regular. Reactivity: brisk. Consensual response: intact. Accommodation: intact.   CN III: no CN III palsy  CN VI: no CN VI palsy  Nystagmus: none   Diplopia: none  Ophthalmoparesis: none  Upgaze: normal  Downgaze: normal  Conjugate gaze: present  Vestibulo-ocular reflex: present    CN V   Facial sensation intact.   Right facial sensation deficit: none  Left facial sensation deficit: none    CN VII   Facial expression full, symmetric.   Right facial weakness: none  Left facial weakness: none    CN VIII   CN VIII normal.   Hearing: intact    CN IX, X   CN IX normal.   CN X normal.   Palate: symmetric    CN XI   CN XI normal.   Right sternocleidomastoid strength: normal  Left sternocleidomastoid strength: normal  Right trapezius strength: normal  Left trapezius strength: normal    CN XII   CN XII normal.   Tongue: not atrophic  Fasciculations: absent  Tongue deviation: none    Motor Exam   Muscle bulk:  normal  Overall muscle tone: normal  Right arm tone: normal  Left arm tone: normal  Right arm pronator drift: absent  Left arm pronator drift: absent  Right leg tone: normal  Left leg tone: normal    Strength   Strength 5/5 throughout.   Right neck flexion: 5/5  Left neck flexion: 5/5  Right neck extension: 5/5  Left neck extension: 5/5  Right deltoid: 5/5  Left deltoid: 5/5  Right biceps: 5/5  Left biceps: 5/5  Right triceps: 5/5  Left triceps: 5/5  Right wrist flexion: 5/5  Left wrist flexion: 5/5  Right wrist extension: 5/5  Left wrist extension: 5/5  Right interossei: 5/5  Left interossei: 5/5  Right iliopsoas: 5/5  Left iliopsoas: 5/5  Right quadriceps: 5/5  Left quadriceps: 5/5  Right hamstrin/5  Left hamstrin/5  Right glutei: 5/5  Left glutei: 5/5  Right anterior tibial: 5/5  Left anterior tibial: 5/5  Right posterior tibial: 5/5  Left posterior tibial: 5/5  Right peroneal: 5/5  Left peroneal: 5/5  Right gastroc: 5/5  Left gastroc: 5/5    Sensory Exam   Light touch normal.   Right arm light touch: normal  Left arm light touch: normal  Right leg light touch: normal  Left leg light touch: normal  Vibration normal.   Right arm vibration: normal  Left arm vibration: normal  Right leg vibration: normal  Left leg vibration: normal  Proprioception normal.   Right arm proprioception: normal  Left arm proprioception: normal  Right leg proprioception: normal  Left leg proprioception: normal  Pinprick normal.   Right arm pinprick: normal  Left arm pinprick: normal  Right leg pinprick: normal  Left leg pinprick: normal  Graphesthesia: normal  Stereognosis: normal    Gait, Coordination, and Reflexes     Gait  Gait: normal    Coordination   Romberg: negative  Finger to nose coordination: normal  Heel to shin coordination: normal  Tandem walking coordination: normal    Tremor   Resting tremor: absent  Intention tremor: absent  Action tremor: absent    Reflexes   Right brachioradialis: 2+  Left brachioradialis:  2+  Right biceps: 2+  Left biceps: 2+  Right triceps: 2+  Left triceps: 2+  Right patellar: 2+  Left patellar: 2+  Right achilles: 2+  Left achilles: 2+  Right : 2+  Left : 2+  Right plantar: normal  Left plantar: normal  Right Yi: absent  Left Yi: absent  Right ankle clonus: absent  Left ankle clonus: absent  Right pendular knee jerk: absent  Left pendular knee jerk: absent      Lab Results   Component Value Date    WBC 3.87 (L) 12/30/2024    HGB 10.3 (L) 02/24/2025    HCT 40.2 12/30/2024     (H) 12/30/2024     12/30/2024     Sodium   Date Value Ref Range Status   12/30/2024 140 136 - 145 mmol/L Final     Potassium   Date Value Ref Range Status   12/30/2024 4.6 3.5 - 5.1 mmol/L Final     Chloride   Date Value Ref Range Status   12/30/2024 102 95 - 110 mmol/L Final     CO2   Date Value Ref Range Status   12/30/2024 24 23 - 29 mmol/L Final     Glucose   Date Value Ref Range Status   12/30/2024 150 (H) 70 - 110 mg/dL Final     BUN   Date Value Ref Range Status   12/30/2024 42 (H) 8 - 23 mg/dL Final     Creatinine   Date Value Ref Range Status   12/30/2024 7.3 (H) 0.5 - 1.4 mg/dL Final     Calcium   Date Value Ref Range Status   12/30/2024 9.8 8.7 - 10.5 mg/dL Final     Total Protein   Date Value Ref Range Status   12/30/2024 7.2 6.0 - 8.4 g/dL Final     Albumin   Date Value Ref Range Status   02/05/2025 3.6 3.5 - 5.2 g/dL Final   12/30/2024 3.5 3.5 - 5.2 g/dL Final     Total Bilirubin   Date Value Ref Range Status   12/30/2024 0.4 0.1 - 1.0 mg/dL Final     Comment:     For infants and newborns, interpretation of results should be based  on gestational age, weight and in agreement with clinical  observations.    Premature Infant recommended reference ranges:  Up to 24 hours.............<8.0 mg/dL  Up to 48 hours............<12.0 mg/dL  3-5 days..................<15.0 mg/dL  6-29 days.................<15.0 mg/dL       Alkaline Phosphatase   Date Value Ref Range Status   12/30/2024 121 40  - 150 U/L Final     AST   Date Value Ref Range Status   12/30/2024 19 10 - 40 U/L Final     ALT   Date Value Ref Range Status   12/30/2024 11 10 - 44 U/L Final     Anion Gap   Date Value Ref Range Status   12/30/2024 14 8 - 16 mmol/L Final     eGFR if    Date Value Ref Range Status   07/14/2022 7 (A) >60 mL/min/1.73 m^2 Final     eGFR if non    Date Value Ref Range Status   07/14/2022 6 (A) >60 mL/min/1.73 m^2 Final     Comment:     Calculation used to obtain the estimated glomerular filtration  rate (eGFR) is the CKD-EPI equation.        Lab Results   Component Value Date    AQUSPVFZ99 1685 (H) 10/28/2021     Lab Results   Component Value Date    TSH 1.41 06/22/2023    FREET4 0.87 07/31/2019           LABORATORY EVALUATION      [id number]    B12, FA, TFT, SPEP-IPEP NL         RADIOLOGY EVALUATION        10-     Brain MRI unremarkable       NEUROPHYSIOLOGY EVALUATION      03-    NCS/EMG RUE RLE Borderline (Only absent Sural and SPN)      10-    NCS/EMG RUE RLE Borderline-NL again (identical)       NEUROCOGNITIVE EVALUATION         NICK COGNITIVE ASSESSMENT (MOCA) TOTAL SCORE 30         NORMAL-MILD NCD 26-30    MILD DEMENTIA 20-25    MODERATE DEMENTIA 10-19    SEVERE DEMENTIA <10        DATE 03- 11-      TOTAL SCORE 25-27 28      VISUOSPATIAL EXECUTIVE (5) 4 4      NAMING (3) 3 3      ATTENTION (6) 5 5      LANGUAGE (3) 3 3      ABSTRACTION(2) 1 2      RECALL (5) 3-5 5      ORIENTATION (6) 6 6          Reviewed the neuroimaging independently       Assessment:       1. Multiple neurological symptoms    2. Vitamin D deficiency    3. Type 2 diabetes mellitus with chronic kidney disease on chronic dialysis, without long-term current use of insulin    4. Type 2 diabetes mellitus with diabetic polyneuropathy, without long-term current use of insulin    5. Subjective memory complaints    6. Sick sinus syndrome    7. Secondary hyperparathyroidism of renal  origin    8. Seasonal allergic rhinitis due to pollen    9. S/P partial thyroidectomy    10. Postprocedural adrenocortical (-medullary) hypofunction    11. Polyneuropathy    12. PND (post-nasal drip)    13. Other chronic pain    14. Neuropathic pain    15. Lumbar spondylosis    16. Hypertension associated with diabetes    17. Hyperlipidemia associated with type 2 diabetes mellitus    18. History of pancreatic cancer    19. History of colon polyps    20. H/O splenectomy    21. Gout, unspecified cause, unspecified chronicity, unspecified site    22. Gastroesophageal reflux disease without esophagitis    23. ESRD needing dialysis    24. Diverticulosis of large intestine without hemorrhage    25. Chemotherapy-induced neuropathy    26. Arthritis    27. Anemia in chronic kidney disease, on chronic dialysis    28. Allergy to cockroaches    29. Allergic rhinitis due to mold    30. Allergic rhinitis due to dust mite    31. Acquired absence of kidney        Plan:                 POLYNEUROPATHY, LIKELY SMALL FIBER NEUROPATHY (SFN), MULTIFACTORIAL: DM, ESRD, CHEMOTHERAPY              Continue Gabapentin/GBP-Neurontin  600 mg QD at 05:00 pm (HD TIW 10:00 am to 02:00 pm).  GBP has helped tremendously with no side effects and no drowsiness.       Failed. Pregabalin/PGB-Lyrica.      NEXT OPTIONS:      Amitriptyline/Elavil, Duloxetine/Cymbalta, CBZ and LTG.        I encouraged the patient to read the leaflet for any newly prescribed medication for more details and report any side effect whether listed or not listed.    Discussed with the patient some of the neuropathy precautions like:    Always use oven mitts.    Test water with an unaffected hand or foot.    Use caution when trimming nails. File sharp areas    Wear shoes that fit well to avoid pressure points, blisters, and ulcers.    Inspect your hands and feet carefully (including the soles of your feet and between your toes) at least once a week.        MEMORY CHANGES AND  COGNITIVE CONCERNS WITH NORMAL COGNITIVE EXAMINATION        EVALUATION    P-Tau 217, P-Tau 181.    CNP evaluation.        MANAGEMENT           HERE ARE 10 WAYS TO REDUCE RISK OF DEMENTIA    Be physically active.    2. Avoid smoking and alcohol consumption.    3. Track your numbers. Keep your blood pressure, cholesterol, blood sugar and weight within recommended ranges.    4. Stay socially connected.    5. Make healthy food choices. Eat a well-balance and healthy diet that rich in cereals, fish, legumes, and vegetables.    6. Reduce stress.     7. Challenge your brain by trying something new, playing games, or learning a new language.    8. Take care of your hearing. Avoid being continuously exposed to loud sounds and wear a hearing aid if hearing does become a problem.    9. Lower risk of falls. Consider installing handrails on all stairs and grab bars in bathrooms.    10. Reduce your exposure to air pollution, such as exposure to exhaust in heavy traffic.           MEDICAL/SURGICAL COMORBIDITIES     All relevant medical comorbidities noted and managed by primary care physician and medical care team.          HEALTHY LIFESTYLE AND PREVENTATIVE CARE    The patient to adhere to the age-appropriate health maintenance guidelines including screening tests and vaccinations. The patient to adhere to  healthy lifestyle, optimal weight, exercise, healthy diet, good sleep hygiene and avoiding drugs including smoking, alcohol and recreational drugs.          RTC ANNUALLY               Eva Zapata MD, FAAN    Attending Neurologist/Epileptologist         Diplomate, American Board of Psychiatry and Neurology    Diplomate, American Board of Clinical Neurophysiology     Fellow, American Academy of Neurology       I spent a total of 30 minutes on the day of the visit.  This includes face to face time and non-face to face time preparing to see the patient (eg, review of tests), obtaining and/or reviewing separately obtained history,  documenting clinical information in the electronic or other health record, independently interpreting results and communicating results to the patient/family/caregiver, or care coordinator.             Sotyktu Pregnancy And Lactation Text: There is insufficient data to evaluate whether or not Sotyktu is safe to use during pregnancy.? ?It is not known if Sotyktu passes into breast milk and whether or not it is safe to use when breastfeeding.??

## 2025-03-07 LAB
IMMUNOLOGIST REVIEW: ABNORMAL
M PHOSPHO-TAU 217: 5.1 PG/ML

## 2025-03-11 ENCOUNTER — TELEPHONE (OUTPATIENT)
Dept: NEUROLOGY | Facility: CLINIC | Age: 85
End: 2025-03-11
Payer: MEDICARE

## 2025-03-11 ENCOUNTER — RESULTS FOLLOW-UP (OUTPATIENT)
Dept: NEUROLOGY | Facility: CLINIC | Age: 85
End: 2025-03-11
Payer: MEDICARE

## 2025-03-11 LAB — LC PHOSPHO-TAU (181P): 21.1 PG/ML (ref 0–0.97)

## 2025-03-11 NOTE — PROGRESS NOTES
03-    P-Tau 181 and P-Tau 217 are elevated but unreliable with ESRD. Will need CNP evaluation to verify the clinical significance.

## 2025-03-11 NOTE — TELEPHONE ENCOUNTER
----- Message from Eva Zapata MD sent at 3/11/2025  3:29 PM CDT -----      03-    P-Tau 181 and P-Tau 217 are elevated but unreliable with ESRD. Will need CNP evaluation to verify the clinical significance.     ----- Message -----  From: [facility]  Sent: 3/7/2025   1:23 PM CDT  To: Eva Zapata MD

## 2025-03-12 ENCOUNTER — TELEPHONE (OUTPATIENT)
Dept: NEUROLOGY | Facility: CLINIC | Age: 85
End: 2025-03-12
Payer: MEDICARE

## 2025-03-12 NOTE — TELEPHONE ENCOUNTER
----- Message from Cathryn sent at 3/12/2025 10:54 AM CDT -----  Contact: Cailin  .Type:  Patient Returning CallWhasher Called:Christiano Avila Message for Patient:Magan Santos MADoes the patient know what this is regarding?:missed call she received on yesterdayWould the patient rather a call back or a response via [facility]? Call [facility] Call Back Number:.[phone] Additional Information:  
Attempt to contact pt with lab results , left vm for pt to call the office   
tea/coffee

## 2025-03-12 NOTE — TELEPHONE ENCOUNTER
----- Message from Shari sent at 3/12/2025 11:16 AM CDT -----  Contact: Cailin  Type:  Patient Returning CallWho Called:Christiano Left Message for Patient:Dylan the patient know what this is regarding?:Missed callWould the patient rather a call back or a response via [facility]? Call [facility] Call Back Number:  Additional Information: [facility] a [facility]

## 2025-03-13 ENCOUNTER — OFFICE VISIT (OUTPATIENT)
Dept: HEMATOLOGY/ONCOLOGY | Facility: CLINIC | Age: 85
End: 2025-03-13
Payer: MEDICARE

## 2025-03-13 ENCOUNTER — RESULTS FOLLOW-UP (OUTPATIENT)
Dept: CARDIOLOGY | Facility: CLINIC | Age: 85
End: 2025-03-13

## 2025-03-13 VITALS
BODY MASS INDEX: 24.41 KG/M2 | TEMPERATURE: 98 F | OXYGEN SATURATION: 98 % | WEIGHT: 146.5 LBS | HEART RATE: 67 BPM | DIASTOLIC BLOOD PRESSURE: 63 MMHG | HEIGHT: 65 IN | SYSTOLIC BLOOD PRESSURE: 130 MMHG

## 2025-03-13 DIAGNOSIS — Z85.07 HISTORY OF PANCREATIC CANCER: Primary | ICD-10-CM

## 2025-03-13 DIAGNOSIS — C25.9 MALIGNANT NEOPLASM OF PANCREAS, UNSPECIFIED LOCATION OF MALIGNANCY: ICD-10-CM

## 2025-03-13 PROCEDURE — 99999 PR PBB SHADOW E&M-EST. PATIENT-LVL IV: CPT | Mod: PBBFAC,,, | Performed by: INTERNAL MEDICINE

## 2025-03-13 NOTE — PROGRESS NOTES
[facility]  [address] [address] [address]  Phone: [phone];  Fax: [phone]    Patient ID: Cailin Pickett   Reason for Visit: Follow-up and Pancreatic Cancer (history)  MRN:  [id number]     Oncologic Diagnosis:   History of pancreatic adenocarcinoma   Previous Treatment: NA FOLFIRINOX x4 cycles followed by chemoradiation w/5FU, 5/15/2013 Surgery with Dr. Carter : 1.  Distal pancreatectomy with splenectomy. 2.  Left nephrectomy. 3. Adrenalectomy.   Current Treatment:  Surveillance  Subjective   Cailin Pickett is a pleasant 84 y.o. female who presents to clinic to for pancreatic cancer surveillance.      She reports that she feels well overall; her only active issues are orthopedic issues for which she follows with orthopedics and renal disease for which she follows nephrology.  She has no acute complaints and has been in her baseline health since our last visit.      Review of Systems   Constitutional:  Negative for activity change, appetite change, chills, diaphoresis, fatigue, fever and unexpected weight change.   HENT:  Negative for nosebleeds.    Respiratory:  Negative for shortness of breath.    Cardiovascular:  Negative for chest pain.   Gastrointestinal:  Negative for abdominal distention, abdominal pain, anal bleeding, blood in stool, constipation, diarrhea, nausea and vomiting.   Genitourinary:  Negative for difficulty urinating and hematuria.   Musculoskeletal:  Negative for arthralgias, back pain and myalgias.   Skin:  Negative for rash.   Neurological:  Negative for dizziness, weakness, light-headedness and headaches.   Hematological:  Does not bruise/bleed easily.   Psychiatric/Behavioral:  The patient is not nervous/anxious.      History     Oncology History    No history exists.       PREVIOUS ONCOLOGIC HISTORY:   I had the pleasure meeting for the 1st time Ms. Pickett, a 80-year-old woman with end-stage renal disease on hemodialysis and  history of pancreatic adenocarcinoma in surveillance.     I reviewed her medical history notable for the following:     She has been followed in our clinic for anemia of chronic kidney disease and history of pancreatic adenocarcinoma treated with neoadjuvant chemotherapy and chemoradiation with 5 fluorouracil followed by resection 5/2013 by Dr. Carter at [facility], [place] path ypT3N0 (treatment effect noted). She has been in surveillance. The patient had previously completed neoadjuvant chemotherapy and 5-FU chemoradiation with excellent response.  negative.     She has end-stage renal disease on hemodialysis with recurrent pleural effusion.  Pleural fluid analysis negative for malignant cells.  She receives LUZ MARIA with dialysis.     She returns for restaging CT scan review.  She notes feeling well improvement in shortness of breath and lower extremity edema.  She continues on dialysis.      Past Medical History:   Diagnosis Date    Anemia     CKD (chronic kidney disease) stage 5, GFR less than 15 ml/min 03/14/2019    CKD (chronic kidney disease), stage III     Diabetes mellitus type II     Encounter for blood transfusion     Family history of colonic polyps 07/18/2017    Gout, unspecified     Hyperlipidemia     Hypertension     Neuropathy     Pancreatic cancer     Renal failure     Secondary hyperparathyroidism of renal origin 03/14/2019    Thyroid disease        Past Surgical History:   Procedure Laterality Date    COLONOSCOPY  2011    Dr. Favio Mims    COLONOSCOPY N/A 07/18/2017    Procedure: screening colonoscopy;  Surgeon: Kenneth Kamara MD;  Location: [facility];  Service: Endoscopy;  Laterality: N/A;    ESOPHAGEAL MANOMETRY WITH MEASUREMENT OF IMPEDANCE N/A 04/07/2022    Procedure: MANOMETRY-ESOPHAGEAL-WITH IMPEDANCE;  Surgeon: Jah Rodgers RN;  Location: [facility];  Service: Endoscopy;  Laterality: N/A;    ESOPHAGOGASTRODUODENOSCOPY N/A 01/18/2022    Procedure: ESOPHAGOGASTRODUODENOSCOPY (EGD);   "Surgeon: Ivett Quarles MD;  Location: [facility] ENDO;  Service: Endoscopy;  Laterality: N/A;    FISTULOGRAM N/A 04/10/2019    Procedure: FISTULOGRAM;  Surgeon: Ruddy Fitzpatrick MD;  Location: [facility] CATH LAB;  Service: Vascular;  Laterality: N/A;  0830 start    HYSTERECTOMY      INJECTION OF ANESTHETIC AGENT AROUND MEDIAL BRANCH NERVES INNERVATING LUMBAR FACET JOINT Left 2023    Procedure: Left L3, 4, 5 MBB;  Surgeon: Geovany Qiu MD;  Location: [facility] PAIN MGT;  Service: Pain Management;  Laterality: Left;    KNEE SURGERY Left 2018    NEPHRECTOMY Left 2013    Dr Carter     PANCREAS SURGERY      distal pancreatectomy    SPLENECTOMY, TOTAL      THYROIDECTOMY, PARTIAL      VENTRICULOATRIAL SHUNT Left 2014    left arm       Family History   Problem Relation Name Age of Onset    Heart disease Mother  60        MI    Hypertension Mother      Stroke Mother      Breast cancer Mother      Cancer Father          prostate    Cancer Brother          renal cell carcinoma    Diabetes Brother      Kidney disease Brother          ESRD s/p kidney transplant    Breast cancer Sister      Breast cancer Sister         Review of patient's allergies indicates:   Allergen Reactions    Allegra [fexofenadine] Swelling    Codeine Hives, Itching and Nausea And Vomiting       Social History     Tobacco Use    Smoking status: Former     Current packs/day: 0.00     Average packs/day: 1 pack/day for 38.8 years (38.8 ttl pk-yrs)     Types: Cigarettes     Start date: 3/14/1962     Quit date: 2001     Years since quittin.2     Passive exposure: Never    Smokeless tobacco: Never   Substance Use Topics    Alcohol use: No    Drug use: No       Physical Exam   ECOG:   ECOG SCORE    0 - Fully active-able to carry on all pre-disease performance without restriction          Vitals:  /63   Pulse 67   Temp 98 °F (36.7 °C) (Temporal)   Ht 5' 5" (1.651 m)   Wt 66.4 kg (146 lb 7.9 oz)   LMP 1982 (Exact Date)   " SpO2 98%   BMI 24.38 kg/m²     Physical Exam:  Physical Exam  Constitutional:       General: She is not in acute distress.     Appearance: Normal appearance. She is not ill-appearing.   HENT:      Head: Normocephalic and atraumatic.   Eyes:      Extraocular Movements: Extraocular movements intact.      Conjunctiva/sclera: Conjunctivae normal.   Cardiovascular:      Rate and Rhythm: Normal rate.   Pulmonary:      Effort: Pulmonary effort is normal. No respiratory distress.      Breath sounds: No rhonchi.   Abdominal:      General: Bowel sounds are normal. There is no distension.      Palpations: Abdomen is soft. There is no hepatomegaly, splenomegaly or mass.      Tenderness: There is no abdominal tenderness. There is no guarding.   Musculoskeletal:         General: Normal range of motion.      Cervical back: Neck supple. No tenderness.      Right lower leg: No edema.      Left lower leg: No edema.   Skin:     Findings: No rash.   Neurological:      General: No focal deficit present.      Mental Status: She is alert and oriented to person, place, and time.   Psychiatric:         Mood and Affect: Mood normal.         Behavior: Behavior normal.         Thought Content: Thought content normal.            Labs   Labs:  No visits with results within 2 Day(s) from this visit.   Latest known visit with results is:   Lab Visit on 03/04/2025   Component Date Value Ref Range Status    M Phospho-Tau 217 03/04/2025 5.1 (H)  pg/mL Final    Comment: -------------------REFERENCE VALUE--------------------------  Negative: < or = 0.185 pg/mL  Intermediate: 0.186 - 0.324 pg/mL  Positive: > or = 0.325 pg/mL      M p Tau 217 Interpretation 03/04/2025 SEE BELOW   Final    Comment: An elevated (positive) [id number] result is consistent   with a positive (abnormal) amyloid positron   emission tomography (PET) scan result. This result   is consistent with the presence of neuropathological   changes associated with Alzheimer's disease. In the    proper clinical context, this test is supportive of   Alzheimer's disease being related to current   clinical symptoms. This test has not been   demonstrated to provide information on the risk of   an asymptomatic individual developing symptoms   related to Alzheimer's disease in the future.    Clinical performance of this test was established   in a study of 427 individuals, 50 years and older,   with mild cognitive impairment or early dementia.   The prevalence of amyloid pathology was 64% as   defined by amyloid-PET and a Centiloid of   > or = 25. For detection of an abnormal amyloid-  PET, the test sensitivity at the lower cutpoint   (< or = 0.185 pg/mL) was 92% and the specificity   at the upper cutpoint (> or = 0.325 pg/mL                           ) was 96%.    The diagnostic performance of this test has not   been established in asymptomatic individuals.   Elevations of [id number] may be seen in individuals   with impaired kidney function associated with   chronic kidney disease and should be interpreted   with caution in these situations.      -------------------ADDITIONAL INFORMATION-------------------  This test was developed and its performance   characteristics determined by [facility] in a   manner consistent with CLIA requirements. This test   has not been cleared or approved by the U.S. Food   and Drug Administration.     The testing method is a chemiluminescent enzyme   immunoassay manufactured by [employer] and   performed on the [employer] analyzer. Values   obtained with different assay methods or kits may   be different and cannot be used interchangeably.     This test is not intended as a screening or   standalone diagnostic assay; correlation with   clinical findings is recommended.     Test Performed by:  [facility] - [address] [address]  : Luz Roblero Ph.D.; CLIA# [id number]       Phospho-Tau (181P) 03/04/2025 21.10 (H)  0.00 - 0.97 pg/mL Final    Comment: Results greater than the clinical cut-off of  0.97 pg/mL in patients greater than 55 years  of age are correlated with Abeta amyloid  pathology as determined by amyloid PET  imaging.  * Test  performed by [employer]  Electrochemiluminescence Immunoassay  (ECLIA). Values obtained with different  methods cannot be used interchangeable.  Results confirmed on  dilution.  This test was developed and its performance  characteristics determined by [facility]. It  has not been cleared or approved  by the Food and Drug Administration.  Performed at:  [address]  [id number]  : Gita Beckwith MD, Phone:  [phone]          Imaging   CT Chest Abdomen Pelvis With IV Contrast (XPD) - 11/09/2023  Narrative & Impression  EXAMINATION:  CT CHEST ABDOMEN PELVIS WITH IV CONTRAST (XPD)     CLINICAL HISTORY:  h/o pancreatic cancer surveillance. back pain;Personal history of malignant neoplasm of pancreas     TECHNIQUE:  Low dose axial images, sagittal and coronal reformations were obtained from the thoracic inlet to the pubic synthesis following the administration of 75 cc intravenous Omnipaque 350 and 1000 cc orally administered Omnipaque 12. All CT scans at this facility are performed using dose optimization techniques including the following: automated exposure control; adjustment of the mA and/or kV; use of iterative reconstruction technique.     COMPARISON:  CT chest abdomen pelvis with contrast 11/29/2022     FINDINGS:  Thoracic soft tissues: The left thyroid lobe is not seen.     Aorta: The aorta is normal in course and caliber, however with severe atherosclerotic plaque.  Accessory hemiazygous vein incidentally seen to anastomose with the left brachiocephalic vein, coursing along the left aspect of the aortic arch.     Heart: Normal size without effusion.  There is abundant coronary artery  calcification.     Emani/Mediastinum: No significant lymphadenopathy     Lungs: Well aerated, without consolidation or pleural fluid. No concerning pulmonary nodules.  There is bilateral dependent densities suggesting atelectatic change or fibrosis.     Liver: Normal in size and attenuation.  There is a subtle subcentimeter hypodensity at the hepatic dome, which appears smaller since the comparison exam which is suspected due to phase of contrast administration rather than actual decrease in size.  Finding remains compatible with flash filling hemangioma.  No new or concerning hepatic lesion.     Gallbladder: There is a 5 mm calcified gallstone in the fundus.  Gallbladder is otherwise unremarkable.     Bile Ducts: The common bile duct is mildly prominent at 8 mm, not significantly changed since comparison exam.  No intrahepatic biliary duct dilatation.     Pancreas: Patient is status post partial pancreatectomy.  The residual pancreatic parenchyma at the head and uncinate process are unremarkable.  No peripancreatic fat stranding.     Spleen: Absent.     Adrenals: The right adrenal gland is unremarkable.  The left adrenal gland is absent.     Kidneys/ Ureters: The left kidney is absent.  The right kidney is normal in location, however with renal parenchyma almost entirely replaced by innumerable cysts which measure up to 6 cm.  There has been no significant change since comparison exam.  No definite enhancing renal lesion, noting multiple subcentimeter hypodensities are too small to definitively characterize. No hydronephrosis or nephrolithiasis. No ureteral dilatation.     Bladder: Decompressed.  No wall thickening.     Reproductive organs: The uterus is absent.     GI Tract/Mesentery: No evidence of bowel obstruction or inflammation. There is fecalization of small bowel contents at the terminal ileum, suggesting slowed motility.  Orally ingested contrast extends through the stomach and the majority of the small  bowel.  There are rare colonic diverticula without inflammatory change of diverticulitis.  No pneumatosis or mural thickening.     Peritoneal Space: No ascites. No free air.     Retroperitoneum: No significant adenopathy.     Abdominal wall: Mild stranding present subcutaneously over the right lower quadrant, suspected related to recent injection site or other focal injury.  No fluid collection.  There is intramuscular lipoma within the left anterior thigh musculature.     Vasculature: There is severe aortic atherosclerosis with mild ectasia.  No aortic aneurysm.  Abundant atherosclerotic plaque lies at the origins of the right renal, superior mesenteric, and celiac arteries.  There is approximately 50% stenosis at the origin of the celiac artery and greater than 50% stenosis of the superior mesenteric artery.  Findings are similar to the prior exam.     Bones: No acute fracture. There is degenerative change of the spine, most significant within the lumbar spine with sclerotic and erosive endplate changes, unchanged since the comparison exam.     Impression:     In this patient with a history of pancreatic cancer, there is postsurgical change of distal pancreatectomy and splenectomy.  No evidence for local recurrent or distant metastatic disease within the chest, abdomen, or pelvis.     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  Findings could result in mesenteric ischemia symptoms.  Correlate with clinical presentation.     Polycystic right kidney.  Status post left nephrectomy and adrenalectomy.     Cholelithiasis.        Electronically signed by: Brenda Arrieta  Date:                                            11/09/2023  Time:                                           17:04     Assessment and Plan   History of Pancreatic Cancer (ypT3N0 )  Diagnosed May 2013.  CA 19 9 at time of diagnosis  was normal.  NA FOLFIRINOX x4 cycles followed by chemoradiation w/5FU, 5/15/2013 had Surgery w/Dr. Carter : 1.  Distal pancreatectomy with splenectomy. 2.  Left nephrectomy. 3. Adrenalectomy.   Restaging CT CPAP 11/09/2023 showed no evidence of pancreatic cancer recurrence; it did show some severe atherosclerotic changes in the SMA putting patient at risk for mesenteric ischemia.  Patient counseled to follow up with PCP and Cardiology as she has been having some recent intermittent abdominal pain; she did not any pain now  Patient requested PET-CT however not approved by insurance  CA 19-9 at next visit  As far as her pancreatic cancer surveillance, she can follow up with our clinic 6 months      Chemotherapy-induced peripheral neuropathy   The patient reports that ever since having chemotherapy she has had numbness tingling in her feet  Continue gabapentin      Cancer Screening  MMG 07/11/2023: BIRADS1   PAP Smear: Hx of hysterectomy  Colonoscopy 06/06/2017:   one 2 mm polyp in the distal transverse colon; Diverticulosis in the sigmoid colon. repeat colonoscopy in 5 years for surveillance.      Chronic Medical Conditions  ESRD on HD   History of left nephrectomy and adrenalectomy  Polycystic right kidney   Hypertension   Sick sinus syndrome   DM II  Anemia CKD - H/H stable; continue to monitor  Secondary hyperparathyroidism   Vitamin-D deficiency   History of splenectomy  GERD   Osteoarthritis multiple joints      Med Onc Chart Routing      Follow up with physician 6 months.   Follow up with [facility]    Infusion scheduling note    Injection scheduling note    Labs CBC, CMP and CA 19-9   Scheduling:  Preferred lab:  Lab interval:     Imaging    Pharmacy appointment    Other referrals                 The patient was seen, interviewed and examined. Pertinent lab and radiologic studies were reviewed. Pt instructed to call should they develop concerning signs/symptoms or have further questions.        Portions of the record  may have been created with voice recognition software. Occasional wrong-word or sound-a-like substitutions may have occurred due to the inherent limitations of voice recognition software. Read the chart carefully and recognize, using context, where substitutions have occurred.      Dalia Bellamy MD    Hematology/Oncology

## 2025-03-31 NOTE — TELEPHONE ENCOUNTER
"Readdressed goals of care today while at bedside with patient's wife and patient. States that he wants to go home because he would be more comfortable in regards to the temperature (too hot in room trung) and being able to \"do more than he's doing now in bed\". Ultimately he stated that he wants to continue current management and will continue working with physical therapy to get stronger.    Family meeting 3/28 - concluded patient will work with therapy for 2 additional days but wants to go home on Monday morning 3/31.   " Left voicemail regarding CT results.  Said that I would refer her to vascular surgery for evaluation of SMA stenosis seen on CT scan that might explain her abdominal symptoms.    Douglas Godfrey MD  Hematology Oncology

## 2025-04-08 ENCOUNTER — OFFICE VISIT (OUTPATIENT)
Dept: DERMATOLOGY | Facility: CLINIC | Age: 85
End: 2025-04-08
Payer: MEDICARE

## 2025-04-08 DIAGNOSIS — L82.1 SEBORRHEIC KERATOSES: Primary | ICD-10-CM

## 2025-04-08 PROCEDURE — 99999 PR PBB SHADOW E&M-EST. PATIENT-LVL III: CPT | Mod: PBBFAC,,, | Performed by: STUDENT IN AN ORGANIZED HEALTH CARE EDUCATION/TRAINING PROGRAM

## 2025-04-08 PROCEDURE — 1100F PTFALLS ASSESS-DOCD GE2>/YR: CPT | Mod: CPTII,S$GLB,, | Performed by: STUDENT IN AN ORGANIZED HEALTH CARE EDUCATION/TRAINING PROGRAM

## 2025-04-08 PROCEDURE — 1159F MED LIST DOCD IN RCRD: CPT | Mod: CPTII,S$GLB,, | Performed by: STUDENT IN AN ORGANIZED HEALTH CARE EDUCATION/TRAINING PROGRAM

## 2025-04-08 PROCEDURE — 1157F ADVNC CARE PLAN IN RCRD: CPT | Mod: CPTII,S$GLB,, | Performed by: STUDENT IN AN ORGANIZED HEALTH CARE EDUCATION/TRAINING PROGRAM

## 2025-04-08 PROCEDURE — 1160F RVW MEDS BY RX/DR IN RCRD: CPT | Mod: CPTII,S$GLB,, | Performed by: STUDENT IN AN ORGANIZED HEALTH CARE EDUCATION/TRAINING PROGRAM

## 2025-04-08 PROCEDURE — 99202 OFFICE O/P NEW SF 15 MIN: CPT | Mod: S$GLB,,, | Performed by: STUDENT IN AN ORGANIZED HEALTH CARE EDUCATION/TRAINING PROGRAM

## 2025-04-08 PROCEDURE — 3288F FALL RISK ASSESSMENT DOCD: CPT | Mod: CPTII,S$GLB,, | Performed by: STUDENT IN AN ORGANIZED HEALTH CARE EDUCATION/TRAINING PROGRAM

## 2025-04-17 ENCOUNTER — OFFICE VISIT (OUTPATIENT)
Dept: NEUROLOGY | Facility: CLINIC | Age: 85
End: 2025-04-17
Payer: MEDICARE

## 2025-04-17 DIAGNOSIS — R41.89 COGNITIVE CHANGE: Primary | ICD-10-CM

## 2025-04-17 PROCEDURE — 99999 PR PBB SHADOW E&M-EST. PATIENT-LVL I: CPT | Mod: PBBFAC,,, | Performed by: STUDENT IN AN ORGANIZED HEALTH CARE EDUCATION/TRAINING PROGRAM

## 2025-04-17 NOTE — PROGRESS NOTES
NEUROPSYCHOLOGY CONSULT    Referral Information  NAME:  Cailin Pickett DATE OF SERVICE: 2025   MRN#:  [id number] EDUCATION: 16   AGE: 84 y.o. HANDEDNESS: Right    : 1940 RACE: Black or    SEX: Female REFERRAL: Eva Zapata MD;  Neurology, [facility]     Evaluation methods: I had the pleasure of seeing Cailin Pickett on 2025 in person at the [facility], Department of Neurology. Data sources for the below report include review of the available medical record and an interview with the patient. At the outset of the appointment, the undersigned explained the rationale for the evaluation along with the limits of confidentiality; and verbal informed consent for this evaluation was obtained.    The chief complaint/medical necessity leading to consultation/medical necessity is: cognitive decline    NEUROPSYCHOLOGICAL EVALUATION - CONFIDENTIAL    SUMMARY/TREATMENT PLAN   Summary of History:  Ms. Pickett is a 84 y.o., right-handed, Black or , female with 16 years of formal education. She was referred by her neurologist due to cognitive concerns in the context of positive biomarker data for Alzheimer's disease the results of which are obscured by end-stage renal disease requiring dialysis. Medical history is additionally notable for a history of pancreatic cancer, left nephrectomy, sick sinus syndrome, neuropathy, chronic pain, and diabetes type 2. Cognitive screening completed by her referring neurology team most-recently on 2023 was within normal limits (MoCA = 28/30). Most-recent neurologic exam completed on 2025 was documented as within normal limits. Most-recent brain MRI completed on 10/18/2022 was documented as within normal limits for age. As above, plasma pTau 181 (21.10) and pTau 217 (5.1) were well-above normal limits.     During interview, Ms. Pickett reported the gradual onset and subsequently relatively-stable course of  cognitive concerns beginning approximately 3 years ago at age 81. Specifically, she characterized difficulties with memory loss for recent information with benefit from cueing, word-finding difficulties, mental tracking difficulties, and slowed thinking speed.     Emotionally, Ms. Pickett denied current or historic mood concerns or any history of suicidal ideation. She has reduced sleep at night, achieving approximately 5.5 hours on average, though sleeps during the mid-afternoon for approximately 3 hours most days. She denied parasomnias or symptoms of sleep apnea. Ms. Pickett denied changes in functional independence related to her above cognitive concerns.    Diagnoses  1. Cognitive change            PLAN/ RECOMMENDATIONS     Provider Recommendations:   On the basis of the above summary, neuropsychological testing is clinically indicated at this time. Ms. Pickett will be scheduled for comprehensive neuropsychological testing. A detailed report including detailed diagnostic information and recommendations will be completed after testing has been completed.     Patient Recommendations:  The next step in your care is to complete neuropsychological testing. Our office staff will reach out to you to schedule an appointment for the testing portion of your evaluation. Please review your after visit summary for more information about your testing appointment.     The above plan/ recommendations were discussed with Ms. Pickett during her appointment today.     Thank you for allowing me to participate in Ms. Vo care.  If you have any questions, please contact me at [phone].        Ms. Pickett will be provided the results of the evaluation.     Thank you for allowing me to participate in Ms. Tavo burt.  If you have any questions, please contact me at [phone].        _____________________  Lawrence Silva, Ph.D.  Neuropsychologist  Department of Neuropsychology  [facility], [place]    HISTORY OF  PRESENT ILLNESS: Ms. Cailin Pickett is an 84 y.o., right-handed, -American female with 16 years of education who was referred for a neuropsychological evaluation in the setting of memory loss, with positive AD biomarker data obscured by end-stage renal disease.     REVIEW OF SYSTEMS    COGNITIVE     Attention/Working Memory: Patient reports losing train of thought.     Executive Functioning: Patient denies difficulty organizing, difficulty planning multistep projects, difficulty multitasking or poor problem solving.     Processing Speed: Patient reports slowed thinking. Patient denies difficulty following fast conversations.     Language: Patient reports word finding problems and words coming later. Patient denies paraphasias, receptive language difficulty, trouble reading, changes to speech pattern or poor spelling.     Visuospatial: Patient denies Visuospatial symptoms.      Learning and Memory:  Patient reports short-term memory loss. Patient denies long-term memory loss or cues not useful.     Onset: Patient reports onset of symptoms were gradual  At age 81.    Course: Course of symptoms have been stable.    BEHAVIORAL: Patient denies personality changes, impulsive behavior, disinhibition, apathy/indifference, visual hallucinations or auditory hallucinations.        Comments: Ms. Pickett denied a history of depression or anxiety.     She reported visual illusions after surgery for pancreatic cancer in 2013.     SLEEP: Patient reports terminal insomnia. Patient denies acts out dreams, hypersomnia and sleep Apnea.        Comments: She goes to bed typically at approximately 22:30-23:00 and awakens at 04:30 to eat in time to get to dialysis at 06:45. She naps during the day typically at 12:00-15:00. She does dialysis M/W/F for 3.5 hours.     PHYSICAL: Patient reports poor appetite, urinary symptoms and chronic pain. Patient denies weight loss or trouble swallowing.        Comments: She reported possible  "subtle myoclonic jerking in her hands.     She is status post left nephrectomy, with reduced urine output as a result of renal disease in her right kidney.     She discussed chronic knee pain, and neuropathic pain in her feet, which affects her sleep. She denied current knee pain, though reported "6"/ 10 knee pain due to walking immediately before this appointment.     SAFETY CONCERNS: Patient denies Safety Concerns.   Supervision Status   Usually alone    FUNCTIONAL ABILITIES:   Bathing / Grooming:  Independent  Feeding:  Independent  Dressing:  Independent  Toileting:  Independent  Financial management:  Independent  Medication management:  Independent  Appointment management:  Independent  Driving:  Independent  Cooking:  Independent  Shopping:  Independent  Housework:  Independent    EDUCATION:   Years of Education:  16   No GED Obtained    OCCUPATION  She worked as a  for the [employer] in [place]; a branch of [employer]. 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       BRAIN HEALTH RISK FACTORS:  Hearing Loss: Denied difficulties hearing conversations, though she reported some right hear hearing loss.   Vision Loss: Denied  Physical Activity: She exercises daily by walking, and doing yard work.   Social Isolation: Denied    MEDICAL HISTORY: Ms. Pickett  has a past medical history of Anemia, CKD (chronic kidney disease) stage 5, GFR less than 15 ml/min (03/14/2019), CKD (chronic kidney disease), stage III, Diabetes mellitus type II, Encounter for blood transfusion, Family history of colonic polyps (07/18/2017), Gout, unspecified, Hyperlipidemia, Hypertension, Neuropathy, Pancreatic cancer, Renal failure, Secondary hyperparathyroidism of renal origin (03/14/2019), and Thyroid disease.    NEUROIMAGING:  Results for orders placed or performed during the hospital encounter of 10/18/22   MRI Brain Without Contrast    Narrative    " EXAMINATION:  MRI BRAIN WITHOUT CONTRAST    CLINICAL HISTORY:  Mild cognitive impairment of uncertain or unknown etiologyMemory loss;    TECHNIQUE:  Sagittal T1. Axial T1, T2, T2 FLAIR, GRE, DWI. Coronal T2 FLAIR.    COMPARISON:  None available.    FINDINGS:  There is no restricted diffusion. Brain demonstrates normal signal intensity with consideration to age.    Mild generalized cerebral atrophy.  The medial temporal lobe atrophy score is 0 bilaterally.  Midline structures are normal. There are preserved arterial flow-voids on T2 weighted imaging. The paranasal sinuses and mastoid air cells are clear.    No abnormal marrow signal is identified.      Impression    Mild generalized cerebral atrophy, not unusual with consideration to the patient's age.  No other significant MRI abnormality.      Electronically signed by: Horacio Rose Jr., MD  Date:    10/18/2022  Time:    14:37     *Note: Due to a large number of results and/or encounters for the requested time period, some results have not been displayed. A complete set of results can be found in Results Review.       Current medications: Ms. Pickett has a current medication list which includes the following prescription(s): allopurinol, atorvastatin, bd danny 2nd gen pen needle, bd ultra-fine micro pen needle, blood sugar diagnostic, co-enzyme q-10, spikevax 7527-1923(12y up)(pf), docusate sodium, gabapentin, tresiba flextouch u-100, ipratropium, lokelma, montelukast, omeprazole, onetouch delica plus lancet, pen needle, diabetic, sevelamer carbonate, and tramadol.     Review of patient's allergies indicates:   Allergen Reactions    Allegra [fexofenadine] Swelling    Codeine Hives, Itching and Nausea And Vomiting       PSYCHIATRIC HISTORY: Denied    SUICIDAL IDEATION:  History: Denied  Current Stressors: Denied  Active Suicidal Ideation:Denied  Plan/Intent: Denied    FAMILY HISTORY: family history includes Breast cancer in her mother, sister, and sister; Cancer in  her brother and father; Diabetes in her brother; Heart disease (age of onset: 60) in her mother; Hypertension in her mother; Kidney disease in her brother; Stroke in her mother.    PSYCHOSOCIAL HISTORY:   Education:   Learning Difficulties: Denied   Special Education: Denied   Repeated Grade: Denied    Relationship Status/ Support Network:   : Yes for 39 years this month.    : No   Children: None    SUBSTANCE USE: Ms. Pickett denied a history of problematic alcohol or recreational substance use.  Tobacco Products: She is a former smoker who quit in 2001, after approximately a 20 pack-year history.     MENTAL STATUS AND OBSERVATIONS:  APPEARANCE: Casually dressed and adequate grooming/hygiene.   ALERTNESS/ORIENTATION: Attentive and alert. Ms. Pickett was fully oriented to person, place, time, and situation.   GAIT/MOTOR: She had mildly antalgic gait. No other abnormalities were observed.    SENSORY: Vision and hearing were adequate for testing.   SPEECH/LANGUAGE: Normal in rate, rhythm, tone, and volume. Expressive and receptive language were grossly intact, noting a few isolated word-finding errors of unclear significance.   MOOD/AFFECT: Mood was euthymic and affect was mood-congruent.   INTERPERSONAL BEHAVIOR: Rapport was quickly and easily established   THOUGHT PROCESSES: Thoughts seemed logical and goal-directed. She demonstrated good recent and prospective memory and was a good historian.      Billing Documentation     Time spent conducting face to face interview with the patient: 34 minutes; [id number].      Referral Diagnosis: R41.89 (ICD-10-CM) - Cognitive change

## 2025-05-08 ENCOUNTER — OFFICE VISIT (OUTPATIENT)
Dept: NEUROLOGY | Facility: CLINIC | Age: 85
End: 2025-05-08
Payer: MEDICARE

## 2025-05-08 DIAGNOSIS — R41.9 COGNITIVE COMPLAINTS WITH NORMAL NEUROPSYCHOLOGICAL EXAM: ICD-10-CM

## 2025-05-08 PROCEDURE — 99499 UNLISTED E&M SERVICE: CPT | Mod: S$GLB,,, | Performed by: STUDENT IN AN ORGANIZED HEALTH CARE EDUCATION/TRAINING PROGRAM

## 2025-05-08 PROCEDURE — 96133 NRPSYC TST EVAL PHYS/QHP EA: CPT | Mod: S$GLB,,, | Performed by: STUDENT IN AN ORGANIZED HEALTH CARE EDUCATION/TRAINING PROGRAM

## 2025-05-08 PROCEDURE — 99999 PR PBB SHADOW E&M-EST. PATIENT-LVL II: CPT | Mod: PBBFAC,,, | Performed by: STUDENT IN AN ORGANIZED HEALTH CARE EDUCATION/TRAINING PROGRAM

## 2025-05-08 PROCEDURE — 96139 PSYCL/NRPSYC TST TECH EA: CPT | Mod: S$GLB,,, | Performed by: STUDENT IN AN ORGANIZED HEALTH CARE EDUCATION/TRAINING PROGRAM

## 2025-05-08 PROCEDURE — 96132 NRPSYC TST EVAL PHYS/QHP 1ST: CPT | Mod: S$GLB,,, | Performed by: STUDENT IN AN ORGANIZED HEALTH CARE EDUCATION/TRAINING PROGRAM

## 2025-05-08 PROCEDURE — 96138 PSYCL/NRPSYC TECH 1ST: CPT | Mod: S$GLB,,, | Performed by: STUDENT IN AN ORGANIZED HEALTH CARE EDUCATION/TRAINING PROGRAM

## 2025-05-15 NOTE — PROGRESS NOTES
NEUROPSYCHOLOGY CONSULT    Referral Information  NAME:  Cailin Pickett DATE OF SERVICE: 2025   MRN#:  [id number] EDUCATION: 16   AGE: 84 y.o. HANDEDNESS: Right    : 1940 RACE: Black or    SEX: Female REFERRAL: Eva Zapata MD;  Neurology, [facility]     Evaluation methods: I had the pleasure of seeing Cailin Pickett on 2025 in person at the [facility], Department of Neurology. Data sources for the below report include review of the available medical record, an interview with the patient on 2025, and administration of a series of neuropsychological tests listed in the Results section of this report. At the outset of the appointment, the undersigned explained the rationale for the evaluation along with the limits of confidentiality; and verbal informed consent for this evaluation was obtained.    The chief complaint/medical necessity leading to consultation/medical necessity is: cognitive decline    NEUROPSYCHOLOGICAL EVALUATION - CONFIDENTIAL    SUMMARY/TREATMENT PLAN   Summary of History:  Ms. Pickett is a 84 y.o., right-handed, Black or , female with 16 years of formal education. She was referred by her neurologist due to cognitive concerns in the context of positive biomarker data for Alzheimer's disease the results of which are obscured by end-stage renal disease requiring dialysis. Medical history is additionally notable for a history of pancreatic cancer, left nephrectomy, sick sinus syndrome, neuropathy, chronic pain, and diabetes type 2. Cognitive screening completed by her referring neurology team most-recently on 2023 was within normal limits (MoCA = 28/30). Most-recent neurologic exam completed on 2025 was documented as within normal limits. Most-recent brain MRI completed on 10/18/2022 was documented as within normal limits for age. As above, plasma pTau 181 (21.10) and pTau 217 (5.1) were well-above normal  "limits.      During interview, Ms. Pickett reported the gradual onset and subsequently relatively-stable course of cognitive concerns beginning approximately 3 years ago at age 81. Specifically, she characterized difficulties with memory loss for recent information with benefit from cueing, word-finding difficulties, mental tracking difficulties, and slowed thinking speed.      Emotionally, Ms. Pickett denied current or historic mood concerns or any history of suicidal ideation. She has reduced sleep at night, achieving approximately 5.5 hours on average, though sleeps during the mid-afternoon for approximately 3 hours most days. She denied parasomnias or symptoms of sleep apnea. Ms. Pickett denied changes in functional independence related to her above cognitive concerns.    Test Results:      Table of Normative Performances   Extremely high   (98+ %ile)             Above average  (91-97 %ile)    X  X        High average  (75-90 %ile)    X  X   X    Average  (25-74 %ile) X X X X  X X X  R/L   Low average  (9-24 %ile)     X  X  X    Below average  (2-8 %ile)  X           Extremely low  (< 2 %ile)              Est. Baseline Attention Working Memory Proc. Speed Verbal Memory Visual Memory Language Visual- Spatial Executive Motor   Note: Scores are marked by an "x." Where performances vary, two "x" marks are placed to show the range. Detailed data may be found in the Results section of this report.   WNL = Within Normal Limits; used for tests that are not normally distributed. R = right hand, L = left hand.      Key Findings: Cognitive test results are most-notable for normative strengths on tasks of memory for verbal information with narrative structure; and select working memory tasks. Memory performances were otherwise notable for excellent retention, with 150% retention on a visual learning and memory task suggesting against forgetting. Her isolated low single-trial learning and recognition for rote verbal " information is thought consistent with normal intra-individual variability in this context. All other test performances were consistent with her estimated baseline. Behaviorally, she was suspicious of a recent encounter with her eye doctor, though did not discuss content of delusional intensity. She did not endorse clinically significant symptoms of depression or anxiety on screening questionnaires, consistent with her report during interview.     Diagnostic Considerations: Cognitive test performances reflect a normal neuropsychological exam.     Etiologic Considerations: Normal-range cognitive test results support her neurologist's contention that the above biomarker studies are elevated due to advanced kidney disease, vs. Preclinical Alzheimer's disease. Ongoing monitoring remains indicated.     Diagnoses  1. Cognitive complaints with normal neuropsychological exam  Ambulatory referral/consult to Neuropsychology         Provider Recommendations:     Provider Recommendations:   Ms. Pickett will be encouraged to follow up with her referring provider in order to integrate these findings into the overall context of her clinical care, and to implement any of the below recommendations as appropriate.     Continued close management of Ms. Pickett's cerebrovascular risk factors will be important moving forward.     Medications to manage cognitive decline may be deferred at this time.     Repeat assessment is recommended in 12 months, in order to assess for changes over time and update her treatment plan. Scores from this assessment should be used as a baseline for comparison at that time.     Patient Recommendations:  The next step in your care is to follow up with your referring provider in order to help with continued management of your care. The below recommendations may help you and your family compensate for your difficulties and better understand the reasons for your cognitive changes.     At this time the most  important thing you can do to reduce your risk of future cognitive decline is to engage in prevention strategies, including those referenced below. Follow with your nephrology, primary care, and neurology teams for management of medical risk factors.     There are steps you can take to improve your long-term brain health and reduce your risk for cognitive decline in the future. I encourage you to follow the recommendations in your daily life:  Continue to follow your regimen for dialysis.   Engage in physical activity (i.e., something that gets your heart rate up) totaling at least 15-30 minutes per day. Regular exercise is very important for both long-term health and stress management. Walking, hiking, elliptical, stationary biking, dancing, and yoga are all good options. If you have any physical limitations or health conditions that may impact your ability to exercise, consult with your doctor and/or a physical therapist to develop a regimen that works for you.  Work closely with your doctor(s) to manage any vascular conditions such as high blood pressure, high cholesterol, and diabetes. Follow the management guidelines from the American Heart Association at www.heart.org.  Remain mentally engaged by keeping socially active, reading, learning new skills, playing games, or participating in a hobby.  Get a good night's sleep by avoiding screens 30-60 minutes before bed and sticking to a regular sleep schedule.  Eat a balanced, heart-healthy diet that is low in salt, saturated fats, and added sugar. The Mediterranean diet has been shown to be especially healthy; studies show that it may reduce the risk of developing dementia and slow the rate of age-related cognitive decline.      Repeat assessment is indicated in 12 months, or sooner in the event that you or your family notice significant cognitive or functional declines. At that time scores from this assessment should be used as a baseline for comparison.      Ms.  Piyush will be provided the results of the evaluation.     Thank you for allowing me to participate in Ms. Vo care.  If you have any questions, please contact me at [phone].        _____________________  Lawrence Silva, Ph.D.  Neuropsychologist  Department of Neuropsychology  [facility], [place]    HISTORY OF PRESENT ILLNESS: Ms. Cailin Pickett is an 84 y.o., right-handed, -American female with 16 years of education who was referred for a neuropsychological evaluation in the setting of memory loss, with positive AD biomarker data obscured by end-stage renal disease.      REVIEW OF SYSTEMS     COGNITIVE      Attention/Working Memory: Patient reports losing train of thought.      Executive Functioning: Patient denies difficulty organizing, difficulty planning multistep projects, difficulty multitasking or poor problem solving.      Processing Speed: Patient reports slowed thinking. Patient denies difficulty following fast conversations.      Language: Patient reports word finding problems and words coming later. Patient denies paraphasias, receptive language difficulty, trouble reading, changes to speech pattern or poor spelling.      Visuospatial: Patient denies Visuospatial symptoms.       Learning and Memory:  Patient reports short-term memory loss. Patient denies long-term memory loss or cues not useful.      Onset: Patient reports onset of symptoms were gradual  At age 81.     Course: Course of symptoms have been stable.     BEHAVIORAL: Patient denies personality changes, impulsive behavior, disinhibition, apathy/indifference, visual hallucinations or auditory hallucinations.        Comments: Ms. Pickett denied a history of depression or anxiety.      She reported visual illusions after surgery for pancreatic cancer in 2013.      SLEEP: Patient reports terminal insomnia. Patient denies acts out dreams, hypersomnia and sleep Apnea.        Comments: She goes to bed typically at  "approximately 22:30-23:00 and awakens at 04:30 to eat in time to get to dialysis at 06:45. She naps during the day typically at 12:00-15:00. She does dialysis M/W/F for 3.5 hours.      PHYSICAL: Patient reports poor appetite, urinary symptoms and chronic pain. Patient denies weight loss or trouble swallowing.        Comments: She reported possible subtle myoclonic jerking in her hands.      She is status post left nephrectomy, with reduced urine output as a result of renal disease in her right kidney.      She discussed chronic knee pain, and neuropathic pain in her feet, which affects her sleep. She denied current knee pain, though reported "6"/ 10 knee pain due to walking immediately before this appointment.      SAFETY CONCERNS: Patient denies Safety Concerns.   Supervision Status   Usually alone     FUNCTIONAL ABILITIES:   Bathing / Grooming:  Independent  Feeding:  Independent  Dressing:  Independent  Toileting:  Independent  Financial management:  Independent  Medication management:  Independent  Appointment management:  Independent  Driving:  Independent  Cooking:  Independent  Shopping:  Independent  Housework:  Independent     EDUCATION:   Years of Education:  16   No GED Obtained     OCCUPATION  She worked as a  for the [employer] in [place]; a branch of [employer]. 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         BRAIN HEALTH RISK FACTORS:  Hearing Loss: Denied difficulties hearing conversations, though she reported some right hear hearing loss.   Vision Loss: Denied  Physical Activity: She exercises daily by walking, and doing yard work.   Social Isolation: Denied    MEDICAL HISTORY: Ms. Pickett  has a past medical history of Anemia, CKD (chronic kidney disease) stage 5, GFR less than 15 ml/min (03/14/2019), CKD (chronic kidney disease), stage III, Diabetes mellitus type II, Encounter for blood transfusion, Family history of " colonic polyps (07/18/2017), Gout, unspecified, Hyperlipidemia, Hypertension, Neuropathy, Pancreatic cancer, Renal failure, Secondary hyperparathyroidism of renal origin (03/14/2019), and Thyroid disease.    NEUROIMAGING:  Results for orders placed or performed during the hospital encounter of 10/18/22   MRI Brain Without Contrast    Narrative    EXAMINATION:  MRI BRAIN WITHOUT CONTRAST    CLINICAL HISTORY:  Mild cognitive impairment of uncertain or unknown etiologyMemory loss;    TECHNIQUE:  Sagittal T1. Axial T1, T2, T2 FLAIR, GRE, DWI. Coronal T2 FLAIR.    COMPARISON:  None available.    FINDINGS:  There is no restricted diffusion. Brain demonstrates normal signal intensity with consideration to age.    Mild generalized cerebral atrophy.  The medial temporal lobe atrophy score is 0 bilaterally.  Midline structures are normal. There are preserved arterial flow-voids on T2 weighted imaging. The paranasal sinuses and mastoid air cells are clear.    No abnormal marrow signal is identified.      Impression    Mild generalized cerebral atrophy, not unusual with consideration to the patient's age.  No other significant MRI abnormality.      Electronically signed by: Horacio Rose Jr., MD  Date:    10/18/2022  Time:    14:37     *Note: Due to a large number of results and/or encounters for the requested time period, some results have not been displayed. A complete set of results can be found in Results Review.       Current medications: Ms. Pickett has a current medication list which includes the following prescription(s): allopurinol, atorvastatin, bd danny 2nd gen pen needle, bd ultra-fine micro pen needle, blood sugar diagnostic, co-enzyme q-10, spikevax [id number](12y up)(pf), docusate sodium, gabapentin, tresiba flextouch u-100, ipratropium, lokelma, montelukast, omeprazole, onetouch delica plus lancet, pen needle, diabetic, sevelamer carbonate, and tramadol.     Review of patient's allergies indicates:   Allergen  Reactions    Allegra [fexofenadine] Swelling    Codeine Hives, Itching and Nausea And Vomiting       PSYCHIATRIC HISTORY: Denied     SUICIDAL IDEATION:  History: Denied  Current Stressors: Denied  Active Suicidal Ideation:Denied  Plan/Intent: Denied     FAMILY HISTORY: family history includes Breast cancer in her mother, sister, and sister; Cancer in her brother and father; Diabetes in her brother; Heart disease (age of onset: 60) in her mother; Hypertension in her mother; Kidney disease in her brother; Stroke in her mother.     PSYCHOSOCIAL HISTORY:   Education:              Learning Difficulties: Denied              Special Education: Denied              Repeated Grade: Denied     Relationship Status/ Support Network:              : Yes for 39 years this month.               : No              Children: None     SUBSTANCE USE: Ms. Pickett denied a history of problematic alcohol or recreational substance use.  Tobacco Products: She is a former smoker who quit in 2001, after approximately a 20 pack-year history.      INTERVIEW MENTAL STATUS AND OBSERVATIONS (04/17/2025):  APPEARANCE: Casually dressed and adequate grooming/hygiene.   ALERTNESS/ORIENTATION: Attentive and alert. Ms. Pickett was fully oriented to person, place, time, and situation.   GAIT/MOTOR: She had mildly antalgic gait. No other abnormalities were observed.    SENSORY: Vision and hearing were adequate for testing.   SPEECH/LANGUAGE: Normal in rate, rhythm, tone, and volume. Expressive and receptive language were grossly intact, noting a few isolated word-finding errors of unclear significance.   MOOD/AFFECT: Mood was euthymic and affect was mood-congruent.   INTERPERSONAL BEHAVIOR: Rapport was quickly and easily established   THOUGHT PROCESSES: Thoughts seemed logical and goal-directed. She demonstrated good recent and prospective memory and was a good historian.    TESTING OBSERVATIONS: Ms. Pickett did all that was asked of her  without complaint or criticism and appeared to persist well throughout the evaluation. 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 She reported to the examiner that she went to the eye doctor and as soon as she got home she received a phone call trying to sell her sun glasses. She expressed concern that the doctor sold her personal information. She asked if we sold her personal informationa and was reassured that her information would remain confidential consistent with HIPPA guidelines.     RESULTS     Tests Administered (manual norms used unless otherwise indicated): [employer] - Test of Premorbid Functioning (TOPF), Dot Counting Test (DCT), Wechsler Adult Intelligence Scale 4th Ed. (WAIS-IV) select subtests, Controlled Oral Word Association Test (COWAT; [facility] norms), Semantic Fluency (Animals; [facility] norms), Neuropsychological Assessment Battery (NAB) Naming and Auditory Comprehension, Harry Verbal Learning Test, Revised (HVLT-R), Wechsler Memory Scale, 4th Ed. Logical Memory (WMS IV-LM), Brief Visuospatial Memory Test, Revised (BVMT-R), Trail Making Test (TMT A/B; Dima norms), Grooved Pegboard Test (Dima norms), Geriatric Depression Scale (GDS) and Russell Anxiety Inventory (KELSEY).    Score Label T-Score Standard Score Z-Score Scaled Score %ile Rank   Exceptionally High > 70 > 130 > 2.0  > 16 > 98   Above Average 64-69 120-129 1.4-1.9 15 91-97   High Average 57-63 110-119 0.7-1.3 12-14 75-90   Average 44-56  0.6 to -0.6 8-11 25-74   Low Average 37-43 80-89 -1.3 to -0.7 6-7 9-24   Below Average 30-36 70-79 -2.0 to -1.4 4-5 2-8   Exceptionally Low < 30 < 70  < -2.0 < 4 < 2       Performance Validity: Ms. Pickett completed both stand-alone and embedded measures of task engagement. Below-cutoff performance on any one stand-alone measure and/ or any two embedded measures of task  engagement is highly unlikely to occur outside the context of poor or inconsistent effort during testing. Ms. Pickett scored above predetermined cutoffs on all administered measures of task engagement. As such, there is no evidence to suggest poor or inconsistent engagement and their performance is deemed to be a reasonable reflection of their day-to-day cognitive status.       PREMORBID FUNCTIONING Raw Score Type of Standardized Score Standardized Score Percentile/CP Descriptor   TOPF simple dem. eFSIQ - SS 96 39 Average   TOPF pred. eFSIQ -  53 Average   TOPF simple + pred. eFSIQ - SS 96 39 Average   INTELLECTUAL FUNCTIONING Raw Score Type of Standardized Score Standardized Score Percentile/CP Descriptor   WAIS-IV         VCI - SS 87 19 Low Average   JUDE - SS 94 34 Average   WMI -  50 Average   PSI -  63 Average   FSIQ - SS 94 34 Average   GAI - ss 89 23 Low Average   LANGUAGE FUNCTIONING Raw Score Type of Standardized Score Standardized Score Percentile/CP Descriptor   WAIS-IV Information 9 ss 8 25 Average   TOPF Word Reading 38 SS 99 47 Average   NAB Auditory Comprehension 85 Tscore 40 16 Low Average   NAB Auditory Comprehension Colors 13 - - 100 WNL   NAB Auditory Comprehension Shapes 21 - - 10 Low Average   NAB Auditory Comprehension Colors/Shapes/Numbers 19 - - 5 Below Average   NAB Auditory Comprehension Pointing 6 - - 100 WNL   NAB Auditory Comprehension Yes/No 10 - - 100 WNL   NAB Auditory Comprehension Paper Folding 16 - - 41 Average   NAB Naming 28 Tscore 44 27 Average   NAB Naming Percent Correct After Semantic Cuing 0 - - 38 Average   NAB Naming Percent Correct After Phonemic Cuing 67 - - 54 Average   COWAT 24 Tscore 41 18 Low Average   Animal Naming 12 Tscore 48 42 Average   Fruit Naming 14 Tscore 53 62 Average   Vegetable Naming 13 Tscore 48 42 Average   Animals/Fruits/Vegetables 39 Tscore 43 24 Low Average   VISUOSPATIAL FUNCTIONING Raw Score Type of Standardized Score Standardized  Score Percentile/CP Descriptor   WAIS-IV Block Design 24 ss 10 50 Average   BVMT-R Copy 12 - - - WNL   LEARNING & MEMORY Raw Score Type of Standardized Score Standardized Score Percentile/CP Descriptor   HVLT-R         Total Immediate (3, 5, 8/ 12) 16 Tscore 37 9 Low Average   Delayed Recall 7 Tscore 48 42 Average   Retention % 88 Tscore 53 62 Average   Hits 8 - - - -   False Positives 0 - - - -   Discrimination  8 Tscore 34 5 Below Average   WMS-IV Subtests         LM I 38 ss 14 91 Above Average   LM II 25 ss 14 91 Above Average   LM Recognition 21 - - >75 High Average   BVMT-R         IR 14 Tscore -0.45 33 Average   DR 9 Tscore 0.89 81 High Average   Discrimination Index 6 - 0.81 79 High Average   ATTENTION/WORKING MEMORY Raw Score Type of Standardized Score Standardized Score Percentile/CP Descriptor   WAIS-IV Digit Span 27 ss 12 75 High Average         DS Forward 9 ss 9 37 Average         DS Backward 10 ss 14 91 Above Average         DS Sequence 8 ss 12 75 High Average         Longest Digit Forward 6 - - - -         Longest Digit Backward 5 - - - -         Longest Digit Sequence 6 - - - -   WAIS-IV Arithmetic 9 ss 8 25 Average   MENTAL PROCESSING SPEED Raw Score Type of Standardized Score Standardized Score Percentile/CP Descriptor   WAIS-IV Symbol Search 22 ss 12 75 High Average   WAIS-IV Coding 42 ss 10 50 Average   TMT A  34 Tscore 56 73 Average   TMT A Total Err. 0 - - - -   EXECUTIVE FUNCTIONING Raw Score Type of Standardized Score Standardized Score Percentile/CP Descriptor   TMT B 102 Tscore 57 76 High Average   TMT B Total Err. 1 - - - -   TMT B Seq Err. 1 - - - -   WAIS-IV Similarities 14 ss 7 16 Low Average   WAIS-IV Matrix Reasoning 7 ss 8 25 Average   FRONTOMOTOR  Raw Score Type of Standardized Score Standardized Score Percentile/CP Descriptor   GPT  Tscore 47 38 Average   GPT  Tscore 47 38 Average   MOOD & PERSONALITY Raw Score Type of Standardized Score Standardized Score Percentile/CP  Descriptor   GDS-30 9 - - - WNL   KELSEY 5 - - - Minimal   ss = scaled score (mean = 10, SD = 3); SS = standard score (mean = 100, SD = 15); Tscore mean = 50, SD = 10; zscore (mean = 0.00, SD = 1)         Billing Documentation     Time spent conducting face to face interview with the patient: 34 minutes; billed separately.  Time on review of neuropsychological test data and relevant records, data interpretation,  and writing/ documentation: 94 minutes; [id number] &[id number] (x1).  Psychometrist time spent in the administration and scoring of 2 or more neuropsychological tests 251 minutes; [phone] & [phone] (x7).     Referral Diagnoses:  R41.89 (ICD-10-CM) - Cognitive change

## 2025-05-29 ENCOUNTER — PROCEDURE VISIT (OUTPATIENT)
Dept: ORTHOPEDICS | Facility: CLINIC | Age: 85
End: 2025-05-29
Payer: MEDICARE

## 2025-05-29 VITALS
HEIGHT: 65 IN | BODY MASS INDEX: 24.32 KG/M2 | SYSTOLIC BLOOD PRESSURE: 163 MMHG | WEIGHT: 146 LBS | HEART RATE: 70 BPM | DIASTOLIC BLOOD PRESSURE: 64 MMHG

## 2025-05-29 DIAGNOSIS — M21.061 ACQUIRED VALGUS DEFORMITY KNEE, RIGHT: ICD-10-CM

## 2025-05-29 DIAGNOSIS — M25.561 PAIN IN BOTH KNEES, UNSPECIFIED CHRONICITY: Primary | ICD-10-CM

## 2025-05-29 DIAGNOSIS — M25.562 PAIN IN BOTH KNEES, UNSPECIFIED CHRONICITY: Primary | ICD-10-CM

## 2025-05-29 PROCEDURE — 99499 UNLISTED E&M SERVICE: CPT | Mod: S$GLB,,, | Performed by: PHYSICIAN ASSISTANT

## 2025-05-29 PROCEDURE — 20610 DRAIN/INJ JOINT/BURSA W/O US: CPT | Mod: RT,S$GLB,, | Performed by: PHYSICIAN ASSISTANT

## 2025-05-29 RX ORDER — LIDOCAINE HYDROCHLORIDE 10 MG/ML
5 INJECTION, SOLUTION INFILTRATION; PERINEURAL
Status: DISCONTINUED | OUTPATIENT
Start: 2025-05-29 | End: 2025-05-29 | Stop reason: HOSPADM

## 2025-05-29 RX ORDER — METHYLPREDNISOLONE ACETATE 80 MG/ML
80 INJECTION, SUSPENSION INTRA-ARTICULAR; INTRALESIONAL; INTRAMUSCULAR; SOFT TISSUE
Status: DISCONTINUED | OUTPATIENT
Start: 2025-05-29 | End: 2025-05-29 | Stop reason: HOSPADM

## 2025-05-29 RX ADMIN — METHYLPREDNISOLONE ACETATE 80 MG: 80 INJECTION, SUSPENSION INTRA-ARTICULAR; INTRALESIONAL; INTRAMUSCULAR; SOFT TISSUE at 10:05

## 2025-05-29 RX ADMIN — LIDOCAINE HYDROCHLORIDE 5 ML: 10 INJECTION, SOLUTION INFILTRATION; PERINEURAL at 10:05

## 2025-05-29 NOTE — PROCEDURES
Large Joint Aspiration/Injection: R knee    Date/Time: 5/29/2025 10:45 AM    Performed by: Thelma Mathis PA  Authorized by: Thelma Mathis PA    Indications:  Arthritis, joint swelling and pain  Site marked: the procedure site was marked      Local anesthesia used?: Yes    Local anesthetic:  Topical anesthetic    Details:  Needle Size:  21 G  Approach:  Anterolateral  Location:  Knee  Site:  R knee  Medications:  5 mL LIDOcaine HCL 10 mg/ml (1%) 10 mg/mL (1 %); 80 mg methylPREDNISolone acetate 80 mg/mL  Patient tolerance:  Patient tolerated the procedure well with no immediate complications     Verbal consent was obtained  The patient's ID, site, side was verified  The site was sterile prepped in standard fashion  The injection was performed in the yumiko-lateral side without complication  A sterile Band-Aid was applied    Patient was directed to apply ice today at roughly 15 minutes at a time as needed.  It was discussed that they may be sore for the next few days or so.  Please avoid strenuous activity over the next 24 hours.  It was also discussed that the patient may have a increase in glucose if diabetic and should monitor levels.  Patient was instructed to call as needed.

## 2025-06-12 ENCOUNTER — OFFICE VISIT (OUTPATIENT)
Dept: ALLERGY | Facility: CLINIC | Age: 85
End: 2025-06-12
Payer: MEDICARE

## 2025-06-12 VITALS
WEIGHT: 147.06 LBS | OXYGEN SATURATION: 97 % | HEIGHT: 65 IN | BODY MASS INDEX: 24.5 KG/M2 | TEMPERATURE: 98 F | DIASTOLIC BLOOD PRESSURE: 65 MMHG | HEART RATE: 60 BPM | SYSTOLIC BLOOD PRESSURE: 174 MMHG

## 2025-06-12 DIAGNOSIS — J30.89 ALLERGIC RHINITIS DUE TO MOLD: ICD-10-CM

## 2025-06-12 DIAGNOSIS — J30.89 ALLERGIC RHINITIS DUE TO DUST MITE: ICD-10-CM

## 2025-06-12 DIAGNOSIS — R09.82 PND (POST-NASAL DRIP): ICD-10-CM

## 2025-06-12 DIAGNOSIS — J30.1 SEASONAL ALLERGIC RHINITIS DUE TO POLLEN: Primary | ICD-10-CM

## 2025-06-12 DIAGNOSIS — Z91.038 ALLERGY TO COCKROACHES: ICD-10-CM

## 2025-06-12 DIAGNOSIS — J30.81 ALLERGIC RHINITIS DUE TO ANIMAL DANDER: ICD-10-CM

## 2025-06-12 PROCEDURE — 99999 PR PBB SHADOW E&M-EST. PATIENT-LVL III: CPT | Mod: PBBFAC,,, | Performed by: STUDENT IN AN ORGANIZED HEALTH CARE EDUCATION/TRAINING PROGRAM

## 2025-06-12 RX ORDER — METHYLPREDNISOLONE ACETATE 40 MG/ML
40 INJECTION, SUSPENSION INTRA-ARTICULAR; INTRALESIONAL; INTRAMUSCULAR; SOFT TISSUE
Status: COMPLETED | OUTPATIENT
Start: 2025-06-12 | End: 2025-06-12

## 2025-06-12 RX ORDER — CETIRIZINE HCL 1 MG/ML
1 SOLUTION, ORAL ORAL
COMMUNITY
Start: 2025-06-01

## 2025-06-12 RX ORDER — AMITRIPTYLINE HYDROCHLORIDE 10 MG/1
10 TABLET, FILM COATED ORAL NIGHTLY
COMMUNITY
Start: 2025-05-14

## 2025-06-12 RX ADMIN — METHYLPREDNISOLONE ACETATE 40 MG: 40 INJECTION, SUSPENSION INTRA-ARTICULAR; INTRALESIONAL; INTRAMUSCULAR; SOFT TISSUE at 10:06

## 2025-06-12 NOTE — PROGRESS NOTES
Allergy and Immunology  Established Patient Clinic Note    Date: 6/12/2025  Chief Complaint   Patient presents with    Follow-up     History  Cailin Pickett is a 84 y.o. female being seen for follow-up today.    Chronic allergic rhinitis   Postnasal drip  Patient has not fully controlled   Only using ipratropium PRN   Educated on condition   Methylprednisolone injection at this time due to sick visit    Allergies, PMH, PSH, Social, and Family History were reviewed.    Medications Ordered Prior to Encounter[1]    Physical Examination  Vitals:    06/12/25 0951   BP: (!) 174/65   Pulse: 60   Temp: 97.8 °F (36.6 °C)     GENERAL:  female in no apparent distress and well developed and well nourished  HEAD:  Normocephalic, without obvious abnormality, atraumatic  EYES: sclera anicteric, conjunctiva normochromic  EARS: normal TM's and external ear canals both ears  NOSE: discharge present, clear and copious discharge, turbinates pink, swollen    OROPHARYNX: moist mucous membranes without erythema, exudates or petechiae, clear post-nasal drainage present  LYMPH NODES: normal, supple, no lymphadenopathy  LUNGS: clear to auscultation, no wheezes, rales or rhonchi, symmetric air entry.  HEART: normal rate, regular rhythm, normal S1, S2, no murmurs, rubs, clicks or gallops.  ABDOMEN: soft, nontender, nondistended, no masses or organomegaly.  MUSCULOSKELETAL: no gross joint deformity or swelling.  NEURO: alert, oriented, normal speech, no focal findings or movement disorder noted.  SKIN: normal coloration and turgor, no rashes, no suspicious skin lesions noted.     Assessment/Plan:   Problem List Items Addressed This Visit       PND (post-nasal drip)    Seasonal allergic rhinitis due to pollen - Primary    Overview   11/02/2023: Serum IgE to Aeroallergens positive to cat, dog, dust mites, cockroach, mold, and tree/grass/weed pollen         Allergic rhinitis due to dust mite    Overview   11/02/2023: Serum IgE to  "Aeroallergens positive to cat, dog, dust mites, cockroach, mold, and tree/grass/weed pollen         Allergic rhinitis due to animal dander    Overview   11/02/2023: Serum IgE to Aeroallergens positive to cat, dog, dust mites, cockroach, mold, and tree/grass/weed pollen         Allergic rhinitis due to mold    Overview   11/02/2023: Serum IgE to Aeroallergens positive to cat, dog, dust mites, cockroach, mold, and tree/grass/weed pollen         Allergy to cockroaches    Overview   11/02/2023: Serum IgE to Aeroallergens positive to cat, dog, dust mites, cockroach, mold, and tree/grass/weed pollen          Patient has not fully controlled   Only using ipratropium PRN   Educated on condition   Methylprednisolone injection at this time due to sick visit    Follow up:  Follow up in about 6 months (around 12/12/2025).    Medical intervention at this time high complexity    DISCLAIMER: This note was prepared with [employer] voice recognition transcription software. Garbled syntax, mangled pronouns, and other bizarre constructions may be attributed to that software system. While efforts were made to correct any mistakes made by this voice recognition program, some errors and/or omissions may remain in the note that were missed when the note was originally created.     Basilio Reilly MD   [facility] [place]  All[facility] and [facility]       [1]   Current Outpatient Medications on File Prior to Visit   Medication Sig Dispense Refill    allopurinoL (ZYLOPRIM) 100 MG tablet Take 1 tablet (100 mg total) by mouth once daily. 90 tablet 3    amitriptyline (ELAVIL) 10 MG tablet Take 10 mg by mouth every evening.      atorvastatin (LIPITOR) 40 MG tablet Take 1 tablet (40 mg total) by mouth once daily. 90 tablet 3    BD ROSITA 2ND GEN PEN NEEDLE 32 gauge x 5/32" Ndle       BD ULTRA-FINE MICRO PEN NEEDLE 32 gauge x 1/4" Ndle       blood sugar diagnostic Strp USE TO MONITOR BLOOD GLUCOSE DAILY      co-enzyme Q-10 30 mg capsule Take 1 capsule " "(30 mg total) by mouth 2 (two) times daily. 180 capsule 3    COVID [id number],,andu,,PF, (SPIKEVAX [id number],12Y UP,,PF,) 50 mcg/0.5 mL injection Inject into the muscle. 0.5 mL 0    docusate sodium (COLACE) 50 MG capsule Take by mouth.      gabapentin (NEURONTIN) 600 MG tablet Take 1 tablet (600 mg total) by mouth once daily. Daily at 05:00 PM 90 tablet 1    insulin degludec (TRESIBA FLEXTOUCH U-100) 100 unit/mL (3 mL) InPn Inject 8 Units into the skin.       ipratropium (ATROVENT) 42 mcg (0.06 %) nasal spray 2 sprays by Each Nostril route 4 (four) times daily. 15 mL 11    LOKELMA 10 gram packet Take 1 packet by mouth.      montelukast (SINGULAIR) 10 mg tablet Take 1 tablet (10 mg total) by mouth every evening. 90 tablet 3    ONETOUCH DELICA PLUS LANCET 30 gauge Misc 2 (two) times daily. Use to check blood glucose      pen needle, diabetic 32 gauge x 1/4" Ndle Use to test blood sugars bid      sevelamer carbonate (RENVELA) 800 mg Tab Take 2 tablets by mouth.      traMADoL (ULTRAM) 50 mg tablet Take 1 tablet (50 mg total) by mouth every 12 (twelve) hours. 30 tablet 0    ZYRTEC 10 mg tablet Take 1 tablet by mouth.      omeprazole (PRILOSEC) 40 MG capsule Take 1 capsule (40 mg total) by mouth once daily. 30 capsule 11     No current facility-administered medications on file prior to visit.     "

## 2025-06-24 ENCOUNTER — OFFICE VISIT (OUTPATIENT)
Dept: NEUROLOGY | Facility: CLINIC | Age: 85
End: 2025-06-24
Payer: MEDICARE

## 2025-06-24 DIAGNOSIS — R41.9 COGNITIVE COMPLAINTS WITH NORMAL NEUROPSYCHOLOGICAL EXAM: Primary | ICD-10-CM

## 2025-06-24 PROCEDURE — 99499 UNLISTED E&M SERVICE: CPT | Mod: S$GLB,,, | Performed by: STUDENT IN AN ORGANIZED HEALTH CARE EDUCATION/TRAINING PROGRAM

## 2025-06-24 PROCEDURE — 96132 NRPSYC TST EVAL PHYS/QHP 1ST: CPT | Mod: S$GLB,,, | Performed by: STUDENT IN AN ORGANIZED HEALTH CARE EDUCATION/TRAINING PROGRAM

## 2025-06-24 PROCEDURE — 99999 PR PBB SHADOW E&M-EST. PATIENT-LVL I: CPT | Mod: PBBFAC,,, | Performed by: STUDENT IN AN ORGANIZED HEALTH CARE EDUCATION/TRAINING PROGRAM

## 2025-06-24 NOTE — PROGRESS NOTES
Ms. Pickett attended a in-person feedback session to discuss the results from her recent neuropsychological testing (see report dated 05/08/2025). We discussed her test results, diagnoses, and my recommendations. Ms. Pickett voiced her understanding of the information provided, and voiced her intention to follow through on the recommendations provided. All questions were answered to her satisfaction and Ms. Pickett was provided with a copy of her report. she was encouraged to contact our office with any future questions or concerns.         Billing Documentation     Time on review of neuropsychological test data and relevant records, data interpretation, providing feedback about these results, and writing/ documentation: 31 minutes; [id number]          _____________________  Lawrence Silva, Ph.D.  Neuropsychologist  Department of Neuropsychology  [facility], [place]

## 2025-07-23 NOTE — NURSING
Difficulty cannulating; BFW on 200 arterial and venous pressures greater than 200/200 throughout tx, Dr Lincoln notified and that the bedside during tx. Two hrs HD completed removing 1L.  Pt tolerated HD well. No signs of distress noted. De accessed per p&p  Each site held for 5mins, no post bleeding noted   [Time Spent: ___ minutes] : I have spent [unfilled] minutes of time on the encounter which excludes teaching and separately reported services.

## 2025-08-14 ENCOUNTER — TELEPHONE (OUTPATIENT)
Dept: ORTHOPEDICS | Facility: CLINIC | Age: 85
End: 2025-08-14
Payer: MEDICARE

## 2025-08-19 ENCOUNTER — TELEPHONE (OUTPATIENT)
Dept: HEMATOLOGY/ONCOLOGY | Facility: CLINIC | Age: 85
End: 2025-08-19
Payer: MEDICARE

## 2025-08-19 DIAGNOSIS — G62.9 POLYNEUROPATHY: ICD-10-CM

## 2025-08-19 DIAGNOSIS — L29.9 ITCHING: ICD-10-CM

## 2025-08-19 RX ORDER — GABAPENTIN 600 MG/1
600 TABLET ORAL DAILY
Qty: 90 TABLET | Refills: 3 | Status: SHIPPED | OUTPATIENT
Start: 2025-08-19 | End: 2026-08-19

## 2025-08-22 ENCOUNTER — TELEPHONE (OUTPATIENT)
Dept: NEUROLOGY | Facility: CLINIC | Age: 85
End: 2025-08-22
Payer: MEDICARE

## 2025-08-26 ENCOUNTER — LAB VISIT (OUTPATIENT)
Dept: LAB | Facility: HOSPITAL | Age: 85
End: 2025-08-26
Attending: INTERNAL MEDICINE
Payer: MEDICARE

## 2025-08-26 ENCOUNTER — OFFICE VISIT (OUTPATIENT)
Dept: CARDIOLOGY | Facility: CLINIC | Age: 85
End: 2025-08-26
Payer: MEDICARE

## 2025-08-26 VITALS
OXYGEN SATURATION: 96 % | SYSTOLIC BLOOD PRESSURE: 160 MMHG | DIASTOLIC BLOOD PRESSURE: 69 MMHG | WEIGHT: 151.25 LBS | BODY MASS INDEX: 25.17 KG/M2 | HEART RATE: 66 BPM

## 2025-08-26 DIAGNOSIS — I25.10 CORONARY ARTERY CALCIFICATION SEEN ON CAT SCAN: ICD-10-CM

## 2025-08-26 DIAGNOSIS — I65.29 STENOSIS OF CAROTID ARTERY, UNSPECIFIED LATERALITY: ICD-10-CM

## 2025-08-26 DIAGNOSIS — R00.2 PALPITATIONS: ICD-10-CM

## 2025-08-26 DIAGNOSIS — D64.9 ANEMIA, UNSPECIFIED TYPE: ICD-10-CM

## 2025-08-26 DIAGNOSIS — N18.6 ESRD NEEDING DIALYSIS: ICD-10-CM

## 2025-08-26 DIAGNOSIS — I15.2 HYPERTENSION ASSOCIATED WITH DIABETES: ICD-10-CM

## 2025-08-26 DIAGNOSIS — Z99.2 ESRD NEEDING DIALYSIS: ICD-10-CM

## 2025-08-26 DIAGNOSIS — I65.23 BILATERAL CAROTID ARTERY STENOSIS: ICD-10-CM

## 2025-08-26 DIAGNOSIS — I45.9 SKIPPED HEART BEATS: Primary | ICD-10-CM

## 2025-08-26 DIAGNOSIS — E11.59 HYPERTENSION ASSOCIATED WITH DIABETES: ICD-10-CM

## 2025-08-26 PROCEDURE — 1157F ADVNC CARE PLAN IN RCRD: CPT | Mod: CPTII,S$GLB,, | Performed by: INTERNAL MEDICINE

## 2025-08-26 PROCEDURE — 1159F MED LIST DOCD IN RCRD: CPT | Mod: CPTII,S$GLB,, | Performed by: INTERNAL MEDICINE

## 2025-08-26 PROCEDURE — 3288F FALL RISK ASSESSMENT DOCD: CPT | Mod: CPTII,S$GLB,, | Performed by: INTERNAL MEDICINE

## 2025-08-26 PROCEDURE — 3077F SYST BP >= 140 MM HG: CPT | Mod: CPTII,S$GLB,, | Performed by: INTERNAL MEDICINE

## 2025-08-26 PROCEDURE — 1126F AMNT PAIN NOTED NONE PRSNT: CPT | Mod: CPTII,S$GLB,, | Performed by: INTERNAL MEDICINE

## 2025-08-26 PROCEDURE — 1101F PT FALLS ASSESS-DOCD LE1/YR: CPT | Mod: CPTII,S$GLB,, | Performed by: INTERNAL MEDICINE

## 2025-08-26 PROCEDURE — 99214 OFFICE O/P EST MOD 30 MIN: CPT | Mod: S$GLB,,, | Performed by: INTERNAL MEDICINE

## 2025-08-26 PROCEDURE — 3078F DIAST BP <80 MM HG: CPT | Mod: CPTII,S$GLB,, | Performed by: INTERNAL MEDICINE

## 2025-08-26 PROCEDURE — 99999 PR PBB SHADOW E&M-EST. PATIENT-LVL IV: CPT | Mod: PBBFAC,,, | Performed by: INTERNAL MEDICINE

## 2025-08-26 RX ORDER — AMLODIPINE BESYLATE 2.5 MG/1
2.5 TABLET ORAL DAILY
COMMUNITY

## 2025-09-04 ENCOUNTER — HOSPITAL ENCOUNTER (OUTPATIENT)
Dept: RADIOLOGY | Facility: HOSPITAL | Age: 85
Discharge: HOME | End: 2025-09-04
Attending: PHYSICIAN ASSISTANT
Payer: MEDICARE

## 2025-09-04 ENCOUNTER — PROCEDURE VISIT (OUTPATIENT)
Dept: ORTHOPEDICS | Facility: CLINIC | Age: 85
End: 2025-09-04
Payer: MEDICARE

## 2025-09-04 VITALS
BODY MASS INDEX: 25.2 KG/M2 | HEIGHT: 65 IN | SYSTOLIC BLOOD PRESSURE: 147 MMHG | WEIGHT: 151.25 LBS | HEART RATE: 72 BPM | DIASTOLIC BLOOD PRESSURE: 61 MMHG

## 2025-09-04 DIAGNOSIS — M25.561 PAIN IN BOTH KNEES, UNSPECIFIED CHRONICITY: ICD-10-CM

## 2025-09-04 DIAGNOSIS — Z96.652 HISTORY OF TOTAL LEFT KNEE REPLACEMENT: ICD-10-CM

## 2025-09-04 DIAGNOSIS — M25.562 PAIN IN BOTH KNEES, UNSPECIFIED CHRONICITY: ICD-10-CM

## 2025-09-04 DIAGNOSIS — M21.061 ACQUIRED VALGUS DEFORMITY KNEE, RIGHT: ICD-10-CM

## 2025-09-04 DIAGNOSIS — M17.11 ARTHRITIS OF KNEE, RIGHT: Primary | ICD-10-CM

## 2025-09-04 PROCEDURE — 73564 X-RAY EXAM KNEE 4 OR MORE: CPT | Mod: 26,50,, | Performed by: RADIOLOGY

## 2025-09-04 PROCEDURE — 73564 X-RAY EXAM KNEE 4 OR MORE: CPT | Mod: TC,50
